# Patient Record
Sex: FEMALE | Race: BLACK OR AFRICAN AMERICAN | Employment: OTHER | URBAN - METROPOLITAN AREA
[De-identification: names, ages, dates, MRNs, and addresses within clinical notes are randomized per-mention and may not be internally consistent; named-entity substitution may affect disease eponyms.]

---

## 2017-01-13 ENCOUNTER — HOSPITAL ENCOUNTER (INPATIENT)
Age: 77
LOS: 6 days | Discharge: NURSING, REHAB OR LONG-TERM CARE | DRG: 065 | End: 2017-01-20
Attending: EMERGENCY MEDICINE | Admitting: INTERNAL MEDICINE
Payer: MEDICARE

## 2017-01-13 DIAGNOSIS — R29.898 ARM WEAKNESS: Primary | ICD-10-CM

## 2017-01-13 DIAGNOSIS — E11.42 TYPE 2 DIABETES MELLITUS WITH DIABETIC POLYNEUROPATHY, WITH LONG-TERM CURRENT USE OF INSULIN (HCC): ICD-10-CM

## 2017-01-13 DIAGNOSIS — I10 ESSENTIAL HYPERTENSION: ICD-10-CM

## 2017-01-13 DIAGNOSIS — G81.94 ACUTE LEFT HEMIPARESIS (HCC): ICD-10-CM

## 2017-01-13 DIAGNOSIS — I63.02 CEREBROVASCULAR ACCIDENT (CVA) DUE TO THROMBOSIS OF BASILAR ARTERY (HCC): ICD-10-CM

## 2017-01-13 DIAGNOSIS — I63.50 RIGHT PONTINE STROKE (HCC): ICD-10-CM

## 2017-01-13 DIAGNOSIS — Z79.4 TYPE 2 DIABETES MELLITUS WITH DIABETIC POLYNEUROPATHY, WITH LONG-TERM CURRENT USE OF INSULIN (HCC): ICD-10-CM

## 2017-01-13 LAB
GLUCOSE BLD STRIP.AUTO-MCNC: 287 MG/DL (ref 65–100)
SERVICE CMNT-IMP: ABNORMAL

## 2017-01-13 PROCEDURE — 99285 EMERGENCY DEPT VISIT HI MDM: CPT

## 2017-01-13 PROCEDURE — 82962 GLUCOSE BLOOD TEST: CPT

## 2017-01-13 NOTE — IP AVS SNAPSHOT
[address] [place] [facility]. 
[phone] Patient: Laurent Kim MRN: [id number] [id number] You are allergic to the following Allergen Reactions Amlodipine Swelling Nifedipine Swelling Sulfa (Sulfonamide Antibiotics) Rash Recent Documentation Weight Breastfeeding? BMI OB Status Smoking Status 102.2 kg No 38.08 kg/m2 Hysterectomy Never Smoker Emergency Contacts Name Discharge Info Relation Home Work Mobile Ko Klelogg DISCHARGE CAREGIVER [3] Son [22] [phone] Lalit Kellogg DISCHARGE CAREGIVER [3] Son [22] [phone] About your hospitalization You were admitted on:  January 14, 2017 You last received care in the:  [facility] 3 NEUROSCIENCE TELEMETRY You were discharged on:  January 20, 2017 Unit phone number:  [phone] Why you were hospitalized Your primary diagnosis was:  Not on File Your diagnoses also included:  Stroke (Cerebrum) (Hcc), Right Pontine Stroke (Hcc), Stenosis Of Both Internal Carotid Arteries Providers Seen During Your Hospitalizations Provider Role Specialty Primary office phone Carmen Baez MD Attending Provider Emergency Medicine [phone] Greg Pacheco MD Attending Provider Internal Medicine [phone]  
 Gee Cole MD Attending Provider Hospitalist [phone] Your Primary Care Physician (PCP) Primary Care Physician Office Phone Office Fax Juliet April [phone] Follow-up Information Follow up With Details Comments Contact Info [address]  This is your post acute care provider of choice  [address] [facility] 
[phone] Joslyn Tijerina MD In 2 weeks  [address] [address] 
[phone] Current Discharge Medication List  
  
 START taking these medications Dose & Instructions Dispensing Information Comments Morning Noon Evening Bedtime  
 clopidogrel 75 mg Tab Commonly known as:  PLAVIX Your next dose is: Today, Tomorrow Other:  _________ Dose:  75 mg Take 1 Tab by mouth daily. Quantity:  30 Tab Refills:  6  
     
   
   
   
  
 oxyCODONE IR 5 mg immediate release tablet Commonly known as:  Royetta Elidia Your next dose is: Today, Tomorrow Other:  _________ Dose:  5-10 mg Take 1-2 Tabs by mouth every six (6) hours as needed. Max Daily Amount: 40 mg.  
 Quantity:  30 Tab Refills:  0  
     
   
   
   
  
 predniSONE 20 mg tablet Commonly known as:  Lelia Pound Your next dose is: Today, Tomorrow Other:  _________ Dose:  20 mg Take 1 Tab by mouth daily (with breakfast) for 2 days. Quantity:  2 Tab Refills:  0 CONTINUE these medications which have CHANGED Dose & Instructions Dispensing Information Comments Morning Noon Evening Bedtime LANTUS SOLOSTAR 100 unit/mL (3 mL) pen Generic drug:  insulin glargine What changed:  Another medication with the same name was removed. Continue taking this medication, and follow the directions you see here. Your next dose is: Today, Tomorrow Other:  _________ Dose:  60 Units 60 Units by SubCUTAneous route nightly. Refills:  0 CONTINUE these medications which have NOT CHANGED Dose & Instructions Dispensing Information Comments Morning Noon Evening Bedtime  
 atorvastatin 40 mg tablet Commonly known as:  LIPITOR Your next dose is: Today, Tomorrow Other:  _________ Take  by mouth daily. Refills:  0 BENICAR 40 mg tablet Generic drug:  olmesartan Your next dose is: Today, Tomorrow Other:  _________ Take  by mouth daily. Refills:  0  
     
   
   
   
  
 bumetanide 1 mg tablet Commonly known as:  Joceline Oas Your next dose is: Today, Tomorrow Other:  _________ Dose:  1 mg Take 1 mg by mouth daily. Refills:  0 CENTRUM SILVER Tab tablet Generic drug:  multivitamins-minerals-lutein Your next dose is: Today, Tomorrow Other:  _________ Take  by mouth. Refills:  0  
     
   
   
   
  
 cloNIDine HCl 0.3 mg tablet Commonly known as:  CATAPRES Your next dose is: Today, Tomorrow Other:  _________ Dose:  0.3 mg Take 0.3 mg by mouth two (2) times a day. Refills:  0 HumaLOG KwikPen 100 unit/mL kwikpen Generic drug:  insulin lispro Your next dose is: Today, Tomorrow Other:  _________  
   
   
 by SubCUTAneous route Before breakfast, lunch, dinner and at bedtime. Sliding scale-  14 units                       141-180  16 units                        181-220  18 units                         221-260   20 units                          261-300  22 units                           301-340  24 units                             > 341     26 units Refills:  0  
     
   
   
   
  
 metoprolol succinate 50 mg XL tablet Commonly known as:  TOPROL XL Your next dose is: Today, Tomorrow Other:  _________ Dose:  50 mg Take 1 Tab by mouth daily. Quantity:  30 Tab Refills:  1  
     
   
   
   
  
 spironolactone 50 mg tablet Commonly known as:  ALDACTONE Your next dose is: Today, Tomorrow Other:  _________ Dose:  50 mg Take 50 mg by mouth every morning. Refills:  0  
     
   
   
   
  
 SYNTHROID 150 mcg tablet Generic drug:  levothyroxine Your next dose is: Today, Tomorrow Other:  _________ Dose:  137 mcg Take 137 mcg by mouth Daily (before breakfast). Only taking 75 mcg one day out of seven Refills:  0 STOP taking these medications   
 aspirin 325 mg tablet Commonly known as:  ASPIRIN Where to Get Your Medications Information on where to get these meds will be given to you by the nurse or doctor. ! Ask your nurse or doctor about these medications  
  clopidogrel 75 mg Tab  
 oxyCODONE IR 5 mg immediate release tablet  
 predniSONE 20 mg tablet Discharge Instructions HOSPITALIST DISCHARGE INSTRUCTIONS 
 
NAME: Rinku Dowling :  1940 MRN:  [id number] Date/Time:  2017 2:24 PM 
 
ADMIT DATE: 2017 DISCHARGE DATE: 2017 Attending Physician: Ritika Miller MD 
 
DISCHARGE DIAGNOSIS: 
 
Acute right pontine infarct. Diabetes type 2 uncontrolled on steroids Bilateral foot pain Medications: Per above medication reconciliation. Pain Management: per above medications Recommended diet: Diabetic Diet Recommended activity: Activity as tolerated Wound care: None Indwelling devices:  None Supplemental Oxygen: None Required Lab work: Per SNF routine Glucose management:  Accucheck ACHS with sliding scale per SNF protocol Code status: Full Outside physician follow up: Follow-up Information Follow up With Details Comments Contact Info [address]  This is your post acute care provider of choice  [address] [facility] [address] [facility] 
[phone] Yanni Giraldo MD   [address] [facility] [address] 
[phone] Skilled nursing facility/ SNF MD responsible for above on discharge. Information obtained by : 
I understand that if any problems occur once I am at home I am to contact my physician. I understand and acknowledge receipt of the instructions indicated above. Physician's or R.N.'s Signature                                                                  Date/Time Patient or Repres Discharge Orders None Introducing [facility] [place] [facility]! [facility] introduces eReceipts patient portal. Now you can access parts of your medical record, email your doctor's office, and request medication refills online. 1. In your internet browser, go to [email or web address]. [email or web address] 2. Click on the First Time User? Click Here link in the Sign In box. You will see the New Member Sign Up page. 3. Enter your eReceipts Access Code exactly as it appears below. You will not need to use this code after youve completed the sign-up process. If you do not sign up before the expiration date, you must request a new code. · eReceipts Access Code: [email or web address] Expires: 4/13/2017  8:42 AM 
 
4. Enter the last four digits of your Social Security Number (xxxx) and Date of Birth (mm/dd/yyyy) as indicated and click Submit. You will be taken to the next sign-up page. 5. Create a eReceipts ID. This will be your eReceipts login ID and cannot be changed, so think of one that is secure and easy to remember. 6. Create a eReceipts password. You can change your password at any time. 7. Enter your Password Reset Question and Answer. This can be used at a later time if you forget your password. 8. Enter your e-mail address. You will receive e-mail notification when new information is available in [address] 9. Click Sign Up. You can now view and download portions of your medical record. 10. Click the Download Summary menu link to download a portable copy of your medical information.  
 
If you have questions, please visit the Frequently Asked Questions section of the [employer]. Remember, MyChart is NOT to be used for urgent needs. For medical emergencies, dial 911. Now available from your iPhone and Android! General Information Please provide this summary of care documentation to your next provider. Patient Signature:  ____________________________________________________________ Date:  ____________________________________________________________  
  
Weston Moulding Provider Signature:  ____________________________________________________________ Date:  ____________________________________________________________

## 2017-01-14 ENCOUNTER — APPOINTMENT (OUTPATIENT)
Dept: ULTRASOUND IMAGING | Age: 77
DRG: 065 | End: 2017-01-14
Attending: INTERNAL MEDICINE
Payer: MEDICARE

## 2017-01-14 ENCOUNTER — APPOINTMENT (OUTPATIENT)
Dept: MRI IMAGING | Age: 77
DRG: 065 | End: 2017-01-14
Attending: INTERNAL MEDICINE
Payer: MEDICARE

## 2017-01-14 ENCOUNTER — APPOINTMENT (OUTPATIENT)
Dept: CT IMAGING | Age: 77
DRG: 065 | End: 2017-01-14
Attending: HOSPITALIST
Payer: MEDICARE

## 2017-01-14 ENCOUNTER — APPOINTMENT (OUTPATIENT)
Dept: CT IMAGING | Age: 77
DRG: 065 | End: 2017-01-14
Attending: EMERGENCY MEDICINE
Payer: MEDICARE

## 2017-01-14 PROBLEM — I63.9 STROKE (CEREBRUM) (HCC): Status: ACTIVE | Noted: 2017-01-14

## 2017-01-14 LAB
ALBUMIN SERPL BCP-MCNC: 3.9 G/DL (ref 3.5–5)
ALBUMIN/GLOB SERPL: 1.1 {RATIO} (ref 1.1–2.2)
ALP SERPL-CCNC: 54 U/L (ref 45–117)
ALT SERPL-CCNC: 32 U/L (ref 12–78)
ANION GAP BLD CALC-SCNC: 11 MMOL/L (ref 5–15)
APTT PPP: 24.7 SEC (ref 22.1–32.5)
AST SERPL W P-5'-P-CCNC: 31 U/L (ref 15–37)
BASOPHILS # BLD AUTO: 0 K/UL (ref 0–0.1)
BASOPHILS # BLD: 1 % (ref 0–1)
BILIRUB SERPL-MCNC: 0.4 MG/DL (ref 0.2–1)
BUN SERPL-MCNC: 46 MG/DL (ref 6–20)
BUN/CREAT SERPL: 18 (ref 12–20)
CALCIUM SERPL-MCNC: 8.3 MG/DL (ref 8.5–10.1)
CHLORIDE SERPL-SCNC: 100 MMOL/L (ref 97–108)
CK MB CFR SERPL CALC: 1.3 % (ref 0–2.5)
CK MB SERPL-MCNC: 2.7 NG/ML (ref 5–25)
CK SERPL-CCNC: 202 U/L (ref 26–192)
CO2 SERPL-SCNC: 27 MMOL/L (ref 21–32)
CREAT SERPL-MCNC: 2.49 MG/DL (ref 0.55–1.02)
EOSINOPHIL # BLD: 0.1 K/UL (ref 0–0.4)
EOSINOPHIL NFR BLD: 2 % (ref 0–7)
ERYTHROCYTE [DISTWIDTH] IN BLOOD BY AUTOMATED COUNT: 14.1 % (ref 11.5–14.5)
GLOBULIN SER CALC-MCNC: 3.4 G/DL (ref 2–4)
GLUCOSE BLD STRIP.AUTO-MCNC: 158 MG/DL (ref 65–100)
GLUCOSE BLD STRIP.AUTO-MCNC: 165 MG/DL (ref 65–100)
GLUCOSE BLD STRIP.AUTO-MCNC: 344 MG/DL (ref 65–100)
GLUCOSE BLD STRIP.AUTO-MCNC: 349 MG/DL (ref 65–100)
GLUCOSE SERPL-MCNC: 242 MG/DL (ref 65–100)
HCT VFR BLD AUTO: 29.2 % (ref 35–47)
HGB BLD-MCNC: 9.9 G/DL (ref 11.5–16)
INR BLD: 1 (ref 0.9–1.2)
INR PPP: 1 (ref 0.9–1.1)
LYMPHOCYTES # BLD AUTO: 46 % (ref 12–49)
LYMPHOCYTES # BLD: 2 K/UL (ref 0.8–3.5)
MCH RBC QN AUTO: 29.7 PG (ref 26–34)
MCHC RBC AUTO-ENTMCNC: 33.9 G/DL (ref 30–36.5)
MCV RBC AUTO: 87.7 FL (ref 80–99)
MONOCYTES # BLD: 0.3 K/UL (ref 0–1)
MONOCYTES NFR BLD AUTO: 7 % (ref 5–13)
NEUTS SEG # BLD: 1.8 K/UL (ref 1.8–8)
NEUTS SEG NFR BLD AUTO: 44 % (ref 32–75)
PLATELET # BLD AUTO: 180 K/UL (ref 150–400)
POTASSIUM SERPL-SCNC: 4.1 MMOL/L (ref 3.5–5.1)
PROT SERPL-MCNC: 7.3 G/DL (ref 6.4–8.2)
PROTHROMBIN TIME: 10.2 SEC (ref 9–11.1)
RBC # BLD AUTO: 3.33 M/UL (ref 3.8–5.2)
SERVICE CMNT-IMP: ABNORMAL
SODIUM SERPL-SCNC: 138 MMOL/L (ref 136–145)
THERAPEUTIC RANGE,PTTT: NORMAL SECS (ref 58–77)
TROPONIN I SERPL-MCNC: <0.04 NG/ML
WBC # BLD AUTO: 4.2 K/UL (ref 3.6–11)

## 2017-01-14 PROCEDURE — 82550 ASSAY OF CK (CPK): CPT | Performed by: EMERGENCY MEDICINE

## 2017-01-14 PROCEDURE — 80053 COMPREHEN METABOLIC PANEL: CPT | Performed by: EMERGENCY MEDICINE

## 2017-01-14 PROCEDURE — 74011250636 HC RX REV CODE- 250/636: Performed by: INTERNAL MEDICINE

## 2017-01-14 PROCEDURE — 74011636637 HC RX REV CODE- 636/637: Performed by: INTERNAL MEDICINE

## 2017-01-14 PROCEDURE — 82553 CREATINE MB FRACTION: CPT | Performed by: EMERGENCY MEDICINE

## 2017-01-14 PROCEDURE — 85610 PROTHROMBIN TIME: CPT | Performed by: EMERGENCY MEDICINE

## 2017-01-14 PROCEDURE — 74011250637 HC RX REV CODE- 250/637: Performed by: HOSPITALIST

## 2017-01-14 PROCEDURE — 85610 PROTHROMBIN TIME: CPT

## 2017-01-14 PROCEDURE — 70551 MRI BRAIN STEM W/O DYE: CPT

## 2017-01-14 PROCEDURE — 70450 CT HEAD/BRAIN W/O DYE: CPT

## 2017-01-14 PROCEDURE — 65660000000 HC RM CCU STEPDOWN

## 2017-01-14 PROCEDURE — 82962 GLUCOSE BLOOD TEST: CPT

## 2017-01-14 PROCEDURE — 85730 THROMBOPLASTIN TIME PARTIAL: CPT | Performed by: EMERGENCY MEDICINE

## 2017-01-14 PROCEDURE — 92610 EVALUATE SWALLOWING FUNCTION: CPT

## 2017-01-14 PROCEDURE — 74011250637 HC RX REV CODE- 250/637: Performed by: INTERNAL MEDICINE

## 2017-01-14 PROCEDURE — 84484 ASSAY OF TROPONIN QUANT: CPT | Performed by: EMERGENCY MEDICINE

## 2017-01-14 PROCEDURE — 85025 COMPLETE CBC W/AUTO DIFF WBC: CPT | Performed by: EMERGENCY MEDICINE

## 2017-01-14 PROCEDURE — 76770 US EXAM ABDO BACK WALL COMP: CPT

## 2017-01-14 PROCEDURE — 36415 COLL VENOUS BLD VENIPUNCTURE: CPT | Performed by: EMERGENCY MEDICINE

## 2017-01-14 RX ORDER — MAGNESIUM SULFATE 100 %
4 CRYSTALS MISCELLANEOUS AS NEEDED
Status: DISCONTINUED | OUTPATIENT
Start: 2017-01-14 | End: 2017-01-20 | Stop reason: HOSPADM

## 2017-01-14 RX ORDER — INSULIN LISPRO 100 [IU]/ML
INJECTION, SOLUTION INTRAVENOUS; SUBCUTANEOUS
Status: DISCONTINUED | OUTPATIENT
Start: 2017-01-14 | End: 2017-01-19

## 2017-01-14 RX ORDER — SODIUM CHLORIDE 9 MG/ML
50 INJECTION, SOLUTION INTRAVENOUS CONTINUOUS
Status: DISCONTINUED | OUTPATIENT
Start: 2017-01-14 | End: 2017-01-15

## 2017-01-14 RX ORDER — ATORVASTATIN CALCIUM 40 MG/1
40 TABLET, FILM COATED ORAL DAILY
Status: DISCONTINUED | OUTPATIENT
Start: 2017-01-14 | End: 2017-01-14

## 2017-01-14 RX ORDER — METOPROLOL SUCCINATE 25 MG/1
12.5 TABLET, EXTENDED RELEASE ORAL DAILY
Status: DISCONTINUED | OUTPATIENT
Start: 2017-01-15 | End: 2017-01-16

## 2017-01-14 RX ORDER — DEXTROSE 50 % IN WATER (D50W) INTRAVENOUS SYRINGE
12.5-25 AS NEEDED
Status: DISCONTINUED | OUTPATIENT
Start: 2017-01-14 | End: 2017-01-20 | Stop reason: HOSPADM

## 2017-01-14 RX ORDER — CLONIDINE HYDROCHLORIDE 0.1 MG/1
0.1 TABLET ORAL 2 TIMES DAILY
Status: DISCONTINUED | OUTPATIENT
Start: 2017-01-15 | End: 2017-01-16

## 2017-01-14 RX ORDER — ACETAMINOPHEN 325 MG/1
650 TABLET ORAL
Status: DISCONTINUED | OUTPATIENT
Start: 2017-01-14 | End: 2017-01-20 | Stop reason: HOSPADM

## 2017-01-14 RX ORDER — METOPROLOL SUCCINATE 50 MG/1
50 TABLET, EXTENDED RELEASE ORAL DAILY
Status: DISCONTINUED | OUTPATIENT
Start: 2017-01-14 | End: 2017-01-14

## 2017-01-14 RX ORDER — GUAIFENESIN 100 MG/5ML
81 LIQUID (ML) ORAL DAILY
Status: DISCONTINUED | OUTPATIENT
Start: 2017-01-14 | End: 2017-01-15

## 2017-01-14 RX ORDER — CLONIDINE HYDROCHLORIDE 0.1 MG/1
0.3 TABLET ORAL 2 TIMES DAILY
Status: DISCONTINUED | OUTPATIENT
Start: 2017-01-14 | End: 2017-01-14

## 2017-01-14 RX ORDER — SODIUM CHLORIDE 0.9 % (FLUSH) 0.9 %
5-10 SYRINGE (ML) INJECTION EVERY 8 HOURS
Status: DISCONTINUED | OUTPATIENT
Start: 2017-01-14 | End: 2017-01-20 | Stop reason: HOSPADM

## 2017-01-14 RX ORDER — ATORVASTATIN CALCIUM 40 MG/1
40 TABLET, FILM COATED ORAL
Status: DISCONTINUED | OUTPATIENT
Start: 2017-01-14 | End: 2017-01-20 | Stop reason: HOSPADM

## 2017-01-14 RX ORDER — INSULIN GLARGINE 100 [IU]/ML
50 INJECTION, SOLUTION SUBCUTANEOUS
Status: DISCONTINUED | OUTPATIENT
Start: 2017-01-15 | End: 2017-01-15

## 2017-01-14 RX ORDER — ONDANSETRON 2 MG/ML
4 INJECTION INTRAMUSCULAR; INTRAVENOUS
Status: DISCONTINUED | OUTPATIENT
Start: 2017-01-14 | End: 2017-01-20 | Stop reason: HOSPADM

## 2017-01-14 RX ORDER — INSULIN GLARGINE 100 [IU]/ML
20 INJECTION, SOLUTION SUBCUTANEOUS
Status: DISCONTINUED | OUTPATIENT
Start: 2017-01-15 | End: 2017-01-14

## 2017-01-14 RX ORDER — ENOXAPARIN SODIUM 100 MG/ML
40 INJECTION SUBCUTANEOUS EVERY 24 HOURS
Status: DISCONTINUED | OUTPATIENT
Start: 2017-01-14 | End: 2017-01-15

## 2017-01-14 RX ORDER — LEVOTHYROXINE SODIUM 150 UG/1
150 TABLET ORAL
Status: DISCONTINUED | OUTPATIENT
Start: 2017-01-14 | End: 2017-01-14

## 2017-01-14 RX ORDER — LABETALOL HCL 20 MG/4 ML
5 SYRINGE (ML) INTRAVENOUS
Status: DISCONTINUED | OUTPATIENT
Start: 2017-01-14 | End: 2017-01-20 | Stop reason: HOSPADM

## 2017-01-14 RX ORDER — SODIUM CHLORIDE 0.9 % (FLUSH) 0.9 %
5-10 SYRINGE (ML) INJECTION AS NEEDED
Status: DISCONTINUED | OUTPATIENT
Start: 2017-01-14 | End: 2017-01-20 | Stop reason: HOSPADM

## 2017-01-14 RX ADMIN — CLONIDINE HYDROCHLORIDE 0.3 MG: 0.1 TABLET ORAL at 11:55

## 2017-01-14 RX ADMIN — METOPROLOL SUCCINATE 50 MG: 50 TABLET, EXTENDED RELEASE ORAL at 11:55

## 2017-01-14 RX ADMIN — INSULIN LISPRO 2 UNITS: 100 INJECTION, SOLUTION INTRAVENOUS; SUBCUTANEOUS at 08:03

## 2017-01-14 RX ADMIN — SODIUM CHLORIDE 75 ML/HR: 900 INJECTION, SOLUTION INTRAVENOUS at 07:52

## 2017-01-14 RX ADMIN — Medication 10 ML: at 08:05

## 2017-01-14 RX ADMIN — INSULIN LISPRO 2 UNITS: 100 INJECTION, SOLUTION INTRAVENOUS; SUBCUTANEOUS at 12:07

## 2017-01-14 RX ADMIN — ASPIRIN 81 MG 81 MG: 81 TABLET ORAL at 11:55

## 2017-01-14 RX ADMIN — ATORVASTATIN CALCIUM 40 MG: 40 TABLET, FILM COATED ORAL at 22:28

## 2017-01-14 RX ADMIN — INSULIN LISPRO 7 UNITS: 100 INJECTION, SOLUTION INTRAVENOUS; SUBCUTANEOUS at 16:59

## 2017-01-14 RX ADMIN — LEVOTHYROXINE SODIUM 137 MCG: 25 TABLET ORAL at 11:08

## 2017-01-14 RX ADMIN — INSULIN LISPRO 4 UNITS: 100 INJECTION, SOLUTION INTRAVENOUS; SUBCUTANEOUS at 22:28

## 2017-01-14 RX ADMIN — ENOXAPARIN SODIUM 40 MG: 40 INJECTION SUBCUTANEOUS at 08:03

## 2017-01-14 NOTE — PROGRESS NOTES
Hospitalist    Pt was seen and examined  Admitted earlier by my partner   Continue current management  Passed speech    Alton Sorenson MD

## 2017-01-14 NOTE — H&P
Hospitalist Admission Note    NAME: Thang Felton   :  [birth year]   MRN:  [id number]     Date/Time:  2017 1:42 AM    Patient PCP: Palmira Lorenz MD  ________________________________________________________________________    My assessment of this patient's clinical condition and my plan of care is as follows. Assessment / Plan:  Left upper and lower extremity weakness likely secondary to acute CVA - CT head negative, but given risk factors and symptoms highly suggestive of stroke  -not a candidate for tpa  -continue aspirin, consider adding plavix  -Neurology consult  -Permissive HTN, labetalol PRN SBP >220  -MRI  -Echo  -Check lipid panel  -Check HbA1c  -Swallow screen  -Speech/OT/PT consults    TARUN - baseline Cr ~ 1.5, now up to 2.4, likely pre renal given poor PO intake and concomitant diuretic and ARB use   -start gentle IV fluids  -will hold bumex, olmesartan, spironolactone  -trend Cr, if not improving consider nephrology consult  -renal ultrasound    DM2 - on lantus at home  -will reorder half dose lantus  -ISS    Hypothyroidism  -levothyroxine    Code Status: full  Surrogate Decision Maker: bonita Hernández [phone] or .[phone], bonita Blake [phone]    DVT Prophylaxis: lovenox  GI Prophylaxis: not indicated    Baseline: independent, lives with son        Subjective:   CHIEF COMPLAINT: left sided weakness    HISTORY OF PRESENT ILLNESS:     Manuel Lopez is a 77 y.o.  female with history of diabetes, hypertension, CKD stage 3 who presents with one day history of left upper and lower extremities weakness. On 17 patient was last known well at 5 PM, patient woke up at 930 PM and felt weakness on her left side and fell. Her son who lives with her noticed some slurred speech. During her stay in the ED patient tele neurology was called and based on history she was outside the time window for tpa. Patient's leg weakness has improved, however her arm is still weak. Has been eating and drinking less recently. Had recently been treated for a UTI with antibiotics and completed 7 days of treatment is unable to recall the name. Denies any urinary symptoms. We were asked to admit for work up and evaluation of the above problems. Past Medical History   Diagnosis Date    Arthritis     Cataract      bilateral    Chronic kidney disease     Diabetes ([facility] [address]     Hypercholesterolemia     Hypertension     Ill-defined condition      states 'polyp' in gal bladder    Obesity     Thyroid disease     TIA (transient ischemic attack) 6/6/2014        Past Surgical History   Procedure Laterality Date    Hx gyn  1970's     partial hysterectomy    Hx orthopaedic  1988     bunion    Colorectal scrn; hi risk ind  5/30/2014          Pr breast surgery procedure unlisted  2009, 2006     breast bx-vince    Hx hernia repair  2009    Pr abdomen surgery proc unlisted  2006     stomach    Hx heent  8/2014, 09/2014     Dr. Анна Jonas Hx thyroidectomy  2014     Dr. Dayton Calderon History   Substance Use Topics    Smoking status: Never Smoker    Smokeless tobacco: Never Used    Alcohol use No        Family History   Problem Relation Age of Onset    Heart Disease Mother     Hypertension Mother     Diabetes Mother     Stroke Mother     Cancer Father      colon    Heart Disease Sister     Diabetes Sister     Heart Attack Sister     Hypertension Sister     Lung Disease Brother     Lung Disease Brother     Anesth Problems Neg Hx      Allergies   Allergen Reactions    Amlodipine Swelling    Nifedipine Swelling    Sulfa (Sulfonamide Antibiotics) Rash        Prior to Admission medications    Medication Sig Start Date End Date Taking? Authorizing Provider   levothyroxine (SYNTHROID) 150 mcg tablet Take 150 mcg by mouth Daily (before breakfast). Only taking 75 mcg one day out of seven   Yes Historical Provider   bumetanide (BUMEX) 1 mg tablet Take 1 mg by mouth daily.    Yes Historical Provider   insulin glargine (LANTUS) 100 unit/mL injection 50 Units by SubCUTAneous route nightly. Yes Historical Provider   spironolactone (ALDACTONE) 50 mg tablet Take 50 mg by mouth every morning. Yes Historical Provider   cloNIDine (CATAPRES) 0.3 mg tablet Take 0.3 mg by mouth two (2) times a day. Yes Historical Provider   olmesartan (BENICAR) 40 mg tablet Take  by mouth daily. Yes Historical Provider   metoprolol succinate (TOPROL XL) 50 mg XL tablet Take 1 Tab by mouth daily. 6/6/14  Yes Kam Goldstein MD   aspirin (ASPIRIN) 325 mg tablet Take 1 Tab by mouth daily. 6/6/14  Yes Kam Goldstein MD   atorvastatin (LIPITOR) 40 mg tablet Take  by mouth daily. Yes Historical Provider   multivitamins-minerals-lutein (CENTRUM SILVER) Tab Take  by mouth. Yes Historical Provider   insulin lispro (HUMALOG KWIKPEN) 100 unit/mL kwikpen by SubCUTAneous route Before breakfast, lunch, dinner and at bedtime. Sliding scale-  14 units                        141-180  16 units                         181-220  18 units                          221-260   20 units                           261-300  22 units                            301-340  24 units                              > 341     26 units   Yes Historical Provider   sitaGLIPtin (JANUVIA) 100 mg tablet Take 50 mg by mouth daily. Artis Correia MD       REVIEW OF SYSTEMS:     I am not able to complete the review of systems because:        Total of 12 systems reviewed as follows:         General: no fever, no chills, no sweats, no generalized weakness, no weight loss, no weight gain, no loss of appetite   Eyes: no blurred vision, no eye pain, no loss of vision, no double vision  ENT: no rhinorrhea, no pharyngitis   Respiratory: no cough, no sputum production, no SOB, no ALONSO, no wheezing, no pleuritic pain   Cardiology: no chest pain, no palpitations, no orthopnea, no PND, no edema, no syncope   Gastrointestinal: no abdominal pain, no N/V, no diarrhea, no dysphagia, no constipation, no bleeding   Genitourinary: no frequency, no urgency, no dysuria, no hematuria, no incontinence   Muskuloskeletal : no arthralgia, no myalgia, no back pain  Hematology: no easy bruising, no nose or gum bleeding, no lymphadenopathy   Dermatological: no rash, no ulceration, no pruritis, no color change / jaundice  Endocrine: no hot flashes, no polydipsia   Neurological: no headache, no dizziness, no confusion, + focal weakness, no paresthesia, no speech difficulties, no memory loss, no gait difficulty  Psychological: no anxiety, no depression, no agitation      Objective:   VITALS:    Patient Vitals for the past 12 hrs:   Pulse Resp BP SpO2   01/14/17 0115 70 23 138/66 97 %   01/14/17 0030 74 16 139/68 98 %   01/14/17 0021 77 21 150/62 98 %   01/13/17 2344 82 18 193/77 98 %         PHYSICAL EXAM:    General:    Alert, cooperative, no distress, appears stated age. HEENT: Atraumatic, anicteric sclerae, pink conjunctivae     No oral ulcers, oral mucosa dry  Neck:  Supple, symmetrical  Lungs:   Clear to auscultation bilaterally, no wheezing, rhonchi, or rales. Chest wall:  No tenderness, no accessory muscle use. Heart:   Regular rhythm, no murmur, no edema  Abdomen:   Soft, non-tender, not distended, bowel sounds normal  Extremities: No cyanosis, no clubbing      Capillary refill normal,  radial pulse 2+  Skin:     Not pale, not jaundiced, no rashes   Psych:  Good insight, not depressed, not anxious or agitated.   Neurologic: EOMs intact, mild left facial droop, no aphasia or slurred speech, +drift of left upper ext, strength 4/5, at time of my exam LLE 4+/5 and RLE 5/5 sensation grossly intact, alert and oriented x 4.     _______________________________________________________________________  Care Plan discussed with:    Comments   Patient x    Family  x son   RN     Care Manager                    Consultant: _______________________________________________________________________  Expected  Disposition:   Home with Family    HH/PT/OT/RN x   SNF/LTC    [facility]    ________________________________________________________________________  TOTAL TIME:  60 Minutes    Critical Care Provided     Minutes non procedure based      Comments     Reviewed previous records   >50% of visit spent in counseling and coordination of care  Discussion with patient and/or family and questions answered       ________________________________________________________________________  Mounika Rodas MD    Procedures: see electronic medical records for all procedures/Xrays and details which were not copied into this note but were reviewed prior to creation of Plan. LAB DATA REVIEWED:    Recent Results (from the past 24 hour(s))   GLUCOSE, POC    Collection Time: 01/13/17 11:44 PM   Result Value Ref Range    Glucose (POC) 287 (H) 65 - 100 mg/dL    Performed by Obdulio Mckinley    CBC WITH AUTOMATED DIFF    Collection Time: 01/14/17 12:11 AM   Result Value Ref Range    WBC 4.2 3.6 - 11.0 K/uL    RBC 3.33 (L) 3.80 - 5.20 M/uL    HGB 9.9 (L) 11.5 - 16.0 g/dL    HCT 29.2 (L) 35.0 - 47.0 %    MCV 87.7 80.0 - 99.0 FL    MCH 29.7 26.0 - 34.0 PG    MCHC 33.9 30.0 - 36.5 g/dL    RDW 14.1 11.5 - 14.5 %    PLATELET 143 693 - 600 K/uL    NEUTROPHILS 44 32 - 75 %    LYMPHOCYTES 46 12 - 49 %    MONOCYTES 7 5 - 13 %    EOSINOPHILS 2 0 - 7 %    BASOPHILS 1 0 - 1 %    ABS. NEUTROPHILS 1.8 1.8 - 8.0 K/UL    ABS. LYMPHOCYTES 2.0 0.8 - 3.5 K/UL    ABS. MONOCYTES 0.3 0.0 - 1.0 K/UL    ABS. EOSINOPHILS 0.1 0.0 - 0.4 K/UL    ABS.  BASOPHILS 0.0 0.0 - 0.1 K/UL   METABOLIC PANEL, COMPREHENSIVE    Collection Time: 01/14/17 12:11 AM   Result Value Ref Range    Sodium 138 136 - 145 mmol/L    Potassium 4.1 3.5 - 5.1 mmol/L    Chloride 100 97 - 108 mmol/L    CO2 27 21 - 32 mmol/L    Anion gap 11 5 - 15 mmol/L    Glucose 242 (H) 65 - 100 mg/dL    BUN 46 (H) 6 - 20 MG/DL    Creatinine 2.49 (H) 0.55 - 1.02 MG/DL    BUN/Creatinine ratio 18 12 - 20      GFR est AA 23 (L) >60 ml/min/1.73m2    GFR est non-AA 19 (L) >60 ml/min/1.73m2    Calcium 8.3 (L) 8.5 - 10.1 MG/DL    Bilirubin, total 0.4 0.2 - 1.0 MG/DL    ALT 32 12 - 78 U/L    AST 31 15 - 37 U/L    Alk. phosphatase 54 45 - 117 U/L    Protein, total 7.3 6.4 - 8.2 g/dL    Albumin 3.9 3.5 - 5.0 g/dL    Globulin 3.4 2.0 - 4.0 g/dL    A-G Ratio 1.1 1.1 - 2.2     TROPONIN I    Collection Time: 01/14/17 12:11 AM   Result Value Ref Range    Troponin-I, Qt. <0.04 <0.05 ng/mL   CK W/ REFLX CKMB    Collection Time: 01/14/17 12:11 AM   Result Value Ref Range     (H) 26 - 192 U/L   PROTHROMBIN TIME + INR    Collection Time: 01/14/17 12:11 AM   Result Value Ref Range    INR 1.0 0.9 - 1.1      Prothrombin time 10.2 9.0 - 11.1 sec   PTT    Collection Time: 01/14/17 12:11 AM   Result Value Ref Range    aPTT 24.7 22.1 - 32.5 sec    aPTT, therapeutic range     58.0 - 77.0 SECS   CK-MB,QUANT.     Collection Time: 01/14/17 12:11 AM   Result Value Ref Range    CK - MB 2.7 <3.6 NG/ML    CK-MB Index 1.3 0 - 2.5     POC INR    Collection Time: 01/14/17 12:41 AM   Result Value Ref Range    INR (POC) 1.0 <1.2

## 2017-01-14 NOTE — PROGRESS NOTES
Speech Pathology bedside swallow evaluation/discharge  Patient: Lulu Flowers (77 y.o. female)  Date: 1/14/2017  Primary Diagnosis: Stroke (cerebrum) [facility])        Precautions:        ASSESSMENT :  Based on the objective data described below, the patient presents with functional swallow of all textures. Good basic language but min dysarthria. Reduced breath support noted with min distortions. Therapy provided by a speech-language pathologist is not indicated at this time. PLAN :  Recommendations:  Continue with current diet. Discharge Recommendations: To Be Determined     SUBJECTIVE:   Patient stated she had L sided weakness. OBJECTIVE:     Past Medical History   Diagnosis Date    Arthritis     Cataract      bilateral    Chronic kidney disease     Diabetes ([facility] [address]     Hypercholesterolemia     Hypertension     Ill-defined condition      states 'polyp' in gal bladder    Obesity     Thyroid disease     TIA (transient ischemic attack) 6/6/2014     Past Surgical History   Procedure Laterality Date    Hx gyn  1970's     partial hysterectomy    Hx orthopaedic  1988     bunion    Colorectal scrn; hi risk ind  5/30/2014          Pr breast surgery procedure unlisted  2009, 2006     breast bx-vince    Hx hernia repair  2009    Pr abdomen surgery proc unlisted  2006     stomach    Hx heent  8/2014, 09/2014     Dr. Dang Chávez Hx thyroidectomy  2014     Dr. Billy Prakash     Prior Level of Function/Home Situation:      Diet prior to admission:   Current Diet:   Reg/thins  Cognitive and Communication Status:  Neurologic State: Alert  Orientation Level: Oriented X4  Cognition: Appropriate decision making, Appropriate for age attention/concentration, Appropriate safety awareness, Follows commands  Perception: Appears intact  Perseveration: No perseveration noted     Oral Assessment:  Oral Assessment  Labial: Left droop (min)  Lingual: Left deviation (min)  Mandible: No impairment  P.O.  Trials:     Vocal quality prior to P.O.: No impairment  Consistency Presented: Thin liquid; Solid  How Presented: Self-fed/presented     Bolus Acceptance: No impairment  Bolus Formation/Control: No impairment     Propulsion: No impairment  Oral Residue: None  Initiation of Swallow: No impairment  Laryngeal Elevation: Functional  Aspiration Signs/Symptoms: None                Oral Phase Severity: No impairment  Pharyngeal Phase Severity : No impairment  G Codes: In compliance with CMSs Claims Based Outcome Reporting, the following G-code set was chosen for this patient based the use of the NOMS functional outcome to quantify this patient's level of swallowing impairment. Using the NOMS, the patient was determined to be at level 1 for swallow function which correlates with the CH= 0% level of severity. Based on the objective assessment provided within this note, the current, goal, and discharge g-codes are as follows:    Swallow  Swallowing:   Swallow Current Status CH= 0%   Swallow Goal Status CH= 0%   Swallow D/C Status CH= 0%      NOMS Swallowing Levels:  Level 1 (CN): NPO  Level 2 (CM): NPO but takes consistency in therapy  Level 3 (CL): Takes less than 50% of nutrition p.o. and continues with nonoral feedings; and/or safe with mod cues; and/or max diet restriction  Level 4 (CK): Safe swallow but needs mod cues; and/or mod diet restriction; and/or still requires some nonoral feeding/supplements  Level 5 (CJ): Safe swallow with min diet restriction; and/or needs min cues  Level 6 (CI): Independent with p.o.; rare cues; usually self cues; may need to avoid some foods or needs extra time  Level 7 ([address] [place] [address]): Independent for all p.o.  CATHRYN. (2003). National Outcomes Measurement System (NOMS): Adult Speech-Language Pathology User's Guide.        Pain:  Pain Scale 1: Numeric (0 - 10)  Pain Intensity 1: 0     After treatment:   [] Patient left in no apparent distress sitting up in chair  [x] Patient left in no apparent distress in bed  [x] Call bell left within reach  [x] Nursing notified  [] Caregiver present  [] Bed alarm activated    COMMUNICATION/EDUCATION:   The patients plan of care including findings, recommendations, and recommended diet changes were discussed with: Registered Nurse. Patient was educated regarding Her deficit(s) of dysphagia as this relates to Her diagnosis of CVA. She demonstrated Good understanding as evidenced by good comprehension. [] Posted safety precautions in patient's room. [x] Patient/family have participated as able and agree with findings and recommendations. [] Patient is unable to participate in plan of care at this time.     Thank you for this referral.  Parth Gandhi, SLP  Time Calculation: 10 mins

## 2017-01-14 NOTE — PROGRESS NOTES
Patient had been sleeping and woke up appearing to have increasing weakness of the left arm and more droop of the left side of the face. Dr. Marlene Morse notified. Stat head CT ordered. Spoke to vascular to clarify the stat duplex scan. Not done on weekends unless prior to surgery. Neurology paged for consult.

## 2017-01-14 NOTE — ED PROVIDER NOTES
HPI Comments: 11:58 Sidney Josue is a 77 y.o. Female with history of HTN, DM, elevated cholesterol and TIA. Patient presents with:  Extremity Weakness: started at 10pm, left sided weakness in both arm and leg, and left sided facial droop when puffing out cheeks. hx HTN  Fall: x 2 tonight, post weakness symptoms. denies hitting head or LOC       Patient lives with her son and states the last time he saw her normal was yesterday. He left for work this AM while she was asleep. When he came home for work he went upstairs without checking on her and then heard a fall (approx 9:30). He was unable to get her up and noticed she couldn't use her left arm and leg. He also notes she was having some slurred speech. They checked her BP and noted it was elevated at 160/90. He denies any recent illness and states she was perfectly normal yesterday able to accomplish all her daily activities without problem.    Patient denies any HA, CP, SOB, N/V/D.    PCP:    SOCIAL: lives with son, -Tobacco, -EtOH           Past Medical History:   Diagnosis Date    Arthritis     Cataract      bilateral    Chronic kidney disease     [facility] ([facility] [address]     Hypercholesterolemia     Hypertension     Ill-defined condition      states 'polyp' in gal bladder    Obesity     Thyroid disease     TIA (transient ischemic attack) 6/6/2014       Past Surgical History:   Procedure Laterality Date    Hx gyn  1970's     partial hysterectomy    Hx orthopaedic  1988     bunion    Colorectal scrn; hi risk ind  5/30/2014          Pr breast surgery procedure unlisted  2009, 2006     breast bx-vince    Hx hernia repair  2009    Pr abdomen surgery proc unlisted  2006     stomach    Hx heent  8/2014, 09/2014     Dr. Campbell  Hx thyroidectomy  2014     Dr. Galilea Gonzalez         Family History:   Problem Relation Age of Onset    Heart Disease Mother     Hypertension Mother     Diabetes Mother     Stroke Mother     Cancer Father      colon    Heart Disease Sister     Diabetes Sister     Heart Attack Sister     Hypertension Sister     Lung Disease Brother     Anesth Problems Neg Hx     Lung Disease Brother        Social History     Social History    Marital status:      Spouse name: N/A    Number of children: N/A    Years of education: N/A     Occupational History    Not on file. Social History Main Topics    Smoking status: Never Smoker    Smokeless tobacco: Never Used    Alcohol use No    Drug use: No    Sexual activity: Not on file     Other Topics Concern    Not on file     Social History Narrative         ALLERGIES: Amlodipine; Nifedipine; and Sulfa (sulfonamide antibiotics)    Review of Systems   Constitutional: Negative for activity change and chills. HENT: Negative for congestion and rhinorrhea. Respiratory: Negative for chest tightness and shortness of breath. Cardiovascular: Negative for chest pain and leg swelling. Genitourinary: Negative for dysuria. Neurological: Negative for dizziness and light-headedness. Psychiatric/Behavioral: Negative for confusion. All other systems reviewed and are negative. Vitals:    01/13/17 2344   BP: 193/77   Pulse: 82   Resp: 18   SpO2: 98%   Weight: 102.2 kg (225 lb 5 oz)            Physical Exam   Constitutional: She is oriented to person, place, and time. She appears well-developed and well-nourished. She appears distressed (Moderate). HENT:   Head: Normocephalic and atraumatic. Eyes: Conjunctivae are normal. Pupils are equal, round, and reactive to light. Right eye exhibits no discharge. Left eye exhibits no discharge. Neck: Normal range of motion. Neck supple. Cardiovascular: Normal rate and regular rhythm. No murmur heard. Pulmonary/Chest: Effort normal and breath sounds normal.   Abdominal: Soft. She exhibits no distension. There is no tenderness. Musculoskeletal: Normal range of motion. She exhibits no edema or tenderness.    Neurological: She is alert and oriented to person, place, and time. A cranial nerve deficit (Left sided facial droop) is present. No sensory deficit. She exhibits abnormal muscle tone. Gait abnormal.   Skin: She is not diaphoretic. Psychiatric: She has a normal mood and affect. Nursing note and vitals reviewed. MDM  Number of Diagnoses or Management Options  Arm weakness:   Diagnosis management comments: Stroke but unsure of time of onset and last known well. Hold tPA. Amount and/or Complexity of Data Reviewed  Discuss the patient with other providers: yes (Neuro, hospitalist)      ED Course       Procedures     00:30 Confirmation of details regarding symptoms. Pt states she was able to reheat dinner around 4 or 5:00 tonight and then sat in a chair watching the news until after 9 when she went to go to bed. At that time she realized she couldn't use her leg. CONSULT NOTE:   12:19 AM  Ashlyn Galvez MD spoke with ,   Specialty: neuro  Discussed pt's hx, disposition, and available diagnostic and imaging results. Reviewed care plans. Consultant agrees with plans as outlined. Agrees pt is not a TPA candidate. States No CTA necessary as there is not an indication consistent with cortical stroke  Written by QUINTEN Teagueibe, as dictated by Ashlyn Galvez MD.    CONSULT NOTE:   12:23 AM  Ashlyn Galvez MD spoke with ,   Specialty: Hospitalist  Discussed pt's hx, disposition, and available diagnostic and imaging results. Reviewed care plans. Consultant will evaluate pt for admission.   Written by QUINTEN Teagueibe, as dictated by Ashlyn Galvez MD.    LABORATORY TESTS:  Recent Results (from the past 12 hour(s))   GLUCOSE, POC    Collection Time: 01/13/17 11:44 PM   Result Value Ref Range    Glucose (POC) 287 (H) 65 - 100 mg/dL    Performed by [employer]    CBC WITH AUTOMATED DIFF    Collection Time: 01/14/17 12:11 AM   Result Value Ref Range    WBC 4.2 3.6 - 11.0 K/uL    RBC 3.33 (L) 3.80 - 5.20 M/uL    HGB 9.9 (L) 11.5 - 16.0 g/dL    HCT 29.2 (L) 35.0 - 47.0 %    MCV 87.7 80.0 - 99.0 FL    MCH 29.7 26.0 - 34.0 PG    MCHC 33.9 30.0 - 36.5 g/dL    RDW 14.1 11.5 - 14.5 %    PLATELET 937 768 - 662 K/uL    NEUTROPHILS 44 32 - 75 %    LYMPHOCYTES 46 12 - 49 %    MONOCYTES 7 5 - 13 %    EOSINOPHILS 2 0 - 7 %    BASOPHILS 1 0 - 1 %    ABS. NEUTROPHILS 1.8 1.8 - 8.0 K/UL    ABS. LYMPHOCYTES 2.0 0.8 - 3.5 K/UL    ABS. MONOCYTES 0.3 0.0 - 1.0 K/UL    ABS. EOSINOPHILS 0.1 0.0 - 0.4 K/UL    ABS. BASOPHILS 0.0 0.0 - 0.1 K/UL   METABOLIC PANEL, COMPREHENSIVE    Collection Time: 01/14/17 12:11 AM   Result Value Ref Range    Sodium 138 136 - 145 mmol/L    Potassium 4.1 3.5 - 5.1 mmol/L    Chloride 100 97 - 108 mmol/L    CO2 27 21 - 32 mmol/L    Anion gap 11 5 - 15 mmol/L    Glucose 242 (H) 65 - 100 mg/dL    BUN 46 (H) 6 - 20 MG/DL    Creatinine 2.49 (H) 0.55 - 1.02 MG/DL    BUN/Creatinine ratio 18 12 - 20      GFR est AA 23 (L) >60 ml/min/1.73m2    GFR est non-AA 19 (L) >60 ml/min/1.73m2    Calcium 8.3 (L) 8.5 - 10.1 MG/DL    Bilirubin, total 0.4 0.2 - 1.0 MG/DL    ALT 32 12 - 78 U/L    AST 31 15 - 37 U/L    Alk. phosphatase 54 45 - 117 U/L    Protein, total 7.3 6.4 - 8.2 g/dL    Albumin 3.9 3.5 - 5.0 g/dL    Globulin 3.4 2.0 - 4.0 g/dL    A-G Ratio 1.1 1.1 - 2.2     TROPONIN I    Collection Time: 01/14/17 12:11 AM   Result Value Ref Range    Troponin-I, Qt. <0.04 <0.05 ng/mL   CK W/ REFLX CKMB    Collection Time: 01/14/17 12:11 AM   Result Value Ref Range     (H) 26 - 192 U/L   PROTHROMBIN TIME + INR    Collection Time: 01/14/17 12:11 AM   Result Value Ref Range    INR 1.0 0.9 - 1.1      Prothrombin time 10.2 9.0 - 11.1 sec   PTT    Collection Time: 01/14/17 12:11 AM   Result Value Ref Range    aPTT 24.7 22.1 - 32.5 sec    aPTT, therapeutic range     58.0 - 77.0 SECS   CK-MB,QUANT.     Collection Time: 01/14/17 12:11 AM   Result Value Ref Range    CK - MB 2.7 <3.6 NG/ML    CK-MB Index 1.3 0 - 2.5     POC INR    Collection Time: 01/14/17 12:41 AM   Result Value Ref Range    INR (POC) 1.0 <1.2         IMAGING RESULTS:  CT CODE NEURO HEAD WO CONTRAST   Final ResultEXAM: CT CODE NEURO HEAD WO CONTRAST     INDICATION: r/o stroke left-sided weakness started at 10:00 PM     COMPARISON: 2014.     TECHNIQUE: Unenhanced CT of the head was performed using 5 mm images. Brain and  bone windows were generated. CT dose reduction was achieved through use of a  standardized protocol tailored for this examination and automatic exposure  control for dose modulation.      FINDINGS:  There is slight prominence of ventricles sulci which is stable. There are mild  changes small vessel disease periventricular white matter. No hemorrhage mass or  acute infarction identified. Bony structures are intact.     IMPRESSION  IMPRESSION: No acute abnormality identified. MRI BRAIN WO CONT    (Results Pending)   DUPLEX CAROTID BILATERAL    (Results Pending)   US RETROPERITONEUM COMP    (Results Pending)       MEDICATIONS GIVEN:  Medications   atorvastatin (LIPITOR) tablet 40 mg (not administered)   cloNIDine HCl (CATAPRES) tablet 0.3 mg (not administered)   levothyroxine (SYNTHROID) tablet 150 mcg (not administered)   metoprolol succinate (TOPROL-XL) XL tablet 50 mg (not administered)   sodium chloride (NS) flush 5-10 mL (not administered)   sodium chloride (NS) flush 5-10 mL (not administered)   0.9% sodium chloride infusion (not administered)   aspirin chewable tablet 81 mg (not administered)   enoxaparin (LOVENOX) injection 40 mg (not administered)   labetalol (NORMODYNE;TRANDATE) 20 mg/4 mL (5 mg/mL) injection 5 mg (not administered)   insulin glargine (LANTUS) injection 20 Units (not administered)   insulin lispro (HUMALOG) injection (not administered)   glucose chewable tablet 16 g (not administered)   dextrose (D50W) injection syrg 12.5-25 g (not administered)   glucagon (GLUCAGEN) injection 1 mg (not administered)       IMPRESSION:  1. Arm weakness        PLAN:  1. Admit to hospitalist      12:24 AM  Patient is being admitted to the hospital by Dr. Mony Chin. The results of their tests and reasons for their admission have been discussed with them and/or available family. They convey agreement and understanding for the need to be admitted and for their admission diagnosis. Consultation has been made with the inpatient physician specialist for hospitalization. Written by Thang Villareal, ED Scribe, as dictated by Helio Guzman MD.    This note is prepared by Thang Villareal, acting as Scribe for Helio Guzman MD.    Helio Guzman MD: The scribe's documentation has been prepared under my direction and personally reviewed by me in its entirety. I confirm that the note above accurately reflects all work, treatment, procedures, and medical decision making performed by me.

## 2017-01-14 NOTE — PROGRESS NOTES
Bedside shift change report given to Iwona(oncoming nurse) by Valeria Agrawal (offgoing nurse). Report included the following information SBAR, ED Summary and Recent Results. MRI screening sheet completed. Patient placed on a hospital bed.  Primary Nurse Jaclyn Phillips RN and Talon Velasquez RN performed a dual skin assessment on this patient No impairment noted  Vinny score is 23

## 2017-01-14 NOTE — ED NOTES
Pt alert and oriented x4. Pt speaking in full sentences. Pt denies any chest pain and shortness of breath.

## 2017-01-14 NOTE — ED NOTES
Pt resting comfortably, sleeping but easily aroused, no sign or symptoms of pain or discomfort, VS WNL, frequent checks, bed in lowest position, side rails up x2, call bell within reach, will continue to monitor.

## 2017-01-15 LAB
ANION GAP BLD CALC-SCNC: 6 MMOL/L (ref 5–15)
BUN SERPL-MCNC: 34 MG/DL (ref 6–20)
BUN/CREAT SERPL: 16 (ref 12–20)
CALCIUM SERPL-MCNC: 8.1 MG/DL (ref 8.5–10.1)
CHLORIDE SERPL-SCNC: 107 MMOL/L (ref 97–108)
CHOLEST SERPL-MCNC: 170 MG/DL
CO2 SERPL-SCNC: 28 MMOL/L (ref 21–32)
CREAT SERPL-MCNC: 2.13 MG/DL (ref 0.55–1.02)
EST. AVERAGE GLUCOSE BLD GHB EST-MCNC: 283 MG/DL
GLUCOSE BLD STRIP.AUTO-MCNC: 231 MG/DL (ref 65–100)
GLUCOSE BLD STRIP.AUTO-MCNC: 299 MG/DL (ref 65–100)
GLUCOSE BLD STRIP.AUTO-MCNC: 352 MG/DL (ref 65–100)
GLUCOSE BLD STRIP.AUTO-MCNC: 358 MG/DL (ref 65–100)
GLUCOSE SERPL-MCNC: 227 MG/DL (ref 65–100)
HBA1C MFR BLD: 11.5 % (ref 4.2–6.3)
HDLC SERPL-MCNC: 66 MG/DL
HDLC SERPL: 2.6 {RATIO} (ref 0–5)
LDLC SERPL CALC-MCNC: 70.6 MG/DL (ref 0–100)
LIPID PROFILE,FLP: ABNORMAL
POTASSIUM SERPL-SCNC: 4.3 MMOL/L (ref 3.5–5.1)
SERVICE CMNT-IMP: ABNORMAL
SODIUM SERPL-SCNC: 141 MMOL/L (ref 136–145)
TRIGL SERPL-MCNC: 167 MG/DL (ref ?–150)
TSH SERPL DL<=0.05 MIU/L-ACNC: 4.03 UIU/ML (ref 0.36–3.74)
VLDLC SERPL CALC-MCNC: 33.4 MG/DL

## 2017-01-15 PROCEDURE — 97530 THERAPEUTIC ACTIVITIES: CPT

## 2017-01-15 PROCEDURE — 74011250636 HC RX REV CODE- 250/636: Performed by: INTERNAL MEDICINE

## 2017-01-15 PROCEDURE — G8978 MOBILITY CURRENT STATUS: HCPCS

## 2017-01-15 PROCEDURE — 36415 COLL VENOUS BLD VENIPUNCTURE: CPT | Performed by: INTERNAL MEDICINE

## 2017-01-15 PROCEDURE — 74011250636 HC RX REV CODE- 250/636: Performed by: HOSPITALIST

## 2017-01-15 PROCEDURE — 74011636637 HC RX REV CODE- 636/637: Performed by: HOSPITALIST

## 2017-01-15 PROCEDURE — 83036 HEMOGLOBIN GLYCOSYLATED A1C: CPT | Performed by: INTERNAL MEDICINE

## 2017-01-15 PROCEDURE — 82962 GLUCOSE BLOOD TEST: CPT

## 2017-01-15 PROCEDURE — 74011250637 HC RX REV CODE- 250/637: Performed by: HOSPITALIST

## 2017-01-15 PROCEDURE — 74011636637 HC RX REV CODE- 636/637: Performed by: INTERNAL MEDICINE

## 2017-01-15 PROCEDURE — G8979 MOBILITY GOAL STATUS: HCPCS

## 2017-01-15 PROCEDURE — 80061 LIPID PANEL: CPT | Performed by: INTERNAL MEDICINE

## 2017-01-15 PROCEDURE — 97162 PT EVAL MOD COMPLEX 30 MIN: CPT

## 2017-01-15 PROCEDURE — 74011250637 HC RX REV CODE- 250/637: Performed by: INTERNAL MEDICINE

## 2017-01-15 PROCEDURE — 80048 BASIC METABOLIC PNL TOTAL CA: CPT | Performed by: INTERNAL MEDICINE

## 2017-01-15 PROCEDURE — 65660000000 HC RM CCU STEPDOWN

## 2017-01-15 PROCEDURE — 84443 ASSAY THYROID STIM HORMONE: CPT | Performed by: HOSPITALIST

## 2017-01-15 RX ORDER — INSULIN GLARGINE 100 [IU]/ML
60 INJECTION, SOLUTION SUBCUTANEOUS
Status: DISCONTINUED | OUTPATIENT
Start: 2017-01-15 | End: 2017-01-20 | Stop reason: HOSPADM

## 2017-01-15 RX ORDER — ENOXAPARIN SODIUM 100 MG/ML
30 INJECTION SUBCUTANEOUS EVERY 24 HOURS
Status: DISCONTINUED | OUTPATIENT
Start: 2017-01-16 | End: 2017-01-20 | Stop reason: HOSPADM

## 2017-01-15 RX ORDER — LEVOTHYROXINE SODIUM 150 UG/1
150 TABLET ORAL
Status: DISCONTINUED | OUTPATIENT
Start: 2017-01-16 | End: 2017-01-20 | Stop reason: HOSPADM

## 2017-01-15 RX ORDER — CLOPIDOGREL BISULFATE 75 MG/1
75 TABLET ORAL DAILY
Status: DISCONTINUED | OUTPATIENT
Start: 2017-01-16 | End: 2017-01-20 | Stop reason: HOSPADM

## 2017-01-15 RX ORDER — INSULIN LISPRO 100 [IU]/ML
20 INJECTION, SOLUTION INTRAVENOUS; SUBCUTANEOUS ONCE
Status: COMPLETED | OUTPATIENT
Start: 2017-01-15 | End: 2017-01-15

## 2017-01-15 RX ADMIN — INSULIN LISPRO 5 UNITS: 100 INJECTION, SOLUTION INTRAVENOUS; SUBCUTANEOUS at 12:59

## 2017-01-15 RX ADMIN — INSULIN LISPRO 26 UNITS: 100 INJECTION, SOLUTION INTRAVENOUS; SUBCUTANEOUS at 21:15

## 2017-01-15 RX ADMIN — CLONIDINE HYDROCHLORIDE 0.1 MG: 0.1 TABLET ORAL at 08:02

## 2017-01-15 RX ADMIN — INSULIN GLARGINE 60 UNITS: 100 INJECTION, SOLUTION SUBCUTANEOUS at 21:15

## 2017-01-15 RX ADMIN — LEVOTHYROXINE SODIUM 137 MCG: 25 TABLET ORAL at 07:56

## 2017-01-15 RX ADMIN — CLONIDINE HYDROCHLORIDE 0.1 MG: 0.1 TABLET ORAL at 17:36

## 2017-01-15 RX ADMIN — ENOXAPARIN SODIUM 40 MG: 40 INJECTION SUBCUTANEOUS at 08:03

## 2017-01-15 RX ADMIN — Medication 10 ML: at 21:17

## 2017-01-15 RX ADMIN — ATORVASTATIN CALCIUM 40 MG: 40 TABLET, FILM COATED ORAL at 21:17

## 2017-01-15 RX ADMIN — SODIUM CHLORIDE 50 ML/HR: 900 INJECTION, SOLUTION INTRAVENOUS at 03:57

## 2017-01-15 RX ADMIN — Medication 10 ML: at 08:05

## 2017-01-15 RX ADMIN — ASPIRIN 81 MG 81 MG: 81 TABLET ORAL at 08:02

## 2017-01-15 RX ADMIN — INSULIN LISPRO 3 UNITS: 100 INJECTION, SOLUTION INTRAVENOUS; SUBCUTANEOUS at 07:58

## 2017-01-15 RX ADMIN — METOPROLOL SUCCINATE 12.5 MG: 25 TABLET, EXTENDED RELEASE ORAL at 08:02

## 2017-01-15 RX ADMIN — INSULIN LISPRO 20 UNITS: 100 INJECTION, SOLUTION INTRAVENOUS; SUBCUTANEOUS at 17:36

## 2017-01-15 NOTE — ED NOTES
Pt neuro assessment remains the same at this time. Pt still has minimal weakness noted to left arm as well as mild left sided facial paralysis. Pt in no sign of distress or discomfort at this time.

## 2017-01-15 NOTE — PROGRESS NOTES
Hospitalist Progress Note    NAME: Winifred Forrest   :  [id number]   MRN:  [id number]       Interim Hospital Summary: 77 y.o. female whom presented on 2017 with L sided weakness      Assessment / Plan:  Stroke POA  Sx: facial droop, L sided weakness - slowly improving   Allowing permissive hypertension: BP goal 150-160  Cont ASA pending neurology consult ( likely will need to change to Plavix or aggrenox)   Head CT:negative   MRI:Multiple old focal infarcts in both cerebral hemispheres. Acute right pontine infarct. Carotid duplex US: pending   ECHO: pending   Stroke blood work: LDL  70.6  hba1c 11.5  TSH - 4.03   Seen by neurology: pending   Speech:passed bedside swallow - ok for diet   PT/OT:pending      Likely CKD stage III  Last available baseline  1.4, admission 2.49 - likely her baseline. TARUN was r/o   Cr is not significantly down while on fluids, so likely her baseline. DC IVF   Holding bumex, olmesartan, spironolactone  US: no hydronephrosis, medical disease      IDDM type II with renal manifestation   -300   D/w pt: she is followed by endocrinology every 5-6 months, next appointment . She is trying to keep up with diet. She reports being compliant with mend. hba1c 11 ( per pt down from 13 5 months ago)   Cont home dose lantus 60 units.   Will use this opportunity of her being in the hospital to get her sugar under control - will ask endocrinology for help   Diabetic education   ISS    HTN  Allowing for permissive HTN - BP goal 150-160   Holding bumex/spironolactone/ ARB   Cont clonidine and Toprol XL at lower dose to avoid rebound effect with parameters to hold if BP < 150     Hypothyroidism  TSH 4.03 - high side --> incr levothyroxine  Follow TSH in 6 weeks     HLP, LDL at goal, cont statin   Gallbladder polyp, followed by Dr Betsy Olivo with abd US every year           Code status: Full   Surrogate Decision Maker: bonita Rae [phone] or [phone], bonita Ortega Grisel [phone]  Prophylaxis: Lovenox     Baseline: lives with sio; she has 2 sons; was independent   Recommended Disposition: TBD pending PT     Subjective:     Chief Complaint / Reason for Physician Visit: following stroke/ HTN/TARUN   Pt reports slow improve in L weakness ( she can hold her leg against gravity today)      Discussed with RN events overnight. Review of Systems:  Symptom Y/N Comments  Symptom Y/N Comments   Fever/Chills n   Chest Pain n    Poor Appetite    Edema     Cough    Abdominal Pain n    Sputum    Joint Pain     SOB/ALONSO n   Pruritis/Rash     Nausea/vomit n   Tolerating PT/OT  Pending    Diarrhea n   Tolerating Diet y    Constipation n   Other       Could NOT obtain due to:      Objective:     VITALS:   Last 24hrs VS reviewed since prior progress note.  Most recent are:  Patient Vitals for the past 24 hrs:   Temp Pulse Resp BP SpO2   01/15/17 0727 98.1 °F (36.7 °C) 69 18 151/67 100 %   01/15/17 0436 98.3 °F (36.8 °C) 69 18 167/74 100 %   01/15/17 0330 - 63 19 138/57 94 %   01/15/17 0300 - 69 23 154/64 95 %   01/15/17 0230 - 67 19 149/59 95 %   01/15/17 0136 - 73 21 - -   01/15/17 0130 - - - 153/66 97 %   01/15/17 0100 - 76 21 151/65 96 %   01/15/17 0034 - 68 20 - -   01/15/17 0030 - - - 143/60 94 %   01/14/17 2330 - - - 153/65 97 %   01/14/17 2329 - 75 21 - -   01/14/17 2300 - 71 23 150/63 96 %   01/14/17 2230 - 72 20 141/72 96 %   01/14/17 2200 - 76 21 147/66 95 %   01/14/17 2130 - 73 20 150/60 95 %   01/14/17 2030 - 73 20 137/60 93 %   01/14/17 2000 - 79 22 139/61 94 %   01/14/17 1953 - 82 25 - 95 %   01/14/17 1925 - 78 22 - 97 %   01/14/17 1902 - 81 20 125/85 95 %   01/14/17 1828 - 74 19 - 97 %   01/14/17 1817 - - - 180/68 -   01/14/17 1810 - 74 24 - 97 %   01/14/17 1731 97.9 °F (36.6 °C) 73 22 150/60 97 %   01/14/17 1700 - 74 23 152/58 97 %   01/14/17 1635 - 80 25 - 95 %   01/14/17 1633 - - - 147/64 -   01/14/17 1500 - 70 19 128/65 95 %   01/14/17 1406 - - - - 96 %   01/14/17 1400 - 75 14 144/74 -   01/14/17 1200 - 65 17 148/65 98 %   01/14/17 1100 97.5 °F (36.4 °C) 69 19 140/68 97 %       Intake/Output Summary (Last 24 hours) at 01/15/17 1047  Last data filed at 01/15/17 1008   Gross per 24 hour   Intake                0 ml   Output              300 ml   Net             -300 ml        PHYSICAL EXAM:  General: WD, WN. Alert, cooperative, no acute distress    EENT:  EOMI. Anicteric sclerae. MMM  Resp:  CTA bilaterally, no wheezing or rales. No accessory muscle use  CV:  Regular  rhythm,  No edema  GI:  Soft, Non distended, Non tender.  +Bowel sounds  Neurologic:  Alert and oriented X 3, normal speech, + facial droop, L sided weakness 3/5  - unchanged   Psych:   Good insight. Not anxious nor agitated  Skin:  No rashes. No jaundice    Reviewed most current lab test results and cultures  YES  Reviewed most current radiology test results   YES  Review and summation of old records today    NO  Reviewed patient's current orders and MAR    YES  PMH/SH reviewed - no change compared to H&P  ________________________________________________________________________  Care Plan discussed with:    Comments   Patient y    Family      RN y    Care Manager     Consultant                        Multidiciplinary team rounds were held today with , nursing, pharmacist and clinical coordinator. Patient's plan of care was discussed; medications were reviewed and discharge planning was addressed. ________________________________________________________________________  Total NON critical care TIME:  35   Minutes    Total CRITICAL CARE TIME Spent:   Minutes non procedure based      Comments   >50% of visit spent in counseling and coordination of care     ________________________________________________________________________  Glenn Doshi MD     Procedures: see electronic medical records for all procedures/Xrays and details which were not copied into this note but were reviewed prior to creation of Plan. LABS:  I reviewed today's most current labs and imaging studies.   Pertinent labs include:  Recent Labs      01/14/17   0011   WBC  4.2   HGB  9.9*   HCT  29.2*   PLT  180     Recent Labs      01/15/17   0443  01/14/17   0041  01/14/17   0011   NA  141   --   138   K  4.3   --   4.1   CL  107   --   100   CO2  28   --   27   GLU  227*   --   242*   BUN  34*   --   46*   CREA  2.13*   --   2.49*   CA  8.1*   --   8.3*   ALB   --    --   3.9   TBILI   --    --   0.4   SGOT   --    --   31   ALT   --    --   32   INR   --   1.0  1.0       Signed: Janett Cochran MD

## 2017-01-15 NOTE — PROGRESS NOTES
Hospitalist Progress Note    NAME: Bonifacio Lozano   :     MRN:  [id number]       Interim Hospital Summary: 77 y.o. female whom presented on 2017 with L sided weakness      Assessment / Plan:  Stroke POA  Sx: facial droop, L sided weakness - slowly improving   Allowing permissive hypertension   Cont ASA pending neurology consult ( likely will need to change to Plavix or aggrenox)   Head CT:negative   MRI:pending   MRA: pending   ECHO: pending   Stroke blood work: LDL    hba1c  TSH - pending   Seen by neurology: pending   Speech:passed bedside swallow - ok for diet   PT/OT:pending   Called by RN: pt woke after after nap with worsening facial droop and weakness. Stat head CT ordered. She is not a TPA candidate.      TARUN vs CKD stage III  Last available baseline  1.4, admission 2.49  Holding diuretics and gentle hydration now with close follow up   Holding bumex, olmesartan, spironolactone  US: no hydronephrosis, medical disease      IDDM type II with renal manifestation   BS stable   Cont lower dose Lantus since PO intake will be uncertain at present   ISS    HTN  Allowing for permissive HTN   Holding bumex/spironolactone/ ARB   Cont clonidine and Toprol XL at lower dose to avoid rebound effect with parameters to hold    HLP, cont statin   Hypothyroidism, cont levothyroxine         Code status: Full   Surrogate Decision Maker: bonita Bains [phone] or [phone], son Izaiah Birch [phone]  Prophylaxis: Lovenox     Baseline: lives with sio; she has 2 sons; was independent   Recommended Disposition: TBD pending PT     Subjective:     Chief Complaint / Reason for Physician Visit: following stroke/ HTN/TARUN   Pt was seen in ED around 12 noon. She reported at that time slowly improving weakness  She passed STAND and was able to tolerate diet       Discussed with RN events overnight.      Review of Systems:  Symptom Y/N Comments  Symptom Y/N Comments   Fever/Chills n   Chest Pain n    Poor Appetite Edema     Cough    Abdominal Pain n    Sputum    Joint Pain     SOB/AOLNSO n   Pruritis/Rash     Nausea/vomit n   Tolerating PT/OT  Pending    Diarrhea n   Tolerating Diet y    Constipation n   Other       Could NOT obtain due to:      Objective:     VITALS:   Last 24hrs VS reviewed since prior progress note.  Most recent are:  Patient Vitals for the past 24 hrs:   Temp Pulse Resp BP SpO2   01/14/17 2030 - 73 20 137/60 93 %   01/14/17 2000 - 79 22 139/61 94 %   01/14/17 1953 - 82 25 - 95 %   01/14/17 1925 - 78 22 - 97 %   01/14/17 1902 - 81 20 125/85 95 %   01/14/17 1828 - 74 19 - 97 %   01/14/17 1817 - - - 180/68 -   01/14/17 1810 - 74 24 - 97 %   01/14/17 1731 97.9 °F (36.6 °C) 73 22 150/60 97 %   01/14/17 1700 - 74 23 152/58 97 %   01/14/17 1635 - 80 25 - 95 %   01/14/17 1633 - - - 147/64 -   01/14/17 1500 - 70 19 128/65 95 %   01/14/17 1406 - - - - 96 %   01/14/17 1400 - 75 14 144/74 -   01/14/17 1200 - 65 17 148/65 98 %   01/14/17 1100 97.5 °F (36.4 °C) 69 19 140/68 97 %   01/14/17 0800 97.9 °F (36.6 °C) 70 16 140/61 99 %   01/14/17 0600 - 77 17 141/67 98 %   01/14/17 0545 - 65 18 129/58 97 %   01/14/17 0530 - 70 20 137/63 98 %   01/14/17 0515 - 66 18 132/60 96 %   01/14/17 0500 - 73 20 136/64 96 %   01/14/17 0445 - 69 21 132/59 95 %   01/14/17 0430 - 86 22 132/57 94 %   01/14/17 0415 - 76 21 128/51 99 %   01/14/17 0400 - 71 21 141/65 97 %   01/14/17 0345 - 72 18 141/71 98 %   01/14/17 0330 - 71 25 127/58 97 %   01/14/17 0315 - 71 16 140/61 99 %   01/14/17 0300 - 68 19 136/59 98 %   01/14/17 0230 - 72 21 142/62 98 %   01/14/17 0132 - 71 22 (!) 119/108 98 %   01/14/17 0115 - 70 23 138/66 97 %   01/14/17 0030 - 74 16 139/68 98 %   01/14/17 0021 - 77 21 150/62 98 %   01/13/17 2344 - 82 18 193/77 98 %       Intake/Output Summary (Last 24 hours) at 01/14/17 2104  Last data filed at 01/14/17 0829   Gross per 24 hour   Intake               60 ml   Output                0 ml   Net               60 ml        PHYSICAL EXAM:  General: WD, WN. Alert, cooperative, no acute distress    EENT:  EOMI. Anicteric sclerae. MMM  Resp:  CTA bilaterally, no wheezing or rales. No accessory muscle use  CV:  Regular  rhythm,  No edema  GI:  Soft, Non distended, Non tender.  +Bowel sounds  Neurologic:  Alert and oriented X 3, normal speech, + facial droop, L sided weakness 3/5    Psych:   Good insight. Not anxious nor agitated  Skin:  No rashes. No jaundice    Reviewed most current lab test results and cultures  YES  Reviewed most current radiology test results   YES  Review and summation of old records today    NO  Reviewed patient's current orders and MAR    YES  PMH/SH reviewed - no change compared to H&P  ________________________________________________________________________  Care Plan discussed with:    Comments   Patient y    Family  y Bedside    RN y    Care Manager     Consultant                        Multidiciplinary team rounds were held today with , nursing, pharmacist and clinical coordinator. Patient's plan of care was discussed; medications were reviewed and discharge planning was addressed. ________________________________________________________________________  Total NON critical care TIME:  50   Minutes    Total CRITICAL CARE TIME Spent:   Minutes non procedure based      Comments   >50% of visit spent in counseling and coordination of care     ________________________________________________________________________  Myriam Pham MD     Procedures: see electronic medical records for all procedures/Xrays and details which were not copied into this note but were reviewed prior to creation of Plan. LABS:  I reviewed today's most current labs and imaging studies.   Pertinent labs include:  Recent Labs      01/14/17 0011   WBC  4.2   HGB  9.9*   HCT  29.2*   PLT  180     Recent Labs      01/14/17 0041  01/14/17 0011   NA   --   138   K   --   4.1   CL   --   100   CO2   --   27   GLU   --   242* BUN   --   46*   CREA   --   2.49*   CA   --   8.3*   ALB   --   3.9   TBILI   --   0.4   SGOT   --   31   ALT   --   32   INR  1.0  1.0       Signed: Sandi Singh MD

## 2017-01-15 NOTE — ROUTINE PROCESS
..  * No surgery found *  Bedside and Verbal shift change report given to Leydi Ramirez 70 (oncoming nurse) by Minerva Condon (offgoing nurse). Report included the following information SBAR, Kardex, Recent Results and Cardiac Rhythm NSR. Zone Phone:   [phone]      Significant changes during shift: tried to use bedside commode very weak on left side 2 person assist. Admission        Patient Information    Winifred Forrest  77 y.o.  1/13/2017 11:45 PM by Marcela Carlson MD. Winifred Forrest was admitted from Home    Problem List    Patient Active Problem List    Diagnosis Date Noted    Stroke (cerebrum) ([facility] [address] 01/14/2017    Thyroid cancer ([facility] [address] 09/17/2014    TIA (transient ischemic attack) 06/06/2014    HTN (hypertension) 06/06/2014    CKD (chronic kidney disease) 06/06/2014    Thyroid nodule, right 03/10/2014    Diabetes mellitus ([facility] [address] 03/10/2014    Gallbladder polyp 08/14/2013    Gallbladder sludge 08/14/2013    Recurrent ventral incisional hernia 08/14/2013    Obesity (BMI 35.0-39.9 without comorbidity) ([facility] [address] 08/14/2013     Past Medical History   Diagnosis Date    Arthritis     Cataract      bilateral    Chronic kidney disease     Diabetes ([facility] [address]     Hypercholesterolemia     Hypertension     Ill-defined condition      states 'polyp' in gal bladder    Obesity     Thyroid disease     TIA (transient ischemic attack) 6/6/2014         Core Measures:    CVA: YES YES  CHF:NO NO  PNA:NO NO    Post Op Surgical (If Applicable):     Number times ambulated in hallway past shift:  0  Number of times OOB to chair past shift:   0  NG Tube: NO  Incentive Spirometer: NO  Drains: NO   Volume  0  Dressing Present:  NO  Flatus:  NO    Activity Status:    OOB to Chair NO  Ambulated this shift NO   Bed Rest NO    Supplemental O2: (If Applicable)    NC NO  NRB NO  Venti-mask NO  On 0 Liters/min      LINES AND DRAINS:    Central Line? NO Placement date 0 Reason Medically Necessary 0    PICC LINE?  NO Placement date [facility] Medically Necessary 0    Urinary Catheter? NO Placement Date 0 Reason Medically Necessary 0    DVT prophylaxis:    DVT prophylaxis Med- YES  DVT prophylaxis SCD or SINDHU- NO     Wounds: (If Applicable)    Wounds- NO    Location 0    Patient Safety:    Falls Score Total Score: 4  Safety Level_______  Bed Alarm On? YES  Sitter?  NO    Plan for upcoming shift: PT and OT        Discharge Plan: YES  Possible rehab    Active Consults:  IP CONSULT TO NEUROLOGY

## 2017-01-15 NOTE — ED NOTES
Upon hourly neuro check, patient NIH obtained. Patient NIH 6 at this time with increased weakness noted to left arm and left leg. MD Hospitalist Dr. Manoj Sahu aware of changes, no additional order received at this time. MD to evaluate patient. Will continue to monitor.

## 2017-01-15 NOTE — ED NOTES
Bedside shift change report given to [address] (oncoming nurse) by Pratima Rashid RN (offgoing nurse). Report included the following information SBAR, Kardex, ED Summary and MAR.

## 2017-01-15 NOTE — ED NOTES
Pt up to bedside commode at this time with RN assistance. Pt incontinent of urine in ED bed. Bed linen changed. Pt cleaned up and patient assisted back to ED bed and repositioned into position of comfort.

## 2017-01-15 NOTE — PROGRESS NOTES
Problem: Mobility Impaired (Adult and Pediatric)  Goal: *Acute Goals and Plan of Care (Insert Text)  Physical Therapy Goals  Initiated 1/15/2017  1. Patient will move from supine to sit and sit to supine , scoot up and down and roll side to side in bed with supervision/set-up within 7 day(s). 2. Patient will transfer from bed to chair and chair to bed with minimal assistance/contact guard assist using the least restrictive device within 7 day(s). 3. Patient will perform sit to stand with minimal assistance/contact guard assist within 7 day(s). 4. Patient will ambulate with moderate assistance for 10 feet with the least restrictive device within 7 day(s). 5. Patient will improve Back Balance score by 7 points within 7 days. PHYSICAL THERAPY EVALUATION- NEURO POPULATION     Patient: Woody Reynolds (77 y.o. female)  Date: 1/15/2017  Primary Diagnosis: Stroke (cerebrum) [facility])        Precautions:   Bed Alarm  Patient is positive for acute R pontine CVA  ASSESSMENT :  Based on the objective data described below, the patient presents with decreased L sided strength, impulsive, decreased functional mobility, unsteady gait, and impaired balance following admission for stroke. PTA patient was independent with all aspects of functional mobility and ADLs. She lives with her son who works during the day. Patient is fairly active at home and driving. Currently, patient required CGA for bed mobility. Patient with grossly 2-3/5 strength on LLE. Patient with intact static sitting balance, although dynamic is impaired. Patient required min-mod A for initial sit <> stand. Patient with immediate LLE buckling with poor quad and knee extension control in standing, requiring min A. Patient able to take 1-2 steps to the chair with mod A X 2 due to heavy trunk lean to the L and anteriorly as well as L knee buckling with poor control. Patient scored 6/56 on Back balance test, indicating high fall risk.  Patient left sitting in the chair with the bed alarm on at the conclusion of PT evaluation. Patient educated on seated LE exercises with handout to improve strength. Patient is an excellent candidate for IP rehab at discharge due to the above and to maximize her recovery and potential to regain independence. Patient's sister was at the bedside and is very supportive throughout session. Patient will benefit from skilled intervention to address the above impairments. Patients rehabilitation potential is considered to be Good  Factors which may influence rehabilitation potential include:   [ ]           None noted  [ ]           Mental ability/status  [X]           Medical condition  [ ]           Home/family situation and support systems  [X]           Safety awareness  [ ]           Pain tolerance/management  [ ]           Other:        PLAN :  Recommendations and Planned Interventions:  [X]             Bed Mobility Training             [X]      Neuromuscular Re-Education  [X]             Transfer Training                   [ ]      Orthotic/Prosthetic Training  [X]             Gait Training                         [ ]      Modalities  [X]             Therapeutic Exercises           [ ]      Edema Management/Control  [X]             Therapeutic Activities            [X]      Patient and Family Training/Education  [ ]             Other (comment):  Frequency/Duration: Patient will be followed by physical therapy 5 days per week to address goals. Discharge Recommendations: Inpatient Rehab  Further Equipment Recommendations for Discharge: TBD       SUBJECTIVE:   Patient stated I want to keep getting better.       OBJECTIVE DATA SUMMARY:   HISTORY:    Past Medical History   Diagnosis Date    Arthritis      Cataract         bilateral    Chronic kidney disease      Diabetes ([facility] [address]      Hypercholesterolemia      Hypertension      Ill-defined condition         states 'polyp' in gal bladder    Obesity      Thyroid disease      TIA (transient ischemic attack) 6/6/2014     Past Surgical History   Procedure Laterality Date    Hx gyn   1970's       partial hysterectomy    Hx orthopaedic   1988       bunion    Colorectal scrn; hi risk ind   5/30/2014            Pr breast surgery procedure unlisted   2009, 2006       breast bx-vince    Hx hernia repair   2009    Pr abdomen surgery proc unlisted   2006       stomach    Hx heent   8/2014, 09/2014       Dr. Mike Anders Hx thyroidectomy   2014       Dr. Vincent Arizmendi     Prior Level of Function/Home Situation: patient was independent with all aspects of functional mobility and ADLs. She lives with her son who works during the day. Patient is fairly active at home and driving.         Home Situation  Home Environment: Private residence  # Steps to Enter: 3  Rails to Enter: Yes  Hand Rails : Left  One/Two Story Residence: Two story, live on 1st floor  Living Alone: No  Support Systems: Child(riki)  Patient Expects to be Discharged to[de-identified] Unknown  Current DME Used/Available at Home: Cane, straight, Walker, rollator  Tub or Shower Type: Tub/Shower combination     EXAMINATION/PRESENTATION/DECISION MAKING:   Critical Behavior:  Neurologic State: Alert, Appropriate for age  Orientation Level: Oriented X4  Cognition: Appropriate decision making, Appropriate for age attention/concentration, Appropriate safety awareness, Follows commands     Skin:  Appears intact  Strength:    Strength: Generally decreased, functional                 LLE Strength  L Hip Flexion: 2  L Hip Extension: 3  L Hip ABduction: 3+  L Hip ADduction: 3+  L Knee Flexion: 2  L Knee Extension: 2  L Ankle Dorsiflexion: 2  L Ankle Plantar Flexion: 2  Coordination:  Coordination: Generally decreased, functional  Tone & Sensation:   Tone: Normal              Sensation: Intact               Range Of Motion:  AROM: Generally decreased, functional           PROM: Within functional limits           Functional Mobility:  Bed Mobility:  Rolling: Contact guard assistance  Supine to Sit: Minimum assistance     Scooting: Contact guard assistance  Transfers:  Sit to Stand: Minimum assistance; Moderate assistance;Assist x1  Stand to Sit: Moderate assistance  Stand Pivot Transfers: Moderate assistance     Bed to Chair: Moderate assistance;Assist x1              Balance:   Sitting: Impaired; Without support  Sitting - Static: Good (unsupported)  Sitting - Dynamic: Fair (occasional)  Standing: Impaired; Without support  Standing - Static: Constant support; Fair  Standing - Dynamic : Poor  Ambulation/Gait Training:  Distance (ft): 2 Feet (ft)  Assistive Device: Gait belt  Ambulation - Level of Assistance: Moderate assistance;Assist x1 (second assist for safety)     Gait Description (WDL): Exceptions to WDL  Gait Abnormalities: Ataxic;Decreased step clearance;Trunk sway increased; Shuffling gait; Hemiplegic        Base of Support: Widened;Shift to left  Stance: Left decreased  Speed/Katharina: Pace decreased (<100 feet/min)  Step Length: Right shortened;Left shortened  Swing Pattern: Left asymmetrical                            Functional Measure  Back Balance Test:      Sitting to Standin  Standing Unsupported: 1  Sitting with Back Unsupported: 4  Standing to Sittin  Transfers: 0  Standing Unsupported with Eyes Closed: 0  Standing Unsupported with Feet Together: 0  Reach Forward with Outstretched Arm: 0   Object: 0  Turn to Look Over Shoulders: 0  Turn 360 Degrees: 0  Alternate Foot on Step/Stool: 0  Standing Unsupported One Foot in Front: 0  Stand on One Le  Total: 6             56=Maximum possible score;   0-20=High fall risk  21-40=Moderate fall risk   41-56=Low fall risk      Back Balance Test and G-code impairment scale:  Percentage of Impairment CH     0%    CI     1-19% CJ     20-39% CK     40-59% CL     60-79% CM     80-99% CN      100%   Back   Score 0-56 56 45-55 34-44 23-33 12-22 1-11 0         G codes:   In compliance with CMSs Claims Based Outcome Reporting, the following G-code set was chosen for this patient based on their primary functional limitation being treated: The outcome measure chosen to determine the severity of the functional limitation was the Rahman with a score of 6/56 which was correlated with the impairment scale. · Mobility - Walking and Moving Around:               - CURRENT STATUS:    CM - 80%-99% impaired, limited or restricted               - GOAL STATUS:           CL - 60%-79% impaired, limited or restricted               - D/C STATUS:                       ---------------To be determined---------------         Therapeutic Exercises:   Handout with seated exercises: ankle pumps, LAQ, heel slides, glute squeezes x 10 L  Pain:  Pain Scale 1: Numeric (0 - 10)  Pain Intensity 1: 0              Activity Tolerance:   Good, VSS. Please refer to the flowsheet for vital signs taken during this treatment. After treatment:   [X]     Patient left in no apparent distress sitting up in chair  [ ]     Patient left in no apparent distress in bed  [X]     Call bell left within reach  [X]     Nursing notified  [X]     Caregiver present  [X]     Bed alarm activated      COMMUNICATION/EDUCATION:   The patients plan of care was discussed with: Registered Nurse and . Patient was educated regarding Her deficit(s) of L sided weakness, impaired balance, unsteady gait as this relates to Her diagnosis of CVA workup. She demonstrated fair-good understanding as evidenced by verbal feedback. Patient with fair-good insight into deficits and is impulsive. [X]  Fall prevention education was provided and the patient/caregiver indicated understanding. [X]  Patient/family have participated as able in goal setting and plan of care. [X]  Patient/family agree to work toward stated goals and plan of care. [ ]  Patient understands intent and goals of therapy, but is neutral about his/her participation.   [ ]  Patient is unable to participate in goal setting and plan of care.      Thank you for this referral.  Yariel Escoto, PT, DPT   Time Calculation: 23 mins

## 2017-01-15 NOTE — ROUTINE PROCESS
.. TRANSFER - IN REPORT:    Verbal report received from Tali Muñoz  on Arturo Howard  being received from The ED  for routine progression of care      Report consisted of patients Situation, Background, Assessment and   Recommendations(SBAR). Information from the following report(s) SBAR, MAR and Cardiac Rhythm NSR was reviewed with the receiving nurse. Opportunity for questions and clarification was provided. Assessment completed upon patients arrival to unit and care assumed.

## 2017-01-15 NOTE — ED NOTES
TRANSFER - OUT REPORT:    Verbal report given to [employer] (name) on Melecio Carson  being transferred to Neuro (unit) for routine progression of care       Report consisted of patients Situation, Background, Assessment and   Recommendations(SBAR). Information from the following report(s) SBAR, ED Summary and MAR was reviewed with the receiving nurse. Lines:       Opportunity for questions and clarification was provided.       Patient transported with:   [employer]

## 2017-01-15 NOTE — ED NOTES
Bedside and Verbal shift change report given to [id number] [place] [address] Road  (oncoming nurse) by Ryan Daniel RN (offgoing nurse). Report included the following information SBAR, ED Summary and MAR.

## 2017-01-15 NOTE — PROGRESS NOTES
..[facility] provided to patient and the following topics were discussed    1. Patients personal risk factors for stroke are hypertension, family history, hyperlipidemia, diabetes mellitus, obesity and Other TIA's    2. Warning signs of Stroke:        * Sudden numbness or weakness of the face, arm or leg, especially on one side of          The body            * Sudden confusion, trouble speaking or understanding        * Sudden trouble seeing in one or both eyes        * Sudden trouble walking, dizziness, loss of balance or coordination        * Sudden severe headache with no known cause      3. Importance of activation Emergency Medical Services ( 9-1-1 ) immediately if                       experience any warning signs of stroke. 4. Be sure and schedule a follow-up appointment with your primary care doctor or any                  specialists as instructed. 5. You must take medicine every day to treat your risk factors for stroke. Be sure to take your medicines exactly as your doctor tells you: no more, no less. Know what your medicines are for , what they do. Anti-thrombotics /anticoagulants can help prevent strokes. You are taking the following medicine(s)  Aspirin 81 mg     6. Smoking and second-hand smoke greatly increase your risk of stroke, cardiovascular disease and death.  Smoking history never

## 2017-01-15 NOTE — PROGRESS NOTES
Bedside and Verbal shift change report given to Brayan Phillips RN (oncoming nurse) by Caro Rolle RN (offgoing nurse). Report included the following information SBAR, Kardex, Intake/Output and MAR.

## 2017-01-16 ENCOUNTER — TELEPHONE (OUTPATIENT)
Dept: ENDOCRINOLOGY | Age: 77
End: 2017-01-16

## 2017-01-16 ENCOUNTER — HOSPITAL ENCOUNTER (OUTPATIENT)
Dept: ULTRASOUND IMAGING | Age: 77
Discharge: HOME | DRG: 065 | End: 2017-01-16
Attending: SURGERY
Payer: MEDICARE

## 2017-01-16 DIAGNOSIS — K82.4 GALLBLADDER POLYP: ICD-10-CM

## 2017-01-16 LAB
GLUCOSE BLD STRIP.AUTO-MCNC: 139 MG/DL (ref 65–100)
GLUCOSE BLD STRIP.AUTO-MCNC: 227 MG/DL (ref 65–100)
GLUCOSE BLD STRIP.AUTO-MCNC: 269 MG/DL (ref 65–100)
GLUCOSE BLD STRIP.AUTO-MCNC: 84 MG/DL (ref 65–100)
SERVICE CMNT-IMP: ABNORMAL
SERVICE CMNT-IMP: NORMAL

## 2017-01-16 PROCEDURE — 74011250637 HC RX REV CODE- 250/637: Performed by: PSYCHIATRY & NEUROLOGY

## 2017-01-16 PROCEDURE — 74011250636 HC RX REV CODE- 250/636: Performed by: HOSPITALIST

## 2017-01-16 PROCEDURE — 97166 OT EVAL MOD COMPLEX 45 MIN: CPT | Performed by: OCCUPATIONAL THERAPIST

## 2017-01-16 PROCEDURE — 74011636637 HC RX REV CODE- 636/637: Performed by: HOSPITALIST

## 2017-01-16 PROCEDURE — 97112 NEUROMUSCULAR REEDUCATION: CPT | Performed by: OCCUPATIONAL THERAPIST

## 2017-01-16 PROCEDURE — 97112 NEUROMUSCULAR REEDUCATION: CPT

## 2017-01-16 PROCEDURE — G8988 SELF CARE GOAL STATUS: HCPCS | Performed by: OCCUPATIONAL THERAPIST

## 2017-01-16 PROCEDURE — 93880 EXTRACRANIAL BILAT STUDY: CPT

## 2017-01-16 PROCEDURE — G8987 SELF CARE CURRENT STATUS: HCPCS | Performed by: OCCUPATIONAL THERAPIST

## 2017-01-16 PROCEDURE — 65660000000 HC RM CCU STEPDOWN

## 2017-01-16 PROCEDURE — 74011250637 HC RX REV CODE- 250/637: Performed by: HOSPITALIST

## 2017-01-16 PROCEDURE — 93306 TTE W/DOPPLER COMPLETE: CPT

## 2017-01-16 PROCEDURE — 82962 GLUCOSE BLOOD TEST: CPT

## 2017-01-16 PROCEDURE — 97530 THERAPEUTIC ACTIVITIES: CPT

## 2017-01-16 RX ORDER — INSULIN GLARGINE 100 [IU]/ML
INJECTION, SOLUTION SUBCUTANEOUS
COMMUNITY
End: 2017-10-02 | Stop reason: SDUPTHER

## 2017-01-16 RX ORDER — DOCUSATE SODIUM 100 MG/1
100 CAPSULE, LIQUID FILLED ORAL 2 TIMES DAILY
Status: DISCONTINUED | OUTPATIENT
Start: 2017-01-16 | End: 2017-01-20 | Stop reason: HOSPADM

## 2017-01-16 RX ORDER — CLONIDINE HYDROCHLORIDE 0.1 MG/1
0.3 TABLET ORAL 2 TIMES DAILY
Status: DISCONTINUED | OUTPATIENT
Start: 2017-01-16 | End: 2017-01-20 | Stop reason: HOSPADM

## 2017-01-16 RX ORDER — OLMESARTAN MEDOXOMIL 20 MG/1
40 TABLET ORAL DAILY
Status: DISCONTINUED | OUTPATIENT
Start: 2017-01-17 | End: 2017-01-16 | Stop reason: CLARIF

## 2017-01-16 RX ORDER — DOCUSATE SODIUM 100 MG/1
100 CAPSULE, LIQUID FILLED ORAL
Status: DISCONTINUED | OUTPATIENT
Start: 2017-01-16 | End: 2017-01-16

## 2017-01-16 RX ORDER — METOPROLOL SUCCINATE 50 MG/1
50 TABLET, EXTENDED RELEASE ORAL DAILY
Status: DISCONTINUED | OUTPATIENT
Start: 2017-01-17 | End: 2017-01-20 | Stop reason: HOSPADM

## 2017-01-16 RX ORDER — VALSARTAN 160 MG/1
160 TABLET ORAL DAILY
Status: DISCONTINUED | OUTPATIENT
Start: 2017-01-17 | End: 2017-01-20 | Stop reason: HOSPADM

## 2017-01-16 RX ADMIN — ENOXAPARIN SODIUM 30 MG: 30 INJECTION SUBCUTANEOUS at 09:17

## 2017-01-16 RX ADMIN — LEVOTHYROXINE SODIUM 150 MCG: 150 TABLET ORAL at 07:17

## 2017-01-16 RX ADMIN — DOCUSATE SODIUM 100 MG: 100 CAPSULE, LIQUID FILLED ORAL at 12:58

## 2017-01-16 RX ADMIN — CLONIDINE HYDROCHLORIDE 0.3 MG: 0.1 TABLET ORAL at 17:39

## 2017-01-16 RX ADMIN — INSULIN GLARGINE 60 UNITS: 100 INJECTION, SOLUTION SUBCUTANEOUS at 23:25

## 2017-01-16 RX ADMIN — CLONIDINE HYDROCHLORIDE 0.1 MG: 0.1 TABLET ORAL at 09:17

## 2017-01-16 RX ADMIN — DOCUSATE SODIUM 100 MG: 100 CAPSULE, LIQUID FILLED ORAL at 17:39

## 2017-01-16 RX ADMIN — METOPROLOL SUCCINATE 12.5 MG: 25 TABLET, EXTENDED RELEASE ORAL at 09:17

## 2017-01-16 RX ADMIN — ATORVASTATIN CALCIUM 40 MG: 40 TABLET, FILM COATED ORAL at 23:25

## 2017-01-16 RX ADMIN — CLOPIDOGREL BISULFATE 75 MG: 75 TABLET, FILM COATED ORAL at 09:17

## 2017-01-16 RX ADMIN — ACETAMINOPHEN 650 MG: 325 TABLET, FILM COATED ORAL at 23:25

## 2017-01-16 RX ADMIN — Medication 10 ML: at 13:41

## 2017-01-16 RX ADMIN — Medication 10 ML: at 05:43

## 2017-01-16 RX ADMIN — Medication 10 ML: at 23:25

## 2017-01-16 RX ADMIN — INSULIN LISPRO 20 UNITS: 100 INJECTION, SOLUTION INTRAVENOUS; SUBCUTANEOUS at 17:39

## 2017-01-16 RX ADMIN — INSULIN LISPRO 22 UNITS: 100 INJECTION, SOLUTION INTRAVENOUS; SUBCUTANEOUS at 12:58

## 2017-01-16 NOTE — PROGRESS NOTES
* No surgery found *  Bedside and Verbal shift change report given to [address] (oncoming nurse) by Julia Polanco RN (offgoing nurse). Report included the following information SBAR, Kardex, Recent Results and Cardiac Rhythm NSR.     Zone Phone: [phone]        Significant changes during shift: blood sugar high ,MD made aware           Patient Information     Stacy Donnelly  77 y.o.  1/13/2017 11:45 PM by Nano Motley MD. Stacy Donnelly was admitted from Home     Problem List          Patient Active Problem List     Diagnosis Date Noted    Stroke (cerebrum) ([address] 01/14/2017    Thyroid cancer ([address] 09/17/2014    TIA (transient ischemic attack) 06/06/2014    HTN (hypertension) 06/06/2014    CKD (chronic kidney disease) 06/06/2014    Thyroid nodule, right 03/10/2014    Diabetes mellitus ([address] 03/10/2014    Gallbladder polyp 08/14/2013    Gallbladder sludge 08/14/2013    Recurrent ventral incisional hernia 08/14/2013    Obesity (BMI 35.0-39.9 without comorbidity) ([address] 08/14/2013            Past Medical History   Diagnosis Date    Arthritis      Cataract         bilateral    Chronic kidney disease      Diabetes ([address]      Hypercholesterolemia      Hypertension      Ill-defined condition         states 'polyp' in gal bladder    Obesity      Thyroid disease      TIA (transient ischemic attack) 6/6/2014            Core Measures:     CVA: YES YES  CHF:NO NO  PNA:NO NO     Post Op Surgical (If Applicable):      Number times ambulated in hallway past shift: 0  Number of times OOB to chair past shift: 0  NG Tube: NO  Incentive Spirometer: NO  Drains: NO Volume 0  Dressing Present: NO  Flatus: NO     Activity Status:     OOB to Chair NO  Ambulated this shift NO   Bed Rest NO     Supplemental O2: (If Applicable)     NC NO  NRB NO  Venti-mask NO  On 0 Liters/min        LINES AND DRAINS:     Central Line? NO Placement date 0 Reason Medically Necessary 0     PICC LINE?  NO Placement date [facility] Medically Necessary 0     Urinary Catheter? NO Placement Date 0 Reason Medically Necessary 0     DVT prophylaxis:     DVT prophylaxis Med- YES  DVT prophylaxis SCD or SINDHU- NO      Wounds: (If Applicable)     Wounds- NO     Location 0     Patient Safety:     Falls Score Total Score: 4  Safety Level_______  Bed Alarm On? YES  Sitter?  NO     Plan for upcoming shift: PT and OT, echo,del           Discharge Plan: YES  Possible rehab     Active Consults:  IP CONSULT TO NEUROLOGY

## 2017-01-16 NOTE — INTERDISCIPLINARY ROUNDS
NeuroScience Telemetry Unit Interdisciplinary rounds were held today to discuss patient plan of care and outcomes. The interdisciplinary team collaborated to facilitate discharge planning needs. The following members were present; Nurse Manager, [address], Pharmacist, Primary Nurse, , Physical Therapy,   Physician, and Case Management. PLAN:  Endocrinology to see , therapy recommending inpt rehab, [facility] liason looking at chart, echo later today.

## 2017-01-16 NOTE — PROGRESS NOTES
[facility]  Preliminary Report:  Carotid Duplex Scan    Right:  Mild plaque noted in the right carotid system. Right ICA velocities suggest less than 50% diameter reduction. Right vertebral artery flow is antegrade. Left:  Mild plaque noted in the left carotid system. Left ICA velocities suggest less than 50% diameter reduction. Left vertebral artery flow is antegrade. Tortuous ICA's bilaterally. Final report to follow.

## 2017-01-16 NOTE — PROGRESS NOTES
Speech pathology  Followed up with patient. SLP saw Friday for swallow evaluation and patient with functional swallow. She has been tolerating a regular diet all weekend. Checked in to determine if patient continued with any dysarthria. Patient's speech is now clear and fluent. No current need for formal motor speech eval. Will complete ST orders.  Thanks, Rinku Marcum M.S. CCC-SLP

## 2017-01-16 NOTE — PROGRESS NOTES
Problem: Mobility Impaired (Adult and Pediatric)  Goal: *Acute Goals and Plan of Care (Insert Text)  Physical Therapy Goals  Initiated 1/15/2017  1. Patient will move from supine to sit and sit to supine , scoot up and down and roll side to side in bed with supervision/set-up within 7 day(s). 2. Patient will transfer from bed to chair and chair to bed with minimal assistance/contact guard assist using the least restrictive device within 7 day(s). 3. Patient will perform sit to stand with minimal assistance/contact guard assist within 7 day(s). 4. Patient will ambulate with moderate assistance for 10 feet with the least restrictive device within 7 day(s). 5. Patient will improve Back Balance score by 7 points within 7 days. PHYSICAL THERAPY TREATMENT  Patient: Thang Felton (77 y.o. female)  Date: 1/16/2017  Diagnosis: Stroke (cerebrum) [facility]) <principal problem not specified>       Precautions: Fall, Bed Alarm      ASSESSMENT:  Patient received sitting in the chair, agreeable to PT. Patient with increased B foot pain, limiting mobility this date. Patient attempted to stand x 4 attempts before being able to clear surface with max A x 2. Patient able to stand x 2 trials and tolerated standing x 30 seconds each time with min-mod manual cues to maintain upright standing posture and to facilitate L knee extension. Patient unable to weight shift laterally due to increased pain this date. RN aware. Able to place sriram pad under patient for ease of returning to bed. Patient continues to be an excellent candidate for IP rehab at discharge. Will benefit from pain medication for neuropathy like pain to improve mobility. RN aware.    Progression toward goals:  [X]    Improving appropriately and progressing toward goals  [ ]    Improving slowly and progressing toward goals  [ ]    Not making progress toward goals and plan of care will be adjusted       PLAN:  Patient continues to benefit from skilled intervention to address the above impairments. Continue treatment per established plan of care. Discharge Recommendations:  Inpatient Rehab  Further Equipment Recommendations for Discharge:  TBD       SUBJECTIVE:   Patient stated I can't stand. My ankles are hurting.       OBJECTIVE DATA SUMMARY:   Critical Behavior:  Neurologic State: Alert  Orientation Level: Oriented X4  Cognition: Follows commands  Safety/Judgement: Awareness of environment, Fall prevention, Insight into deficits  Functional Mobility Training:  Bed Mobility:  Rolling: Minimum assistance; Additional time;Assist x1  Supine to Sit: Minimum assistance; Additional time;Assist x1     Scooting: Moderate assistance; Additional time;Assist x1 (A to shift L hip forward)        Transfers:  Sit to Stand: Maximum assistance;Assist x2 (with increased ankle and foot pain bilaterally)  Stand to Sit: Moderate assistance;Assist x2        Bed to Chair: Total assistance; Additional time;Assist x1 (SPT towards R imp balance, WB LLE)                    Balance:  Sitting: Impaired; Without support  Sitting - Static: Good (unsupported)  Sitting - Dynamic: Fair (occasional)  Standing: Impaired; With support  Standing - Static: Constant support;Poor  Standing - Dynamic : Poor  Ambulation/Gait Training:   unable this date. Neuro Re-Education:  Provided mod-max manual cues on L distal quad and anterior tib to facilitate standing and weight bearing through LLE and manual cues at sternum for upright standing in midline. Tolerated standing 2 x 30 seconds with min-mod manual cues and mod-max physical assist x 2. Therapeutic Exercises:      Pain:  Pain Scale 1: Numeric (0 - 10)  Pain Intensity 1: 0              Activity Tolerance:   Limited by pain. Please refer to the flowsheet for vital signs taken during this treatment.   After treatment:   [X]    Patient left in no apparent distress sitting up in chair  [ ]    Patient left in no apparent distress in bed  [X] Call bell left within reach  [X]    Nursing notified  [X]    Caregiver present  [X]    Bed alarm activated      COMMUNICATION/COLLABORATION:   The patients plan of care was discussed with: Occupational Therapist, Registered Nurse and      Luz Elena Lo, PT, DPT   Time Calculation: 24 mins

## 2017-01-16 NOTE — PROGRESS NOTES
CM met with pt to determine disposition. Pt advised she would like to discharge to [facility] for post acute care. CM sent referral to [facility] via [employer]. CM was advised pt has been accepted to [facility] and will await discharge to accept pt. Care Management Interventions  PCP Verified by CM: Yes (Dr. Zuleyka Blackwood )  Mode of Transport at Discharge: Other (see comment) ([facility] transport )  Transition of Care Consult (CM Consult):  Other [facility] inpatient rehab)  Discharge Durable Medical Equipment: No  Physical Therapy Consult: Yes  Occupational Therapy Consult: Yes  Confirm Follow Up Transport: Family  Plan discussed with Pt/Family/Caregiver: Yes  Freedom of Choice Offered: Yes [facility] )  Discharge Location  Discharge Placement: Rehab hospital/unit acute [facility] )    PARAS Do, [address] [place] [address]   [phone]

## 2017-01-16 NOTE — PROGRESS NOTES
Chief Complaint:     Sitting in chair. Complains of bilateral ankle tenderness. Discussed risks for strokes and the role of platelets and the importance of antiplatelet therapy. Discussed the results of the studies. She has no new weakness or sensory loss. Assesment and Plan  1. Stroke  Right PCA stroke  Continue plavix   PT/OT    2. Diabetes  Continue insulin     3. Hypertension  Goal systolic blood pressure is below 120  continue clonidine     4. Hyperlipidemia   Goal LDL is below 70  Continue atorvastatin      Allergies  Amlodipine;  Nifedipine; and Sulfa (sulfonamide antibiotics)     Medications  Medication Dose Route Frequency    docusate sodium (COLACE) capsule 100 mg  100 mg Oral BID    cloNIDine HCl (CATAPRES) tablet 0.3 mg  0.3 mg Oral BID    [START ON 1/17/2017] metoprolol succinate (TOPROL-XL) XL tablet 50 mg  50 mg Oral DAILY    [START ON 1/17/2017] valsartan (DIOVAN) tablet 160 mg  160 mg Oral DAILY    insulin glargine (LANTUS) injection 60 Units  60 Units SubCUTAneous QHS    levothyroxine (SYNTHROID) tablet 150 mcg  150 mcg Oral ACB    enoxaparin (LOVENOX) injection 30 mg  30 mg SubCUTAneous Q24H    clopidogrel (PLAVIX) tablet 75 mg  75 mg Oral DAILY    sodium chloride (NS) flush 5-10 mL  5-10 mL IntraVENous PRN    labetalol (NORMODYNE;TRANDATE) 20 mg/4 mL (5 mg/mL) injection 5 mg  5 mg IntraVENous Q10MIN PRN    insulin lispro (HUMALOG) injection   SubCUTAneous AC&HS    glucose chewable tablet 16 g  4 Tab Oral PRN    dextrose (D50W) injection syrg 12.5-25 g  12.5-25 g IntraVENous PRN    glucagon (GLUCAGEN) injection 1 mg  1 mg IntraMUSCular PRN    atorvastatin (LIPITOR) tablet 40 mg  40 mg Oral QHS    acetaminophen (TYLENOL) tablet 650 mg  650 mg Oral Q6H PRN    ondansetron (ZOFRAN) injection 4 mg  4 mg IntraVENous Q4H PRN        Medical History  Past Medical History   Diagnosis Date    Arthritis     Cataract      bilateral    Chronic kidney disease     [facility]     Hypercholesterolemia     Hypertension     Ill-defined condition      states 'polyp' in gal bladder    Obesity     Thyroid disease     TIA (transient ischemic attack) 6/6/2014       Review of Systems  Neuro: negative for headaches, dizziness and seizures. Constitutional: negative for fevers and chills  Eyes: negative for visual disturbance, visual loss and irritation  ENT: negative for hearing loss, tinnitus and pain  RESP: negative for cough, sputum production and hemoptysis  CVS: negative for diaphoresis and palpatation  GI: negative for nausea vomiting and constipation  :negative for dysuria nocturia and incontinence  HEME: negative for bleeding lymphadenopathy and petichiae  MSKL:negative for positive for , arthalgia, of the left ankle no myalgia  BHV: negative for anxiety, mood swings and sleep disturbance  Exam:    Visit Vitals    /73 (BP 1 Location: Right arm, BP Patient Position: Sitting)    Pulse 93    Temp 99.6 °F (37.6 °C)    Resp 20    Wt 225 lb 5 oz (102.2 kg)    SpO2 95%    Breastfeeding No    BMI 38.08 kg/m2        General: Well developed, well nourished. Head: Normocephalic, atraumatic, anicteric sclera   Lungs:  Clear to auscultation bilaterally, No wheezes or rubs   Cardiac: regular, rate, rythm and no murmur   Abd: Bowel sounds were audible. No tenderness on palpation   Ext: No pedal edema   Skin: No overt signs of rash or insect bites      Neurological Exam:  Mental Status: alert, and oriented person, place and time   Speech: Fluent, no aphasia and no dysarthria   Cranial Nerves:  Intact visual fields. Facial sensation is normal. Left facial droop. Palate is midline. Normal sternocleidomastoid strength. Tongue is midline. Hearing is intact bilaterally. Eyes: Fundi reveal sharp optic discs appreciable venous pulsation PERRL, EOM's full, no nystagmus, no ptosis.    Motor:  4/5 left side weakness and normal bulk and tone   Reflexes:  1+ symmetric    Sensory:  sensory loss on the left no extinguishing   Gait:  Gait is deferred secondary to pain. Tremor:  No and tremor. Cerebellar:  Finger to nose was demonstrated competently. Neurovascular: no carotid bruits. No JVD         Imaging      MRI Results (most recent):    Results from Hospital Encounter encounter on 01/13/17   MRI BRAIN WO CONT   *PRELIMINARY REPORT*    MRI examination of the brain demonstrates acute ischemia in the right tracee. Preliminary report was provided by Dr. Gerri Esqueda, the on-call radiologist, at  11:37 AM    Final report to follow. *END PRELIMINARY REPORT*    INDICATION: Stroke. EXAM: Sagittal and axial and coronal images were obtained through the brain in  varying sequences. T1-weighted, T2-weighted, FLAIR, GRE, Diffusion-weighted  sequences may be utilized. FINDINGS:    Comparison study: June 5, 2014. Sagittal images demonstrate signal void to be present in the cavernous carotid  arteries bilaterally. There is moderate to severe prominence of the basilar  cisterns and cortical sulci and ventricles. Axial and coronal images demonstrate mild multifocal hyperintensities in the  periventricular deep white matter and centrum semiovale of both cerebral  hemispheres likely microvascular changes related to aging. Multiple bilateral  old focal infarct in the periventricular deep white matter of both cerebral  hemispheres. No new hemorrhage or mass effect identified. Images through the visualized orbits and sella and cavernous sinus and  ventricles are grossly within normal limits. Signal void appears to be present in the vertebral basilar system. Diffusion-weighted images demonstrate demilune-shaped geographic restricted  diffusion in the right tracee. IMPRESSION:    Mild to moderate diffuse cortical atrophy. Deep white matter ischemic change in both cerebral hemispheres. Multiple old focal infarcts in the periventricular deep white matter of both  cerebral hemispheres.     Acute right pontine infarct.                   .  Lab Review    Lab Results   Component Value Date/Time    WBC 4.2 01/14/2017 12:11 AM    HCT 29.2 01/14/2017 12:11 AM    HGB 9.9 01/14/2017 12:11 AM    PLATELET 692 44/39/4980 12:11 AM       Lab Results   Component Value Date/Time    Sodium 141 01/15/2017 04:43 AM    Potassium 4.3 01/15/2017 04:43 AM    Chloride 107 01/15/2017 04:43 AM    CO2 28 01/15/2017 04:43 AM    Glucose 227 01/15/2017 04:43 AM    BUN 34 01/15/2017 04:43 AM    Creatinine 2.13 01/15/2017 04:43 AM    Calcium 8.1 01/15/2017 04:43 AM       Lab Results   Component Value Date/Time    Hemoglobin A1c 11.5 01/15/2017 04:43 AM          Lab Results   Component Value Date/Time    Cholesterol, total 170 01/15/2017 04:43 AM    HDL Cholesterol 66 01/15/2017 04:43 AM    LDL, calculated 70.6 01/15/2017 04:43 AM    VLDL, calculated 33.4 01/15/2017 04:43 AM    Triglyceride 167 01/15/2017 04:43 AM    CHOL/HDL Ratio 2.6 01/15/2017 04:43 AM

## 2017-01-16 NOTE — PROGRESS NOTES
Tried to call for patient to have her carotid ultrasound but need a lift team to transport. Will do bedside tomorrow morning.       SHELBY SouzaT

## 2017-01-16 NOTE — ROUTINE PROCESS
No surgery found *  Bedside and Verbal shift change report given to Imer Nesbitt (oncoming nurse) by [address] (off going nurse). Report included the following information SBAR, Kardex, Recent Results and Cardiac Rhythm NSR.     Zone Phone: [phone]        Significant changes during shift:  incontinent       Patient Information     Shereen Sethi  77 y.o.  1/13/2017 11:45 PM by Crow Stevenson MD. Shereen Sethi was admitted from Home     Problem List          Patient Active Problem List     Diagnosis Date Noted    Stroke (cerebrum) ([facility] 01/14/2017    [facility] 09/17/2014    TIA (transient ischemic attack) 06/06/2014    HTN (hypertension) 06/06/2014    CKD (chronic kidney disease) 06/06/2014    Thyroid nodule, right 03/10/2014    Diabetes mellitus ([facility] 03/10/2014    Gallbladder polyp 08/14/2013    Gallbladder sludge 08/14/2013    Recurrent ventral incisional hernia 08/14/2013    Obesity (BMI 35.0-39.9 without comorbidity) ([facility] 08/14/2013            Past Medical History   Diagnosis Date    Arthritis      Cataract         bilateral    Chronic kidney disease      Diabetes ([facility]      Hypercholesterolemia      Hypertension      Ill-defined condition         states 'polyp' in gal bladder    Obesity      Thyroid disease      TIA (transient ischemic attack) 6/6/2014            Core Measures:     CVA: YES YES  CHF:NO NO  PNA:NO NO     Post Op Surgical (If Applicable):      Number times ambulated in hallway past shift: 0  Number of times OOB to chair past shift: 0  NG Tube: NO  Incentive Spirometer: NO  Drains: NO Volume 0  Dressing Present: NO  Flatus: NO     Activity Status:     OOB to Chair NO  Ambulated this shift NO   Bed Rest NO     Supplemental O2: (If Applicable)     NC NO  NRB NO  Venti-mask NO  On 0 Liters/min        LINES AND DRAINS:     Central Line? NO Placement date 0 Reason Medically Necessary 0     PICC LINE? NO Placement date 0Reason Medically Necessary 0     Urinary Catheter?  NO Placement Date 0 Reason Medically Necessary 0     DVT prophylaxis:     DVT prophylaxis Med- YES  DVT prophylaxis SCD or SINDHU- NO      Wounds: (If Applicable)     Wounds- NO     Location 0     Patient Safety:     Falls Score Total Score: 4  Safety Level_______  Bed Alarm On? YES  Sitter?  NO     Plan for upcoming shift: PT and OT and ECHO           Discharge Plan: YES  Possible rehab     Active Consults:  IP CONSULT TO NEUROLOGY

## 2017-01-16 NOTE — CONSULTS
Patient seen 1/15/2016             NEUROLOGY HISTORY AND PHYSICAL    Name Laurent Kim Age 77 y.o. MRN [id number]  1940     Consulting Physician: Greg Pacheco MD      Chief Complaint:  stroke     This is a 78-year-old right-handed after her females medical history of stroke, hypertension, diabetes, hyperlipidemia. She is admitted after a sudden onset of left sided weakness. This weakness is needed. There is no prodromal illness. She states that she arrived to the emergency department within 30 minutes of the onset of her symptoms. Additionally she is suffering pain in the left ankle. Educated patient on stroke, presentation, cause, risk factors, prevention and treatment. I have personally reviewed the chart   I have personally reviewed available imaging   I have the reviewed the available laboratory results. Assessment and Plan     1. Stroke  · Imaging: MRI, carotid dopplers, transthoracic echocardiogram  · Labs: B12 and folate, , lipid profile, TSH, HGBA1C,  · Consults: speech therapy, physical therapy and occupational therapy. · Start: plavix    2. Diabetes  Continue insulin    3. Hypertension  Goal systolic blood pressure is below 120  continue clonidine    4. Hyperlipidemia   Goal LDL is below 70  Continue atorvastatin        Patient Allergies    Amlodipine;  Nifedipine; and Sulfa (sulfonamide antibiotics)     Medication Dose Route Frequency    insulin glargine (LANTUS) injection 60 Units  60 Units SubCUTAneous QHS    levothyroxine (SYNTHROID) tablet 150 mcg  150 mcg Oral ACB    enoxaparin (LOVENOX) injection 30 mg  30 mg SubCUTAneous Q24H    clopidogrel (PLAVIX) tablet 75 mg  75 mg Oral DAILY    sodium chloride (NS) flush 5-10 mL  5-10 mL IntraVENous Q8H    sodium chloride (NS) flush 5-10 mL  5-10 mL IntraVENous PRN    labetalol (NORMODYNE;TRANDATE) 20 mg/4 mL (5 mg/mL) injection 5 mg  5 mg IntraVENous Q10MIN PRN    insulin lispro (HUMALOG) injection   SubCUTAneous AC&HS    glucose chewable tablet 16 g  4 Tab Oral PRN    dextrose (D50W) injection syrg 12.5-25 g  12.5-25 g IntraVENous PRN    glucagon (GLUCAGEN) injection 1 mg  1 mg IntraMUSCular PRN    atorvastatin (LIPITOR) tablet 40 mg  40 mg Oral QHS    metoprolol succinate (TOPROL-XL) XL tablet 12.5 mg  12.5 mg Oral DAILY    cloNIDine HCl (CATAPRES) tablet 0.1 mg  0.1 mg Oral BID    acetaminophen (TYLENOL) tablet 650 mg  650 mg Oral Q6H PRN    ondansetron (ZOFRAN) injection 4 mg  4 mg IntraVENous Q4H PRN       Past Medical History   Diagnosis Date    Arthritis     Cataract      bilateral    Chronic kidney disease     Diabetes ([facility] [address]     Hypercholesterolemia     Hypertension     Ill-defined condition      states 'polyp' in gal bladder    Obesity     Thyroid disease     TIA (transient ischemic attack) 6/6/2014       Social History     Social History    Marital status:      Spouse name: N/A    Number of children: N/A    Years of education: N/A     Occupational History    Not on file. Social History Main Topics    Smoking status: Never Smoker    Smokeless tobacco: Never Used    Alcohol use No    Drug use: No    Sexual activity: Not on file     Other Topics Concern    Not on file     Social History Narrative       Family History   Problem Relation Age of Onset    Heart Disease Mother     Hypertension Mother     Diabetes Mother     Stroke Mother     Cancer Father      colon    Heart Disease Sister     Diabetes Sister     Heart Attack Sister     Hypertension Sister     Lung Disease Brother     Lung Disease Brother     Anesth Problems Neg Hx        Review of Systems  Neuro: negative for headaches, dizziness and seizures.    Constitutional: negative for fevers and chills  Eyes: negative for visual disturbance, visual loss and irritation  ENT: negative for hearing loss, tinnitus and pain  RESP: negative for cough, sputum production and hemoptysis  CVS: negative for diaphoresis and palpatation  GI: negative for nausea vomiting and constipation  :negative for dysuria nocturia and incontinence  HEME: negative for bleeding lymphadenopathy and petichiae  MSKL:negative for positive for , arthalgia, of the left ankle no myalgia  BHV: negative for anxiety, mood swings and sleep disturbance    Exam      Visit Vitals    /72 (BP 1 Location: Right arm)    Pulse 84    Temp 99.3 °F (37.4 °C)    Resp 18    Wt 225 lb 5 oz (102.2 kg)    SpO2 99%    Breastfeeding No    BMI 38.08 kg/m2        General: Well developed, well nourished. Head: Normocephalic, atraumatic, anicteric sclera   Lungs:  Clear to auscultation bilaterally, No wheezes or rubs   Cardiac: regular, rate, rythm and no murmur   Abd: Bowel sounds were audible. No tenderness on palpation   Ext: No pedal edema   Skin: No overt signs of rash or insect bites     Neurological Exam:  Mental Status:  alert, and oriented person, place and time   Speech: Fluent, no aphasia and no dysarthria   Cranial Nerves:   Intact visual fields. Facial sensation is normal. Left facial droop. Palate is midline. Normal sternocleidomastoid strength. Tongue is midline. Hearing is intact bilaterally. Eyes: Fundi reveal sharp optic discs appreciable venous pulsation PERRL, EOM's full, no nystagmus, no ptosis. Motor:  4/5 left side weakness and normal bulk and tone   Reflexes:   1+ symmetric    Sensory:   sensory loss on the left no extinguishing   Gait:  Gait is deferred secondary to pain. Tremor:   No and tremor. Cerebellar:  Finger to nose was demonstrated competently. Neurovascular: no carotid bruits. No JVD     Imaging    MRI Results (most recent):    Results from [place] encounter on 01/13/17   MRI BRAIN WO CONT   Narrative *PRELIMINARY REPORT*    MRI examination of the brain demonstrates acute ischemia in the right tracee.     Preliminary report was provided by Dr. Zach Ledesma, the on-call radiologist, at  11:37 AM    Final report to follow. *END PRELIMINARY REPORT*    INDICATION: Stroke. EXAM: Sagittal and axial and coronal images were obtained through the brain in  varying sequences. T1-weighted, T2-weighted, FLAIR, GRE, Diffusion-weighted  sequences may be utilized. FINDINGS:    Comparison study: June 5, 2014. Sagittal images demonstrate signal void to be present in the cavernous carotid  arteries bilaterally. There is moderate to severe prominence of the basilar  cisterns and cortical sulci and ventricles. Axial and coronal images demonstrate mild multifocal hyperintensities in the  periventricular deep white matter and centrum semiovale of both cerebral  hemispheres likely microvascular changes related to aging. Multiple bilateral  old focal infarct in the periventricular deep white matter of both cerebral  hemispheres. No new hemorrhage or mass effect identified. Images through the visualized orbits and sella and cavernous sinus and  ventricles are grossly within normal limits. Signal void appears to be present in the vertebral basilar system. Diffusion-weighted images demonstrate demilune-shaped geographic restricted  diffusion in the right tracee. Impression IMPRESSION:    Mild to moderate diffuse cortical atrophy. Deep white matter ischemic change in both cerebral hemispheres. Multiple old focal infarcts in the periventricular deep white matter of both  cerebral hemispheres. Acute right pontine infarct. CT Results (most recent):    Results from Hospital Encounter encounter on 01/13/17   CT HEAD WO CONT   Narrative EXAM:  CT HEAD WO CONT    INDICATION:   Increasing neuro deficits    COMPARISON: MRI 1/14/2017. TECHNIQUE: Unenhanced CT of the head was performed using 5 mm images. Brain and  bone windows were generated. CT dose reduction was achieved through use of a  standardized protocol tailored for this examination and automatic exposure  control for dose modulation. FINDINGS:  The ventricles and sulci are normal in size, shape and configuration and  midline. Small foci of hypodensity in the white matter likely related to chronic  small vessel ischemic disease. A 9 mm hypodensity in the right tracee is related  to the previously seen acute lacunar infarct on MRI. Tthere is no intracranial  hemorrhage, extra-axial collection, mass, mass effect or midline shift. The  basilar cisterns are open. No acute infarct is identified. The bone windows  demonstrate no abnormalities. The visualized portions of the paranasal sinuses  and mastoid air cells are clear. Impression IMPRESSION:   1. Unchanged small lacunar infarct in the right tracee. 2. Mild white matter disease likely related to chronic small vessel ischemic  disease.               Lab Review    Lab Results   Component Value Date/Time    WBC 4.2 01/14/2017 12:11 AM    HCT 29.2 01/14/2017 12:11 AM    HGB 9.9 01/14/2017 12:11 AM    PLATELET 838 85/50/8544 12:11 AM     Lab Results   Component Value Date/Time    Sodium 141 01/15/2017 04:43 AM    Potassium 4.3 01/15/2017 04:43 AM    Chloride 107 01/15/2017 04:43 AM    CO2 28 01/15/2017 04:43 AM    Glucose 227 01/15/2017 04:43 AM    BUN 34 01/15/2017 04:43 AM    Creatinine 2.13 01/15/2017 04:43 AM    Calcium 8.1 01/15/2017 04:43 AM     No results found for: B12LT, FOL, RBCF  Lab Results   Component Value Date/Time    LDL, calculated 70.6 01/15/2017 04:43 AM     Lab Results   Component Value Date/Time    Hemoglobin A1c 11.5 01/15/2017 04:43 AM

## 2017-01-16 NOTE — PROGRESS NOTES
Problem: Self Care Deficits Care Plan (Adult)  Goal: *Acute Goals and Plan of Care (Insert Text)  Occupational Therapy Goals  Initiated 1/16/2017   1. Patient will perform self-feeding including container management using her L non-dominant UE as a gross assist with modified independence within 7 day(s). 2. Patient will perform upper body dressing with minimal assistance/contact guard assist using best technique within 7 day(s). 3. Patient will perform lower body dressing with maximal assistance within 7 day(s). 4. Patient will perform toilet transfers with moderate assistance and best DME within 7 day(s). 5. Patient will perform all aspects of toileting with maximal assistance within 7 day(s). 6. Patient will participate in upper extremity therapeutic exercise/activities with moderate assistance for LUE for 10 minutes within 7 day(s). 7. Patient will utilize energy conservation techniques during functional activities with verbal and visual cues within 7 day(s). 8. Patient will improve their LUE Fugl Marrufo score by 5 points within 7 days. OCCUPATIONAL THERAPY EVALUATION  Patient: Horacio Tran (77 y.o. female)  Date: 1/16/2017  Primary Diagnosis: Stroke (cerebrum) [facility])        Precautions:  Fall, Bed Alarm      ASSESSMENT :  Based on the objective data described below, the patient presents with acute R pontine CVA and h/o previous TIA. She is limited by L non-dominate hemiparesis, decreased endurance, mobility, balance in sitting and standing and safety. He LUE strength is 2-/5 and pt is aware of current impairments and verbalized signs and symptoms of CVA after re-educated using [employer] information. Pt currently requires up to mod A for UE ADLs, total A for LE ADLs, min A for bed mobility and total A for functional transfer to bed and BSC beside bed. Prior to CVA she was independent, driving and performing all ADLs without assist.  He son lives with her and works during the day.   She has a very supportive family and pt states her sister will stay with her after rehab. Recommend IP rehab at discharge as pt requires and can tolerate at least 3 hours of intense therapy a day. Patient will benefit from skilled intervention to address the above impairments. Patients rehabilitation potential is considered to be Good  Factors which may influence rehabilitation potential include:   [ ]                None noted  [ ]                Mental ability/status  [X]                Medical condition  [ ]                Home/family situation and support systems  [ ]                Safety awareness  [ ]                Pain tolerance/management  [ ]                Other:        PLAN :  Recommendations and Planned Interventions:  [X]                  Self Care Training                  [X]           Therapeutic Activities  [X]                  Functional Mobility Training    [ ]           Cognitive Retraining  [X]                  Therapeutic Exercises           [X]           Endurance Activities  [X]                  Balance Training                   [X]           Neuromuscular Re-Education  [ ]                  Visual/Perceptual Training     [X]      Home Safety Training  [X]                  Patient Education                 [X]           Family Training/Education  [ ]                  Other (comment):     Frequency/Duration: Patient will be followed by occupational therapy 5 times a week to address goals. Discharge Recommendations: Inpatient Rehab  Further Equipment Recommendations for Discharge: TBD       SUBJECTIVE:   Patient stated I wish I could do more with my L arm.       OBJECTIVE DATA SUMMARY:   HISTORY:   Past Medical History   Diagnosis Date    Arthritis      Cataract         bilateral    Chronic kidney disease      Diabetes ([facility] [address]      Hypercholesterolemia      Hypertension      Ill-defined condition         states 'polyp' in gal bladder    Obesity      Thyroid disease      TIA (transient ischemic attack) 6/6/2014     Past Surgical History   Procedure Laterality Date    Hx gyn   1970's       partial hysterectomy    Hx orthopaedic   1988       bunion    Colorectal scrn; hi risk ind   5/30/2014            Pr breast surgery procedure unlisted   2009, 2006       breast bx-vince    Hx hernia repair   2009    Pr abdomen surgery proc unlisted   2006       stomach    Hx heent   8/2014, 09/2014       Dr. Andie Farmer Hx thyroidectomy   2014       Dr. Bibiana Peñaloza        Prior Level of Function/Home Situation: Prior to CVA she was independent, driving and performing all ADLs without assist.  He son lives with her and works during the day. She has a very supportive family and pt states her sister will stay with her after rehab. Home Situation  Home Environment: Private residence  # Steps to Enter: 3  Rails to Enter: Yes  Hand Rails : Left  One/Two Story Residence: Two story, live on 1st floor  Living Alone: No  Support Systems: Child(riki) (son lives with pt, but works during day)  Patient Expects to be Discharged to[de-identified] Private residence  Current DME Used/Available at Home: [address] [facility], [place], straight, Richie Tidwell, rollator  Tub or Shower Type: Tub/Shower combination  [X]  Right hand dominant             [ ]  Left hand dominant     EXAMINATION OF PERFORMANCE DEFICITS:  Cognitive/Behavioral Status:  Neurologic State: Alert  Orientation Level: Oriented X4  Cognition: Follows commands  Perception: Cues to maintain midline in standing; Tactile;Verbal;Visual  Perseveration: No perseveration noted  Safety/Judgement: Awareness of environment; Fall prevention; Insight into deficits     Vision/Perceptual:    Acuity: Within Defined Limits    Corrective Lenses: Glasses  Range of Motion:  AROM: Grossly decreased, non-functional (LUE)  PROM: Generally decreased, functional                    Strength:  Strength: Grossly decreased, non-functional (LUE 2-/5 and RUE 5/5)              Coordination:  Coordination: Grossly decreased, non-functional (LUE)  Fine Motor Skills-Upper: Left Impaired;Right Intact    Gross Motor Skills-Upper: Left Impaired;Right Intact  Tone & Sensation:  Tone: Normal  Sensation: Intact (to light touch)                       Balance:  Sitting: Impaired; Without support  Sitting - Static: Good (unsupported)  Sitting - Dynamic: Fair (occasional)  Standing: Impaired; With support  Standing - Static: Poor;Constant support  Standing - Dynamic : Poor     Functional Mobility and Transfers for ADLs:  Bed Mobility:  Rolling: Minimum assistance; Additional time;Assist x1  Supine to Sit: Minimum assistance; Additional time;Assist x1  Scooting: Moderate assistance; Additional time;Assist x1 (A to shift L hip forward)     Transfers:  Sit to Stand: Maximum assistance; Additional time;Assist x1 (imp WB LLE)  Stand to Sit: Moderate assistance; Additional time;Assist x1  Bed to Chair: Total assistance; Additional time;Assist x1 (SPT towards R imp balance, WB LLE)  Toilet Transfer : Total assistance; Additional time;Assist x1 (to BSC- SPT towards R imp balance, WB LLE)     ADL Assessment:  Feeding: Setup; Additional time (A to open containers and cut food due to L HP)     Oral Facial Hygiene/Grooming: Minimum assistance; Additional time;Assist x1 (A to open containers due to L HP)     Bathing: Maximum assistance; Additional time;Assist x1 (A for balance, L HP)     Upper Body Dressing: Moderate assistance; Additional time;Assist x1 (due to L HP)     Lower Body Dressing: Total assistance; Additional time     Toileting: Total assistance; Additional time     Cognitive Retraining  Safety/Judgement: Awareness of environment; Fall prevention; Insight into deficits     Functional Measure:   Fugl-Marrufo Assessment of Motor Recovery after Stroke:  LUE      Reflex Activity  Flexors/Biceps/Fingers: None  Extensors/Triceps: None  Reflex Subtotal: 0     Volitional Movement Within Synergies  Shoulder Retraction: Partial  Shoulder Elevation: Partial  Shoulder Abduction (90 degrees): Partial  Shoulder External Rotation: None  Elbow Flexion: Partial  Forearm Supination: Partial  Shoulder Adduction/Internal Rotation: Partial  Elbow Extension: Full  Forearm Pronation: Full  Subtotal: 10     Volitional Movement Mixing Synergies  Hand to Lumbar Spine: None  Shoulder Flexion (0-90 degrees): Partial  Pronation-Supination: Partial  Subtotal: 2     Volitional Movement With Little or No Synergy  Shoulder Abduction (0-90 degrees): None  Shoulder Flexion ( degrees): None  Pronation/Supination: None  Subtotal : 0     Normal Reflex Activity  Biceps, Triceps, Finger Flexors: None  Subtotal : 0     Upper Extremity Total   Upper Extremity Total: 12     Wrist  Stability at 15 Degree Dorsiflexion: Partial  Repeated Dorsiflexion/ Volar Flexion: None  Stability at 15 Degree Dorsiflexion: None  Repeated Dorsiflexion/ Volar Flexion: None  Circumduction: None  Wrist Total: 1     Hand  Mass Flexion: Partial  Mass Extension: Partial  Grasp A: None  Grasp B: None  Grasp C: None  Grasp D: None  Grasp E: None  Hand Total: 2     Coordination/Speed  Tremor: None  Dysmetria: None  Time: >5s (unable to perform with LUE)  Coordination/Speed Total : 4     Total A-D  Total A-D (Motor Function): 19/66      Percentage of impairment CH  0% CI  1-19% CJ  20-39% CK  40-59% CL  60-79% CM  80-99% CN  100%   Fugl-Marrufo score: 0-66 66 53-65 39-52 26-38 13-25 1-12    0      This is a reliable/valid measure of arm function after a neurological event. It has established value to characterize functional status and for measuring spontaneous and therapy-induced recovery; tests proximal and distal motor functions. Fugl-Marrufo Assessment  UE scores recorded between five and 30 days post neurologic event can be used to predict UE recovery at six months post neurologic event.   Severe = 0-21 points   Moderately Severe = 22-33 points   Moderate = 34-47 points   Mild = 48-66 points  ADAM Deras, Jacobo Giordano., & Praneeth, FEDE (1992). Measurement of motor recovery after stroke: Outcome assessment and sample size requirements. Stroke, 23, pp. 9071-5690.   ------------------------------------------------------------------------------------------------------------------------------------------------------------------  MCID:  Stroke:   Aye Fallon et al, 2001; n = 171; mean age 79 (5) years; assessed within 16 (12) days of stroke, Acute Stroke)  FMA Motor Scores from Admission to Discharge   · 10 point increase in FMA Upper Extremity = 1.5 change in discharge FIM  · 10 point increase in FMA Lower Extremity = 1.9 change in discharge FIM  MDC:   Stroke:   Liyah Hou et al, 2008, n = 14, mean age = 59.9 (14.6) years, assessed on average 14 (6.5) months post stroke, Chronic Stroke)   · FMA = 5.2 points for the Upper Extremity portion of the assessment      G codes: In compliance with CMSs Claims Based Outcome Reporting, the following G-code set was chosen for this patient based on their primary functional limitation being treated: The outcome measure chosen to determine the severity of the functional limitation was the Fugl-Marrufo with a score of 19/66 which was correlated with the impairment scale.       · Self Care:               - CURRENT STATUS:    CL - 60%-79% impaired, limited or restricted               - GOAL STATUS:           CK - 40%-59% impaired, limited or restricted               - D/C STATUS:                       ---------------To be determined---------------       Occupational Therapy Evaluation Charge Determination   History Examination Decision-Making   MEDIUM Complexity : Expanded review of history including physical, cognitive and psychosocial  history  HIGH Complexity : 5 or more performance deficits relating to physical, cognitive , or psychosocial skils that result in activity limitations and / or participation restrictions MEDIUM Complexity : Patient may present with comorbidities that affect occupational performnce. Miniml to moderate modification of tasks or assistance (eg, physical or verbal ) with assesment(s) is necessary to enable patient to complete evaluation       Based on the above components, the patient evaluation is determined to be of the following complexity level: MEDIUM     Pain:  Pain Scale 1: Numeric (0 - 10)  Pain Intensity 1: 0              Activity Tolerance:   Fair-good  Please refer to the flowsheet for vital signs taken during this treatment. After treatment:   [X]  Patient left in no apparent distress sitting up in chair  [ ]  Patient left in no apparent distress in bed  [X]  Call bell left within reach  [X]  Nursing notified  [ ]  Caregiver present  [X]  Bed alarm activated      COMMUNICATION/EDUCATION:   The patients plan of care was discussed with: Physical Therapist and Registered Nurse. Patient was educated regarding Her deficit(s) of L HP as this relates to Her diagnosis of R pontine CVA. She demonstrated Good understanding as evidenced by awareness of situation. [X]    Home safety education was provided and the patient/caregiver indicated understanding. [X]    Patient/family have participated as able in goal setting and plan of care. [X]    Patient/family agree to work toward stated goals and plan of care. [ ]    Patient understands intent and goals of therapy, but is neutral about his/her participation. [ ]    Patient is unable to participate in goal setting and plan of care. This patients plan of care is appropriate for delegation to \[facility]\"".      Thank you for this referral.  Greg Luis OT  Time Calculation: 24 mins

## 2017-01-16 NOTE — PROGRESS NOTES
Hospitalist Progress Note    NAME: Arturo Howard   :     MRN:  [id number]       Interim Hospital Summary: 77 y.o. female whom presented on 2017 with L sided weakness      Assessment / Plan:  Stroke POA  Sx: facial droop, L sided weakness - slowly improving   OK to control BP   Head CT:negative   MRI:Multiple old focal infarcts in both cerebral hemispheres. Acute right pontine infarct. Carotid duplex US: pending   ECHO: pending   Stroke blood work: LDL  70.6  hba1c 11.5  TSH - 4.03   Seen by neurology: ASA was changed to Plavix   Speech:reguler diet    PT/OT:SAH       Likely CKD stage III  Last available baseline  1.4, admission 2.49 - likely her baseline. TARUN was r/o   Cr is not significantly down while on fluids, so likely her baseline. US: no hydronephrosis, medical disease      IDDM type II with renal manifestation     On home regiment now + SS  Endocrinology consulted  Diabetic education     HTN  OK to control BP now   BB/clonidine/ARB at home dose   Holding bumex/spironolactone    Hypothyroidism  TSH 4.03 - high side --> incr levothyroxine  Follow TSH in 6 weeks     HLP, LDL at goal, cont statin   Gallbladder polyp, followed by Dr Fritz Barahona with abd US every year           Code status: Full   Surrogate Decision Maker: bonita Simmons [phone] or [phone], son Dwight Jorge [phone]  Prophylaxis: Lovenox     Baseline: lives with sio; she has 2 sons; was independent   Recommended Disposition: PT recommending SAH, SW consulted      Subjective:     Chief Complaint / Reason for Physician Visit: following stroke/ HTN/TARUN   weakness is improving       Discussed with RN events overnight.      Review of Systems:  Symptom Y/N Comments  Symptom Y/N Comments   Fever/Chills n   Chest Pain n    Poor Appetite    Edema     Cough    Abdominal Pain n    Sputum    Joint Pain     SOB/ALONSO n   Pruritis/Rash     Nausea/vomit n   Tolerating PT/OT y    Diarrhea n   Tolerating Diet y    Constipation n Other       Could NOT obtain due to:      Objective:     VITALS:   Last 24hrs VS reviewed since prior progress note. Most recent are:  Patient Vitals for the past 24 hrs:   Temp Pulse Resp BP SpO2   01/16/17 1517 99.6 °F (37.6 °C) 93 20 150/73 95 %   01/16/17 1105 100 °F (37.8 °C) 88 20 161/79 100 %   01/16/17 0827 98.8 °F (37.1 °C) 99 20 186/79 98 %   01/16/17 0329 99.8 °F (37.7 °C) 90 20 154/80 100 %   01/16/17 0053 99 °F (37.2 °C) 90 18 158/66 98 %   01/15/17 2006 99.3 °F (37.4 °C) 84 18 169/72 99 %   01/15/17 1935 99.5 °F (37.5 °C) 84 18 157/69 100 %   01/15/17 1656 98.6 °F (37 °C) 76 18 158/70 98 %       Intake/Output Summary (Last 24 hours) at 01/16/17 1532  Last data filed at 01/15/17 1737   Gross per 24 hour   Intake              240 ml   Output                0 ml   Net              240 ml        PHYSICAL EXAM:  General: WD, WN. Alert, cooperative, no acute distress    EENT:  EOMI. Anicteric sclerae. MMM  Resp:  CTA bilaterally, no wheezing or rales. No accessory muscle use  CV:  Regular  rhythm,  No edema  GI:  Soft, Non distended, Non tender.  +Bowel sounds  Neurologic:  Alert and oriented X 3, normal speech,  facial droop - resolved, L sided weakness is much better   Psych:   Good insight. Not anxious nor agitated  Skin:  No rashes. No jaundice    Reviewed most current lab test results and cultures  YES  Reviewed most current radiology test results   YES  Review and summation of old records today    NO  Reviewed patient's current orders and MAR    YES  PMH/SH reviewed - no change compared to H&P  ________________________________________________________________________  Care Plan discussed with:    Comments   Patient y    Family      RN y    Care Manager     Consultant                        Multidiciplinary team rounds were held today with , nursing, pharmacist and clinical coordinator. Patient's plan of care was discussed; medications were reviewed and discharge planning was addressed. ________________________________________________________________________  Total NON critical care TIME:  35   Minutes    Total CRITICAL CARE TIME Spent:   Minutes non procedure based      Comments   >50% of visit spent in counseling and coordination of care     ________________________________________________________________________  Rodrick Juarez MD     Procedures: see electronic medical records for all procedures/Xrays and details which were not copied into this note but were reviewed prior to creation of Plan. LABS:  I reviewed today's most current labs and imaging studies.   Pertinent labs include:  Recent Labs      01/14/17   0011   WBC  4.2   HGB  9.9*   HCT  29.2*   PLT  180     Recent Labs      01/15/17   0443  01/14/17   0041  01/14/17   0011   NA  141   --   138   K  4.3   --   4.1   CL  107   --   100   CO2  28   --   27   GLU  227*   --   242*   BUN  34*   --   46*   CREA  2.13*   --   2.49*   CA  8.1*   --   8.3*   ALB   --    --   3.9   TBILI   --    --   0.4   SGOT   --    --   31   ALT   --    --   32   INR   --   1.0  1.0       Signed: Rodrick Juarez MD

## 2017-01-16 NOTE — DIABETES MGMT
DTC Consult Note    Recommendations/ Comments:Recommend changing humalog order to match her home scale which begins at a BG of 90. This will ensure pt is receiving insulin to cover her food at each meal.  Given the amount of insulin dosed per her humalog scale it is likely for prandial and correction dosed together to make the regimen easier on the patient. Pt's BG this am was 139. With the current scale pt did NOT receive insulin at breakfast and her BG was 269 by lunch time indicating need for prandial insulin. Discussed with Dr. Dominic Crespo and order received to change humalog scale to begin at 90 per med rec. Would monitor BG with this change. If BG remains elevated after change, then would consider either increasing doses or changing regimen to be separate prandial and correction dosing. Would continue lantus at current dose a this time. Left message for nurse at Dr. Maty Walden office to call regarding pt. Addendum:  Discussed pt with Dr. Maty Walden nurse, Cecilio Esquivel. Cecilio Esquivel will speak with Dr. Rubi Vazquez about having pt establish care in their office after discharge. Consult received for:  [x]             Assessment of home management                 Chart reviewed and initial evaluation complete on Marissa Kellogg. Patient is a 77 y.o. female with known Type 2 Diabetes on lantus 60units Qhs and humalog correction scale as follows at home:     14 units                           141-180  16 units                           181-220  18 units                           221-260   20 units                           261-300  22 units                            301-340  24 units                            > 341     26 units    Pt denies missing any insulin doses except for a rare occasion. If she misses her humalog it would be at lunch because she was eating out and did not take her insulin with her. Discussed need to take her insulin with her when dining out and when to administer.   She reports she typically eats at home. She likes baked chicken. She does snack some during the day. Reviewed basic meal planning and healthy snack ideas. Pt is avoiding concentrated sweets. Pt can verbalize the correct steps for using her insulin pens and is storing them appropriately. With pt's current neurologic injury, pt would not be able to administer her own insulin. Pt cannot  the insulin pen in her left hand to hold it still and allow her right hand to manipulate the pen. She reports plan to go to [facility] for two weeks and then her sister will be staying with her. Her sister will be able to assist with her insulin administration. Discussed with pt need for additional plan for her insulin, if by the time she is finished with rehab/her sister must return home she remains unable to administer it herself. Discussed a1c value and meaning with pt as well as continued high risk for stroke reoccurrence/ additional complications with continued uncontrolled DM. Pt typically is seen by Dr. Stephen Hughes in the outpt setting for her DM management. Per pt's request, contact info for [facility] was provided. Pt would like to see someone closer to her home here. Assessed and instructed patient on the following:   ·  interpretation of lab results, complications of diabetes mellitus, nutrition, referred to Diabetes Educator, site rotation and use of insulin pen      Provided patient with the following: [x]             Survival skills education materials               [x]             Insulin education materials               []             CHO counting education materials               [x]             Outpatient DTC contact number               []             Glucometer                 Discussed with patient and/or family need for follow up appointment for diabetes management after discharge.       A1c:   Lab Results   Component Value Date/Time    Hemoglobin A1c 11.5 01/15/2017 04:43 AM Recent Glucose Results: Lab Results   Component Value Date/Time    GLUCPOC 227 (H) 01/16/2017 03:20 PM    GLUCPOC 269 (H) 01/16/2017 11:53 AM    GLUCPOC 139 (H) 01/16/2017 06:34 AM        Lab Results   Component Value Date/Time    Creatinine 2.13 01/15/2017 04:43 AM       Active Orders   Diet    DIET DIABETIC WITH OPTIONS Consistent Carb 1800kcal; Regular        PO intake: Patient Vitals for the past 72 hrs:   % Diet Eaten   01/15/17 1737 100 %   01/15/17 1322 75 %   01/15/17 0830 75 %       Current hospital DM medication: lantus 60units Qhs and humalog >140 correction scale    Will continue to follow as needed. Thank you.   Carlitos Amaral RN, [address]

## 2017-01-17 ENCOUNTER — APPOINTMENT (OUTPATIENT)
Dept: GENERAL RADIOLOGY | Age: 77
DRG: 065 | End: 2017-01-17
Attending: HOSPITALIST
Payer: MEDICARE

## 2017-01-17 ENCOUNTER — TELEPHONE (OUTPATIENT)
Dept: ENDOCRINOLOGY | Age: 77
End: 2017-01-17

## 2017-01-17 PROBLEM — I65.23 STENOSIS OF BOTH INTERNAL CAROTID ARTERIES: Status: ACTIVE | Noted: 2017-01-17

## 2017-01-17 PROBLEM — I63.50 RIGHT PONTINE STROKE (HCC): Status: ACTIVE | Noted: 2017-01-17

## 2017-01-17 LAB
ANION GAP BLD CALC-SCNC: 9 MMOL/L (ref 5–15)
APPEARANCE UR: ABNORMAL
BACTERIA URNS QL MICRO: NEGATIVE /HPF
BILIRUB UR QL: NEGATIVE
BUN SERPL-MCNC: 25 MG/DL (ref 6–20)
BUN/CREAT SERPL: 12 (ref 12–20)
CALCIUM SERPL-MCNC: 8.3 MG/DL (ref 8.5–10.1)
CHLORIDE SERPL-SCNC: 104 MMOL/L (ref 97–108)
CO2 SERPL-SCNC: 27 MMOL/L (ref 21–32)
COLOR UR: ABNORMAL
CREAT SERPL-MCNC: 2.16 MG/DL (ref 0.55–1.02)
EPITH CASTS URNS QL MICRO: ABNORMAL /LPF
ERYTHROCYTE [DISTWIDTH] IN BLOOD BY AUTOMATED COUNT: 14.4 % (ref 11.5–14.5)
GLUCOSE BLD STRIP.AUTO-MCNC: 151 MG/DL (ref 65–100)
GLUCOSE BLD STRIP.AUTO-MCNC: 160 MG/DL (ref 65–100)
GLUCOSE BLD STRIP.AUTO-MCNC: 165 MG/DL (ref 65–100)
GLUCOSE BLD STRIP.AUTO-MCNC: 208 MG/DL (ref 65–100)
GLUCOSE SERPL-MCNC: 178 MG/DL (ref 65–100)
GLUCOSE UR STRIP.AUTO-MCNC: NEGATIVE MG/DL
HCT VFR BLD AUTO: 27.1 % (ref 35–47)
HGB BLD-MCNC: 9 G/DL (ref 11.5–16)
HGB UR QL STRIP: ABNORMAL
KETONES UR QL STRIP.AUTO: NEGATIVE MG/DL
LEUKOCYTE ESTERASE UR QL STRIP.AUTO: ABNORMAL
MCH RBC QN AUTO: 29.5 PG (ref 26–34)
MCHC RBC AUTO-ENTMCNC: 33.2 G/DL (ref 30–36.5)
MCV RBC AUTO: 88.9 FL (ref 80–99)
NITRITE UR QL STRIP.AUTO: NEGATIVE
PH UR STRIP: 5.5 [PH] (ref 5–8)
PLATELET # BLD AUTO: 154 K/UL (ref 150–400)
POTASSIUM SERPL-SCNC: 3.9 MMOL/L (ref 3.5–5.1)
PROT UR STRIP-MCNC: 100 MG/DL
RBC # BLD AUTO: 3.05 M/UL (ref 3.8–5.2)
RBC #/AREA URNS HPF: ABNORMAL /HPF (ref 0–5)
SERVICE CMNT-IMP: ABNORMAL
SODIUM SERPL-SCNC: 140 MMOL/L (ref 136–145)
SP GR UR REFRACTOMETRY: 1.02 (ref 1–1.03)
UA: UC IF INDICATED,UAUC: ABNORMAL
UROBILINOGEN UR QL STRIP.AUTO: 0.2 EU/DL (ref 0.2–1)
WBC # BLD AUTO: 6.9 K/UL (ref 3.6–11)
WBC URNS QL MICRO: ABNORMAL /HPF (ref 0–4)

## 2017-01-17 PROCEDURE — 74011636637 HC RX REV CODE- 636/637: Performed by: HOSPITALIST

## 2017-01-17 PROCEDURE — 74011250637 HC RX REV CODE- 250/637: Performed by: PSYCHIATRY & NEUROLOGY

## 2017-01-17 PROCEDURE — 74011250637 HC RX REV CODE- 250/637: Performed by: HOSPITALIST

## 2017-01-17 PROCEDURE — 36415 COLL VENOUS BLD VENIPUNCTURE: CPT | Performed by: HOSPITALIST

## 2017-01-17 PROCEDURE — 73630 X-RAY EXAM OF FOOT: CPT

## 2017-01-17 PROCEDURE — 85027 COMPLETE CBC AUTOMATED: CPT | Performed by: HOSPITALIST

## 2017-01-17 PROCEDURE — 65660000000 HC RM CCU STEPDOWN

## 2017-01-17 PROCEDURE — 81001 URINALYSIS AUTO W/SCOPE: CPT | Performed by: HOSPITALIST

## 2017-01-17 PROCEDURE — 71020 XR CHEST PA LAT: CPT

## 2017-01-17 PROCEDURE — 97535 SELF CARE MNGMENT TRAINING: CPT | Performed by: OCCUPATIONAL THERAPIST

## 2017-01-17 PROCEDURE — 97530 THERAPEUTIC ACTIVITIES: CPT | Performed by: OCCUPATIONAL THERAPIST

## 2017-01-17 PROCEDURE — 82962 GLUCOSE BLOOD TEST: CPT

## 2017-01-17 PROCEDURE — 74011250636 HC RX REV CODE- 250/636: Performed by: HOSPITALIST

## 2017-01-17 PROCEDURE — 80048 BASIC METABOLIC PNL TOTAL CA: CPT | Performed by: HOSPITALIST

## 2017-01-17 RX ORDER — SPIRONOLACTONE 25 MG/1
50 TABLET ORAL DAILY
Status: DISCONTINUED | OUTPATIENT
Start: 2017-01-18 | End: 2017-01-20 | Stop reason: HOSPADM

## 2017-01-17 RX ORDER — GABAPENTIN 100 MG/1
100 CAPSULE ORAL 3 TIMES DAILY
Status: DISCONTINUED | OUTPATIENT
Start: 2017-01-17 | End: 2017-01-20 | Stop reason: HOSPADM

## 2017-01-17 RX ORDER — BUMETANIDE 1 MG/1
1 TABLET ORAL DAILY
Status: DISCONTINUED | OUTPATIENT
Start: 2017-01-18 | End: 2017-01-20 | Stop reason: HOSPADM

## 2017-01-17 RX ORDER — PREDNISONE 20 MG/1
60 TABLET ORAL
Status: COMPLETED | OUTPATIENT
Start: 2017-01-17 | End: 2017-01-17

## 2017-01-17 RX ORDER — OXYCODONE HYDROCHLORIDE 5 MG/1
5 TABLET ORAL
Status: DISCONTINUED | OUTPATIENT
Start: 2017-01-17 | End: 2017-01-20 | Stop reason: HOSPADM

## 2017-01-17 RX ADMIN — PREDNISONE 60 MG: 20 TABLET ORAL at 16:18

## 2017-01-17 RX ADMIN — CLONIDINE HYDROCHLORIDE 0.3 MG: 0.1 TABLET ORAL at 07:46

## 2017-01-17 RX ADMIN — LEVOTHYROXINE SODIUM 150 MCG: 150 TABLET ORAL at 07:46

## 2017-01-17 RX ADMIN — Medication 10 ML: at 13:21

## 2017-01-17 RX ADMIN — DOCUSATE SODIUM 100 MG: 100 CAPSULE, LIQUID FILLED ORAL at 07:46

## 2017-01-17 RX ADMIN — GABAPENTIN 100 MG: 100 CAPSULE ORAL at 22:32

## 2017-01-17 RX ADMIN — ENOXAPARIN SODIUM 30 MG: 30 INJECTION SUBCUTANEOUS at 07:46

## 2017-01-17 RX ADMIN — METOPROLOL SUCCINATE 50 MG: 50 TABLET, EXTENDED RELEASE ORAL at 07:48

## 2017-01-17 RX ADMIN — INSULIN LISPRO 16 UNITS: 100 INJECTION, SOLUTION INTRAVENOUS; SUBCUTANEOUS at 12:34

## 2017-01-17 RX ADMIN — VALSARTAN 160 MG: 160 TABLET, FILM COATED ORAL at 07:47

## 2017-01-17 RX ADMIN — INSULIN LISPRO 16 UNITS: 100 INJECTION, SOLUTION INTRAVENOUS; SUBCUTANEOUS at 07:50

## 2017-01-17 RX ADMIN — CLOPIDOGREL BISULFATE 75 MG: 75 TABLET, FILM COATED ORAL at 07:46

## 2017-01-17 RX ADMIN — INSULIN GLARGINE 60 UNITS: 100 INJECTION, SOLUTION SUBCUTANEOUS at 22:33

## 2017-01-17 RX ADMIN — CLONIDINE HYDROCHLORIDE 0.3 MG: 0.1 TABLET ORAL at 17:20

## 2017-01-17 RX ADMIN — Medication 10 ML: at 06:26

## 2017-01-17 RX ADMIN — ACETAMINOPHEN 650 MG: 325 TABLET, FILM COATED ORAL at 13:24

## 2017-01-17 RX ADMIN — INSULIN LISPRO 16 UNITS: 100 INJECTION, SOLUTION INTRAVENOUS; SUBCUTANEOUS at 17:19

## 2017-01-17 RX ADMIN — DOCUSATE SODIUM 100 MG: 100 CAPSULE, LIQUID FILLED ORAL at 17:19

## 2017-01-17 RX ADMIN — GABAPENTIN 100 MG: 100 CAPSULE ORAL at 16:19

## 2017-01-17 RX ADMIN — INSULIN LISPRO 18 UNITS: 100 INJECTION, SOLUTION INTRAVENOUS; SUBCUTANEOUS at 22:33

## 2017-01-17 RX ADMIN — Medication 10 ML: at 22:36

## 2017-01-17 RX ADMIN — ATORVASTATIN CALCIUM 40 MG: 40 TABLET, FILM COATED ORAL at 22:32

## 2017-01-17 NOTE — PROGRESS NOTES
Problem: Self Care Deficits Care Plan (Adult)  Goal: *Acute Goals and Plan of Care (Insert Text)  Occupational Therapy Goals  Initiated 1/16/2017   1. Patient will perform self-feeding including container management using her L non-dominant UE as a gross assist with modified independence within 7 day(s). 2. Patient will perform upper body dressing with minimal assistance/contact guard assist using best technique within 7 day(s). 3. Patient will perform lower body dressing with maximal assistance within 7 day(s). 4. Patient will perform toilet transfers with moderate assistance and best DME within 7 day(s). 5. Patient will perform all aspects of toileting with maximal assistance within 7 day(s). 6. Patient will participate in upper extremity therapeutic exercise/activities with moderate assistance for LUE for 10 minutes within 7 day(s). 7. Patient will utilize energy conservation techniques during functional activities with verbal and visual cues within 7 day(s). 8. Patient will improve their LUE Fugl Marrufo score by 5 points within 7 days. OCCUPATIONAL THERAPY TREATMENT  Patient: Breanna Smiley (77 y.o. female)  Date: 1/17/2017  Diagnosis: Stroke (cerebrum) [facility]) <principal problem not specified>       Precautions: Fall, Bed Alarm      ASSESSMENT:  Pt is making slow, steady progress toward goals. She c/o increased B LE pain this session extending now to B feet, ankles and knees to touch and with ROM. RN aware and awaiting MD to assess pt as she was at xray earlier this am.  Pt unable to tolerate WBing through BLEs this session. She was able to sit EOB and participate in UE dressing using stephon technique to don front opening shirt with max A and mod verbal, visual and tactile cues. Continue to recommend IP rehab at discharge.   Progression toward goals:  [ ]       Improving appropriately and progressing toward goals  [X]       Improving slowly and progressing toward goals  [ ]       Not making progress toward goals and plan of care will be adjusted       PLAN:  Patient continues to benefit from skilled intervention to address the above impairments. Continue treatment per established plan of care. Discharge Recommendations:  Inpatient Rehab  Further Equipment Recommendations for Discharge:  TBD       SUBJECTIVE:   Patient stated River Diaz would be fine in my L side started working right.       OBJECTIVE DATA SUMMARY:   Cognitive/Behavioral Status:  Neurologic State: Alert; Appropriate for age  Orientation Level: Oriented X4  Cognition: Decreased attention/concentration; Follows commands  Perception: Appears intact  Perseveration: No perseveration noted  Safety/Judgement: Awareness of environment; Fall prevention; Insight into deficits     Functional Mobility and Transfers for ADLs:  Bed Mobility:  Supine to Sit: Moderate assistance; Additional time;Assist x1  Sit to Supine: Moderate assistance; Additional time;Assist x2  Scooting: Moderate assistance; Additional time;Assist x1 (A for wt shifting )     Transfers:  Sit to Stand: Total assistance (pt unable to tolerate touch or WBing through feet)     Balance:  Sitting: Impaired; With support  Sitting - Static: Good (unsupported)  Sitting - Dynamic: Fair (occasional)     ADL Intervention:  Upper Body Dressing Assistance  Front Opened Shirt: Maximum assistance; Compensatory technique training;Training to use affected extremity as a gross stabilizer (A to thread LUE in sleeve, push up arm, around back and into R sleeve. She required mod A to doff. A for removing RUE first.  Once RUE removed then she only required verbal and visual instruction to doff LUE.)  Cues: Physical assistance; Tactile cues provided;Verbal cues provided;Visual cues provided     Lower Body Dressing Assistance  Socks: Total assistance (dependent) (pt c/o 10/10 B leg pain even to touch)  Leg Crossed Method Used: No  Position Performed: Supine;Seated edge of bed  Cues: Don;Physical assistance; Tactile cues provided;Verbal cues provided;Visual cues provided     Cognitive Retraining  Safety/Judgement: Awareness of environment; Fall prevention; Insight into deficits     Pain:  Pain Scale 1: Numeric (0 - 10)  Pain Intensity 1: 10 - BLEs to touch and with ROM              Activity Tolerance:   Fair  Please refer to the flowsheet for vital signs taken during this treatment.   After treatment:   [ ] Patient left in no apparent distress sitting up in chair  [X] Patient left in no apparent distress in bed  [X] Call bell left within reach  [X] Nursing notified  [X] Caregiver present - pt's sister  [ ] Bed alarm activated      COMMUNICATION/COLLABORATION:   The patients plan of care was discussed with: Physical Therapist and Registered Nurse     Beena Saeed OT  Time Calculation: 25 mins

## 2017-01-17 NOTE — PROGRESS NOTES
PT note:    Spoke with OT who reports patient with severe B foot/ankle pain radiating up to her knees with any movement. Unable to perform any ROM, functional mobility, or able to withstand any touch to B LEs without extreme pain. RN aware and awaiting MD input. Will follow up for PT treatment session.      Franklyn Christie, PT, DPT

## 2017-01-17 NOTE — TELEPHONE ENCOUNTER
----- Message from Eagle Whitman sent at 2017 12:28 PM EST -----  Regarding: FW: Hospital consult request  Contact: [phone]  Alek Eleno was notified to call the diabetic team to evaluate and call Dr. Ken Frankel if needed. Alek Eleno  voiced understanding.   ----- Message -----     From: Frank Jennings     Sent: 2017  11:44 AM       To: Eagle Whitman  Subject: Hospital consult request                         2017    Reginald Dumont(with [facility] [phone]) is calling for a Hospital Consult for Dr. Alexander Light. Patient: Winnie Billings    :  1940  Room: 3123  Reason:  Uncontrolled Type2 Diabetes w/Stroke.     Attending Physician: Dr. Breanna Robles

## 2017-01-17 NOTE — TELEPHONE ENCOUNTER
----- Message from Linda Tinoco sent at 1/16/2017  5:04 PM EST -----  Regarding: FW: Hospital consult  Gerhardt Earthly stated that she saw the patient and she has an A1c of 11.5 and she also had a stroke. The patient normally sees Dr. Ranjan Barboza but would like to see a different endo. She stated that she feels that she needs to get in to get established with an endocrinologist .  Pt will be going to rehab for 1-2 weeks. Gerhardt Earthly stated she did adjust her insulin .  ----- Message -----     From: Thomas Matamoros     Sent: 1/16/2017   3:33 PM       To: Linda Tinoco  Subject: Hospital consult                                 Kash Arnett from diabetes treatment is calling in regards to the consult that was called in earlier for this patient. She stated that she would like to speak with you. She can be reached at [phone]. Thank you.        García Hollingsworth

## 2017-01-17 NOTE — PROGRESS NOTES
CM was advised pt has been accepted to [facility] and has a bed. Attending advised CM that pt will not be discharging today due to fever. CM advised [facility] liaison of the above. CM will continue to follow to assist with discharge needs.      Stevo Garrett, MSW, [address] [place] [address]   [phone]

## 2017-01-17 NOTE — PROCEDURES
[facility]  *** FINAL REPORT ***    Name: Michelle Muir  MRN: [id number]    Inpatient  : 19 Aug 1940  HIS Order #: [id number]  [address] [facility] Visit #: [id number]  Date: 2017    TYPE OF TEST: Cerebrovascular Duplex    REASON FOR TEST  Stroke    Right Carotid:-             Proximal               Mid                 Distal  cm/s  Systolic  Diastolic  Systolic  Diastolic  Systolic  Diastolic  CCA:     93.4                                      67.0  Bulb:  ECA:     85.0  ICA:     63.0                 77.0                 62.0  ICA/CCA:  0.9    ICA Stenosis: <50%    Right Vertebral:-  Finding: Antegrade  Sys:       55.0  Ginette:    Right Subclavian: Normal    Left Carotid:-            Proximal                Mid                 Distal  cm/s  Systolic  Diastolic  Systolic  Diastolic  Systolic  Diastolic  CCA:     45.9                                      68.0  Bulb:  ECA:     58.0  ICA:     50.0                 68.0                 95.0  ICA/CCA:  0.7    ICA Stenosis: <50%    Left Vertebral:-  Finding: Antegrade  Sys:       52.0  Ginette:    Left Subclavian: Normal    INTERPRETATION/FINDINGS  PROCEDURE:  Carotid Duplex Examination using B-mode, color and  spectral Doppler of the extracranial cerebrovascular arteries. 1. Bilateral <50% stenosis of the internal carotid arteries. 2. No significant stenosis in the external carotid arteries  bilaterally. 3. Antegrade flow in both vertebral arteries. 4. Normal flow in both subclavian arteries. Plaque Morphology:  1. Heterogeneous plaque in the bulb and right ICA. 2. Heterogeneous plaque in the left ICA. ADDITIONAL COMMENTS    Tortuous ICA's bilaterally. I have personally reviewed the data relevant to the interpretation of  this  study. TECHNOLOGIST: Boris Breen RVT, DANNIELLE  Signed: 2017 09:32 AM    PHYSICIAN: Valeria Patino MD  Signed: 2017 09:55 PM

## 2017-01-17 NOTE — ROUTINE PROCESS
Bedside and Verbal shift change report given to Mary Jo Kohler RN (oncoming nurse) by Shana Jenkins RN (off going nurse). Report included the following information SBAR, Kardex, Recent Results and Cardiac Rhythm NSR.     Zone Phone: [phone]          Significant changes during shift: None  Patient Information      Nora Abraham  77 y.o.  1/13/2017 11:45 PM by Lb Castellanos MD. Nora Abraham was admitted from [facility]  Problem List              Patient Active Problem List      Diagnosis Date Noted    Stroke (cerebrum) ([address] 01/14/2017    Thyroid cancer ([facility] 09/17/2014    TIA (transient ischemic attack) 06/06/2014    HTN (hypertension) 06/06/2014    CKD (chronic kidney disease) 06/06/2014    Thyroid nodule, right 03/10/2014    Diabetes mellitus ([address] 03/10/2014    Gallbladder polyp 08/14/2013    Gallbladder sludge 08/14/2013    Recurrent ventral incisional hernia 08/14/2013    Obesity (BMI 35.0-39.9 without comorbidity) ([address] 08/14/2013                 Past Medical History   Diagnosis Date    Arthritis       Cataract            bilateral    Chronic kidney disease       Diabetes ([address]       Hypercholesterolemia       Hypertension       Ill-defined condition            states 'polyp' in gal bladder    Obesity       Thyroid disease       TIA (transient ischemic attack) 6/6/2014               Core Measures:      CVA: YES YES  CHF:NO NO  PNA:NO NO      Post Op Surgical (If Applicable):       Number times ambulated in hallway past shift: 0  Number of times OOB to chair past shift: 0  NG Tube: NO  Incentive Spirometer: NO  Drains: NO Volume 0  Dressing Present: NO  Flatus: NO      Activity Status:      OOB to Chair NO  Ambulated this shift NO   Bed Rest NO      Supplemental O2: (If Applicable)      NC NO  NRB NO  Venti-mask NO  On 0 Liters/min          LINES AND DRAINS:  Hermes Edna? NO Placement date 0 Reason Medically Necessary 0      PICC LINE?  NO Placement date [facility] Medically Necessary 0      Urinary Catheter? NO Placement Date 0 Reason Medically Necessary 0      DVT prophylaxis:      DVT prophylaxis Med- YES  DVT prophylaxis SCD or SINDHU- NO       Wounds: (If Applicable)      Wounds- NO      Location 0      Patient Safety:      Falls Score Total Score: 4  Safety Level__III_____  Bed Alarm On? YES  Sitter?  NO      Plan for upcoming shift: PT and OT              Discharge Plan: YES  Possible rehab      Active Consults:  IP CONSULT TO NEUROLOGY

## 2017-01-17 NOTE — ROUTINE PROCESS
No surgery found *  Bedside and Verbal shift change report given to Radha (oncoming nurse) by HESHAM Zamora (off going nurse). Report included the following information SBAR, Kardex, Recent Results and Cardiac Rhythm NSR.     Zone Phone: [phone]        Significant changes during shift:  incontinent       Patient Information     Miguel Angel Nelson  77 y.o.  1/13/2017 11:45 PM by Rosetta Oconnor MD. Miguel Angel Nelson was admitted from Home     Problem List          Patient Active Problem List     Diagnosis Date Noted    Stroke (cerebrum) ([address] 01/14/2017    Thyroid cancer ([address] 09/17/2014    TIA (transient ischemic attack) 06/06/2014    HTN (hypertension) 06/06/2014    CKD (chronic kidney disease) 06/06/2014    Thyroid nodule, right 03/10/2014    Diabetes mellitus ([address] 03/10/2014    Gallbladder polyp 08/14/2013    Gallbladder sludge 08/14/2013    Recurrent ventral incisional hernia 08/14/2013    Obesity (BMI 35.0-39.9 without comorbidity) ([address] 08/14/2013            Past Medical History   Diagnosis Date    Arthritis      Cataract         bilateral    Chronic kidney disease      Diabetes ([address]      Hypercholesterolemia      Hypertension      Ill-defined condition         states 'polyp' in gal bladder    Obesity      Thyroid disease      TIA (transient ischemic attack) 6/6/2014            Core Measures:     CVA: YES YES  CHF:NO NO  PNA:NO NO     Post Op Surgical (If Applicable):      Number times ambulated in hallway past shift: 0  Number of times OOB to chair past shift: 0  NG Tube: NO  Incentive Spirometer: NO  Drains: NO Volume 0  Dressing Present: NO  Flatus: NO     Activity Status:     OOB to Chair NO  Ambulated this shift NO   Bed Rest NO     Supplemental O2: (If Applicable)     NC NO  NRB NO  Venti-mask NO  On 0 Liters/min        LINES AND DRAINS:     Central Line? NO Placement date 0 Reason Medically Necessary 0     PICC LINE? NO Placement date 0Reason Medically Necessary 0     Urinary Catheter?  NO Placement Date 0 Reason Medically Necessary 0     DVT prophylaxis:     DVT prophylaxis Med- YES  DVT prophylaxis SCD or SINDHU- NO      Wounds: (If Applicable)     Wounds- NO     Location 0     Patient Safety:     Falls Score Total Score: 4  Safety Level__III_____  Bed Alarm On? YES  Sitter?  NO     Plan for upcoming shift: PT and OT           Discharge Plan: YES  Possible rehab     Active Consults:  IP CONSULT TO NEUROLOGY

## 2017-01-17 NOTE — PROGRESS NOTES
Hospitalist Progress Note    NAME: Sergio Casey   :     MRN:  [id number]       Interim Hospital Summary: 77 y.o. female whom presented on 2017 with L sided weakness      Assessment / Plan:  Low grade fever   Denies cough/dysuria/diarrhea. No leukocytosis   Cxray: clear  Follow UA - not collected yet  ? Related to her BL feet pain ( possible if this is gout)     BL feet pain: neuropathy vs r/o gout arthritis   Unable to ambulate due to pain   H/o fall prior to admission --> check xray both feet   Follow uric acid  Start Neurontin and prednisone - reassess in am if need to continue steroids   Pain management      Stroke POA  Sx: facial droop, L sided weakness - slowly improving   OK to control BP   Head CT:negative   MRI:Multiple old focal infarcts in both cerebral hemispheres. Acute right pontine infarct. Carotid duplex US: BL < 50% stenosis   ECHO: EF 55%   Stroke blood work: LDL  70.6  hba1c 11.5  TSH - 4.03   Seen by neurology: ASA was changed to Plavix   Speech:reguler diet    PT/OT:[facility] accepted      Likely CKD stage III  Last available baseline  1.4, admission 2.49 - likely her baseline. TARUN was r/o   Cr is not significantly down while on fluids, so likely her baseline.     US: no hydronephrosis, medical disease      IDDM type II with renal manifestation   BS better   On home regiment now + SS  Endocrinology consulted however did not see pt IP ( RN called office and was advised that office d/w diabetic educator nurse and they will see pt OP)   Diabetic education     HTN  OK to control BP now goal < 120  BB/clonidine/ARB at home dose   Restarting bumex/spironolactone in am     Hypothyroidism  TSH 4.03 - high side --> incr levothyroxine  Follow TSH in 6 weeks     HLP, LDL at goal, cont statin   Gallbladder polyp, followed by Dr Trudy Manzano with abd US every year           Code status: Full   Surrogate Decision Maker: bonita Burton [phone] or [phone], bonita Jose [phone]  Prophylaxis: Lovenox     Baseline: lives with sio; she has 2 sons; was independent   Recommended Disposition: [facility] accepted pt - dc when medically stable 1-2 days ( pending w/u for fever and BL feet pain)      Subjective:     Chief Complaint / Reason for Physician Visit: following stroke/ HTN/TARUN   C/o BL feet pain started since admission. Both feet very tender to touch. Difficult to bear weight   No BM since admission    weakness is improving       Discussed with RN events overnight. Review of Systems:  Symptom Y/N Comments  Symptom Y/N Comments   Fever/Chills n   Chest Pain n    Poor Appetite    Edema     Cough    Abdominal Pain n    Sputum    Joint Pain     SOB/ALONSO n   Pruritis/Rash     Nausea/vomit n   Tolerating PT/OT y    Diarrhea n   Tolerating Diet y    Constipation n   Other       Could NOT obtain due to:      Objective:     VITALS:   Last 24hrs VS reviewed since prior progress note. Most recent are:  Patient Vitals for the past 24 hrs:   Temp Pulse Resp BP SpO2   01/17/17 1132 98.6 °F (37 °C) 80 18 127/69 99 %   01/17/17 0732 99.3 °F (37.4 °C) 87 30 163/81 100 %   01/17/17 0306 99.5 °F (37.5 °C) 79 18 145/65 96 %   01/16/17 2321 (!) 100.5 °F (38.1 °C) (!) 106 18 161/74 99 %   01/16/17 2002 99.3 °F (37.4 °C) 87 18 171/71 99 %   01/16/17 1517 99.6 °F (37.6 °C) 93 20 150/73 95 %     No intake or output data in the 24 hours ending 01/17/17 1459     PHYSICAL EXAM:  General: WD, WN. Alert, cooperative, no acute distress    EENT:  EOMI. Anicteric sclerae. MMM  Resp:  CTA bilaterally, no wheezing or rales. No accessory muscle use  CV:  Regular  rhythm,  No edema  GI:  Soft, Non distended, Non tender.  +Bowel sounds  Neurologic:  Alert and oriented X 3, normal speech,  facial droop - resolved, L sided weakness is much better   Extr:                BL feet /ankles swollen / very tender to touch/ not warm   Psych:   Good insight. Not anxious nor agitated  Skin:  No rashes.   No jaundice    Reviewed most current lab test results and cultures  YES  Reviewed most current radiology test results   YES  Review and summation of old records today    NO  Reviewed patient's current orders and MAR    YES  PMH/SH reviewed - no change compared to H&P  ________________________________________________________________________  Care Plan discussed with:    Comments   Patient y    Family  y Family meeting at bedside: sister at bedside and another on the speaker phone, also friend at bedside - reviewed available test, current management and plan    RN y    Care Manager     Consultant                        Multidiciplinary team rounds were held today with , nursing, pharmacist and clinical coordinator. Patient's plan of care was discussed; medications were reviewed and discharge planning was addressed. ________________________________________________________________________  Total NON critical care TIME:  45   Minutes    Total CRITICAL CARE TIME Spent:   Minutes non procedure based      Comments   >50% of visit spent in counseling and coordination of care     ________________________________________________________________________  Karina Macias MD     Procedures: see electronic medical records for all procedures/Xrays and details which were not copied into this note but were reviewed prior to creation of Plan. LABS:  I reviewed today's most current labs and imaging studies.   Pertinent labs include:  Recent Labs      01/17/17 0926   WBC  6.9   HGB  9.0*   HCT  27.1*   PLT  154     Recent Labs      01/17/17 0926  01/15/17   0443   NA  140  141   K  3.9  4.3   CL  104  107   CO2  27  28   GLU  178*  227*   BUN  25*  34*   CREA  2.16*  2.13*   CA  8.3*  8.1*       Signed: Karina Macias MD

## 2017-01-17 NOTE — PROGRESS NOTES
PT note:    Chart reviewed and noted patient off the floor for testing. Will follow up for PT treatment session.      Christine Avery, PT, DPT

## 2017-01-18 LAB
ANION GAP BLD CALC-SCNC: 12 MMOL/L (ref 5–15)
BUN SERPL-MCNC: 28 MG/DL (ref 6–20)
BUN/CREAT SERPL: 13 (ref 12–20)
CALCIUM SERPL-MCNC: 8.4 MG/DL (ref 8.5–10.1)
CHLORIDE SERPL-SCNC: 103 MMOL/L (ref 97–108)
CO2 SERPL-SCNC: 22 MMOL/L (ref 21–32)
CREAT SERPL-MCNC: 2.09 MG/DL (ref 0.55–1.02)
ERYTHROCYTE [DISTWIDTH] IN BLOOD BY AUTOMATED COUNT: 14.1 % (ref 11.5–14.5)
GLUCOSE BLD STRIP.AUTO-MCNC: 230 MG/DL (ref 65–100)
GLUCOSE BLD STRIP.AUTO-MCNC: 237 MG/DL (ref 65–100)
GLUCOSE BLD STRIP.AUTO-MCNC: 268 MG/DL (ref 65–100)
GLUCOSE BLD STRIP.AUTO-MCNC: 319 MG/DL (ref 65–100)
GLUCOSE BLD STRIP.AUTO-MCNC: 343 MG/DL (ref 65–100)
GLUCOSE BLD STRIP.AUTO-MCNC: 371 MG/DL (ref 65–100)
GLUCOSE SERPL-MCNC: 226 MG/DL (ref 65–100)
HCT VFR BLD AUTO: 27.4 % (ref 35–47)
HGB BLD-MCNC: 9.3 G/DL (ref 11.5–16)
MCH RBC QN AUTO: 30.2 PG (ref 26–34)
MCHC RBC AUTO-ENTMCNC: 33.9 G/DL (ref 30–36.5)
MCV RBC AUTO: 89 FL (ref 80–99)
PLATELET # BLD AUTO: 162 K/UL (ref 150–400)
POTASSIUM SERPL-SCNC: 4.5 MMOL/L (ref 3.5–5.1)
RBC # BLD AUTO: 3.08 M/UL (ref 3.8–5.2)
SERVICE CMNT-IMP: ABNORMAL
SODIUM SERPL-SCNC: 137 MMOL/L (ref 136–145)
URATE SERPL-MCNC: 7.8 MG/DL (ref 2.6–6)
WBC # BLD AUTO: 7.6 K/UL (ref 3.6–11)

## 2017-01-18 PROCEDURE — 65660000000 HC RM CCU STEPDOWN

## 2017-01-18 PROCEDURE — 74011636637 HC RX REV CODE- 636/637: Performed by: EMERGENCY MEDICINE

## 2017-01-18 PROCEDURE — 82962 GLUCOSE BLOOD TEST: CPT

## 2017-01-18 PROCEDURE — 74011636637 HC RX REV CODE- 636/637: Performed by: HOSPITALIST

## 2017-01-18 PROCEDURE — 97112 NEUROMUSCULAR REEDUCATION: CPT

## 2017-01-18 PROCEDURE — 74011250636 HC RX REV CODE- 250/636: Performed by: HOSPITALIST

## 2017-01-18 PROCEDURE — 74011250637 HC RX REV CODE- 250/637: Performed by: PSYCHIATRY & NEUROLOGY

## 2017-01-18 PROCEDURE — 97116 GAIT TRAINING THERAPY: CPT

## 2017-01-18 PROCEDURE — 80048 BASIC METABOLIC PNL TOTAL CA: CPT | Performed by: HOSPITALIST

## 2017-01-18 PROCEDURE — 85027 COMPLETE CBC AUTOMATED: CPT | Performed by: HOSPITALIST

## 2017-01-18 PROCEDURE — 97112 NEUROMUSCULAR REEDUCATION: CPT | Performed by: OCCUPATIONAL THERAPIST

## 2017-01-18 PROCEDURE — 36415 COLL VENOUS BLD VENIPUNCTURE: CPT | Performed by: HOSPITALIST

## 2017-01-18 PROCEDURE — 74011250637 HC RX REV CODE- 250/637: Performed by: HOSPITALIST

## 2017-01-18 PROCEDURE — 97535 SELF CARE MNGMENT TRAINING: CPT | Performed by: OCCUPATIONAL THERAPIST

## 2017-01-18 PROCEDURE — 84550 ASSAY OF BLOOD/URIC ACID: CPT | Performed by: HOSPITALIST

## 2017-01-18 RX ORDER — INSULIN LISPRO 100 [IU]/ML
15 INJECTION, SOLUTION INTRAVENOUS; SUBCUTANEOUS ONCE
Status: COMPLETED | OUTPATIENT
Start: 2017-01-18 | End: 2017-01-18

## 2017-01-18 RX ADMIN — LEVOTHYROXINE SODIUM 150 MCG: 150 TABLET ORAL at 07:19

## 2017-01-18 RX ADMIN — INSULIN GLARGINE 60 UNITS: 100 INJECTION, SOLUTION SUBCUTANEOUS at 22:07

## 2017-01-18 RX ADMIN — DOCUSATE SODIUM 100 MG: 100 CAPSULE, LIQUID FILLED ORAL at 18:12

## 2017-01-18 RX ADMIN — CLONIDINE HYDROCHLORIDE 0.3 MG: 0.1 TABLET ORAL at 18:12

## 2017-01-18 RX ADMIN — INSULIN LISPRO 22 UNITS: 100 INJECTION, SOLUTION INTRAVENOUS; SUBCUTANEOUS at 11:51

## 2017-01-18 RX ADMIN — VALSARTAN 160 MG: 160 TABLET, FILM COATED ORAL at 08:49

## 2017-01-18 RX ADMIN — ENOXAPARIN SODIUM 30 MG: 30 INJECTION SUBCUTANEOUS at 08:48

## 2017-01-18 RX ADMIN — INSULIN LISPRO 26 UNITS: 100 INJECTION, SOLUTION INTRAVENOUS; SUBCUTANEOUS at 18:12

## 2017-01-18 RX ADMIN — ATORVASTATIN CALCIUM 40 MG: 40 TABLET, FILM COATED ORAL at 22:06

## 2017-01-18 RX ADMIN — DOCUSATE SODIUM 100 MG: 100 CAPSULE, LIQUID FILLED ORAL at 08:49

## 2017-01-18 RX ADMIN — METOPROLOL SUCCINATE 50 MG: 50 TABLET, EXTENDED RELEASE ORAL at 08:49

## 2017-01-18 RX ADMIN — INSULIN LISPRO 15 UNITS: 100 INJECTION, SOLUTION INTRAVENOUS; SUBCUTANEOUS at 23:19

## 2017-01-18 RX ADMIN — Medication 10 ML: at 15:07

## 2017-01-18 RX ADMIN — CLONIDINE HYDROCHLORIDE 0.3 MG: 0.1 TABLET ORAL at 08:49

## 2017-01-18 RX ADMIN — BUMETANIDE 1 MG: 1 TABLET ORAL at 08:49

## 2017-01-18 RX ADMIN — SPIRONOLACTONE 50 MG: 25 TABLET, FILM COATED ORAL at 08:49

## 2017-01-18 RX ADMIN — INSULIN LISPRO 20 UNITS: 100 INJECTION, SOLUTION INTRAVENOUS; SUBCUTANEOUS at 07:19

## 2017-01-18 RX ADMIN — CLOPIDOGREL BISULFATE 75 MG: 75 TABLET, FILM COATED ORAL at 08:49

## 2017-01-18 RX ADMIN — Medication 10 ML: at 22:06

## 2017-01-18 RX ADMIN — GABAPENTIN 100 MG: 100 CAPSULE ORAL at 22:06

## 2017-01-18 RX ADMIN — GABAPENTIN 100 MG: 100 CAPSULE ORAL at 08:50

## 2017-01-18 RX ADMIN — GABAPENTIN 100 MG: 100 CAPSULE ORAL at 18:12

## 2017-01-18 NOTE — CONSULTS
Endocrinology Consult    FYI--I am aware of this patient's need for outpatient follow up and we will arrange for her to be seen in our office once she is discharged from rehab from [facility]. I agree with the current regimen as ordered with lantus and humalog and this should be acceptable for her when she goes to rehab. Please don't hesitate to call [phone] with further questions.     Merly Loya MD

## 2017-01-18 NOTE — PROGRESS NOTES
Bedside and Verbal shift change report given to Deysi Rachel (oncoming nurse) by Sarahi Conrad RN (off going nurse). Report included the following information SBAR, Kardex, Recent Results and Cardiac Rhythm NSR.     Zone Phone: [phone]          Significant changes during shift: None    Patient Information      Silvana Desouza  77 y.o.  1/13/2017 11:45 PM by Tessie Nieves MD. Silvana Desouza was admitted from [facility]  Problem List      [address]   Patient Active Problem List      Diagnosis Date Noted    Stroke (cerebrum) ([facility] [address] 01/14/2017    Thyroid cancer ([facility] [address] 09/17/2014    TIA (transient ischemic attack) 06/06/2014    HTN (hypertension) 06/06/2014    CKD (chronic kidney disease) 06/06/2014    Thyroid nodule, right 03/10/2014    Diabetes mellitus ([address] 03/10/2014    Gallbladder polyp 08/14/2013    Gallbladder sludge 08/14/2013    Recurrent ventral incisional hernia 08/14/2013    Obesity (BMI 35.0-39.9 without comorbidity) ([address] 08/14/2013                     Past Medical History   Diagnosis Date    Arthritis       Cataract            bilateral    Chronic kidney disease       Diabetes ([facility] [address]       Hypercholesterolemia       Hypertension       Ill-defined condition            states 'polyp' in gal bladder    Obesity       Thyroid disease       TIA (transient ischemic attack) 6/6/2014               Core Measures:      CVA: YES YES  CHF:NO NO  PNA:NO NO      Post Op Surgical (If Applicable):       n/a      Activity Status:      OOB to Chair NO  Ambulated this shift NO   Bed Rest NO      Supplemental O2: (If Applicable)      n/a          LINES AND DRAINS:      Peripheral IV: 20 in the LAC, saline locked      DVT prophylaxis:      DVT prophylaxis Med- YES, Lovenox  DVT prophylaxis SCD or SINDHU- NO       Wounds: (If Applicable)      Wounds- NO      Location -      Patient Safety:      Falls Score Total Score: 4  Safety Level__III_____  Bed Alarm On? YES  Sitter?  NO      Plan for upcoming shift: work on ambulation and moving left side of body              Discharge Plan: YES  When medically cleared      Active Consults:  IP CONSULT TO NEUROLOGY

## 2017-01-18 NOTE — PROGRESS NOTES
Problem: Self Care Deficits Care Plan (Adult)  Goal: *Acute Goals and Plan of Care (Insert Text)  Occupational Therapy Goals  Initiated 1/16/2017   1. Patient will perform self-feeding including container management using her L non-dominant UE as a gross assist with modified independence within 7 day(s). 2. Patient will perform upper body dressing with minimal assistance/contact guard assist using best technique within 7 day(s). 3. Patient will perform lower body dressing with maximal assistance within 7 day(s). 4. Patient will perform toilet transfers with moderate assistance and best DME within 7 day(s). 5. Patient will perform all aspects of toileting with maximal assistance within 7 day(s). 6. Patient will participate in upper extremity therapeutic exercise/activities with moderate assistance for LUE for 10 minutes within 7 day(s). 7. Patient will utilize energy conservation techniques during functional activities with verbal and visual cues within 7 day(s). 8. Patient will improve their LUE Fugl Marrufo score by 5 points within 7 days. OCCUPATIONAL THERAPY TREATMENT: NEURO  Patient: Tabatha Crain (77 y.o. female)  Date: 1/18/2017  Diagnosis: Stroke (cerebrum) [facility]) <principal problem not specified>       Precautions: Fall, Bed Alarm      ASSESSMENT:  Pt presented with decreased safety awareness of affected L limbs,  impaired strength and balance of L side of body due to acute R pontine infarct. She was willing and eager to participate in skilled OT this morning with reports of decreased bilateral feet pain from 1/17. She recalled stephon-technique to don button-down shirt with min A for self-care activity. She was able to lean forward to reach her feet in order to perform LB dressing ADL of donning socks with mod A for R foot and max A for L foot. Pt performed neuro-retraining activity standing bedside reaching across the bed using BUEs with LUE incorporating proprioception, coordination, and strength.  Mod hand over hand A was provided to achieve full ROM of LUE due to decreased strength. Mod manual cues in LLE for WB provided. She also required verbal cues to maintain upright posture at bedside. Verbal cues were necessary for her to remember to reach back for the arm of the chair before sitting in the chair in a controlled fashion Further skilled OT is necessary to continue to address deficits in strength, balance, safety awareness, and incorporation of affected limbs in ADLs. Progression toward goals:  [X]          Improving appropriately and progressing toward goals  [ ]          Improving slowly and progressing toward goals  [ ]          Not making progress toward goals and plan of care will be adjusted       PLAN:  Patient continues to benefit from skilled intervention to address the above impairments. Continue treatment per established plan of care. Discharge Recommendations:  Inpatient Rehab  Further Equipment Recommendations for Discharge:  TBD       SUBJECTIVE:   Patient stated My pain is much better today.  Pt had B feet pain during session 1/17      OBJECTIVE DATA SUMMARY:   Cognitive/Behavioral Status:  Neurologic State: Alert  Orientation Level: Oriented X4  Cognition: Appropriate decision making; Appropriate for age attention/concentration; Appropriate safety awareness; Follows commands           Functional Mobility and Transfers for ADLs:  Bed Mobility:  Rolling: Contact guard assistance  Supine to Sit: Minimum assistance     Scooting: Contact guard assistance  Transfers:  Sit to Stand: Moderate assistance;Assist x2  Stand Pivot Transfers: Maximum assistance  Bed to Chair: Moderate assistance;Assist x2     Balance:  Sitting: Without support; Impaired  Sitting - Static: Good (unsupported)  Sitting - Dynamic: Fair (occasional)  Standing: Impaired; With support  Standing - Static: Constant support; Fair  Standing - Dynamic : Poor  ADL Intervention:    She recalled stephon-technique to don button-down shirt with min A for self-care activity. She was able to lean forward to reach her feet in order to perform LB dressing ADL of donning socks with mod A for R foot and max A for L foot. Neuro Re-Education & Therapeutic Exercises:     Pt performed neuro-retraining activity standing bedside reaching across the bed using BUEs with LUE incorporating proprioception, coordination, and strength. Mod [place] A was provided to achieve full ROM of LUE due to decreased strength. Mod manual cues in LLE for WB provided. She also required verbal cues to maintain upright posture at bedside. Pain:  Pain Scale 1: Numeric (0 - 10); no pain reported today  Pain Intensity 1: 0              Activity Tolerance:   Fair  Please refer to the flowsheet for vital signs taken during this treatment. After treatment:   [X] Patient left in no apparent distress sitting up in chair  [ ] Patient left in no apparent distress in bed  [X] Call bell left within reach  [X] Nursing notified  [ ] Caregiver present  [X] Bed alarm activated      COMMUNICATION/COLLABORATION:   The patients plan of care was discussed with: Physical Therapist and Registered Nurse  Patient was educated regarding Her deficit(s) of LUE and LLE as this relates to Her diagnosis of acute right pontine infarct. She demonstrated Good understanding as evidenced by verbalizing understanding. .        Regarding student involvement in patient care:  A student participated in this treatment session. Per CMS Medicare statements and AOTA guidelines I certify that the following was true:  1. I was present and directly observed the entire session. 2. I made all skilled judgments and clinical decisions regarding care. 3. I am the practitioner responsible for assessment, treatment, and documentation. SWETHA Erazo and Sun Monet, OTR/L, CBIS  Time Calculation: 32 mins

## 2017-01-18 NOTE — TELEPHONE ENCOUNTER
----- Message from Roddy Mcgowan sent at 1/17/2017 12:13 PM EST -----  Regarding: FW: 1 [facility] spoke with Andrew Maya and he stated that he was just following up on the consult. I informed him that Raquel Elias with the DTC team saw patient and evaluated her on yesterday . I informed him that she documented in the chart and stated that the patient needed a f/u with an endo. He read the note while I was on the phone and stated that he would check with the ordering doctor to see if a consult was still needed. ----- Message -----     From: Neena Meter     Sent: 1/17/2017  11:51 AM       To: Roddy Mcgowan  Subject: [id number] [facility], at [facility], is following up on a hospital consult from yesterday. He can be reached at:  [phone].     Room # 21 [phone]  Seen For:    Uncontrolled Diabetes  Referring Physician:   Dr. Caio Sin

## 2017-01-18 NOTE — PROGRESS NOTES
Chief Complaint: Left sided weakness    Patient was sitting in bed with physical therapy. She has less pain in her ankles. Has had no new weakness or sensory loss. No trouble swallowing. She is tolerating her medications and has no new complaints. Assesment and Plan  1. Stroke  Right PCA stroke  Continue plavix  continue PT/OT excellent candidate for rehabilitation. 2. Diabetes  Continue insulin     3. Hypertension  Goal systolic blood pressure is below 120  continue clonidine     4. Hyperlipidemia   Goal LDL is below 70  Continue atorvastatin      Allergies  Amlodipine;  Nifedipine; and Sulfa (sulfonamide antibiotics)     Medications  Medication Dose Route Frequency    docusate sodium (COLACE) capsule 100 mg  100 mg Oral BID    cloNIDine HCl (CATAPRES) tablet 0.3 mg  0.3 mg Oral BID    [START ON 1/17/2017] metoprolol succinate (TOPROL-XL) XL tablet 50 mg  50 mg Oral DAILY    [START ON 1/17/2017] valsartan (DIOVAN) tablet 160 mg  160 mg Oral DAILY    insulin glargine (LANTUS) injection 60 Units  60 Units SubCUTAneous QHS    levothyroxine (SYNTHROID) tablet 150 mcg  150 mcg Oral ACB    enoxaparin (LOVENOX) injection 30 mg  30 mg SubCUTAneous Q24H    clopidogrel (PLAVIX) tablet 75 mg  75 mg Oral DAILY    sodium chloride (NS) flush 5-10 mL  5-10 mL IntraVENous PRN    labetalol (NORMODYNE;TRANDATE) 20 mg/4 mL (5 mg/mL) injection 5 mg  5 mg IntraVENous Q10MIN PRN    insulin lispro (HUMALOG) injection   SubCUTAneous AC&HS    glucose chewable tablet 16 g  4 Tab Oral PRN    dextrose (D50W) injection syrg 12.5-25 g  12.5-25 g IntraVENous PRN    glucagon (GLUCAGEN) injection 1 mg  1 mg IntraMUSCular PRN    atorvastatin (LIPITOR) tablet 40 mg  40 mg Oral QHS    acetaminophen (TYLENOL) tablet 650 mg  650 mg Oral Q6H PRN    ondansetron (ZOFRAN) injection 4 mg  4 mg IntraVENous Q4H PRN        Medical History  Past Medical History   Diagnosis Date    Arthritis     Cataract      bilateral    Chronic kidney disease     Diabetes ([facility] [address]     Hypercholesterolemia     Hypertension     Ill-defined condition      states 'polyp' in gal bladder    Obesity     Thyroid disease     TIA (transient ischemic attack) 6/6/2014       Review of Systems  Neuro: negative for headaches, dizziness and seizures. Constitutional: negative for fevers and chills  Eyes: negative for visual disturbance, visual loss and irritation  ENT: negative for hearing loss, tinnitus and pain  RESP: negative for cough, sputum production and hemoptysis  CVS: negative for diaphoresis and palpatation  GI: negative for nausea vomiting and constipation  :negative for dysuria nocturia and incontinence  HEME: negative for bleeding lymphadenopathy and petichiae  MSKL: positive for , arthalgia, of the left ankle no myalgia  BHV: negative for anxiety, mood swings and sleep disturbance  Exam:    Visit Vitals    /74    Pulse 76    Temp 98.6 °F (37 °C)    Resp 18    Wt 225 lb 5 oz (102.2 kg)    SpO2 99%    Breastfeeding No    BMI 38.08 kg/m2        General: Well developed, well nourished. Head: Normocephalic, atraumatic, anicteric sclera   Lungs:  Clear to auscultation bilaterally, No wheezes or rubs   Cardiac: regular, rate, rythm and no murmur   Abd: Bowel sounds were audible. No tenderness on palpation   Ext: No pedal edema   Skin: No overt signs of rash or insect bites      Neurological Exam:  Mental Status: alert, and oriented person, place and time   Speech: Fluent, no aphasia and no dysarthria   Cranial Nerves:  CN II-XII intact with Left facial droop. Eyes: PERRL, EOM's full, no nystagmus, no ptosis. Motor:  4/5 left side weakness and normal bulk and tone   Reflexes:  1+ symmetric    Sensory:  sensory loss on the left no extinguishing   Gait:  Gait istood her up to walk but left leg buckeld    Tremor:  No and tremor. Cerebellar:  Finger to nose was demonstrated competently. Neurovascular: no carotid bruits.  No JVD Imaging      MRI Results (most recent):    Results from Hospital Encounter encounter on 01/13/17   MRI BRAIN WO CONT   *PRELIMINARY REPORT*    MRI examination of the brain demonstrates acute ischemia in the right tracee. Preliminary report was provided by Dr. Lavern Ott, the on-call radiologist, at  11:37 AM    Final report to follow. *END PRELIMINARY REPORT*    INDICATION: Stroke. EXAM: Sagittal and axial and coronal images were obtained through the brain in  varying sequences. T1-weighted, T2-weighted, FLAIR, GRE, Diffusion-weighted  sequences may be utilized. FINDINGS:    Comparison study: June 5, 2014. Sagittal images demonstrate signal void to be present in the cavernous carotid  arteries bilaterally. There is moderate to severe prominence of the basilar  cisterns and cortical sulci and ventricles. Axial and coronal images demonstrate mild multifocal hyperintensities in the  periventricular deep white matter and centrum semiovale of both cerebral  hemispheres likely microvascular changes related to aging. Multiple bilateral  old focal infarct in the periventricular deep white matter of both cerebral  hemispheres. No new hemorrhage or mass effect identified. Images through the visualized orbits and sella and cavernous sinus and  ventricles are grossly within normal limits. Signal void appears to be present in the vertebral basilar system. Diffusion-weighted images demonstrate demilune-shaped geographic restricted  diffusion in the right tracee. IMPRESSION:    Mild to moderate diffuse cortical atrophy. Deep white matter ischemic change in both cerebral hemispheres. Multiple old focal infarcts in the periventricular deep white matter of both  cerebral hemispheres. Acute right pontine infarct.                   .  Lab Review    Lab Results   Component Value Date/Time    WBC 7.6 01/18/2017 02:48 AM    HCT 27.4 01/18/2017 02:48 AM    HGB 9.3 01/18/2017 02:48 AM    PLATELET 162 01/18/2017 02:48 AM       Lab Results   Component Value Date/Time    Sodium 137 01/18/2017 02:48 AM    Potassium 4.5 01/18/2017 02:48 AM    Chloride 103 01/18/2017 02:48 AM    CO2 22 01/18/2017 02:48 AM    Glucose 226 01/18/2017 02:48 AM    BUN 28 01/18/2017 02:48 AM    Creatinine 2.09 01/18/2017 02:48 AM    Calcium 8.4 01/18/2017 02:48 AM       Lab Results   Component Value Date/Time    Hemoglobin A1c 11.5 01/15/2017 04:43 AM          Lab Results   Component Value Date/Time    Cholesterol, total 170 01/15/2017 04:43 AM    HDL Cholesterol 66 01/15/2017 04:43 AM    LDL, calculated 70.6 01/15/2017 04:43 AM    VLDL, calculated 33.4 01/15/2017 04:43 AM    Triglyceride 167 01/15/2017 04:43 AM    CHOL/HDL Ratio 2.6 01/15/2017 04:43 AM

## 2017-01-18 NOTE — PROGRESS NOTES
Problem: Mobility Impaired (Adult and Pediatric)  Goal: *Acute Goals and Plan of Care (Insert Text)  Physical Therapy Goals  Initiated 1/15/2017  1. Patient will move from supine to sit and sit to supine , scoot up and down and roll side to side in bed with supervision/set-up within 7 day(s). 2. Patient will transfer from bed to chair and chair to bed with minimal assistance/contact guard assist using the least restrictive device within 7 day(s). 3. Patient will perform sit to stand with minimal assistance/contact guard assist within 7 day(s). 4. Patient will ambulate with moderate assistance for 10 feet with the least restrictive device within 7 day(s). 5. Patient will improve Back Balance score by 7 points within 7 days. PHYSICAL THERAPY TREATMENT  Patient: Nano Gordon (77 y.o. female)  Date: 1/18/2017  Diagnosis: Stroke (cerebrum) [facility]) <principal problem not specified>       Precautions: Fall, Bed Alarm      ASSESSMENT:  Patient received resting in bed, agreeable and motivated for therapy. Patient reports less pain in B feet this date. Patient required min A for bed mobility. Patient with improved sitting balance and able to scoot herself with CGA. Patient required mod A x 2 for sit <> stand, requiring mod manual cues on L quad, anterior tib and glute to facilitate trunk, hip and knee extension. Patient tolerated standing x 3 times with mod A x 2. Performed pre-gait activities as described below with mod-max A x 2 and requiring max manual cues with fatigue. Patient able to get to the chair with mod A. Patient with impaired insight to deficits although is motivated. Patient left sitting in the chair at the conclusion of PT treatment session with bed alarm on. Patient able to tolerate extensive, separate PT/OT sessions this date with minimal pain. Patient continues to be an excellent candidate for IP rehab at discharge.    Progression toward goals:  [X]    Improving appropriately and progressing toward goals  [ ]    Improving slowly and progressing toward goals  [ ]    Not making progress toward goals and plan of care will be adjusted       PLAN:  Patient continues to benefit from skilled intervention to address the above impairments. Continue treatment per established plan of care. Discharge Recommendations:  Inpatient Rehab  Further Equipment Recommendations for Discharge:  TBD       SUBJECTIVE:   Patient stated My ankles are just weak.       OBJECTIVE DATA SUMMARY:   Critical Behavior:  Neurologic State: Alert  Orientation Level: Oriented X4  Cognition: Appropriate decision making, Appropriate for age attention/concentration, Appropriate safety awareness, Follows commands  Safety/Judgement: Awareness of environment, Fall prevention, Insight into deficits  Functional Mobility Training:  Bed Mobility:  Rolling: Contact guard assistance  Supine to Sit: Minimum assistance     Scooting: Contact guard assistance        Transfers:  Sit to Stand: Moderate assistance;Assist x2  Stand to Sit: Moderate assistance  Stand Pivot Transfers: Maximum assistance     Bed to Chair: Moderate assistance;Assist x2                    Balance:  Sitting: Without support; Impaired  Sitting - Static: Good (unsupported)  Sitting - Dynamic: Fair (occasional)  Standing: Impaired; With support  Standing - Static: Constant support; Fair  Standing - Dynamic : Poor  Ambulation/Gait Training:  Distance (ft): 2 Feet (ft)  Assistive Device: Gait belt  Ambulation - Level of Assistance: Moderate assistance;Assist x2        Gait Abnormalities: Ataxic;Decreased step clearance; Hemiplegic (poor quad control)        Base of Support: Widened;Center of gravity altered;Shift to left  Stance: Left decreased  Speed/Katharina: Slow  Step Length: Right shortened;Left shortened  Swing Pattern: Left asymmetrical                       Neuro Re-Education:  Performed weightshifting laterally  X 45 seconds with mod manual cues at the L quad and glute for facilitation of motor control and knee extension and trunk extensors for upright posture in preparation for gait and standing tasks. Performed pre-gait activity with stepping forward with RLE and weightshifting to the LLE with mod manual cues for facilitation of motor control on L quad and glute to promote trunk, hip, and knee extension. Therapeutic Exercises:   Performed glute squeezes, LAQ with cues for controlled decent x 20 on LLE  Pain:  Pain Scale 1: Numeric (0 - 10)  Pain Intensity 1: 0              Activity Tolerance:   Good, improving. Minimal pain. Please refer to the flowsheet for vital signs taken during this treatment.   After treatment:   [X]    Patient left in no apparent distress sitting up in chair  [ ]    Patient left in no apparent distress in bed  [X]    Call bell left within reach  [X]    Nursing notified  [ ]    Caregiver present  [X]    Bed alarm activated      COMMUNICATION/COLLABORATION:   The patients plan of care was discussed with: Occupational Therapist, Registered Nurse and      Luz Elena Lechuga, PT, DPT   Time Calculation: 39 mins

## 2017-01-19 LAB
ANION GAP BLD CALC-SCNC: 11 MMOL/L (ref 5–15)
BASOPHILS # BLD AUTO: 0 K/UL (ref 0–0.1)
BASOPHILS # BLD: 0 % (ref 0–1)
BUN SERPL-MCNC: 47 MG/DL (ref 6–20)
BUN/CREAT SERPL: 21 (ref 12–20)
CALCIUM SERPL-MCNC: 8.3 MG/DL (ref 8.5–10.1)
CHLORIDE SERPL-SCNC: 101 MMOL/L (ref 97–108)
CO2 SERPL-SCNC: 25 MMOL/L (ref 21–32)
CREAT SERPL-MCNC: 2.24 MG/DL (ref 0.55–1.02)
EOSINOPHIL # BLD: 0.2 K/UL (ref 0–0.4)
EOSINOPHIL NFR BLD: 2 % (ref 0–7)
ERYTHROCYTE [DISTWIDTH] IN BLOOD BY AUTOMATED COUNT: 14.2 % (ref 11.5–14.5)
GLUCOSE BLD STRIP.AUTO-MCNC: 109 MG/DL (ref 65–100)
GLUCOSE BLD STRIP.AUTO-MCNC: 188 MG/DL (ref 65–100)
GLUCOSE BLD STRIP.AUTO-MCNC: 355 MG/DL (ref 65–100)
GLUCOSE BLD STRIP.AUTO-MCNC: 75 MG/DL (ref 65–100)
GLUCOSE BLD STRIP.AUTO-MCNC: 84 MG/DL (ref 65–100)
GLUCOSE SERPL-MCNC: 152 MG/DL (ref 65–100)
HCT VFR BLD AUTO: 26.3 % (ref 35–47)
HGB BLD-MCNC: 8.8 G/DL (ref 11.5–16)
LYMPHOCYTES # BLD AUTO: 23 % (ref 12–49)
LYMPHOCYTES # BLD: 1.4 K/UL (ref 0.8–3.5)
MCH RBC QN AUTO: 29.4 PG (ref 26–34)
MCHC RBC AUTO-ENTMCNC: 33.5 G/DL (ref 30–36.5)
MCV RBC AUTO: 88 FL (ref 80–99)
MONOCYTES # BLD: 0.7 K/UL (ref 0–1)
MONOCYTES NFR BLD AUTO: 11 % (ref 5–13)
NEUTS SEG # BLD: 4.1 K/UL (ref 1.8–8)
NEUTS SEG NFR BLD AUTO: 64 % (ref 32–75)
PLATELET # BLD AUTO: 208 K/UL (ref 150–400)
POTASSIUM SERPL-SCNC: 4.2 MMOL/L (ref 3.5–5.1)
RBC # BLD AUTO: 2.99 M/UL (ref 3.8–5.2)
SERVICE CMNT-IMP: ABNORMAL
SERVICE CMNT-IMP: NORMAL
SERVICE CMNT-IMP: NORMAL
SODIUM SERPL-SCNC: 137 MMOL/L (ref 136–145)
WBC # BLD AUTO: 6.4 K/UL (ref 3.6–11)

## 2017-01-19 PROCEDURE — 85025 COMPLETE CBC W/AUTO DIFF WBC: CPT | Performed by: INTERNAL MEDICINE

## 2017-01-19 PROCEDURE — 74011636637 HC RX REV CODE- 636/637: Performed by: HOSPITALIST

## 2017-01-19 PROCEDURE — 82962 GLUCOSE BLOOD TEST: CPT

## 2017-01-19 PROCEDURE — 65660000000 HC RM CCU STEPDOWN

## 2017-01-19 PROCEDURE — 97530 THERAPEUTIC ACTIVITIES: CPT

## 2017-01-19 PROCEDURE — 74011250637 HC RX REV CODE- 250/637: Performed by: HOSPITALIST

## 2017-01-19 PROCEDURE — 97112 NEUROMUSCULAR REEDUCATION: CPT | Performed by: OCCUPATIONAL THERAPIST

## 2017-01-19 PROCEDURE — 74011250636 HC RX REV CODE- 250/636: Performed by: PSYCHIATRY & NEUROLOGY

## 2017-01-19 PROCEDURE — 80048 BASIC METABOLIC PNL TOTAL CA: CPT | Performed by: INTERNAL MEDICINE

## 2017-01-19 PROCEDURE — 74011636637 HC RX REV CODE- 636/637: Performed by: INTERNAL MEDICINE

## 2017-01-19 PROCEDURE — 97535 SELF CARE MNGMENT TRAINING: CPT | Performed by: OCCUPATIONAL THERAPIST

## 2017-01-19 PROCEDURE — 74011250637 HC RX REV CODE- 250/637: Performed by: PSYCHIATRY & NEUROLOGY

## 2017-01-19 PROCEDURE — 36415 COLL VENOUS BLD VENIPUNCTURE: CPT | Performed by: INTERNAL MEDICINE

## 2017-01-19 PROCEDURE — 74011250636 HC RX REV CODE- 250/636: Performed by: HOSPITALIST

## 2017-01-19 RX ORDER — INSULIN LISPRO 100 [IU]/ML
INJECTION, SOLUTION INTRAVENOUS; SUBCUTANEOUS
Status: DISCONTINUED | OUTPATIENT
Start: 2017-01-19 | End: 2017-01-20 | Stop reason: HOSPADM

## 2017-01-19 RX ORDER — SODIUM CHLORIDE AND POTASSIUM CHLORIDE .9; .15 G/100ML; G/100ML
SOLUTION INTRAVENOUS CONTINUOUS
Status: DISCONTINUED | OUTPATIENT
Start: 2017-01-19 | End: 2017-01-19

## 2017-01-19 RX ORDER — PREDNISONE 20 MG/1
20 TABLET ORAL
Status: DISCONTINUED | OUTPATIENT
Start: 2017-01-19 | End: 2017-01-20 | Stop reason: HOSPADM

## 2017-01-19 RX ORDER — IBUPROFEN 400 MG/1
400 TABLET ORAL 2 TIMES DAILY
Status: DISCONTINUED | OUTPATIENT
Start: 2017-01-19 | End: 2017-01-20 | Stop reason: HOSPADM

## 2017-01-19 RX ADMIN — INSULIN LISPRO 18 UNITS: 100 INJECTION, SOLUTION INTRAVENOUS; SUBCUTANEOUS at 08:32

## 2017-01-19 RX ADMIN — GABAPENTIN 100 MG: 100 CAPSULE ORAL at 22:10

## 2017-01-19 RX ADMIN — INSULIN LISPRO 6 UNITS: 100 INJECTION, SOLUTION INTRAVENOUS; SUBCUTANEOUS at 22:12

## 2017-01-19 RX ADMIN — CLOPIDOGREL BISULFATE 75 MG: 75 TABLET, FILM COATED ORAL at 08:33

## 2017-01-19 RX ADMIN — DOCUSATE SODIUM 100 MG: 100 CAPSULE, LIQUID FILLED ORAL at 17:25

## 2017-01-19 RX ADMIN — IBUPROFEN 400 MG: 400 TABLET ORAL at 13:18

## 2017-01-19 RX ADMIN — METOPROLOL SUCCINATE 50 MG: 50 TABLET, EXTENDED RELEASE ORAL at 08:33

## 2017-01-19 RX ADMIN — PREDNISONE 20 MG: 20 TABLET ORAL at 16:08

## 2017-01-19 RX ADMIN — INSULIN LISPRO 14 UNITS: 100 INJECTION, SOLUTION INTRAVENOUS; SUBCUTANEOUS at 13:17

## 2017-01-19 RX ADMIN — ATORVASTATIN CALCIUM 40 MG: 40 TABLET, FILM COATED ORAL at 22:10

## 2017-01-19 RX ADMIN — INSULIN GLARGINE 60 UNITS: 100 INJECTION, SOLUTION SUBCUTANEOUS at 22:12

## 2017-01-19 RX ADMIN — BUMETANIDE 1 MG: 1 TABLET ORAL at 08:33

## 2017-01-19 RX ADMIN — OXYCODONE HYDROCHLORIDE 5 MG: 5 TABLET ORAL at 22:12

## 2017-01-19 RX ADMIN — SODIUM CHLORIDE AND POTASSIUM CHLORIDE: 9; 1.49 INJECTION, SOLUTION INTRAVENOUS at 13:18

## 2017-01-19 RX ADMIN — OXYCODONE HYDROCHLORIDE 5 MG: 5 TABLET ORAL at 05:11

## 2017-01-19 RX ADMIN — IBUPROFEN 400 MG: 400 TABLET ORAL at 17:25

## 2017-01-19 RX ADMIN — Medication 10 ML: at 22:13

## 2017-01-19 RX ADMIN — DOCUSATE SODIUM 100 MG: 100 CAPSULE, LIQUID FILLED ORAL at 08:33

## 2017-01-19 RX ADMIN — Medication 10 ML: at 13:18

## 2017-01-19 RX ADMIN — CLONIDINE HYDROCHLORIDE 0.3 MG: 0.1 TABLET ORAL at 17:25

## 2017-01-19 RX ADMIN — LEVOTHYROXINE SODIUM 150 MCG: 150 TABLET ORAL at 08:32

## 2017-01-19 RX ADMIN — Medication 10 ML: at 03:05

## 2017-01-19 RX ADMIN — CLONIDINE HYDROCHLORIDE 0.3 MG: 0.1 TABLET ORAL at 08:32

## 2017-01-19 RX ADMIN — GABAPENTIN 100 MG: 100 CAPSULE ORAL at 16:08

## 2017-01-19 RX ADMIN — OXYCODONE HYDROCHLORIDE 5 MG: 5 TABLET ORAL at 16:08

## 2017-01-19 RX ADMIN — ENOXAPARIN SODIUM 30 MG: 30 INJECTION SUBCUTANEOUS at 08:32

## 2017-01-19 RX ADMIN — VALSARTAN 160 MG: 160 TABLET, FILM COATED ORAL at 08:33

## 2017-01-19 RX ADMIN — GABAPENTIN 100 MG: 100 CAPSULE ORAL at 08:33

## 2017-01-19 RX ADMIN — SPIRONOLACTONE 50 MG: 25 TABLET, FILM COATED ORAL at 08:33

## 2017-01-19 NOTE — PROGRESS NOTES
Hospitalist Progress Note    NAME: Shey Gunderson   :     MRN:  [id number]       Interim Hospital Summary: 77 y.o. female whom presented on 2017 with L sided weakness      Assessment / Plan:  Fevers now resolved  Denies cough/dysuria/diarrhea. No leukocytosis   Cxray: clear  UA with no pyuria or bacteria  ? Related to her BL feet pain, improving    BL feet pain: neuropathy vs acute gout arthritis   Unable to ambulate due to pain   H/o fall prior to admission --> check xray both feet   Follow uric acid  Start Neurontin   Got 60 mg prednisone last PM, resume prednisone 20 mg/day x 4 more days    DM type II with renal manifestation PO  , 188, 109  Lantus 60 units, will increase to 70 units  high level sliding scale  Outpatient endocrinology follow up  Diabetic education     Stroke POA  Sx: facial droop, Left sided weakness, slowly improving   OK to control BP   Head CT negative   MRI:Multiple old focal infarcts in both cerebral hemispheres. Acute right pontine infarct. Carotid duplex US: BL < 50% stenosis   ECHO LVEF 55% with no regional wall motion changes      Normal RV function, No AS, no MR  Stroke blood work: LDL  70.6  hba1c 11.5  TSH - 4.03   Seen by neurology: ASA was changed to Plavix   Speech:reguler diet    PT/OT:[facility] accepted  Not clear why neurology ordered a head CT scan     CKD stage III POA  Last available baseline  1.4, admission 2.49 - likely her baseline. TARUN was r/o   Cr is not significantly down while on fluids, so likely her baseline. US: no hydronephrosis, medical disease      DM type II with renal manifestation POA  BS remain poorly controlled, ?  Related to prednisone she got yesterday  , 230, 237, 268, 343  Lantus 60 units, will increase to 70 units  Add humalog 6 units before meals plus  Outpatient endocrinology follow up  Diabetic education     Essential HTN POA  BB/clonidine/ARB at home dose   Restarting bumex/spironolactone in am     Hypothyroidism POA  TSH 4.03 - high side --> incr levothyroxine  Follow TSH in 6 weeks     Hyperlipidemia POA, LDL at goal, cont statin   Gallbladder polyp, followed by Dr Talya Adam with abd US every year      Code status: Full   Surrogate Decision Maker: bonita Freeman [phone] or [phone], bonita Sethi [phone]  Prophylaxis: Lovenox     Baseline: lives with sio; she has 2 sons; was independent   Recommended Disposition: [address] accepted pt - dc when medically stable 1-2 days ( pending w/u for fever and BL feet pain)        Subjective:     Chief Complaint / Reason for Physician Visit: following stroke/ HTN/TARUN   C/o BL feet pain now feel a bit worse  No other complaints  Discussed with RN events overnight. Review of Systems:  Symptom Y/N Comments  Symptom Y/N Comments   Fever/Chills n   Chest Pain n    Poor Appetite    Edema     Cough n   Abdominal Pain n    Sputum    Joint Pain     SOB/ALONSO n   Pruritis/Rash     Nausea/vomit n   Tolerating PT/OT y    Diarrhea n   Tolerating Diet y    Constipation    Other       Could NOT obtain due to:      Objective:     VITALS:   Last 24hrs VS reviewed since prior progress note. Most recent are:  Patient Vitals for the past 24 hrs:   Temp Pulse Resp BP SpO2   01/19/17 1522 98.2 °F (36.8 °C) 66 18 130/59 100 %   01/19/17 1236 - 68 18 136/54 100 %   01/19/17 1100 - 71 - 121/69 -   01/19/17 0719 97.8 °F (36.6 °C) 68 18 144/69 98 %   01/19/17 0306 98.2 °F (36.8 °C) 77 18 133/60 99 %   01/18/17 2321 98.5 °F (36.9 °C) 69 18 147/63 100 %   01/18/17 2050 98.7 °F (37.1 °C) 71 18 149/75 96 %   01/18/17 1812 - 80 - 153/72 -     No intake or output data in the 24 hours ending 01/19/17 1554     PHYSICAL EXAM:  General: WD, WN. Alert, cooperative, no acute distress    EENT:  EOMI. Anicteric sclerae. MMM  Resp:  CTA bilaterally, no wheezing or rales.   No accessory muscle use  CV:  Regular  rhythm,  No edema  GI:  Soft, Non distended, Non tender.  +Bowel sounds  Neurologic:  Alert and oriented X 3, normal speech,  facial droop resolved, still with left droop  Extr:                BL feet /ankles swollen / very tender to touch/ not warm   Psych:   Good insight. Not anxious nor agitated  Skin:  No rashes. No jaundice    Reviewed most current lab test results and cultures  YES  Reviewed most current radiology test results   YES  Review and summation of old records today    NO  Reviewed patient's current orders and MAR    YES  PMH/SH reviewed - no change compared to H&P  ________________________________________________________________________  Care Plan discussed with:    Comments   Patient y    Family       RN y    Care Manager     Consultant                        Multidiciplinary team rounds were held today with , nursing, pharmacist and clinical coordinator. Patient's plan of care was discussed; medications were reviewed and discharge planning was addressed. ________________________________________________________________________  Total NON critical care TIME:  25   Minutes    Total CRITICAL CARE TIME Spent:   Minutes non procedure based      Comments   >50% of visit spent in counseling and coordination of care     ________________________________________________________________________  Cristino Gan MD     Procedures: see electronic medical records for all procedures/Xrays and details which were not copied into this note but were reviewed prior to creation of Plan. LABS:  I reviewed today's most current labs and imaging studies.   Pertinent labs include:  Recent Labs      01/19/17   0311  01/18/17   0248  01/17/17   0926   WBC  6.4  7.6  6.9   HGB  8.8*  9.3*  9.0*   HCT  26.3*  27.4*  27.1*   PLT  208  162  154     Recent Labs      01/19/17   0311  01/18/17   0248  01/17/17   0926   NA  137  137  140   K  4.2  4.5  3.9   CL  101  103  104   CO2  25  22  27   GLU  152*  226*  178*   BUN  47*  28*  25*   CREA  2.24*  2.09*  2.16*   CA  8.3*  8.4*  8.3*       Signed: Song Munoz Shantal Abraham MD

## 2017-01-19 NOTE — PROGRESS NOTES
Problem: Self Care Deficits Care Plan (Adult)  Goal: *Acute Goals and Plan of Care (Insert Text)  Occupational Therapy Goals  Initiated 1/16/2017   1. Patient will perform self-feeding including container management using her L non-dominant UE as a gross assist with modified independence within 7 day(s). 2. Patient will perform upper body dressing with minimal assistance/contact guard assist using best technique within 7 day(s). 3. Patient will perform lower body dressing with maximal assistance within 7 day(s). 4. Patient will perform toilet transfers with moderate assistance and best DME within 7 day(s). 5. Patient will perform all aspects of toileting with maximal assistance within 7 day(s). 6. Patient will participate in upper extremity therapeutic exercise/activities with moderate assistance for LUE for 10 minutes within 7 day(s). 7. Patient will utilize energy conservation techniques during functional activities with verbal and visual cues within 7 day(s). 8. Patient will improve their LUE Fugl Marrufo score by 5 points within 7 days. OCCUPATIONAL THERAPY TREATMENT: NEURO  Patient: Yessi Hirsch (77 y.o. female)  Date: 1/19/2017  Diagnosis: Stroke (cerebrum) [facility]) <principal problem not specified>       Precautions: Fall, Bed Alarm      ASSESSMENT:  Pt presents with more impaired LUE and LE strength, balance, and endurance to perform functional mobility and ADL tasks. RN notified immediately as well as neurologist.  Pt sat EOB for 25 minutes to participate in UB dressing ADL, functional mobility, and neuro-retraining activities. She recalled stephon-technique education regarding UE dressing ADL with button-down shirt, requiring mod A to complete the task. She was tired stating she did not sleep well last night, and c/o of increased B feet pain and unable to WB through LEs due to pain.   Typically, she is lively and eager to participate in therapy, but today she was limited due to mild slow processing, new LUE and LE weakness, fatigue and B feet pain. Skilled OT is needed to address cognitive deficits, decreased strength, balance/endurance, functional mobility, and ADL tasks. Continue to recommend IP rehab at discharge. Progression toward goals:  [ ]          Improving appropriately and progressing toward goals  [X]          Improving slowly and progressing toward goals  [ ]          Not making progress toward goals and plan of care will be adjusted       PLAN:  Patient continues to benefit from skilled intervention to address the above impairments. Continue treatment per established plan of care. Discharge Recommendations:  IP Rehab  Further Equipment Recommendations for Discharge:  TBD       SUBJECTIVE:   Patient stated I want to get out of this bed, but my feet hurt.  Wanted to get up from the bed, but unable to withstand physical demands of the task. OBJECTIVE DATA SUMMARY:   Cognitive/Behavioral Status:  Neurologic State: Alert  Orientation Level: Oriented X4  Cognition: Appropriate for age attention/concentration; Appropriate decision making; Follows commands; Appropriate safety awareness  Perception: Appears intact  Perseveration: No perseveration noted  Safety/Judgement: Awareness of environment; Fall prevention;Good awareness of safety precautions; Insight into deficits     Functional Mobility and Transfers for ADLs:  Bed Mobility:  Rolling: Moderate assistance  Supine to Sit: Moderate assistance  Sit to Supine: Maximum assistance;Assist x2  Scooting: Maximum assistance; Additional time;Assist x1     Transfers:  Sit to Stand: Maximum assistance;Assist x2     Balance:  Sitting: Impaired; Without support  Sitting - Static: Fair (occasional)  Sitting - Dynamic: Fair (occasional)  Standing - Static: Poor;Constant support     ADL Intervention:   Upper Body Dressing Assistance  Dressing Assistance:  Moderate assistance  Front Opened Shirt: Moderate assistance  Cues: Tactile cues provided;Verbal cues provided;Physical assistance;Visual cues provided  Cognitive Retraining  Safety/Judgement: Awareness of environment; Fall prevention;Good awareness of safety precautions; Insight into deficits     Neuro Re-Education:    Pt recalled stephon-technique education regarding UE dressing ADL with button-down shirt, requiring mod A to complete the task. Pt performed sit to stand transfer with max A x2 and only able to come to squatting position vs upright standing as preparatory activity to work towards toilet transfers using BSC. She required max manual cues for trunk extension, B hip extension and L knee extension for WB. Pt not safe at this time to transfer to [facility] without use of mechanical lift. Discussed with RN. Pain:  Pain Scale 1: Numeric (0 - 10)  Pain Intensity 1: 8  Pain Location 1: Foot   Pain Intervention(s) 1: Medication (see MAR)    Activity Tolerance:   Fair  Please refer to the flowsheet for vital signs taken during this treatment. After treatment: Hourly rounds team in pt room  [ ] Patient left in no apparent distress sitting up in chair  [X] Patient left in no apparent distress in bed  [X] Call bell left within reach  [X] Nursing notified  [ ] Caregiver present  [X] Bed alarm activated      COMMUNICATION/COLLABORATION:   The patients plan of care was discussed with: Physical Therapist and Registered Nurse  Patient was educated regarding Her deficit(s) of LUE and LLE weakness as this relates to Her diagnosis of acute right pontine infarct. She demonstrated Good understanding as evidenced by verbalizing understanding. Bindu Anand and [id number] xAd  Time Calculation: 32 mins     Regarding student involvement in patient care:  A student participated in this treatment session. Per CMS Medicare statements and AOTA guidelines I certify that the following was true:  1. I was present and directly observed the entire session.   2. I made all skilled judgments and clinical decisions regarding care.  3. I am the practitioner responsible for assessment, treatment, and documentation.

## 2017-01-19 NOTE — INTERDISCIPLINARY ROUNDS
[facility] Telemetry Unit Interdisciplinary rounds were held today to discuss patient plan of care and outcomes. The interdisciplinary team collaborated to facilitate discharge planning needs. The following members were present; Nurse Manager, Juan Ceron [phone], Pharmacist, Primary Nurse, , Physical Therapy,   Physician, and Case Management. PLAN:  Inpatient rehab recommended. CM to meet with patient.

## 2017-01-19 NOTE — PROGRESS NOTES
Bedside and Verbal shift change report given to Lorri Kathleen RN (oncoming nurse) by Josué Villafuerte RN (off going nurse). Report included the following information SBAR, Kardex, Recent Results and Cardiac Rhythm NSR.     Zone Phone: [phone]          Significant changes during shift: None    Patient Information      Stuart Marquez  77 y.o.  1/13/2017 11:45 PM by Adilene Tsang MD. Stuart Marquez was admitted from [facility]  Problem List      [address]   Patient Active Problem List      Diagnosis Date Noted    Stroke [facility]cerebrum) ([facility] [address] 01/14/2017    Thyroid cancer ([facility] [address] 09/17/2014    TIA (transient ischemic attack) 06/06/2014    HTN (hypertension) 06/06/2014    CKD (chronic kidney disease) 06/06/2014    Thyroid nodule, right 03/10/2014    Diabetes mellitus ([facility] [address] 03/10/2014    Gallbladder polyp 08/14/2013    Gallbladder sludge 08/14/2013    Recurrent ventral incisional hernia 08/14/2013    Obesity (BMI 35.0-39.9 without comorbidity) ([facility] [address] 08/14/2013                     Past Medical History   Diagnosis Date    Arthritis       Cataract            bilateral    Chronic kidney disease       Diabetes ([facility] [address]       Hypercholesterolemia       Hypertension       Ill-defined condition            states 'polyp' in gal bladder    Obesity       Thyroid disease       TIA (transient ischemic attack) 6/6/2014               Core Measures:      CVA: YES YES  CHF:NO NO  PNA:NO NO      Post Op Surgical (If Applicable):       n/a      Activity Status:      OOB to Chair NO  Ambulated this shift NO   Bed Rest NO      Supplemental O2: (If Applicable)      n/a          LINES AND DRAINS:      Peripheral IV: 20 in the LAC, saline locked      DVT prophylaxis:      DVT prophylaxis Med- YES, Lovenox  DVT prophylaxis SCD or SINDHU- NO       Wounds: (If Applicable)      Wounds- NO      Location -      Patient Safety:      Falls Score Total Score: 4  Safety Level__III_____  Bed Alarm On? YES  Sitter?  NO      Plan for upcoming shift: work on ambulation and moving left side of body              Discharge Plan: YES  When medically cleared      Active Consults:  IP CONSULT TO NEUROLOGY

## 2017-01-19 NOTE — PROGRESS NOTES
Problem: Mobility Impaired (Adult and Pediatric)  Goal: *Acute Goals and Plan of Care (Insert Text)  Physical Therapy Goals  Initiated 1/15/2017  1. Patient will move from supine to sit and sit to supine , scoot up and down and roll side to side in bed with supervision/set-up within 7 day(s). 2. Patient will transfer from bed to chair and chair to bed with minimal assistance/contact guard assist using the least restrictive device within 7 day(s). 3. Patient will perform sit to stand with minimal assistance/contact guard assist within 7 day(s). 4. Patient will ambulate with moderate assistance for 10 feet with the least restrictive device within 7 day(s). 5. Patient will improve Back Balance score by 7 points within 7 days. PHYSICAL THERAPY TREATMENT  Patient: Byron Regan (77 y.o. female)  Date: 1/19/2017  Diagnosis: Stroke (cerebrum) [facility]) <principal problem not specified>       Precautions: Fall, Bed Alarm      ASSESSMENT:  Patient received resting in bed, reporting increased B foot pain again. Patient with increased weakness on L side, especially in the arm/hand as well as more fatigued and with slower processing compared to yesterday. RN, neurologist, and MD notified. Patient agreeable to sit EOB, requiring min-mod A for supine to sit. Once sitting EOB, patient immediately incontinent of urine and reports unaware she needed to urinate. Patient unable to put feet on the ground due to increased ankle/foot pain. Returned to supine and performed rolling to each side to clean and change the bed. Noted patient with decreased attention to the L arm with rolling, leaving it behind and requiring cues. Patient able to scoot to the [facility] via bridging with mod A. Patient left resting in bed as she is unable to get to the chair this date.  Patient will benefit from IP rehab at discharge to maximize per rehab potential.  Progression toward goals:  [X]    Improving appropriately and progressing toward goals  [ ] Improving slowly and progressing toward goals  [ ]    Not making progress toward goals and plan of care will be adjusted       PLAN:  Patient continues to benefit from skilled intervention to address the above impairments. Continue treatment per established plan of care. Discharge Recommendations:  Inpatient Rehab  Further Equipment Recommendations for Discharge:  TBD       SUBJECTIVE:   Patient stated I just can't put my feet down.       OBJECTIVE DATA SUMMARY:   Critical Behavior:  Neurologic State: Alert  Orientation Level: Oriented X4  Cognition: Appropriate for age attention/concentration, Appropriate decision making, Follows commands, Appropriate safety awareness  Safety/Judgement: Awareness of environment, Fall prevention, Good awareness of safety precautions, Insight into deficits  Functional Mobility Training:  Bed Mobility:  Rolling: Moderate assistance  Supine to Sit: Moderate assistance  Sit to Supine: Maximum assistance  Scooting: Maximum assistance        Transfers:  Sit to Stand: Maximum assistance;Assist x2                                Balance:  Sitting: Impaired; Without support  Sitting - Static: Fair (occasional)  Sitting - Dynamic: Fair (occasional)  Standing:  (unable to attempt)  Standing - Static: Poor;Constant support  Ambulation/Gait Training:                                                        Neuro Re-Education:  Provided mod manual cues for bridging in supine x 5 to scoot to [facility]. Therapeutic Exercises:   LAQ x 5B on EOB  Pain:  Pain Scale 1: Numeric (0 - 10)  Pain Intensity 1: 3  Pain Location 1: Foot        Pain Intervention(s) 1: Medication (see MAR)  Activity Tolerance:   Fair, limited by pain. Please refer to the flowsheet for vital signs taken during this treatment.   After treatment:   [ ]    Patient left in no apparent distress sitting up in chair  [X]    Patient left in no apparent distress in bed  [X]    Call bell left within reach  [X]    Nursing notified  [ ] Caregiver present  [X]    Bed alarm activated      COMMUNICATION/COLLABORATION:   The patients plan of care was discussed with: Occupational Therapist, Registered Nurse and      Luz Elena Matute, PT, DPT   Time Calculation: 22 mins

## 2017-01-19 NOTE — PROGRESS NOTES
1710: Patient blood glucose 75. Patient was provided with 4 oz of orange juice and is eating her dinner. Will recheck blood sugar in 15 minutes. 1725:Patient blood sugar rechecked. Blood glucose 84.

## 2017-01-19 NOTE — PROGRESS NOTES
Hospitalist Progress Note    NAME: Rinku Dowling   :     MRN:  [id number]       Interim Hospital Summary: 77 y.o. female whom presented on 2017 with L sided weakness      Assessment / Plan:  Low grade fever no further high grade fevers  Denies cough/dysuria/diarrhea. No leukocytosis   Cxray: clear  UA with no pyuria or bacteria  ? Related to her BL feet pain, improving    BL feet pain: neuropathy vs r/o gout arthritis   Unable to ambulate due to pain   H/o fall prior to admission --> check xray both feet   Follow uric acid  Start Neurontin   Got 60 mg prednisone last PM, will hold further doses today    DM type II with renal manifestation POA  BS remain poorly controlled, ? Related to prednisone she got yesterday  , 230, 237, 268, 343  Lantus 60 units, will increase to 70 units  Add humalog 6 units before meals plus  Outpatient endocrinology follow up  Diabetic education     Stroke POA  Sx: facial droop, Left sided weakness, slowly improving   OK to control BP   Head CT negative   MRI:Multiple old focal infarcts in both cerebral hemispheres. Acute right pontine infarct. Carotid duplex US: BL < 50% stenosis   ECHO LVEF 55% with no regional wall motion changes      Normal RV function, No AS, no MR  Stroke blood work: LDL  70.6  hba1c 11.5  TSH - 4.03   Seen by neurology: ASA was changed to Plavix   Speech:reguler diet    PT/OT:[facility] accepted, likely discharge in AM     CKD stage III POA  Last available baseline  1.4, admission 2.49 - likely her baseline. TARUN was r/o   Cr is not significantly down while on fluids, so likely her baseline. US: no hydronephrosis, medical disease      DM type II with renal manifestation POA  BS remain poorly controlled, ?  Related to prednisone she got yesterday  , 230, 237, 268, 343  Lantus 60 units, will increase to 70 units  Add humalog 6 units before meals plus  Outpatient endocrinology follow up  Diabetic education     Essential HTN POA  BB/clonidine/ARB at home dose   Restarting bumex/spironolactone in am     Hypothyroidism POA  TSH 4.03 - high side --> incr levothyroxine  Follow TSH in 6 weeks     Hyperlipidemia POA, LDL at goal, cont statin   Gallbladder polyp, followed by Dr Ad Elizondo with abd US every year      Code status: Full   Surrogate Decision Maker: bonita Sal [phone] or [id number], bonita Lloyd [phone]  Prophylaxis: Lovenox     Baseline: lives with sio; she has 2 sons; was independent   Recommended Disposition: [facility] accepted pt - dc when medically stable 1-2 days ( pending w/u for fever and BL feet pain)        Subjective:     Chief Complaint / Reason for Physician Visit: following stroke/ HTN/TARUN   C/o BL feet pain is improved a bit, able to stand better. No CP  Discussed with RN events overnight. Review of Systems:  Symptom Y/N Comments  Symptom Y/N Comments   Fever/Chills n   Chest Pain n    Poor Appetite    Edema     Cough n   Abdominal Pain n    Sputum    Joint Pain     SOB/ALONSO n   Pruritis/Rash     Nausea/vomit n   Tolerating PT/OT y    Diarrhea n   Tolerating Diet y    Constipation    Other       Could NOT obtain due to:      Objective:     VITALS:   Last 24hrs VS reviewed since prior progress note. Most recent are:  Patient Vitals for the past 24 hrs:   Temp Pulse Resp BP SpO2   01/18/17 1812 - 80 - 153/72 -   01/18/17 1506 99.2 °F (37.3 °C) 72 16 130/76 100 %   01/18/17 1158 98.6 °F (37 °C) 76 18 128/74 99 %   01/18/17 1143 99.5 °F (37.5 °C) 82 18 163/68 100 %   01/18/17 0715 98.5 °F (36.9 °C) 67 16 137/68 99 %   01/18/17 0300 98 °F (36.7 °C) 80 18 138/70 96 %   01/18/17 0002 98.7 °F (37.1 °C) 79 18 170/73 96 %     No intake or output data in the 24 hours ending 01/18/17 2000     PHYSICAL EXAM:  General: WD, WN. Alert, cooperative, no acute distress    EENT:  EOMI. Anicteric sclerae. MMM  Resp:  CTA bilaterally, no wheezing or rales.   No accessory muscle use  CV:  Regular  rhythm,  No edema  GI:  Soft, Non distended, Non tender.  +Bowel sounds  Neurologic:  Alert and oriented X 3, normal speech,  facial droop resolved, still with left droop  Extr:                BL feet /ankles swollen / very tender to touch/ not warm   Psych:   Good insight. Not anxious nor agitated  Skin:  No rashes. No jaundice    Reviewed most current lab test results and cultures  YES  Reviewed most current radiology test results   YES  Review and summation of old records today    NO  Reviewed patient's current orders and MAR    YES  PMH/SH reviewed - no change compared to H&P  ________________________________________________________________________  Care Plan discussed with:    Comments   Patient y    Family       RN y    Care Manager     Consultant                        Multidiciplinary team rounds were held today with , nursing, pharmacist and clinical coordinator. Patient's plan of care was discussed; medications were reviewed and discharge planning was addressed. ________________________________________________________________________  Total NON critical care TIME:  25   Minutes    Total CRITICAL CARE TIME Spent:   Minutes non procedure based      Comments   >50% of visit spent in counseling and coordination of care     ________________________________________________________________________  Ritika Miller MD     Procedures: see electronic medical records for all procedures/Xrays and details which were not copied into this note but were reviewed prior to creation of Plan. LABS:  I reviewed today's most current labs and imaging studies.   Pertinent labs include:  Recent Labs      01/18/17   0248  01/17/17   0926   WBC  7.6  6.9   HGB  9.3*  9.0*   HCT  27.4*  27.1*   PLT  162  154     Recent Labs      01/18/17   0248  01/17/17   0926   NA  137  140   K  4.5  3.9   CL  103  104   CO2  22  27   GLU  226*  178*   BUN  28*  25*   CREA  2.09*  2.16*   CA  8.4*  8.3*       Signed: Ritika Miller MD

## 2017-01-20 ENCOUNTER — HOSPITAL ENCOUNTER (OUTPATIENT)
Age: 77
Discharge: HOME | End: 2017-02-21
Attending: PHYSICAL MEDICINE & REHABILITATION | Admitting: PHYSICAL MEDICINE & REHABILITATION

## 2017-01-20 ENCOUNTER — APPOINTMENT (OUTPATIENT)
Dept: CT IMAGING | Age: 77
DRG: 065 | End: 2017-01-20
Attending: PSYCHIATRY & NEUROLOGY
Payer: MEDICARE

## 2017-01-20 VITALS
SYSTOLIC BLOOD PRESSURE: 137 MMHG | BODY MASS INDEX: 38.08 KG/M2 | WEIGHT: 225.31 LBS | TEMPERATURE: 97.9 F | OXYGEN SATURATION: 96 % | RESPIRATION RATE: 20 BRPM | HEART RATE: 67 BPM | DIASTOLIC BLOOD PRESSURE: 63 MMHG

## 2017-01-20 LAB
ANION GAP BLD CALC-SCNC: 10 MMOL/L (ref 5–15)
ANION GAP BLD CALC-SCNC: 11 MMOL/L (ref 5–15)
APPEARANCE UR: CLEAR
BACTERIA URNS QL MICRO: NEGATIVE /HPF
BASOPHILS # BLD AUTO: 0 K/UL (ref 0–0.1)
BASOPHILS # BLD: 0 % (ref 0–1)
BILIRUB UR QL: NEGATIVE
BUN SERPL-MCNC: 54 MG/DL (ref 6–20)
BUN SERPL-MCNC: 55 MG/DL (ref 6–20)
BUN/CREAT SERPL: 22 (ref 12–20)
BUN/CREAT SERPL: 23 (ref 12–20)
CALCIUM SERPL-MCNC: 8 MG/DL (ref 8.5–10.1)
CALCIUM SERPL-MCNC: 8.2 MG/DL (ref 8.5–10.1)
CHLORIDE SERPL-SCNC: 100 MMOL/L (ref 97–108)
CHLORIDE SERPL-SCNC: 100 MMOL/L (ref 97–108)
CO2 SERPL-SCNC: 23 MMOL/L (ref 21–32)
CO2 SERPL-SCNC: 26 MMOL/L (ref 21–32)
COLOR UR: ABNORMAL
CREAT SERPL-MCNC: 2.39 MG/DL (ref 0.55–1.02)
CREAT SERPL-MCNC: 2.47 MG/DL (ref 0.55–1.02)
EOSINOPHIL # BLD: 0 K/UL (ref 0–0.4)
EOSINOPHIL NFR BLD: 0 % (ref 0–7)
EPITH CASTS URNS QL MICRO: ABNORMAL /LPF
ERYTHROCYTE [DISTWIDTH] IN BLOOD BY AUTOMATED COUNT: 14.1 % (ref 11.5–14.5)
GLUCOSE BLD STRIP.AUTO-MCNC: 283 MG/DL (ref 65–100)
GLUCOSE BLD STRIP.AUTO-MCNC: 315 MG/DL (ref 65–100)
GLUCOSE BLD STRIP.AUTO-MCNC: 373 MG/DL (ref 65–100)
GLUCOSE SERPL-MCNC: 285 MG/DL (ref 65–100)
GLUCOSE SERPL-MCNC: 318 MG/DL (ref 65–100)
GLUCOSE UR STRIP.AUTO-MCNC: 100 MG/DL
HCT VFR BLD AUTO: 26.4 % (ref 35–47)
HGB BLD-MCNC: 8.9 G/DL (ref 11.5–16)
HGB UR QL STRIP: NEGATIVE
HYALINE CASTS URNS QL MICRO: ABNORMAL /LPF (ref 0–5)
KETONES UR QL STRIP.AUTO: NEGATIVE MG/DL
LEUKOCYTE ESTERASE UR QL STRIP.AUTO: ABNORMAL
LYMPHOCYTES # BLD AUTO: 16 % (ref 12–49)
LYMPHOCYTES # BLD: 0.9 K/UL (ref 0.8–3.5)
MCH RBC QN AUTO: 29.9 PG (ref 26–34)
MCHC RBC AUTO-ENTMCNC: 33.7 G/DL (ref 30–36.5)
MCV RBC AUTO: 88.6 FL (ref 80–99)
MONOCYTES # BLD: 0.6 K/UL (ref 0–1)
MONOCYTES NFR BLD AUTO: 11 % (ref 5–13)
NEUTS SEG # BLD: 4.1 K/UL (ref 1.8–8)
NEUTS SEG NFR BLD AUTO: 73 % (ref 32–75)
NITRITE UR QL STRIP.AUTO: NEGATIVE
PH UR STRIP: 5.5 [PH] (ref 5–8)
PLATELET # BLD AUTO: 228 K/UL (ref 150–400)
POTASSIUM SERPL-SCNC: 4.5 MMOL/L (ref 3.5–5.1)
POTASSIUM SERPL-SCNC: 5.6 MMOL/L (ref 3.5–5.1)
PROT UR STRIP-MCNC: NEGATIVE MG/DL
RBC # BLD AUTO: 2.98 M/UL (ref 3.8–5.2)
RBC #/AREA URNS HPF: ABNORMAL /HPF (ref 0–5)
SERVICE CMNT-IMP: ABNORMAL
SODIUM SERPL-SCNC: 134 MMOL/L (ref 136–145)
SODIUM SERPL-SCNC: 136 MMOL/L (ref 136–145)
SP GR UR REFRACTOMETRY: 1.01 (ref 1–1.03)
UROBILINOGEN UR QL STRIP.AUTO: 0.2 EU/DL (ref 0.2–1)
WBC # BLD AUTO: 5.7 K/UL (ref 3.6–11)
WBC URNS QL MICRO: ABNORMAL /HPF (ref 0–4)

## 2017-01-20 PROCEDURE — 80048 BASIC METABOLIC PNL TOTAL CA: CPT | Performed by: INTERNAL MEDICINE

## 2017-01-20 PROCEDURE — 82962 GLUCOSE BLOOD TEST: CPT

## 2017-01-20 PROCEDURE — 74011250637 HC RX REV CODE- 250/637: Performed by: HOSPITALIST

## 2017-01-20 PROCEDURE — 74011636637 HC RX REV CODE- 636/637: Performed by: INTERNAL MEDICINE

## 2017-01-20 PROCEDURE — 74011636637 HC RX REV CODE- 636/637: Performed by: PHYSICAL MEDICINE & REHABILITATION

## 2017-01-20 PROCEDURE — 87186 SC STD MICRODIL/AGAR DIL: CPT | Performed by: PHYSICAL MEDICINE & REHABILITATION

## 2017-01-20 PROCEDURE — 97535 SELF CARE MNGMENT TRAINING: CPT | Performed by: OCCUPATIONAL THERAPIST

## 2017-01-20 PROCEDURE — 87077 CULTURE AEROBIC IDENTIFY: CPT | Performed by: PHYSICAL MEDICINE & REHABILITATION

## 2017-01-20 PROCEDURE — 70450 CT HEAD/BRAIN W/O DYE: CPT

## 2017-01-20 PROCEDURE — 74011250637 HC RX REV CODE- 250/637: Performed by: PSYCHIATRY & NEUROLOGY

## 2017-01-20 PROCEDURE — 74011250637 HC RX REV CODE- 250/637: Performed by: PHYSICAL MEDICINE & REHABILITATION

## 2017-01-20 PROCEDURE — 74011250636 HC RX REV CODE- 250/636: Performed by: HOSPITALIST

## 2017-01-20 PROCEDURE — 87086 URINE CULTURE/COLONY COUNT: CPT | Performed by: PHYSICAL MEDICINE & REHABILITATION

## 2017-01-20 PROCEDURE — 36415 COLL VENOUS BLD VENIPUNCTURE: CPT | Performed by: INTERNAL MEDICINE

## 2017-01-20 PROCEDURE — 97112 NEUROMUSCULAR REEDUCATION: CPT | Performed by: OCCUPATIONAL THERAPIST

## 2017-01-20 PROCEDURE — 81001 URINALYSIS AUTO W/SCOPE: CPT | Performed by: PHYSICAL MEDICINE & REHABILITATION

## 2017-01-20 PROCEDURE — 85025 COMPLETE CBC W/AUTO DIFF WBC: CPT | Performed by: INTERNAL MEDICINE

## 2017-01-20 RX ORDER — CLOPIDOGREL BISULFATE 75 MG/1
75 TABLET ORAL DAILY
Qty: 30 TAB | Refills: 6 | Status: SHIPPED | OUTPATIENT
Start: 2017-01-20 | End: 2019-12-17

## 2017-01-20 RX ORDER — INSULIN GLARGINE 100 [IU]/ML
60 INJECTION, SOLUTION SUBCUTANEOUS
Status: DISCONTINUED | OUTPATIENT
Start: 2017-01-20 | End: 2017-01-24

## 2017-01-20 RX ORDER — MAGNESIUM SULFATE 100 %
16 CRYSTALS MISCELLANEOUS AS NEEDED
Status: DISCONTINUED | OUTPATIENT
Start: 2017-01-20 | End: 2017-02-21 | Stop reason: HOSPADM

## 2017-01-20 RX ORDER — BUMETANIDE 1 MG/1
1 TABLET ORAL DAILY
Status: DISCONTINUED | OUTPATIENT
Start: 2017-01-21 | End: 2017-02-03

## 2017-01-20 RX ORDER — ANASTROZOLE 1 MG/1
1 TABLET ORAL DAILY
Status: DISCONTINUED | OUTPATIENT
Start: 2017-01-21 | End: 2017-01-20 | Stop reason: CLARIF

## 2017-01-20 RX ORDER — CLONIDINE HYDROCHLORIDE 0.1 MG/1
0.3 TABLET ORAL 2 TIMES DAILY
Status: DISCONTINUED | OUTPATIENT
Start: 2017-01-20 | End: 2017-02-21 | Stop reason: HOSPADM

## 2017-01-20 RX ORDER — ATORVASTATIN CALCIUM 40 MG/1
40 TABLET, FILM COATED ORAL
Status: DISCONTINUED | OUTPATIENT
Start: 2017-01-20 | End: 2017-02-21 | Stop reason: HOSPADM

## 2017-01-20 RX ORDER — VALSARTAN 160 MG/1
160 TABLET ORAL DAILY
Status: DISCONTINUED | OUTPATIENT
Start: 2017-01-21 | End: 2017-01-21

## 2017-01-20 RX ORDER — DEXTROSE 50 % IN WATER (D50W) INTRAVENOUS SYRINGE
25 AS NEEDED
Status: DISCONTINUED | OUTPATIENT
Start: 2017-01-20 | End: 2017-02-21 | Stop reason: HOSPADM

## 2017-01-20 RX ORDER — INSULIN LISPRO 100 [IU]/ML
INJECTION, SOLUTION INTRAVENOUS; SUBCUTANEOUS
Status: DISCONTINUED | OUTPATIENT
Start: 2017-01-20 | End: 2017-02-08

## 2017-01-20 RX ORDER — METOPROLOL SUCCINATE 50 MG/1
50 TABLET, EXTENDED RELEASE ORAL DAILY
Status: DISCONTINUED | OUTPATIENT
Start: 2017-01-21 | End: 2017-02-21 | Stop reason: HOSPADM

## 2017-01-20 RX ORDER — OXYCODONE HYDROCHLORIDE 5 MG/1
5-10 TABLET ORAL
Qty: 30 TAB | Refills: 0 | Status: SHIPPED | OUTPATIENT
Start: 2017-01-20 | End: 2017-06-06

## 2017-01-20 RX ORDER — THERA TABS 400 MCG
1 TAB ORAL DAILY
Status: DISCONTINUED | OUTPATIENT
Start: 2017-01-21 | End: 2017-01-20

## 2017-01-20 RX ORDER — INSULIN LISPRO 100 [IU]/ML
INJECTION, SOLUTION INTRAVENOUS; SUBCUTANEOUS
Status: DISCONTINUED | OUTPATIENT
Start: 2017-01-21 | End: 2017-02-21 | Stop reason: HOSPADM

## 2017-01-20 RX ORDER — HYDROMORPHONE HYDROCHLORIDE 2 MG/1
5 TABLET ORAL 2 TIMES DAILY
Status: DISCONTINUED | OUTPATIENT
Start: 2017-01-21 | End: 2017-01-20 | Stop reason: CLARIF

## 2017-01-20 RX ORDER — LANOLIN ALCOHOL/MO/W.PET/CERES
3 CREAM (GRAM) TOPICAL
Status: DISCONTINUED | OUTPATIENT
Start: 2017-01-20 | End: 2017-02-21 | Stop reason: HOSPADM

## 2017-01-20 RX ORDER — OXYCODONE HYDROCHLORIDE 5 MG/1
5 TABLET ORAL
Status: DISCONTINUED | OUTPATIENT
Start: 2017-01-20 | End: 2017-02-21 | Stop reason: HOSPADM

## 2017-01-20 RX ORDER — GABAPENTIN 100 MG/1
100 CAPSULE ORAL 3 TIMES DAILY
Status: DISCONTINUED | OUTPATIENT
Start: 2017-01-20 | End: 2017-01-24

## 2017-01-20 RX ORDER — ACETAMINOPHEN 325 MG/1
650 TABLET ORAL
Status: DISCONTINUED | OUTPATIENT
Start: 2017-01-20 | End: 2017-02-21 | Stop reason: HOSPADM

## 2017-01-20 RX ORDER — FACIAL-BODY WIPES
10 EACH TOPICAL DAILY PRN
Status: DISCONTINUED | OUTPATIENT
Start: 2017-01-20 | End: 2017-02-21 | Stop reason: HOSPADM

## 2017-01-20 RX ORDER — CLOPIDOGREL BISULFATE 75 MG/1
75 TABLET ORAL DAILY
Status: DISCONTINUED | OUTPATIENT
Start: 2017-01-21 | End: 2017-02-21 | Stop reason: HOSPADM

## 2017-01-20 RX ORDER — SPIRONOLACTONE 25 MG/1
50 TABLET ORAL DAILY
Status: DISCONTINUED | OUTPATIENT
Start: 2017-01-21 | End: 2017-02-03

## 2017-01-20 RX ORDER — PREDNISONE 20 MG/1
20 TABLET ORAL
Qty: 2 TAB | Refills: 0 | Status: SHIPPED
Start: 2017-01-20 | End: 2017-01-22

## 2017-01-20 RX ORDER — OXYCODONE HYDROCHLORIDE 5 MG/1
10 TABLET ORAL
Status: DISCONTINUED | OUTPATIENT
Start: 2017-01-20 | End: 2017-02-21 | Stop reason: HOSPADM

## 2017-01-20 RX ORDER — ENOXAPARIN SODIUM 100 MG/ML
30 INJECTION SUBCUTANEOUS EVERY 24 HOURS
Status: DISCONTINUED | OUTPATIENT
Start: 2017-01-21 | End: 2017-02-21 | Stop reason: HOSPADM

## 2017-01-20 RX ORDER — LEVOTHYROXINE SODIUM 75 UG/1
150 TABLET ORAL
Status: DISCONTINUED | OUTPATIENT
Start: 2017-01-21 | End: 2017-01-21

## 2017-01-20 RX ORDER — ONDANSETRON 4 MG/1
4 TABLET, ORALLY DISINTEGRATING ORAL
Status: DISCONTINUED | OUTPATIENT
Start: 2017-01-20 | End: 2017-02-21 | Stop reason: HOSPADM

## 2017-01-20 RX ORDER — AMOXICILLIN 250 MG
1 CAPSULE ORAL DAILY
Status: DISCONTINUED | OUTPATIENT
Start: 2017-01-21 | End: 2017-02-02

## 2017-01-20 RX ORDER — SORBITOL SOLUTION 70 %
30 SOLUTION, ORAL MISCELLANEOUS DAILY PRN
Status: DISCONTINUED | OUTPATIENT
Start: 2017-01-20 | End: 2017-02-21 | Stop reason: HOSPADM

## 2017-01-20 RX ORDER — PREDNISONE 20 MG/1
20 TABLET ORAL
Status: COMPLETED | OUTPATIENT
Start: 2017-01-21 | End: 2017-01-22

## 2017-01-20 RX ORDER — PREDNISONE 10 MG/1
10 TABLET ORAL
Status: DISCONTINUED | OUTPATIENT
Start: 2017-01-25 | End: 2017-01-24

## 2017-01-20 RX ADMIN — LEVOTHYROXINE SODIUM 150 MCG: 150 TABLET ORAL at 07:29

## 2017-01-20 RX ADMIN — CLOPIDOGREL BISULFATE 75 MG: 75 TABLET, FILM COATED ORAL at 09:31

## 2017-01-20 RX ADMIN — GABAPENTIN 100 MG: 100 CAPSULE ORAL at 09:31

## 2017-01-20 RX ADMIN — ENOXAPARIN SODIUM 30 MG: 30 INJECTION SUBCUTANEOUS at 09:30

## 2017-01-20 RX ADMIN — IBUPROFEN 400 MG: 400 TABLET ORAL at 09:31

## 2017-01-20 RX ADMIN — PREDNISONE 20 MG: 20 TABLET ORAL at 07:29

## 2017-01-20 RX ADMIN — DOCUSATE SODIUM 100 MG: 100 CAPSULE, LIQUID FILLED ORAL at 09:31

## 2017-01-20 RX ADMIN — INSULIN LISPRO 26 UNITS: 100 INJECTION, SOLUTION INTRAVENOUS; SUBCUTANEOUS at 21:36

## 2017-01-20 RX ADMIN — CLONIDINE HYDROCHLORIDE 0.3 MG: 0.1 TABLET ORAL at 09:31

## 2017-01-20 RX ADMIN — METOPROLOL SUCCINATE 50 MG: 50 TABLET, EXTENDED RELEASE ORAL at 09:31

## 2017-01-20 RX ADMIN — CLONIDINE HYDROCHLORIDE 0.3 MG: 0.1 TABLET ORAL at 21:20

## 2017-01-20 RX ADMIN — Medication 10 ML: at 02:07

## 2017-01-20 RX ADMIN — INSULIN GLARGINE 60 UNITS: 100 INJECTION, SOLUTION SUBCUTANEOUS at 21:36

## 2017-01-20 RX ADMIN — ATORVASTATIN CALCIUM 40 MG: 40 TABLET, FILM COATED ORAL at 21:20

## 2017-01-20 RX ADMIN — GABAPENTIN 100 MG: 100 CAPSULE ORAL at 21:20

## 2017-01-20 RX ADMIN — BUMETANIDE 1 MG: 1 TABLET ORAL at 09:31

## 2017-01-20 RX ADMIN — INSULIN LISPRO 22 UNITS: 100 INJECTION, SOLUTION INTRAVENOUS; SUBCUTANEOUS at 09:30

## 2017-01-20 RX ADMIN — OXYCODONE HYDROCHLORIDE 5 MG: 5 TABLET ORAL at 07:34

## 2017-01-20 RX ADMIN — VALSARTAN 160 MG: 160 TABLET, FILM COATED ORAL at 09:31

## 2017-01-20 RX ADMIN — SPIRONOLACTONE 50 MG: 25 TABLET, FILM COATED ORAL at 09:31

## 2017-01-20 RX ADMIN — INSULIN LISPRO 26 UNITS: 100 INJECTION, SOLUTION INTRAVENOUS; SUBCUTANEOUS at 12:01

## 2017-01-20 NOTE — ACP (ADVANCE CARE PLANNING)
Responded to request to assist patient in completion of an Advance Medical Directive. Ms. Scarlett Batista was sitting up in her chair and shared with me events of past few days. Advance Directive gone over with patient and completed; she appointed her sons Shiva Sung and Dariana Mello as her medical power of . She did not wish to discuss end-of life issues at this time; she shared that at this time she would want all measures done to preserve her life but understands she may not always feel that way and may choose to revisit the conversation at a later time. We elected to cross out section 2 of the advance Directive at this time. Form completed and witnessed by myself and RN Moris Oates; copies and original given to patient for her medical records- copy given to Moris Oates for patient's medical chart. Informed patient that should she ever desire to change her wishes; she would need to let Urbano Heaton know. Assured pt of prayers and care. No further needs at this time. Please page if needed/desired. [phone]. Visit by: Katty Arriola. Charito Hernandez.  Curtis Simon MA, [address]

## 2017-01-20 NOTE — PROGRESS NOTES
Spiritual Care Assessment/Progress Notes    Miguel Angel Nelson [id number]  [id number]    1940  77 y.o.  female    Patient Telephone Number: [phone] (home)   Samaritan Affiliation: [place]   Language: English   Extended Emergency Contact Information  Primary Emergency Contact: [address] Phone: [phone]  Relation: Son  Secondary Emergency Contact: Janet Conklin Phone: [phone]  Relation: Son   Patient Active Problem List    Diagnosis Date Noted    Right pontine stroke ([facility] [address] 01/17/2017    Stenosis of both internal carotid arteries 01/17/2017    Stroke (cerebrum) ([facility] [address] [facility] 01/14/2017    Thyroid cancer ([facility] [address] 09/17/2014    TIA (transient ischemic attack) 06/06/2014    HTN (hypertension) 06/06/2014    CKD (chronic kidney disease) 06/06/2014    Thyroid nodule, right 03/10/2014    Diabetes mellitus ([facility] [address] 03/10/2014    Gallbladder polyp 08/14/2013    Gallbladder sludge 08/14/2013    Recurrent ventral incisional hernia 08/14/2013    Obesity (BMI 35.0-39.9 without comorbidity) ([facility] [address] 08/14/2013        Date: 1/20/2017       Level of Samaritan/Spiritual Activity:  []         Involved in dimple tradition/spiritual practice    []         Not involved in dimple tradition/spiritual practice  [x]         Spiritually oriented    []         Claims no spiritual orientation    []         seeking spiritual identity  []         Feels alienated from Evangelical practice/tradition  []         Feels angry about Evangelical practice/tradition  []         Spirituality/Evangelical tradition is a resource for coping at this time.   []         Not able to assess due to medical condition    Services Provided Today:  []         crisis intervention    []         reading Scriptures  [x]         spiritual assessment    []         prayer  []         empathic listening/emotional support  []         rites and rituals (cite in comments)  []         life review     [] Catholic support  []         theological development   []         advocacy  []         ethical dialog     []         blessing  []         bereavement support    []         support to family  []         anticipatory grief support   [x]         help with AMD  []         spiritual guidance    []         meditation      Spiritual Care Needs  []         Emotional Support  []         Spiritual/Jewish Care  []         Loss/Adjustment  []         Advocacy/Referral                /Ethics  [x]         No needs expressed at               this time  []         Other: (note in               comments)  [address]  []         Follow up visits with               pt/family  []         Provide materials  []         Schedule sacraments  []         Contact Community               Clergy  [x]         Follow up as needed  []         Other: (note in               comments)     Comments:   Responded to request to assist patient in completion of an Advance Medical Directive. Ms. Delia Wong was sitting up in her chair and shared with me events of past few days. Advance Directive gone over with patient and completed; she appointed her sons Juwan Funes and Lela Marinelli as her medical power of . She did not wish to discuss end-of life issues at this time; she shared that at this time she would want all measures done to preserve her life but understands she may not always feel that way and may choose to revisit the conversation at a later time. We elected to cross out section 2 of the advance Directive at this time. Form completed and witnessed by myself and RN Mona Solorzano; copies and original given to patient for her medical records- copy given to Mona Solorzano for patient's medical chart. Informed patient that should she ever desire to change her wishes; she would need to let Emily Cassidyer know. Assured pt of prayers and care. No further needs at this time. Please page if needed/desired. [phone]. Visit by: Arjun Monk. Dana Jay.  Julien Brooks MA, [facility]

## 2017-01-20 NOTE — DISCHARGE INSTRUCTIONS
HOSPITALIST DISCHARGE INSTRUCTIONS    NAME: Nora Abraham   :     MRN:  [id number]     Date/Time:  2017 2:24 PM    ADMIT DATE: 2017     DISCHARGE DATE: 2017     Attending Physician: Ramila Rodrigez MD    DISCHARGE DIAGNOSIS:    Acute right pontine infarct. Diabetes type 2 uncontrolled on steroids    Bilateral foot pain      Medications: Per above medication reconciliation. Pain Management: per above medications    Recommended diet: Diabetic Diet    Recommended activity: Activity as tolerated    Wound care: None    Indwelling devices:  None    Supplemental Oxygen: None    Required Lab work: Per SNF routine    Glucose management:  Accucheck ACHS with sliding scale per SNF protocol    Code status: Full        Outside physician follow up: Follow-up Information     Follow up With Details Comments [address] [place] [address]  This is your post acute care provider of choice  [address] [place] [id number]  Remigio Franklin MD   [address] [place] [phone]                 Skilled nursing facility/ SNF MD responsible for above on discharge. Information obtained by :  I understand that if any problems occur once I am at home I am to contact my physician. I understand and acknowledge receipt of the instructions indicated above.                                                                                                                                            Physician's or R.N.'s Signature                                                                  Date/Time                                                                                                                                              Patient or Repres

## 2017-01-20 NOTE — PROGRESS NOTES
Discharge Note:     Pt will be discharged to [facility] room #108. Pt will be transported via [facility] transport. CM provided call report number to floor nurse. CM provided second IM letter and placed on bedside chart. Care Management Interventions  PCP Verified by CM: Yes (Dr. Lucho Bernal )  Mode of Transport at Discharge: Other (see comment) ([facility] transport )  Transition of Care Consult (CM Consult):  Other [facility] inpatient rehab)  Discharge Durable Medical Equipment: No  Physical Therapy Consult: Yes  Occupational Therapy Consult: Yes  Confirm Follow Up Transport: Family  Plan discussed with Pt/Family/Caregiver: Yes  Freedom of Choice Offered: Yes [facility] )  Discharge Location  Discharge Placement: Rehab hospital/unit acute [facility] )    PARAS Ramsey, [address] [place], [place] [phone]

## 2017-01-20 NOTE — DISCHARGE SUMMARY
Hospitalist Discharge Note    NAME: Nico Salazar   :     MRN:  [id number]     Admit date: 2017    Discharge date: 17    PCP: Roberto Pulido MD    Discharge Diagnoses:    Acute right pontine thrombotic Stroke POA     CKD stage III POA     DM type II with renal manifestation POA    Essential HTN POA     Fevers now resolved    BL feet pain: neuropathy vs acute gout arthritis    Hypothyroidism POA    Hyperlipidemia POA    Gallbladder polyp    Code status: Full       Discharge Medications:  Current Discharge Medication List      START taking these medications    Details   predniSONE (DELTASONE) 20 mg tablet Take 1 Tab by mouth daily (with breakfast) for 2 days. Qty: 2 Tab, Refills: 0      oxyCODONE IR (ROXICODONE) 5 mg immediate release tablet Take 1-2 Tabs by mouth every six (6) hours as needed. Max Daily Amount: 40 mg.  Qty: 30 Tab, Refills: 0      clopidogrel (PLAVIX) 75 mg tab Take 1 Tab by mouth daily. Qty: 30 Tab, Refills: 6         CONTINUE these medications which have NOT CHANGED    Details   insulin glargine (LANTUS SOLOSTAR) 100 unit/mL (3 mL) pen 60 Units by SubCUTAneous route nightly. levothyroxine (SYNTHROID) 150 mcg tablet Take 137 mcg by mouth Daily (before breakfast). Only taking 75 mcg one day out of seven       bumetanide (BUMEX) 1 mg tablet Take 1 mg by mouth daily. spironolactone (ALDACTONE) 50 mg tablet Take 50 mg by mouth every morning. cloNIDine (CATAPRES) 0.3 mg tablet Take 0.3 mg by mouth two (2) times a day. olmesartan (BENICAR) 40 mg tablet Take  by mouth daily. metoprolol succinate (TOPROL XL) 50 mg XL tablet Take 1 Tab by mouth daily. Qty: 30 Tab, Refills: 1      atorvastatin (LIPITOR) 40 mg tablet Take  by mouth daily. multivitamins-minerals-lutein (CENTRUM SILVER) Tab Take  by mouth. insulin lispro (HUMALOG KWIKPEN) 100 unit/mL kwikpen by SubCUTAneous route Before breakfast, lunch, dinner and at bedtime.  Sliding scale-  14 units                        141-180  16 units                         181-220  18 units                          221-260   20 units                           261-300  22 units                            301-340  24 units                              > 341     26 units         STOP taking these medications       aspirin (ASPIRIN) 325 mg tablet Comments:   Reason for Stopping:         insulin glargine (LANTUS) 100 unit/mL injection Comments:   Reason for Stopping: Follow-up Information     Follow up With Details Comments [facility]  This is your post acute care provider of choice  [address] [facility] [address]   [address] [place]    Marlena Maldonado MD In 2 weeks  [address] [address] [phone]            Time spent on discharge:   I spent greater than 30 minutes on discharge, seeing and examining the patient, reconciling home meds and new meds, coordinating care with case management, doing the discharge papers and the D/C summary    Discharge disposition: home    Discharge Condition: Stable    Summary of admission H+P(copied from Dr Natalie Schaffer Note):     CHIEF COMPLAINT: left sided weakness     HISTORY OF PRESENT ILLNESS:   Mely Nuno is a 77 y.o.  female with history of diabetes, hypertension, CKD stage 3 who presents with one day history of left upper and lower extremities weakness. On 1/13/17 patient was last known well at 5 PM, patient woke up at 930 PM and felt weakness on her left side and fell. Her son who lives with her noticed some slurred speech. During her stay in the ED patient tele neurology was called and based on history she was outside the time window for tpa. Patient's leg weakness has improved, however her arm is still weak. Has been eating and drinking less recently. Had recently been treated for a UTI with antibiotics and completed 7 days of treatment is unable to recall the name.  Denies any urinary symptoms.     We were asked to admit for work up and evaluation of the above problems.            Past Medical History   Diagnosis Date    Arthritis      Cataract         bilateral    Chronic kidney disease      Diabetes ([facility] [address]      Hypercholesterolemia      Hypertension      Ill-defined condition         states 'polyp' in gal bladder    Obesity      Thyroid disease      TIA (transient ischemic attack)        PA/Lat CXR results:  PA and lateral views demonstrate normal heart size. The patient is on a cardiac  monitor. The patient effect a shallow depth inspiration. The lung volumes are low. Lungs  are clear there is no pneumonia or pulmonary edema. IMPRESSION:  1. No pneumonia    Admit head CT scan FINDINGS:  There is slight prominence of ventricles sulci which is stable. There are mild  changes small vessel disease periventricular white matter. No hemorrhage mass or  acute infarction identified. Bony structures are intact. IMPRESSION: No acute abnormality identified. Left foot X-ray FINDINGS:   Three views of the left foot demonstrate pins traversing an old  healed first metatarsal fracture. Mild first metatarsophalangeal and  interphalangeal osteoarthritis, as well as midfoot and ankle osteoarthritis. No  acute fracture. The soft tissues are within normal limits. IMPRESSION: No acute fracture. Right foot -ray FINDINGS:   Three views of the right foot demonstrate no acute fracture or other  acute osseous or articular abnormality. Swelling of the soft tissues superior to  the metatarsal heads. Midfoot and first metatarsophalangeal osteoarthritis. Resolution of the fifth proximal phalangeal head. Pin across the second  metatarsal head. IMPRESSION: Soft tissue swelling over the metatarsal heads. No acute fracture    MRI/MRA brain results:  MR brain:  The ventricles are midline without hydrocephalus. There is no acute intra or extra-axial fluid collection.  Moderate patchy   bilateral subcortical and periventricular increased T2/FLAIR signal is   nonspecific but likely related to changes of chronic small vessel ischemic   disease. Probable tiny left chronic lacunar infarction. No abnormal   intracranial enhancement. The optic nerves and orbits appear within normal   limits. No restricted diffusion to suggest acute infarction. No significant brain   parenchymal signal abnormality. The major intracranial vascular flow-voids are patent. No abnormal signal in the mastoid air cells or visualized paranasal sinuses. Visualized soft tissues unremarkable. MRA brain:  Bilateral cavernous internal carotid arteries are widely patent. Bilateral anterior and middle cerebral arteries are widely patent. The anterior communicating artery is unremarkable. The posterior communicating arteries are diminutive. The posterior cerebral arteries are patent and unremarkable. The basilar artery and its branches are patent and unremarkable. The distal V4 segments of the vertebral arteries are patent and   unremarkable. They are codominant. The posterior circulation is unremarkable. No hemodynamically significant stenosis or evidence for intracranial   Aneurysm. IMPRESSION:  No evidence for acute infarction. Changes most compatible with moderate chronic small vessel ischemic disease   with probable tiny chronic left lacunar infarction. No abnormal intracranial enhancement. The optic nerves and orbits appear   within normal limits. No intracranial hemodynamically significant arterial stenosis or evidence   for intracranial aneurysm. Echo TTE results:  LEFT VENTRICLE: Size was normal. Systolic function was normal. Ejection  fraction was estimated in the range of 55 % to 60 %. There were no  regional wall motion abnormalities. There was mild concentric hypertrophy.   RIGHT VENTRICLE: The size was normal. Systolic function was normal. Wall  thickness was normal.  LEFT ATRIUM: Size was normal.  RIGHT ATRIUM: Size was normal.  MITRAL VALVE: There was mild thickening. DOPPLER: There was no significant  regurgitation. AORTIC VALVE: The valve was trileaflet. Leaflets exhibited normal  thickness and normal cuspal separation. DOPPLER: Transaortic velocity was  within the normal range. There was no stenosis. There was no regurgitation. TRICUSPID VALVE: Normal valve structure. DOPPLER: There was no significant  regurgitation. PULMONIC VALVE: Leaflets exhibited normal thickness, no calcification, and  normal cuspal separation. DOPPLER: The transpulmonic velocity was within  the normal range. There was no regurgitation  AORTA: The root exhibited normal size  PERICARDIUM: There was no pericardial effusion. The pericardium was normal  in appearance. Hospital course:         Acute right pontine stroke POA  Sx: facial droop, Left sided weakness, improved  OK to control BP   Head CT negative   MRI:Multiple old focal infarcts in both cerebral hemispheres. Acute right pontine infarct. Carotid duplex US: BL < 50% stenosis   ECHO LVEF 55% with no regional wall motion changes      Normal RV function, No AS, no MR  Stroke blood work: LDL  70.6  hba1c 11.5  TSH - 4.03   Seen by neurology: ASA was changed to Plavix   Speech:reguler diet    PT/OT:[facility] accepted  Repeat head CT scan negative    Fevers now resolved  Denies cough/dysuria/diarrhea. No leukocytosis   Cxray: clear  UA with no pyuria or bacteria  ? Related to her BL feet pain, improving    BL feet pain: neuropathy vs acute gout arthritis   Unable to ambulate due to pain   H/o fall prior to admission --> check xray both feet   Follow uric acid  Start Neurontin   Got 60 mg prednisone last PM, resume prednisone 20 mg/day x 4 more days    DM type II with renal manifestation POA  BS remain poorly controlled, ?  Related to prednisone she got yesterday  , 230, 237, 268, 343  Lantus 60 units, will increase to 70 units  Add humalog 6 units before meals plus  Outpatient endocrinology follow up  Diabetic education      CKD stage III POA  Last available baseline 2014 1.4, admission 2.49 - likely her baseline. TARUN was r/o   Cr is not significantly down while on fluids, so likely her baseline. US: no hydronephrosis, medical disease      Essential HTN POA  BB/clonidine/ARB at home dose   Restarting bumex/spironolactone in am     Hypothyroidism POA  TSH 4.03 - high side --> incr levothyroxine  Follow TSH in 6 weeks     Hyperlipidemia POA, LDL at goal, cont statin   Gallbladder polyp, followed by Dr Guy Brown with abd US every year      Code status: Full   Surrogate Decision Maker: son Medina Delacruz [phone] or [phone], son Shannon Estimable [phone]  Prophylaxis: Lovenox     Baseline: lives with sio; she has 2 sons; was independent   Recommended Disposition: [facility] accepted pt - dc when medically stable 1-2 days ( pending w/u for fever and BL feet pain)        Subjective:     Chief Complaint / Reason for Physician Visit: following stroke/ HTN/TARUN   C/o BL feet pain  No other complaints  Discussed with RN events overnight. Review of Systems:  Symptom Y/N Comments  Symptom Y/N Comments   Fever/Chills n   Chest Pain n    Poor Appetite    Edema     Cough n   Abdominal Pain n    Sputum    Joint Pain     SOB/ALONSO n   Pruritis/Rash     Nausea/vomit n   Tolerating PT/OT y    Diarrhea n   Tolerating Diet y    Constipation    Other       Could NOT obtain due to:      Objective:     VITALS:   Last 24hrs VS reviewed since prior progress note.  Most recent are:  Patient Vitals for the past 24 hrs:   Temp Pulse Resp BP SpO2   01/20/17 1131 - 67 20 137/63 96 %   01/20/17 0855 97.9 °F (36.6 °C) 61 20 143/54 99 %   01/20/17 0322 98.1 °F (36.7 °C) (!) 54 20 153/63 98 %   01/19/17 2320 98.3 °F (36.8 °C) 67 20 164/78 99 %   01/19/17 1924 98.2 °F (36.8 °C) 62 18 130/56 100 %   01/19/17 1522 98.2 °F (36.8 °C) 66 18 130/59 100 %     No intake or output data in the 24 hours ending 01/20/17 1432     PHYSICAL EXAM:  General: WD, WN. Alert, cooperative, no acute distress    Resp:  CTA bilaterally,No accessory muscle use  Neurologic:  Alert and oriented X 3, normal speech,  facial droop resolved  Psych:   Good insight. Not anxious nor agitated  Skin:  No rashes. No jaundice    Reviewed most current lab test results and cultures  YES  Reviewed most current radiology test results   YES  Review and summation of old records today    NO  Reviewed patient's current orders and MAR    YES  PMH/SH reviewed - no change compared to H&P  ________________________________________________________________________  Care Plan discussed with:    Comments   Patient y    Family       RN y    Care Manager     Consultant                        Multidiciplinary team rounds were held today with , nursing, pharmacist and clinical coordinator. Patient's plan of care was discussed; medications were reviewed and discharge planning was addressed. ________________________________________________________________________      Comments   >50% of visit spent in counseling and coordination of care     ________________________________________________________________________  Geno Dawson MD     Procedures: see electronic medical records for all procedures/Xrays and details which were not copied into this note but were reviewed prior to creation of Plan. LABS:  I reviewed today's most current labs and imaging studies.   Pertinent labs include:  Recent Labs      01/20/17   0209  01/19/17   0311  01/18/17   0248   WBC  5.7  6.4  7.6   HGB  8.9*  8.8*  9.3*   HCT  26.4*  26.3*  27.4*   PLT  228  208  162     Recent Labs      01/20/17   1047  01/20/17   0209  01/19/17   0311   NA  136  134*  137   K  4.5  5.6*  4.2   CL  100  100  101   CO2  26  23  25   GLU  285*  318*  152*   BUN  54*  55*  47*   CREA  2.39*  2.47*  2.24*   CA  8.2*  8.0*  8.3*       Signed: Geno Dawson MD

## 2017-01-20 NOTE — PROGRESS NOTES
Chief Complaint: stroke    Patient is lying in bed. She is complaining of increased weakness on the left side. This started overnight. No confusion no cranial nerve deficits. No nausea no vomiting no diarrhea.keeps repeating how disappointed she is that she suffered a stroke. that she wishes she could wake up from this nightmare. Assesment and Plan  1. Stroke  Right PCA stroke  Overnight worsening of weakness on the paretic side. This may be evolution of the current stroke or a new ischemic process since the patient woke up with this weakness she is out of the window for TPA. CT in the AM  Continue plavix  PT/OT      2. Diabetes  Continue insulin      3. Hypertension  Goal systolic blood pressure is below 120  continue clonidine      4. Hyperlipidemia   Goal LDL is below 70  Continue atorvastatin    5.  depression. A setup for depression both because of her deficits and because of the stroke. Start cymbalta    Allergies  Amlodipine;  Nifedipine; and Sulfa (sulfonamide antibiotics)     Medications  Medication Dose Route Frequency    ibuprofen (MOTRIN) tablet 400 mg  400 mg Oral BID    predniSONE (DELTASONE) tablet 20 mg  20 mg Oral DAILY WITH BREAKFAST    insulin lispro (HUMALOG) injection   SubCUTAneous AC&HS    gabapentin (NEURONTIN) capsule 100 mg  100 mg Oral TID    oxyCODONE IR (ROXICODONE) tablet 5 mg  5 mg Oral Q4H PRN    bumetanide (BUMEX) tablet 1 mg  1 mg Oral DAILY    spironolactone (ALDACTONE) tablet 50 mg  50 mg Oral DAILY    docusate sodium (COLACE) capsule 100 mg  100 mg Oral BID    cloNIDine HCl (CATAPRES) tablet 0.3 mg  0.3 mg Oral BID    metoprolol succinate (TOPROL-XL) XL tablet 50 mg  50 mg Oral DAILY    valsartan (DIOVAN) tablet 160 mg  160 mg Oral DAILY    insulin glargine (LANTUS) injection 60 Units  60 Units SubCUTAneous QHS    levothyroxine (SYNTHROID) tablet 150 mcg  150 mcg Oral ACB    enoxaparin (LOVENOX) injection 30 mg  30 mg SubCUTAneous Q24H    clopidogrel (PLAVIX) tablet 75 mg  75 mg Oral DAILY    sodium chloride (NS) flush 5-10 mL  5-10 mL IntraVENous Q8H    sodium chloride (NS) flush 5-10 mL  5-10 mL IntraVENous PRN    labetalol (NORMODYNE;TRANDATE) 20 mg/4 mL (5 mg/mL) injection 5 mg  5 mg IntraVENous Q10MIN PRN    glucose chewable tablet 16 g  4 Tab Oral PRN    dextrose (D50W) injection syrg 12.5-25 g  12.5-25 g IntraVENous PRN    glucagon (GLUCAGEN) injection 1 mg  1 mg IntraMUSCular PRN    atorvastatin (LIPITOR) tablet 40 mg  40 mg Oral QHS    acetaminophen (TYLENOL) tablet 650 mg  650 mg Oral Q6H PRN    ondansetron (ZOFRAN) injection 4 mg  4 mg IntraVENous Q4H PRN        Medical History  Past Medical History   Diagnosis Date    Arthritis     Cataract      bilateral    Chronic kidney disease     Diabetes ([facility] [address]     Hypercholesterolemia     Hypertension     Ill-defined condition      states 'polyp' in gal bladder    Obesity     Thyroid disease     TIA (transient ischemic attack) 6/6/2014       Review of Systems  Neuro: negative for headaches, dizziness and seizures. worsening weakness   Constitutional: negative for fevers and chills  Eyes: negative for visual disturbance, visual loss and irritation  ENT: negative for hearing loss, tinnitus and pain  RESP: negative for cough, sputum production and hemoptysis  CVS: negative for diaphoresis and palpatation  GI: negative for nausea vomiting and constipation  :negative for dysuria nocturia and incontinence  HEME: negative for bleeding lymphadenopathy and petichiae  MSKL: positive for , arthalgia, both ankle no myalgia  BHV: negative for anxiety, mood swings and sleep disturbance    Exam:    Visit Vitals    /78 (BP 1 Location: Left arm, BP Patient Position: At rest)    Pulse 67    Temp 98.3 °F (36.8 °C)    Resp 20    Wt 225 lb 5 oz (102.2 kg)    SpO2 99%    Breastfeeding No    BMI 38.08 kg/m2        General: Well developed, well nourished.     Head: Normocephalic, atraumatic, anicteric sclera Lungs:  Clear to auscultation bilaterally, No wheezes or rubs   Cardiac: regular, rate, rythm and no murmur   Abd: Bowel sounds were audible. No tenderness on palpation   Ext: No pedal edema   Skin: No overt signs of rash or insect bites       Neurological Exam:  Mental Status: alert, and oriented person, place and time   Speech: Fluent, no aphasia and no dysarthria   Cranial Nerves:  CN II-XII intact with Left facial droop. Eyes: PERRL, EOM's full, no nystagmus, no ptosis. Motor:  2/5 left side weakness and normal bulk and tone   Reflexes:  1+ symmetric    Sensory:  sensory loss on the left no extinguishing   Gait:  Gait istood her up to walk but left leg buckeld    Tremor:  No tremor. Cerebellar:  Finger to nose was demonstrated competently. Neurovascular: no carotid bruits. No JVD            Imaging      MRI Results (most recent):    Results from [place] encounter on 01/13/17   MRI BRAIN WO CONT       INDICATION: Stroke. EXAM: Sagittal and axial and coronal images were obtained through the brain in  varying sequences. T1-weighted, T2-weighted, FLAIR, GRE, Diffusion-weighted  sequences may be utilized. FINDINGS:    Comparison study: June 5, 2014. Sagittal images demonstrate signal void to be present in the cavernous carotid  arteries bilaterally. There is moderate to severe prominence of the basilar  cisterns and cortical sulci and ventricles. Axial and coronal images demonstrate mild multifocal hyperintensities in the  periventricular deep white matter and centrum semiovale of both cerebral  hemispheres likely microvascular changes related to aging. Multiple bilateral  old focal infarct in the periventricular deep white matter of both cerebral  hemispheres. No new hemorrhage or mass effect identified. Images through the visualized orbits and sella and cavernous sinus and  ventricles are grossly within normal limits.     Signal void appears to be present in the vertebral basilar system. Diffusion-weighted images demonstrate demilune-shaped geographic restricted  diffusion in the right tracee. Impression IMPRESSION:    Mild to moderate diffuse cortical atrophy. Deep white matter ischemic change in both cerebral hemispheres. Multiple old focal infarcts in the periventricular deep white matter of both  cerebral hemispheres. Acute right pontine infarct.                   .  Lab Review    Lab Results   Component Value Date/Time    WBC 6.4 01/19/2017 03:11 AM    HCT 26.3 01/19/2017 03:11 AM    HGB 8.8 01/19/2017 03:11 AM    PLATELET 253 46/70/6279 03:11 AM       Lab Results   Component Value Date/Time    Sodium 137 01/19/2017 03:11 AM    Potassium 4.2 01/19/2017 03:11 AM    Chloride 101 01/19/2017 03:11 AM    CO2 25 01/19/2017 03:11 AM    Glucose 152 01/19/2017 03:11 AM    BUN 47 01/19/2017 03:11 AM    Creatinine 2.24 01/19/2017 03:11 AM    Calcium 8.3 01/19/2017 03:11 AM       Lab Results   Component Value Date/Time    Hemoglobin A1c 11.5 01/15/2017 04:43 AM       Lab Results   Component Value Date/Time    Cholesterol, total 170 01/15/2017 04:43 AM    HDL Cholesterol 66 01/15/2017 04:43 AM    LDL, calculated 70.6 01/15/2017 04:43 AM    VLDL, calculated 33.4 01/15/2017 04:43 AM    Triglyceride 167 01/15/2017 04:43 AM    CHOL/HDL Ratio 2.6 01/15/2017 04:43 AM

## 2017-01-20 NOTE — PROGRESS NOTES
Problem: Self Care Deficits Care Plan (Adult)  Goal: *Acute Goals and Plan of Care (Insert Text)  Occupational Therapy Goals  Initiated 1/16/2017   1. Patient will perform self-feeding including container management using her L non-dominant UE as a gross assist with modified independence within 7 day(s). 2. Patient will perform upper body dressing with minimal assistance/contact guard assist using best technique within 7 day(s). 3. Patient will perform lower body dressing with maximal assistance within 7 day(s). 4. Patient will perform toilet transfers with moderate assistance and best DME within 7 day(s). 5. Patient will perform all aspects of toileting with maximal assistance within 7 day(s). 6. Patient will participate in upper extremity therapeutic exercise/activities with moderate assistance for LUE for 10 minutes within 7 day(s). 7. Patient will utilize energy conservation techniques during functional activities with verbal and visual cues within 7 day(s). 8. Patient will improve their LUE Fugl Marrufo score by 5 points within 7 days. OCCUPATIONAL THERAPY TREATMENT  Patient: Mariola Limon (77 y.o. female)  Date: 1/20/2017  Diagnosis: Stroke (cerebrum) [facility]) <principal problem not specified>       Precautions: Fall, Bed Alarm      ASSESSMENT:  Pt continues to present with decreased endurance, strength, and cognition, but improvements were noted from yesterday's session. She still remains weak in LUE/LLE for functional mobility and ADLs, but improved from her status yesterday. She was alert and eager to participate in therapy, with no c/o B foot pain. Pt was mod A x2 for sit to stand for self-care and toileting ADL, she was incontinent of urine in brief. She had approx 5 min of mod Ax2 supported standing balance using back of the bedside chair for WB in LUE. Pt demonstrated carry-over of prior stephon-technique education for UB dressing with her hospital gown with mod A.   When positioning gait belt for sit to stand transfer she lifted LUE with RUE, stating that she performs these motions while in bed for SROM. Pt participated in container management using L non-dominant UE as a gross assist with 5 different container styles and shapes, including flip top lids, screw-on, cylindrical containers, conical containers, and denture container with flip top lid. Pt initiated opening/closing containers and required min verbal cues to complete the task. She put two pillowcases on pillows using LUE as gross assist with min A and mod verbal cues to complete the activity. Kept stating \"my son would do this at my house,\" but was open to continuing the activity as therapeutic exercise. Skilled OT is needed to address cognitive deficits, decreased strength, balance/endurance, functional mobility, and ADL tasks. Continue to recommend IP rehab at discharge. Progression toward goals:  [ ]       Improving appropriately and progressing toward goals  [X]       Improving slowly and progressing toward goals  [ ]       Not making progress toward goals and plan of care will be adjusted       PLAN:  Patient continues to benefit from skilled intervention to address the above impairments. Continue treatment per established plan of care. Discharge Recommendations:  Inpatient Rehab  Further Equipment Recommendations for Discharge:  TBD       SUBJECTIVE:   Patient stated I been working on my hand.  Pt states she practices SROM while in bed with affected RUE      OBJECTIVE DATA SUMMARY:   Cognitive/Behavioral Status:  Neurologic State: Alert  Orientation Level: Oriented X4  Cognition: Follows commands; Appropriate safety awareness  Perception: Appears intact  Perseveration: No perseveration noted  Safety/Judgement: Awareness of environment; Fall prevention;Lack of insight into deficits     Functional Mobility and Transfers for ADLs:  Bed Mobility:  Scooting: Contact guard assistance     Transfers:  Sit to Stand:  Moderate assistance;Assist x2 Balance:  Sitting - Static: Good (unsupported)  Sitting - Dynamic: Fair (occasional)  Standing - Static: Fair  Standing - Dynamic : Poor     ADL Intervention:   Upper Body Dressing Assistance  Dressing Assistance: Moderate assistance  Hospital Gown: Moderate assistance (using stephon-technique carry over)  Cues: Verbal cues provided;Visual cues provided;Physical assistance; Tactile cues provided     Lower Body Dressing Assistance  Socks: Stand-by assistance (did not doff/don, but pulled up increased balance)  Cues: Verbal cues provided     Toileting  Toileting Assistance: Total assistance(dependent)  Bladder Hygiene: Total assistance (dependent) (soaked brief)  Cues: Physical assistance for pants down;Physical assistance for pants up; Tactile cues provided;Verbal cues provided     Cognitive Retraining  Problem Solving: Awareness of environment; Identifying the problem;Deductive reason (pillow cases)  Organizing/Sequencing: Breaking task down  Attention to Task: Multi-task  Maintains Attention For (Time): Greater than 10 minutes  Following Commands: Follows multi-step simple commands/directions  Safety/Judgement: Awareness of environment; Fall prevention;Lack of insight into deficits  Cues: Verbal cues provided; Tactile cues provided;Visual cues provided     Neuro Re-Education:    Pt had approx 5 min of mod Ax2 supported standing balance using back of the bedside chair for WB in LUE, WB through LLE as well. Pt performed neuro task of using LUE as gross assist to put two pillowcases on pillows with min A and mod verbal cues to complete the activity. Pt stated this was not a normal task for her, but demonstrated good problem solving in opening the pillow case, holding the pillow, and attempting to pull the case up with LUE for gross assist during the task.  On 2nd pillow, therapist showed her that when she got to a place where she could no longer pull the pillowcase up with RUE to turn the pillow over and continue using LUE as gross assist while JULIÁN manipulated the pillowcase. Pain:  Pain Scale 1: Numeric (0 - 10)  Pain Intensity 1: No pain reported today           Pain Intervention(s) 1: Medication (see MAR)  Activity Tolerance:   Good  Please refer to the flowsheet for vital signs taken during this treatment. After treatment:   [X] Patient left in no apparent distress sitting up in chair  [ ] Patient left in no apparent distress in bed  [X] Call bell left within reach  [X] Nursing notified  [ ] Caregiver present  [X] Bed alarm activated      COMMUNICATION/COLLABORATION:   The patients plan of care was discussed with: Registered Nurse     Giuseppe Izaguirre. SWETHA Anand and Sun Bui, OTR/L, CBIS  Time Calculation: 40 mins        Regarding student involvement in patient care:  A student participated in this treatment session. Per CMS Medicare statements and AOTA guidelines I certify that the following was true:  1. I was present and directly observed the entire session. 2. I made all skilled judgments and clinical decisions regarding care. 3. I am the practitioner responsible for assessment, treatment, and documentation.

## 2017-01-20 NOTE — ROUTINE PROCESS
Bedside and Verbal shift change report given to Diamante Waller RN (oncoming nurse) by HESHAM Warner (off going nurse). Report included the following information SBAR, Kardex, Recent Results and Cardiac Rhythm NSR.     Zone Phone: [phone]          Significant changes during shift: None     Patient Information      Nano Gordon  77 y.o.  1/13/2017 11:45 PM by Annika Carpio MD. Nano Gordon was admitted from [facility]  Problem List      [id number]   Patient Active Problem List      Diagnosis Date Noted    Stroke (cerebrum) ([facility] 01/14/2017    Thyroid cancer ([facility] 09/17/2014    TIA (transient ischemic attack) 06/06/2014    HTN (hypertension) 06/06/2014    CKD (chronic kidney disease) 06/06/2014    Thyroid nodule, right 03/10/2014    Diabetes mellitus ([facility] 03/10/2014    Gallbladder polyp 08/14/2013    Gallbladder sludge 08/14/2013    Recurrent ventral incisional hernia 08/14/2013    Obesity (BMI 35.0-39.9 without comorbidity) ([facility] 08/14/2013                     Past Medical History   Diagnosis Date    Arthritis       Cataract            bilateral    Chronic kidney disease       Diabetes ([facility]       Hypercholesterolemia       Hypertension       Ill-defined condition            states 'polyp' in gal bladder    Obesity       Thyroid disease       TIA (transient ischemic attack) 6/6/2014               Core Measures:      CVA: YES YES  CHF:NO NO  PNA:NO NO      Post Op Surgical (If Applicable):       n/a      Activity Status:      OOB to Chair NO  Ambulated this shift NO   Bed Rest NO      Supplemental O2: (If Applicable)      n/a          LINES AND DRAINS:      Peripheral IV: 20 in the LAC, saline locked      DVT prophylaxis:      DVT prophylaxis Med- YES, Lovenox  DVT prophylaxis SCD or SINDHU- NO       Wounds: (If Applicable)      Wounds- NO      Location -      Patient Safety:      Falls Score Total Score: 4  Safety Level__III_____  Bed Alarm On? YES  Sitter?  NO      Plan for upcoming shift: work on ambulation and moving left side of body              Discharge Plan: YES  When medically cleared      Active Consults:  IP CONSULT TO NEUROLOGY

## 2017-01-20 NOTE — PROGRESS NOTES
Nutrition Services      Nutrition Screen:  Wt Readings from Last 10 Encounters:   01/13/17 102.2 kg (225 lb 5 oz)   01/14/17 102.2 kg (225 lb 5 oz)   08/08/16 100.2 kg (221 lb)   05/09/16 102.5 kg (226 lb)   01/11/16 98 kg (216 lb)   01/09/15 97.1 kg (214 lb)   09/16/14 101.6 kg (224 lb)   09/08/14 102.3 kg (225 lb 8 oz)   08/13/14 103 kg (227 lb)   08/06/14 103 kg (227 lb)     Body mass index is 38.08 kg/(m^2). Supplements:                        _____ ordered ______  declined. __ __  Pt is nutritionally stable at this time, will rescreen in 7 days. _x __    Pt is at nutritional risk and will be rescreened in 3-5 days. __ __  Pt is at moderate or high nutritional risk, will refer to RD for assessment.        Julianna Schultz  Dietetic Technician, Registered

## 2017-01-20 NOTE — ROUTINE PROCESS
Report was called to [facility], I spoke with Lucie Lui RN. Report included SBAR, recent results, and MAR. All  Questions were answered.

## 2017-01-21 LAB
ANION GAP BLD CALC-SCNC: 8 MMOL/L (ref 5–15)
BUN SERPL-MCNC: 57 MG/DL (ref 6–20)
BUN/CREAT SERPL: 25 (ref 12–20)
CALCIUM SERPL-MCNC: 8.6 MG/DL (ref 8.5–10.1)
CHLORIDE SERPL-SCNC: 102 MMOL/L (ref 97–108)
CO2 SERPL-SCNC: 27 MMOL/L (ref 21–32)
CREAT SERPL-MCNC: 2.27 MG/DL (ref 0.55–1.02)
ERYTHROCYTE [DISTWIDTH] IN BLOOD BY AUTOMATED COUNT: 14.1 % (ref 11.5–14.5)
GLUCOSE SERPL-MCNC: 245 MG/DL (ref 65–100)
HCT VFR BLD AUTO: 26.4 % (ref 35–47)
HGB BLD-MCNC: 8.8 G/DL (ref 11.5–16)
MCH RBC QN AUTO: 29.4 PG (ref 26–34)
MCHC RBC AUTO-ENTMCNC: 33.3 G/DL (ref 30–36.5)
MCV RBC AUTO: 88.3 FL (ref 80–99)
PLATELET # BLD AUTO: 257 K/UL (ref 150–400)
POTASSIUM SERPL-SCNC: 4.5 MMOL/L (ref 3.5–5.1)
RBC # BLD AUTO: 2.99 M/UL (ref 3.8–5.2)
SODIUM SERPL-SCNC: 137 MMOL/L (ref 136–145)
WBC # BLD AUTO: 5.8 K/UL (ref 3.6–11)

## 2017-01-21 PROCEDURE — 36415 COLL VENOUS BLD VENIPUNCTURE: CPT | Performed by: PHYSICAL MEDICINE & REHABILITATION

## 2017-01-21 PROCEDURE — 74011250637 HC RX REV CODE- 250/637: Performed by: PHYSICAL MEDICINE & REHABILITATION

## 2017-01-21 PROCEDURE — 74011636637 HC RX REV CODE- 636/637: Performed by: PHYSICAL MEDICINE & REHABILITATION

## 2017-01-21 PROCEDURE — 74011250636 HC RX REV CODE- 250/636: Performed by: PHYSICAL MEDICINE & REHABILITATION

## 2017-01-21 PROCEDURE — 80048 BASIC METABOLIC PNL TOTAL CA: CPT | Performed by: PHYSICAL MEDICINE & REHABILITATION

## 2017-01-21 PROCEDURE — 85027 COMPLETE CBC AUTOMATED: CPT | Performed by: PHYSICAL MEDICINE & REHABILITATION

## 2017-01-21 RX ADMIN — BUMETANIDE 1 MG: 1 TABLET ORAL at 08:52

## 2017-01-21 RX ADMIN — ACETAMINOPHEN 650 MG: 325 TABLET, FILM COATED ORAL at 19:06

## 2017-01-21 RX ADMIN — ATORVASTATIN CALCIUM 40 MG: 40 TABLET, FILM COATED ORAL at 21:31

## 2017-01-21 RX ADMIN — METOPROLOL SUCCINATE 50 MG: 50 TABLET, EXTENDED RELEASE ORAL at 08:52

## 2017-01-21 RX ADMIN — ACETAMINOPHEN 650 MG: 325 TABLET, FILM COATED ORAL at 04:41

## 2017-01-21 RX ADMIN — GABAPENTIN 100 MG: 100 CAPSULE ORAL at 13:05

## 2017-01-21 RX ADMIN — INSULIN LISPRO 12 UNITS: 100 INJECTION, SOLUTION INTRAVENOUS; SUBCUTANEOUS at 12:16

## 2017-01-21 RX ADMIN — BISACODYL 10 MG: 10 SUPPOSITORY RECTAL at 19:05

## 2017-01-21 RX ADMIN — INSULIN LISPRO 22 UNITS: 100 INJECTION, SOLUTION INTRAVENOUS; SUBCUTANEOUS at 17:28

## 2017-01-21 RX ADMIN — GABAPENTIN 100 MG: 100 CAPSULE ORAL at 06:36

## 2017-01-21 RX ADMIN — SPIRONOLACTONE 50 MG: 25 TABLET, FILM COATED ORAL at 08:51

## 2017-01-21 RX ADMIN — PREDNISONE 20 MG: 20 TABLET ORAL at 08:53

## 2017-01-21 RX ADMIN — INSULIN LISPRO 18 UNITS: 100 INJECTION, SOLUTION INTRAVENOUS; SUBCUTANEOUS at 08:50

## 2017-01-21 RX ADMIN — CLONIDINE HYDROCHLORIDE 0.3 MG: 0.1 TABLET ORAL at 08:51

## 2017-01-21 RX ADMIN — ENOXAPARIN SODIUM 30 MG: 30 INJECTION SUBCUTANEOUS at 08:51

## 2017-01-21 RX ADMIN — CLONIDINE HYDROCHLORIDE 0.3 MG: 0.1 TABLET ORAL at 21:31

## 2017-01-21 RX ADMIN — GABAPENTIN 100 MG: 100 CAPSULE ORAL at 21:31

## 2017-01-21 RX ADMIN — Medication 1 TABLET: at 08:53

## 2017-01-21 RX ADMIN — INSULIN LISPRO 4 UNITS: 100 INJECTION, SOLUTION INTRAVENOUS; SUBCUTANEOUS at 21:32

## 2017-01-21 RX ADMIN — INSULIN GLARGINE 60 UNITS: 100 INJECTION, SOLUTION SUBCUTANEOUS at 21:32

## 2017-01-21 RX ADMIN — VALSARTAN 160 MG: 160 TABLET, FILM COATED ORAL at 08:52

## 2017-01-21 RX ADMIN — LEVOTHYROXINE SODIUM 150 MCG: 75 TABLET ORAL at 06:36

## 2017-01-21 RX ADMIN — CLOPIDOGREL BISULFATE 75 MG: 75 TABLET, FILM COATED ORAL at 08:52

## 2017-01-21 NOTE — PROGRESS NOTES
Chief Complaint: stroke    Patient is lying in bed. Feeling better. Weakness resolved as well as the pain in the ankles. Tolerating the medications. The ct scan was reveiwed and the results discussed. She should be heading out to rehabilitation today. CT reviewed  Labs reviewed. Assesment and Plan  1. Stroke  Right PCA stroke  Worsening of weakness resolved  CT scan reviewed  Continue plavix  Going to inpatient rehabilitation      2. Diabetes  Continue insulin      3. Hypertension  Goal systolic blood pressure is below 120  continue clonidine      4. Hyperlipidemia   Goal LDL is below 70  Continue atorvastatin    5.  depression. Start cymbalta    Allergies  Amlodipine;  Nifedipine; and Sulfa (sulfonamide antibiotics)     Medications  Medication Dose    ibuprofen (MOTRIN) tablet 400 mg  400 mg    predniSONE (DELTASONE) tablet 20 mg  20 mg    insulin lispro (HUMALOG) injection      gabapentin (NEURONTIN) capsule 100 mg  100 mg    oxyCODONE IR (ROXICODONE) tablet 5 mg  5 mg    bumetanide (BUMEX) tablet 1 mg  1 mg    spironolactone (ALDACTONE) tablet 50 mg  50 mg    docusate sodium (COLACE) capsule 100 mg  100 mg    cloNIDine HCl (CATAPRES) tablet 0.3 mg  0.3 mg    metoprolol succinate (TOPROL-XL) XL tablet 50 mg  50 mg    valsartan (DIOVAN) tablet 160 mg  160 mg    insulin glargine (LANTUS) injection 60 Units  60 Units    levothyroxine (SYNTHROID) tablet 150 mcg  150 mcg    enoxaparin (LOVENOX) injection 30 mg  30 mg    clopidogrel (PLAVIX) tablet 75 mg  75 mg    sodium chloride (NS) flush 5-10 mL  5-10 mL    sodium chloride (NS) flush 5-10 mL  5-10 mL    labetalol (NORMODYNE;TRANDATE) 20 mg/4 mL (5 mg/mL) injection 5 mg  5 mg    glucose chewable tablet 16 g  4 Tab    dextrose (D50W) injection syrg 12.5-25 g  12.5-25 g    glucagon (GLUCAGEN) injection 1 mg  1 mg    atorvastatin (LIPITOR) tablet 40 mg  40 mg    acetaminophen (TYLENOL) tablet 650 mg  650 mg    ondansetron (ZOFRAN) injection 4 mg  4 mg        Medical History  Past Medical History   Diagnosis Date    Arthritis     Cataract      bilateral    Chronic kidney disease     Diabetes ([facility]     Hypercholesterolemia     Hypertension     Ill-defined condition      states 'polyp' in gal bladder    Obesity     Thyroid disease     TIA (transient ischemic attack) 6/6/2014       Review of Systems  Neuro: negative for headaches, dizziness and seizures. weakness   Constitutional: negative for fevers and chills  Eyes: negative for visual disturbance, visual loss and irritation  ENT: negative for hearing loss, tinnitus and pain  RESP: negative for cough, sputum production and hemoptysis  CVS: negative for diaphoresis and palpatation  GI: negative for nausea vomiting and constipation  :negative for dysuria nocturia and incontinence  HEME: negative for bleeding lymphadenopathy and petichiae  MSKL: arthalgia, both ankle no myalgia  BHV: negative for anxiety, mood swings and sleep disturbance    Exam:    Visit Vitals    /63 (BP 1 Location: Left arm)    Pulse 67    Temp 97.9 °F (36.6 °C)    Resp 20    Wt 225 lb 5 oz (102.2 kg)    SpO2 96%    Breastfeeding No    BMI 38.08 kg/m2        General: Well developed, well nourished. Head: Normocephalic, atraumatic, anicteric sclera   Lungs:  Clear to auscultation bilaterally, No wheezes or rubs   Cardiac: regular, rate, rythm and no murmur   Abd: Bowel sounds were audible. No tenderness on palpation   Ext: No pedal edema       Neurological Exam:  Mental Status: alert, and oriented person, place and time   Speech: Fluent, no aphasia and no dysarthria   Cranial Nerves:  CN II-XII intact with Left facial droop. Eyes: PERRL, EOM's full, no nystagmus, no ptosis. Motor:  3/5 left side weakness and normal bulk and tone  Worse in th left hand which is 2/5   Reflexes:  1+ symmetric    Sensory:  sensory loss on the left no extinguishing   Gait:  Gait stood her up to walk.  Can support weight   Tremor: No tremor. Neurovascular: no carotid bruits. No JVD            Imaging      MRI Results (most recent):    Results from [place] encounter on 01/13/17   MRI BRAIN WO CONT       INDICATION: Stroke. EXAM: Sagittal and axial and coronal images were obtained through the brain in  varying sequences. T1-weighted, T2-weighted, FLAIR, GRE, Diffusion-weighted  sequences may be utilized. FINDINGS:    Comparison study: June 5, 2014. Sagittal images demonstrate signal void to be present in the cavernous carotid  arteries bilaterally. There is moderate to severe prominence of the basilar  cisterns and cortical sulci and ventricles. Axial and coronal images demonstrate mild multifocal hyperintensities in the  periventricular deep white matter and centrum semiovale of both cerebral  hemispheres likely microvascular changes related to aging. Multiple bilateral  old focal infarct in the periventricular deep white matter of both cerebral  hemispheres. No new hemorrhage or mass effect identified. Images through the visualized orbits and sella and cavernous sinus and  ventricles are grossly within normal limits. Signal void appears to be present in the vertebral basilar system. Diffusion-weighted images demonstrate demilune-shaped geographic restricted  diffusion in the right tracee. IMPRESSION:    Mild to moderate diffuse cortical atrophy. Deep white matter ischemic change in both cerebral hemispheres. Multiple old focal infarcts in the periventricular deep white matter of both  cerebral hemispheres. Acute right pontine infarct.                  .  Lab Review    Lab Results   Component Value Date/Time    WBC 5.8 01/21/2017 04:35 AM    HCT 26.4 01/21/2017 04:35 AM    HGB 8.8 01/21/2017 04:35 AM    PLATELET 225 54/85/0397 04:35 AM       Lab Results   Component Value Date/Time    Sodium 137 01/21/2017 04:35 AM    Potassium 4.5 01/21/2017 04:35 AM    Chloride 102 01/21/2017 04:35 AM    CO2 27 01/21/2017 04:35 AM    Glucose 245 01/21/2017 04:35 AM    BUN 57 01/21/2017 04:35 AM    Creatinine 2.27 01/21/2017 04:35 AM    Calcium 8.6 01/21/2017 04:35 AM       Lab Results   Component Value Date/Time    Hemoglobin A1c 11.5 01/15/2017 04:43 AM       Lab Results   Component Value Date/Time    Cholesterol, total 170 01/15/2017 04:43 AM    HDL Cholesterol 66 01/15/2017 04:43 AM    LDL, calculated 70.6 01/15/2017 04:43 AM    VLDL, calculated 33.4 01/15/2017 04:43 AM    Triglyceride 167 01/15/2017 04:43 AM    CHOL/HDL Ratio 2.6 01/15/2017 04:43 AM

## 2017-01-22 PROCEDURE — 74011250637 HC RX REV CODE- 250/637: Performed by: PHYSICAL MEDICINE & REHABILITATION

## 2017-01-22 PROCEDURE — 74011250636 HC RX REV CODE- 250/636: Performed by: PHYSICAL MEDICINE & REHABILITATION

## 2017-01-22 PROCEDURE — 74011636637 HC RX REV CODE- 636/637: Performed by: PHYSICAL MEDICINE & REHABILITATION

## 2017-01-22 RX ORDER — VALSARTAN 160 MG/1
160 TABLET ORAL DAILY
Status: DISCONTINUED | OUTPATIENT
Start: 2017-01-22 | End: 2017-02-21 | Stop reason: HOSPADM

## 2017-01-22 RX ORDER — DOXYCYCLINE HYCLATE 100 MG
100 TABLET ORAL EVERY 12 HOURS
Status: DISCONTINUED | OUTPATIENT
Start: 2017-01-22 | End: 2017-01-23

## 2017-01-22 RX ADMIN — INSULIN LISPRO 6 UNITS: 100 INJECTION, SOLUTION INTRAVENOUS; SUBCUTANEOUS at 20:52

## 2017-01-22 RX ADMIN — LEVOTHYROXINE SODIUM 137 MCG: 112 TABLET ORAL at 06:06

## 2017-01-22 RX ADMIN — OXYCODONE HYDROCHLORIDE 5 MG: 5 TABLET ORAL at 12:24

## 2017-01-22 RX ADMIN — BUMETANIDE 1 MG: 1 TABLET ORAL at 08:18

## 2017-01-22 RX ADMIN — VALSARTAN 160 MG: 160 TABLET, FILM COATED ORAL at 08:17

## 2017-01-22 RX ADMIN — CLOPIDOGREL BISULFATE 75 MG: 75 TABLET, FILM COATED ORAL at 08:19

## 2017-01-22 RX ADMIN — INSULIN LISPRO 14 UNITS: 100 INJECTION, SOLUTION INTRAVENOUS; SUBCUTANEOUS at 08:16

## 2017-01-22 RX ADMIN — GABAPENTIN 100 MG: 100 CAPSULE ORAL at 20:51

## 2017-01-22 RX ADMIN — INSULIN LISPRO 26 UNITS: 100 INJECTION, SOLUTION INTRAVENOUS; SUBCUTANEOUS at 17:42

## 2017-01-22 RX ADMIN — INSULIN GLARGINE 60 UNITS: 100 INJECTION, SOLUTION SUBCUTANEOUS at 20:51

## 2017-01-22 RX ADMIN — PREDNISONE 20 MG: 20 TABLET ORAL at 08:19

## 2017-01-22 RX ADMIN — CLONIDINE HYDROCHLORIDE 0.3 MG: 0.1 TABLET ORAL at 08:18

## 2017-01-22 RX ADMIN — INSULIN LISPRO 18 UNITS: 100 INJECTION, SOLUTION INTRAVENOUS; SUBCUTANEOUS at 12:24

## 2017-01-22 RX ADMIN — DOXYCYCLINE HYCLATE 100 MG: 100 TABLET, COATED ORAL at 20:50

## 2017-01-22 RX ADMIN — GABAPENTIN 100 MG: 100 CAPSULE ORAL at 06:06

## 2017-01-22 RX ADMIN — ATORVASTATIN CALCIUM 40 MG: 40 TABLET, FILM COATED ORAL at 20:50

## 2017-01-22 RX ADMIN — METOPROLOL SUCCINATE 50 MG: 50 TABLET, EXTENDED RELEASE ORAL at 08:18

## 2017-01-22 RX ADMIN — SPIRONOLACTONE 50 MG: 25 TABLET, FILM COATED ORAL at 08:18

## 2017-01-22 RX ADMIN — GABAPENTIN 100 MG: 100 CAPSULE ORAL at 13:11

## 2017-01-22 RX ADMIN — Medication 1 TABLET: at 08:19

## 2017-01-22 RX ADMIN — ENOXAPARIN SODIUM 30 MG: 30 INJECTION SUBCUTANEOUS at 08:17

## 2017-01-22 RX ADMIN — CLONIDINE HYDROCHLORIDE 0.3 MG: 0.1 TABLET ORAL at 20:50

## 2017-01-23 LAB
BACTERIA SPEC CULT: NORMAL
BACTERIA SPEC CULT: NORMAL
CC UR VC: NORMAL
SERVICE CMNT-IMP: NORMAL

## 2017-01-23 PROCEDURE — 74011250637 HC RX REV CODE- 250/637: Performed by: PHYSICAL MEDICINE & REHABILITATION

## 2017-01-23 PROCEDURE — 74011636637 HC RX REV CODE- 636/637: Performed by: PHYSICAL MEDICINE & REHABILITATION

## 2017-01-23 PROCEDURE — 74011250636 HC RX REV CODE- 250/636: Performed by: PHYSICAL MEDICINE & REHABILITATION

## 2017-01-23 RX ORDER — DOXYCYCLINE HYCLATE 100 MG
100 TABLET ORAL EVERY 12 HOURS
Status: COMPLETED | OUTPATIENT
Start: 2017-01-23 | End: 2017-01-30

## 2017-01-23 RX ADMIN — GABAPENTIN 100 MG: 100 CAPSULE ORAL at 13:04

## 2017-01-23 RX ADMIN — DOXYCYCLINE HYCLATE 100 MG: 100 TABLET, COATED ORAL at 08:49

## 2017-01-23 RX ADMIN — DOXYCYCLINE HYCLATE 100 MG: 100 TABLET, COATED ORAL at 21:35

## 2017-01-23 RX ADMIN — Medication 1 TABLET: at 08:48

## 2017-01-23 RX ADMIN — MELATONIN 3 MG ORAL TABLET 3 MG: 3 TABLET ORAL at 21:35

## 2017-01-23 RX ADMIN — INSULIN LISPRO 12 UNITS: 100 INJECTION, SOLUTION INTRAVENOUS; SUBCUTANEOUS at 17:08

## 2017-01-23 RX ADMIN — VALSARTAN 160 MG: 160 TABLET, FILM COATED ORAL at 08:51

## 2017-01-23 RX ADMIN — SPIRONOLACTONE 50 MG: 25 TABLET, FILM COATED ORAL at 08:51

## 2017-01-23 RX ADMIN — BUMETANIDE 1 MG: 1 TABLET ORAL at 08:50

## 2017-01-23 RX ADMIN — CLONIDINE HYDROCHLORIDE 0.3 MG: 0.1 TABLET ORAL at 21:35

## 2017-01-23 RX ADMIN — INSULIN LISPRO 18 UNITS: 100 INJECTION, SOLUTION INTRAVENOUS; SUBCUTANEOUS at 13:04

## 2017-01-23 RX ADMIN — INSULIN LISPRO 4 UNITS: 100 INJECTION, SOLUTION INTRAVENOUS; SUBCUTANEOUS at 21:52

## 2017-01-23 RX ADMIN — GABAPENTIN 100 MG: 100 CAPSULE ORAL at 21:35

## 2017-01-23 RX ADMIN — CLOPIDOGREL BISULFATE 75 MG: 75 TABLET, FILM COATED ORAL at 08:49

## 2017-01-23 RX ADMIN — GABAPENTIN 100 MG: 100 CAPSULE ORAL at 07:09

## 2017-01-23 RX ADMIN — INSULIN LISPRO 14 UNITS: 100 INJECTION, SOLUTION INTRAVENOUS; SUBCUTANEOUS at 08:50

## 2017-01-23 RX ADMIN — PREDNISONE 15 MG: 5 TABLET ORAL at 08:49

## 2017-01-23 RX ADMIN — METOPROLOL SUCCINATE 50 MG: 50 TABLET, EXTENDED RELEASE ORAL at 08:51

## 2017-01-23 RX ADMIN — OXYCODONE HYDROCHLORIDE 10 MG: 5 TABLET ORAL at 08:49

## 2017-01-23 RX ADMIN — LEVOTHYROXINE SODIUM 137 MCG: 112 TABLET ORAL at 07:09

## 2017-01-23 RX ADMIN — CLONIDINE HYDROCHLORIDE 0.3 MG: 0.1 TABLET ORAL at 08:51

## 2017-01-23 RX ADMIN — INSULIN GLARGINE 60 UNITS: 100 INJECTION, SOLUTION SUBCUTANEOUS at 21:34

## 2017-01-23 RX ADMIN — ATORVASTATIN CALCIUM 40 MG: 40 TABLET, FILM COATED ORAL at 21:35

## 2017-01-23 RX ADMIN — ENOXAPARIN SODIUM 30 MG: 30 INJECTION SUBCUTANEOUS at 08:49

## 2017-01-23 RX ADMIN — OXYCODONE HYDROCHLORIDE 10 MG: 5 TABLET ORAL at 21:35

## 2017-01-24 PROCEDURE — 74011250636 HC RX REV CODE- 250/636: Performed by: PHYSICAL MEDICINE & REHABILITATION

## 2017-01-24 PROCEDURE — 74011250637 HC RX REV CODE- 250/637: Performed by: PHYSICAL MEDICINE & REHABILITATION

## 2017-01-24 PROCEDURE — 74011636637 HC RX REV CODE- 636/637: Performed by: PHYSICAL MEDICINE & REHABILITATION

## 2017-01-24 RX ORDER — GABAPENTIN 100 MG/1
100 CAPSULE ORAL 2 TIMES DAILY
Status: DISCONTINUED | OUTPATIENT
Start: 2017-01-24 | End: 2017-01-25

## 2017-01-24 RX ORDER — INSULIN GLARGINE 100 [IU]/ML
35 INJECTION, SOLUTION SUBCUTANEOUS
Status: DISCONTINUED | OUTPATIENT
Start: 2017-01-24 | End: 2017-01-26

## 2017-01-24 RX ORDER — GABAPENTIN 300 MG/1
300 CAPSULE ORAL
Status: DISCONTINUED | OUTPATIENT
Start: 2017-01-24 | End: 2017-02-21 | Stop reason: HOSPADM

## 2017-01-24 RX ORDER — INSULIN GLARGINE 100 [IU]/ML
70 INJECTION, SOLUTION SUBCUTANEOUS
Status: DISCONTINUED | OUTPATIENT
Start: 2017-01-24 | End: 2017-01-24

## 2017-01-24 RX ORDER — PREDNISONE 20 MG/1
20 TABLET ORAL 2 TIMES DAILY WITH MEALS
Status: DISCONTINUED | OUTPATIENT
Start: 2017-01-24 | End: 2017-01-26

## 2017-01-24 RX ADMIN — ATORVASTATIN CALCIUM 40 MG: 40 TABLET, FILM COATED ORAL at 21:19

## 2017-01-24 RX ADMIN — SPIRONOLACTONE 50 MG: 25 TABLET, FILM COATED ORAL at 09:28

## 2017-01-24 RX ADMIN — ENOXAPARIN SODIUM 30 MG: 30 INJECTION SUBCUTANEOUS at 09:28

## 2017-01-24 RX ADMIN — GABAPENTIN 300 MG: 300 CAPSULE ORAL at 21:19

## 2017-01-24 RX ADMIN — VALSARTAN 160 MG: 160 TABLET, FILM COATED ORAL at 09:28

## 2017-01-24 RX ADMIN — GABAPENTIN 100 MG: 100 CAPSULE ORAL at 14:45

## 2017-01-24 RX ADMIN — CLONIDINE HYDROCHLORIDE 0.3 MG: 0.1 TABLET ORAL at 09:28

## 2017-01-24 RX ADMIN — INSULIN LISPRO 18 UNITS: 100 INJECTION, SOLUTION INTRAVENOUS; SUBCUTANEOUS at 12:47

## 2017-01-24 RX ADMIN — INSULIN LISPRO 12 UNITS: 100 INJECTION, SOLUTION INTRAVENOUS; SUBCUTANEOUS at 09:28

## 2017-01-24 RX ADMIN — LEVOTHYROXINE SODIUM 137 MCG: 112 TABLET ORAL at 06:07

## 2017-01-24 RX ADMIN — Medication 1 TABLET: at 09:28

## 2017-01-24 RX ADMIN — CLONIDINE HYDROCHLORIDE 0.3 MG: 0.1 TABLET ORAL at 21:19

## 2017-01-24 RX ADMIN — DOXYCYCLINE HYCLATE 100 MG: 100 TABLET, COATED ORAL at 21:19

## 2017-01-24 RX ADMIN — INSULIN GLARGINE 35 UNITS: 100 INJECTION, SOLUTION SUBCUTANEOUS at 21:18

## 2017-01-24 RX ADMIN — INSULIN LISPRO 4 UNITS: 100 INJECTION, SOLUTION INTRAVENOUS; SUBCUTANEOUS at 21:28

## 2017-01-24 RX ADMIN — PREDNISONE 15 MG: 5 TABLET ORAL at 09:28

## 2017-01-24 RX ADMIN — PREDNISONE 20 MG: 20 TABLET ORAL at 17:45

## 2017-01-24 RX ADMIN — GABAPENTIN 100 MG: 100 CAPSULE ORAL at 06:07

## 2017-01-24 RX ADMIN — OXYCODONE HYDROCHLORIDE 10 MG: 5 TABLET ORAL at 11:10

## 2017-01-24 RX ADMIN — METOPROLOL SUCCINATE 50 MG: 50 TABLET, EXTENDED RELEASE ORAL at 09:28

## 2017-01-24 RX ADMIN — DOXYCYCLINE HYCLATE 100 MG: 100 TABLET, COATED ORAL at 09:28

## 2017-01-24 RX ADMIN — CLOPIDOGREL BISULFATE 75 MG: 75 TABLET, FILM COATED ORAL at 09:28

## 2017-01-24 RX ADMIN — INSULIN LISPRO 24 UNITS: 100 INJECTION, SOLUTION INTRAVENOUS; SUBCUTANEOUS at 17:45

## 2017-01-24 RX ADMIN — BUMETANIDE 1 MG: 1 TABLET ORAL at 09:28

## 2017-01-25 PROCEDURE — 74011250636 HC RX REV CODE- 250/636: Performed by: PHYSICAL MEDICINE & REHABILITATION

## 2017-01-25 PROCEDURE — 74011250637 HC RX REV CODE- 250/637: Performed by: PHYSICAL MEDICINE & REHABILITATION

## 2017-01-25 PROCEDURE — 74011636637 HC RX REV CODE- 636/637: Performed by: PHYSICAL MEDICINE & REHABILITATION

## 2017-01-25 RX ORDER — GABAPENTIN 100 MG/1
200 CAPSULE ORAL 2 TIMES DAILY
Status: DISCONTINUED | OUTPATIENT
Start: 2017-01-25 | End: 2017-02-21 | Stop reason: HOSPADM

## 2017-01-25 RX ADMIN — PREDNISONE 20 MG: 20 TABLET ORAL at 17:48

## 2017-01-25 RX ADMIN — ENOXAPARIN SODIUM 30 MG: 30 INJECTION SUBCUTANEOUS at 08:38

## 2017-01-25 RX ADMIN — INSULIN LISPRO 26 UNITS: 100 INJECTION, SOLUTION INTRAVENOUS; SUBCUTANEOUS at 12:37

## 2017-01-25 RX ADMIN — CLONIDINE HYDROCHLORIDE 0.3 MG: 0.1 TABLET ORAL at 21:53

## 2017-01-25 RX ADMIN — METOPROLOL SUCCINATE 50 MG: 50 TABLET, EXTENDED RELEASE ORAL at 08:39

## 2017-01-25 RX ADMIN — SORBITOL SOLUTION (BULK) 30 ML: 70 SOLUTION at 08:38

## 2017-01-25 RX ADMIN — INSULIN LISPRO 22 UNITS: 100 INJECTION, SOLUTION INTRAVENOUS; SUBCUTANEOUS at 08:38

## 2017-01-25 RX ADMIN — OXYCODONE HYDROCHLORIDE 10 MG: 5 TABLET ORAL at 08:40

## 2017-01-25 RX ADMIN — INSULIN GLARGINE 35 UNITS: 100 INJECTION, SOLUTION SUBCUTANEOUS at 21:52

## 2017-01-25 RX ADMIN — INSULIN LISPRO 18 UNITS: 100 INJECTION, SOLUTION INTRAVENOUS; SUBCUTANEOUS at 17:47

## 2017-01-25 RX ADMIN — ATORVASTATIN CALCIUM 40 MG: 40 TABLET, FILM COATED ORAL at 21:54

## 2017-01-25 RX ADMIN — GABAPENTIN 200 MG: 100 CAPSULE ORAL at 17:48

## 2017-01-25 RX ADMIN — INSULIN LISPRO 6 UNITS: 100 INJECTION, SOLUTION INTRAVENOUS; SUBCUTANEOUS at 21:51

## 2017-01-25 RX ADMIN — LEVOTHYROXINE SODIUM 137 MCG: 112 TABLET ORAL at 05:56

## 2017-01-25 RX ADMIN — BUMETANIDE 1 MG: 1 TABLET ORAL at 08:39

## 2017-01-25 RX ADMIN — DOXYCYCLINE HYCLATE 100 MG: 100 TABLET, COATED ORAL at 21:53

## 2017-01-25 RX ADMIN — Medication 1 TABLET: at 08:40

## 2017-01-25 RX ADMIN — GABAPENTIN 100 MG: 100 CAPSULE ORAL at 08:39

## 2017-01-25 RX ADMIN — PREDNISONE 20 MG: 20 TABLET ORAL at 08:39

## 2017-01-25 RX ADMIN — GABAPENTIN 300 MG: 300 CAPSULE ORAL at 21:54

## 2017-01-25 RX ADMIN — CLOPIDOGREL BISULFATE 75 MG: 75 TABLET, FILM COATED ORAL at 09:00

## 2017-01-25 RX ADMIN — VALSARTAN 160 MG: 160 TABLET, FILM COATED ORAL at 08:40

## 2017-01-25 RX ADMIN — SPIRONOLACTONE 50 MG: 25 TABLET, FILM COATED ORAL at 08:40

## 2017-01-25 RX ADMIN — DOXYCYCLINE HYCLATE 100 MG: 100 TABLET, COATED ORAL at 08:40

## 2017-01-25 RX ADMIN — CLONIDINE HYDROCHLORIDE 0.3 MG: 0.1 TABLET ORAL at 08:38

## 2017-01-26 PROCEDURE — 74011250636 HC RX REV CODE- 250/636: Performed by: PHYSICAL MEDICINE & REHABILITATION

## 2017-01-26 PROCEDURE — 74011250637 HC RX REV CODE- 250/637: Performed by: PHYSICAL MEDICINE & REHABILITATION

## 2017-01-26 PROCEDURE — 77030029684 HC NEB SM VOL KT MONA -A

## 2017-01-26 PROCEDURE — 74011636637 HC RX REV CODE- 636/637: Performed by: PHYSICAL MEDICINE & REHABILITATION

## 2017-01-26 RX ORDER — INSULIN GLARGINE 100 [IU]/ML
40 INJECTION, SOLUTION SUBCUTANEOUS
Status: DISCONTINUED | OUTPATIENT
Start: 2017-01-26 | End: 2017-01-28 | Stop reason: DRUGHIGH

## 2017-01-26 RX ORDER — PREDNISONE 20 MG/1
20 TABLET ORAL 2 TIMES DAILY WITH MEALS
Status: COMPLETED | OUTPATIENT
Start: 2017-01-26 | End: 2017-01-26

## 2017-01-26 RX ADMIN — GABAPENTIN 200 MG: 100 CAPSULE ORAL at 09:14

## 2017-01-26 RX ADMIN — PREDNISONE 20 MG: 20 TABLET ORAL at 09:14

## 2017-01-26 RX ADMIN — INSULIN LISPRO 26 UNITS: 100 INJECTION, SOLUTION INTRAVENOUS; SUBCUTANEOUS at 12:54

## 2017-01-26 RX ADMIN — GABAPENTIN 300 MG: 300 CAPSULE ORAL at 20:23

## 2017-01-26 RX ADMIN — SPIRONOLACTONE 50 MG: 25 TABLET, FILM COATED ORAL at 09:13

## 2017-01-26 RX ADMIN — INSULIN GLARGINE 40 UNITS: 100 INJECTION, SOLUTION SUBCUTANEOUS at 22:38

## 2017-01-26 RX ADMIN — METOPROLOL SUCCINATE 50 MG: 50 TABLET, EXTENDED RELEASE ORAL at 09:14

## 2017-01-26 RX ADMIN — INSULIN LISPRO 6 UNITS: 100 INJECTION, SOLUTION INTRAVENOUS; SUBCUTANEOUS at 22:39

## 2017-01-26 RX ADMIN — VALSARTAN 160 MG: 160 TABLET, FILM COATED ORAL at 09:13

## 2017-01-26 RX ADMIN — DOXYCYCLINE HYCLATE 100 MG: 100 TABLET, COATED ORAL at 09:14

## 2017-01-26 RX ADMIN — GABAPENTIN 200 MG: 100 CAPSULE ORAL at 17:17

## 2017-01-26 RX ADMIN — LEVOTHYROXINE SODIUM 137 MCG: 112 TABLET ORAL at 05:42

## 2017-01-26 RX ADMIN — DOXYCYCLINE HYCLATE 100 MG: 100 TABLET, COATED ORAL at 20:24

## 2017-01-26 RX ADMIN — INSULIN GLARGINE 40 UNITS: 100 INJECTION, SOLUTION SUBCUTANEOUS at 22:39

## 2017-01-26 RX ADMIN — CLOPIDOGREL BISULFATE 75 MG: 75 TABLET, FILM COATED ORAL at 09:14

## 2017-01-26 RX ADMIN — CLONIDINE HYDROCHLORIDE 0.3 MG: 0.1 TABLET ORAL at 20:24

## 2017-01-26 RX ADMIN — INSULIN LISPRO 26 UNITS: 100 INJECTION, SOLUTION INTRAVENOUS; SUBCUTANEOUS at 09:25

## 2017-01-26 RX ADMIN — ATORVASTATIN CALCIUM 40 MG: 40 TABLET, FILM COATED ORAL at 20:23

## 2017-01-26 RX ADMIN — INSULIN LISPRO 26 UNITS: 100 INJECTION, SOLUTION INTRAVENOUS; SUBCUTANEOUS at 17:41

## 2017-01-26 RX ADMIN — ENOXAPARIN SODIUM 30 MG: 30 INJECTION SUBCUTANEOUS at 09:15

## 2017-01-26 RX ADMIN — BUMETANIDE 1 MG: 1 TABLET ORAL at 09:13

## 2017-01-26 RX ADMIN — PREDNISONE 20 MG: 20 TABLET ORAL at 17:17

## 2017-01-26 RX ADMIN — CLONIDINE HYDROCHLORIDE 0.3 MG: 0.1 TABLET ORAL at 09:14

## 2017-01-26 RX ADMIN — Medication 1 TABLET: at 09:15

## 2017-01-27 PROCEDURE — 74011250637 HC RX REV CODE- 250/637: Performed by: PHYSICAL MEDICINE & REHABILITATION

## 2017-01-27 PROCEDURE — 74011636637 HC RX REV CODE- 636/637: Performed by: PHYSICAL MEDICINE & REHABILITATION

## 2017-01-27 PROCEDURE — 74011250636 HC RX REV CODE- 250/636: Performed by: PHYSICAL MEDICINE & REHABILITATION

## 2017-01-27 RX ORDER — PREDNISONE 10 MG/1
10 TABLET ORAL 2 TIMES DAILY WITH MEALS
Status: COMPLETED | OUTPATIENT
Start: 2017-01-29 | End: 2017-01-30

## 2017-01-27 RX ADMIN — Medication 1 TABLET: at 09:56

## 2017-01-27 RX ADMIN — VALSARTAN 160 MG: 160 TABLET, FILM COATED ORAL at 09:57

## 2017-01-27 RX ADMIN — BUMETANIDE 1 MG: 1 TABLET ORAL at 09:56

## 2017-01-27 RX ADMIN — DOXYCYCLINE HYCLATE 100 MG: 100 TABLET, COATED ORAL at 09:56

## 2017-01-27 RX ADMIN — CLONIDINE HYDROCHLORIDE 0.3 MG: 0.1 TABLET ORAL at 22:12

## 2017-01-27 RX ADMIN — PREDNISONE 15 MG: 5 TABLET ORAL at 09:56

## 2017-01-27 RX ADMIN — PREDNISONE 15 MG: 5 TABLET ORAL at 16:22

## 2017-01-27 RX ADMIN — INSULIN LISPRO 26 UNITS: 100 INJECTION, SOLUTION INTRAVENOUS; SUBCUTANEOUS at 09:56

## 2017-01-27 RX ADMIN — GABAPENTIN 300 MG: 300 CAPSULE ORAL at 22:08

## 2017-01-27 RX ADMIN — DOXYCYCLINE HYCLATE 100 MG: 100 TABLET, COATED ORAL at 22:08

## 2017-01-27 RX ADMIN — INSULIN GLARGINE 40 UNITS: 100 INJECTION, SOLUTION SUBCUTANEOUS at 22:08

## 2017-01-27 RX ADMIN — CLOPIDOGREL BISULFATE 75 MG: 75 TABLET, FILM COATED ORAL at 09:56

## 2017-01-27 RX ADMIN — INSULIN LISPRO 6 UNITS: 100 INJECTION, SOLUTION INTRAVENOUS; SUBCUTANEOUS at 22:09

## 2017-01-27 RX ADMIN — LEVOTHYROXINE SODIUM 137 MCG: 112 TABLET ORAL at 06:33

## 2017-01-27 RX ADMIN — METOPROLOL SUCCINATE 50 MG: 50 TABLET, EXTENDED RELEASE ORAL at 09:56

## 2017-01-27 RX ADMIN — INSULIN GLARGINE 40 UNITS: 100 INJECTION, SOLUTION SUBCUTANEOUS at 22:09

## 2017-01-27 RX ADMIN — INSULIN LISPRO 28 UNITS: 100 INJECTION, SOLUTION INTRAVENOUS; SUBCUTANEOUS at 13:01

## 2017-01-27 RX ADMIN — CLONIDINE HYDROCHLORIDE 0.3 MG: 0.1 TABLET ORAL at 09:56

## 2017-01-27 RX ADMIN — INSULIN LISPRO 28 UNITS: 100 INJECTION, SOLUTION INTRAVENOUS; SUBCUTANEOUS at 17:31

## 2017-01-27 RX ADMIN — ENOXAPARIN SODIUM 30 MG: 30 INJECTION SUBCUTANEOUS at 09:56

## 2017-01-27 RX ADMIN — GABAPENTIN 200 MG: 100 CAPSULE ORAL at 16:21

## 2017-01-27 RX ADMIN — GABAPENTIN 200 MG: 100 CAPSULE ORAL at 09:57

## 2017-01-27 RX ADMIN — SPIRONOLACTONE 50 MG: 25 TABLET, FILM COATED ORAL at 09:56

## 2017-01-27 RX ADMIN — ATORVASTATIN CALCIUM 40 MG: 40 TABLET, FILM COATED ORAL at 22:08

## 2017-01-28 PROCEDURE — 74011636637 HC RX REV CODE- 636/637: Performed by: PHYSICAL MEDICINE & REHABILITATION

## 2017-01-28 PROCEDURE — 74011250636 HC RX REV CODE- 250/636: Performed by: PHYSICAL MEDICINE & REHABILITATION

## 2017-01-28 PROCEDURE — 74011250637 HC RX REV CODE- 250/637: Performed by: PHYSICAL MEDICINE & REHABILITATION

## 2017-01-28 RX ORDER — INSULIN GLARGINE 100 [IU]/ML
50 INJECTION, SOLUTION SUBCUTANEOUS
Status: COMPLETED | OUTPATIENT
Start: 2017-01-28 | End: 2017-01-28

## 2017-01-28 RX ORDER — INSULIN GLARGINE 100 [IU]/ML
50 INJECTION, SOLUTION SUBCUTANEOUS 2 TIMES DAILY
Status: DISCONTINUED | OUTPATIENT
Start: 2017-01-28 | End: 2017-01-30

## 2017-01-28 RX ADMIN — GABAPENTIN 200 MG: 100 CAPSULE ORAL at 17:15

## 2017-01-28 RX ADMIN — INSULIN LISPRO 28 UNITS: 100 INJECTION, SOLUTION INTRAVENOUS; SUBCUTANEOUS at 17:15

## 2017-01-28 RX ADMIN — INSULIN LISPRO 6 UNITS: 100 INJECTION, SOLUTION INTRAVENOUS; SUBCUTANEOUS at 22:28

## 2017-01-28 RX ADMIN — CLONIDINE HYDROCHLORIDE 0.3 MG: 0.1 TABLET ORAL at 09:36

## 2017-01-28 RX ADMIN — INSULIN LISPRO 24 UNITS: 100 INJECTION, SOLUTION INTRAVENOUS; SUBCUTANEOUS at 09:36

## 2017-01-28 RX ADMIN — INSULIN GLARGINE 50 UNITS: 100 INJECTION, SOLUTION SUBCUTANEOUS at 12:38

## 2017-01-28 RX ADMIN — ATORVASTATIN CALCIUM 40 MG: 40 TABLET, FILM COATED ORAL at 21:02

## 2017-01-28 RX ADMIN — INSULIN GLARGINE 50 UNITS: 100 INJECTION, SOLUTION SUBCUTANEOUS at 22:28

## 2017-01-28 RX ADMIN — INSULIN LISPRO 28 UNITS: 100 INJECTION, SOLUTION INTRAVENOUS; SUBCUTANEOUS at 12:37

## 2017-01-28 RX ADMIN — SPIRONOLACTONE 50 MG: 25 TABLET, FILM COATED ORAL at 09:37

## 2017-01-28 RX ADMIN — LEVOTHYROXINE SODIUM 137 MCG: 112 TABLET ORAL at 06:01

## 2017-01-28 RX ADMIN — Medication 1 TABLET: at 09:37

## 2017-01-28 RX ADMIN — PREDNISONE 15 MG: 5 TABLET ORAL at 09:37

## 2017-01-28 RX ADMIN — CLOPIDOGREL BISULFATE 75 MG: 75 TABLET, FILM COATED ORAL at 09:37

## 2017-01-28 RX ADMIN — METOPROLOL SUCCINATE 50 MG: 50 TABLET, EXTENDED RELEASE ORAL at 09:36

## 2017-01-28 RX ADMIN — VALSARTAN 160 MG: 160 TABLET, FILM COATED ORAL at 09:37

## 2017-01-28 RX ADMIN — DOXYCYCLINE HYCLATE 100 MG: 100 TABLET, COATED ORAL at 21:02

## 2017-01-28 RX ADMIN — GABAPENTIN 200 MG: 100 CAPSULE ORAL at 09:37

## 2017-01-28 RX ADMIN — ENOXAPARIN SODIUM 30 MG: 30 INJECTION SUBCUTANEOUS at 09:36

## 2017-01-28 RX ADMIN — CLONIDINE HYDROCHLORIDE 0.3 MG: 0.1 TABLET ORAL at 21:02

## 2017-01-28 RX ADMIN — PREDNISONE 15 MG: 5 TABLET ORAL at 17:15

## 2017-01-28 RX ADMIN — GABAPENTIN 300 MG: 300 CAPSULE ORAL at 21:02

## 2017-01-28 RX ADMIN — BUMETANIDE 1 MG: 1 TABLET ORAL at 09:36

## 2017-01-28 RX ADMIN — DOXYCYCLINE HYCLATE 100 MG: 100 TABLET, COATED ORAL at 09:37

## 2017-01-29 PROCEDURE — 74011636637 HC RX REV CODE- 636/637: Performed by: PHYSICAL MEDICINE & REHABILITATION

## 2017-01-29 PROCEDURE — 74011250637 HC RX REV CODE- 250/637: Performed by: PHYSICAL MEDICINE & REHABILITATION

## 2017-01-29 PROCEDURE — 74011250636 HC RX REV CODE- 250/636: Performed by: PHYSICAL MEDICINE & REHABILITATION

## 2017-01-29 RX ADMIN — SPIRONOLACTONE 50 MG: 25 TABLET, FILM COATED ORAL at 08:54

## 2017-01-29 RX ADMIN — DOXYCYCLINE HYCLATE 100 MG: 100 TABLET, COATED ORAL at 08:55

## 2017-01-29 RX ADMIN — CLOPIDOGREL BISULFATE 75 MG: 75 TABLET, FILM COATED ORAL at 08:54

## 2017-01-29 RX ADMIN — CLONIDINE HYDROCHLORIDE 0.3 MG: 0.1 TABLET ORAL at 08:55

## 2017-01-29 RX ADMIN — ATORVASTATIN CALCIUM 40 MG: 40 TABLET, FILM COATED ORAL at 21:13

## 2017-01-29 RX ADMIN — CLONIDINE HYDROCHLORIDE 0.3 MG: 0.1 TABLET ORAL at 21:13

## 2017-01-29 RX ADMIN — INSULIN LISPRO 26 UNITS: 100 INJECTION, SOLUTION INTRAVENOUS; SUBCUTANEOUS at 13:07

## 2017-01-29 RX ADMIN — VALSARTAN 160 MG: 160 TABLET, FILM COATED ORAL at 08:53

## 2017-01-29 RX ADMIN — ENOXAPARIN SODIUM 30 MG: 30 INJECTION SUBCUTANEOUS at 08:56

## 2017-01-29 RX ADMIN — PREDNISONE 10 MG: 10 TABLET ORAL at 16:20

## 2017-01-29 RX ADMIN — INSULIN LISPRO 6 UNITS: 100 INJECTION, SOLUTION INTRAVENOUS; SUBCUTANEOUS at 21:13

## 2017-01-29 RX ADMIN — GABAPENTIN 200 MG: 100 CAPSULE ORAL at 16:20

## 2017-01-29 RX ADMIN — INSULIN GLARGINE 50 UNITS: 100 INJECTION, SOLUTION SUBCUTANEOUS at 08:55

## 2017-01-29 RX ADMIN — INSULIN LISPRO 16 UNITS: 100 INJECTION, SOLUTION INTRAVENOUS; SUBCUTANEOUS at 08:56

## 2017-01-29 RX ADMIN — GABAPENTIN 300 MG: 300 CAPSULE ORAL at 21:13

## 2017-01-29 RX ADMIN — DOXYCYCLINE HYCLATE 100 MG: 100 TABLET, COATED ORAL at 21:13

## 2017-01-29 RX ADMIN — PREDNISONE 10 MG: 10 TABLET ORAL at 08:55

## 2017-01-29 RX ADMIN — GABAPENTIN 200 MG: 100 CAPSULE ORAL at 08:54

## 2017-01-29 RX ADMIN — BUMETANIDE 1 MG: 1 TABLET ORAL at 08:54

## 2017-01-29 RX ADMIN — METOPROLOL SUCCINATE 50 MG: 50 TABLET, EXTENDED RELEASE ORAL at 08:55

## 2017-01-29 RX ADMIN — INSULIN LISPRO 28 UNITS: 100 INJECTION, SOLUTION INTRAVENOUS; SUBCUTANEOUS at 16:20

## 2017-01-29 RX ADMIN — Medication 1 TABLET: at 08:54

## 2017-01-29 RX ADMIN — INSULIN GLARGINE 50 UNITS: 100 INJECTION, SOLUTION SUBCUTANEOUS at 21:13

## 2017-01-29 RX ADMIN — LEVOTHYROXINE SODIUM 137 MCG: 112 TABLET ORAL at 06:14

## 2017-01-30 PROCEDURE — 74011636637 HC RX REV CODE- 636/637: Performed by: PHYSICAL MEDICINE & REHABILITATION

## 2017-01-30 PROCEDURE — 74011250637 HC RX REV CODE- 250/637: Performed by: PHYSICAL MEDICINE & REHABILITATION

## 2017-01-30 PROCEDURE — 74011250636 HC RX REV CODE- 250/636: Performed by: PHYSICAL MEDICINE & REHABILITATION

## 2017-01-30 RX ORDER — PREDNISONE 10 MG/1
10 TABLET ORAL
Status: DISCONTINUED | OUTPATIENT
Start: 2017-01-31 | End: 2017-02-03

## 2017-01-30 RX ORDER — INSULIN GLARGINE 100 [IU]/ML
55 INJECTION, SOLUTION SUBCUTANEOUS 2 TIMES DAILY
Status: DISCONTINUED | OUTPATIENT
Start: 2017-01-30 | End: 2017-02-01

## 2017-01-30 RX ADMIN — PREDNISONE 10 MG: 10 TABLET ORAL at 09:07

## 2017-01-30 RX ADMIN — PREDNISONE 10 MG: 10 TABLET ORAL at 16:42

## 2017-01-30 RX ADMIN — DOXYCYCLINE HYCLATE 100 MG: 100 TABLET, COATED ORAL at 09:07

## 2017-01-30 RX ADMIN — GABAPENTIN 200 MG: 100 CAPSULE ORAL at 09:07

## 2017-01-30 RX ADMIN — INSULIN LISPRO 20 UNITS: 100 INJECTION, SOLUTION INTRAVENOUS; SUBCUTANEOUS at 17:31

## 2017-01-30 RX ADMIN — CLONIDINE HYDROCHLORIDE 0.3 MG: 0.1 TABLET ORAL at 21:19

## 2017-01-30 RX ADMIN — INSULIN GLARGINE 50 UNITS: 100 INJECTION, SOLUTION SUBCUTANEOUS at 09:06

## 2017-01-30 RX ADMIN — INSULIN LISPRO 5 UNITS: 100 INJECTION, SOLUTION INTRAVENOUS; SUBCUTANEOUS at 21:19

## 2017-01-30 RX ADMIN — INSULIN LISPRO 20 UNITS: 100 INJECTION, SOLUTION INTRAVENOUS; SUBCUTANEOUS at 09:05

## 2017-01-30 RX ADMIN — INSULIN GLARGINE 55 UNITS: 100 INJECTION, SOLUTION SUBCUTANEOUS at 21:00

## 2017-01-30 RX ADMIN — SPIRONOLACTONE 50 MG: 25 TABLET, FILM COATED ORAL at 09:07

## 2017-01-30 RX ADMIN — INSULIN LISPRO 26 UNITS: 100 INJECTION, SOLUTION INTRAVENOUS; SUBCUTANEOUS at 12:25

## 2017-01-30 RX ADMIN — VALSARTAN 160 MG: 160 TABLET, FILM COATED ORAL at 09:06

## 2017-01-30 RX ADMIN — BUMETANIDE 1 MG: 1 TABLET ORAL at 09:07

## 2017-01-30 RX ADMIN — LEVOTHYROXINE SODIUM 137 MCG: 112 TABLET ORAL at 05:33

## 2017-01-30 RX ADMIN — ATORVASTATIN CALCIUM 40 MG: 40 TABLET, FILM COATED ORAL at 21:20

## 2017-01-30 RX ADMIN — ENOXAPARIN SODIUM 30 MG: 30 INJECTION SUBCUTANEOUS at 09:05

## 2017-01-30 RX ADMIN — METOPROLOL SUCCINATE 50 MG: 50 TABLET, EXTENDED RELEASE ORAL at 09:07

## 2017-01-30 RX ADMIN — GABAPENTIN 300 MG: 300 CAPSULE ORAL at 21:20

## 2017-01-30 RX ADMIN — GABAPENTIN 200 MG: 100 CAPSULE ORAL at 16:41

## 2017-01-30 RX ADMIN — Medication 1 TABLET: at 09:08

## 2017-01-30 RX ADMIN — CLOPIDOGREL BISULFATE 75 MG: 75 TABLET, FILM COATED ORAL at 09:07

## 2017-01-30 RX ADMIN — CLONIDINE HYDROCHLORIDE 0.3 MG: 0.1 TABLET ORAL at 09:06

## 2017-01-31 LAB
APPEARANCE UR: CLEAR
BACTERIA URNS QL MICRO: NEGATIVE /HPF
BILIRUB UR QL: NEGATIVE
COLOR UR: ABNORMAL
EPITH CASTS URNS QL MICRO: ABNORMAL /LPF
GLUCOSE UR STRIP.AUTO-MCNC: 500 MG/DL
HGB UR QL STRIP: NEGATIVE
HYALINE CASTS URNS QL MICRO: ABNORMAL /LPF (ref 0–5)
KETONES UR QL STRIP.AUTO: NEGATIVE MG/DL
LEUKOCYTE ESTERASE UR QL STRIP.AUTO: ABNORMAL
NITRITE UR QL STRIP.AUTO: NEGATIVE
PH UR STRIP: 5.5 [PH] (ref 5–8)
PROT UR STRIP-MCNC: NEGATIVE MG/DL
RBC #/AREA URNS HPF: ABNORMAL /HPF (ref 0–5)
SP GR UR REFRACTOMETRY: 1.01 (ref 1–1.03)
UROBILINOGEN UR QL STRIP.AUTO: 0.2 EU/DL (ref 0.2–1)
WBC URNS QL MICRO: ABNORMAL /HPF (ref 0–4)

## 2017-01-31 PROCEDURE — 74011250636 HC RX REV CODE- 250/636: Performed by: PHYSICAL MEDICINE & REHABILITATION

## 2017-01-31 PROCEDURE — 74011250637 HC RX REV CODE- 250/637: Performed by: PHYSICAL MEDICINE & REHABILITATION

## 2017-01-31 PROCEDURE — 87077 CULTURE AEROBIC IDENTIFY: CPT | Performed by: PHYSICAL MEDICINE & REHABILITATION

## 2017-01-31 PROCEDURE — 74011636637 HC RX REV CODE- 636/637: Performed by: PHYSICAL MEDICINE & REHABILITATION

## 2017-01-31 PROCEDURE — 87186 SC STD MICRODIL/AGAR DIL: CPT | Performed by: PHYSICAL MEDICINE & REHABILITATION

## 2017-01-31 PROCEDURE — 81001 URINALYSIS AUTO W/SCOPE: CPT | Performed by: PHYSICAL MEDICINE & REHABILITATION

## 2017-01-31 PROCEDURE — 87086 URINE CULTURE/COLONY COUNT: CPT | Performed by: PHYSICAL MEDICINE & REHABILITATION

## 2017-01-31 RX ADMIN — ATORVASTATIN CALCIUM 40 MG: 40 TABLET, FILM COATED ORAL at 20:58

## 2017-01-31 RX ADMIN — INSULIN LISPRO 6 UNITS: 100 INJECTION, SOLUTION INTRAVENOUS; SUBCUTANEOUS at 20:58

## 2017-01-31 RX ADMIN — PREDNISONE 10 MG: 10 TABLET ORAL at 09:08

## 2017-01-31 RX ADMIN — GABAPENTIN 200 MG: 100 CAPSULE ORAL at 09:08

## 2017-01-31 RX ADMIN — INSULIN LISPRO 28 UNITS: 100 INJECTION, SOLUTION INTRAVENOUS; SUBCUTANEOUS at 12:59

## 2017-01-31 RX ADMIN — SPIRONOLACTONE 50 MG: 25 TABLET, FILM COATED ORAL at 09:08

## 2017-01-31 RX ADMIN — INSULIN LISPRO 26 UNITS: 100 INJECTION, SOLUTION INTRAVENOUS; SUBCUTANEOUS at 09:13

## 2017-01-31 RX ADMIN — INSULIN GLARGINE 55 UNITS: 100 INJECTION, SOLUTION SUBCUTANEOUS at 09:12

## 2017-01-31 RX ADMIN — LEVOTHYROXINE SODIUM 137 MCG: 112 TABLET ORAL at 06:04

## 2017-01-31 RX ADMIN — INSULIN LISPRO 26 UNITS: 100 INJECTION, SOLUTION INTRAVENOUS; SUBCUTANEOUS at 17:02

## 2017-01-31 RX ADMIN — CLONIDINE HYDROCHLORIDE 0.3 MG: 0.1 TABLET ORAL at 20:58

## 2017-01-31 RX ADMIN — BUMETANIDE 1 MG: 1 TABLET ORAL at 09:08

## 2017-01-31 RX ADMIN — METOPROLOL SUCCINATE 50 MG: 50 TABLET, EXTENDED RELEASE ORAL at 09:08

## 2017-01-31 RX ADMIN — GABAPENTIN 300 MG: 300 CAPSULE ORAL at 20:58

## 2017-01-31 RX ADMIN — VALSARTAN 160 MG: 160 TABLET, FILM COATED ORAL at 09:08

## 2017-01-31 RX ADMIN — GABAPENTIN 200 MG: 100 CAPSULE ORAL at 16:20

## 2017-01-31 RX ADMIN — CLONIDINE HYDROCHLORIDE 0.3 MG: 0.1 TABLET ORAL at 09:08

## 2017-01-31 RX ADMIN — Medication 1 TABLET: at 09:08

## 2017-01-31 RX ADMIN — INSULIN GLARGINE 55 UNITS: 100 INJECTION, SOLUTION SUBCUTANEOUS at 20:56

## 2017-01-31 RX ADMIN — CLOPIDOGREL BISULFATE 75 MG: 75 TABLET, FILM COATED ORAL at 09:08

## 2017-01-31 RX ADMIN — ENOXAPARIN SODIUM 30 MG: 30 INJECTION SUBCUTANEOUS at 09:13

## 2017-02-01 PROCEDURE — 74011250637 HC RX REV CODE- 250/637: Performed by: PHYSICAL MEDICINE & REHABILITATION

## 2017-02-01 PROCEDURE — 74011636637 HC RX REV CODE- 636/637: Performed by: PHYSICAL MEDICINE & REHABILITATION

## 2017-02-01 PROCEDURE — 74011250636 HC RX REV CODE- 250/636: Performed by: PHYSICAL MEDICINE & REHABILITATION

## 2017-02-01 RX ORDER — INSULIN GLARGINE 100 [IU]/ML
60 INJECTION, SOLUTION SUBCUTANEOUS 2 TIMES DAILY
Status: DISCONTINUED | OUTPATIENT
Start: 2017-02-01 | End: 2017-02-03

## 2017-02-01 RX ADMIN — VALSARTAN 160 MG: 160 TABLET, FILM COATED ORAL at 09:00

## 2017-02-01 RX ADMIN — INSULIN GLARGINE 60 UNITS: 100 INJECTION, SOLUTION SUBCUTANEOUS at 21:03

## 2017-02-01 RX ADMIN — GABAPENTIN 200 MG: 100 CAPSULE ORAL at 17:30

## 2017-02-01 RX ADMIN — SPIRONOLACTONE 50 MG: 25 TABLET, FILM COATED ORAL at 09:00

## 2017-02-01 RX ADMIN — INSULIN LISPRO 5 UNITS: 100 INJECTION, SOLUTION INTRAVENOUS; SUBCUTANEOUS at 21:02

## 2017-02-01 RX ADMIN — GABAPENTIN 200 MG: 100 CAPSULE ORAL at 09:00

## 2017-02-01 RX ADMIN — INSULIN LISPRO 16 UNITS: 100 INJECTION, SOLUTION INTRAVENOUS; SUBCUTANEOUS at 08:59

## 2017-02-01 RX ADMIN — BUMETANIDE 1 MG: 1 TABLET ORAL at 09:00

## 2017-02-01 RX ADMIN — INSULIN LISPRO 20 UNITS: 100 INJECTION, SOLUTION INTRAVENOUS; SUBCUTANEOUS at 12:24

## 2017-02-01 RX ADMIN — LEVOTHYROXINE SODIUM 137 MCG: 112 TABLET ORAL at 05:45

## 2017-02-01 RX ADMIN — Medication 1 TABLET: at 09:00

## 2017-02-01 RX ADMIN — INSULIN GLARGINE 55 UNITS: 100 INJECTION, SOLUTION SUBCUTANEOUS at 08:59

## 2017-02-01 RX ADMIN — CLONIDINE HYDROCHLORIDE 0.3 MG: 0.1 TABLET ORAL at 21:00

## 2017-02-01 RX ADMIN — INSULIN LISPRO 24 UNITS: 100 INJECTION, SOLUTION INTRAVENOUS; SUBCUTANEOUS at 17:31

## 2017-02-01 RX ADMIN — CLOPIDOGREL BISULFATE 75 MG: 75 TABLET, FILM COATED ORAL at 09:00

## 2017-02-01 RX ADMIN — ATORVASTATIN CALCIUM 40 MG: 40 TABLET, FILM COATED ORAL at 20:59

## 2017-02-01 RX ADMIN — GABAPENTIN 300 MG: 300 CAPSULE ORAL at 21:00

## 2017-02-01 RX ADMIN — PREDNISONE 10 MG: 10 TABLET ORAL at 09:00

## 2017-02-01 RX ADMIN — CLONIDINE HYDROCHLORIDE 0.3 MG: 0.1 TABLET ORAL at 09:00

## 2017-02-01 RX ADMIN — ENOXAPARIN SODIUM 30 MG: 30 INJECTION SUBCUTANEOUS at 09:00

## 2017-02-01 RX ADMIN — METOPROLOL SUCCINATE 50 MG: 50 TABLET, EXTENDED RELEASE ORAL at 09:00

## 2017-02-02 ENCOUNTER — TELEPHONE (OUTPATIENT)
Dept: SURGERY | Age: 77
End: 2017-02-02

## 2017-02-02 LAB
BACTERIA SPEC CULT: NORMAL
CC UR VC: NORMAL
SERVICE CMNT-IMP: NORMAL

## 2017-02-02 PROCEDURE — 74011250636 HC RX REV CODE- 250/636: Performed by: PHYSICAL MEDICINE & REHABILITATION

## 2017-02-02 PROCEDURE — 74011250637 HC RX REV CODE- 250/637: Performed by: PHYSICAL MEDICINE & REHABILITATION

## 2017-02-02 PROCEDURE — 74011636637 HC RX REV CODE- 636/637: Performed by: PHYSICAL MEDICINE & REHABILITATION

## 2017-02-02 RX ORDER — LOPERAMIDE HYDROCHLORIDE 2 MG/1
2 CAPSULE ORAL
Status: DISCONTINUED | OUTPATIENT
Start: 2017-02-02 | End: 2017-02-21 | Stop reason: HOSPADM

## 2017-02-02 RX ADMIN — INSULIN LISPRO 6 UNITS: 100 INJECTION, SOLUTION INTRAVENOUS; SUBCUTANEOUS at 21:16

## 2017-02-02 RX ADMIN — PREDNISONE 10 MG: 10 TABLET ORAL at 08:42

## 2017-02-02 RX ADMIN — ATORVASTATIN CALCIUM 40 MG: 40 TABLET, FILM COATED ORAL at 21:17

## 2017-02-02 RX ADMIN — LOPERAMIDE HYDROCHLORIDE 2 MG: 2 CAPSULE ORAL at 14:45

## 2017-02-02 RX ADMIN — GABAPENTIN 300 MG: 300 CAPSULE ORAL at 21:17

## 2017-02-02 RX ADMIN — GABAPENTIN 200 MG: 100 CAPSULE ORAL at 17:40

## 2017-02-02 RX ADMIN — BUMETANIDE 1 MG: 1 TABLET ORAL at 08:44

## 2017-02-02 RX ADMIN — LEVOTHYROXINE SODIUM 137 MCG: 112 TABLET ORAL at 05:24

## 2017-02-02 RX ADMIN — CLONIDINE HYDROCHLORIDE 0.3 MG: 0.1 TABLET ORAL at 08:45

## 2017-02-02 RX ADMIN — VALSARTAN 160 MG: 160 TABLET, FILM COATED ORAL at 08:42

## 2017-02-02 RX ADMIN — SPIRONOLACTONE 50 MG: 25 TABLET, FILM COATED ORAL at 08:45

## 2017-02-02 RX ADMIN — INSULIN LISPRO 12 UNITS: 100 INJECTION, SOLUTION INTRAVENOUS; SUBCUTANEOUS at 17:40

## 2017-02-02 RX ADMIN — Medication 1 TABLET: at 08:45

## 2017-02-02 RX ADMIN — ENOXAPARIN SODIUM 30 MG: 30 INJECTION SUBCUTANEOUS at 08:42

## 2017-02-02 RX ADMIN — METOPROLOL SUCCINATE 50 MG: 50 TABLET, EXTENDED RELEASE ORAL at 08:45

## 2017-02-02 RX ADMIN — INSULIN GLARGINE 60 UNITS: 100 INJECTION, SOLUTION SUBCUTANEOUS at 08:43

## 2017-02-02 RX ADMIN — CLOPIDOGREL BISULFATE 75 MG: 75 TABLET, FILM COATED ORAL at 08:42

## 2017-02-02 RX ADMIN — INSULIN LISPRO 12 UNITS: 100 INJECTION, SOLUTION INTRAVENOUS; SUBCUTANEOUS at 08:44

## 2017-02-02 RX ADMIN — CLONIDINE HYDROCHLORIDE 0.3 MG: 0.1 TABLET ORAL at 21:17

## 2017-02-02 RX ADMIN — INSULIN LISPRO 12 UNITS: 100 INJECTION, SOLUTION INTRAVENOUS; SUBCUTANEOUS at 12:23

## 2017-02-02 RX ADMIN — GABAPENTIN 200 MG: 100 CAPSULE ORAL at 08:43

## 2017-02-02 RX ADMIN — INSULIN GLARGINE 60 UNITS: 100 INJECTION, SOLUTION SUBCUTANEOUS at 21:15

## 2017-02-02 NOTE — TELEPHONE ENCOUNTER
Patient calling to let Dr. Celina Kelly know she has had a slight stroke on the left side. Patient is at [address] in room [address].  Please call patient after 4:00pm.

## 2017-02-03 LAB
ANION GAP BLD CALC-SCNC: 9 MMOL/L (ref 5–15)
BUN SERPL-MCNC: 105 MG/DL (ref 6–20)
BUN/CREAT SERPL: 39 (ref 12–20)
CALCIUM SERPL-MCNC: 8.1 MG/DL (ref 8.5–10.1)
CHLORIDE SERPL-SCNC: 102 MMOL/L (ref 97–108)
CO2 SERPL-SCNC: 24 MMOL/L (ref 21–32)
CREAT SERPL-MCNC: 2.66 MG/DL (ref 0.55–1.02)
ERYTHROCYTE [DISTWIDTH] IN BLOOD BY AUTOMATED COUNT: 14.5 % (ref 11.5–14.5)
GLUCOSE SERPL-MCNC: 254 MG/DL (ref 65–100)
HCT VFR BLD AUTO: 25.6 % (ref 35–47)
HGB BLD-MCNC: 8.6 G/DL (ref 11.5–16)
MCH RBC QN AUTO: 29.3 PG (ref 26–34)
MCHC RBC AUTO-ENTMCNC: 33.6 G/DL (ref 30–36.5)
MCV RBC AUTO: 87.1 FL (ref 80–99)
PLATELET # BLD AUTO: 268 K/UL (ref 150–400)
POTASSIUM SERPL-SCNC: 6 MMOL/L (ref 3.5–5.1)
RBC # BLD AUTO: 2.94 M/UL (ref 3.8–5.2)
SODIUM SERPL-SCNC: 135 MMOL/L (ref 136–145)
WBC # BLD AUTO: 6.1 K/UL (ref 3.6–11)

## 2017-02-03 PROCEDURE — 85027 COMPLETE CBC AUTOMATED: CPT | Performed by: PHYSICAL MEDICINE & REHABILITATION

## 2017-02-03 PROCEDURE — 80048 BASIC METABOLIC PNL TOTAL CA: CPT | Performed by: PHYSICAL MEDICINE & REHABILITATION

## 2017-02-03 PROCEDURE — 74011250637 HC RX REV CODE- 250/637: Performed by: PHYSICAL MEDICINE & REHABILITATION

## 2017-02-03 PROCEDURE — 36415 COLL VENOUS BLD VENIPUNCTURE: CPT | Performed by: PHYSICAL MEDICINE & REHABILITATION

## 2017-02-03 PROCEDURE — 74011250636 HC RX REV CODE- 250/636: Performed by: PHYSICAL MEDICINE & REHABILITATION

## 2017-02-03 PROCEDURE — 74011636637 HC RX REV CODE- 636/637: Performed by: PHYSICAL MEDICINE & REHABILITATION

## 2017-02-03 RX ORDER — SODIUM POLYSTYRENE SULFONATE 15 G/60ML
15 SUSPENSION ORAL; RECTAL
Status: COMPLETED | OUTPATIENT
Start: 2017-02-03 | End: 2017-02-03

## 2017-02-03 RX ORDER — INSULIN GLARGINE 100 [IU]/ML
45 INJECTION, SOLUTION SUBCUTANEOUS EVERY 12 HOURS
Status: DISCONTINUED | OUTPATIENT
Start: 2017-02-03 | End: 2017-02-06

## 2017-02-03 RX ORDER — SODIUM CHLORIDE 0.9 % (FLUSH) 0.9 %
5 SYRINGE (ML) INJECTION AS NEEDED
Status: DISCONTINUED | OUTPATIENT
Start: 2017-02-03 | End: 2017-02-07

## 2017-02-03 RX ORDER — LINEZOLID 600 MG/1
600 TABLET, FILM COATED ORAL DAILY
Status: DISCONTINUED | OUTPATIENT
Start: 2017-02-04 | End: 2017-02-03 | Stop reason: SDUPTHER

## 2017-02-03 RX ORDER — LINEZOLID 600 MG/1
600 TABLET, FILM COATED ORAL EVERY 12 HOURS
Status: COMPLETED | OUTPATIENT
Start: 2017-02-03 | End: 2017-02-10

## 2017-02-03 RX ORDER — SODIUM CHLORIDE 0.9 % (FLUSH) 0.9 %
5 SYRINGE (ML) INJECTION EVERY 8 HOURS
Status: DISCONTINUED | OUTPATIENT
Start: 2017-02-03 | End: 2017-02-07

## 2017-02-03 RX ORDER — PREDNISONE 5 MG/1
5 TABLET ORAL
Status: DISCONTINUED | OUTPATIENT
Start: 2017-02-04 | End: 2017-02-09

## 2017-02-03 RX ADMIN — LINEZOLID 600 MG: 600 TABLET, FILM COATED ORAL at 22:05

## 2017-02-03 RX ADMIN — VALSARTAN 160 MG: 160 TABLET, FILM COATED ORAL at 08:20

## 2017-02-03 RX ADMIN — SODIUM POLYSTYRENE SULFONATE 15 G: 15 SUSPENSION ORAL; RECTAL at 12:19

## 2017-02-03 RX ADMIN — CLOPIDOGREL BISULFATE 75 MG: 75 TABLET, FILM COATED ORAL at 08:21

## 2017-02-03 RX ADMIN — ATORVASTATIN CALCIUM 40 MG: 40 TABLET, FILM COATED ORAL at 22:04

## 2017-02-03 RX ADMIN — INSULIN GLARGINE 45 UNITS: 100 INJECTION, SOLUTION SUBCUTANEOUS at 22:05

## 2017-02-03 RX ADMIN — INSULIN LISPRO 20 UNITS: 100 INJECTION, SOLUTION INTRAVENOUS; SUBCUTANEOUS at 08:20

## 2017-02-03 RX ADMIN — CLONIDINE HYDROCHLORIDE 0.3 MG: 0.1 TABLET ORAL at 08:20

## 2017-02-03 RX ADMIN — LEVOTHYROXINE SODIUM 137 MCG: 112 TABLET ORAL at 05:30

## 2017-02-03 RX ADMIN — BUMETANIDE 1 MG: 1 TABLET ORAL at 08:21

## 2017-02-03 RX ADMIN — METOPROLOL SUCCINATE 50 MG: 50 TABLET, EXTENDED RELEASE ORAL at 08:21

## 2017-02-03 RX ADMIN — INSULIN GLARGINE 60 UNITS: 100 INJECTION, SOLUTION SUBCUTANEOUS at 08:19

## 2017-02-03 RX ADMIN — Medication 5 ML: at 14:59

## 2017-02-03 RX ADMIN — ERYTHROPOIETIN 10000 UNITS: 10000 INJECTION, SOLUTION INTRAVENOUS; SUBCUTANEOUS at 17:09

## 2017-02-03 RX ADMIN — GABAPENTIN 200 MG: 100 CAPSULE ORAL at 17:05

## 2017-02-03 RX ADMIN — Medication 5 ML: at 22:02

## 2017-02-03 RX ADMIN — ENOXAPARIN SODIUM 30 MG: 30 INJECTION SUBCUTANEOUS at 08:20

## 2017-02-03 RX ADMIN — CLONIDINE HYDROCHLORIDE 0.3 MG: 0.1 TABLET ORAL at 22:04

## 2017-02-03 RX ADMIN — GABAPENTIN 200 MG: 100 CAPSULE ORAL at 08:20

## 2017-02-03 RX ADMIN — INSULIN LISPRO 14 UNITS: 100 INJECTION, SOLUTION INTRAVENOUS; SUBCUTANEOUS at 12:19

## 2017-02-03 RX ADMIN — PREDNISONE 10 MG: 10 TABLET ORAL at 08:21

## 2017-02-03 RX ADMIN — INSULIN LISPRO 3 UNITS: 100 INJECTION, SOLUTION INTRAVENOUS; SUBCUTANEOUS at 22:06

## 2017-02-03 RX ADMIN — SPIRONOLACTONE 50 MG: 25 TABLET, FILM COATED ORAL at 08:20

## 2017-02-03 RX ADMIN — GABAPENTIN 300 MG: 300 CAPSULE ORAL at 22:04

## 2017-02-03 RX ADMIN — SODIUM CHLORIDE 1000 ML: 9 INJECTION, SOLUTION INTRAVENOUS at 12:19

## 2017-02-03 RX ADMIN — INSULIN LISPRO 24 UNITS: 100 INJECTION, SOLUTION INTRAVENOUS; SUBCUTANEOUS at 17:01

## 2017-02-03 NOTE — TELEPHONE ENCOUNTER
Spoke to pt, advised as listed below, per MD.   Patient verbalized understanding and will call back for an appointment once she is home.        US still suggests GB wall thickening and stones   Still needs to consider removal but get over stroke first     Talisha Aaron MD FACS

## 2017-02-04 LAB
ANION GAP BLD CALC-SCNC: 10 MMOL/L (ref 5–15)
BUN SERPL-MCNC: 96 MG/DL (ref 6–20)
BUN/CREAT SERPL: 42 (ref 12–20)
CALCIUM SERPL-MCNC: 7.7 MG/DL (ref 8.5–10.1)
CHLORIDE SERPL-SCNC: 108 MMOL/L (ref 97–108)
CO2 SERPL-SCNC: 22 MMOL/L (ref 21–32)
CREAT SERPL-MCNC: 2.27 MG/DL (ref 0.55–1.02)
ERYTHROCYTE [DISTWIDTH] IN BLOOD BY AUTOMATED COUNT: 14.6 % (ref 11.5–14.5)
GLUCOSE SERPL-MCNC: 74 MG/DL (ref 65–100)
HCT VFR BLD AUTO: 25.1 % (ref 35–47)
HGB BLD-MCNC: 8.5 G/DL (ref 11.5–16)
MCH RBC QN AUTO: 29.8 PG (ref 26–34)
MCHC RBC AUTO-ENTMCNC: 33.9 G/DL (ref 30–36.5)
MCV RBC AUTO: 88.1 FL (ref 80–99)
PLATELET # BLD AUTO: 254 K/UL (ref 150–400)
POTASSIUM SERPL-SCNC: 5 MMOL/L (ref 3.5–5.1)
RBC # BLD AUTO: 2.85 M/UL (ref 3.8–5.2)
SODIUM SERPL-SCNC: 140 MMOL/L (ref 136–145)
WBC # BLD AUTO: 4.8 K/UL (ref 3.6–11)

## 2017-02-04 PROCEDURE — 36415 COLL VENOUS BLD VENIPUNCTURE: CPT | Performed by: PHYSICAL MEDICINE & REHABILITATION

## 2017-02-04 PROCEDURE — 74011250637 HC RX REV CODE- 250/637: Performed by: PHYSICAL MEDICINE & REHABILITATION

## 2017-02-04 PROCEDURE — 74011250636 HC RX REV CODE- 250/636: Performed by: PHYSICAL MEDICINE & REHABILITATION

## 2017-02-04 PROCEDURE — 80048 BASIC METABOLIC PNL TOTAL CA: CPT | Performed by: PHYSICAL MEDICINE & REHABILITATION

## 2017-02-04 PROCEDURE — 85027 COMPLETE CBC AUTOMATED: CPT | Performed by: PHYSICAL MEDICINE & REHABILITATION

## 2017-02-04 PROCEDURE — 74011636637 HC RX REV CODE- 636/637: Performed by: PHYSICAL MEDICINE & REHABILITATION

## 2017-02-04 RX ADMIN — CLOPIDOGREL BISULFATE 75 MG: 75 TABLET, FILM COATED ORAL at 09:29

## 2017-02-04 RX ADMIN — OXYCODONE HYDROCHLORIDE 10 MG: 5 TABLET ORAL at 14:25

## 2017-02-04 RX ADMIN — METOPROLOL SUCCINATE 50 MG: 50 TABLET, EXTENDED RELEASE ORAL at 09:29

## 2017-02-04 RX ADMIN — Medication 5 ML: at 14:25

## 2017-02-04 RX ADMIN — Medication 5 ML: at 20:56

## 2017-02-04 RX ADMIN — INSULIN LISPRO 20 UNITS: 100 INJECTION, SOLUTION INTRAVENOUS; SUBCUTANEOUS at 12:21

## 2017-02-04 RX ADMIN — SODIUM CHLORIDE 1000 ML: 9 INJECTION, SOLUTION INTRAVENOUS at 19:14

## 2017-02-04 RX ADMIN — PREDNISONE 5 MG: 5 TABLET ORAL at 09:29

## 2017-02-04 RX ADMIN — CLONIDINE HYDROCHLORIDE 0.3 MG: 0.1 TABLET ORAL at 20:55

## 2017-02-04 RX ADMIN — ATORVASTATIN CALCIUM 40 MG: 40 TABLET, FILM COATED ORAL at 20:56

## 2017-02-04 RX ADMIN — Medication 5 ML: at 05:44

## 2017-02-04 RX ADMIN — GABAPENTIN 200 MG: 100 CAPSULE ORAL at 17:54

## 2017-02-04 RX ADMIN — LINEZOLID 600 MG: 600 TABLET, FILM COATED ORAL at 20:55

## 2017-02-04 RX ADMIN — INSULIN LISPRO 20 UNITS: 100 INJECTION, SOLUTION INTRAVENOUS; SUBCUTANEOUS at 17:53

## 2017-02-04 RX ADMIN — VALSARTAN 160 MG: 160 TABLET, FILM COATED ORAL at 09:29

## 2017-02-04 RX ADMIN — CLONIDINE HYDROCHLORIDE 0.3 MG: 0.1 TABLET ORAL at 09:29

## 2017-02-04 RX ADMIN — LINEZOLID 600 MG: 600 TABLET, FILM COATED ORAL at 09:31

## 2017-02-04 RX ADMIN — INSULIN GLARGINE 45 UNITS: 100 INJECTION, SOLUTION SUBCUTANEOUS at 09:31

## 2017-02-04 RX ADMIN — ENOXAPARIN SODIUM 30 MG: 30 INJECTION SUBCUTANEOUS at 09:29

## 2017-02-04 RX ADMIN — GABAPENTIN 300 MG: 300 CAPSULE ORAL at 20:56

## 2017-02-04 RX ADMIN — INSULIN GLARGINE 45 UNITS: 100 INJECTION, SOLUTION SUBCUTANEOUS at 20:55

## 2017-02-04 RX ADMIN — GABAPENTIN 200 MG: 100 CAPSULE ORAL at 09:29

## 2017-02-04 RX ADMIN — LEVOTHYROXINE SODIUM 137 MCG: 112 TABLET ORAL at 05:43

## 2017-02-05 LAB
ANION GAP BLD CALC-SCNC: 10 MMOL/L (ref 5–15)
BUN SERPL-MCNC: 89 MG/DL (ref 6–20)
BUN/CREAT SERPL: 37 (ref 12–20)
CALCIUM SERPL-MCNC: 7.8 MG/DL (ref 8.5–10.1)
CHLORIDE SERPL-SCNC: 108 MMOL/L (ref 97–108)
CO2 SERPL-SCNC: 23 MMOL/L (ref 21–32)
CREAT SERPL-MCNC: 2.38 MG/DL (ref 0.55–1.02)
GLUCOSE SERPL-MCNC: 137 MG/DL (ref 65–100)
POTASSIUM SERPL-SCNC: 5.4 MMOL/L (ref 3.5–5.1)
SODIUM SERPL-SCNC: 141 MMOL/L (ref 136–145)

## 2017-02-05 PROCEDURE — 80048 BASIC METABOLIC PNL TOTAL CA: CPT | Performed by: PHYSICAL MEDICINE & REHABILITATION

## 2017-02-05 PROCEDURE — 74011250636 HC RX REV CODE- 250/636: Performed by: PHYSICAL MEDICINE & REHABILITATION

## 2017-02-05 PROCEDURE — 74011250637 HC RX REV CODE- 250/637: Performed by: PHYSICAL MEDICINE & REHABILITATION

## 2017-02-05 PROCEDURE — 74011636637 HC RX REV CODE- 636/637: Performed by: PHYSICAL MEDICINE & REHABILITATION

## 2017-02-05 PROCEDURE — 36415 COLL VENOUS BLD VENIPUNCTURE: CPT | Performed by: PHYSICAL MEDICINE & REHABILITATION

## 2017-02-05 RX ORDER — SODIUM CHLORIDE 9 MG/ML
75 INJECTION, SOLUTION INTRAVENOUS DAILY
Status: DISCONTINUED | OUTPATIENT
Start: 2017-02-05 | End: 2017-02-06

## 2017-02-05 RX ORDER — SODIUM CHLORIDE 9 MG/ML
75 INJECTION, SOLUTION INTRAVENOUS DAILY
Status: DISCONTINUED | OUTPATIENT
Start: 2017-02-06 | End: 2017-02-05 | Stop reason: SDUPTHER

## 2017-02-05 RX ORDER — MAGNESIUM CITRATE
148 SOLUTION, ORAL ORAL ONCE
Status: COMPLETED | OUTPATIENT
Start: 2017-02-05 | End: 2017-02-05

## 2017-02-05 RX ADMIN — CLONIDINE HYDROCHLORIDE 0.3 MG: 0.1 TABLET ORAL at 20:51

## 2017-02-05 RX ADMIN — CLONIDINE HYDROCHLORIDE 0.3 MG: 0.1 TABLET ORAL at 09:04

## 2017-02-05 RX ADMIN — LINEZOLID 600 MG: 600 TABLET, FILM COATED ORAL at 20:48

## 2017-02-05 RX ADMIN — VALSARTAN 160 MG: 160 TABLET, FILM COATED ORAL at 09:04

## 2017-02-05 RX ADMIN — LEVOTHYROXINE SODIUM 137 MCG: 112 TABLET ORAL at 06:39

## 2017-02-05 RX ADMIN — OXYCODONE HYDROCHLORIDE 10 MG: 5 TABLET ORAL at 12:16

## 2017-02-05 RX ADMIN — METOPROLOL SUCCINATE 50 MG: 50 TABLET, EXTENDED RELEASE ORAL at 09:04

## 2017-02-05 RX ADMIN — PREDNISONE 5 MG: 5 TABLET ORAL at 09:04

## 2017-02-05 RX ADMIN — INSULIN GLARGINE 45 UNITS: 100 INJECTION, SOLUTION SUBCUTANEOUS at 09:04

## 2017-02-05 RX ADMIN — Medication 5 ML: at 20:52

## 2017-02-05 RX ADMIN — INSULIN LISPRO 12 UNITS: 100 INJECTION, SOLUTION INTRAVENOUS; SUBCUTANEOUS at 09:05

## 2017-02-05 RX ADMIN — Medication 5 ML: at 15:40

## 2017-02-05 RX ADMIN — CLOPIDOGREL BISULFATE 75 MG: 75 TABLET, FILM COATED ORAL at 09:04

## 2017-02-05 RX ADMIN — SODIUM CHLORIDE 75 ML/HR: 9 INJECTION, SOLUTION INTRAVENOUS at 18:35

## 2017-02-05 RX ADMIN — INSULIN LISPRO 12 UNITS: 100 INJECTION, SOLUTION INTRAVENOUS; SUBCUTANEOUS at 17:17

## 2017-02-05 RX ADMIN — GABAPENTIN 200 MG: 100 CAPSULE ORAL at 17:17

## 2017-02-05 RX ADMIN — BISACODYL 10 MG: 10 SUPPOSITORY RECTAL at 18:19

## 2017-02-05 RX ADMIN — SORBITOL SOLUTION (BULK) 30 ML: 70 SOLUTION at 17:18

## 2017-02-05 RX ADMIN — INSULIN LISPRO 16 UNITS: 100 INJECTION, SOLUTION INTRAVENOUS; SUBCUTANEOUS at 11:59

## 2017-02-05 RX ADMIN — GABAPENTIN 300 MG: 300 CAPSULE ORAL at 20:52

## 2017-02-05 RX ADMIN — ENOXAPARIN SODIUM 30 MG: 30 INJECTION SUBCUTANEOUS at 09:04

## 2017-02-05 RX ADMIN — LINEZOLID 600 MG: 600 TABLET, FILM COATED ORAL at 09:04

## 2017-02-05 RX ADMIN — Medication 5 ML: at 06:39

## 2017-02-05 RX ADMIN — INSULIN GLARGINE 45 UNITS: 100 INJECTION, SOLUTION SUBCUTANEOUS at 20:53

## 2017-02-05 RX ADMIN — ATORVASTATIN CALCIUM 40 MG: 40 TABLET, FILM COATED ORAL at 20:52

## 2017-02-05 RX ADMIN — MAGESIUM CITRATE 148 ML: 1.75 LIQUID ORAL at 15:40

## 2017-02-05 RX ADMIN — GABAPENTIN 200 MG: 100 CAPSULE ORAL at 09:04

## 2017-02-06 PROCEDURE — 74011250636 HC RX REV CODE- 250/636: Performed by: PHYSICAL MEDICINE & REHABILITATION

## 2017-02-06 PROCEDURE — 74011636637 HC RX REV CODE- 636/637: Performed by: PHYSICAL MEDICINE & REHABILITATION

## 2017-02-06 PROCEDURE — 74011250637 HC RX REV CODE- 250/637: Performed by: PHYSICAL MEDICINE & REHABILITATION

## 2017-02-06 RX ORDER — INSULIN GLARGINE 100 [IU]/ML
30 INJECTION, SOLUTION SUBCUTANEOUS EVERY 12 HOURS
Status: DISCONTINUED | OUTPATIENT
Start: 2017-02-06 | End: 2017-02-08

## 2017-02-06 RX ADMIN — METOPROLOL SUCCINATE 50 MG: 50 TABLET, EXTENDED RELEASE ORAL at 08:03

## 2017-02-06 RX ADMIN — ATORVASTATIN CALCIUM 40 MG: 40 TABLET, FILM COATED ORAL at 22:34

## 2017-02-06 RX ADMIN — INSULIN GLARGINE 30 UNITS: 100 INJECTION, SOLUTION SUBCUTANEOUS at 22:40

## 2017-02-06 RX ADMIN — LINEZOLID 600 MG: 600 TABLET, FILM COATED ORAL at 08:02

## 2017-02-06 RX ADMIN — LINEZOLID 600 MG: 600 TABLET, FILM COATED ORAL at 22:31

## 2017-02-06 RX ADMIN — GABAPENTIN 200 MG: 100 CAPSULE ORAL at 17:32

## 2017-02-06 RX ADMIN — INSULIN GLARGINE 45 UNITS: 100 INJECTION, SOLUTION SUBCUTANEOUS at 08:03

## 2017-02-06 RX ADMIN — INSULIN LISPRO 24 UNITS: 100 INJECTION, SOLUTION INTRAVENOUS; SUBCUTANEOUS at 17:30

## 2017-02-06 RX ADMIN — GABAPENTIN 200 MG: 100 CAPSULE ORAL at 08:02

## 2017-02-06 RX ADMIN — ENOXAPARIN SODIUM 30 MG: 30 INJECTION SUBCUTANEOUS at 08:02

## 2017-02-06 RX ADMIN — Medication 5 ML: at 06:07

## 2017-02-06 RX ADMIN — PREDNISONE 5 MG: 5 TABLET ORAL at 08:03

## 2017-02-06 RX ADMIN — CLOPIDOGREL BISULFATE 75 MG: 75 TABLET, FILM COATED ORAL at 08:03

## 2017-02-06 RX ADMIN — Medication 5 ML: at 13:05

## 2017-02-06 RX ADMIN — GABAPENTIN 300 MG: 300 CAPSULE ORAL at 22:34

## 2017-02-06 RX ADMIN — LEVOTHYROXINE SODIUM 137 MCG: 112 TABLET ORAL at 06:06

## 2017-02-06 RX ADMIN — CLONIDINE HYDROCHLORIDE 0.3 MG: 0.1 TABLET ORAL at 22:34

## 2017-02-06 RX ADMIN — INSULIN LISPRO 16 UNITS: 100 INJECTION, SOLUTION INTRAVENOUS; SUBCUTANEOUS at 12:28

## 2017-02-07 LAB
ANION GAP BLD CALC-SCNC: 7 MMOL/L (ref 5–15)
BUN SERPL-MCNC: 67 MG/DL (ref 6–20)
BUN/CREAT SERPL: 30 (ref 12–20)
CALCIUM SERPL-MCNC: 7.8 MG/DL (ref 8.5–10.1)
CHLORIDE SERPL-SCNC: 111 MMOL/L (ref 97–108)
CO2 SERPL-SCNC: 24 MMOL/L (ref 21–32)
CREAT SERPL-MCNC: 2.27 MG/DL (ref 0.55–1.02)
GLUCOSE SERPL-MCNC: 122 MG/DL (ref 65–100)
POTASSIUM SERPL-SCNC: 5.3 MMOL/L (ref 3.5–5.1)
SODIUM SERPL-SCNC: 142 MMOL/L (ref 136–145)

## 2017-02-07 PROCEDURE — 36415 COLL VENOUS BLD VENIPUNCTURE: CPT | Performed by: PHYSICAL MEDICINE & REHABILITATION

## 2017-02-07 PROCEDURE — 80048 BASIC METABOLIC PNL TOTAL CA: CPT | Performed by: PHYSICAL MEDICINE & REHABILITATION

## 2017-02-07 PROCEDURE — 74011636637 HC RX REV CODE- 636/637: Performed by: PHYSICAL MEDICINE & REHABILITATION

## 2017-02-07 PROCEDURE — 74011250637 HC RX REV CODE- 250/637: Performed by: PHYSICAL MEDICINE & REHABILITATION

## 2017-02-07 PROCEDURE — 74011250636 HC RX REV CODE- 250/636: Performed by: PHYSICAL MEDICINE & REHABILITATION

## 2017-02-07 RX ADMIN — PREDNISONE 5 MG: 5 TABLET ORAL at 08:34

## 2017-02-07 RX ADMIN — CLOPIDOGREL BISULFATE 75 MG: 75 TABLET, FILM COATED ORAL at 08:34

## 2017-02-07 RX ADMIN — ENOXAPARIN SODIUM 30 MG: 30 INJECTION SUBCUTANEOUS at 08:33

## 2017-02-07 RX ADMIN — INSULIN GLARGINE 30 UNITS: 100 INJECTION, SOLUTION SUBCUTANEOUS at 08:33

## 2017-02-07 RX ADMIN — LEVOTHYROXINE SODIUM 137 MCG: 112 TABLET ORAL at 05:42

## 2017-02-07 RX ADMIN — INSULIN LISPRO 12 UNITS: 100 INJECTION, SOLUTION INTRAVENOUS; SUBCUTANEOUS at 08:32

## 2017-02-07 RX ADMIN — GABAPENTIN 200 MG: 100 CAPSULE ORAL at 17:30

## 2017-02-07 RX ADMIN — GABAPENTIN 300 MG: 300 CAPSULE ORAL at 21:19

## 2017-02-07 RX ADMIN — LINEZOLID 600 MG: 600 TABLET, FILM COATED ORAL at 08:34

## 2017-02-07 RX ADMIN — METOPROLOL SUCCINATE 50 MG: 50 TABLET, EXTENDED RELEASE ORAL at 08:34

## 2017-02-07 RX ADMIN — INSULIN LISPRO 12 UNITS: 100 INJECTION, SOLUTION INTRAVENOUS; SUBCUTANEOUS at 12:30

## 2017-02-07 RX ADMIN — LINEZOLID 600 MG: 600 TABLET, FILM COATED ORAL at 21:19

## 2017-02-07 RX ADMIN — CLONIDINE HYDROCHLORIDE 0.3 MG: 0.1 TABLET ORAL at 21:19

## 2017-02-07 RX ADMIN — ATORVASTATIN CALCIUM 40 MG: 40 TABLET, FILM COATED ORAL at 21:19

## 2017-02-07 RX ADMIN — INSULIN GLARGINE 30 UNITS: 100 INJECTION, SOLUTION SUBCUTANEOUS at 21:20

## 2017-02-07 RX ADMIN — CLONIDINE HYDROCHLORIDE 0.3 MG: 0.1 TABLET ORAL at 08:34

## 2017-02-07 RX ADMIN — GABAPENTIN 200 MG: 100 CAPSULE ORAL at 08:33

## 2017-02-07 RX ADMIN — Medication 1 CAPSULE: at 12:30

## 2017-02-07 RX ADMIN — VALSARTAN 160 MG: 160 TABLET, FILM COATED ORAL at 08:33

## 2017-02-07 RX ADMIN — INSULIN LISPRO 12 UNITS: 100 INJECTION, SOLUTION INTRAVENOUS; SUBCUTANEOUS at 17:30

## 2017-02-08 PROCEDURE — 74011250637 HC RX REV CODE- 250/637: Performed by: PHYSICAL MEDICINE & REHABILITATION

## 2017-02-08 PROCEDURE — 74011636637 HC RX REV CODE- 636/637: Performed by: PHYSICAL MEDICINE & REHABILITATION

## 2017-02-08 PROCEDURE — 74011250636 HC RX REV CODE- 250/636: Performed by: PHYSICAL MEDICINE & REHABILITATION

## 2017-02-08 RX ORDER — INSULIN GLARGINE 100 [IU]/ML
25 INJECTION, SOLUTION SUBCUTANEOUS EVERY 12 HOURS
Status: DISCONTINUED | OUTPATIENT
Start: 2017-02-08 | End: 2017-02-11

## 2017-02-08 RX ADMIN — VALSARTAN 160 MG: 160 TABLET, FILM COATED ORAL at 08:59

## 2017-02-08 RX ADMIN — INSULIN LISPRO 10 UNITS: 100 INJECTION, SOLUTION INTRAVENOUS; SUBCUTANEOUS at 17:26

## 2017-02-08 RX ADMIN — ENOXAPARIN SODIUM 30 MG: 30 INJECTION SUBCUTANEOUS at 09:00

## 2017-02-08 RX ADMIN — GABAPENTIN 300 MG: 300 CAPSULE ORAL at 21:41

## 2017-02-08 RX ADMIN — LINEZOLID 600 MG: 600 TABLET, FILM COATED ORAL at 22:06

## 2017-02-08 RX ADMIN — PREDNISONE 5 MG: 5 TABLET ORAL at 09:01

## 2017-02-08 RX ADMIN — Medication 1 CAPSULE: at 08:59

## 2017-02-08 RX ADMIN — CLONIDINE HYDROCHLORIDE 0.3 MG: 0.1 TABLET ORAL at 08:59

## 2017-02-08 RX ADMIN — GABAPENTIN 200 MG: 100 CAPSULE ORAL at 08:59

## 2017-02-08 RX ADMIN — CLONIDINE HYDROCHLORIDE 0.3 MG: 0.1 TABLET ORAL at 21:40

## 2017-02-08 RX ADMIN — CLOPIDOGREL BISULFATE 75 MG: 75 TABLET, FILM COATED ORAL at 08:59

## 2017-02-08 RX ADMIN — INSULIN LISPRO 12 UNITS: 100 INJECTION, SOLUTION INTRAVENOUS; SUBCUTANEOUS at 12:51

## 2017-02-08 RX ADMIN — INSULIN GLARGINE 25 UNITS: 100 INJECTION, SOLUTION SUBCUTANEOUS at 21:41

## 2017-02-08 RX ADMIN — LEVOTHYROXINE SODIUM 137 MCG: 112 TABLET ORAL at 06:16

## 2017-02-08 RX ADMIN — LINEZOLID 600 MG: 600 TABLET, FILM COATED ORAL at 09:05

## 2017-02-08 RX ADMIN — METOPROLOL SUCCINATE 50 MG: 50 TABLET, EXTENDED RELEASE ORAL at 08:59

## 2017-02-08 RX ADMIN — GABAPENTIN 200 MG: 100 CAPSULE ORAL at 17:27

## 2017-02-08 RX ADMIN — INSULIN GLARGINE 25 UNITS: 100 INJECTION, SOLUTION SUBCUTANEOUS at 09:01

## 2017-02-08 RX ADMIN — ATORVASTATIN CALCIUM 40 MG: 40 TABLET, FILM COATED ORAL at 21:41

## 2017-02-09 PROCEDURE — 74011250637 HC RX REV CODE- 250/637: Performed by: PHYSICAL MEDICINE & REHABILITATION

## 2017-02-09 PROCEDURE — 74011250636 HC RX REV CODE- 250/636: Performed by: PHYSICAL MEDICINE & REHABILITATION

## 2017-02-09 PROCEDURE — 74011636637 HC RX REV CODE- 636/637: Performed by: PHYSICAL MEDICINE & REHABILITATION

## 2017-02-09 RX ADMIN — ATORVASTATIN CALCIUM 40 MG: 40 TABLET, FILM COATED ORAL at 20:54

## 2017-02-09 RX ADMIN — VALSARTAN 160 MG: 160 TABLET, FILM COATED ORAL at 08:41

## 2017-02-09 RX ADMIN — LINEZOLID 600 MG: 600 TABLET, FILM COATED ORAL at 08:43

## 2017-02-09 RX ADMIN — CLONIDINE HYDROCHLORIDE 0.3 MG: 0.1 TABLET ORAL at 08:41

## 2017-02-09 RX ADMIN — METOPROLOL SUCCINATE 50 MG: 50 TABLET, EXTENDED RELEASE ORAL at 08:41

## 2017-02-09 RX ADMIN — ENOXAPARIN SODIUM 30 MG: 30 INJECTION SUBCUTANEOUS at 08:39

## 2017-02-09 RX ADMIN — CLOPIDOGREL BISULFATE 75 MG: 75 TABLET, FILM COATED ORAL at 08:41

## 2017-02-09 RX ADMIN — GABAPENTIN 200 MG: 100 CAPSULE ORAL at 16:38

## 2017-02-09 RX ADMIN — INSULIN LISPRO 14 UNITS: 100 INJECTION, SOLUTION INTRAVENOUS; SUBCUTANEOUS at 17:38

## 2017-02-09 RX ADMIN — PREDNISONE 5 MG: 5 TABLET ORAL at 08:41

## 2017-02-09 RX ADMIN — GABAPENTIN 200 MG: 100 CAPSULE ORAL at 08:40

## 2017-02-09 RX ADMIN — INSULIN GLARGINE 25 UNITS: 100 INJECTION, SOLUTION SUBCUTANEOUS at 08:42

## 2017-02-09 RX ADMIN — LEVOTHYROXINE SODIUM 137 MCG: 112 TABLET ORAL at 06:19

## 2017-02-09 RX ADMIN — INSULIN LISPRO 12 UNITS: 100 INJECTION, SOLUTION INTRAVENOUS; SUBCUTANEOUS at 12:23

## 2017-02-09 RX ADMIN — CLONIDINE HYDROCHLORIDE 0.3 MG: 0.1 TABLET ORAL at 20:53

## 2017-02-09 RX ADMIN — INSULIN GLARGINE 25 UNITS: 100 INJECTION, SOLUTION SUBCUTANEOUS at 20:54

## 2017-02-09 RX ADMIN — GABAPENTIN 300 MG: 300 CAPSULE ORAL at 20:54

## 2017-02-09 RX ADMIN — Medication 1 CAPSULE: at 08:41

## 2017-02-09 RX ADMIN — LINEZOLID 600 MG: 600 TABLET, FILM COATED ORAL at 21:00

## 2017-02-10 PROCEDURE — 74011250637 HC RX REV CODE- 250/637: Performed by: PHYSICAL MEDICINE & REHABILITATION

## 2017-02-10 PROCEDURE — 74011250636 HC RX REV CODE- 250/636: Performed by: PHYSICAL MEDICINE & REHABILITATION

## 2017-02-10 PROCEDURE — 74011636637 HC RX REV CODE- 636/637: Performed by: PHYSICAL MEDICINE & REHABILITATION

## 2017-02-10 RX ADMIN — METOPROLOL SUCCINATE 50 MG: 50 TABLET, EXTENDED RELEASE ORAL at 08:53

## 2017-02-10 RX ADMIN — CLONIDINE HYDROCHLORIDE 0.3 MG: 0.1 TABLET ORAL at 21:51

## 2017-02-10 RX ADMIN — GABAPENTIN 300 MG: 300 CAPSULE ORAL at 21:51

## 2017-02-10 RX ADMIN — ENOXAPARIN SODIUM 30 MG: 30 INJECTION SUBCUTANEOUS at 08:53

## 2017-02-10 RX ADMIN — GABAPENTIN 200 MG: 100 CAPSULE ORAL at 17:11

## 2017-02-10 RX ADMIN — INSULIN LISPRO 12 UNITS: 100 INJECTION, SOLUTION INTRAVENOUS; SUBCUTANEOUS at 12:12

## 2017-02-10 RX ADMIN — GABAPENTIN 200 MG: 100 CAPSULE ORAL at 08:53

## 2017-02-10 RX ADMIN — INSULIN GLARGINE 25 UNITS: 100 INJECTION, SOLUTION SUBCUTANEOUS at 21:49

## 2017-02-10 RX ADMIN — ERYTHROPOIETIN 10000 UNITS: 10000 INJECTION, SOLUTION INTRAVENOUS; SUBCUTANEOUS at 17:35

## 2017-02-10 RX ADMIN — Medication 1 CAPSULE: at 08:53

## 2017-02-10 RX ADMIN — LEVOTHYROXINE SODIUM 137 MCG: 112 TABLET ORAL at 06:11

## 2017-02-10 RX ADMIN — INSULIN GLARGINE 25 UNITS: 100 INJECTION, SOLUTION SUBCUTANEOUS at 08:53

## 2017-02-10 RX ADMIN — CLONIDINE HYDROCHLORIDE 0.3 MG: 0.1 TABLET ORAL at 08:53

## 2017-02-10 RX ADMIN — ATORVASTATIN CALCIUM 40 MG: 40 TABLET, FILM COATED ORAL at 21:52

## 2017-02-10 RX ADMIN — INSULIN LISPRO 12 UNITS: 100 INJECTION, SOLUTION INTRAVENOUS; SUBCUTANEOUS at 08:54

## 2017-02-10 RX ADMIN — LINEZOLID 600 MG: 600 TABLET, FILM COATED ORAL at 08:57

## 2017-02-10 RX ADMIN — VALSARTAN 160 MG: 160 TABLET, FILM COATED ORAL at 08:53

## 2017-02-10 RX ADMIN — CLOPIDOGREL BISULFATE 75 MG: 75 TABLET, FILM COATED ORAL at 08:53

## 2017-02-10 RX ADMIN — ACETAMINOPHEN 650 MG: 325 TABLET, FILM COATED ORAL at 17:14

## 2017-02-11 PROCEDURE — 74011250637 HC RX REV CODE- 250/637: Performed by: PHYSICAL MEDICINE & REHABILITATION

## 2017-02-11 PROCEDURE — 74011250636 HC RX REV CODE- 250/636: Performed by: PHYSICAL MEDICINE & REHABILITATION

## 2017-02-11 PROCEDURE — 74011636637 HC RX REV CODE- 636/637: Performed by: PHYSICAL MEDICINE & REHABILITATION

## 2017-02-11 RX ORDER — INSULIN GLARGINE 100 [IU]/ML
20 INJECTION, SOLUTION SUBCUTANEOUS EVERY 12 HOURS
Status: DISCONTINUED | OUTPATIENT
Start: 2017-02-11 | End: 2017-02-13

## 2017-02-11 RX ADMIN — OXYCODONE HYDROCHLORIDE 5 MG: 5 TABLET ORAL at 08:57

## 2017-02-11 RX ADMIN — GABAPENTIN 200 MG: 100 CAPSULE ORAL at 08:57

## 2017-02-11 RX ADMIN — GABAPENTIN 200 MG: 100 CAPSULE ORAL at 17:15

## 2017-02-11 RX ADMIN — Medication 1 CAPSULE: at 08:57

## 2017-02-11 RX ADMIN — CLONIDINE HYDROCHLORIDE 0.3 MG: 0.1 TABLET ORAL at 08:57

## 2017-02-11 RX ADMIN — INSULIN GLARGINE 20 UNITS: 100 INJECTION, SOLUTION SUBCUTANEOUS at 21:09

## 2017-02-11 RX ADMIN — ATORVASTATIN CALCIUM 40 MG: 40 TABLET, FILM COATED ORAL at 21:12

## 2017-02-11 RX ADMIN — METOPROLOL SUCCINATE 50 MG: 50 TABLET, EXTENDED RELEASE ORAL at 08:58

## 2017-02-11 RX ADMIN — CLOPIDOGREL BISULFATE 75 MG: 75 TABLET, FILM COATED ORAL at 08:57

## 2017-02-11 RX ADMIN — GABAPENTIN 300 MG: 300 CAPSULE ORAL at 21:12

## 2017-02-11 RX ADMIN — VALSARTAN 160 MG: 160 TABLET, FILM COATED ORAL at 08:57

## 2017-02-11 RX ADMIN — ENOXAPARIN SODIUM 30 MG: 30 INJECTION SUBCUTANEOUS at 08:56

## 2017-02-11 RX ADMIN — CLONIDINE HYDROCHLORIDE 0.3 MG: 0.1 TABLET ORAL at 21:11

## 2017-02-11 RX ADMIN — INSULIN LISPRO 16 UNITS: 100 INJECTION, SOLUTION INTRAVENOUS; SUBCUTANEOUS at 12:16

## 2017-02-11 RX ADMIN — LEVOTHYROXINE SODIUM 137 MCG: 112 TABLET ORAL at 06:25

## 2017-02-11 RX ADMIN — INSULIN GLARGINE 25 UNITS: 100 INJECTION, SOLUTION SUBCUTANEOUS at 08:56

## 2017-02-12 PROCEDURE — 74011250636 HC RX REV CODE- 250/636: Performed by: PHYSICAL MEDICINE & REHABILITATION

## 2017-02-12 PROCEDURE — 74011250637 HC RX REV CODE- 250/637: Performed by: PHYSICAL MEDICINE & REHABILITATION

## 2017-02-12 PROCEDURE — 74011636637 HC RX REV CODE- 636/637: Performed by: PHYSICAL MEDICINE & REHABILITATION

## 2017-02-12 RX ADMIN — CLONIDINE HYDROCHLORIDE 0.3 MG: 0.1 TABLET ORAL at 08:52

## 2017-02-12 RX ADMIN — GABAPENTIN 200 MG: 100 CAPSULE ORAL at 08:51

## 2017-02-12 RX ADMIN — METOPROLOL SUCCINATE 50 MG: 50 TABLET, EXTENDED RELEASE ORAL at 08:52

## 2017-02-12 RX ADMIN — INSULIN GLARGINE 20 UNITS: 100 INJECTION, SOLUTION SUBCUTANEOUS at 21:51

## 2017-02-12 RX ADMIN — ATORVASTATIN CALCIUM 40 MG: 40 TABLET, FILM COATED ORAL at 21:50

## 2017-02-12 RX ADMIN — GABAPENTIN 300 MG: 300 CAPSULE ORAL at 21:51

## 2017-02-12 RX ADMIN — CLONIDINE HYDROCHLORIDE 0.3 MG: 0.1 TABLET ORAL at 21:51

## 2017-02-12 RX ADMIN — ENOXAPARIN SODIUM 30 MG: 30 INJECTION SUBCUTANEOUS at 08:50

## 2017-02-12 RX ADMIN — INSULIN LISPRO 10 UNITS: 100 INJECTION, SOLUTION INTRAVENOUS; SUBCUTANEOUS at 08:51

## 2017-02-12 RX ADMIN — CLOPIDOGREL BISULFATE 75 MG: 75 TABLET, FILM COATED ORAL at 08:52

## 2017-02-12 RX ADMIN — GABAPENTIN 200 MG: 100 CAPSULE ORAL at 17:03

## 2017-02-12 RX ADMIN — OXYCODONE HYDROCHLORIDE 10 MG: 5 TABLET ORAL at 08:50

## 2017-02-12 RX ADMIN — LEVOTHYROXINE SODIUM 137 MCG: 112 TABLET ORAL at 05:59

## 2017-02-12 RX ADMIN — INSULIN GLARGINE 20 UNITS: 100 INJECTION, SOLUTION SUBCUTANEOUS at 08:51

## 2017-02-12 RX ADMIN — Medication 1 CAPSULE: at 08:52

## 2017-02-12 RX ADMIN — VALSARTAN 160 MG: 160 TABLET, FILM COATED ORAL at 08:51

## 2017-02-12 RX ADMIN — INSULIN LISPRO 10 UNITS: 100 INJECTION, SOLUTION INTRAVENOUS; SUBCUTANEOUS at 12:44

## 2017-02-13 PROCEDURE — 74011250637 HC RX REV CODE- 250/637: Performed by: PHYSICAL MEDICINE & REHABILITATION

## 2017-02-13 PROCEDURE — 74011636637 HC RX REV CODE- 636/637: Performed by: PHYSICAL MEDICINE & REHABILITATION

## 2017-02-13 PROCEDURE — 74011250636 HC RX REV CODE- 250/636: Performed by: PHYSICAL MEDICINE & REHABILITATION

## 2017-02-13 RX ORDER — COLCHICINE 0.6 MG/1
0.6 TABLET ORAL DAILY
Status: DISCONTINUED | OUTPATIENT
Start: 2017-02-13 | End: 2017-02-21 | Stop reason: ALTCHOICE

## 2017-02-13 RX ORDER — INSULIN GLARGINE 100 [IU]/ML
20 INJECTION, SOLUTION SUBCUTANEOUS DAILY
Status: DISCONTINUED | OUTPATIENT
Start: 2017-02-14 | End: 2017-02-21 | Stop reason: HOSPADM

## 2017-02-13 RX ORDER — PREDNISONE 20 MG/1
20 TABLET ORAL
Status: DISCONTINUED | OUTPATIENT
Start: 2017-02-13 | End: 2017-02-14

## 2017-02-13 RX ORDER — INSULIN GLARGINE 100 [IU]/ML
10 INJECTION, SOLUTION SUBCUTANEOUS
Status: DISCONTINUED | OUTPATIENT
Start: 2017-02-13 | End: 2017-02-21 | Stop reason: HOSPADM

## 2017-02-13 RX ADMIN — COLCHICINE 0.6 MG: 0.6 TABLET, FILM COATED ORAL at 13:21

## 2017-02-13 RX ADMIN — INSULIN LISPRO 12 UNITS: 100 INJECTION, SOLUTION INTRAVENOUS; SUBCUTANEOUS at 17:51

## 2017-02-13 RX ADMIN — METOPROLOL SUCCINATE 50 MG: 50 TABLET, EXTENDED RELEASE ORAL at 09:35

## 2017-02-13 RX ADMIN — CLONIDINE HYDROCHLORIDE 0.3 MG: 0.1 TABLET ORAL at 09:35

## 2017-02-13 RX ADMIN — GABAPENTIN 200 MG: 100 CAPSULE ORAL at 09:27

## 2017-02-13 RX ADMIN — CLONIDINE HYDROCHLORIDE 0.3 MG: 0.1 TABLET ORAL at 21:12

## 2017-02-13 RX ADMIN — GABAPENTIN 200 MG: 100 CAPSULE ORAL at 16:28

## 2017-02-13 RX ADMIN — PREDNISONE 20 MG: 20 TABLET ORAL at 12:53

## 2017-02-13 RX ADMIN — CLOPIDOGREL BISULFATE 75 MG: 75 TABLET, FILM COATED ORAL at 09:26

## 2017-02-13 RX ADMIN — ACETAMINOPHEN 650 MG: 325 TABLET, FILM COATED ORAL at 09:28

## 2017-02-13 RX ADMIN — INSULIN LISPRO 12 UNITS: 100 INJECTION, SOLUTION INTRAVENOUS; SUBCUTANEOUS at 12:53

## 2017-02-13 RX ADMIN — ATORVASTATIN CALCIUM 40 MG: 40 TABLET, FILM COATED ORAL at 21:12

## 2017-02-13 RX ADMIN — ACETAMINOPHEN 650 MG: 325 TABLET, FILM COATED ORAL at 21:13

## 2017-02-13 RX ADMIN — INSULIN GLARGINE 20 UNITS: 100 INJECTION, SOLUTION SUBCUTANEOUS at 09:28

## 2017-02-13 RX ADMIN — Medication 1 CAPSULE: at 09:27

## 2017-02-13 RX ADMIN — ENOXAPARIN SODIUM 30 MG: 30 INJECTION SUBCUTANEOUS at 09:27

## 2017-02-13 RX ADMIN — LEVOTHYROXINE SODIUM 137 MCG: 112 TABLET ORAL at 06:28

## 2017-02-13 RX ADMIN — GABAPENTIN 300 MG: 300 CAPSULE ORAL at 21:12

## 2017-02-13 RX ADMIN — INSULIN GLARGINE 10 UNITS: 100 INJECTION, SOLUTION SUBCUTANEOUS at 21:13

## 2017-02-13 RX ADMIN — VALSARTAN 160 MG: 160 TABLET, FILM COATED ORAL at 09:35

## 2017-02-14 LAB
ANION GAP BLD CALC-SCNC: 11 MMOL/L (ref 5–15)
BUN SERPL-MCNC: 35 MG/DL (ref 6–20)
BUN/CREAT SERPL: 20 (ref 12–20)
CALCIUM SERPL-MCNC: 8.2 MG/DL (ref 8.5–10.1)
CHLORIDE SERPL-SCNC: 110 MMOL/L (ref 97–108)
CO2 SERPL-SCNC: 19 MMOL/L (ref 21–32)
CREAT SERPL-MCNC: 1.77 MG/DL (ref 0.55–1.02)
ERYTHROCYTE [DISTWIDTH] IN BLOOD BY AUTOMATED COUNT: 14.6 % (ref 11.5–14.5)
GLUCOSE SERPL-MCNC: 187 MG/DL (ref 65–100)
HCT VFR BLD AUTO: 24.9 % (ref 35–47)
HGB BLD-MCNC: 8.4 G/DL (ref 11.5–16)
MCH RBC QN AUTO: 29.7 PG (ref 26–34)
MCHC RBC AUTO-ENTMCNC: 33.7 G/DL (ref 30–36.5)
MCV RBC AUTO: 88 FL (ref 80–99)
PLATELET # BLD AUTO: 149 K/UL (ref 150–400)
POTASSIUM SERPL-SCNC: 5.4 MMOL/L (ref 3.5–5.1)
RBC # BLD AUTO: 2.83 M/UL (ref 3.8–5.2)
SODIUM SERPL-SCNC: 140 MMOL/L (ref 136–145)
URATE SERPL-MCNC: 7.8 MG/DL (ref 2.6–6)
WBC # BLD AUTO: 3.9 K/UL (ref 3.6–11)

## 2017-02-14 PROCEDURE — 80048 BASIC METABOLIC PNL TOTAL CA: CPT | Performed by: PHYSICAL MEDICINE & REHABILITATION

## 2017-02-14 PROCEDURE — 74011250636 HC RX REV CODE- 250/636: Performed by: PHYSICAL MEDICINE & REHABILITATION

## 2017-02-14 PROCEDURE — 74011250637 HC RX REV CODE- 250/637: Performed by: PHYSICAL MEDICINE & REHABILITATION

## 2017-02-14 PROCEDURE — 84550 ASSAY OF BLOOD/URIC ACID: CPT | Performed by: PHYSICAL MEDICINE & REHABILITATION

## 2017-02-14 PROCEDURE — 36415 COLL VENOUS BLD VENIPUNCTURE: CPT | Performed by: PHYSICAL MEDICINE & REHABILITATION

## 2017-02-14 PROCEDURE — 74011636637 HC RX REV CODE- 636/637: Performed by: PHYSICAL MEDICINE & REHABILITATION

## 2017-02-14 PROCEDURE — 85027 COMPLETE CBC AUTOMATED: CPT | Performed by: PHYSICAL MEDICINE & REHABILITATION

## 2017-02-14 RX ADMIN — ACETAMINOPHEN 650 MG: 325 TABLET, FILM COATED ORAL at 09:10

## 2017-02-14 RX ADMIN — Medication 1 CAPSULE: at 09:09

## 2017-02-14 RX ADMIN — COLCHICINE 0.6 MG: 0.6 TABLET, FILM COATED ORAL at 09:10

## 2017-02-14 RX ADMIN — VALSARTAN 160 MG: 160 TABLET, FILM COATED ORAL at 09:09

## 2017-02-14 RX ADMIN — LEVOTHYROXINE SODIUM 137 MCG: 112 TABLET ORAL at 05:33

## 2017-02-14 RX ADMIN — CLOPIDOGREL BISULFATE 75 MG: 75 TABLET, FILM COATED ORAL at 09:09

## 2017-02-14 RX ADMIN — INSULIN LISPRO 16 UNITS: 100 INJECTION, SOLUTION INTRAVENOUS; SUBCUTANEOUS at 12:05

## 2017-02-14 RX ADMIN — PREDNISONE 20 MG: 20 TABLET ORAL at 09:09

## 2017-02-14 RX ADMIN — GABAPENTIN 200 MG: 100 CAPSULE ORAL at 09:09

## 2017-02-14 RX ADMIN — GABAPENTIN 200 MG: 100 CAPSULE ORAL at 15:54

## 2017-02-14 RX ADMIN — INSULIN GLARGINE 10 UNITS: 100 INJECTION, SOLUTION SUBCUTANEOUS at 21:36

## 2017-02-14 RX ADMIN — ENOXAPARIN SODIUM 30 MG: 30 INJECTION SUBCUTANEOUS at 09:10

## 2017-02-14 RX ADMIN — GABAPENTIN 300 MG: 300 CAPSULE ORAL at 21:37

## 2017-02-14 RX ADMIN — METOPROLOL SUCCINATE 50 MG: 50 TABLET, EXTENDED RELEASE ORAL at 09:09

## 2017-02-14 RX ADMIN — ATORVASTATIN CALCIUM 40 MG: 40 TABLET, FILM COATED ORAL at 21:37

## 2017-02-14 RX ADMIN — INSULIN GLARGINE 20 UNITS: 100 INJECTION, SOLUTION SUBCUTANEOUS at 09:11

## 2017-02-14 RX ADMIN — INSULIN LISPRO 14 UNITS: 100 INJECTION, SOLUTION INTRAVENOUS; SUBCUTANEOUS at 09:11

## 2017-02-14 RX ADMIN — CLONIDINE HYDROCHLORIDE 0.3 MG: 0.1 TABLET ORAL at 21:36

## 2017-02-14 RX ADMIN — INSULIN LISPRO 14 UNITS: 100 INJECTION, SOLUTION INTRAVENOUS; SUBCUTANEOUS at 16:46

## 2017-02-14 RX ADMIN — CLONIDINE HYDROCHLORIDE 0.3 MG: 0.1 TABLET ORAL at 09:10

## 2017-02-15 PROCEDURE — 74011250637 HC RX REV CODE- 250/637: Performed by: PHYSICAL MEDICINE & REHABILITATION

## 2017-02-15 PROCEDURE — 74011636637 HC RX REV CODE- 636/637: Performed by: PHYSICAL MEDICINE & REHABILITATION

## 2017-02-15 PROCEDURE — 74011250636 HC RX REV CODE- 250/636: Performed by: PHYSICAL MEDICINE & REHABILITATION

## 2017-02-15 RX ORDER — PREDNISONE 10 MG/1
10 TABLET ORAL
Status: DISCONTINUED | OUTPATIENT
Start: 2017-02-16 | End: 2017-02-16

## 2017-02-15 RX ADMIN — GABAPENTIN 300 MG: 300 CAPSULE ORAL at 21:44

## 2017-02-15 RX ADMIN — INSULIN LISPRO 16 UNITS: 100 INJECTION, SOLUTION INTRAVENOUS; SUBCUTANEOUS at 13:02

## 2017-02-15 RX ADMIN — ENOXAPARIN SODIUM 30 MG: 30 INJECTION SUBCUTANEOUS at 08:29

## 2017-02-15 RX ADMIN — INSULIN LISPRO 14 UNITS: 100 INJECTION, SOLUTION INTRAVENOUS; SUBCUTANEOUS at 08:28

## 2017-02-15 RX ADMIN — CLOPIDOGREL BISULFATE 75 MG: 75 TABLET, FILM COATED ORAL at 08:29

## 2017-02-15 RX ADMIN — INSULIN GLARGINE 20 UNITS: 100 INJECTION, SOLUTION SUBCUTANEOUS at 08:28

## 2017-02-15 RX ADMIN — INSULIN LISPRO 10 UNITS: 100 INJECTION, SOLUTION INTRAVENOUS; SUBCUTANEOUS at 17:12

## 2017-02-15 RX ADMIN — LEVOTHYROXINE SODIUM 137 MCG: 112 TABLET ORAL at 05:59

## 2017-02-15 RX ADMIN — OXYCODONE HYDROCHLORIDE 10 MG: 5 TABLET ORAL at 13:06

## 2017-02-15 RX ADMIN — CLONIDINE HYDROCHLORIDE 0.3 MG: 0.1 TABLET ORAL at 21:44

## 2017-02-15 RX ADMIN — METOPROLOL SUCCINATE 50 MG: 50 TABLET, EXTENDED RELEASE ORAL at 08:29

## 2017-02-15 RX ADMIN — OXYCODONE HYDROCHLORIDE 10 MG: 5 TABLET ORAL at 09:19

## 2017-02-15 RX ADMIN — VALSARTAN 160 MG: 160 TABLET, FILM COATED ORAL at 09:19

## 2017-02-15 RX ADMIN — INSULIN GLARGINE 10 UNITS: 100 INJECTION, SOLUTION SUBCUTANEOUS at 21:45

## 2017-02-15 RX ADMIN — ATORVASTATIN CALCIUM 40 MG: 40 TABLET, FILM COATED ORAL at 21:44

## 2017-02-15 RX ADMIN — Medication 1 CAPSULE: at 08:29

## 2017-02-15 RX ADMIN — GABAPENTIN 200 MG: 100 CAPSULE ORAL at 08:29

## 2017-02-15 RX ADMIN — COLCHICINE 0.6 MG: 0.6 TABLET, FILM COATED ORAL at 08:27

## 2017-02-15 RX ADMIN — PREDNISONE 15 MG: 5 TABLET ORAL at 08:29

## 2017-02-15 RX ADMIN — CLONIDINE HYDROCHLORIDE 0.3 MG: 0.1 TABLET ORAL at 08:28

## 2017-02-15 RX ADMIN — GABAPENTIN 200 MG: 100 CAPSULE ORAL at 17:12

## 2017-02-16 PROCEDURE — 74011636637 HC RX REV CODE- 636/637: Performed by: PHYSICAL MEDICINE & REHABILITATION

## 2017-02-16 PROCEDURE — 74011250636 HC RX REV CODE- 250/636: Performed by: PHYSICAL MEDICINE & REHABILITATION

## 2017-02-16 PROCEDURE — 74011250637 HC RX REV CODE- 250/637: Performed by: PHYSICAL MEDICINE & REHABILITATION

## 2017-02-16 RX ORDER — PREDNISONE 5 MG/1
7.5 TABLET ORAL
Status: DISCONTINUED | OUTPATIENT
Start: 2017-02-17 | End: 2017-02-18

## 2017-02-16 RX ORDER — BUMETANIDE 1 MG/1
0.5 TABLET ORAL DAILY
Status: DISCONTINUED | OUTPATIENT
Start: 2017-02-16 | End: 2017-02-21 | Stop reason: HOSPADM

## 2017-02-16 RX ADMIN — CLONIDINE HYDROCHLORIDE 0.3 MG: 0.1 TABLET ORAL at 09:19

## 2017-02-16 RX ADMIN — METOPROLOL SUCCINATE 50 MG: 50 TABLET, EXTENDED RELEASE ORAL at 09:19

## 2017-02-16 RX ADMIN — ACETAMINOPHEN 650 MG: 325 TABLET, FILM COATED ORAL at 13:28

## 2017-02-16 RX ADMIN — INSULIN LISPRO 12 UNITS: 100 INJECTION, SOLUTION INTRAVENOUS; SUBCUTANEOUS at 09:16

## 2017-02-16 RX ADMIN — INSULIN LISPRO 12 UNITS: 100 INJECTION, SOLUTION INTRAVENOUS; SUBCUTANEOUS at 16:30

## 2017-02-16 RX ADMIN — INSULIN GLARGINE 20 UNITS: 100 INJECTION, SOLUTION SUBCUTANEOUS at 09:16

## 2017-02-16 RX ADMIN — BUMETANIDE 0.5 MG: 1 TABLET ORAL at 13:23

## 2017-02-16 RX ADMIN — CLOPIDOGREL BISULFATE 75 MG: 75 TABLET, FILM COATED ORAL at 09:19

## 2017-02-16 RX ADMIN — VALSARTAN 160 MG: 160 TABLET, FILM COATED ORAL at 09:19

## 2017-02-16 RX ADMIN — PREDNISONE 10 MG: 10 TABLET ORAL at 09:19

## 2017-02-16 RX ADMIN — CLONIDINE HYDROCHLORIDE 0.3 MG: 0.1 TABLET ORAL at 20:26

## 2017-02-16 RX ADMIN — ATORVASTATIN CALCIUM 40 MG: 40 TABLET, FILM COATED ORAL at 20:26

## 2017-02-16 RX ADMIN — GABAPENTIN 300 MG: 300 CAPSULE ORAL at 20:27

## 2017-02-16 RX ADMIN — ENOXAPARIN SODIUM 30 MG: 30 INJECTION SUBCUTANEOUS at 09:18

## 2017-02-16 RX ADMIN — LEVOTHYROXINE SODIUM 137 MCG: 112 TABLET ORAL at 05:38

## 2017-02-16 RX ADMIN — COLCHICINE 0.6 MG: 0.6 TABLET, FILM COATED ORAL at 09:18

## 2017-02-16 RX ADMIN — GABAPENTIN 200 MG: 100 CAPSULE ORAL at 09:19

## 2017-02-16 RX ADMIN — INSULIN GLARGINE 10 UNITS: 100 INJECTION, SOLUTION SUBCUTANEOUS at 20:27

## 2017-02-16 RX ADMIN — INSULIN LISPRO 14 UNITS: 100 INJECTION, SOLUTION INTRAVENOUS; SUBCUTANEOUS at 13:23

## 2017-02-16 RX ADMIN — GABAPENTIN 200 MG: 100 CAPSULE ORAL at 16:39

## 2017-02-16 RX ADMIN — Medication 1 CAPSULE: at 09:19

## 2017-02-17 PROCEDURE — 74011250637 HC RX REV CODE- 250/637: Performed by: PHYSICAL MEDICINE & REHABILITATION

## 2017-02-17 PROCEDURE — 74011250636 HC RX REV CODE- 250/636: Performed by: PHYSICAL MEDICINE & REHABILITATION

## 2017-02-17 PROCEDURE — 74011636637 HC RX REV CODE- 636/637: Performed by: PHYSICAL MEDICINE & REHABILITATION

## 2017-02-17 RX ADMIN — GABAPENTIN 300 MG: 300 CAPSULE ORAL at 21:31

## 2017-02-17 RX ADMIN — VALSARTAN 160 MG: 160 TABLET, FILM COATED ORAL at 09:11

## 2017-02-17 RX ADMIN — INSULIN GLARGINE 10 UNITS: 100 INJECTION, SOLUTION SUBCUTANEOUS at 21:30

## 2017-02-17 RX ADMIN — INSULIN LISPRO 18 UNITS: 100 INJECTION, SOLUTION INTRAVENOUS; SUBCUTANEOUS at 12:34

## 2017-02-17 RX ADMIN — CLOPIDOGREL BISULFATE 75 MG: 75 TABLET, FILM COATED ORAL at 09:12

## 2017-02-17 RX ADMIN — ENOXAPARIN SODIUM 30 MG: 30 INJECTION SUBCUTANEOUS at 09:11

## 2017-02-17 RX ADMIN — INSULIN LISPRO 12 UNITS: 100 INJECTION, SOLUTION INTRAVENOUS; SUBCUTANEOUS at 17:50

## 2017-02-17 RX ADMIN — ERYTHROPOIETIN 10000 UNITS: 10000 INJECTION, SOLUTION INTRAVENOUS; SUBCUTANEOUS at 18:59

## 2017-02-17 RX ADMIN — BUMETANIDE 0.5 MG: 1 TABLET ORAL at 09:12

## 2017-02-17 RX ADMIN — LEVOTHYROXINE SODIUM 137 MCG: 112 TABLET ORAL at 05:17

## 2017-02-17 RX ADMIN — PREDNISONE 7.5 MG: 5 TABLET ORAL at 09:12

## 2017-02-17 RX ADMIN — GABAPENTIN 200 MG: 100 CAPSULE ORAL at 09:14

## 2017-02-17 RX ADMIN — INSULIN GLARGINE 20 UNITS: 100 INJECTION, SOLUTION SUBCUTANEOUS at 09:11

## 2017-02-17 RX ADMIN — GABAPENTIN 200 MG: 100 CAPSULE ORAL at 17:51

## 2017-02-17 RX ADMIN — METOPROLOL SUCCINATE 50 MG: 50 TABLET, EXTENDED RELEASE ORAL at 09:14

## 2017-02-17 RX ADMIN — COLCHICINE 0.6 MG: 0.6 TABLET, FILM COATED ORAL at 09:11

## 2017-02-17 RX ADMIN — ACETAMINOPHEN 650 MG: 325 TABLET, FILM COATED ORAL at 10:08

## 2017-02-17 RX ADMIN — ATORVASTATIN CALCIUM 40 MG: 40 TABLET, FILM COATED ORAL at 21:30

## 2017-02-17 RX ADMIN — CLONIDINE HYDROCHLORIDE 0.3 MG: 0.1 TABLET ORAL at 09:12

## 2017-02-17 RX ADMIN — CLONIDINE HYDROCHLORIDE 0.3 MG: 0.1 TABLET ORAL at 21:32

## 2017-02-18 PROCEDURE — 74011250636 HC RX REV CODE- 250/636: Performed by: PHYSICAL MEDICINE & REHABILITATION

## 2017-02-18 PROCEDURE — 74011250637 HC RX REV CODE- 250/637: Performed by: PHYSICAL MEDICINE & REHABILITATION

## 2017-02-18 PROCEDURE — 74011636637 HC RX REV CODE- 636/637: Performed by: PHYSICAL MEDICINE & REHABILITATION

## 2017-02-18 RX ORDER — ALLOPURINOL 100 MG/1
100 TABLET ORAL DAILY
Status: DISCONTINUED | OUTPATIENT
Start: 2017-02-20 | End: 2017-02-21 | Stop reason: HOSPADM

## 2017-02-18 RX ORDER — PREDNISONE 5 MG/1
5 TABLET ORAL
Status: DISCONTINUED | OUTPATIENT
Start: 2017-02-19 | End: 2017-02-19

## 2017-02-18 RX ADMIN — INSULIN LISPRO 14 UNITS: 100 INJECTION, SOLUTION INTRAVENOUS; SUBCUTANEOUS at 12:17

## 2017-02-18 RX ADMIN — COLCHICINE 0.6 MG: 0.6 TABLET, FILM COATED ORAL at 08:48

## 2017-02-18 RX ADMIN — CLOPIDOGREL BISULFATE 75 MG: 75 TABLET, FILM COATED ORAL at 08:50

## 2017-02-18 RX ADMIN — INSULIN GLARGINE 10 UNITS: 100 INJECTION, SOLUTION SUBCUTANEOUS at 21:30

## 2017-02-18 RX ADMIN — LEVOTHYROXINE SODIUM 137 MCG: 112 TABLET ORAL at 05:58

## 2017-02-18 RX ADMIN — GABAPENTIN 200 MG: 100 CAPSULE ORAL at 08:50

## 2017-02-18 RX ADMIN — INSULIN LISPRO 181 UNITS: 100 INJECTION, SOLUTION INTRAVENOUS; SUBCUTANEOUS at 16:34

## 2017-02-18 RX ADMIN — PREDNISONE 7.5 MG: 5 TABLET ORAL at 08:49

## 2017-02-18 RX ADMIN — ATORVASTATIN CALCIUM 40 MG: 40 TABLET, FILM COATED ORAL at 21:31

## 2017-02-18 RX ADMIN — CLONIDINE HYDROCHLORIDE 0.3 MG: 0.1 TABLET ORAL at 21:31

## 2017-02-18 RX ADMIN — GABAPENTIN 300 MG: 300 CAPSULE ORAL at 21:31

## 2017-02-18 RX ADMIN — GABAPENTIN 200 MG: 100 CAPSULE ORAL at 16:34

## 2017-02-18 RX ADMIN — CLONIDINE HYDROCHLORIDE 0.3 MG: 0.1 TABLET ORAL at 08:50

## 2017-02-18 RX ADMIN — VALSARTAN 160 MG: 160 TABLET, FILM COATED ORAL at 08:50

## 2017-02-18 RX ADMIN — BUMETANIDE 0.5 MG: 1 TABLET ORAL at 08:49

## 2017-02-18 RX ADMIN — ENOXAPARIN SODIUM 30 MG: 30 INJECTION SUBCUTANEOUS at 08:48

## 2017-02-18 RX ADMIN — INSULIN LISPRO 10 UNITS: 100 INJECTION, SOLUTION INTRAVENOUS; SUBCUTANEOUS at 08:48

## 2017-02-18 RX ADMIN — INSULIN GLARGINE 20 UNITS: 100 INJECTION, SOLUTION SUBCUTANEOUS at 08:49

## 2017-02-18 RX ADMIN — METOPROLOL SUCCINATE 50 MG: 50 TABLET, EXTENDED RELEASE ORAL at 08:49

## 2017-02-19 PROCEDURE — 74011636637 HC RX REV CODE- 636/637: Performed by: PHYSICAL MEDICINE & REHABILITATION

## 2017-02-19 PROCEDURE — 74011250636 HC RX REV CODE- 250/636: Performed by: PHYSICAL MEDICINE & REHABILITATION

## 2017-02-19 PROCEDURE — 74011250637 HC RX REV CODE- 250/637: Performed by: PHYSICAL MEDICINE & REHABILITATION

## 2017-02-19 RX ORDER — PREDNISONE 5 MG/1
2.5 TABLET ORAL
Status: DISCONTINUED | OUTPATIENT
Start: 2017-02-20 | End: 2017-02-21 | Stop reason: ALTCHOICE

## 2017-02-19 RX ADMIN — INSULIN LISPRO 18 UNITS: 100 INJECTION, SOLUTION INTRAVENOUS; SUBCUTANEOUS at 12:44

## 2017-02-19 RX ADMIN — GABAPENTIN 200 MG: 100 CAPSULE ORAL at 16:51

## 2017-02-19 RX ADMIN — GABAPENTIN 300 MG: 300 CAPSULE ORAL at 21:52

## 2017-02-19 RX ADMIN — VALSARTAN 160 MG: 160 TABLET, FILM COATED ORAL at 08:30

## 2017-02-19 RX ADMIN — ATORVASTATIN CALCIUM 40 MG: 40 TABLET, FILM COATED ORAL at 21:52

## 2017-02-19 RX ADMIN — CLONIDINE HYDROCHLORIDE 0.3 MG: 0.1 TABLET ORAL at 21:53

## 2017-02-19 RX ADMIN — PREDNISONE 5 MG: 5 TABLET ORAL at 08:31

## 2017-02-19 RX ADMIN — CLONIDINE HYDROCHLORIDE 0.3 MG: 0.1 TABLET ORAL at 08:31

## 2017-02-19 RX ADMIN — CLOPIDOGREL BISULFATE 75 MG: 75 TABLET, FILM COATED ORAL at 08:31

## 2017-02-19 RX ADMIN — INSULIN LISPRO 12 UNITS: 100 INJECTION, SOLUTION INTRAVENOUS; SUBCUTANEOUS at 08:29

## 2017-02-19 RX ADMIN — COLCHICINE 0.6 MG: 0.6 TABLET, FILM COATED ORAL at 08:30

## 2017-02-19 RX ADMIN — INSULIN GLARGINE 20 UNITS: 100 INJECTION, SOLUTION SUBCUTANEOUS at 08:29

## 2017-02-19 RX ADMIN — LEVOTHYROXINE SODIUM 137 MCG: 112 TABLET ORAL at 05:38

## 2017-02-19 RX ADMIN — INSULIN GLARGINE 10 UNITS: 100 INJECTION, SOLUTION SUBCUTANEOUS at 21:51

## 2017-02-19 RX ADMIN — METOPROLOL SUCCINATE 50 MG: 50 TABLET, EXTENDED RELEASE ORAL at 08:31

## 2017-02-19 RX ADMIN — BUMETANIDE 0.5 MG: 1 TABLET ORAL at 08:31

## 2017-02-19 RX ADMIN — GABAPENTIN 200 MG: 100 CAPSULE ORAL at 08:31

## 2017-02-19 RX ADMIN — INSULIN LISPRO 10 UNITS: 100 INJECTION, SOLUTION INTRAVENOUS; SUBCUTANEOUS at 16:51

## 2017-02-19 RX ADMIN — ENOXAPARIN SODIUM 30 MG: 30 INJECTION SUBCUTANEOUS at 08:29

## 2017-02-20 VITALS
HEART RATE: 67 BPM | HEIGHT: 65 IN | DIASTOLIC BLOOD PRESSURE: 71 MMHG | WEIGHT: 227.06 LBS | SYSTOLIC BLOOD PRESSURE: 154 MMHG | BODY MASS INDEX: 37.83 KG/M2

## 2017-02-20 PROCEDURE — 74011636637 HC RX REV CODE- 636/637: Performed by: PHYSICAL MEDICINE & REHABILITATION

## 2017-02-20 PROCEDURE — 74011250637 HC RX REV CODE- 250/637: Performed by: PHYSICAL MEDICINE & REHABILITATION

## 2017-02-20 PROCEDURE — 74011250636 HC RX REV CODE- 250/636: Performed by: PHYSICAL MEDICINE & REHABILITATION

## 2017-02-20 RX ADMIN — INSULIN LISPRO 14 UNITS: 100 INJECTION, SOLUTION INTRAVENOUS; SUBCUTANEOUS at 09:01

## 2017-02-20 RX ADMIN — ENOXAPARIN SODIUM 30 MG: 30 INJECTION SUBCUTANEOUS at 08:42

## 2017-02-20 RX ADMIN — CLONIDINE HYDROCHLORIDE 0.3 MG: 0.1 TABLET ORAL at 08:44

## 2017-02-20 RX ADMIN — COLCHICINE 0.6 MG: 0.6 TABLET, FILM COATED ORAL at 09:01

## 2017-02-20 RX ADMIN — ALLOPURINOL 100 MG: 100 TABLET ORAL at 08:43

## 2017-02-20 RX ADMIN — CLOPIDOGREL BISULFATE 75 MG: 75 TABLET, FILM COATED ORAL at 08:42

## 2017-02-20 RX ADMIN — LEVOTHYROXINE SODIUM 137 MCG: 112 TABLET ORAL at 06:53

## 2017-02-20 RX ADMIN — GABAPENTIN 200 MG: 100 CAPSULE ORAL at 17:17

## 2017-02-20 RX ADMIN — METOPROLOL SUCCINATE 50 MG: 50 TABLET, EXTENDED RELEASE ORAL at 08:44

## 2017-02-20 RX ADMIN — INSULIN LISPRO 12 UNITS: 100 INJECTION, SOLUTION INTRAVENOUS; SUBCUTANEOUS at 17:17

## 2017-02-20 RX ADMIN — PREDNISONE 2.5 MG: 5 TABLET ORAL at 08:42

## 2017-02-20 RX ADMIN — BUMETANIDE 0.5 MG: 1 TABLET ORAL at 08:44

## 2017-02-20 RX ADMIN — INSULIN GLARGINE 20 UNITS: 100 INJECTION, SOLUTION SUBCUTANEOUS at 08:43

## 2017-02-20 RX ADMIN — ATORVASTATIN CALCIUM 40 MG: 40 TABLET, FILM COATED ORAL at 21:47

## 2017-02-20 RX ADMIN — VALSARTAN 160 MG: 160 TABLET, FILM COATED ORAL at 08:44

## 2017-02-20 RX ADMIN — GABAPENTIN 200 MG: 100 CAPSULE ORAL at 08:42

## 2017-02-20 RX ADMIN — CLONIDINE HYDROCHLORIDE 0.3 MG: 0.1 TABLET ORAL at 21:46

## 2017-02-20 RX ADMIN — GABAPENTIN 300 MG: 300 CAPSULE ORAL at 21:47

## 2017-02-20 RX ADMIN — INSULIN GLARGINE 10 UNITS: 100 INJECTION, SOLUTION SUBCUTANEOUS at 21:49

## 2017-02-20 RX ADMIN — INSULIN LISPRO 14 UNITS: 100 INJECTION, SOLUTION INTRAVENOUS; SUBCUTANEOUS at 12:31

## 2017-02-21 PROCEDURE — 74011250637 HC RX REV CODE- 250/637: Performed by: PHYSICAL MEDICINE & REHABILITATION

## 2017-02-21 PROCEDURE — 74011250636 HC RX REV CODE- 250/636: Performed by: PHYSICAL MEDICINE & REHABILITATION

## 2017-02-21 PROCEDURE — 74011636637 HC RX REV CODE- 636/637: Performed by: PHYSICAL MEDICINE & REHABILITATION

## 2017-02-21 RX ADMIN — CLONIDINE HYDROCHLORIDE 0.3 MG: 0.1 TABLET ORAL at 09:12

## 2017-02-21 RX ADMIN — LEVOTHYROXINE SODIUM 137 MCG: 112 TABLET ORAL at 06:19

## 2017-02-21 RX ADMIN — METOPROLOL SUCCINATE 50 MG: 50 TABLET, EXTENDED RELEASE ORAL at 09:12

## 2017-02-21 RX ADMIN — ALLOPURINOL 100 MG: 100 TABLET ORAL at 09:12

## 2017-02-21 RX ADMIN — INSULIN GLARGINE 20 UNITS: 100 INJECTION, SOLUTION SUBCUTANEOUS at 09:13

## 2017-02-21 RX ADMIN — BUMETANIDE 0.5 MG: 1 TABLET ORAL at 09:12

## 2017-02-21 RX ADMIN — INSULIN LISPRO 12 UNITS: 100 INJECTION, SOLUTION INTRAVENOUS; SUBCUTANEOUS at 09:13

## 2017-02-21 RX ADMIN — CLOPIDOGREL BISULFATE 75 MG: 75 TABLET, FILM COATED ORAL at 09:12

## 2017-02-21 RX ADMIN — PREDNISONE 2.5 MG: 5 TABLET ORAL at 09:12

## 2017-02-21 RX ADMIN — COLCHICINE 0.6 MG: 0.6 TABLET, FILM COATED ORAL at 09:11

## 2017-02-21 RX ADMIN — VALSARTAN 160 MG: 160 TABLET, FILM COATED ORAL at 09:12

## 2017-02-21 RX ADMIN — ENOXAPARIN SODIUM 30 MG: 30 INJECTION SUBCUTANEOUS at 09:11

## 2017-02-21 RX ADMIN — GABAPENTIN 200 MG: 100 CAPSULE ORAL at 09:12

## 2017-06-06 ENCOUNTER — OFFICE VISIT (OUTPATIENT)
Dept: ENDOCRINOLOGY | Age: 77
End: 2017-06-06

## 2017-06-06 VITALS
WEIGHT: 217.2 LBS | DIASTOLIC BLOOD PRESSURE: 88 MMHG | HEART RATE: 65 BPM | SYSTOLIC BLOOD PRESSURE: 138 MMHG | HEIGHT: 65 IN | BODY MASS INDEX: 36.19 KG/M2

## 2017-06-06 DIAGNOSIS — E11.42 TYPE 2 DIABETES MELLITUS WITH DIABETIC POLYNEUROPATHY, WITH LONG-TERM CURRENT USE OF INSULIN (HCC): Primary | ICD-10-CM

## 2017-06-06 DIAGNOSIS — E78.5 HYPERLIPIDEMIA LDL GOAL <70: ICD-10-CM

## 2017-06-06 DIAGNOSIS — Z79.4 TYPE 2 DIABETES MELLITUS WITH DIABETIC POLYNEUROPATHY, WITH LONG-TERM CURRENT USE OF INSULIN (HCC): Primary | ICD-10-CM

## 2017-06-06 DIAGNOSIS — C73 THYROID CANCER (HCC): ICD-10-CM

## 2017-06-06 DIAGNOSIS — I10 ESSENTIAL HYPERTENSION, BENIGN: ICD-10-CM

## 2017-06-06 LAB — HBA1C MFR BLD HPLC: 6.2 %

## 2017-06-06 RX ORDER — LEVOTHYROXINE SODIUM 137 UG/1
CAPSULE ORAL
COMMUNITY
End: 2017-06-06 | Stop reason: SDUPTHER

## 2017-06-06 RX ORDER — GABAPENTIN 100 MG/1
CAPSULE ORAL
Qty: 450 CAP | Refills: 3 | Status: SHIPPED | OUTPATIENT
Start: 2017-06-06 | End: 2018-06-14 | Stop reason: SDUPTHER

## 2017-06-06 RX ORDER — LANOLIN ALCOHOL/MO/W.PET/CERES
CREAM (GRAM) TOPICAL
COMMUNITY
End: 2017-11-10

## 2017-06-06 RX ORDER — CITALOPRAM 40 MG/1
40 TABLET, FILM COATED ORAL DAILY
COMMUNITY
End: 2017-06-06 | Stop reason: SDUPTHER

## 2017-06-06 RX ORDER — PEN NEEDLE, DIABETIC 32GX 5/32"
NEEDLE, DISPOSABLE MISCELLANEOUS
COMMUNITY
Start: 2017-04-09 | End: 2018-01-15 | Stop reason: SDUPTHER

## 2017-06-06 RX ORDER — ALLOPURINOL 100 MG/1
TABLET ORAL DAILY
COMMUNITY

## 2017-06-06 RX ORDER — GABAPENTIN 100 MG/1
CAPSULE ORAL 3 TIMES DAILY
COMMUNITY
End: 2017-06-06 | Stop reason: SDUPTHER

## 2017-06-06 RX ORDER — CITALOPRAM 20 MG/1
TABLET, FILM COATED ORAL
COMMUNITY
Start: 2017-05-15 | End: 2019-09-11

## 2017-06-06 RX ORDER — LEVOTHYROXINE SODIUM 137 UG/1
TABLET ORAL
COMMUNITY
Start: 2017-03-08 | End: 2017-07-26 | Stop reason: SDUPTHER

## 2017-06-06 NOTE — PROGRESS NOTES
Chief Complaint   Patient presents with    Diabetes     pcp and pharmacy confirmed     History of Present Illness: Tami Reynoso is a 77 y.o. female who is a new patient for evaluation of diabetes. Was diagnosed with diabetes around age 49 and has been on insulin for at least 10-15 years. Had been seeing Dr. Siomara Levi and then changed to Dr. Guille Pavon but transferred to me due to distance. Last saw him in Dec 2016. Was admitted to the hospital in Jan 2017 for a stroke and went to [address] [place] [address] upon discharge and they adjusted her insulin regimen and has been on the doses below ever since. Current regimen is lantus 20 units in the morning and 10 units at night along with humalog based on the scale below. Prior to the hospital stay, she was not taking her insulin on schedule and not checking as frequently and her A1c was 11.5% in 1/17 but now is down to 6.2% today after being much more diligent with taking her insulin and checking sugars and eating on time. Checks blood sugars 3 times per day and readings run mostly in the 100-180 range. Did have a low sugar yesterday down to 40 in the late afternoon but didn't eat as much for lunch. Has made changes to her diet and has cut back on her starches. A typical day is as follows:  - breakfast: eggs, turkey perez and apple sauce and 1 slice of whole wheat toast, some cereal like shredded wheat  - lunch: ham or turkey sandwich or can of soup with a few crackers, no chips or fries  - dinner: healthy choice dinner 1/2 the portion, green salad and apple sauce, no sweets aside from occ terri crackers  - beverages: water, hot tea without sugar, no sweet drinks  - snacks: nuts  Exercise consists of going to therapy on Tues and Thurs until July which will be 20 weeks. (+) history of vascular disease with stroke in Jan 2017.  (+) history of retinopathy with laser shot years ago, no neuropathy, (+) nephropathy. Last eye exam was about a year ago.   She noticed a lump in her neck in 2014 and was found to have a 3.4 cm right lobe nodule with microcalcifications and she made an appt with Dr. Wayne Rey and he performed a right thyroidectomy in 8/14 and final pathology showed a Follicular carcinoma, Hurthle cell type, at least minimally invasive and ended up having a completion thyroidectomy that was benign. Had a whole body scan at [facility] that fall that didn't show any uptake. Was initially on Synthroid 150 mcg daily but her dose was decreased to 137 mcg daily and has been on this dose for the past 2 years. Did have a TSH of 4.03 in 1/17 during her hospital stay. Takes the synthroid every morning at least 30 min before food. Was just given iron by Dr. Day Glover but hasn't started taking this. Hgb was 8.4 in 2/17. She hasn't seen any blood in her stool or urine. Past Medical History:   Diagnosis Date    Arthritis     Cataract     bilateral    CKD stage 3 due to type 2 diabetes mellitus ([address]     Depression     Diabetes ([address] age 49    Follicular thyroid cancer ([address] 08/2014    Gallbladder polyp 8/14/2013    Hypercholesterolemia     Hypertension     Ill-defined condition     states 'polyp' in gal bladder    Obesity     Right pontine stroke ([address] 1/17/2017    TIA (transient ischemic attack) 6/6/2014     Past Surgical History:   Procedure Laterality Date    ABDOMEN SURGERY PROC UNLISTED  2006    stomach    BREAST SURGERY PROCEDURE UNLISTED  2009, 2006    breast bx-vince    COLORECTAL SCRN; HI RISK IND  5/30/2014         HX CATARACT REMOVAL Left     HX GYN  1970's    partial hysterectomy    HX HERNIA REPAIR  2009    HX ORTHOPAEDIC Bilateral 1988    bunion    HX THYROIDECTOMY  2014    Dr. Wayne Rey     Current Outpatient Prescriptions   Medication Sig    PYRIDOXINE HCL, VITAMIN B6, (VITAMIN B-6 PO) Take  by mouth daily.  ferrous sulfate 325 mg (65 mg iron) tablet Take  by mouth three (3) times daily (with meals).     allopurinol (ZYLOPRIM) 100 mg tablet Take  by mouth daily.    gabapentin (NEURONTIN) 100 mg capsule Take  by mouth three (3) times daily. Takes 2 in am, 1 at lunch, and 2 at bedtime    SYNTHROID 137 mcg tablet Take 1 tab daily    LOR PEN NEEDLE 32 gauge x 5/32\" ndle Use as directed    clopidogrel (PLAVIX) 75 mg tab Take 1 Tab by mouth daily.  insulin glargine (LANTUS SOLOSTAR) 100 unit/mL (3 mL) pen 20 units in the morning and 10 units at bedtime    bumetanide (BUMEX) 1 mg tablet Take 1 mg by mouth daily.  cloNIDine (CATAPRES) 0.3 mg tablet Take 0.3 mg by mouth two (2) times a day.  olmesartan (BENICAR) 40 mg tablet Take  by mouth daily.  metoprolol succinate (TOPROL XL) 50 mg XL tablet Take 1 Tab by mouth daily.  atorvastatin (LIPITOR) 40 mg tablet Take  by mouth daily.  multivitamins-minerals-lutein (CENTRUM SILVER) Tab Take  by mouth.  insulin lispro (HUMALOG KWIKPEN) 100 unit/mL kwikpen by SubCUTAneous route Before breakfast, lunch, dinner and at bedtime. Sliding scale-  10 units                        141-180  12 units                         181-220  14 units                          221-260   16 units                           261-300  18 units                            > 301     20 units    citalopram (CELEXA) 20 mg tablet Take 1 tab daily     No current facility-administered medications for this visit.       Allergies   Allergen Reactions    Amlodipine Swelling    Nifedipine Swelling    Sulfa (Sulfonamide Antibiotics) Rash     Family History   Problem Relation Age of Onset    Heart Disease Mother     Hypertension Mother     Diabetes Mother     Stroke Mother     Cancer Father      colon    Heart Disease Sister     Diabetes Sister     Heart Attack Sister     Hypertension Sister     Lung Disease Brother     Lung Disease Brother     Anesth Problems Neg Hx      Social History     Social History    Marital status:      Spouse name: N/A    Number of children: N/A    Years of education: N/A     Occupational History    Not on file. Social History Main Topics    Smoking status: Never Smoker    Smokeless tobacco: Never Used    Alcohol use No    Drug use: No    Sexual activity: Not on file     Other Topics Concern    Not on file     Social History Narrative    Lives in [place] with her son. Also has one other son. . Used to work at [employer]. Likes to [employer]. Review of Systems:  - Constitutional Symptoms: no fevers, chills, (+) 8 lb weight loss since 1/17  - Eyes: no blurry vision or double vision  - Cardiovascular: no chest pain or palpitations  - Respiratory: no cough or shortness of breath  - Gastrointestinal: no dysphagia or abdominal pain  - Musculoskeletal: (+) weakness in left arm and hand  - Integumentary: no rashes  - Neurological: some numbness, tingling in right hand, no headaches  - Psychiatric: some depression and anxiety  - Endocrine: no heat or cold intolerance, no polyuria or polydipsia    Physical Examination:  Blood pressure 138/88, pulse 65, height 5' 5\" (1.651 m), weight 217 lb 3.2 oz (98.5 kg).   - General: pleasant, no distress, good eye contact  - HEENT: no exopthalmos, no periorbital edema, no scleral/conjunctival injection, EOMI, no lid lag or stare  - Neck: supple, well healed inferior neck scar, no palpable thyroid tissue, masses or lymph nodes, no masses, lymph nodes, or carotid bruits, no supraclavicular or dorsocervical fat pads  - Cardiovascular: regular, normal rate, normal S1 and S2, no murmurs/rubs/gallops,  - Respiratory: clear to auscultation bilaterally  - Gastrointestinal: soft, nontender, nondistended, no masses, no hepatosplenomegaly  - Musculoskeletal: (+) proximal muscle weakness in left upper extremity  - Integumentary: no acanthosis nigricans, no abdominal striae, no rashes, no edema, no foot ulcers  - Neurological: see foot exam  - Psychiatric: normal mood and affect         Diabetic foot exam performed by Amaya Armijo MD Measurement  Response Nurse Comment Physician Comment   Monofilament  R - reduced sensation with micro filament  L - reduced sensation with micro filament     Pulse DP R - 2+ (normal)  L - 2+ (normal)     Vibration R - decreased  L - decreased     Structural deformity R - None  L - None     Skin Integrity / Deformity R - Mild - callus  L - Mild - callus        Reviewed by:               Data Reviewed:   Component      Latest Ref Rng & Units 6/6/2017           3:32 PM   Hemoglobin A1c (POC)      % 6.2     Component      Latest Ref Rng & Units 1/15/2017 1/15/2017 1/15/2017 1/15/2017           4:43 AM  4:43 AM  4:43 AM  4:43 AM   Sodium      136 - 145 mmol/L    141   Potassium      3.5 - 5.1 mmol/L    4.3   Chloride      97 - 108 mmol/L    107   CO2      21 - 32 mmol/L    28   Anion gap      5 - 15 mmol/L    6   Glucose      65 - 100 mg/dL    227 (H)   BUN      6 - 20 MG/DL    34 (H)   Creatinine      0.55 - 1.02 MG/DL    2.13 (H)   BUN/Creatinine ratio      12 - 20      16   GFR est AA      >60 ml/min/1.73m2    27 (L)   GFR est non-AA      >60 ml/min/1.73m2    23 (L)   Calcium      8.5 - 10.1 MG/DL    8.1 (L)   Cholesterol, total      <200 MG/DL  170     Triglyceride      <150 MG/DL  167 (H)     HDL Cholesterol      MG/DL  66     LDL, calculated      0 - 100 MG/DL  70.6     VLDL, calculated      MG/DL  33.4     CHOL/HDL Ratio      0 - 5.0    2.6     Hemoglobin A1c, (calculated)      4.2 - 6.3 % 11.5 (H)      Est. average glucose      mg/dL 283      TSH      0.36 - 3.74 uIU/mL   4.03 (H)      Component      Latest Ref Rng & Units 2/14/2017           5:37 AM   Sodium      136 - 145 mmol/L 140   Potassium      3.5 - 5.1 mmol/L 5.4 (H)   Chloride      97 - 108 mmol/L 110 (H)   CO2      21 - 32 mmol/L 19 (L)   Anion gap      5 - 15 mmol/L 11   Glucose      65 - 100 mg/dL 187 (H)   BUN      6 - 20 MG/DL 35 (H)   Creatinine      0.55 - 1.02 MG/DL 1.77 (H)   BUN/Creatinine ratio      12 - 20   20   GFR est AA      >60 ml/min/1.73m2 34 (L)   GFR est non-AA      >60 ml/min/1.73m2 28 (L)   Calcium      8.5 - 10.1 MG/DL 8.2 (L)       Thyroid surgery pathology 2014:    Thyroid gland, right, right hemithyroidectomy  Follicular carcinoma, Hurthle cell type, at least minimally invasive  See comment    FINAL PATHOLOGIC DIAGNOSIS  1. Left thyroid gland, completion thyroidectomy:  Benign thyroid tissue, no tumor seen  2. Level VI lymph node, selective lymph node dissection: Three benign lymph nodes, no tumor seen (0/3)    Assessment/Plan:   1. Type 2 diabetes mellitus with diabetic polyneuropathy, with long-term current use of insulin ([address]): her most recent Hgb A1c was 6.2% in 6/17 (first visit with me) down from 11.5% in 1/17 during hospital stay for stroke. She has greatly improved her compliance with insulin and diet and checking her sugars and A1c is at goal <7.5% so will keep her doses the same. - cont lantus 20 units in the morning and 10 units at night  - cont humalog 10 units before meals + 2 units for every 40 mg/dl above 140 mg/dl  - check bs 3 times daily  - foot exam done 6/17  - due for eye exam  - check Hgb A1c, cmp, and microalbumin prior to next visit      2. Thyroid cancer [facility]): She noticed a lump in her neck in 2014 and was found to have a 3.4 cm right lobe nodule with microcalcifications and she made an appt with Dr. Debra Mayberry and he performed a right thyroidectomy in 8/14 and final pathology showed a Follicular carcinoma, Hurthle cell type, at least minimally invasive and ended up having a completion thyroidectomy that was benign. Had [facility] that didn't show any uptake. Was initially on Synthroid 150 mcg daily but her dose was decreased to 137 mcg daily and has been on this dose for the past 2 years. Did have a TSH of 4.03 in 1/17 during her hospital stay.   - cont synthroid 137 mcg daily until labs are back  - check TSH and free T4 and thyroglobulin reflex panel today  - check TSH and free T4 prior to next visit      3. Essential hypertension, benign: her BP was at goal < 140/90  -  cont current regimen for now      4. Hyperlipidemia LDL goal <70: LDL 70.6 in 1/17 on atorvastatin 40 mg.  - cont atorvastatin 40 mg daily  - check lipids today and prior to next visit        Patient Instructions   1) Call Medicare to find out which testing supply company to use and have them fax me a form. 2) Hemoglobin A1c is a 3 month marker of your diabetes control. Goal is less than 7.5% which means your average blood sugar is less than 170. Your Hemoglobin A1c is 6.2% which means your diabetes is under much better control than 11.5% at your last check. Continue to work on your diet and exercise and take all your medications as directed. 3) Keep taking the same doses of lantus and humalog and let me know when you need refills. 4) I will repeat your thyroid tests to see if you need a change in your dose of synthroid. I will also check your thyroglobulin (thyroid cancer blood test) to see if you need any further evaluation or treatment. 5) I will call you or send you a letter with your lab results. 6) I will send you a reminder letter 3-4 weeks prior to your next visit and you will have the order already in the [facility] system so you can just go sometime in the 3-7 days before the next visit to have your labs drawn. Follow-up Disposition:  Return in about 5 months (around 11/6/2017).     Copy sent to:  Dr. Dread Gan Cera follow up: 6/7/17    Sent her the following message in a letter:    T4, FREE   Result Value Ref Range    T4, Free 1.16 0.82 - 1.77 ng/dL    Narrative    Performed at:  [facility] [address] [place] [address]  [id number]  : Nohemy Matos MD, Phone:  [phone]   TSH 3RD GENERATION   Result Value Ref Range    TSH 2.690 0.450 - 4.500 uIU/mL    Narrative    Performed at:  [facility]   [address] [place] [address] [place] [address]  [id number]  : Carmen Oneal MD, Phone:  [phone]   THYROGLOBULIN REFLEX PROFILE   Result Value Ref Range    Thyroglobulin Ab <1.0 0.0 - 0.9 IU/mL      Comment:      Thyroglobulin Antibody measured by [facility]    Narrative    Performed at:  [address]  [id number]  : Carmen Oneal MD, Phone:  [phone]   LIPID PANEL   Result Value Ref Range    Cholesterol, total 185 100 - 199 mg/dL    Triglyceride 118 0 - 149 mg/dL    HDL Cholesterol 81 >39 mg/dL    VLDL, calculated 24 5 - 40 mg/dL    LDL, calculated 80 0 - 99 mg/dL    Narrative    Performed at:  [address]  [id number]  : Carmen Oneal MD, Phone:  [phone]   METABOLIC PANEL, COMPREHENSIVE   Result Value Ref Range    Glucose 131 (H) 65 - 99 mg/dL    BUN 22 8 - 27 mg/dL    Creatinine 1.54 (H) 0.57 - 1.00 mg/dL    GFR est non-AA 33 (L) >59 mL/min/1.73    GFR est AA 38 (L) >59 mL/min/1.73    BUN/Creatinine ratio 14 12 - 28    Sodium 144 134 - 144 mmol/L    Potassium 3.8 3.5 - 5.2 mmol/L    Chloride 101 96 - 106 mmol/L    CO2 27 18 - 29 mmol/L    Calcium 9.0 8.7 - 10.3 mg/dL    Protein, total 7.0 6.0 - 8.5 g/dL    Albumin 4.4 3.5 - 4.8 g/dL    GLOBULIN, TOTAL 2.6 1.5 - 4.5 g/dL    A-G Ratio 1.7 1.2 - 2.2    Bilirubin, total 0.5 0.0 - 1.2 mg/dL    Alk.  phosphatase 71 39 - 117 IU/L    AST (SGOT) 53 (H) 0 - 40 IU/L    ALT (SGPT) 63 (H) 0 - 32 IU/L    Narrative    Performed at:  [address]  [id number]  : Carmen Oneal MD, Phone:  [phone]   CVD REPORT   Result Value Ref Range    INTERPRETATION NTAP     PDF IMAGE Not applicable     Narrative    Performed at:  [address]  [address] [place] [address]  [id number]  : Henri Burgos PhD, Phone:  [phone]   CKD REPORT   Result Value Ref Range Interpretation Note       Comment:      Supplement report is available. Narrative    Performed at:  [address]  [address] [place] [address]  [id number]  : Laila Haro PhD, Phone:  [phone]   DIABETES PATIENT EDUCATION   Result Value Ref Range    PDF Image Not applicable     Narrative    Performed at:  [address]  [address] [place] [address]  [id number]  : Laila Haro PhD, Phone:  [phone]   THYROGLOBULIN REFLEX   Result Value Ref Range    Thyroglobulin by ANN 0.3 (L) 1.5 - 38.5 ng/mL      Comment:      According to the [facility] of Clinical Biochemistry, the  reference interval for Thyroglobulin (TG) should be related to  euthyroid patients and not for patients who underwent thyroidectomy. TG reference intervals for these patients depend on the residual  mass of the thyroid tissue left after surgery. Establishing a  post-operative baseline is recommended. The assay limit of quantitation is 0.1 ng/mL  Thyroglobulin measured by [facility] Immunometric Assay      Narrative    Performed at:  [address]  [id number]  : Josie Vargas MD, Phone:  [phone]       I wanted to update you on your recent lab results:    -------------------------------------------------------------------------------------------------------------------  Total Cholesterol is the total number of cholesterol particles in your blood. Goal is less than 200. Your value is at goal.    Triglycerides are the short term fats in your blood. Goal is less than 150. Your value is at goal.    HDL is the good cholesterol in your blood. Goal is more than 50. Your value is at goal.    LDL is the bad cholesterol in your blood. Goal is less than 70. Your value is just above goal.    Continue to follow a low cholesterol diet.   Try to limit the amount of fried foods, fatty foods, butter, gravy, red meat, ice cream, cheese, and eggs in your diet, which are all high in cholesterol. Take all of your medications (atorvastatin) as directed.  -------------------------------------------------------------------------------------------------------------------  BUN and creatinine are markers of kidney function. Your creatinine is abnormal but improved from 1.77 at your last check. -------------------------------------------------------------------------------------------------------------------  ALT and AST are markers of liver function. Your values are normal and likely elevated due to fatty deposition in the liver that can occur with weight gain. Hopefully with weight loss, these values will return to normal.  -------------------------------------------------------------------------------------------------------------------  TSH is a thyroid test.  Your level is 2.69 which is normal and at goal of 0.5-3.0. This test goes opposite of your thyroid dose and suggests your dose of synthroid is working well so I will keep your dose the same. Please let me know when you need refills on this medication. Your thyroglobulin (thyroid cancer blood test) is very low at 0.3 so you don't have any evidence of thyroid cancer at this time.

## 2017-06-06 NOTE — PATIENT INSTRUCTIONS
1) Call Medicare to find out which testing supply company to use and have them fax me a form. 2) Hemoglobin A1c is a 3 month marker of your diabetes control. Goal is less than 7.5% which means your average blood sugar is less than 170. Your Hemoglobin A1c is 6.2% which means your diabetes is under much better control than 11.5% at your last check. Continue to work on your diet and exercise and take all your medications as directed. 3) Keep taking the same doses of lantus and humalog and let me know when you need refills. 4) I will repeat your thyroid tests to see if you need a change in your dose of synthroid. I will also check your thyroglobulin (thyroid cancer blood test) to see if you need any further evaluation or treatment. 5) I will call you or send you a letter with your lab results. 6) I will send you a reminder letter 3-4 weeks prior to your next visit and you will have the order already in the [facility] system so you can just go sometime in the 3-7 days before the next visit to have your labs drawn.

## 2017-06-06 NOTE — MR AVS SNAPSHOT
Visit Information Date & Time Provider Department Dept. Phone Encounter #  
 6/6/2017  2:30 PM Barbara Centeno, [address] [place] [address] Diabetes and Endocrinology [phone] [id number] Follow-up Instructions Return in about 5 months (around 11/6/2017). Upcoming Health Maintenance Date Due  
 FOOT EXAM Q1 8/19/1950 EYE EXAM RETINAL OR DILATED Q1 8/19/1950 DTaP/Tdap/Td series (1 - Tdap) 8/19/1962 ZOSTER VACCINE AGE 60> 8/19/2001 GLAUCOMA SCREENING Q2Y 8/19/2006 OSTEOPOROSIS SCREENING (DEXA) 8/19/2006 Pneumococcal 65+ High/Highest Risk (1 of 2 - PCV13) 8/19/2006 MEDICARE YEARLY EXAM 8/19/2006 MICROALBUMIN Q1 2/20/2015 HEMOGLOBIN A1C Q6M 7/15/2017 INFLUENZA AGE 9 TO ADULT 8/1/2017 LIPID PANEL Q1 1/15/2018 Allergies as of 6/6/2017  Review Complete On: 6/6/2017 By: Barbara Centeno MD  
  
 Severity Noted Reaction Type Reactions Amlodipine  08/06/2014    Swelling Nifedipine  08/06/2014    Swelling Sulfa (Sulfonamide Antibiotics)  08/14/2013    Rash Current Immunizations  Never Reviewed No immunizations on file. Not reviewed this visit You Were Diagnosed With   
  
 Codes Comments Type 2 diabetes mellitus with diabetic polyneuropathy, with long-term current use of insulin (HCC)    -  Primary ICD-10-CM: E11.42, Z79.4 ICD-9-CM: 250.60, 357.2, V58.67 Thyroid cancer [facility])     ICD-10-CM: O27 ICD-9-CM: 548 Essential hypertension, benign     ICD-10-CM: I10 
ICD-9-CM: 401.1 Hyperlipidemia LDL goal <70     ICD-10-CM: E78.5 ICD-9-CM: 272.4 Vitals BP Pulse Height(growth percentile) Weight(growth percentile) BMI OB Status 138/88 65 5' 5\" (1.651 m) 217 lb 3.2 oz (98.5 kg) 36.14 kg/m2 Hysterectomy Smoking Status Never Smoker Vitals History BMI and BSA Data Body Mass Index Body Surface Area  
 36.14 kg/m 2 2.13 m 2 [phone] Your Updated Medication List  
  
   
This list is accurate as of: 6/6/17  4:12 PM.  Always use your most recent med list.  
  
  
  
  
 allopurinol 100 mg tablet Commonly known as:  Renee Niels Take  by mouth daily. atorvastatin 40 mg tablet Commonly known as:  LIPITOR Take  by mouth daily. BENICAR 40 mg tablet Generic drug:  olmesartan Take  by mouth daily. bumetanide 1 mg tablet Commonly known as:  Shelah Renny Take 1 mg by mouth daily. CENTRUM SILVER Tab tablet Generic drug:  multivitamins-minerals-lutein Take  by mouth.  
  
 citalopram 20 mg tablet Commonly known as:  Ashwin Gaviria Take 1 tab daily  
  
 cloNIDine HCl 0.3 mg tablet Commonly known as:  CATAPRES Take 0.3 mg by mouth two (2) times a day. clopidogrel 75 mg Tab Commonly known as:  PLAVIX Take 1 Tab by mouth daily. ferrous sulfate 325 mg (65 mg iron) tablet Take  by mouth three (3) times daily (with meals). gabapentin 100 mg capsule Commonly known as:  NEURONTIN Take 2 caps in am, 1 at lunch, and 2 at bedtime HumaLOG KwikPen 100 unit/mL kwikpen Generic drug:  insulin lispro  
by SubCUTAneous route Before breakfast, lunch, dinner and at bedtime. Sliding scale-  10 units                       141-180  12 units                        181-220  14 units                         221-260   16 units                          261-300  18 units                           > 301     20 units LANTUS SOLOSTAR 100 unit/mL (3 mL) Inpn Generic drug:  insulin glargine 20 units in the morning and 10 units at bedtime  
  
 metoprolol succinate 50 mg XL tablet Commonly known as:  TOPROL XL Take 1 Tab by mouth daily. Krystina Pen Needle 32 gauge x 5/32\" Ndle Generic drug:  Insulin Needles (Disposable) Use as directed SYNTHROID 137 mcg tablet Generic drug:  levothyroxine Take 1 tab daily VITAMIN B-6 PO Take  by mouth daily. Prescriptions Sent to Pharmacy Refills  
 gabapentin (NEURONTIN) 100 mg capsule 3 Sig: Take 2 caps in am, 1 at lunch, and 2 at bedtime Class: Normal  
 Pharmacy: [address], [address] [place] [address] #: [phone] We Performed the Following AMB POC HEMOGLOBIN A1C [18364 CPT(R)] LIPID PANEL [00046 CPT(R)] METABOLIC PANEL, COMPREHENSIVE [17459 CPT(R)] T4, FREE E9597063 CPT(R)] THYROGLOBULIN REFLEX PROFILE T5952675 CPT(R)] TSH 3RD GENERATION [83756 CPT(R)] Follow-up Instructions Return in about 5 months (around 11/6/2017). Patient Instructions 1) Call Medicare to find out which testing supply company to use and have them fax me a form. 2) Hemoglobin A1c is a 3 month marker of your diabetes control. Goal is less than 7.5% which means your average blood sugar is less than 170. Your Hemoglobin A1c is 6.2% which means your diabetes is under much better control than 11.5% at your last check. Continue to work on your diet and exercise and take all your medications as directed. 3) Keep taking the same doses of lantus and humalog and let me know when you need refills. 4) I will repeat your thyroid tests to see if you need a change in your dose of synthroid. I will also check your thyroglobulin (thyroid cancer blood test) to see if you need any further evaluation or treatment. 5) I will call you or send you a letter with your lab results. 6) I will send you a reminder letter 3-4 weeks prior to your next visit and you will have the order already in the [facility] system so you can just go sometime in the 3-7 days before the next visit to have your labs drawn. Introducing [facility]!    
 Ernestina Maldonado introduces [employer] patient portal. Now you can access parts of your medical record, email your doctor's office, and request medication refills online. 1. In your internet browser, go to [email or web address] 2. Click on the First Time User? Click Here link in the Sign In box. You will see the New Member Sign Up page. 3. Enter your FortunePay Access Code exactly as it appears below. You will not need to use this code after youve completed the sign-up process. If you do not sign up before the expiration date, you must request a new code. · FortunePay Access Code: [email or web address] Expires: 9/4/2017  4:12 PM 
 
4. Enter the last four digits of your Social Security Number (xxxx) and Date of Birth (mm/dd/yyyy) as indicated and click Submit. You will be taken to the next sign-up page. 5. Create a FortunePay ID. This will be your FortunePay login ID and cannot be changed, so think of one that is secure and easy to remember. 6. Create a FortunePay password. You can change your password at any time. 7. Enter your Password Reset Question and Answer. This can be used at a later time if you forget your password. 8. Enter your e-mail address. You will receive e-mail notification when new information is available in [address] 9. Click Sign Up. You can now view and download portions of your medical record. 10. Click the Download Summary menu link to download a portable copy of your medical information. If you have questions, please visit the Frequently Asked Questions section of the FortunePay website. Remember, FortunePay is NOT to be used for urgent needs. For medical emergencies, dial 911. Now available from your iPhone and Android! Please provide this summary of care documentation to your next provider. Your primary care clinician is listed as FRAN BAKER. If you have any questions after today's visit, please call [phone].

## 2017-06-07 ENCOUNTER — TELEPHONE (OUTPATIENT)
Dept: ENDOCRINOLOGY | Age: 77
End: 2017-06-07

## 2017-06-07 LAB
ALBUMIN SERPL-MCNC: 4.4 G/DL (ref 3.5–4.8)
ALBUMIN/GLOB SERPL: 1.7 {RATIO} (ref 1.2–2.2)
ALP SERPL-CCNC: 71 IU/L (ref 39–117)
ALT SERPL-CCNC: 63 IU/L (ref 0–32)
AST SERPL-CCNC: 53 IU/L (ref 0–40)
BILIRUB SERPL-MCNC: 0.5 MG/DL (ref 0–1.2)
BUN SERPL-MCNC: 22 MG/DL (ref 8–27)
BUN/CREAT SERPL: 14 (ref 12–28)
CALCIUM SERPL-MCNC: 9 MG/DL (ref 8.7–10.3)
CHLORIDE SERPL-SCNC: 101 MMOL/L (ref 96–106)
CHOLEST SERPL-MCNC: 185 MG/DL (ref 100–199)
CO2 SERPL-SCNC: 27 MMOL/L (ref 18–29)
CREAT SERPL-MCNC: 1.54 MG/DL (ref 0.57–1)
GLOBULIN SER CALC-MCNC: 2.6 G/DL (ref 1.5–4.5)
GLUCOSE SERPL-MCNC: 131 MG/DL (ref 65–99)
HDLC SERPL-MCNC: 81 MG/DL
INTERPRETATION, 910389: NORMAL
INTERPRETATION: NORMAL
LDLC SERPL CALC-MCNC: 80 MG/DL (ref 0–99)
Lab: NORMAL
PDF IMAGE, 910387: NORMAL
POTASSIUM SERPL-SCNC: 3.8 MMOL/L (ref 3.5–5.2)
PROT SERPL-MCNC: 7 G/DL (ref 6–8.5)
SODIUM SERPL-SCNC: 144 MMOL/L (ref 134–144)
T4 FREE SERPL-MCNC: 1.16 NG/DL (ref 0.82–1.77)
THYROGLOB AB SERPL-ACNC: <1 IU/ML (ref 0–0.9)
THYROGLOB SERPL-MCNC: 0.3 NG/ML (ref 1.5–38.5)
TRIGL SERPL-MCNC: 118 MG/DL (ref 0–149)
TSH SERPL DL<=0.005 MIU/L-ACNC: 2.69 UIU/ML (ref 0.45–4.5)
VLDLC SERPL CALC-MCNC: 24 MG/DL (ref 5–40)

## 2017-06-08 NOTE — TELEPHONE ENCOUNTER
Patient has decided to use [facility] and she will have them fax the form to our office. She would also like to know if we have any samples of the one touch ultra test strips.

## 2017-06-09 NOTE — TELEPHONE ENCOUNTER
I don't have any samples of the one touch strips. If she is out of strips, then I can provide her with a freestyle meter and 30 strips to last until her mail order shipment arrives. She can pick this up at her convenience.

## 2017-06-09 NOTE — TELEPHONE ENCOUNTER
Pt notified of message per Dr. Sun Chirinos and voiced understanding of what was read to her. She stated that she will  the meter on Tuesday.

## 2017-07-26 RX ORDER — LEVOTHYROXINE SODIUM 137 UG/1
137 TABLET ORAL DAILY
Qty: 90 TAB | Refills: 3 | Status: SHIPPED | OUTPATIENT
Start: 2017-07-26 | End: 2017-11-10 | Stop reason: DRUGHIGH

## 2017-10-02 RX ORDER — INSULIN GLARGINE 100 [IU]/ML
INJECTION, SOLUTION SUBCUTANEOUS
Qty: 30 ML | Refills: 3 | Status: SHIPPED | OUTPATIENT
Start: 2017-10-02 | End: 2018-09-27 | Stop reason: SDUPTHER

## 2017-10-11 ENCOUNTER — TELEPHONE (OUTPATIENT)
Dept: ENDOCRINOLOGY | Age: 77
End: 2017-10-11

## 2017-10-11 DIAGNOSIS — C73 THYROID CANCER (HCC): ICD-10-CM

## 2017-10-11 DIAGNOSIS — Z79.4 TYPE 2 DIABETES MELLITUS WITH DIABETIC POLYNEUROPATHY, WITH LONG-TERM CURRENT USE OF INSULIN (HCC): ICD-10-CM

## 2017-10-11 DIAGNOSIS — E78.5 HYPERLIPIDEMIA LDL GOAL <70: ICD-10-CM

## 2017-10-11 DIAGNOSIS — E11.42 TYPE 2 DIABETES MELLITUS WITH DIABETIC POLYNEUROPATHY, WITH LONG-TERM CURRENT USE OF INSULIN (HCC): ICD-10-CM

## 2017-10-11 DIAGNOSIS — I10 ESSENTIAL HYPERTENSION, BENIGN: ICD-10-CM

## 2017-10-11 NOTE — TELEPHONE ENCOUNTER
----- Message from Mahogany Arechiga MD sent at 10/11/2017  6:27 AM EDT -----      ----- Message -----     From: Mahogany Arechiga MD     Sent: 10/11/2017       To: Mahogany Arechiga MD    Send a reminder letter to have labs prior to next visit    ---    completed

## 2017-11-04 LAB
ALBUMIN SERPL-MCNC: 3.8 G/DL (ref 3.5–4.8)
ALBUMIN/CREAT UR: 3192.8 MG/G CREAT (ref 0–30)
ALBUMIN/GLOB SERPL: 1.5 {RATIO} (ref 1.2–2.2)
ALP SERPL-CCNC: 68 IU/L (ref 39–117)
ALT SERPL-CCNC: 43 IU/L (ref 0–32)
AST SERPL-CCNC: 45 IU/L (ref 0–40)
BILIRUB SERPL-MCNC: 0.6 MG/DL (ref 0–1.2)
BUN SERPL-MCNC: 20 MG/DL (ref 8–27)
BUN/CREAT SERPL: 10 (ref 12–28)
CALCIUM SERPL-MCNC: 8.7 MG/DL (ref 8.7–10.3)
CHLORIDE SERPL-SCNC: 102 MMOL/L (ref 96–106)
CHOLEST SERPL-MCNC: 209 MG/DL (ref 100–199)
CO2 SERPL-SCNC: 27 MMOL/L (ref 18–29)
CREAT SERPL-MCNC: 1.91 MG/DL (ref 0.57–1)
CREAT UR-MCNC: 44.3 MG/DL
EST. AVERAGE GLUCOSE BLD GHB EST-MCNC: 166 MG/DL
GFR SERPLBLD CREATININE-BSD FMLA CKD-EPI: 25 ML/MIN/1.73
GFR SERPLBLD CREATININE-BSD FMLA CKD-EPI: 29 ML/MIN/1.73
GLOBULIN SER CALC-MCNC: 2.5 G/DL (ref 1.5–4.5)
GLUCOSE SERPL-MCNC: 124 MG/DL (ref 65–99)
HBA1C MFR BLD: 7.4 % (ref 4.8–5.6)
HDLC SERPL-MCNC: 93 MG/DL
LDLC SERPL CALC-MCNC: 99 MG/DL (ref 0–99)
MICROALBUMIN UR-MCNC: 1414.4 UG/ML
POTASSIUM SERPL-SCNC: 3.7 MMOL/L (ref 3.5–5.2)
PROT SERPL-MCNC: 6.3 G/DL (ref 6–8.5)
SODIUM SERPL-SCNC: 145 MMOL/L (ref 134–144)
T4 FREE SERPL-MCNC: 1.52 NG/DL (ref 0.82–1.77)
TRIGL SERPL-MCNC: 83 MG/DL (ref 0–149)
TSH SERPL DL<=0.005 MIU/L-ACNC: 3.41 UIU/ML (ref 0.45–4.5)
VLDLC SERPL CALC-MCNC: 17 MG/DL (ref 5–40)

## 2017-11-10 ENCOUNTER — OFFICE VISIT (OUTPATIENT)
Dept: ENDOCRINOLOGY | Age: 77
End: 2017-11-10

## 2017-11-10 VITALS
RESPIRATION RATE: 16 BRPM | WEIGHT: 214 LBS | HEART RATE: 72 BPM | DIASTOLIC BLOOD PRESSURE: 80 MMHG | HEIGHT: 65 IN | BODY MASS INDEX: 35.65 KG/M2 | SYSTOLIC BLOOD PRESSURE: 164 MMHG

## 2017-11-10 DIAGNOSIS — C73 THYROID CANCER (HCC): ICD-10-CM

## 2017-11-10 DIAGNOSIS — Z79.4 CONTROLLED TYPE 2 DIABETES MELLITUS WITH DIABETIC POLYNEUROPATHY, WITH LONG-TERM CURRENT USE OF INSULIN (HCC): Primary | ICD-10-CM

## 2017-11-10 DIAGNOSIS — E78.5 HYPERLIPIDEMIA LDL GOAL <70: ICD-10-CM

## 2017-11-10 DIAGNOSIS — E11.42 CONTROLLED TYPE 2 DIABETES MELLITUS WITH DIABETIC POLYNEUROPATHY, WITH LONG-TERM CURRENT USE OF INSULIN (HCC): Primary | ICD-10-CM

## 2017-11-10 DIAGNOSIS — I10 ESSENTIAL HYPERTENSION, BENIGN: ICD-10-CM

## 2017-11-10 RX ORDER — SPIRONOLACTONE 50 MG/1
50 TABLET, FILM COATED ORAL DAILY
COMMUNITY
Start: 2017-11-07 | End: 2019-05-24

## 2017-11-10 RX ORDER — BUMETANIDE 0.5 MG/1
0.5 TABLET ORAL 2 TIMES DAILY
COMMUNITY
Start: 2017-09-29 | End: 2020-05-08

## 2017-11-10 RX ORDER — LEVOTHYROXINE SODIUM 150 MCG
150 TABLET ORAL
Qty: 90 TAB | Refills: 3 | Status: SHIPPED | OUTPATIENT
Start: 2017-11-10 | End: 2018-01-15 | Stop reason: SDUPTHER

## 2017-11-10 NOTE — PATIENT INSTRUCTIONS
1) Stop the fruit after dinner. Only have fruit earlier in the day. Try not to snack on anything with sugar after dinner as this will cause your blood sugar to be higher the next morning. You can try sugar free jello or pudding or raw veggies or nuts as these won't cause your sugar to spike overnight. If you eliminate the fruit and your sugars in the morning are still over 130 after 2 weeks, then slightly increase the night lantus to 14 units. 2) Your Hemoglobin A1c is a 3 month marker of your diabetes control. Goal is less than 7% which means your average blood sugar is less than 150. Your Hemoglobin A1c is 7.4% which means your diabetes is under slightly worse control than 6.2% at your last check. Continue to work on your diet and exercise and take all your medications as directed. 3) TSH is a thyroid test.  Your level is 3.4 which is normal but above goal of 0.5-2.0. This test goes opposite of your thyroid dose and suggests your dose of synthroid is not quite enough. I will increase your dose to 150 mcg daily and have [facility] Feel free to use up the 137 mcg tabs by taking 1 tab on Monday-Saturday and 2 tabs on Sunday. 4) Your LDL (bad cholesterol) should be under 70 and yours is 99 and this up from 80 at your last check. It's possible that your thyroid dose not being enough is causing a slight rise in cholesterol. Try to limit the amount of fried foods, fatty foods, butter, gravy, red meat, ice cream, cheese and eggs in your diet, which are all high in cholesterol. 5) Your urine protein was very elevated so I agree with Dr. Yen Malhotra about restarting the spironolactone. 6) Your ALT and AST are markers of liver function. Your values are still slightly abnormal but improved from last time. 7) Your BUN and creatinine are markers of kidney function. Your values are higher than last time due to the higher A1c and blood pressure.     8) I will send you a reminder letter 3-4 weeks prior to your next visit and you will have the order already in the [facility] system so you can just go sometime in the 3-7 days before the next visit to have your labs drawn.

## 2017-11-10 NOTE — MR AVS SNAPSHOT
Visit Information Date & Time Provider Department Dept. Phone Encounter #  
 11/10/2017 11:50 AM Elfrieda Ahumada, MD [place] [facility] [phone] [id number] Follow-up Instructions Return in about 5 months (around 4/10/2018). Upcoming Health Maintenance Date Due  
 EYE EXAM RETINAL OR DILATED Q1 8/19/1950 DTaP/Tdap/Td series (1 - Tdap) 8/19/1962 ZOSTER VACCINE AGE 60> 6/19/2001 GLAUCOMA SCREENING Q2Y 8/19/2006 OSTEOPOROSIS SCREENING (DEXA) 8/19/2006 Pneumococcal 65+ High/Highest Risk (1 of 2 - PCV13) 8/19/2006 MEDICARE YEARLY EXAM 8/19/2006 Influenza Age 5 to Adult 8/1/2017 HEMOGLOBIN A1C Q6M 5/3/2018 FOOT EXAM Q1 6/6/2018 MICROALBUMIN Q1 11/3/2018 LIPID PANEL Q1 11/3/2018 Allergies as of 11/10/2017  Review Complete On: 11/10/2017 By: Elfrieda Ahumada, MD  
  
 Severity Noted Reaction Type Reactions Amlodipine  08/06/2014    Swelling Nifedipine  08/06/2014    Swelling Sulfa (Sulfonamide Antibiotics)  08/14/2013    Rash Current Immunizations  Never Reviewed No immunizations on file. Not reviewed this visit Vitals BP Pulse Resp Height(growth percentile) Weight(growth percentile) BMI  
 164/80 72 16 5' 5\" (1.651 m) 214 lb (97.1 kg) 35.61 kg/m2 OB Status Smoking Status Hysterectomy Never Smoker Vitals History BMI and BSA Data Body Mass Index Body Surface Area  
 35.61 kg/m 2 2.11 m 2 Preferred Pharmacy Pharmacy Name Phone [address] [facility] [phone] Your Updated Medication List  
  
   
This list is accurate as of: 11/10/17 12:52 PM.  Always use your most recent med list.  
  
  
  
  
 allopurinol 100 mg tablet Commonly known as:  Cyntha Dicker Take  by mouth daily. atorvastatin 40 mg tablet Commonly known as:  LIPITOR Take  by mouth daily. BENICAR 40 mg tablet Generic drug:  olmesartan Take  by mouth daily. * bumetanide 1 mg tablet Commonly known as:  Welby Shows Take 1 mg by mouth daily. * bumetanide 0.5 mg tablet Commonly known as:  Mitch Shows CENTRUM SILVER Tab tablet Generic drug:  multivitamins-minerals-lutein Take  by mouth.  
  
 citalopram 20 mg tablet Commonly known as:  Lenward Campanile Take 1 tab daily  
  
 cloNIDine HCl 0.3 mg tablet Commonly known as:  CATAPRES Take 0.3 mg by mouth two (2) times a day. clopidogrel 75 mg Tab Commonly known as:  PLAVIX Take 1 Tab by mouth daily. gabapentin 100 mg capsule Commonly known as:  NEURONTIN Take 2 caps in am, 1 at lunch, and 2 at bedtime HumaLOG KwikPen 100 unit/mL kwikpen Generic drug:  insulin lispro  
by SubCUTAneous route Before breakfast, lunch, dinner and at bedtime. Sliding scale-  10 units                       141-180  12 units                        181-220  14 units                         221-260   16 units                          261-300  18 units                           > 301     20 units  
  
 insulin glargine 100 unit/mL (3 mL) Inpn Commonly known as:  LANTUS SOLOSTAR Inject 20 units in the morning and 10 units at bedtime  
  
 metoprolol succinate 50 mg XL tablet Commonly known as:  TOPROL XL Take 1 Tab by mouth daily. Krystina Pen Needle 32 gauge x 5/32\" Ndle Generic drug:  Insulin Needles (Disposable) Use as directed  
  
 spironolactone 50 mg tablet Commonly known as:  ALDACTONE Take 50 mg by mouth daily. SYNTHROID 150 mcg tablet Generic drug:  levothyroxine Take 1 Tab by mouth Daily (before breakfast). BRAND MEDICALLY NECESSARY  
  
 VITAMIN B-6 PO Take  by mouth daily. * Notice: This list has 2 medication(s) that are the same as other medications prescribed for you. Read the directions carefully, and ask your doctor or other care provider to review them with you. Prescriptions Sent to Pharmacy Refills SYNTHROID 150 mcg tablet 3 Sig: Take 1 Tab by mouth Daily (before breakfast). BRAND MEDICALLY NECESSARY Class: Normal  
 Pharmacy: [address], [address] [place] [address] #: [phone] Route: Oral  
  
Follow-up Instructions Return in about 5 months (around 4/10/2018). Patient Instructions 1) Stop the fruit after dinner. Only have fruit earlier in the day. Try not to snack on anything with sugar after dinner as this will cause your blood sugar to be higher the next morning. You can try sugar free jello or pudding or raw veggies or nuts as these won't cause your sugar to spike overnight. If you eliminate the fruit and your sugars in the morning are still over 130 after 2 weeks, then slightly increase the night lantus to 14 units. 2) Your Hemoglobin A1c is a 3 month marker of your diabetes control. Goal is less than 7% which means your average blood sugar is less than 150. Your Hemoglobin A1c is 7.4% which means your diabetes is under slightly worse control than 6.2% at your last check. Continue to work on your diet and exercise and take all your medications as directed. 3) TSH is a thyroid test.  Your level is 3.4 which is normal but above goal of 0.5-2.0. This test goes opposite of your thyroid dose and suggests your dose of synthroid is not quite enough. I will increase your dose to 150 mcg daily and have [facility] Feel free to use up the 137 mcg tabs by taking 1 tab on Monday-Saturday and 2 tabs on Sunday. 4) Your LDL (bad cholesterol) should be under 70 and yours is 99 and this up from 80 at your last check. It's possible that your thyroid dose not being enough is causing a slight rise in cholesterol. Try to limit the amount of fried foods, fatty foods, butter, gravy, red meat, ice cream, cheese and eggs in your diet, which are all high in cholesterol. 5) Your urine protein was very elevated so I agree with Dr. Yen Malhotra about restarting the spironolactone. 6) Your ALT and AST are markers of liver function. Your values are still slightly abnormal but improved from last time. 7) Your BUN and creatinine are markers of kidney function. Your values are higher than last time due to the higher A1c and blood pressure. 8) I will send you a reminder letter 3-4 weeks prior to your next visit and you will have the order already in the [facility] system so you can just go sometime in the 3-7 days before the next visit to have your labs drawn. Introducing [facility] & HEALTH SERVICES! [facility] introduces [employer] patient portal. Now you can access parts of your medical record, email your doctor's office, and request medication refills online. 1. In your internet browser, go to [email or web address] 2. Click on the First Time User? Click Here link in the Sign In box. You will see the New Member Sign Up page. 3. Enter your SocialDefender Access Code exactly as it appears below. You will not need to use this code after youve completed the sign-up process. If you do not sign up before the expiration date, you must request a new code. · SocialDefender Access Code: [email or web address] Expires: 2/8/2018 12:52 PM 
 
4. Enter the last four digits of your Social Security Number (xxxx) and Date of Birth (mm/dd/yyyy) as indicated and click Submit. You will be taken to the next sign-up page. 5. Create a Neovacst ID. This will be your SocialDefender login ID and cannot be changed, so think of one that is secure and easy to remember. 6. Create a Neovacst password. You can change your password at any time. 7. Enter your Password Reset Question and Answer. This can be used at a later time if you forget your password. 8. Enter your e-mail address. You will receive e-mail notification when new information is available in [address] 9. Click Sign Up. You can now view and download portions of your medical record. 10. Click the Download Summary menu link to download a portable copy of your medical information. If you have questions, please visit the Frequently Asked Questions section of the aCon website. Remember, aCon is NOT to be used for urgent needs. For medical emergencies, dial 911. Now available from your iPhone and Android! Please provide this summary of care documentation to your next provider. Your primary care clinician is listed as Lalit Bob. If you have any questions after today's visit, please call [phone].

## 2017-11-10 NOTE — PROGRESS NOTES
Lulu Flowers is a 69 y.o. female      Chief Complaint   Patient presents with    Diabetes     PCP and Pharmacy Confirmed         1. Have you been to the ER, urgent care clinic since your last visit? Hospitalized since your last visit? No    2. Have you seen or consulted any other health care providers outside of the [address] [place] [address] since your last visit? Include any pap smears or colon screening.  No

## 2017-11-10 NOTE — PROGRESS NOTES
Chief Complaint   Patient presents with    Diabetes     PCP and Pharmacy Confirmed     History of Present Illness: Aleja Jernigan is a 69 y.o. female here for follow up of diabetes. Weight down 3 lbs since last visit in 6/17. Has been checking 3 times daily before meals and taking lantus and humalog the same way. Has seen more fasting sugars in the 150s when they had been closer to 130 or less. Can be back to  by lunch and about the same at dinner. Does have an apple or orange after dinner that may be keeping her fasting sugars higher. Dr. Jennifer Alonzo just added back floyd 50 mg daily 2 days ago based on high urine protein. Has had more leg swelling recently. Compliant with lipitor. Did have some fried chicken recently but otherwise isn't eating too many fatty foods. Compliant with synthroid and takes 30 min before breakfast with just water and hasn't missed doses or run out. Current Outpatient Prescriptions   Medication Sig    spironolactone (ALDACTONE) 50 mg tablet Take 50 mg by mouth daily.  bumetanide (BUMEX) 0.5 mg tablet     insulin glargine (LANTUS SOLOSTAR) 100 unit/mL (3 mL) inpn Inject 20 units in the morning and 10 units at bedtime    SYNTHROID 137 mcg tablet Take 137 mcg by mouth daily. Take 1 tab daily    PYRIDOXINE HCL, VITAMIN B6, (VITAMIN B-6 PO) Take  by mouth daily.  allopurinol (ZYLOPRIM) 100 mg tablet Take  by mouth daily.  citalopram (CELEXA) 20 mg tablet Take 1 tab daily    LOR PEN NEEDLE 32 gauge x 5/32\" ndle Use as directed    gabapentin (NEURONTIN) 100 mg capsule Take 2 caps in am, 1 at lunch, and 2 at bedtime    clopidogrel (PLAVIX) 75 mg tab Take 1 Tab by mouth daily.  bumetanide (BUMEX) 1 mg tablet Take 1 mg by mouth daily.  cloNIDine (CATAPRES) 0.3 mg tablet Take 0.3 mg by mouth two (2) times a day.  olmesartan (BENICAR) 40 mg tablet Take  by mouth daily.  metoprolol succinate (TOPROL XL) 50 mg XL tablet Take 1 Tab by mouth daily.     atorvastatin (LIPITOR) 40 mg tablet Take  by mouth daily.  multivitamins-minerals-lutein (CENTRUM SILVER) Tab Take  by mouth.  insulin lispro (HUMALOG KWIKPEN) 100 unit/mL kwikpen by SubCUTAneous route Before breakfast, lunch, dinner and at bedtime. Sliding scale-  10 units                        141-180  12 units                         181-220  14 units                          221-260   16 units                           261-300  18 units                            > 301     20 units     No current facility-administered medications for this visit. Allergies   Allergen Reactions    Amlodipine Swelling    Nifedipine Swelling    Sulfa (Sulfonamide Antibiotics) Rash     Review of Systems:  - Eyes: no blurry vision or double vision  - Cardiovascular: no chest pain  - Respiratory: no shortness of breath  - Musculoskeletal: no myalgias  - Neurological: no numbness/tingling in extremities    Physical Examination:  Blood pressure 164/80, pulse 72, resp. rate 16, height 5' 5\" (1.651 m), weight 214 lb (97.1 kg).   - General: pleasant, no distress, good eye contact   - Neck: well healed inferior neck scar, no palpable thyroid tissue, masses or lymph nodes, no carotid bruits  - Cardiovascular: regular, normal rate, nl s1 and s2, no m/r/g,   - Respiratory: clear bilaterally  - Integumentary: 1+ edema,   - Psychiatric: normal mood and affect    Data Reviewed:   Component      Latest Ref Rng & Units 11/3/2017 11/3/2017          10:44 AM 10:44 AM   T4, Free      0.82 - 1.77 ng/dL  1.52   TSH      0.450 - 4.500 uIU/mL 3.410      Component      Latest Ref Rng & Units 11/3/2017 11/3/2017 11/3/2017 11/3/2017          10:44 AM 10:44 AM 10:44 AM 10:44 AM   Glucose      65 - 99 mg/dL   124 (H)    BUN      8 - 27 mg/dL   20    Creatinine      0.57 - 1.00 mg/dL   1.91 (H)    GFR est non-AA      >59 mL/min/1.73   25 (L)    GFR est AA      >59 mL/min/1.73   29 (L)    BUN/Creatinine ratio      12 - 28   10 (L)    Sodium      134 - 144 mmol/L   145 (H)    Potassium      3.5 - 5.2 mmol/L   3.7    Chloride      96 - 106 mmol/L   102    CO2      18 - 29 mmol/L   27    Calcium      8.7 - 10.3 mg/dL   8.7    Protein, total      6.0 - 8.5 g/dL   6.3    Albumin      3.5 - 4.8 g/dL   3.8    GLOBULIN, TOTAL      1.5 - 4.5 g/dL   2.5    A-G Ratio      1.2 - 2.2   1.5    Bilirubin, total      0.0 - 1.2 mg/dL   0.6    Alk. phosphatase      39 - 117 IU/L   68    AST      0 - 40 IU/L   45 (H)    ALT (SGPT)      0 - 32 IU/L   43 (H)    Cholesterol, total      100 - 199 mg/dL  209 (H)     Triglyceride      0 - 149 mg/dL  83     HDL Cholesterol      >39 mg/dL  93     VLDL, calculated      5 - 40 mg/dL  17     LDL, calculated      0 - 99 mg/dL  99     Creatinine, urine      Not Estab. mg/dL    44.3   Microalbumin, urine      Not Estab. ug/mL    1414.4   Microalbumin/Creat. Ratio      0.0 - 30.0 mg/g creat    3192.8 (H)   Hemoglobin A1c, (calculated)      4.8 - 5.6 % 7.4 (H)      Estimated average glucose      mg/dL 166          Assessment/Plan:     1. Type 2 diabetes mellitus with diabetic polyneuropathy, with long-term current use of insulin ([facility]): her most recent Hgb A1c was 7.4% in 11/17 up from 6.2% in 6/17 (first visit with me) down from 11.5% in 1/17 during hospital stay for stroke. A1c is stil at goal <7-7.5% so will keep her doses the same but will eliminate fruit at night and if fasting sugars still over 130, will increase night lantus to 14 units  - cont lantus 20 units in the morning and 10 units at night  - cont humalog 10 units before meals + 2 units for every 40 mg/dl above 140 mg/dl  - check bs 3 times daily  - foot exam done 6/17  - due for eye exam  - microalbumin 3192 11/17  - check Hgb A1c, cmp, and microalbumin prior to next visit      2. Thyroid cancer [facility]):  She noticed a lump in her neck in 2014 and was found to have a 3.4 cm right lobe nodule with microcalcifications and she made an appt with Dr. Spencer Black and he performed a right thyroidectomy in 8/14 and final pathology showed a Follicular carcinoma, Hurthle cell type, at least minimally invasive and ended up having a completion thyroidectomy that was benign. Had a whole body scan at [facility] that fall that didn't show any uptake. Was initially on Synthroid 150 mcg daily but her dose was decreased to 137 mcg daily and has been on this dose for the past 2 years. Did have a TSH of 4.03 in 1/17 during her hospital stay. TSH 2.69 and TG 0.3 with neg TG ab in 6/17 so kept dose the same. TSH up to 3.41 in 11/17 so will increase synthroid back to 150 mcg daily  - increase synthroid to 150 mcg daily   - check TSH and free T4 and thyroglobulin reflex panel prior to next visit      3. Essential hypertension, benign: her BP was above goal < 140/90 but just had floyd added so no new changes today  -  cont current regimen for now      4. Hyperlipidemia LDL goal <70: LDL 70.6 in 1/17 on atorvastatin 40 mg and still 80 in 6/17 and 99 in 11/17 possibly due to higher TSH  - cont atorvastatin 40 mg daily  - check lipids prior to next visit        Patient Instructions   1) Stop the fruit after dinner. Only have fruit earlier in the day. Try not to snack on anything with sugar after dinner as this will cause your blood sugar to be higher the next morning. You can try sugar free jello or pudding or raw veggies or nuts as these won't cause your sugar to spike overnight. If you eliminate the fruit and your sugars in the morning are still over 130 after 2 weeks, then slightly increase the night lantus to 14 units. 2) Your Hemoglobin A1c is a 3 month marker of your diabetes control. Goal is less than 7% which means your average blood sugar is less than 150. Your Hemoglobin A1c is 7.4% which means your diabetes is under slightly worse control than 6.2% at your last check. Continue to work on your diet and exercise and take all your medications as directed.     3) TSH is a thyroid test.  Your level is 3.4 which is normal but above goal of 0.5-2.0. This test goes opposite of your thyroid dose and suggests your dose of synthroid is not quite enough. I will increase your dose to 150 mcg daily and have [facility] Feel free to use up the 137 mcg tabs by taking 1 tab on Monday-Saturday and 2 tabs on Sunday. 4) Your LDL (bad cholesterol) should be under 70 and yours is 99 and this up from 80 at your last check. It's possible that your thyroid dose not being enough is causing a slight rise in cholesterol. Try to limit the amount of fried foods, fatty foods, butter, gravy, red meat, ice cream, cheese and eggs in your diet, which are all high in cholesterol. 5) Your urine protein was very elevated so I agree with Dr. Jeff Guerrero about restarting the spironolactone. 6) Your ALT and AST are markers of liver function. Your values are still slightly abnormal but improved from last time. 7) Your BUN and creatinine are markers of kidney function. Your values are higher than last time due to the higher A1c and blood pressure. 8) I will send you a reminder letter 3-4 weeks prior to your next visit and you will have the order already in the [facility] system so you can just go sometime in the 3-7 days before the next visit to have your labs drawn. Follow-up Disposition:  Return in about 5 months (around 4/10/2018).     Copy sent to:  Dr. Scooby Ortiz

## 2017-12-12 ENCOUNTER — HOSPITAL ENCOUNTER (OUTPATIENT)
Dept: MAMMOGRAPHY | Age: 77
Discharge: HOME | End: 2017-12-12
Attending: INTERNAL MEDICINE
Payer: MEDICARE

## 2017-12-12 DIAGNOSIS — Z12.39 SCREENING BREAST EXAMINATION: ICD-10-CM

## 2017-12-12 PROCEDURE — 77063 BREAST TOMOSYNTHESIS BI: CPT

## 2018-01-15 RX ORDER — PEN NEEDLE, DIABETIC 32GX 5/32"
NEEDLE, DISPOSABLE MISCELLANEOUS
Qty: 500 PEN NEEDLE | Refills: 3 | Status: SHIPPED | OUTPATIENT
Start: 2018-01-15 | End: 2020-10-02

## 2018-01-15 RX ORDER — LEVOTHYROXINE SODIUM 150 MCG
150 TABLET ORAL
Qty: 90 TAB | Refills: 3 | Status: SHIPPED | OUTPATIENT
Start: 2018-01-15 | End: 2018-09-27 | Stop reason: SDUPTHER

## 2018-04-05 ENCOUNTER — TELEPHONE (OUTPATIENT)
Dept: ENDOCRINOLOGY | Age: 78
End: 2018-04-05

## 2018-04-05 NOTE — TELEPHONE ENCOUNTER
Patient went to [facility] today to have labs drawn. She had blood taken, but could not urinate. She wants to know if she has to have another order put in the system for just urine? She can be reached at:  [phone].

## 2018-04-06 LAB
ALBUMIN SERPL-MCNC: 4.3 G/DL (ref 3.5–4.8)
ALBUMIN/GLOB SERPL: 1.7 {RATIO} (ref 1.2–2.2)
ALP SERPL-CCNC: 66 IU/L (ref 39–117)
ALT SERPL-CCNC: 34 IU/L (ref 0–32)
AST SERPL-CCNC: 25 IU/L (ref 0–40)
BILIRUB SERPL-MCNC: 0.6 MG/DL (ref 0–1.2)
BUN SERPL-MCNC: 47 MG/DL (ref 8–27)
BUN/CREAT SERPL: 19 (ref 12–28)
CALCIUM SERPL-MCNC: 9.6 MG/DL (ref 8.7–10.3)
CHLORIDE SERPL-SCNC: 95 MMOL/L (ref 96–106)
CHOLEST SERPL-MCNC: 157 MG/DL (ref 100–199)
CO2 SERPL-SCNC: 29 MMOL/L (ref 18–29)
CREAT SERPL-MCNC: 2.51 MG/DL (ref 0.57–1)
CREAT UR-MCNC: NORMAL MG/DL
EST. AVERAGE GLUCOSE BLD GHB EST-MCNC: 258 MG/DL
GFR SERPLBLD CREATININE-BSD FMLA CKD-EPI: 18 ML/MIN/1.73
GFR SERPLBLD CREATININE-BSD FMLA CKD-EPI: 21 ML/MIN/1.73
GLOBULIN SER CALC-MCNC: 2.5 G/DL (ref 1.5–4.5)
GLUCOSE SERPL-MCNC: 196 MG/DL (ref 65–99)
HBA1C MFR BLD: 10.6 % (ref 4.8–5.6)
HDLC SERPL-MCNC: 75 MG/DL
INTERPRETATION, 910389: NORMAL
INTERPRETATION: NORMAL
LDLC SERPL CALC-MCNC: 63 MG/DL (ref 0–99)
Lab: NORMAL
MICROALBUMIN UR-MCNC: NORMAL
PDF IMAGE, 910387: NORMAL
POTASSIUM SERPL-SCNC: 4.4 MMOL/L (ref 3.5–5.2)
PROT SERPL-MCNC: 6.8 G/DL (ref 6–8.5)
SODIUM SERPL-SCNC: 140 MMOL/L (ref 134–144)
T4 FREE SERPL-MCNC: 1.5 NG/DL (ref 0.82–1.77)
THYROGLOB AB SERPL-ACNC: <1 IU/ML (ref 0–0.9)
THYROGLOB SERPL-MCNC: 0.3 NG/ML (ref 1.5–38.5)
TRIGL SERPL-MCNC: 94 MG/DL (ref 0–149)
TSH SERPL DL<=0.005 MIU/L-ACNC: 0.6 UIU/ML (ref 0.45–4.5)
VLDLC SERPL CALC-MCNC: 19 MG/DL (ref 5–40)

## 2018-04-13 ENCOUNTER — OFFICE VISIT (OUTPATIENT)
Dept: ENDOCRINOLOGY | Age: 78
End: 2018-04-13

## 2018-04-13 VITALS
WEIGHT: 209.2 LBS | BODY MASS INDEX: 34.85 KG/M2 | HEIGHT: 65 IN | DIASTOLIC BLOOD PRESSURE: 62 MMHG | HEART RATE: 71 BPM | SYSTOLIC BLOOD PRESSURE: 128 MMHG

## 2018-04-13 DIAGNOSIS — C73 THYROID CANCER (HCC): ICD-10-CM

## 2018-04-13 DIAGNOSIS — E78.5 HYPERLIPIDEMIA LDL GOAL <70: ICD-10-CM

## 2018-04-13 DIAGNOSIS — I10 ESSENTIAL HYPERTENSION, BENIGN: ICD-10-CM

## 2018-04-13 RX ORDER — LANOLIN ALCOHOL/MO/W.PET/CERES
CREAM (GRAM) TOPICAL DAILY
COMMUNITY

## 2018-04-13 RX ORDER — ATORVASTATIN CALCIUM 80 MG/1
80 TABLET, FILM COATED ORAL DAILY
COMMUNITY

## 2018-04-13 NOTE — PROGRESS NOTES
Chief Complaint   Patient presents with    Diabetes     pcp and pharmacy confirmed    Other     consent form signed to obtain eye report     History of Present Illness: Claire Archuleta is a 69 y.o. female here for follow up of diabetes. Weight down 5 lbs since last visit in 11/17. Fasting sugar was 320 this morning. Has been eating more bread since last visit and therefore having more readings in the 200s that are keeping her A1c up. States she is compliant with her insulin. Has cut back on fruit at night. Dr. Demetris Andres increased her atorvastatin to 80 mg sometime after her last visit with me. Due to see Dr. Borrego Fall in the next week. Compliant with higher dose of synthroid. Energy is about the same. Current Outpatient Prescriptions   Medication Sig    atorvastatin (LIPITOR) 80 mg tablet Take 80 mg by mouth daily.  ferrous sulfate (IRON) 325 mg (65 mg iron) tablet Take  by mouth three (3) times daily (with meals).  SYNTHROID 150 mcg tablet Take 1 Tab by mouth Daily (before breakfast). BRAND MEDICALLY NECESSARY    LOR PEN NEEDLE 32 gauge x 5/32\" ndle Use as directed 5 times daily    spironolactone (ALDACTONE) 50 mg tablet Take 50 mg by mouth daily.  bumetanide (BUMEX) 0.5 mg tablet     insulin glargine (LANTUS SOLOSTAR) 100 unit/mL (3 mL) inpn Inject 20 units in the morning and 10 units at bedtime    PYRIDOXINE HCL, VITAMIN B6, (VITAMIN B-6 PO) Take  by mouth daily.  allopurinol (ZYLOPRIM) 100 mg tablet Take  by mouth daily.  citalopram (CELEXA) 20 mg tablet Take 1 tab daily    gabapentin (NEURONTIN) 100 mg capsule Take 2 caps in am, 1 at lunch, and 2 at bedtime    clopidogrel (PLAVIX) 75 mg tab Take 1 Tab by mouth daily.  cloNIDine (CATAPRES) 0.3 mg tablet Take 0.3 mg by mouth two (2) times a day.  olmesartan (BENICAR) 40 mg tablet Take  by mouth daily.  metoprolol succinate (TOPROL XL) 50 mg XL tablet Take 1 Tab by mouth daily.     multivitamins-minerals-lutein (CENTRUM SILVER) Tab Take  by mouth.  insulin lispro (HUMALOG KWIKPEN) 100 unit/mL kwikpen by SubCUTAneous route Before breakfast, lunch, dinner and at bedtime. Sliding scale-  10 units                        141-180  12 units                         181-220  14 units                          221-260   16 units                           261-300  18 units                            > 301     20 units     No current facility-administered medications for this visit. Allergies   Allergen Reactions    Amlodipine Swelling    Nifedipine Swelling    Sulfa (Sulfonamide Antibiotics) Rash     Review of Systems:  - Eyes: no blurry vision or double vision  - Cardiovascular: no chest pain  - Respiratory: no shortness of breath  - Musculoskeletal: no myalgias  - Neurological: no numbness/tingling in extremities    Physical Examination:  Blood pressure 128/62, pulse 71, height 5' 5\" (1.651 m), weight 209 lb 3.2 oz (94.9 kg).   - General: pleasant, no distress, good eye contact   - Neck: no carotid bruits  - Cardiovascular: regular, normal rate, nl s1 and s2, no m/r/g,   - Respiratory: clear bilaterally  - Integumentary: trace edema,   - Psychiatric: normal mood and affect    Data Reviewed:   Component      Latest Ref Rng & Units 4/5/2018 4/5/2018 4/5/2018 4/5/2018          11:15 AM 11:15 AM 11:15 AM 11:15 AM   T4, Free      0.82 - 1.77 ng/dL    1.50   TSH      0.450 - 4.500 uIU/mL   0.600    Thyroglobulin Ab      0.0 - 0.9 IU/mL  <1.0     Thyroglobulin by ANN      1.5 - 38.5 ng/mL 0.3 (L)        Component      Latest Ref Rng & Units 4/5/2018 4/5/2018 4/5/2018          11:15 AM 11:15 AM 11:15 AM   Glucose      65 - 99 mg/dL  196 (H)    BUN      8 - 27 mg/dL  47 (H)    Creatinine      0.57 - 1.00 mg/dL  2.51 (H)    GFR est non-AA      >59 mL/min/1.73  18 (L)    GFR est AA      >59 mL/min/1.73  21 (L)    BUN/Creatinine ratio      12 - 28  19    Sodium      134 - 144 mmol/L  140    Potassium      3.5 - 5.2 mmol/L  4.4    Chloride 96 - 106 mmol/L  95 (L)    CO2      18 - 29 mmol/L  29    Calcium      8.7 - 10.3 mg/dL  9.6    Protein, total      6.0 - 8.5 g/dL  6.8    Albumin      3.5 - 4.8 g/dL  4.3    GLOBULIN, TOTAL      1.5 - 4.5 g/dL  2.5    A-G Ratio      1.2 - 2.2  1.7    Bilirubin, total      0.0 - 1.2 mg/dL  0.6    Alk. phosphatase      39 - 117 IU/L  66    AST      0 - 40 IU/L  25    ALT (SGPT)      0 - 32 IU/L  34 (H)    Cholesterol, total      100 - 199 mg/dL 157     Triglyceride      0 - 149 mg/dL 94     HDL Cholesterol      >39 mg/dL 75     VLDL, calculated      5 - 40 mg/dL 19     LDL, calculated      0 - 99 mg/dL 63     Hemoglobin A1c, (calculated)      4.8 - 5.6 %   10.6 (H)   Estimated average glucose      mg/dL   258       Assessment/Plan:     1. Type 2 diabetes mellitus with diabetic polyneuropathy, with long-term current use of insulin ([address]): her most recent Hgb A1c was 10.6% in 4/18 up from 7.4% in 11/17 up from 6.2% in 6/17 (first visit with me) down from 11.5% in 1/17 during hospital stay for stroke. A1c is above goal <7-7.5% due to eating more bread so will get back on track with her diet and keep her insulin the same as previously this regimen worked well. - cont lantus 20 units in the morning and 10 units at night  - cont humalog 10 units before meals + 2 units for every 40 mg/dl above 140 mg/dl  - check bs 3 times daily  - foot exam done 6/17  - eye exam UTD 3/18--will obtain report  - microalbumin 3192 11/17--repeat today  - check Hgb A1c and cmp prior to next visit      2. Thyroid cancer [facility]): She noticed a lump in her neck in 2014 and was found to have a 3.4 cm right lobe nodule with microcalcifications and she made an appt with Dr. Carole Whitaker and he performed a right thyroidectomy in 8/14 and final pathology showed a Follicular carcinoma, Hurthle cell type, at least minimally invasive and ended up having a completion thyroidectomy that was benign.   Had a whole body scan at [facility] that fall that didn't show any uptake. Was initially on Synthroid 150 mcg daily but her dose was decreased to 137 mcg daily and has been on this dose for the past 2 years. Did have a TSH of 4.03 in 1/17 during her hospital stay. TSH 2.69 and TG 0.3 with neg TG ab in 6/17 so kept dose the same. TSH up to 3.41 in 11/17 so increased synthroid back to 150 mcg daily and TSH 0.6 in 4/18 and TG 0.3 with neg TG ab so will keep her dose the same. - cont synthroid 150 mcg daily   - check TSH and free T4 prior to next visit  - check thyroglobulin reflex panel in 4/19      3. Essential hypertension, benign: her BP was at goal < 140/90 with addition of sprio  -  cont current regimen for now      4. Hyperlipidemia LDL goal <70: LDL 70.6 in 1/17 on atorvastatin 40 mg and still 80 in 6/17 and 99 in 11/17 possibly due to higher TSH. Was put on 80 mg by PCP and LDL down to 63 in 4/18  - cont atorvastatin 80 mg daily  - check lipids prior to next visit        Patient Instructions   1) Your Hemoglobin A1c is a 3 month marker of your diabetes control. Goal is less than 7.5% which means your average blood sugar is less than 170. Your Hemoglobin A1c is 10.6% which means your diabetes is under much wore control than 7.4% at your last check. Continue to work on your diet and exercise and take all your medications as directed. 2) Your LDL (bad cholesterol) is 63 and is back to goal under 70.    3) Your TSH (thyroid test) is normal so your current dose of Synthroid is perfect. Your thyroglobulin (thyroid cancer blood test) is still appropriately low at 0.3 so you have no evidence of thyroid cancer at this time. 4) Your creatinine (kidney test) has worsened from 1.9 to 2.5. Please be sure to follow up with Dr. Mary Portillo for your kidneys. 5) Your blood pressure was better and your weight was down 5 lbs since last visit. 6) I think the major problem has been too much bread and other carbs over the past 5 months so please start cutting back on these.       7) I will mail you a letter next week with your urine results. Follow-up Disposition:  Return in about 5 months (around 9/13/2018). Copy sent to:  Dr. Azalea Siddiqui    Lab follow up: 4/15/18    Sent her the following message in a letter:    Resulted Orders   MICROALBUMIN, UR, RAND W/ MICROALB/CREAT RATIO   Result Value Ref Range    Creatinine, urine 47.7 Not Estab. mg/dL    Microalbumin, urine 424.1 Not Estab. ug/mL      Comment:      Results confirmed on  dilution. Microalb/Creat ratio (ug/mg creat.) 889.1 (H) 0.0 - 30.0 mg/g creat    Narrative    Performed at:  [address]  [id number]  : Lele Gomes MD, Phone:  [phone]       Microalbumin/creatinine ratio is a marker of the amount of protein in your urine. Goal is less than 30. Your value is abnormal at 889 but much improved from 3192 in November. This indicates that your kidneys are being less affected by your uncontrolled diabetes and/or blood pressure than last time after starting the spironolactone. Continue to take olmesartan and spironolactone to help protect your kidneys from the effects of diabetes and high blood pressure.

## 2018-04-13 NOTE — MR AVS SNAPSHOT
[address] [place] [address] [address] [address] [phone] Patient: Mariela Lala MRN: [id number] [facility]:7/41[birth year]79 Visit Information Date & Time Provider Department Dept. Phone Encounter #  
 4/13/2018  9:50 AM Simón Meraz MD [facility] [phone] [id number] Follow-up Instructions Return in about 5 months (around 9/13/2018). Upcoming Health Maintenance Date Due  
 EYE EXAM RETINAL OR DILATED Q1 8/19/1950 DTaP/Tdap/Td series (1 - Tdap) 8/19/1962 ZOSTER VACCINE AGE 60> 6/19/2001 GLAUCOMA SCREENING Q2Y 8/19/2006 Bone Densitometry (Dexa) Screening 8/19/2006 Pneumococcal 65+ High/Highest Risk (1 of 2 - PCV13) 8/19/2006 Influenza Age 5 to Adult 8/1/2017 MEDICARE YEARLY EXAM 3/14/2018 FOOT EXAM Q1 6/6/2018 HEMOGLOBIN A1C Q6M 10/5/2018 MICROALBUMIN Q1 4/5/2019 LIPID PANEL Q1 4/5/2019 Allergies as of 4/13/2018  Review Complete On: 4/13/2018 By: Simón Meraz MD  
  
 Severity Noted Reaction Type Reactions Amlodipine  08/06/2014    Swelling Nifedipine  08/06/2014    Swelling Sulfa (Sulfonamide Antibiotics)  08/14/2013    Rash Current Immunizations  Never Reviewed No immunizations on file. Not reviewed this visit You Were Diagnosed With   
  
 Codes Comments Uncontrolled type 2 diabetes mellitus with diabetic polyneuropathy, with long-term current use of insulin (HCC)    -  Primary ICD-10-CM: E11.42, Z79.4, E11.65 ICD-9-CM: 250.62, 357.2, V58.67 Hyperlipidemia LDL goal <70     ICD-10-CM: E78.5 ICD-9-CM: 272.4 Thyroid cancer [facility])     ICD-10-CM: D01 ICD-9-CM: 660 Essential hypertension, benign     ICD-10-CM: I10 
ICD-9-CM: 401.1 Vitals BP Pulse Height(growth percentile) Weight(growth percentile) BMI OB Status 128/62 71 5' 5\" (1.651 m) 209 lb 3.2 oz (94.9 kg) 34.81 kg/m2 Hysterectomy Smoking Status Never Smoker Vitals History BMI and BSA Data Body Mass Index Body Surface Area 34.81 kg/m 2 2.09 m 2 Preferred Pharmacy Pharmacy Name Phone [facility]  [place] [phone] Your Updated Medication List  
  
   
This list is accurate as of 4/13/18 10:25 AM.  Always use your most recent med list.  
  
  
  
  
 allopurinol 100 mg tablet Commonly known as:  [place] Nick Take  by mouth daily. atorvastatin 80 mg tablet Commonly known as:  LIPITOR Take 80 mg by mouth daily. BENICAR 40 mg tablet Generic drug:  olmesartan Take  by mouth daily. bumetanide 0.5 mg tablet Commonly known as:  Willia Maid CENTRUM SILVER Tab tablet Generic drug:  multivitamins-minerals-lutein Take  by mouth.  
  
 citalopram 20 mg tablet Commonly known as:  Jeni [place] Take 1 tab daily  
  
 cloNIDine HCl 0.3 mg tablet Commonly known as:  CATAPRES Take 0.3 mg by mouth two (2) times a day. clopidogrel 75 mg Tab Commonly known as:  PLAVIX Take 1 Tab by mouth daily. gabapentin 100 mg capsule Commonly known as:  NEURONTIN Take 2 caps in am, 1 at lunch, and 2 at bedtime HumaLOG KwikPen Insulin 100 unit/mL kwikpen Generic drug:  insulin lispro  
by SubCUTAneous route Before breakfast, lunch, dinner and at bedtime. Sliding scale-  10 units                       141-180  12 units                        181-220  14 units                         221-260   16 units                          261-300  18 units                           > 301     20 units  
  
 insulin glargine 100 unit/mL (3 mL) Inpn Commonly known as:  LANTUS SOLOSTAR U-100 INSULIN Inject 20 units in the morning and 10 units at bedtime Iron 325 mg (65 mg iron) tablet Generic drug:  ferrous sulfate Take  by mouth three (3) times daily (with meals). metoprolol succinate 50 mg XL tablet Commonly known as:  TOPROL XL Take 1 Tab by mouth daily. Krystina Pen Needle 32 gauge x 5/32\" Ndle Generic drug:  Insulin Needles (Disposable) Use as directed 5 times daily  
  
 spironolactone 50 mg tablet Commonly known as:  ALDACTONE Take 50 mg by mouth daily. SYNTHROID 150 mcg tablet Generic drug:  levothyroxine Take 1 Tab by mouth Daily (before breakfast). BRAND MEDICALLY NECESSARY  
  
 VITAMIN B-6 PO Take  by mouth daily. We Performed the Following HEMOGLOBIN A1C WITH EAG [01357 CPT(R)] LIPID PANEL [32121 CPT(R)] METABOLIC PANEL, COMPREHENSIVE [88514 CPT(R)] MICROALBUMIN, UR, RAND W/ MICROALB/CREAT RATIO [id number] CPT(R)] T4, FREE [id number] CPT(R)] TSH 3RD GENERATION [74068 CPT(R)] Follow-up Instructions Return in about 5 months (around 9/13/2018). Patient Instructions 1) Your Hemoglobin A1c is a 3 month marker of your diabetes control. Goal is less than 7.5% which means your average blood sugar is less than 170. Your Hemoglobin A1c is 10.6% which means your diabetes is under much wore control than 7.4% at your last check. Continue to work on your diet and exercise and take all your medications as directed. 2) Your LDL (bad cholesterol) is 63 and is back to goal under 70. 
 
3) Your TSH (thyroid test) is normal so your current dose of Synthroid is perfect. Your thyroglobulin (thyroid cancer blood test) is still appropriately low at 0.3 so you have no evidence of thyroid cancer at this time. 4) Your creatinine (kidney test) has worsened from 1.9 to 2.5. Please be sure to follow up with Dr. Vania Pereira for your kidneys. 5) Your blood pressure was better and your weight was down 5 lbs since last visit. 6) I think the major problem has been too much bread and other carbs over the past 5 months so please start cutting back on these. 7) I will mail you a letter next week with your urine results. Introducing [facility]! [employer] introduces [employer] patient portal. Now you can access parts of your medical record, email your doctor's office, and request medication refills online. 1. In your internet browser, go to [email or web address] 2. Click on the First Time User? Click Here link in the Sign In box. You will see the New Member Sign Up page. 3. Enter your [employer] Access Code exactly as it appears below. You will not need to use this code after youve completed the sign-up process. If you do not sign up before the expiration date, you must request a new code. · [employer] Access Code: [email or web address] Expires: 7/12/2018 10:17 AM 
 
4. Enter the last four digits of your Social Security Number (xxxx) and Date of Birth (mm/dd/yyyy) as indicated and click Submit. You will be taken to the next sign-up page. 5. Create a [employer] ID. This will be your [employer] login ID and cannot be changed, so think of one that is secure and easy to remember. 6. Create a [employer] password. You can change your password at any time. 7. Enter your Password Reset Question and Answer. This can be used at a later time if you forget your password. 8. Enter your e-mail address. You will receive e-mail notification when new information is available in [address] 9. Click Sign Up. You can now view and download portions of your medical record. 10. Click the Download Summary menu link to download a portable copy of your medical information. If you have questions, please visit the Frequently Asked Questions section of the [employer] website. Remember, [employer] is NOT to be used for urgent needs. For medical emergencies, dial 911. Now available from your iPhone and Android! Please provide this summary of care documentation to your next provider. Your primary care clinician is listed as Wei Couch.  If you have any questions after today's visit, please call [phone].

## 2018-04-13 NOTE — PATIENT INSTRUCTIONS
1) Your Hemoglobin A1c is a 3 month marker of your diabetes control. Goal is less than 7.5% which means your average blood sugar is less than 170. Your Hemoglobin A1c is 10.6% which means your diabetes is under much wore control than 7.4% at your last check. Continue to work on your diet and exercise and take all your medications as directed. 2) Your LDL (bad cholesterol) is 63 and is back to goal under 70.    3) Your TSH (thyroid test) is normal so your current dose of Synthroid is perfect. Your thyroglobulin (thyroid cancer blood test) is still appropriately low at 0.3 so you have no evidence of thyroid cancer at this time. 4) Your creatinine (kidney test) has worsened from 1.9 to 2.5. Please be sure to follow up with Dr. Haley Rausch for your kidneys. 5) Your blood pressure was better and your weight was down 5 lbs since last visit. 6) I think the major problem has been too much bread and other carbs over the past 5 months so please start cutting back on these. 7) I will mail you a letter next week with your urine results.

## 2018-04-14 LAB
ALBUMIN/CREAT UR: 889.1 MG/G CREAT (ref 0–30)
CREAT UR-MCNC: 47.7 MG/DL
MICROALBUMIN UR-MCNC: 424.1 UG/ML

## 2018-04-15 PROBLEM — E11.21 TYPE 2 DIABETES WITH NEPHROPATHY (HCC): Status: ACTIVE | Noted: 2018-04-15

## 2018-04-20 ENCOUNTER — TELEPHONE (OUTPATIENT)
Dept: ENDOCRINOLOGY | Age: 78
End: 2018-04-20

## 2018-04-23 NOTE — TELEPHONE ENCOUNTER
4/23/18: spoke with patient. She states that her urine has an odor, but no burning or pain with urination. Advised to contact her PCP to rule out UTI. She agreed. She also wanted to make sure that her lab results were sent to  and Dr. Dom Elmore.

## 2018-06-14 ENCOUNTER — TELEPHONE (OUTPATIENT)
Dept: ENDOCRINOLOGY | Age: 78
End: 2018-06-14

## 2018-06-14 RX ORDER — GABAPENTIN 100 MG/1
CAPSULE ORAL
Qty: 70 CAP | Refills: 0 | Status: SHIPPED | OUTPATIENT
Start: 2018-06-14 | End: 2018-09-27 | Stop reason: SDUPTHER

## 2018-06-14 RX ORDER — GABAPENTIN 100 MG/1
CAPSULE ORAL
Qty: 450 CAP | Refills: 3 | Status: SHIPPED | OUTPATIENT
Start: 2018-06-14 | End: 2018-06-14 | Stop reason: SDUPTHER

## 2018-06-14 NOTE — TELEPHONE ENCOUNTER
Pt notified that [facility] however she is requesting a 14 day supply to be sent to her local pharmacy.

## 2018-06-14 NOTE — TELEPHONE ENCOUNTER
Patient is calling in regards to getting a refill on her gabapentin to be sent to [facility]. She stated that she has only 3 days left. Patient can be reached at [phone].

## 2018-07-17 ENCOUNTER — OFFICE VISIT (OUTPATIENT)
Dept: SURGERY | Age: 78
End: 2018-07-17

## 2018-07-17 VITALS
BODY MASS INDEX: 33.82 KG/M2 | HEIGHT: 65 IN | DIASTOLIC BLOOD PRESSURE: 60 MMHG | TEMPERATURE: 98 F | HEART RATE: 67 BPM | WEIGHT: 203 LBS | SYSTOLIC BLOOD PRESSURE: 131 MMHG | OXYGEN SATURATION: 98 % | RESPIRATION RATE: 16 BRPM

## 2018-07-17 DIAGNOSIS — K43.2 RECURRENT VENTRAL INCISIONAL HERNIA: Primary | ICD-10-CM

## 2018-07-17 DIAGNOSIS — K82.8 GALLBLADDER SLUDGE: ICD-10-CM

## 2018-07-17 DIAGNOSIS — E66.9 OBESITY (BMI 35.0-39.9 WITHOUT COMORBIDITY): ICD-10-CM

## 2018-07-17 DIAGNOSIS — K82.4 GALLBLADDER POLYP: ICD-10-CM

## 2018-07-17 NOTE — MR AVS SNAPSHOT
[address], [address], [address] [place] [address] [facility] 
[phone] Patient: Toby Lung MRN: [id number] CMS:4/69/3328 Visit Information Date & Time Provider Department Dept. Phone Encounter #  
 7/17/2018  8:00 AM Wilfredo Carvalho MD [facility] [id number] Your Appointments 9/27/2018  9:50 AM  
Follow Up with Ranjit Cristina MD  
[facility] [address]) Appt Note: 5 month f/u  
 [address] [facility] [address] [address] [address]  
  
    
 11/12/2018  8:40 AM  
ESTABLISHED PATIENT with Wilfredo Carvalho MD  
[facility] Appt Note: follow up gallbladder polyp [address] [address]  
[address], [address] [place] [address] [address] [address] Upcoming Health Maintenance Date Due DTaP/Tdap/Td series (1 - Tdap) 8/19/1962 ZOSTER VACCINE AGE 60> 6/19/2001 Bone Densitometry (Dexa) Screening 8/19/2006 Pneumococcal 65+ High/Highest Risk (1 of 2 - PCV13) 8/19/2006 MEDICARE YEARLY EXAM 3/14/2018 FOOT EXAM Q1 6/6/2018 Influenza Age 5 to Adult 8/1/2018 HEMOGLOBIN A1C Q6M 10/5/2018 LIPID PANEL Q1 4/5/2019 MICROALBUMIN Q1 4/13/2019 EYE EXAM RETINAL OR DILATED Q1 5/23/2019 GLAUCOMA SCREENING Q2Y 5/23/2020 Allergies as of 7/17/2018  Review Complete On: 7/17/2018 By: Wilfredo Carvalho MD  
  
 Severity Noted Reaction Type Reactions Amlodipine  08/06/2014    Swelling Nifedipine  08/06/2014    Swelling Sulfa (Sulfonamide Antibiotics)  08/14/2013    Rash Current Immunizations  Never Reviewed No immunizations on file. Not reviewed this visit Vitals BP Pulse Temp Resp Height(growth percentile) Weight(growth percentile) 131/60 (BP 1 Location: Right arm, BP Patient Position: Sitting) 67 98 °F (36.7 °C) (Oral) 16 5' 5\" (1.651 m) 203 lb (92.1 kg) SpO2 BMI OB Status Smoking Status 98% 33.78 kg/m2 Hysterectomy Never Smoker BMI and BSA Data Body Mass Index Body Surface Area 33.78 kg/m 2 2.06 m 2 Preferred Pharmacy Pharmacy Name Phone Dav[facility], [facility] [phone] Your Updated Medication List  
  
   
This list is accurate as of 7/17/18  8:52 AM.  Always use your most recent med list.  
  
  
  
  
 allopurinol 100 mg tablet Commonly known as:  Ollen Epley Take  by mouth daily. atorvastatin 80 mg tablet Commonly known as:  LIPITOR Take 80 mg by mouth daily. BENICAR 40 mg tablet Generic drug:  olmesartan Take  by mouth daily. bumetanide 0.5 mg tablet Commonly known as:  Maryjunie Baas CENTRUM SILVER Tab tablet Generic drug:  multivitamins-minerals-lutein Take  by mouth.  
  
 citalopram 20 mg tablet Commonly known as:  Ethel Mutters Take 1 tab daily  
  
 cloNIDine HCl 0.3 mg tablet Commonly known as:  CATAPRES Take 0.3 mg by mouth two (2) times a day. clopidogrel 75 mg Tab Commonly known as:  PLAVIX Take 1 Tab by mouth daily. gabapentin 100 mg capsule Commonly known as:  NEURONTIN  
TAKE 2 CAPSULES IN THE MORNING, 1 CAPSULE AT LUNCH AND 2 CAPSULES AT BEDTIME--14 DAY TEMP SUPPLY HumaLOG KwikPen Insulin 100 unit/mL kwikpen Generic drug:  insulin lispro  
by SubCUTAneous route Before breakfast, lunch, dinner and at bedtime. Sliding scale-  10 units                       141-180  12 units                        181-220  14 units                         221-260   16 units                          261-300  18 units                           > 301     20 units insulin glargine 100 unit/mL (3 mL) Inpn Commonly known as:  LANTUS SOLOSTAR U-100 INSULIN Inject 20 units in the morning and 10 units at bedtime Iron 325 mg (65 mg iron) tablet Generic drug:  ferrous sulfate Take  by mouth three (3) times daily (with meals). metoprolol succinate 50 mg XL tablet Commonly known as:  TOPROL XL Take 1 Tab by mouth daily. Krystina Pen Needle 32 gauge x 5/32\" Ndle Generic drug:  Insulin Needles (Disposable) Use as directed 5 times daily  
  
 spironolactone 50 mg tablet Commonly known as:  ALDACTONE Take 50 mg by mouth daily. SYNTHROID 150 mcg tablet Generic drug:  levothyroxine Take 1 Tab by mouth Daily (before breakfast). BRAND MEDICALLY NECESSARY  
  
 VITAMIN B-6 PO Take  by mouth daily. To-Do List   
 12/17/2018 9:00 AM  
  Appointment with [facility] 3 at [address] [place] [address] ([phone]) Shower or bathe using soap and water. Do not use deodorant, powder, perfumes, or lotion the day of your exam.  If your prior mammograms were not performed at [facility] 6 please bring films with you or forward prior images 2 days before your procedure. Check in at registration 15min before your appointment time unless you were instructed to do otherwise. A script is not necessary for screening. If you have one, please bring it on the day of the mammogram or have it faxed to the department. [facility]  [phone] [facility] [phone] [facility] [phone] [facility] [phone] Introducing \[facility]\"" & HEALTH SERVICES! [facility] introduces [employer] patient portal. Now you can access parts of your medical record, email your doctor's office, and request medication refills online. 1. In your internet browser, go to [email or web address] 2. Click on the First Time User? Click Here link in the Sign In box. You will see the New Member Sign Up page. 3. Enter your [employer] Access Code exactly as it appears below.  You will not need to use this code after youve completed the sign-up process. If you do not sign up before the expiration date, you must request a new code. · [employer] Access Code: [email or web address] Expires: 10/15/2018  8:21 AM 
 
4. Enter the last four digits of your Social Security Number (xxxx) and Date of Birth (mm/dd/yyyy) as indicated and click Submit. You will be taken to the next sign-up page. 5. Create a [employer] ID. This will be your [employer] login ID and cannot be changed, so think of one that is secure and easy to remember. 6. Create a [employer] password. You can change your password at any time. 7. Enter your Password Reset Question and Answer. This can be used at a later time if you forget your password. 8. Enter your e-mail address. You will receive e-mail notification when new information is available in [address] 9. Click Sign Up. You can now view and download portions of your medical record. 10. Click the Download Summary menu link to download a portable copy of your medical information. If you have questions, please visit the Frequently Asked Questions section of the [employer] website. Remember, [employer] is NOT to be used for urgent needs. For medical emergencies, dial 911. Now available from your iPhone and Android! Please provide this summary of care documentation to your next provider. Your primary care clinician is listed as Marlena Maldonado. If you have any questions after today's visit, please call [phone].

## 2018-07-17 NOTE — PROGRESS NOTES
HISTORY OF PRESENT ILLNESS  Breanna Smiley is a 69 y.o.  AA  female who comes in for follow up for a gallbladder polyp and incisional hernia  Follow-up   Associated symptoms include abdominal pain. Pertinent negatives include no chest pain, no headaches and no shortness of breath. Abdominal Pain   Associated symptoms include abdominal pain. Pertinent negatives include no chest pain, no headaches and no shortness of breath. Associated symptoms include abdominal pain. Pertinent negatives include no chest pain, no headaches and no shortness of breath. She has had a known GB polyp for over 3 years. It went from 1.1 to 1.2 cm over 12 months. Recent US 11/25/2015 demonstrated no change in the largest one but still with multiple polyps and increasing number of small liver nodules and a liver hemangioma. These have been unchanged since 2012. She has some bloating but denies nausea, vomiting, GERD, post prandial indigestion, pain, diarrhea or constipation, fever, chills or sweats. She also has abdominal swelling in the where she previously underwent a partial gastrectomy and subsequent incisional hernia repair and diagnosed with a recurrent incisional hernia by me in Aug 2013. Mabeline Sink She is now having more swelling and pain around the hernia site. She denies weight loss. Repeat US 2/2017 demonstrated the polyp and possible stones.     Past Medical History:   Diagnosis Date    Arthritis     Cataract     bilateral    CKD stage 3 due to type 2 diabetes mellitus ([address]     Depression     Diabetes ([address] age 49    Follicular thyroid cancer ([address] 08/2014    Gallbladder polyp 8/14/2013    Hypercholesterolemia     Hypertension     Ill-defined condition     states 'polyp' in gal bladder    Obesity     Right pontine stroke ([address] 1/17/2017    TIA (transient ischemic attack) 6/6/2014     Past Surgical History:   Procedure Laterality Date    ABDOMEN SURGERY PROC UNLISTED  2006    stomach    BREAST SURGERY PROCEDURE UNLISTED  2009, 2006    breast bx-vince    COLORECTAL SCRN; HI RISK IND  5/30/2014         HX BREAST BIOPSY Right [id number]    2 biopies on the right. Benign (per patient)    HX BREAST BIOPSY Left 2015    Benign (per patient)    HX CATARACT REMOVAL Left     HX GYN  18's    partial hysterectomy    HX HERNIA REPAIR  2009    HX HYSTERECTOMY      HX ORTHOPAEDIC Bilateral 1988    bunion    HX THYROIDECTOMY  2014    Dr. Billy Prakash     Family History   Problem Relation Age of Onset    Heart Disease Mother     Hypertension Mother     Diabetes Mother     Stroke Mother     Cancer Father      colon    Heart Disease Sister     Diabetes Sister     Heart Attack Sister     Hypertension Sister     Lung Disease Brother     Lung Disease Brother     Anesth Problems Neg Hx      Social History   Substance Use Topics    Smoking status: Never Smoker    Smokeless tobacco: Never Used    Alcohol use No     Current Outpatient Prescriptions   Medication Sig    gabapentin (NEURONTIN) 100 mg capsule TAKE 2 CAPSULES IN THE MORNING, 1 CAPSULE AT LUNCH AND 2 CAPSULES AT BEDTIME--14 DAY TEMP SUPPLY    atorvastatin (LIPITOR) 80 mg tablet Take 80 mg by mouth daily.  ferrous sulfate (IRON) 325 mg (65 mg iron) tablet Take  by mouth three (3) times daily (with meals).  SYNTHROID 150 mcg tablet Take 1 Tab by mouth Daily (before breakfast). BRAND MEDICALLY NECESSARY    LOR PEN NEEDLE 32 gauge x 5/32\" ndle Use as directed 5 times daily    spironolactone (ALDACTONE) 50 mg tablet Take 50 mg by mouth daily.  bumetanide (BUMEX) 0.5 mg tablet     insulin glargine (LANTUS SOLOSTAR) 100 unit/mL (3 mL) inpn Inject 20 units in the morning and 10 units at bedtime    PYRIDOXINE HCL, VITAMIN B6, (VITAMIN B-6 PO) Take  by mouth daily.  allopurinol (ZYLOPRIM) 100 mg tablet Take  by mouth daily.  citalopram (CELEXA) 20 mg tablet Take 1 tab daily    clopidogrel (PLAVIX) 75 mg tab Take 1 Tab by mouth daily.     cloNIDine (CATAPRES) 0.3 mg tablet Take 0.3 mg by mouth two (2) times a day.  olmesartan (BENICAR) 40 mg tablet Take  by mouth daily.  metoprolol succinate (TOPROL XL) 50 mg XL tablet Take 1 Tab by mouth daily.  multivitamins-minerals-lutein (CENTRUM SILVER) Tab Take  by mouth.  insulin lispro (HUMALOG KWIKPEN) 100 unit/mL kwikpen by SubCUTAneous route Before breakfast, lunch, dinner and at bedtime. Sliding scale-  10 units                        141-180  12 units                         181-220  14 units                          221-260   16 units                           261-300  18 units                            > 301     20 units     No current facility-administered medications for this visit. Allergies   Allergen Reactions    Amlodipine Swelling    Nifedipine Swelling    Sulfa (Sulfonamide Antibiotics) Rash       Review of Systems   Constitutional: Negative for chills, diaphoresis, fever, malaise/fatigue and weight loss. HENT: Negative for congestion, ear pain and sore throat. Eyes: Negative for blurred vision and pain. Respiratory: Negative for cough, hemoptysis, sputum production, shortness of breath, wheezing and stridor. Cardiovascular: Negative for chest pain, palpitations, orthopnea, claudication, leg swelling and PND. Gastrointestinal: Positive for abdominal pain. Negative for blood in stool, constipation, diarrhea, heartburn, melena, nausea and vomiting. Genitourinary: Negative for dysuria, flank pain, frequency, hematuria and urgency. Musculoskeletal: Positive for joint pain. Negative for back pain, myalgias and neck pain. Skin: Negative for itching and rash. Neurological: Negative for dizziness, tremors, focal weakness, seizures, weakness and headaches. Endo/Heme/Allergies: Negative for polydipsia. Last Hgb A1c was over 12. Psychiatric/Behavioral: Positive for depression. Negative for memory loss. The patient is not nervous/anxious.       Visit Vitals    BP 131/60 (BP 1 Location: Right arm, BP Patient Position: Sitting)    Pulse 67    Temp 98 °F (36.7 °C) (Oral)    Resp 16    Ht 5' 5\" (1.651 m)    Wt 92.1 kg (203 lb)    SpO2 98%    BMI 33.78 kg/m2       Physical Exam   Constitutional: She is oriented to person, place, and time. She appears well-developed and well-nourished. No distress. HENT:   Head: Normocephalic and atraumatic. Mouth/Throat: Oropharynx is clear and moist. No oropharyngeal exudate. Eyes: Conjunctivae and EOM are normal. Pupils are equal, round, and reactive to light. No scleral icterus. Neck: Normal range of motion and full passive range of motion without pain. Neck supple. No JVD present. No tracheal deviation present. No thyroid mass (3+ cm right anterior neck mass) and no thyromegaly present. Cardiovascular: Normal rate and regular rhythm. Exam reveals no gallop and no friction rub. No murmur heard. Pulmonary/Chest: Effort normal and breath sounds normal. No respiratory distress. She has no wheezes. She has no rales. Abdominal: Soft. Bowel sounds are normal. She exhibits no distension and no mass. There is no hepatosplenomegaly. There is no tenderness. There is no rebound, no guarding and no CVA tenderness. A hernia is present. Hernia confirmed positive in the ventral area (reducible 6 x 8 cm defect). Musculoskeletal: Normal range of motion. She exhibits no edema. Lymphadenopathy:     She has no cervical adenopathy. Neurological: She is alert and oriented to person, place, and time. No cranial nerve deficit. Skin: Skin is warm and dry. No rash noted. She is not diaphoretic. No erythema. No pallor. Psychiatric: She has a normal mood and affect. Her behavior is normal. Judgment and thought content normal.       ASSESSMENT and PLAN  1. Asymptomatic gallbladder polyp and sludge/stones. This is stable at this point.    I explained the anatomy and pathophysiology of biliary tract disease and cholecystitis, pancreatitis, cholangitis, choledocholithiasis. I explained about laparoscopic possible open cholecystectomy with possible cholangiogram and the risks of surgery including but not limited to bleeding, infection, bile duct or bowel injury, hernia development, retained common duct stones requiring further therapy, non resolution of symptoms, post cholecystectomy diarrhea, DVT, and risks of general anesthesia. The polyp has not significantly changed size in a year (1.2 vs 1.1). We discussed risks of future malignancy. 2.  Recurrent incisional hernia/eventration of mesh. I explained about the anatomy and pathophysiology of hernias and the risk of incarceration and strangulation of the bowel. I explained about hernia repairs (open with and without mesh, and laparoscopic with mesh). I explained the risks and benefits of repair including bleeding, infection, chronic pain, bowel or bladder injury, hernia recurrence, mesh infection requiring removal.  I explained it would be a six to eight week recuperation with no driving for 5 - 7 days, no lifting for six weeks. She would require a separation of components and mesh repair for her defect. 3.  Obesity. BMI now 38   She will try to exercise and lose weight (down 5 pounds since May 2016)  4. Diabetes mellitus, type 2:  Uncontrolled. Last HgbA1c was over 10.6. Needs better control. Changed to Dr Jean Thompson  5. 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 Hx colon polyps:   Unclear if last colonoscopy was in 2006 or 2009, sees Dr Christianne Sims but he did not do last exam  7. Small liver nodules ? ?significance but given the new onset would like her to get a colonoscopy to r/o malignancy. Stable? 8.   Chronic kidney disease  Followed by Dr Lydia Villareal    She is still not ready for surgery with poor glucose control and she has actually gained weight  She is leaning to undergo RA recurrent hernia repair with mesh and cholecystectomy but needs her blood sugar control is better and continue to lose weight  RTC in 4 months if she has better glucose control and has lost some weight      London Sher MD FACS

## 2018-07-17 NOTE — PROGRESS NOTES
Jay Davis is a 69 y.o. female     Chief Complaint   Patient presents with    Follow-up     gallbladder polyp       Visit Vitals    /60 (BP 1 Location: Right arm, BP Patient Position: Sitting)    Pulse 67    Temp 98 °F (36.7 °C) (Oral)    Resp 16    Ht 5' 5\" (1.651 m)    Wt 203 lb (92.1 kg)    SpO2 98%    BMI 33.78 kg/m2       Health Maintenance Due   Topic Date Due    DTaP/Tdap/Td series (1 - Tdap) 08/19/1962    ZOSTER VACCINE AGE 60>  06/19/2001    Bone Densitometry (Dexa) Screening  08/19/2006    Pneumococcal 65+ High/Highest Risk (1 of 2 - PCV13) 08/19/2006    MEDICARE YEARLY EXAM  03/14/2018    FOOT EXAM Q1  06/06/2018       1. Have you been to the ER, urgent care clinic since your last visit? Hospitalized since your last visit? No    2. Have you seen or consulted any other health care providers outside of the [facility] since your last visit? Include any pap smears or colon screening.  No

## 2018-09-12 NOTE — ROUTINE PROCESS
Message from [employer]:  Arleen Cottomiguelangel would like a refill of the following medications:     clonazePAM (KLONOPIN) 1 MG tablet [Michelle Ruvalcaba MD]   Patient Comment: Please send refill to [facility]. Thank you     ALPRAZolam (XANAX) 0.25 MG tablet [Michelle Ruvalcaba MD]   Patient Comment: Please send refill to [facility]. Thank you    Preferred pharmacy: [facility] [address] [place] [address]  Delivery method: Pickup       No surgery found *  Bedside and Verbal shift change report given to [address] [place] [address] (oncoming nurse) by Kalie Christopher (off going nurse). Report included the following information SBAR, Kardex, Recent Results and Cardiac Rhythm NSR.     Zone Phone: [phone]        Significant changes during shift:  incontinent       Patient Information     Yessi Hirsch  77 y.o.  1/13/2017 11:45 PM by Haritha Medina MD. Yessi Hirsch was admitted from Home     Problem List          Patient Active Problem List     Diagnosis Date Noted    Stroke (cerebrum) ([address] 01/14/2017    Thyroid cancer ([facility] 09/17/2014    TIA (transient ischemic attack) 06/06/2014    HTN (hypertension) 06/06/2014    CKD (chronic kidney disease) 06/06/2014    Thyroid nodule, right 03/10/2014    Diabetes mellitus ([address] 03/10/2014    Gallbladder polyp 08/14/2013    Gallbladder sludge 08/14/2013    Recurrent ventral incisional hernia 08/14/2013    Obesity (BMI 35.0-39.9 without comorbidity) ([address] 08/14/2013            Past Medical History   Diagnosis Date    Arthritis      Cataract         bilateral    Chronic kidney disease      Diabetes ([address]      Hypercholesterolemia      Hypertension      Ill-defined condition         states 'polyp' in gal bladder    Obesity      Thyroid disease      TIA (transient ischemic attack) 6/6/2014            Core Measures:     CVA: YES YES  CHF:NO NO  PNA:NO NO     Post Op Surgical (If Applicable):      Number times ambulated in hallway past shift: 0  Number of times OOB to chair past shift: 0  NG Tube: NO  Incentive Spirometer: NO  Drains: NO Volume 0  Dressing Present: NO  Flatus: NO     Activity Status:     OOB to Chair NO  Ambulated this shift NO   Bed Rest NO     Supplemental O2: (If Applicable)     NC NO  NRB NO  Venti-mask NO  On 0 Liters/min        LINES AND DRAINS:     Central Line? NO Placement date 0 Reason Medically Necessary 0     PICC LINE? NO Placement date 0Reason Medically Necessary 0     Urinary Catheter?  NO Placement Date 0 Reason Medically Necessary 0     DVT prophylaxis:     DVT prophylaxis Med- YES  DVT prophylaxis SCD or SINDHU- NO      Wounds: (If Applicable)     Wounds- NO     Location 0     Patient Safety:     Falls Score Total Score: 4  Safety Level__III_____  Bed Alarm On? YES  Sitter?  NO     Plan for upcoming shift: PT and OT           Discharge Plan: YES  Possible rehab     Active Consults:  IP CONSULT TO NEUROLOGY

## 2018-09-26 LAB
ALBUMIN SERPL-MCNC: 4.3 G/DL (ref 3.5–4.8)
ALBUMIN/GLOB SERPL: 1.9 {RATIO} (ref 1.2–2.2)
ALP SERPL-CCNC: 70 IU/L (ref 39–117)
ALT SERPL-CCNC: 33 IU/L (ref 0–32)
AST SERPL-CCNC: 31 IU/L (ref 0–40)
BILIRUB SERPL-MCNC: 0.5 MG/DL (ref 0–1.2)
BUN SERPL-MCNC: 54 MG/DL (ref 8–27)
BUN/CREAT SERPL: 17 (ref 12–28)
CALCIUM SERPL-MCNC: 9.7 MG/DL (ref 8.7–10.3)
CHLORIDE SERPL-SCNC: 97 MMOL/L (ref 96–106)
CHOLEST SERPL-MCNC: 149 MG/DL (ref 100–199)
CO2 SERPL-SCNC: 25 MMOL/L (ref 20–29)
CREAT SERPL-MCNC: 3.12 MG/DL (ref 0.57–1)
EST. AVERAGE GLUCOSE BLD GHB EST-MCNC: 349 MG/DL
GLOBULIN SER CALC-MCNC: 2.3 G/DL (ref 1.5–4.5)
GLUCOSE SERPL-MCNC: 156 MG/DL (ref 65–99)
HBA1C MFR BLD: 13.8 % (ref 4.8–5.6)
HDLC SERPL-MCNC: 78 MG/DL
INTERPRETATION, 910389: NORMAL
INTERPRETATION: NORMAL
LDLC SERPL CALC-MCNC: 55 MG/DL (ref 0–99)
Lab: NORMAL
PDF IMAGE, 910387: NORMAL
POTASSIUM SERPL-SCNC: 5.1 MMOL/L (ref 3.5–5.2)
PROT SERPL-MCNC: 6.6 G/DL (ref 6–8.5)
SODIUM SERPL-SCNC: 139 MMOL/L (ref 134–144)
T4 FREE SERPL-MCNC: 1.53 NG/DL (ref 0.82–1.77)
TRIGL SERPL-MCNC: 79 MG/DL (ref 0–149)
TSH SERPL DL<=0.005 MIU/L-ACNC: 0.08 UIU/ML (ref 0.45–4.5)
VLDLC SERPL CALC-MCNC: 16 MG/DL (ref 5–40)

## 2018-09-27 ENCOUNTER — OFFICE VISIT (OUTPATIENT)
Dept: ENDOCRINOLOGY | Age: 78
End: 2018-09-27

## 2018-09-27 VITALS
HEART RATE: 59 BPM | SYSTOLIC BLOOD PRESSURE: 119 MMHG | HEIGHT: 65 IN | DIASTOLIC BLOOD PRESSURE: 53 MMHG | WEIGHT: 199.2 LBS | BODY MASS INDEX: 33.19 KG/M2

## 2018-09-27 DIAGNOSIS — C73 THYROID CANCER (HCC): ICD-10-CM

## 2018-09-27 DIAGNOSIS — I10 ESSENTIAL HYPERTENSION, BENIGN: ICD-10-CM

## 2018-09-27 DIAGNOSIS — E78.5 HYPERLIPIDEMIA LDL GOAL <70: ICD-10-CM

## 2018-09-27 RX ORDER — TERCONAZOLE 4 MG/G
CREAM VAGINAL
COMMUNITY
Start: 2018-08-13 | End: 2018-12-27

## 2018-09-27 RX ORDER — LEVOTHYROXINE SODIUM 150 MCG
TABLET ORAL
Qty: 90 TAB | Refills: 3 | Status: SHIPPED | OUTPATIENT
Start: 2018-09-27 | End: 2018-12-27 | Stop reason: DRUGHIGH

## 2018-09-27 RX ORDER — INSULIN LISPRO 100 [IU]/ML
INJECTION, SOLUTION INTRAVENOUS; SUBCUTANEOUS
Qty: 60 ML | Refills: 3 | Status: SHIPPED | OUTPATIENT
Start: 2018-09-27 | End: 2019-01-03 | Stop reason: SDUPTHER

## 2018-09-27 RX ORDER — GABAPENTIN 100 MG/1
CAPSULE ORAL
Qty: 540 CAP | Refills: 3 | Status: SHIPPED | OUTPATIENT
Start: 2018-09-27 | End: 2019-09-11

## 2018-09-27 RX ORDER — INSULIN GLARGINE 100 [IU]/ML
INJECTION, SOLUTION SUBCUTANEOUS
Qty: 45 ML | Refills: 3 | Status: SHIPPED | OUTPATIENT
Start: 2018-09-27 | End: 2018-12-27

## 2018-09-27 NOTE — PROGRESS NOTES
Chief Complaint   Patient presents with    Diabetes     pcp and pharmacy confirme    Diabetic Foot Exam     due     History of Present Illness: Nico Garrett is a 69 y.o. female here for follow up of diabetes. Weight down 10 lbs since last visit in 4/18. May be missing the morning dose of lantus once a week but always gets the evening dose. Has seen more readings in the 200-300s recently. Has been eating more bread recently. Dr. Amber Johnson cancelled her appointment in 8/18 and rescheduled for today and she cancelled this because of seeing me today and rescheduled for October. Compliant with regimen below. Current Outpatient Prescriptions   Medication Sig    terconazole (TERAZOL 7) 0.4 % vaginal cream     gabapentin (NEURONTIN) 100 mg capsule TAKE 2 CAPSULES IN THE MORNING, 1 CAPSULE AT LUNCH AND 2 CAPSULES AT BEDTIME--14 DAY TEMP SUPPLY    atorvastatin (LIPITOR) 80 mg tablet Take 80 mg by mouth daily.  ferrous sulfate (IRON) 325 mg (65 mg iron) tablet Take  by mouth three (3) times daily (with meals).  SYNTHROID 150 mcg tablet Take 1 Tab by mouth Daily (before breakfast). BRAND MEDICALLY NECESSARY    LOR PEN NEEDLE 32 gauge x 5/32\" ndle Use as directed 5 times daily    spironolactone (ALDACTONE) 50 mg tablet Take 50 mg by mouth daily.  bumetanide (BUMEX) 0.5 mg tablet     insulin glargine (LANTUS SOLOSTAR) 100 unit/mL (3 mL) inpn Inject 20 units in the morning and 10 units at bedtime    PYRIDOXINE HCL, VITAMIN B6, (VITAMIN B-6 PO) Take  by mouth daily.  allopurinol (ZYLOPRIM) 100 mg tablet Take  by mouth daily.  citalopram (CELEXA) 20 mg tablet Take 1 tab daily    clopidogrel (PLAVIX) 75 mg tab Take 1 Tab by mouth daily.  cloNIDine (CATAPRES) 0.3 mg tablet Take 0.3 mg by mouth two (2) times a day.  olmesartan (BENICAR) 40 mg tablet Take  by mouth daily.  metoprolol succinate (TOPROL XL) 50 mg XL tablet Take 1 Tab by mouth daily.     multivitamins-minerals-lutein (CENTRUM SILVER) Tab Take  by mouth.  insulin lispro (HUMALOG KWIKPEN) 100 unit/mL kwikpen by SubCUTAneous route Before breakfast, lunch, dinner and at bedtime. Sliding scale-  10 units                        141-180  12 units                         181-220  14 units                          221-260   16 units                           261-300  18 units                            > 301     20 units     No current facility-administered medications for this visit. Allergies   Allergen Reactions    Amlodipine Swelling    Nifedipine Swelling    Sulfa (Sulfonamide Antibiotics) Rash     Review of Systems: Not asked today    Physical Examination:  Blood pressure 119/53, pulse (!) 59, height 5' 5\" (1.651 m), weight 199 lb 3.2 oz (90.4 kg). - General: pleasant, no distress, good eye contact   - Full exam not performed  - Psychiatric: normal mood and affect    Data Reviewed:   Component      Latest Ref Rng & Units 9/25/2018 9/25/2018          11:58 AM 11:58 AM   T4, Free      0.82 - 1.77 ng/dL  1.53   TSH      0.450 - 4.500 uIU/mL 0.079 (L)      Component      Latest Ref Rng & Units 9/25/2018 9/25/2018 9/25/2018          11:58 AM 11:58 AM 11:58 AM   Creatinine      0.57 - 1.00 mg/dL 3.12 (H)     GFR est non-AA      >59 mL/min/1.73 14 (L)     GFR est AA      >59 mL/min/1.73 16 (L)     BUN/Creatinine ratio      12 - 28 17     Sodium      134 - 144 mmol/L 139     Potassium      3.5 - 5.2 mmol/L 5.1     Chloride      96 - 106 mmol/L 97     CO2      20 - 29 mmol/L 25     Calcium      8.7 - 10.3 mg/dL 9.7     Protein, total      6.0 - 8.5 g/dL 6.6     Albumin      3.5 - 4.8 g/dL 4.3     GLOBULIN, TOTAL      1.5 - 4.5 g/dL 2.3     A-G Ratio      1.2 - 2.2 1.9     Bilirubin, total      0.0 - 1.2 mg/dL 0.5     Alk.  phosphatase      39 - 117 IU/L 70     AST      0 - 40 IU/L 31     ALT (SGPT)      0 - 32 IU/L 33 (H)     Cholesterol, total      100 - 199 mg/dL  149    Triglyceride      0 - 149 mg/dL  79    HDL Cholesterol      >39 mg/dL  78    VLDL, calculated      5 - 40 mg/dL  16    LDL, calculated      0 - 99 mg/dL  55    Hemoglobin A1c, (calculated)      4.8 - 5.6 %   13.8 (H)   Estimated average glucose      mg/dL   349       Assessment/Plan:     1. Type 2 diabetes mellitus with diabetic polyneuropathy, with long-term current use of insulin ([facility]): her most recent Hgb A1c was 13.8% in 9/18 up from 10.6% in 4/18 up from 7.4% in 11/17 up from 6.2% in 6/17 (first visit with me) down from 11.5% in 1/17 during hospital stay for stroke. A1c is above goal <8% due to eating more bread and missing some insulin so will increase her evening lantus and have my nurse navigator reach out to her in 2 weeks. - cont lantus 20 units in the morning and increase to 20 units at night  - cont humalog 10 units before meals + 2 units for every 40 mg/dl above 140 mg/dl  - check bs 3 times daily  - foot exam done 6/17  - eye exam UTD 5/18  - microalbumin 3192 11/17, down to 889 in 4/18  - check Hgb A1c and cmp prior to next visit      2. Thyroid cancer [facility]): She noticed a lump in her neck in 2014 and was found to have a 3.4 cm right lobe nodule with microcalcifications and she made an appt with Dr. Diamond Mccrary and he performed a right thyroidectomy in 8/14 and final pathology showed a Follicular carcinoma, Hurthle cell type, at least minimally invasive and ended up having a completion thyroidectomy that was benign. Had a whole body scan at [facility] that fall that didn't show any uptake. Was initially on Synthroid 150 mcg daily but her dose was decreased to 137 mcg daily and has been on this dose for the past 2 years. Did have a TSH of 4.03 in 1/17 during her hospital stay. TSH 2.69 and TG 0.3 with neg TG ab in 6/17 so kept dose the same. TSH up to 3.41 in 11/17 so increased synthroid back to 150 mcg daily and TSH 0.6 in 4/18 and TG 0.3 with neg TG ab so kept her dose the same.   Wt down 10 lbs and TSH down to 0.079 in 9/18 so will take 6 tabs/week  - cont synthroid 150 mcg but decrease to 6 tabs/week  - check TSH and free T4 prior to next visit  - check thyroglobulin reflex panel in 4/19      3. Essential hypertension, benign: her BP was at goal < 140/90 with addition of floyd. Creatinine is worsening due to uncontrolled DM  -  cont current regimen for now      4. Hyperlipidemia LDL goal <70: LDL 70.6 in 1/17 on atorvastatin 40 mg and still 80 in 6/17 and 99 in 11/17 possibly due to higher TSH. Was put on 80 mg by PCP and LDL down to 63 in 4/18 and 55 in 9/18  - cont atorvastatin 80 mg daily  - check lipids prior to next visit        Patient Instructions   1) TSH is a thyroid test.  Your level is 0.07 which is low and below goal of 0.5-2.0. This test goes opposite of your thyroid dose and suggests your dose of synthroid is too much. I will decrease your dose to 150 mcg and take 1 tab on Mon-Sat and NONE on Sunday. I have sent a new prescription to your local pharmacy. 2) Your weight is down 10 lbs from your diabetes being out of control. Your Hemoglobin A1c is a 3 month marker of your diabetes control. Goal is less than 8% which means your average blood sugar is less than 185. Your Hemoglobin A1c is 13.8% which means your diabetes is under worse control than 10.6% at your last check. Continue to work on your diet and exercise and take all your medications as directed. 3) Increase your lantus to 20 units in the morning and at night starting tonight to get your sugar back down. 4) If you want to change kidney doctors, you can try Dr. Miladys Mckeon at [phone]    5) Write down your sugars and how much insulin you took and my nurse navigator, Edie Sanchez, will call you in 2 weeks to obtain your blood sugars so we can start adjusting your doses of insulin.           Follow-up Disposition:  Return for 12/27/18 at 1:50pm.    Copy sent to:  Dr. Stephan Nur

## 2018-09-27 NOTE — MR AVS SNAPSHOT
[address] [facility] [address] [id number] [phone] Patient: Breanna Smiley MRN: [id number] CXS:0/81/1520 Visit Information Date & Time Provider Department Dept. Phone Encounter #  
 9/27/2018  9:50 AM MD [facility] [phone] Follow-up Instructions Return for 12/27/18 at 1:50pm.  
  
Your Appointments 11/12/2018  8:40 AM  
ESTABLISHED PATIENT with Alley Pepe MD  
[facility] Dr. Ko [facility] [place] ([facility]) Appt Note: follow up gallbladder polyp [address] [address]  
[address], [address], [address] [facility] [address] [address] Upcoming Health Maintenance Date Due DTaP/Tdap/Td series (1 - Tdap) 8/19/1962 Shingrix Vaccine Age 50> (1 of 2) 8/19/1991 Bone Densitometry (Dexa) Screening 8/19/2006 Pneumococcal 65+ High/Highest Risk (1 of 2 - PCV13) 8/19/2006 MEDICARE YEARLY EXAM 3/14/2018 FOOT EXAM Q1 6/6/2018 Influenza Age 5 to Adult 8/1/2018 HEMOGLOBIN A1C Q6M 3/25/2019 MICROALBUMIN Q1 4/13/2019 EYE EXAM RETINAL OR DILATED Q1 5/23/2019 LIPID PANEL Q1 9/25/2019 GLAUCOMA SCREENING Q2Y 5/23/2020 Allergies as of 9/27/2018  Review Complete On: 9/27/2018 By: Michelle Hunt MD  
  
 Severity Noted Reaction Type Reactions Amlodipine  08/06/2014    Swelling Nifedipine  08/06/2014    Swelling Sulfa (Sulfonamide Antibiotics)  08/14/2013    Rash Current Immunizations  Never Reviewed No immunizations on file. Not reviewed this visit You Were Diagnosed With   
  
 Codes Comments Uncontrolled type 2 diabetes mellitus with diabetic polyneuropathy, with long-term current use of insulin (HCC)    -  Primary ICD-10-CM: E11.42, Z79.4, E11.65 ICD-9-CM: 250.62, 357.2, V58.67   
 Thyroid cancer [facility])     ICD-10-CM: V40 ICD-9-CM: 039 Essential hypertension, benign     ICD-10-CM: I10 
ICD-9-CM: 401.1 Hyperlipidemia LDL goal <70     ICD-10-CM: E78.5 ICD-9-CM: 272.4 Vitals BP Pulse Height(growth percentile) Weight(growth percentile) BMI OB Status 119/53 (!) 59 5' 5\" (1.651 m) 199 lb 3.2 oz (90.4 kg) 33.15 kg/m2 Hysterectomy Smoking Status Never Smoker Vitals History BMI and BSA Data Body Mass Index Body Surface Area  
 33.15 kg/m 2 2.04 m 2 Preferred Pharmacy Pharmacy Name Phone Davon Garcia, [facility] [phone] Your Updated Medication List  
  
   
This list is accurate as of 9/27/18 11:10 AM.  Always use your most recent med list.  
  
  
  
  
 allopurinol 100 mg tablet Commonly known as:  Cyntha Dicker Take  by mouth daily. atorvastatin 80 mg tablet Commonly known as:  LIPITOR Take 80 mg by mouth daily. BENICAR 40 mg tablet Generic drug:  olmesartan Take  by mouth daily. bumetanide 0.5 mg tablet Commonly known as:  Merla Goods CENTRUM SILVER Tab tablet Generic drug:  multivitamins-minerals-lutein Take  by mouth.  
  
 citalopram 20 mg tablet Commonly known as:  Johnny Mao Take 1 tab daily  
  
 cloNIDine HCl 0.3 mg tablet Commonly known as:  CATAPRES Take 0.3 mg by mouth two (2) times a day. clopidogrel 75 mg Tab Commonly known as:  PLAVIX Take 1 Tab by mouth daily. gabapentin 100 mg capsule Commonly known as:  NEURONTIN  
TAKE 2 CAPSULES IN THE MORNING, 1 CAPSULE AT LUNCH AND 2 CAPSULES AT BEDTIME  
  
 insulin glargine 100 unit/mL (3 mL) Inpn Commonly known as:  LANTUS SOLOSTAR U-100 INSULIN Inject 20 units in the morning and 20 units at bedtime  
  
 insulin lispro 100 unit/mL kwikpen Commonly known as:  HumaLOG KwikPen Insulin Inject 10 units before meals + 2 units for every 40 mg/dl above 140 mg/dl--Max 65 units per day Iron 325 mg (65 mg iron) tablet Generic drug:  ferrous sulfate Take  by mouth three (3) times daily (with meals). metoprolol succinate 50 mg XL tablet Commonly known as:  TOPROL XL Take 1 Tab by mouth daily. Krystina Pen Needle 32 gauge x 5/32\" Ndle Generic drug:  Insulin Needles (Disposable) Use as directed 5 times daily  
  
 spironolactone 50 mg tablet Commonly known as:  ALDACTONE Take 50 mg by mouth daily. SYNTHROID 150 mcg tablet Generic drug:  levothyroxine Take 1 tab on Mon-Sat and NONE on Sunday--BRAND MEDICALLY NECESSARY  
  
 terconazole 0.4 % vaginal cream  
Commonly known as:  TERAZOL 7  
  
 VITAMIN B-6 PO Take  by mouth daily. Prescriptions Sent to Pharmacy Refills SYNTHROID 150 mcg tablet 3 Sig: Take 1 tab on Mon-Sat and NONE on Sunday--BRAND MEDICALLY NECESSARY Class: Normal  
 Pharmacy: [facility], [address] [place] [address] Ph #: [phone]  
 insulin glargine (LANTUS SOLOSTAR U-100 INSULIN) 100 unit/mL (3 mL) inpn 3 Sig: Inject 20 units in the morning and 20 units at bedtime Class: Normal  
 Pharmacy: [address] Ph #: [phone]  
 insulin lispro (HUMALOG [facility] INSULIN) 100 unit/mL kwikpen 3 Sig: Inject 10 units before meals + 2 units for every 40 mg/dl above 140 mg/dl--Max 65 units per day Class: Normal  
 Pharmacy: [address], [address] [place] [address] Ph #: [phone]  
 gabapentin (NEURONTIN) 100 mg capsule 3 Sig: TAKE 2 CAPSULES IN THE MORNING, 1 CAPSULE AT LUNCH AND 2 CAPSULES AT BEDTIME Class: Normal  
 Pharmacy: [address] [place] [address], [address] [place] [address] Ph #: [phone] We Performed the Following HEMOGLOBIN A1C WITH EAG [98311 CPT(R)] LIPID PANEL [05418 CPT(R)] METABOLIC PANEL, COMPREHENSIVE [02820 CPT(R)] T4, FREE B9074625 CPT(R)] TSH 3RD GENERATION [85423 CPT(R)] Follow-up Instructions Return for 12/27/18 at 1:50pm. To-Do List   
 12/17/2018 9:00 AM  
  Appointment with [id number] at [address] [place] [address] ([phone]) Shower or bathe using soap and water. Do not use deodorant, powder, perfumes, or lotion the day of your exam.  If your prior mammograms were not performed at [facility] 6 please bring films with you or forward prior images 2 days before your procedure. Check in at registration 15min before your appointment time unless you were instructed to do otherwise. A script is not necessary for screening. If you have one, please bring it on the day of the mammogram or have it faxed to the department. [facility]  [phone] [facility] [phone] [facility] [phone] [facility] [phone] Patient Instructions 1) TSH is a thyroid test.  Your level is 0.07 which is low and below goal of 0.5-2.0. This test goes opposite of your thyroid dose and suggests your dose of synthroid is too much. I will decrease your dose to 150 mcg and take 1 tab on Mon-Sat and NONE on Sunday. I have sent a new prescription to your local pharmacy. 2) Your weight is down 10 lbs from your diabetes being out of control. Your Hemoglobin A1c is a 3 month marker of your diabetes control. Goal is less than 8% which means your average blood sugar is less than 185. Your Hemoglobin A1c is 13.8% which means your diabetes is under worse control than 10.6% at your last check. Continue to work on your diet and exercise and take all your medications as directed. 3) Increase your lantus to 20 units in the morning and at night starting tonight to get your sugar back down. 4) If you want to change kidney doctors, you can try Dr. Sergio Avery at [phone] 5) Write down your sugars and how much insulin you took and my nurse navigator, Sheila Foote, will call you in 2 weeks to obtain your blood sugars so we can start adjusting your doses of insulin. Introducing [facility]! Kathleen Romero introduces [employer] patient portal. Now you can access parts of your medical record, email your doctor's office, and request medication refills online. 1. In your internet browser, go to [email or web address] 2. Click on the First Time User? Click Here link in the Sign In box. You will see the New Member Sign Up page. 3. Enter your [employer] Access Code exactly as it appears below. You will not need to use this code after youve completed the sign-up process. If you do not sign up before the expiration date, you must request a new code. · [employer] Access Code: [email or web address] Expires: 10/15/2018  8:21 AM 
 
4. Enter the last four digits of your Social Security Number (xxxx) and Date of Birth (mm/dd/yyyy) as indicated and click Submit. You will be taken to the next sign-up page. 5. Create a [employer] ID. This will be your [employer] login ID and cannot be changed, so think of one that is secure and easy to remember. 6. Create a [employer] password. You can change your password at any time. 7. Enter your Password Reset Question and Answer. This can be used at a later time if you forget your password. 8. Enter your e-mail address. You will receive e-mail notification when new information is available in [address] 9. Click Sign Up. You can now view and download portions of your medical record. 10. Click the Download Summary menu link to download a portable copy of your medical information. If you have questions, please visit the Frequently Asked Questions section of the [employer] website. Remember, [employer] is NOT to be used for urgent needs. For medical emergencies, dial 911. Now available from your iPhone and Android! Please provide this summary of care documentation to your next provider. Your primary care clinician is listed as Liat Hubbard. If you have any questions after today's visit, please call [phone].

## 2018-09-27 NOTE — PATIENT INSTRUCTIONS
1) TSH is a thyroid test.  Your level is 0.07 which is low and below goal of 0.5-2.0. This test goes opposite of your thyroid dose and suggests your dose of synthroid is too much. I will decrease your dose to 150 mcg and take 1 tab on Mon-Sat and NONE on Sunday. I have sent a new prescription to your local pharmacy. 2) Your weight is down 10 lbs from your diabetes being out of control. Your Hemoglobin A1c is a 3 month marker of your diabetes control. Goal is less than 8% which means your average blood sugar is less than 185. Your Hemoglobin A1c is 13.8% which means your diabetes is under worse control than 10.6% at your last check. Continue to work on your diet and exercise and take all your medications as directed. 3) Increase your lantus to 20 units in the morning and at night starting tonight to get your sugar back down. 4) If you want to change kidney doctors, you can try Dr. Therese Lawton at [phone]    5) Write down your sugars and how much insulin you took and my nurse navigator, Duc Murray, will call you in 2 weeks to obtain your blood sugars so we can start adjusting your doses of insulin.

## 2018-10-12 ENCOUNTER — PATIENT OUTREACH (OUTPATIENT)
Dept: ENDOCRINOLOGY | Age: 78
End: 2018-10-12

## 2018-10-12 NOTE — PROGRESS NOTES
MD Marcelo Zavala, RN    
    
   
    
  Please call him to obtain blood sugar readings over the past week 10/12/18 Attempted to contact patient, follow up for chronic condition of diabetes with blood sugar readings and insulin regimen. No answer, left voicemail message to return telephone call.

## 2018-10-17 ENCOUNTER — PATIENT OUTREACH (OUTPATIENT)
Dept: ENDOCRINOLOGY | Age: 78
End: 2018-10-17

## 2018-11-16 ENCOUNTER — OFFICE VISIT (OUTPATIENT)
Dept: SURGERY | Age: 78
End: 2018-11-16

## 2018-11-16 VITALS
SYSTOLIC BLOOD PRESSURE: 159 MMHG | WEIGHT: 205 LBS | HEART RATE: 64 BPM | DIASTOLIC BLOOD PRESSURE: 66 MMHG | TEMPERATURE: 97.5 F | HEIGHT: 65 IN | BODY MASS INDEX: 34.16 KG/M2 | OXYGEN SATURATION: 97 %

## 2018-11-16 DIAGNOSIS — K82.4 GALLBLADDER POLYP: Primary | ICD-10-CM

## 2018-11-16 DIAGNOSIS — K43.2 RECURRENT VENTRAL INCISIONAL HERNIA: ICD-10-CM

## 2018-11-16 NOTE — PROGRESS NOTES
HISTORY OF PRESENT ILLNESS  Mehnaz Gordon is a 69 y.o.  AA  female who comes in for follow up for a gallbladder polyp and incisional hernia  Follow-up   Associated symptoms include abdominal pain. Pertinent negatives include no chest pain, no headaches and no shortness of breath. Abdominal Pain   Associated symptoms include abdominal pain. Pertinent negatives include no chest pain, no headaches and no shortness of breath. Gallbladder Attack   Associated symptoms include abdominal pain. Pertinent negatives include no chest pain, no headaches and no shortness of breath. She has had a known GB polyp for over 3 years. It went from 1.1 to 1.2 cm over 12 months. Recent US 11/25/2015 demonstrated no change in the largest one but still with multiple polyps and increasing number of small liver nodules and a liver hemangioma. These have been unchanged since 2012. She has some bloating but denies nausea, vomiting, GERD, post prandial indigestion, pain, diarrhea or constipation, fever, chills or sweats. She also has abdominal swelling in the where she previously underwent a partial gastrectomy and subsequent incisional hernia repair and diagnosed with a recurrent incisional hernia by me in Aug 2013. Iam Cox She is now having more swelling and pain around the hernia site. She denies weight loss. Repeat US 2/2017 demonstrated the polyp and possible stones.     Past Medical History:   Diagnosis Date    Arthritis     Cataract     bilateral    CKD stage 3 due to type 2 diabetes mellitus ([address]     Depression     Diabetes ([address] age 49    Follicular thyroid cancer ([address] 08/2014    Gallbladder polyp 8/14/2013    Hypercholesterolemia     Hypertension     Ill-defined condition     states 'polyp' in gal bladder    Obesity     Right pontine stroke ([address] 1/17/2017    TIA (transient ischemic attack) 6/6/2014     Past Surgical History:   Procedure Laterality Date    ABDOMEN SURGERY PROC UNLISTED  2006    stomach    BREAST SURGERY PROCEDURE UNLISTED  2009, 2006    breast bx-vince    COLORECTAL SCRN; HI RISK IND  5/30/2014         HX BREAST BIOPSY Right [id number]    2 biopies on the right. Benign (per patient)    HX BREAST BIOPSY Left 2015    Benign (per patient)    HX CATARACT REMOVAL Left     HX GYN  19's    partial hysterectomy    HX HERNIA REPAIR  2009    HX HYSTERECTOMY      HX ORTHOPAEDIC Bilateral 1988    bunion    HX THYROIDECTOMY  2014    Dr. Virgil Taylor     Family History   Problem Relation Age of Onset    Heart Disease Mother     Hypertension Mother     Diabetes Mother     Stroke Mother     Cancer Father         colon    Heart Disease Sister     Diabetes Sister     Heart Attack Sister     Hypertension Sister     Lung Disease Brother     Lung Disease Brother     Anesth Problems Neg Hx      Social History     Tobacco Use    Smoking status: Never Smoker    Smokeless tobacco: Never Used   Substance Use Topics    Alcohol use: No    Drug use: No     Current Outpatient Medications   Medication Sig    terconazole (TERAZOL 7) 0.4 % vaginal cream     SYNTHROID 150 mcg tablet Take 1 tab on Mon-Sat and NONE on Sunday--BRAND MEDICALLY NECESSARY    insulin glargine (LANTUS SOLOSTAR U-100 INSULIN) 100 unit/mL (3 mL) inpn Inject 20 units in the morning and 20 units at bedtime    insulin lispro (HUMALOG KWIKPEN INSULIN) 100 unit/mL kwikpen Inject 10 units before meals + 2 units for every 40 mg/dl above 140 mg/dl--Max 65 units per day    gabapentin (NEURONTIN) 100 mg capsule TAKE 2 CAPSULES IN THE MORNING, 1 CAPSULE AT LUNCH AND 2 CAPSULES AT BEDTIME    ONETOUCH ULTRA BLUE TEST STRIP strip Test 3 times daily    atorvastatin (LIPITOR) 80 mg tablet Take 80 mg by mouth daily.  ferrous sulfate (IRON) 325 mg (65 mg iron) tablet Take  by mouth three (3) times daily (with meals).  LOR PEN NEEDLE 32 gauge x 5/32\" ndle Use as directed 5 times daily    spironolactone (ALDACTONE) 50 mg tablet Take 50 mg by mouth daily.  bumetanide (BUMEX) 0.5 mg tablet     PYRIDOXINE HCL, VITAMIN B6, (VITAMIN B-6 PO) Take  by mouth daily.  allopurinol (ZYLOPRIM) 100 mg tablet Take  by mouth daily.  citalopram (CELEXA) 20 mg tablet Take 1 tab daily    clopidogrel (PLAVIX) 75 mg tab Take 1 Tab by mouth daily.  cloNIDine (CATAPRES) 0.3 mg tablet Take 0.3 mg by mouth two (2) times a day.  olmesartan (BENICAR) 40 mg tablet Take  by mouth daily.  metoprolol succinate (TOPROL XL) 50 mg XL tablet Take 1 Tab by mouth daily.  multivitamins-minerals-lutein (CENTRUM SILVER) Tab Take  by mouth. No current facility-administered medications for this visit. Allergies   Allergen Reactions    Amlodipine Swelling    Nifedipine Swelling    Sulfa (Sulfonamide Antibiotics) Rash       Review of Systems   Constitutional: Negative for chills, diaphoresis, fever, malaise/fatigue and weight loss. HENT: Negative for congestion, ear pain and sore throat. Eyes: Negative for blurred vision and pain. Respiratory: Negative for cough, hemoptysis, sputum production, shortness of breath, wheezing and stridor. Cardiovascular: Negative for chest pain, palpitations, orthopnea, claudication, leg swelling and PND. Gastrointestinal: Positive for abdominal pain. Negative for blood in stool, constipation, diarrhea, heartburn, melena, nausea and vomiting. Genitourinary: Negative for dysuria, flank pain, frequency, hematuria and urgency. Musculoskeletal: Positive for joint pain. Negative for back pain, myalgias and neck pain. Skin: Negative for itching and rash. Neurological: Negative for dizziness, tremors, focal weakness, seizures, weakness and headaches. Endo/Heme/Allergies: Negative for polydipsia. Last Hgb A1c was over 12. Psychiatric/Behavioral: Positive for depression. Negative for memory loss. The patient is not nervous/anxious.       Visit Vitals  /66 (BP 1 Location: Right arm, BP Patient Position: Sitting) Pulse 64   Temp 97.5 °F (36.4 °C)   Ht 5' 5\" (1.651 m)   Wt 93 kg (205 lb)   SpO2 97%   BMI 34.11 kg/m²       Physical Exam   Constitutional: She is oriented to person, place, and time. She appears well-developed and well-nourished. No distress. HENT:   Head: Normocephalic and atraumatic. Mouth/Throat: Oropharynx is clear and moist. No oropharyngeal exudate. Eyes: Conjunctivae and EOM are normal. Pupils are equal, round, and reactive to light. No scleral icterus. Neck: Normal range of motion and full passive range of motion without pain. Neck supple. No JVD present. No tracheal deviation present. No thyroid mass (3+ cm right anterior neck mass) and no thyromegaly present. Cardiovascular: Normal rate and regular rhythm. Exam reveals no gallop and no friction rub. No murmur heard. Pulmonary/Chest: Effort normal and breath sounds normal. No respiratory distress. She has no wheezes. She has no rales. Abdominal: Soft. Bowel sounds are normal. She exhibits no distension and no mass. There is no hepatosplenomegaly. There is no tenderness. There is no rebound, no guarding and no CVA tenderness. A hernia is present. Hernia confirmed positive in the ventral area (reducible 6 x 8 cm defect). Musculoskeletal: Normal range of motion. She exhibits no edema. Lymphadenopathy:     She has no cervical adenopathy. Neurological: She is alert and oriented to person, place, and time. No cranial nerve deficit. Skin: Skin is warm and dry. No rash noted. She is not diaphoretic. No erythema. No pallor. Psychiatric: She has a normal mood and affect. Her behavior is normal. Judgment and thought content normal.       ASSESSMENT and PLAN  1. Asymptomatic gallbladder polyp and sludge/stones. This is stable at this point. I explained the anatomy and pathophysiology of biliary tract disease and cholecystitis, pancreatitis, cholangitis, choledocholithiasis.   I explained about laparoscopic possible open cholecystectomy with possible cholangiogram and the risks of surgery including but not limited to bleeding, infection, bile duct or bowel injury, hernia development, retained common duct stones requiring further therapy, non resolution of symptoms, post cholecystectomy diarrhea, DVT, and risks of general anesthesia. The polyp has not significantly changed size in a year (1.2 vs 1.1). We discussed risks of future malignancy. Will recheck [address] as she has not had an [address] since 1/2017  2. Recurrent incisional hernia/eventration of mesh. I explained about the anatomy and pathophysiology of hernias and the risk of incarceration and strangulation of the bowel. I explained about hernia repairs (open with and without mesh, and laparoscopic with mesh). I explained the risks and benefits of repair including bleeding, infection, chronic pain, bowel or bladder injury, hernia recurrence, mesh infection requiring removal.  I explained it would be a six to eight week recuperation with no driving for 5 - 7 days, no lifting for six weeks. She would require a separation of components and mesh repair for her defect. 3.  Obesity. BMI now 38   She will try to exercise and lose weight (down 5 pounds since May 2016)  4. Diabetes mellitus, type 2:  Uncontrolled. Last HgbA1c now 13.8. Needs better control. Changed to Dr Chris Cao  5. 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 Hx colon polyps:   Unclear if last colonoscopy was in 2006 or 2009, sees Dr Mauricio Groves but he did not do last exam  7. Small liver nodules ? ?significance but given the new onset would like her to get a colonoscopy to r/o malignancy. Stable? 8.   Chronic kidney disease  Followed by Dr Luisa Miranda    She is still not ready for surgery with poor glucose control and she has actually gained weight  She is leaning to undergo RA recurrent hernia repair with mesh and cholecystectomy but needs her blood sugar control is better and continue to lose weight  RTC in 4 months if she has better glucose control and has lost some weight      Ana Dodson MD FACS

## 2018-11-16 NOTE — PROGRESS NOTES
Chief Complaint   Patient presents with    Follow-up     f/u gallbladder polyp last seen 7/17/18     1. Have you been to the ER, urgent care clinic since your last visit? Hospitalized since your last visit? No    2. Have you seen or consulted any other health care providers outside of the [address] [place] [address] since your last visit? Include any pap smears or colon screening.  No

## 2018-11-23 ENCOUNTER — HOSPITAL ENCOUNTER (OUTPATIENT)
Dept: ULTRASOUND IMAGING | Age: 78
Discharge: HOME | End: 2018-11-23
Attending: SURGERY
Payer: MEDICARE

## 2018-11-23 DIAGNOSIS — K43.2 RECURRENT VENTRAL INCISIONAL HERNIA: ICD-10-CM

## 2018-11-23 DIAGNOSIS — K82.4 GALLBLADDER POLYP: ICD-10-CM

## 2018-11-23 PROCEDURE — 76700 US EXAM ABDOM COMPLETE: CPT

## 2018-11-28 ENCOUNTER — TELEPHONE (OUTPATIENT)
Dept: ENDOCRINOLOGY | Age: 78
End: 2018-11-28

## 2018-11-29 NOTE — TELEPHONE ENCOUNTER
Please call patient to let her know I received approval for her gabapentin so she should be able to receive this [facility]. Have her let us know if she is having any trouble getting this.

## 2018-12-17 ENCOUNTER — HOSPITAL ENCOUNTER (OUTPATIENT)
Dept: MAMMOGRAPHY | Age: 78
Discharge: HOME | End: 2018-12-17
Attending: INTERNAL MEDICINE
Payer: MEDICARE

## 2018-12-17 DIAGNOSIS — Z12.31 VISIT FOR SCREENING MAMMOGRAM: ICD-10-CM

## 2018-12-17 PROCEDURE — 77063 BREAST TOMOSYNTHESIS BI: CPT

## 2018-12-18 LAB
ALBUMIN SERPL-MCNC: 4.1 G/DL (ref 3.5–4.8)
ALBUMIN/GLOB SERPL: 1.8 {RATIO} (ref 1.2–2.2)
ALP SERPL-CCNC: 67 IU/L (ref 39–117)
ALT SERPL-CCNC: 53 IU/L (ref 0–32)
AST SERPL-CCNC: 48 IU/L (ref 0–40)
BILIRUB SERPL-MCNC: 0.6 MG/DL (ref 0–1.2)
BUN SERPL-MCNC: 37 MG/DL (ref 8–27)
BUN/CREAT SERPL: 13 (ref 12–28)
CALCIUM SERPL-MCNC: 9.2 MG/DL (ref 8.7–10.3)
CHLORIDE SERPL-SCNC: 102 MMOL/L (ref 96–106)
CHOLEST SERPL-MCNC: 143 MG/DL (ref 100–199)
CO2 SERPL-SCNC: 26 MMOL/L (ref 20–29)
CREAT SERPL-MCNC: 2.84 MG/DL (ref 0.57–1)
EST. AVERAGE GLUCOSE BLD GHB EST-MCNC: 312 MG/DL
GLOBULIN SER CALC-MCNC: 2.3 G/DL (ref 1.5–4.5)
GLUCOSE SERPL-MCNC: 65 MG/DL (ref 65–99)
HBA1C MFR BLD: 12.5 % (ref 4.8–5.6)
HDLC SERPL-MCNC: 71 MG/DL
INTERPRETATION, 910389: NORMAL
INTERPRETATION: NORMAL
LDLC SERPL CALC-MCNC: 58 MG/DL (ref 0–99)
Lab: NORMAL
PDF IMAGE, 910387: NORMAL
POTASSIUM SERPL-SCNC: 4.5 MMOL/L (ref 3.5–5.2)
PROT SERPL-MCNC: 6.4 G/DL (ref 6–8.5)
SODIUM SERPL-SCNC: 143 MMOL/L (ref 134–144)
T4 FREE SERPL-MCNC: 1.01 NG/DL (ref 0.82–1.77)
TRIGL SERPL-MCNC: 71 MG/DL (ref 0–149)
TSH SERPL DL<=0.005 MIU/L-ACNC: 5.98 UIU/ML (ref 0.45–4.5)
VLDLC SERPL CALC-MCNC: 14 MG/DL (ref 5–40)

## 2018-12-26 ENCOUNTER — APPOINTMENT (OUTPATIENT)
Dept: GENERAL RADIOLOGY | Age: 78
End: 2018-12-26
Attending: PHYSICIAN ASSISTANT
Payer: MEDICARE

## 2018-12-26 ENCOUNTER — HOSPITAL ENCOUNTER (EMERGENCY)
Age: 78
Discharge: HOME | End: 2018-12-26
Attending: EMERGENCY MEDICINE
Payer: MEDICARE

## 2018-12-26 ENCOUNTER — TELEPHONE (OUTPATIENT)
Dept: ENDOCRINOLOGY | Age: 78
End: 2018-12-26

## 2018-12-26 VITALS
HEIGHT: 65 IN | SYSTOLIC BLOOD PRESSURE: 133 MMHG | WEIGHT: 202.16 LBS | RESPIRATION RATE: 16 BRPM | TEMPERATURE: 97.7 F | BODY MASS INDEX: 33.68 KG/M2 | DIASTOLIC BLOOD PRESSURE: 57 MMHG | OXYGEN SATURATION: 100 % | HEART RATE: 65 BPM

## 2018-12-26 DIAGNOSIS — M25.511 ACUTE PAIN OF RIGHT SHOULDER: Primary | ICD-10-CM

## 2018-12-26 PROCEDURE — 73030 X-RAY EXAM OF SHOULDER: CPT

## 2018-12-26 PROCEDURE — 99283 EMERGENCY DEPT VISIT LOW MDM: CPT

## 2018-12-26 PROCEDURE — 74011250637 HC RX REV CODE- 250/637: Performed by: PHYSICIAN ASSISTANT

## 2018-12-26 RX ORDER — TRAMADOL HYDROCHLORIDE 50 MG/1
50 TABLET ORAL
Qty: 20 TAB | Refills: 0 | Status: SHIPPED | OUTPATIENT
Start: 2018-12-26 | End: 2019-01-05

## 2018-12-26 RX ORDER — TRAMADOL HYDROCHLORIDE 50 MG/1
50 TABLET ORAL
Status: COMPLETED | OUTPATIENT
Start: 2018-12-26 | End: 2018-12-26

## 2018-12-26 RX ADMIN — TRAMADOL HYDROCHLORIDE 50 MG: 50 TABLET, FILM COATED ORAL at 14:02

## 2018-12-26 NOTE — TELEPHONE ENCOUNTER
Patient called to inform Dr. Mica Harden that she has fallen 4x. She stated she saw an ortho Doctor last week and was dx with a sprained neck and bruised shoulder. She stated she is still in a lot of pain in spite of being on pain meds. Patient stated that she called them today and was not able to reach the doctor. She stated she was told by someone in the office that if she is in a lot of pain to go to the ER. She stated that she has an appointment with Dr. Mica Harden tomorrow and she will keep her appointment.

## 2018-12-26 NOTE — ED NOTES
SUSANA Kellogg have reviewed discharge instructions with the patient. The patient verbalized understanding. Left alert and oriented and ambulatory.

## 2018-12-26 NOTE — ED PROVIDER NOTES
EMERGENCY DEPARTMENT HISTORY AND PHYSICAL EXAM      Date: 12/26/2018  Patient Name: Ivelisse Hernandez    History of Presenting Illness     Chief Complaint   Patient presents with    Shoulder Pain     Pt states she fell on Dec 6 and has had RIGHT shoulder pain since. History Provided By: Patient    HPI: Ivelisse Hernandez, 69 y.o. female presents ambulatory to the ED with cc of 3 weeks 9 out of 10 constant aching right shoulder pain that is worse with movement and started after a fall at the [employer] on [id number]. She was seen last week for this problem by Ortho (Dr. Jessica Redd) and was diagnosed with cervical spondylosis. She specifically denies any fevers, chills, nausea, vomiting, chest pain, shortness of breath, headache, rash, diarrhea, sweating or weight loss. There are no other complaints, changes, or physical findings at this time. PCP: Fletcher Rodriguez MD    Current Outpatient Medications   Medication Sig Dispense Refill    traMADol (ULTRAM) 50 mg tablet Take 1 Tab by mouth every six (6) hours as needed for Pain for up to 10 days. Max Daily Amount: 200 mg. 20 Tab 0    terconazole (TERAZOL 7) 0.4 % vaginal cream       SYNTHROID 150 mcg tablet Take 1 tab on Mon-Sat and NONE on Sunday--BRAND MEDICALLY NECESSARY 90 Tab 3    insulin glargine (LANTUS SOLOSTAR U-100 INSULIN) 100 unit/mL (3 mL) inpn Inject 20 units in the morning and 20 units at bedtime 45 mL 3    insulin lispro (HUMALOG KWIKPEN INSULIN) 100 unit/mL kwikpen Inject 10 units before meals + 2 units for every 40 mg/dl above 140 mg/dl--Max 65 units per day 60 mL 3    gabapentin (NEURONTIN) 100 mg capsule TAKE 2 CAPSULES IN THE MORNING, 1 CAPSULE AT LUNCH AND 2 CAPSULES AT BEDTIME 540 Cap 3    ONETOUCH ULTRA BLUE TEST STRIP strip Test 3 times daily 300 Strip 3    atorvastatin (LIPITOR) 80 mg tablet Take 80 mg by mouth daily.  ferrous sulfate (IRON) 325 mg (65 mg iron) tablet Take  by mouth three (3) times daily (with meals).       LOR PEN NEEDLE 32 gauge x 5/32\" ndle Use as directed 5 times daily 500 Pen Needle 3    spironolactone (ALDACTONE) 50 mg tablet Take 50 mg by mouth daily.  bumetanide (BUMEX) 0.5 mg tablet       PYRIDOXINE HCL, VITAMIN B6, (VITAMIN B-6 PO) Take  by mouth daily.  allopurinol (ZYLOPRIM) 100 mg tablet Take  by mouth daily.  citalopram (CELEXA) 20 mg tablet Take 1 tab daily      clopidogrel (PLAVIX) 75 mg tab Take 1 Tab by mouth daily. 30 Tab 6    cloNIDine (CATAPRES) 0.3 mg tablet Take 0.3 mg by mouth two (2) times a day.  olmesartan (BENICAR) 40 mg tablet Take  by mouth daily.  metoprolol succinate (TOPROL XL) 50 mg XL tablet Take 1 Tab by mouth daily. 30 Tab 1    multivitamins-minerals-lutein (CENTRUM SILVER) Tab Take  by mouth. Past History     Past Medical History:  Past Medical History:   Diagnosis Date    Arthritis     Cataract     bilateral    CKD stage 3 due to type 2 diabetes mellitus ([address]     Depression     Diabetes ([address] age 49    Follicular thyroid cancer ([address] 08/2014    Gallbladder polyp 8/14/2013    Hypercholesterolemia     Hypertension     Ill-defined condition     states 'polyp' in gal bladder    Obesity     Right pontine stroke ([address] 1/17/2017    TIA (transient ischemic attack) 6/6/2014       Past Surgical History:  Past Surgical History:   Procedure Laterality Date    ABDOMEN SURGERY PROC UNLISTED  2006    stomach    BREAST SURGERY PROCEDURE UNLISTED  2009, 2006    breast bx-vince    COLORECTAL SCRN; HI RISK IND  5/30/2014         HX BREAST BIOPSY Right [id number]    2 biopies on the right. Benign (per patient)    HX BREAST BIOPSY Left 2015    Benign (per patient)    HX CATARACT REMOVAL Left     HX GYN  1970's    partial hysterectomy    HX HERNIA REPAIR  2009    HX HYSTERECTOMY      HX ORTHOPAEDIC Bilateral 1988    bunion    HX THYROIDECTOMY  2014    Dr. Pedro Acosta       Family History:  Family History   Problem Relation Age of Onset    Heart Disease Mother     Hypertension Mother     Diabetes Mother     Stroke Mother     Cancer Father         colon    Heart Disease Sister     Diabetes Sister     Heart Attack Sister     Hypertension Sister     Lung Disease Brother     Lung Disease Brother     Anesth Problems Neg Hx        Social History:  Social History     Tobacco Use    Smoking status: Never Smoker    Smokeless tobacco: Never Used   Substance Use Topics    Alcohol use: No    Drug use: No       Allergies: Allergies   Allergen Reactions    Amlodipine Swelling    Nifedipine Swelling    Sulfa (Sulfonamide Antibiotics) Rash     Review of Systems   Review of Systems   Constitutional: Negative for fatigue and fever. HENT: Negative for ear pain and sore throat. Eyes: Negative for pain, redness and visual disturbance. Respiratory: Negative for cough and shortness of breath. Cardiovascular: Negative for chest pain and palpitations. Gastrointestinal: Negative for abdominal pain, nausea and vomiting. Genitourinary: Negative for dysuria, frequency and urgency. Musculoskeletal: Negative for back pain, gait problem, neck pain and neck stiffness. Right shoulder pain   Skin: Negative for rash and wound. Neurological: Negative for dizziness, weakness, light-headedness, numbness and headaches. Physical Exam   Physical Exam   Constitutional: She is oriented to person, place, and time. She appears well-developed and well-nourished. Non-toxic appearance. No distress. HENT:   Head: Normocephalic and atraumatic. Right Ear: External ear normal.   Left Ear: External ear normal.   Nose: Nose normal.   Mouth/Throat: Uvula is midline. No trismus in the jaw. Eyes: Conjunctivae and EOM are normal. Pupils are equal, round, and reactive to light. No scleral icterus. Neck: Normal range of motion and full passive range of motion without pain. Cardiovascular: Normal rate and regular rhythm.    Pulmonary/Chest: Effort normal. No accessory muscle usage. No tachypnea. No respiratory distress. She has no decreased breath sounds. She has no wheezes. Abdominal: Soft. There is no tenderness. Musculoskeletal: Normal range of motion. Right shoulder: She exhibits tenderness. Arms:  RIGHT SHOULDER:  Good symmetry  No bruising, redness or swelling  ROM is full with some discomfort with overhead flexion  Diffuse tenderness   Neurological: She is alert and oriented to person, place, and time. She is not disoriented. No cranial nerve deficit. GCS eye subscore is 4. GCS verbal subscore is 5. GCS motor subscore is 6. Skin: Skin is intact. No rash noted. Psychiatric: She has a normal mood and affect. Her speech is normal.   Nursing note and vitals reviewed. Diagnostic Study Results     Labs -   No results found for this or any previous visit (from the past 12 hour(s)). Radiologic Studies -   XR SHOULDER RT AP/LAT MIN 2 V   Final Result   IMPRESSION: No acute abnormality. CT Results  (Last 48 hours)    None        CXR Results  (Last 48 hours)    None        Medical Decision Making   I am the first provider for this patient. I reviewed the vital signs, available nursing notes, past medical history, past surgical history, family history and social history. Vital Signs-Reviewed the patient's vital signs. Patient Vitals for the past 12 hrs:   Temp Pulse Resp BP SpO2   12/26/18 1232 97.7 °F (36.5 °C) 65 16 133/57 100 %       Pulse Oximetry Analysis - 100% on RA    Records Reviewed: Nursing Notes, Old Medical Records, Previous Radiology Studies, Previous Laboratory Studies and     Provider Notes (Medical Decision Making):   DDx: fracture, sprain, strain    ED Course:   Initial assessment performed. The patients presenting problems have been discussed, and they are in agreement with the care plan formulated and outlined with them.   I have encouraged them to ask questions as they arise throughout their visit. Disposition:  Discharge    PLAN:  1. Discharge Medication List as of 12/26/2018  2:08 PM      START taking these medications    Details   traMADol (ULTRAM) 50 mg tablet Take 1 Tab by mouth every six (6) hours as needed for Pain for up to 10 days. Max Daily Amount: 200 mg., Print, Disp-20 Tab, R-0         CONTINUE these medications which have NOT CHANGED    Details   terconazole (TERAZOL 7) 0.4 % vaginal cream Historical Med      SYNTHROID 150 mcg tablet Take 1 tab on Mon-Sat and NONE on Sunday--BRAND MEDICALLY NECESSARY, Normal, Disp-90 Tab, R-3, JAYSON      insulin glargine (LANTUS SOLOSTAR U-100 INSULIN) 100 unit/mL (3 mL) inpn Inject 20 units in the morning and 20 units at bedtime, Normal, Disp-45 mL, R-3      insulin lispro (HUMALOG KWIKPEN INSULIN) 100 unit/mL kwikpen Inject 10 units before meals + 2 units for every 40 mg/dl above 140 mg/dl--Max 65 units per day, Normal, Disp-60 mL, R-3      gabapentin (NEURONTIN) 100 mg capsule TAKE 2 CAPSULES IN THE MORNING, 1 CAPSULE AT LUNCH AND 2 CAPSULES AT BEDTIME, Normal, Disp-540 Cap, R-3      ONETOUCH ULTRA BLUE TEST STRIP strip Test 3 times daily, Normal, Disp-300 Strip, R-3, JAYSON      atorvastatin (LIPITOR) 80 mg tablet Take 80 mg by mouth daily. , Historical Med      ferrous sulfate (IRON) 325 mg (65 mg iron) tablet Take  by mouth three (3) times daily (with meals). , Historical Med      LOR PEN NEEDLE 32 gauge x 5/32\" ndle Use as directed 5 times daily, Normal, Disp-500 Pen Needle, R-3, JAYSON      spironolactone (ALDACTONE) 50 mg tablet Take 50 mg by mouth daily. , Historical Med      bumetanide (BUMEX) 0.5 mg tablet Historical Med      PYRIDOXINE HCL, VITAMIN B6, (VITAMIN B-6 PO) Take  by mouth daily. , Historical Med      allopurinol (ZYLOPRIM) 100 mg tablet Take  by mouth daily. , Historical Med      citalopram (CELEXA) 20 mg tablet Take 1 tab daily, Historical Med      clopidogrel (PLAVIX) 75 mg tab Take 1 Tab by mouth daily. , Print, Disp-30 Tab, R-6 cloNIDine (CATAPRES) 0.3 mg tablet Take 0.3 mg by mouth two (2) times a day., Historical Med      olmesartan (BENICAR) 40 mg tablet Take  by mouth daily. , Historical Med      metoprolol succinate (TOPROL XL) 50 mg XL tablet Take 1 Tab by mouth daily. , Print, Disp-30 Tab, R-1      multivitamins-minerals-lutein (CENTRUM SILVER) Tab Take  by mouth., Historical Med           2. Follow-up Information     Follow up With Specialties Details Why Contact Info    Michi Bragg MD Internal Medicine Schedule an appointment as soon as possible for a visit PRIMARY CARE: call to schedule follow up [address]      Tony Knapp MD Neurology Schedule an appointment as soon as possible for a visit NEUROLOGY: as needed [id number] [address]      Blake Aguilar MD Orthopedic Surgery Schedule an appointment as soon as possible for a visit ORTHO: call to schedule follow up [address] [facility] [address]  [phone]          Return to ED if worse     Diagnosis     Clinical Impression:   1.  Acute pain of right shoulder

## 2018-12-26 NOTE — ED NOTES
Pt fell on dec 6, not significant amt of pain but then noted neck was hurting so went to  on Thursday and had xray of back and neck and told sprained neck and prescribed medication but its not effective and now should more so hurts now.

## 2018-12-26 NOTE — TELEPHONE ENCOUNTER
Patient called to say that she has fallen 3 times recently and is very concerned. She can be reached at:  [phone].

## 2018-12-27 ENCOUNTER — OFFICE VISIT (OUTPATIENT)
Dept: ENDOCRINOLOGY | Age: 78
End: 2018-12-27

## 2018-12-27 VITALS
DIASTOLIC BLOOD PRESSURE: 52 MMHG | HEART RATE: 58 BPM | HEIGHT: 65 IN | BODY MASS INDEX: 33.05 KG/M2 | SYSTOLIC BLOOD PRESSURE: 111 MMHG | WEIGHT: 198.4 LBS

## 2018-12-27 DIAGNOSIS — E78.5 HYPERLIPIDEMIA LDL GOAL <70: ICD-10-CM

## 2018-12-27 DIAGNOSIS — I10 ESSENTIAL HYPERTENSION, BENIGN: ICD-10-CM

## 2018-12-27 DIAGNOSIS — C73 THYROID CANCER (HCC): ICD-10-CM

## 2018-12-27 RX ORDER — TIZANIDINE HYDROCHLORIDE 4 MG/1
4 CAPSULE, GELATIN COATED ORAL AS NEEDED
COMMUNITY
End: 2019-05-24

## 2018-12-27 RX ORDER — INSULIN GLARGINE 100 [IU]/ML
INJECTION, SOLUTION SUBCUTANEOUS
Qty: 45 ML | Refills: 3 | Status: SHIPPED | OUTPATIENT
Start: 2018-12-27 | End: 2019-09-12

## 2018-12-27 RX ORDER — LEVOTHYROXINE SODIUM 137 UG/1
137 TABLET ORAL
Qty: 90 TAB | Refills: 3 | Status: SHIPPED | OUTPATIENT
Start: 2018-12-27 | End: 2019-11-19 | Stop reason: DRUGHIGH

## 2018-12-27 NOTE — PATIENT INSTRUCTIONS
1) Your TSH is a thyroid test.  Your level is 5 which is high and above goal of 0.5-2.0. This test goes opposite of your thyroid dose and suggests your dose of synthroid is not enough. I will change your dose to 137 mcg and take 1 tab 7 days a week and have sent a new prescription to your mail order pharmacy. Keep taking the 150 mcg tabs until the new supply comes and this Sunday don't skip your dose. 2) Increase the lantus to 25 units in the morning and evening. 3) Write down your sugars and how much insulin you took and my nurse navigator, Corrinne Bone, will call you in one week to obtain your blood sugars so we can start adjusting your doses of insulin. 4) Your creatinine (kidney test) has improved since last visit.     5) Your cholesterol is at goal.

## 2018-12-27 NOTE — PROGRESS NOTES
Chief Complaint   Patient presents with    Diabetes     pcp and pharmacy confirmed    Diabetic Foot Exam     diue     History of Present Illness: Palma Cisneros is a 69 y.o. female here for follow up of diabetes. Weight down 1 lbs since last visit in 9/18. She had a fall on 12/6/18 when she went to the [facility] and had had some right shoulder pain since this fall and has also had a few more falls since since then. Went to Dr. Shima Reyes on 12/20/18 and he gave her tizanidine and prescribed PT but she can't start this until 1/8/19. Went to the ER yesterday and was given tramadol. She has put a call into Dr. Shima Reyes' office but hasn't heard back from them yet. Has been taking the lantus 20 units bid and the humalog with meals. May miss the humalog if away from home which can happen a few times a week. Checking sugars at least 2 times daily before breakfast and dinner. Fasting sugar was 235 this morning. Tends to stay over 150 in the morning and usually is close to 200. Has been taking synthroid 6 tabs a week. Due to have labs for kidney doctor. Current Outpatient Medications   Medication Sig    tiZANidine (ZANAFLEX) 4 mg capsule Take 4 mg by mouth as needed.  traMADol (ULTRAM) 50 mg tablet Take 1 Tab by mouth every six (6) hours as needed for Pain for up to 10 days.  Max Daily Amount: 200 mg.    SYNTHROID 150 mcg tablet Take 1 tab on Mon-Sat and NONE on Sunday--BRAND MEDICALLY NECESSARY    insulin glargine (LANTUS SOLOSTAR U-100 INSULIN) 100 unit/mL (3 mL) inpn Inject 20 units in the morning and 20 units at bedtime    insulin lispro (HUMALOG KWIKPEN INSULIN) 100 unit/mL kwikpen Inject 10 units before meals + 2 units for every 40 mg/dl above 140 mg/dl--Max 65 units per day    gabapentin (NEURONTIN) 100 mg capsule TAKE 2 CAPSULES IN THE MORNING, 1 CAPSULE AT LUNCH AND 2 CAPSULES AT BEDTIME    ONETOUCH ULTRA BLUE TEST STRIP strip Test 3 times daily    atorvastatin (LIPITOR) 80 mg tablet Take 80 mg by mouth daily.  LOR PEN NEEDLE 32 gauge x 5/32\" ndle Use as directed 5 times daily    bumetanide (BUMEX) 0.5 mg tablet     PYRIDOXINE HCL, VITAMIN B6, (VITAMIN B-6 PO) Take  by mouth daily.  allopurinol (ZYLOPRIM) 100 mg tablet Take  by mouth daily.  citalopram (CELEXA) 20 mg tablet Take 1 tab daily    clopidogrel (PLAVIX) 75 mg tab Take 1 Tab by mouth daily.  cloNIDine (CATAPRES) 0.3 mg tablet Take 0.3 mg by mouth two (2) times a day.  olmesartan (BENICAR) 40 mg tablet Take  by mouth daily.  metoprolol succinate (TOPROL XL) 50 mg XL tablet Take 1 Tab by mouth daily.  multivitamins-minerals-lutein (CENTRUM SILVER) Tab Take  by mouth.  ferrous sulfate (IRON) 325 mg (65 mg iron) tablet Take  by mouth three (3) times daily (with meals).  spironolactone (ALDACTONE) 50 mg tablet Take 50 mg by mouth daily. No current facility-administered medications for this visit. Allergies   Allergen Reactions    Amlodipine Swelling    Nifedipine Swelling    Sulfa (Sulfonamide Antibiotics) Rash     Review of Systems:  - Eyes: no blurry vision or double vision  - Cardiovascular: no chest pain  - Respiratory: no shortness of breath  - Musculoskeletal: no myalgias  - Neurological: no numbness/tingling in extremities    Physical Examination:  Blood pressure 111/52, pulse (!) 58, height 5' 5\" (1.651 m), weight 198 lb 6.4 oz (90 kg).   - General: pleasant, no distress, good eye contact   - Neck: no carotid bruits  - Cardiovascular: regular, normal rate, nl s1 and s2, no m/r/g,   - Respiratory: clear bilaterally  - Integumentary: no edema,   - Psychiatric: normal mood and affect    Diabetic foot exam:     Left Foot:   Visual Exam: callous - mild   Pulse DP: 2+ (normal)   Filament test: normal sensation    Vibratory sensation: diminished      Right Foot:   Visual Exam: callous - mild   Pulse DP: 2+ (normal)   Filament test: normal sensation    Vibratory sensation: diminished        Data Reviewed: Component      Latest Ref Rng & Units 12/17/2018 12/17/2018 12/17/2018          10:34 AM 10:34 AM 10:34 AM   Glucose      65 - 99 mg/dL  65    BUN      8 - 27 mg/dL  37 (H)    Creatinine      0.57 - 1.00 mg/dL  2.84 (H)    GFR est non-AA      >59 mL/min/1.73  15 (L)    GFR est AA      >59 mL/min/1.73  18 (L)    BUN/Creatinine ratio      12 - 28  13    Sodium      134 - 144 mmol/L  143    Potassium      3.5 - 5.2 mmol/L  4.5    Chloride      96 - 106 mmol/L  102    CO2      20 - 29 mmol/L  26    Calcium      8.7 - 10.3 mg/dL  9.2    Protein, total      6.0 - 8.5 g/dL  6.4    Albumin      3.5 - 4.8 g/dL  4.1    GLOBULIN, TOTAL      1.5 - 4.5 g/dL  2.3    A-G Ratio      1.2 - 2.2  1.8    Bilirubin, total      0.0 - 1.2 mg/dL  0.6    Alk. phosphatase      39 - 117 IU/L  67    AST      0 - 40 IU/L  48 (H)    ALT (SGPT)      0 - 32 IU/L  53 (H)    Cholesterol, total      100 - 199 mg/dL 143     Triglyceride      0 - 149 mg/dL 71     HDL Cholesterol      >39 mg/dL 71     VLDL, calculated      5 - 40 mg/dL 14     LDL, calculated      0 - 99 mg/dL 58     Hemoglobin A1c, (calculated)      4.8 - 5.6 %   12.5 (H)   Estimated average glucose      mg/dL   312     Component      Latest Ref Rng & Units 12/17/2018 12/17/2018          10:34 AM 10:34 AM   T4, Free      0.82 - 1.77 ng/dL  1.01   TSH      0.450 - 4.500 uIU/mL 5.980 (H)        Assessment/Plan:     1. Type 2 diabetes mellitus with diabetic polyneuropathy, with long-term current use of insulin ([facility]): her most recent Hgb A1c was 12.5% in 12/18 down from 13.8% in 9/18 up from 10.6% in 4/18 up from 7.4% in 11/17 up from 6.2% in 6/17 (first visit with me) down from 11.5% in 1/17 during hospital stay for stroke. A1c is above goal <8% due to pain and missing some insulin so will increase her evening lantus and have my nurse navigator reach out to her in 1 week.   - increase lantus to 25 units in the morning and 25 units at night  - cont humalog 10 units before meals + 2 units for every 40 mg/dl above 140 mg/dl  - check bs 3 times daily  - foot exam done 12/18  - eye exam UTD 5/18  - microalbumin 3192 11/17, down to 889 in 4/18  - check Hgb A1c and cmp and microalbumin prior to next visit        2. Thyroid cancer [facility]): She noticed a lump in her neck in 2014 and was found to have a 3.4 cm right lobe nodule with microcalcifications and she made an appt with Dr. Melecio Martins and he performed a right thyroidectomy in 8/14 and final pathology showed a Follicular carcinoma, Hurthle cell type, at least minimally invasive and ended up having a completion thyroidectomy that was benign. Had a whole body scan at [facility] that fall that didn't show any uptake. Was initially on Synthroid 150 mcg daily but her dose was decreased to 137 mcg daily and has been on this dose for the past 2 years. Did have a TSH of 4.03 in 1/17 during her hospital stay. TSH 2.69 and TG 0.3 with neg TG ab in 6/17 so kept dose the same. TSH up to 3.41 in 11/17 so increased synthroid back to 150 mcg daily and TSH 0.6 in 4/18 and TG 0.3 with neg TG ab so kept her dose the same. Wt down 10 lbs and TSH down to 0.079 in 9/18 so had her take 6 tabs/week and TSH up to 5.98 in 12/18 so changed to 137 mcg daily. - change synthroid to 137 mcg daily  - check TSH and free T4 prior to next visit  - check thyroglobulin reflex panel prior to next visit        3. Essential hypertension, benign: her BP was at goal < 140/90 with addition of floyd. Creatinine is worsening due to uncontrolled DM but better than last check. -  cont current regimen for now      4. Hyperlipidemia LDL goal <70: LDL 70.6 in 1/17 on atorvastatin 40 mg and still 80 in 6/17 and 99 in 11/17 possibly due to higher TSH.   Was put on 80 mg by PCP and LDL down to 63 in 4/18 and 55 in 9/18 and 58 in 12/18  - cont atorvastatin 80 mg daily  - check lipids in summer 2019        Patient Instructions   1) Your TSH is a thyroid test.  Your level is 5 which is high and above goal of 0.5-2.0. This test goes opposite of your thyroid dose and suggests your dose of synthroid is not enough. I will change your dose to 137 mcg and take 1 tab 7 days a week and have sent a new prescription to your mail order pharmacy. Keep taking the 150 mcg tabs until the new supply comes and this Sunday don't skip your dose. 2) Increase the lantus to 25 units in the morning and evening. 3) Write down your sugars and how much insulin you took and my nurse navigator, Gianni Norton, will call you in one week to obtain your blood sugars so we can start adjusting your doses of insulin. 4) Your creatinine (kidney test) has improved since last visit.     5) Your cholesterol is at goal.      Follow-up Disposition:  Return in about 3 months (around 3/27/2019) for ok to use 12:10pm.    Copy sent to:  Dr. Franca Castellanos

## 2019-01-02 ENCOUNTER — TELEPHONE (OUTPATIENT)
Dept: ENDOCRINOLOGY | Age: 79
End: 2019-01-02

## 2019-01-02 ENCOUNTER — PATIENT OUTREACH (OUTPATIENT)
Dept: ENDOCRINOLOGY | Age: 79
End: 2019-01-02

## 2019-01-02 NOTE — Clinical Note
FYI,Patient will bring blood sugar readings to office for your review in the next week or two. She is not writing them down and states she gets confused when she goes backward on her glucometer. Thank you.

## 2019-01-02 NOTE — TELEPHONE ENCOUNTER
1/2/2019  10:12 AM      Ms. Nicky Dubois didn't want to leave what the call was about, but would like for you to give her a call back.         Thanks

## 2019-01-02 NOTE — LETTER
1/3/2019 10:46 AM 
 
Ms. Luis Butterfield [address] [facility] Dear Ms. Jessica López: 
 
I am a RN Nurse Navigator working with [facility]. Part of my job is to follow up with patients who have been in the emergency department, hospital, or have a chronic disease. I check to see how you are feeling, answer any questions you may have about your health and check to see if you have a follow-up appointment to see your primary care physician. If you have changed your Primary Care Provider, let us know so we can update our records. Otherwise, I am available to help provide education and resources for your needs. I can be reached directly Monday thru Friday at [phone] or [phone]. Your next appointment with Dr. Sarah Yen is scheduled for Friday, April 19, 2019 at 2:50 pm. 
 
Thank you for allowing me to participate in your care! Sincerely, Ritika Shaffer RN Enclosure(s) Home Blood Sugar Monitoring Record Please call, email, mail or fax your most recent blood sugar readings to office for review. Fax number is [phone] or [phone]. Thank you.

## 2019-01-02 NOTE — TELEPHONE ENCOUNTER
Patient would like her recent office visit and labs sent to Dr. Kelin Mcduffie (Neurologist). I informed her that it would be taken care of.     Phone [phone]  Fax [phone]

## 2019-01-02 NOTE — PROGRESS NOTES
19 NN attempted to contact patient, follow up for chronic condition of diabetes with blood sugar readings and insulin regimen. No answer, left voicemail message to return telephone call. MD Rommel Omalley Berdie Poplar, RN  
  
   
  
Please call her on [phone] to check on her sugars over the past week 1/3/19 NN attempted to contact patient today at the above recommended telephone number, no answer, mailbox is full, unable to leave a message. Patient returned telephone call. NN verified patient's name, address and . Patient states doing good, no problems or concerns noted. Patient states taking her blood sugar readings 3 times daily, prior to meals and insulin administration. Patient states she is not writing her blood sugar down. Patient states no low blood sugar or symptoms of low blood sugar noted. Patient states blood sugar averaging between 160s-200s. NN encouraged patient to write down her blood sugar readings for the next 1-2 weeks, since she states confusion with glucometer dates and times. Patient will write down her blood sugars daily and bring readings to office in the next 1-2 weeks for MD to review. Patient states she is taking 25 units of Lantus 2 times daily and 10 units of Humalog with meals or she uses the corrective scale. NN asked patient to write down insulin given with blood sugar readings and watch dietary carbohydrates, sugar and starches. Patient states agreement and understanding. MD Rommel Omalley Berdie Poplar, RN  
  
   
  
Thanks so much.  At least she is making some progress.

## 2019-01-03 ENCOUNTER — TELEPHONE (OUTPATIENT)
Dept: ENDOCRINOLOGY | Age: 79
End: 2019-01-03

## 2019-01-03 RX ORDER — INSULIN LISPRO 100 [IU]/ML
INJECTION, SOLUTION INTRAVENOUS; SUBCUTANEOUS
Qty: 60 ML | Refills: 3 | Status: SHIPPED | OUTPATIENT
Start: 2019-01-03 | End: 2019-04-22

## 2019-01-03 NOTE — TELEPHONE ENCOUNTER
Please call Dr. Raymundo Sher office to confirm receipt of my office note that was electronically faxed. If not received, please manually fax.

## 2019-01-03 NOTE — TELEPHONE ENCOUNTER
Called Dr. Marisel Soriano office and spoke to [place]. She had not received the office note faxed over electronically. Manually faxed the note to the office. Confirmed receipt with [place].

## 2019-01-03 NOTE — TELEPHONE ENCOUNTER
----- Message from Brian Vargas sent at 1/3/2019 12:19 PM EST -----  Regarding: Dr Virgie Manzano  Pt (p) [phone], pt said she just received a call from [employer]  regarding her Lantus she said she does not need that one, she will need a new rx for her Humalog , she said the number is listed in her Chart

## 2019-01-12 ENCOUNTER — HOSPITAL ENCOUNTER (OUTPATIENT)
Dept: MRI IMAGING | Age: 79
Discharge: HOME | End: 2019-01-12
Attending: ORTHOPAEDIC SURGERY
Payer: MEDICARE

## 2019-01-12 DIAGNOSIS — S13.9XXD ACUTE CERVICAL SPRAIN, SUBSEQUENT ENCOUNTER: ICD-10-CM

## 2019-01-12 DIAGNOSIS — M47.812 CERVICAL SPONDYLOSIS WITHOUT MYELOPATHY: ICD-10-CM

## 2019-01-12 DIAGNOSIS — M54.2 NECK PAIN: ICD-10-CM

## 2019-01-12 DIAGNOSIS — M54.12 CERVICAL RADICULOPATHY: ICD-10-CM

## 2019-01-12 PROCEDURE — 72141 MRI NECK SPINE W/O DYE: CPT

## 2019-01-18 ENCOUNTER — PATIENT OUTREACH (OUTPATIENT)
Dept: ENDOCRINOLOGY | Age: 79
End: 2019-01-18

## 2019-01-18 NOTE — PROGRESS NOTES
1/118/19 Nurse navigator called patient today, no answer, left voicemail message to return telephone call.

## 2019-02-19 LAB — CREATININE, EXTERNAL: 3.11

## 2019-03-05 ENCOUNTER — OFFICE VISIT (OUTPATIENT)
Dept: SURGERY | Age: 79
End: 2019-03-05

## 2019-03-05 VITALS
OXYGEN SATURATION: 100 % | BODY MASS INDEX: 34.82 KG/M2 | SYSTOLIC BLOOD PRESSURE: 135 MMHG | HEART RATE: 59 BPM | TEMPERATURE: 97.6 F | DIASTOLIC BLOOD PRESSURE: 65 MMHG | WEIGHT: 209 LBS | RESPIRATION RATE: 24 BRPM | HEIGHT: 65 IN

## 2019-03-05 DIAGNOSIS — K82.8 GALLBLADDER SLUDGE: ICD-10-CM

## 2019-03-05 DIAGNOSIS — K43.2 RECURRENT VENTRAL INCISIONAL HERNIA: Primary | ICD-10-CM

## 2019-03-05 NOTE — PROGRESS NOTES
HISTORY OF PRESENT ILLNESS  Ángel Trejo is a 69 y.o.  AA  female who comes in for follow up for a gallbladder polyp and incisional hernia  Follow-up   Associated symptoms include abdominal pain. Pertinent negatives include no chest pain, no headaches and no shortness of breath. Abdominal Pain   Associated symptoms include abdominal pain. Pertinent negatives include no chest pain, no headaches and no shortness of breath. Gallbladder Attack   Associated symptoms include abdominal pain. Pertinent negatives include no chest pain, no headaches and no shortness of breath. She has had a known GB polyp for over 3 years. It went from 1.1 to 1.2 cm over 12 months. Recent US 11/25/2015 demonstrated no change in the largest one but still with multiple polyps and increasing number of small liver nodules and a liver hemangioma. These have been unchanged since 2012. She has some bloating but denies nausea, vomiting, GERD, post prandial indigestion, pain, diarrhea or constipation, fever, chills or sweats. She also has abdominal swelling in the where she previously underwent a partial gastrectomy and subsequent incisional hernia repair and diagnosed with a recurrent incisional hernia by me in Aug 2013. Vinita Dickerson She is now having more swelling and pain around the hernia site. She denies weight loss. Repeat US 2/2017 demonstrated the polyp and possible stones.     Past Medical History:   Diagnosis Date    Arthritis     Cataract     bilateral    Chronic kidney disease     Dr. Simon Magallanes CKD stage 3 due to type 2 diabetes mellitus ([facility] [place] [address]     Depression     Diabetes [facility]) age 49    Follicular thyroid cancer ([facility] [place] [address] 08/2014    Gallbladder polyp 8/14/2013    Hypercholesterolemia     Hypertension     Ill-defined condition     states 'polyp' in gal bladder    Long term current use of anticoagulant therapy     Obesity     Right pontine stroke ([facility] [place] [address] 1/17/2017    TIA (transient ischemic attack) 6/6/2014     Past Surgical History:   Procedure Laterality Date    ABDOMEN SURGERY PROC UNLISTED  2006    stomach    BREAST SURGERY PROCEDURE UNLISTED  2009, 2006    breast bx-vince    COLORECTAL SCRN; HI RISK IND  5/30/2014         HX BREAST BIOPSY Right [id number]    2 biopies on the right. Benign (per patient)    HX BREAST BIOPSY Left 2015    Benign (per patient)    HX CATARACT REMOVAL Left     HX GYN  1970's    partial hysterectomy    HX HERNIA REPAIR  2009    HX HYSTERECTOMY      HX ORTHOPAEDIC Bilateral 1988    bunion    HX THYROIDECTOMY  2014    Dr. Nicolasa Rosales     Family History   Problem Relation Age of Onset    Heart Disease Mother     Hypertension Mother     Diabetes Mother     Stroke Mother     Cancer Father         colon    Heart Disease Sister     Diabetes Sister     Heart Attack Sister     Hypertension Sister     Lung Disease Brother     Lung Disease Brother     Anesth Problems Neg Hx      Social History     Tobacco Use    Smoking status: Never Smoker    Smokeless tobacco: Never Used   Substance Use Topics    Alcohol use: No    Drug use: No     Current Outpatient Medications   Medication Sig    insulin lispro (HUMALOG KWIKPEN INSULIN) 100 unit/mL kwikpen Inject 10 units before meals + 2 units for every 40 mg/dl above 140 mg/dl--Max 65 units per day    SYNTHROID 137 mcg tablet Take 137 mcg by mouth Daily (before breakfast). BRAND MEDICALLY NECESSARY--Delete 150 mcg dose from profile    insulin glargine (LANTUS SOLOSTAR U-100 INSULIN) 100 unit/mL (3 mL) inpn Inject 25 units in the morning and 25 units at bedtime (Patient taking differently: 20 Units daily. Inject 25 units in the morning and 25 units at bedtime)    gabapentin (NEURONTIN) 100 mg capsule TAKE 2 CAPSULES IN THE MORNING, 1 CAPSULE AT LUNCH AND 2 CAPSULES AT BEDTIME    ONETOUCH ULTRA BLUE TEST STRIP strip Test 3 times daily    atorvastatin (LIPITOR) 80 mg tablet Take 80 mg by mouth daily.     ferrous sulfate (IRON) 325 mg (65 mg iron) tablet Take  by mouth three (3) times daily (with meals).  LOR PEN NEEDLE 32 gauge x 5/32\" ndle Use as directed 5 times daily    bumetanide (BUMEX) 0.5 mg tablet     allopurinol (ZYLOPRIM) 100 mg tablet Take  by mouth daily.  citalopram (CELEXA) 20 mg tablet Take 1 tab daily    clopidogrel (PLAVIX) 75 mg tab Take 1 Tab by mouth daily.  cloNIDine (CATAPRES) 0.3 mg tablet Take 0.3 mg by mouth two (2) times a day.  olmesartan (BENICAR) 40 mg tablet Take  by mouth daily.  metoprolol succinate (TOPROL XL) 50 mg XL tablet Take 1 Tab by mouth daily.  multivitamins-minerals-lutein (CENTRUM SILVER) Tab Take  by mouth.  tiZANidine (ZANAFLEX) 4 mg capsule Take 4 mg by mouth as needed.  spironolactone (ALDACTONE) 50 mg tablet Take 50 mg by mouth daily.  PYRIDOXINE HCL, VITAMIN B6, (VITAMIN B-6 PO) Take  by mouth daily. No current facility-administered medications for this visit. Allergies   Allergen Reactions    Amlodipine Swelling    Nifedipine Swelling    Sulfa (Sulfonamide Antibiotics) Rash       Review of Systems   Constitutional: Negative for chills, diaphoresis, fever, malaise/fatigue and weight loss. HENT: Negative for congestion, ear pain and sore throat. Eyes: Negative for blurred vision and pain. Respiratory: Negative for cough, hemoptysis, sputum production, shortness of breath, wheezing and stridor. Cardiovascular: Negative for chest pain, palpitations, orthopnea, claudication, leg swelling and PND. Gastrointestinal: Positive for abdominal pain. Negative for blood in stool, constipation, diarrhea, heartburn, melena, nausea and vomiting. Genitourinary: Negative for dysuria, flank pain, frequency, hematuria and urgency. Musculoskeletal: Positive for joint pain. Negative for back pain, myalgias and neck pain. Skin: Negative for itching and rash. Neurological: Negative for dizziness, tremors, focal weakness, seizures, weakness and headaches.    Endo/Heme/Allergies: Negative for polydipsia. Last Hgb A1c was over 12. Psychiatric/Behavioral: Positive for depression. Negative for memory loss. The patient is not nervous/anxious. Visit Vitals  /65 (BP 1 Location: Right arm, BP Patient Position: Sitting)   Pulse (!) 59   Temp 97.6 °F (36.4 °C)   Resp 24   Ht 5' 5\" (1.651 m)   Wt 94.8 kg (209 lb)   SpO2 100%   BMI 34.78 kg/m²       Physical Exam   Constitutional: She is oriented to person, place, and time. She appears well-developed and well-nourished. No distress. HENT:   Head: Normocephalic and atraumatic. Mouth/Throat: Oropharynx is clear and moist. No oropharyngeal exudate. Eyes: Conjunctivae and EOM are normal. Pupils are equal, round, and reactive to light. No scleral icterus. Neck: Normal range of motion and full passive range of motion without pain. Neck supple. No JVD present. No tracheal deviation present. No thyroid mass (3+ cm right anterior neck mass) and no thyromegaly present. Cardiovascular: Normal rate and regular rhythm. Exam reveals no gallop and no friction rub. No murmur heard. Pulmonary/Chest: Effort normal and breath sounds normal. No respiratory distress. She has no wheezes. She has no rales. Abdominal: Soft. Bowel sounds are normal. She exhibits no distension and no mass. There is no hepatosplenomegaly. There is no tenderness. There is no rebound, no guarding and no CVA tenderness. A hernia is present. Hernia confirmed positive in the ventral area (reducible 6 x 8 cm defect). Musculoskeletal: Normal range of motion. She exhibits no edema. Lymphadenopathy:     She has no cervical adenopathy. Neurological: She is alert and oriented to person, place, and time. No cranial nerve deficit. Skin: Skin is warm and dry. No rash noted. She is not diaphoretic. No erythema. No pallor. Psychiatric: She has a normal mood and affect. Her behavior is normal. Judgment and thought content normal.       ASSESSMENT and PLAN  1. Asymptomatic gallbladder polyp and sludge/stones. This is stable at this point. I explained the anatomy and pathophysiology of biliary tract disease and cholecystitis, pancreatitis, cholangitis, choledocholithiasis. I explained about laparoscopic possible open cholecystectomy with possible cholangiogram and the risks of surgery including but not limited to bleeding, infection, bile duct or bowel injury, hernia development, retained common duct stones requiring further therapy, non resolution of symptoms, post cholecystectomy diarrhea, DVT, and risks of general anesthesia. The polyp has not significantly changed size in a year (1.2 vs 1.1). We discussed risks of future malignancy. Repeat US  11/23/2018 demonstrated no change in polyps and stones  2. Recurrent incisional hernia/eventration of mesh. I explained about the anatomy and pathophysiology of hernias and the risk of incarceration and strangulation of the bowel. I explained about hernia repairs (open with and without mesh, and laparoscopic with mesh). I explained the risks and benefits of repair including bleeding, infection, chronic pain, bowel or bladder injury, hernia recurrence, mesh infection requiring removal.  I explained it would be a six to eight week recuperation with no driving for 5 - 7 days, no lifting for six weeks. She would require a separation of components and mesh repair for her defect. 3.  Obesity. Body mass index is 34.78 kg/m². She will try to exercise and lose weight (down 5 pounds since May 2016)  4. Diabetes mellitus, type 2:  Uncontrolled. Last HgbA1c now 12.5. Needs better control. Changed to Dr Michelle Strange  5. 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 Hx colon polyps:   Unclear if last colonoscopy was in 2006 or 2009, sees Dr Adriel Delacruz but he did not do last exam  7. Small liver nodules ? ?significance but given the new onset would like her to get a colonoscopy to r/o malignancy. Stable? 8.   Chronic kidney disease  Followed by Dr Celina Omer    She is still not ready for surgery with poor glucose control and she has actually gained weight  She is leaning to undergo RA recurrent hernia repair with mesh and cholecystectomy but needs her blood sugar control is better and continue to lose weight  RTC in 6-12 months if she has better glucose control and has lost some weight      Carol Greer MD FACS

## 2019-03-05 NOTE — PROGRESS NOTES
Chief Complaint   Patient presents with    Follow-up     f/u gallbladder last seen 11/16/2018     Discussed advanced directive. Patient states that she does not have an advanced directive. 1. Have you been to the ER, urgent care clinic since your last visit? Hospitalized since your last visit? No    2. Have you seen or consulted any other health care providers outside of the [address] [place] [address] since your last visit? Include any pap smears or colon screening.  No

## 2019-04-16 ENCOUNTER — TELEPHONE (OUTPATIENT)
Dept: ENDOCRINOLOGY | Age: 79
End: 2019-04-16

## 2019-04-16 NOTE — TELEPHONE ENCOUNTER
Please ask if she has checked her sugar this morning and find out what it was. She will need to bring in a detailed record of her sugars over the next 3 days to her visit on 4/19/19 so we can see what changes need to be made to her insulin doses.

## 2019-04-16 NOTE — TELEPHONE ENCOUNTER
Pedro Wooten with [facility] called with a critical lab for Ms. Kellogg    Glucose 528 from 4/15/2019

## 2019-04-17 LAB
BUN SERPL-MCNC: 35 MG/DL (ref 8–27)
BUN/CREAT SERPL: 15 (ref 12–28)
CALCIUM SERPL-MCNC: 9.2 MG/DL (ref 8.7–10.3)
CHLORIDE SERPL-SCNC: 93 MMOL/L (ref 96–106)
CO2 SERPL-SCNC: 27 MMOL/L (ref 20–29)
CREAT SERPL-MCNC: 2.27 MG/DL (ref 0.57–1)
CREAT UR-MCNC: NORMAL MG/DL
EST. AVERAGE GLUCOSE BLD GHB EST-MCNC: 309 MG/DL
GLUCOSE SERPL-MCNC: 528 MG/DL (ref 65–99)
HBA1C MFR BLD: 12.4 % (ref 4.8–5.6)
INTERPRETATION: NORMAL
Lab: NORMAL
MICROALBUMIN UR-MCNC: NORMAL
POTASSIUM SERPL-SCNC: 4 MMOL/L (ref 3.5–5.2)
SODIUM SERPL-SCNC: 137 MMOL/L (ref 134–144)
T4 FREE SERPL-MCNC: 1.31 NG/DL (ref 0.82–1.77)
THYROGLOB AB SERPL-ACNC: <1 IU/ML (ref 0–0.9)
THYROGLOB SERPL-MCNC: 0.2 NG/ML (ref 1.5–38.5)
TSH SERPL DL<=0.005 MIU/L-ACNC: 0.39 UIU/ML (ref 0.45–4.5)

## 2019-04-18 NOTE — TELEPHONE ENCOUNTER
I spoke with patient and she stated that she does not check her sugar and she has not checked it in over a month. I asked her to check it and it was 249. Patient stated that the day she had her blood drawn she had drank two cups of sweet tea 16 oz. She will check her sugar tonight and tomorrow in the am and afternoon and will bring the readings with her.

## 2019-04-22 ENCOUNTER — OFFICE VISIT (OUTPATIENT)
Dept: ENDOCRINOLOGY | Age: 79
End: 2019-04-22

## 2019-04-22 VITALS
DIASTOLIC BLOOD PRESSURE: 70 MMHG | SYSTOLIC BLOOD PRESSURE: 139 MMHG | HEART RATE: 63 BPM | HEIGHT: 65 IN | WEIGHT: 207.6 LBS | OXYGEN SATURATION: 99 % | BODY MASS INDEX: 34.59 KG/M2

## 2019-04-22 DIAGNOSIS — I10 ESSENTIAL HYPERTENSION, BENIGN: ICD-10-CM

## 2019-04-22 DIAGNOSIS — E78.5 HYPERLIPIDEMIA LDL GOAL <70: ICD-10-CM

## 2019-04-22 DIAGNOSIS — C73 THYROID CANCER (HCC): ICD-10-CM

## 2019-04-22 PROBLEM — N18.4 CKD (CHRONIC KIDNEY DISEASE) STAGE 4, GFR 15-29 ML/MIN (HCC): Status: ACTIVE | Noted: 2019-04-22

## 2019-04-22 RX ORDER — INSULIN LISPRO 100 [IU]/ML
INJECTION, SOLUTION INTRAVENOUS; SUBCUTANEOUS
Qty: 60 ML | Refills: 3
Start: 2019-04-22 | End: 2019-12-03 | Stop reason: CLARIF

## 2019-04-22 RX ORDER — BLOOD-GLUCOSE METER
KIT MISCELLANEOUS
Qty: 1 KIT | Refills: 0 | Status: SHIPPED | OUTPATIENT
Start: 2019-04-22 | End: 2020-10-02

## 2019-04-22 NOTE — PROGRESS NOTES
Benuel Sacks is a 69 y.o. female Chief Complaint Patient presents with  Diabetes 3 month follow up Visit Vitals /70 (BP 1 Location: Left arm, BP Patient Position: Sitting) Pulse 63 Ht 5' 5\" (1.651 m) Wt 207 lb 9.6 oz (94.2 kg) SpO2 99% BMI 34.55 kg/m² Health Maintenance Due Topic Date Due  
 DTaP/Tdap/Td series (1 - Tdap) 08/19/1962  Shingrix Vaccine Age 50> (1 of 2) 08/19/1991  Bone Densitometry (Dexa) Screening  08/19/2006  Pneumococcal 65+ years (1 of 2 - PCV13) 08/19/2006  MEDICARE YEARLY EXAM  03/14/2018 1. Have you been to the ER, urgent care clinic since your last visit? Hospitalized since your last visit? No 
 
2. Have you seen or consulted any other health care providers outside of the [address] [place] [address] since your last visit? Include any pap smears or colon screening.  No

## 2019-04-22 NOTE — PATIENT INSTRUCTIONS
1) Be sure to take 25 units of lantus twice daily. 2) Follow this new scale for breakfast and dinner for the humalog (throw out the old scale): 
Blood sugar Less than 90  Eat first, take 10 units    12 units 151-200  14 units 201-250  16 units 251-300  18 units 301-350  20 units 351-400  22 units Over 400  24 units If you don't check, still plan on taking 12 units of humalog for food. 3) Your TSH (thyroid test) is 0.39 which is fine for now so keep taking 137 mcg of synthroid daily. Your thyroglobulin (thyroid cancer blood test) is appropriately low at 0.2 so you don't have any evidence of thyroid cancer at this time. 4) Your creatinine (kidney test) has improved from 2.8 to 2.2. 
 
5) Write down your sugars and how much humalog you took and bring to your visit in 1 month. 6) I will make a referral to the diabetes treatment center who will contact you for an appointment for further education.

## 2019-04-22 NOTE — PROGRESS NOTES
Chief Complaint Patient presents with  Diabetes 3 month follow up History of Present Illness: Lul Tate is a 69 y.o. female here for follow up of diabetes. Weight up 9 lbs since last visit in 12/18. Takes the synthroid every morning with just water and waits 30 min before eating. Missed a dose this week but usually does not miss doses. Did increase the lantus for awhile to 25 units twice daily but then forgot about this and went back to 20 units twice daily. Did drink 2 glasses of sweet tea right before her labs were drawn to explain why her sugar was over 500 on the lab draw. She is only checking her sugar about 3 times a week at most and will dose her humalog based on the scale when she checks but otherwise is just taking 10 units of humalog when she eats. Current Outpatient Medications Medication Sig  
 insulin lispro (HUMALOG KWIKPEN INSULIN) 100 unit/mL kwikpen Inject 10 units before meals + 2 units for every 40 mg/dl above 140 mg/dl--Max 65 units per day  tiZANidine (ZANAFLEX) 4 mg capsule Take 4 mg by mouth as needed.  SYNTHROID 137 mcg tablet Take 137 mcg by mouth Daily (before breakfast). BRAND MEDICALLY NECESSARY--Delete 150 mcg dose from profile  insulin glargine (LANTUS SOLOSTAR U-100 INSULIN) 100 unit/mL (3 mL) inpn Inject 25 units in the morning and 25 units at bedtime (Patient taking differently: 20 Units two (2) times a day. Inject 25 units in the morning and 25 units at bedtime)  gabapentin (NEURONTIN) 100 mg capsule TAKE 2 CAPSULES IN THE MORNING, 1 CAPSULE AT LUNCH AND 2 CAPSULES AT BEDTIME  
 ONETOUCH ULTRA BLUE TEST STRIP strip Test 3 times daily  atorvastatin (LIPITOR) 80 mg tablet Take 80 mg by mouth daily.  ferrous sulfate (IRON) 325 mg (65 mg iron) tablet Take  by mouth daily.  LOR PEN NEEDLE 32 gauge x 5/32\" ndle Use as directed 5 times daily  spironolactone (ALDACTONE) 50 mg tablet Take 50 mg by mouth daily.  bumetanide (BUMEX) 0.5 mg tablet  PYRIDOXINE HCL, VITAMIN B6, (VITAMIN B-6 PO) Take  by mouth daily.  allopurinol (ZYLOPRIM) 100 mg tablet Take  by mouth daily.  citalopram (CELEXA) 20 mg tablet Take 1 tab daily  clopidogrel (PLAVIX) 75 mg tab Take 1 Tab by mouth daily.  cloNIDine (CATAPRES) 0.3 mg tablet Take 0.3 mg by mouth two (2) times a day.  olmesartan (BENICAR) 40 mg tablet Take  by mouth daily.  metoprolol succinate (TOPROL XL) 50 mg XL tablet Take 1 Tab by mouth daily.  multivitamins-minerals-lutein (CENTRUM SILVER) Tab Take  by mouth. No current facility-administered medications for this visit. Allergies Allergen Reactions  Amlodipine Swelling  Nifedipine Swelling  Sulfa (Sulfonamide Antibiotics) Rash Review of Systems: - Eyes: no blurry vision or double vision - Cardiovascular: no chest pain - Respiratory: no shortness of breath - Musculoskeletal: no myalgias - Neurological: no numbness/tingling in extremities Physical Examination: 
Blood pressure 139/70, pulse 63, height 5' 5\" (1.651 m), weight 207 lb 9.6 oz (94.2 kg), SpO2 99 %. - General: pleasant, no distress, good eye contact  
- Neck: no carotid bruits - Cardiovascular: regular, normal rate, nl s1 and s2, no m/r/g,  
- Respiratory: clear bilaterally - Integumentary: no edema,  
- Psychiatric: normal mood and affect Data Reviewed:  
Component Latest Ref Rng & Units 4/15/2019 4/15/2019 4/15/2019 4/15/2019  
 
      3:58 PM  3:58 PM  3:58 PM  3:58 PM  
TSH 
    0.450 - 4.500 uIU/mL    0.394 (L) T4, Free 0.82 - 1.77 ng/dL   1.31 Thyroglobulin Ab 
    0.0 - 0.9 IU/mL  <1.0 Thyroglobulin by ANN 
    1.5 - 38.5 ng/mL 0.2 (L) Component Latest Ref Rng & Units 4/15/2019 4/15/2019  
 
      3:58 PM  3:58 PM  
Glucose 65 - 99 mg/dL 528 (>) BUN 
    8 - 27 mg/dL 35 (H) Creatinine 
    0.57 - 1.00 mg/dL 2.27 (H) GFR est non-AA >59 mL/min/1.73 20 (L) GFR est AA 
    >59 mL/min/1.73 23 (L) BUN/Creatinine ratio 12 - 28 15 Sodium 134 - 144 mmol/L 137 Potassium 3.5 - 5.2 mmol/L 4.0 Chloride 96 - 106 mmol/L 93 (L)   
CO2 
    20 - 29 mmol/L 27 Calcium 8.7 - 10.3 mg/dL 9.2 Hemoglobin A1c, (calculated) 4.8 - 5.6 %  12.4 (H) Estimated average glucose 
    mg/dL  309 Assessment/Plan: 1. Type 2 diabetes mellitus with diabetic polyneuropathy, with long-term current use of insulin ([facility] [address]): her most recent Hgb A1c was 12.4% in 4/19 down from 12.5% in 12/18 down from 13.8% in 9/18 up from 10.6% in 4/18 up from 7.4% in 11/17 up from 6.2% in 6/17 (first visit with me) down from 11.5% in 1/17 during hospital stay for stroke. A1c is above goal <8% due to not checking frequently and not following the scale and forgetting about the increased dose of lantus so printed a new scale today to follow and gave her log sheets to write down her readings over the next month. - increase lantus to 25 units in the morning and 25 units at night 
- increase humalog to 12 units before meals + 2 units for every 50 mg/dl above 150 mg/dl - check bs 3 times daily 
- foot exam done 12/18 
- eye exam UTD 5/18 
- microalbumin 3192 11/17, down to 889 in 4/18 
- check Hgb A1c and cmp and microalbumin in July 2019  
  
2. Thyroid cancer [facility]): She noticed a lump in her neck in 2014 and was found to have a 3.4 cm right lobe nodule with microcalcifications and she made an appt with Dr. Rola Nielson and he performed a right thyroidectomy in 8/14 and final pathology showed a Follicular carcinoma, Hurthle cell type, at least minimally invasive and ended up having a completion thyroidectomy that was benign. Had a whole body scan at [facility] that fall that didn't show any uptake. Was initially on Synthroid 150 mcg daily but her dose was decreased to 137 mcg daily and has been on this dose for the past 2 years. Did have a TSH of 4.03 in 1/17 during her hospital stay. TSH 2.69 and TG 0.3 with neg TG ab in 6/17 so kept dose the same. TSH up to 3.41 in 11/17 so increased synthroid back to 150 mcg daily and TSH 0.6 in 4/18 and TG 0.3 with neg TG ab so kept her dose the same. Wt down 10 lbs and TSH down to 0.079 in 9/18 so had her take 6 tabs/week and TSH up to 5.98 in 12/18 so changed to 137 mcg daily and TSH 0.39 in 4/19 and TG 0.2 so kept dose the same 
- cont synthroid 137 mcg daily - check TSH and free T4 in July 2019 
- check thyroglobulin reflex panel in 4/20 3. Essential hypertension, benign: her BP was at goal < 140/90 with addition of floyd. Creatinine is still high but improved from last time 
-  cont current regimen for now 4. Hyperlipidemia LDL goal <70: LDL 70.6 in 1/17 on atorvastatin 40 mg and still 80 in 6/17 and 99 in 11/17 possibly due to higher TSH. Was put on 80 mg by PCP and LDL down to 63 in 4/18 and 55 in 9/18 and 58 in 12/18 
- cont atorvastatin 80 mg daily - check lipids in July 2019 Patient Instructions 1) Be sure to take 25 units of lantus twice daily. 2) Follow this new scale for breakfast and dinner for the humalog (throw out the old scale): 
Blood sugar Less than 90  Eat first, take 10 units    12 units 151-200  14 units 201-250  16 units 251-300  18 units 301-350  20 units 351-400  22 units Over 400  24 units If you don't check, still plan on taking 12 units of humalog for food. 3) Your TSH (thyroid test) is 0.39 which is fine for now so keep taking 137 mcg of synthroid daily. Your thyroglobulin (thyroid cancer blood test) is appropriately low at 0.2 so you don't have any evidence of thyroid cancer at this time. 4) Your creatinine (kidney test) has improved from 2.8 to 2.2. 
 
5) Write down your sugars and how much humalog you took and bring to your visit in 1 month. 6) I will make a referral to the diabetes treatment center who will contact you for an appointment for further education. Follow-up and Dispositions · Return for 5/20/19 at 12:10pm. 
  
 
 
 
 
 
Copy sent to: 
Dr. Rand Cordoba

## 2019-05-24 ENCOUNTER — OFFICE VISIT (OUTPATIENT)
Dept: ENDOCRINOLOGY | Age: 79
End: 2019-05-24

## 2019-05-24 VITALS
DIASTOLIC BLOOD PRESSURE: 80 MMHG | OXYGEN SATURATION: 99 % | BODY MASS INDEX: 35.09 KG/M2 | HEART RATE: 64 BPM | WEIGHT: 210.6 LBS | RESPIRATION RATE: 16 BRPM | SYSTOLIC BLOOD PRESSURE: 162 MMHG | HEIGHT: 65 IN

## 2019-05-24 DIAGNOSIS — I10 ESSENTIAL HYPERTENSION, BENIGN: ICD-10-CM

## 2019-05-24 DIAGNOSIS — C73 THYROID CANCER (HCC): ICD-10-CM

## 2019-05-24 DIAGNOSIS — E78.5 HYPERLIPIDEMIA LDL GOAL <70: ICD-10-CM

## 2019-05-24 RX ORDER — TERCONAZOLE 4 MG/G
CREAM VAGINAL
Refills: 0 | COMMUNITY
Start: 2019-04-11 | End: 2019-05-24

## 2019-05-24 RX ORDER — LEVOTHYROXINE SODIUM 137 UG/1
137 TABLET ORAL
Qty: 90 TAB | Refills: 3 | Status: CANCELLED | OUTPATIENT
Start: 2019-05-24

## 2019-05-24 RX ORDER — GABAPENTIN 300 MG/1
600 CAPSULE ORAL 3 TIMES DAILY
COMMUNITY
Start: 2019-01-24 | End: 2020-01-31 | Stop reason: SDUPTHER

## 2019-05-24 RX ORDER — LEVOTHYROXINE SODIUM 150 UG/1
TABLET ORAL
COMMUNITY
Start: 2018-09-27 | End: 2019-05-24

## 2019-05-24 NOTE — PROGRESS NOTES
Chief Complaint   Patient presents with    Diabetes    Other     Pharmay verified; HM all are updated     History of Present Illness: Benuel Sacks is a 69 y.o. female here for follow up of diabetes. Weight up 3 lbs since last visit in 4/19. Has been good about getting the higher dose of lantus 25 units bid and the humalog scale. Fasting sugars are in the 100-150 range and pre-dinner are 130-170 but did have some over 200 after last visit but they have come down with watching her portions more closely. Was rushing to get her and forgot one of her BP pills to explain the rise in BP today. States she had to pay over $600 for her lantus and humalog so may need to change to NPH and regular in the future. Current Outpatient Medications   Medication Sig    gabapentin (NEURONTIN) 300 mg capsule Take 600 mg by mouth.  insulin lispro (HUMALOG KWIKPEN INSULIN) 100 unit/mL kwikpen Inject 12 units before meals + 2 units for every 50 mg/dl above 150 mg/dl--Max 65 units per day--Dose change 4/22/19--updated med list--did not send prescription to the pharmacy    [facility] monitoring kit Test 3 times daily. DX E11.65    SYNTHROID 137 mcg tablet Take 137 mcg by mouth Daily (before breakfast). BRAND MEDICALLY NECESSARY--Delete 150 mcg dose from profile    insulin glargine (LANTUS SOLOSTAR U-100 INSULIN) 100 unit/mL (3 mL) inpn Inject 25 units in the morning and 25 units at bedtime (Patient taking differently: 20 Units two (2) times a day. Inject 25 units in the morning and 25 units at bedtime)    ONETOUCH ULTRA BLUE TEST STRIP strip Test 3 times daily    atorvastatin (LIPITOR) 80 mg tablet Take 80 mg by mouth daily.  ferrous sulfate (IRON) 325 mg (65 mg iron) tablet Take  by mouth daily.  LOR PEN NEEDLE 32 gauge x 5/32\" ndle Use as directed 5 times daily    bumetanide (BUMEX) 0.5 mg tablet     PYRIDOXINE HCL, VITAMIN B6, (VITAMIN B-6 PO) Take  by mouth daily.     allopurinol (ZYLOPRIM) 100 mg tablet Take  by mouth daily.  citalopram (CELEXA) 20 mg tablet Take 1 tab daily    clopidogrel (PLAVIX) 75 mg tab Take 1 Tab by mouth daily.  cloNIDine (CATAPRES) 0.3 mg tablet Take 0.3 mg by mouth two (2) times a day.  olmesartan (BENICAR) 40 mg tablet Take  by mouth daily.  metoprolol succinate (TOPROL XL) 50 mg XL tablet Take 1 Tab by mouth daily.  multivitamins-minerals-lutein (CENTRUM SILVER) Tab Take  by mouth.  gabapentin (NEURONTIN) 100 mg capsule TAKE 2 CAPSULES IN THE MORNING, 1 CAPSULE AT LUNCH AND 2 CAPSULES AT BEDTIME     No current facility-administered medications for this visit. Allergies   Allergen Reactions    Amlodipine Swelling    Nifedipine Swelling    Sulfa (Sulfonamide Antibiotics) Rash     Review of Systems:  - Eyes: no blurry vision or double vision  - Cardiovascular: no chest pain  - Respiratory: no shortness of breath  - Musculoskeletal: no myalgias  - Neurological: no numbness/tingling in extremities    Physical Examination:  Blood pressure 162/80, pulse 64, resp. rate 16, height 5' 5\" (1.651 m), weight 210 lb 9.6 oz (95.5 kg), SpO2 99 %. - General: pleasant, no distress, good eye contact   - Neck: no carotid bruits  - Cardiovascular: regular, normal rate, nl s1 and s2, no m/r/g,   - Respiratory: clear bilaterally  - Integumentary: no edema,   - Psychiatric: normal mood and affect    Data Reviewed:   - none new for review    Assessment/Plan:     1. Type 2 diabetes mellitus with diabetic polyneuropathy, with long-term current use of [facility] her most recent Hgb A1c was 12.4% in 4/19 down from 12.5% in 12/18 down from 13.8% in 9/18 up from 10.6% in 4/18 up from 7.4% in 11/17 up from 6.2% in 6/17 (first visit with me) down from 11.5% in 1/17 during hospital stay for stroke. A1c is above goal <8% but her current sugars are much better so won't make any changes at this time.   - cont lantus 25 units in the morning and 25 units at night  - cont humalog 12 units before meals + 2 units for every 50 mg/dl above 150 mg/dl  - check bs 3 times daily  - foot exam done 12/18  - eye exam UTD 5/18  - microalbumin 3192 11/17, down to 889 in 4/18  - check Hgb A1c and cmp and microalbumin prior to next visit        2. Thyroid cancer [facility]): She noticed a lump in her neck in 2014 and was found to have a 3.4 cm right lobe nodule with microcalcifications and she made an appt with Dr. Valarie Banks and he performed a right thyroidectomy in 8/14 and final pathology showed a Follicular carcinoma, Hurthle cell type, at least minimally invasive and ended up having a completion thyroidectomy that was benign. Had a whole body scan at [facility] that fall that didn't show any uptake. Was initially on Synthroid 150 mcg daily but her dose was decreased to 137 mcg daily and has been on this dose for the past 2 years. Did have a TSH of 4.03 in 1/17 during her hospital stay. TSH 2.69 and TG 0.3 with neg TG ab in 6/17 so kept dose the same. TSH up to 3.41 in 11/17 so increased synthroid back to 150 mcg daily and TSH 0.6 in 4/18 and TG 0.3 with neg TG ab so kept her dose the same. Wt down 10 lbs and TSH down to 0.079 in 9/18 so had her take 6 tabs/week and TSH up to 5.98 in 12/18 so changed to 137 mcg daily and TSH 0.39 in 4/19 and TG 0.2 so kept dose the same  - cont synthroid 137 mcg daily  - check TSH and free T4 prior to next visit  - check thyroglobulin reflex panel in 4/20        3. Essential hypertension, benign: her BP was above goal < 140/90 but was rushing so won't make any changes  -  cont current regimen for now      4. Hyperlipidemia LDL goal <70: LDL 70.6 in 1/17 on atorvastatin 40 mg and still 80 in 6/17 and 99 in 11/17 possibly due to higher TSH. Was put on 80 mg by PCP and LDL down to 63 in 4/18 and 55 in 9/18 and 58 in 12/18  - cont atorvastatin 80 mg daily  - check lipids prior to next visit          Patient Instructions   1) Your blood sugars look so much better. Keep up the good work.     2) Your weight is slowly rising because you are eating too much starch/sugar and this makes your blood sugar rise and then you take more insulin based on the sliding scale and then the higher insulin doses can cause weight gain. If you try watching your intake more closely, this will mean you will need less insulin and will help with weight loss. 3) Your blood pressure was up today but you were rushing so I'm not concerned. Follow-up and Dispositions    · Return in about 3 months (around 8/24/2019) for ok to use 12:10.                Copy sent to:  Dr. Ramesh Hopkins

## 2019-05-24 NOTE — PATIENT INSTRUCTIONS
1) Your blood sugars look so much better. Keep up the good work. 2) Your weight is slowly rising because you are eating too much starch/sugar and this makes your blood sugar rise and then you take more insulin based on the sliding scale and then the higher insulin doses can cause weight gain. If you try watching your intake more closely, this will mean you will need less insulin and will help with weight loss. 3) Your blood pressure was up today but you were rushing so I'm not concerned.

## 2019-06-20 ENCOUNTER — HOSPITAL ENCOUNTER (EMERGENCY)
Age: 79
Discharge: HOME | End: 2019-06-20
Attending: EMERGENCY MEDICINE
Payer: MEDICARE

## 2019-06-20 ENCOUNTER — APPOINTMENT (OUTPATIENT)
Dept: GENERAL RADIOLOGY | Age: 79
End: 2019-06-20
Attending: EMERGENCY MEDICINE
Payer: MEDICARE

## 2019-06-20 VITALS
OXYGEN SATURATION: 100 % | SYSTOLIC BLOOD PRESSURE: 173 MMHG | TEMPERATURE: 99 F | HEIGHT: 65 IN | HEART RATE: 62 BPM | RESPIRATION RATE: 18 BRPM | WEIGHT: 218.92 LBS | BODY MASS INDEX: 36.47 KG/M2 | DIASTOLIC BLOOD PRESSURE: 69 MMHG

## 2019-06-20 DIAGNOSIS — R60.0 LOCALIZED EDEMA: Primary | ICD-10-CM

## 2019-06-20 LAB
ALBUMIN SERPL-MCNC: 3.3 G/DL (ref 3.5–5)
ALBUMIN/GLOB SERPL: 1 {RATIO} (ref 1.1–2.2)
ALP SERPL-CCNC: 73 U/L (ref 45–117)
ALT SERPL-CCNC: 84 U/L (ref 12–78)
ANION GAP SERPL CALC-SCNC: 3 MMOL/L (ref 5–15)
AST SERPL-CCNC: 66 U/L (ref 15–37)
BASOPHILS # BLD: 0 K/UL (ref 0–0.1)
BASOPHILS NFR BLD: 1 % (ref 0–1)
BILIRUB SERPL-MCNC: 0.4 MG/DL (ref 0.2–1)
BNP SERPL-MCNC: 297 PG/ML
BUN SERPL-MCNC: 30 MG/DL (ref 6–20)
BUN/CREAT SERPL: 13 (ref 12–20)
CALCIUM SERPL-MCNC: 8 MG/DL (ref 8.5–10.1)
CHLORIDE SERPL-SCNC: 104 MMOL/L (ref 97–108)
CO2 SERPL-SCNC: 33 MMOL/L (ref 21–32)
CREAT SERPL-MCNC: 2.26 MG/DL (ref 0.55–1.02)
DIFFERENTIAL METHOD BLD: ABNORMAL
EOSINOPHIL # BLD: 0.1 K/UL (ref 0–0.4)
EOSINOPHIL NFR BLD: 2 % (ref 0–7)
ERYTHROCYTE [DISTWIDTH] IN BLOOD BY AUTOMATED COUNT: 14.7 % (ref 11.5–14.5)
GLOBULIN SER CALC-MCNC: 3.4 G/DL (ref 2–4)
GLUCOSE SERPL-MCNC: 130 MG/DL (ref 65–100)
HCT VFR BLD AUTO: 29.8 % (ref 35–47)
HGB BLD-MCNC: 10.1 G/DL (ref 11.5–16)
IMM GRANULOCYTES # BLD AUTO: 0 K/UL (ref 0–0.04)
IMM GRANULOCYTES NFR BLD AUTO: 1 % (ref 0–0.5)
LYMPHOCYTES # BLD: 1.7 K/UL (ref 0.8–3.5)
LYMPHOCYTES NFR BLD: 42 % (ref 12–49)
MCH RBC QN AUTO: 29.7 PG (ref 26–34)
MCHC RBC AUTO-ENTMCNC: 33.9 G/DL (ref 30–36.5)
MCV RBC AUTO: 87.6 FL (ref 80–99)
MONOCYTES # BLD: 0.5 K/UL (ref 0–1)
MONOCYTES NFR BLD: 14 % (ref 5–13)
NEUTS SEG # BLD: 1.6 K/UL (ref 1.8–8)
NEUTS SEG NFR BLD: 40 % (ref 32–75)
NRBC # BLD: 0 K/UL (ref 0–0.01)
NRBC BLD-RTO: 0 PER 100 WBC
PLATELET # BLD AUTO: 183 K/UL (ref 150–400)
PMV BLD AUTO: 11.2 FL (ref 8.9–12.9)
POTASSIUM SERPL-SCNC: 3.8 MMOL/L (ref 3.5–5.1)
PROT SERPL-MCNC: 6.7 G/DL (ref 6.4–8.2)
RBC # BLD AUTO: 3.4 M/UL (ref 3.8–5.2)
SODIUM SERPL-SCNC: 140 MMOL/L (ref 136–145)
WBC # BLD AUTO: 4 K/UL (ref 3.6–11)

## 2019-06-20 PROCEDURE — 85025 COMPLETE CBC W/AUTO DIFF WBC: CPT

## 2019-06-20 PROCEDURE — 80053 COMPREHEN METABOLIC PANEL: CPT

## 2019-06-20 PROCEDURE — 71045 X-RAY EXAM CHEST 1 VIEW: CPT

## 2019-06-20 PROCEDURE — 36415 COLL VENOUS BLD VENIPUNCTURE: CPT

## 2019-06-20 PROCEDURE — 74011000250 HC RX REV CODE- 250: Performed by: EMERGENCY MEDICINE

## 2019-06-20 PROCEDURE — 83880 ASSAY OF NATRIURETIC PEPTIDE: CPT

## 2019-06-20 PROCEDURE — 96374 THER/PROPH/DIAG INJ IV PUSH: CPT

## 2019-06-20 PROCEDURE — 99282 EMERGENCY DEPT VISIT SF MDM: CPT

## 2019-06-20 RX ORDER — BUMETANIDE 0.25 MG/ML
1 INJECTION INTRAMUSCULAR; INTRAVENOUS
Status: COMPLETED | OUTPATIENT
Start: 2019-06-20 | End: 2019-06-20

## 2019-06-20 RX ADMIN — BUMETANIDE 1 MG: 0.25 INJECTION INTRAMUSCULAR; INTRAVENOUS at 18:34

## 2019-06-20 NOTE — ED NOTES
Dr. Mariaa Ramirez reviewed discharge instructions with the patient. The patient verbalized understanding. All questions and concerns were addressed. The patient declined a wheelchair and is discharged ambulatory in the care of family members with instructions and prescriptions in hand. Pt is alert and oriented x 4. Respirations are clear and unlabored.

## 2019-06-20 NOTE — DISCHARGE INSTRUCTIONS
Patient Education        Leg and Ankle Edema: Care Instructions  Your Care Instructions  Swelling in the legs, ankles, and feet is called edema. It is common after you sit or stand for a while. Long plane flights or car rides often cause swelling in the legs and feet. You may also have swelling if you have to stand for long periods of time at your job. Problems with the veins in the legs (varicose veins) and changes in hormones can also cause swelling. Sometimes the swelling in the ankles and feet is caused by a more serious problem, such as heart failure, infection, blood clots, or liver or kidney disease. Follow-up care is a key part of your treatment and safety. Be sure to make and go to all appointments, and call your doctor if you are having problems. It's also a good idea to know your test results and keep a list of the medicines you take. How can you care for yourself at home? · If your doctor gave you medicine, take it as prescribed. Call your doctor if you think you are having a problem with your medicine. · Whenever you are resting, raise your legs up. Try to keep the swollen area higher than the level of your heart. · Take breaks from standing or sitting in one position. ? Walk around to increase the blood flow in your lower legs. ? Move your feet and ankles often while you stand, or tighten and relax your leg muscles. · Wear support stockings. Put them on in the morning, before swelling gets worse. · Eat a balanced diet. Lose weight if you need to. · Limit the amount of salt (sodium) in your diet. Salt holds fluid in the body and may increase swelling. When should you call for help? Call 911 anytime you think you may need emergency care. For example, call if:    · You have symptoms of a blood clot in your lung (called a pulmonary embolism). These may include:  ? Sudden chest pain. ? Trouble breathing. ?  Coughing up blood.    Call your doctor now or seek immediate medical care if:    · You have signs of a blood clot, such as:  ? Pain in your calf, back of the knee, thigh, or groin. ? Redness and swelling in your leg or groin.     · You have symptoms of infection, such as:  ? Increased pain, swelling, warmth, or redness. ? Red streaks or pus. ? A fever.    Watch closely for changes in your health, and be sure to contact your doctor if:    · Your swelling is getting worse.     · You have new or worsening pain in your legs.     · You do not get better as expected. Where can you learn more? Go to http://jaciel-fish.info/. Enter S213 in the search box to learn more about \"Leg and Ankle Edema: Care Instructions. \"  Current as of: September 23, 2018  Content Version: 11.9  © 0658-9188 [email or web address], [employer]. Care instructions adapted under license by [employer] (which disclaims liability or warranty for this information). If you have questions about a medical condition or this instruction, always ask your healthcare professional. [place] [address] any warranty or liability for your use of this information.
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
## 2019-09-08 ENCOUNTER — HOSPITAL ENCOUNTER (EMERGENCY)
Age: 79
Discharge: HOME | End: 2019-09-08
Attending: EMERGENCY MEDICINE | Admitting: EMERGENCY MEDICINE
Payer: MEDICARE

## 2019-09-08 VITALS
HEIGHT: 65 IN | WEIGHT: 214 LBS | BODY MASS INDEX: 35.65 KG/M2 | TEMPERATURE: 98.4 F | OXYGEN SATURATION: 100 % | SYSTOLIC BLOOD PRESSURE: 150 MMHG | HEART RATE: 88 BPM | DIASTOLIC BLOOD PRESSURE: 70 MMHG | RESPIRATION RATE: 16 BRPM

## 2019-09-08 DIAGNOSIS — R53.83 MALAISE AND FATIGUE: Primary | ICD-10-CM

## 2019-09-08 DIAGNOSIS — N18.4 CKD (CHRONIC KIDNEY DISEASE) STAGE 4, GFR 15-29 ML/MIN (HCC): ICD-10-CM

## 2019-09-08 DIAGNOSIS — R77.8 ELEVATED TROPONIN: ICD-10-CM

## 2019-09-08 DIAGNOSIS — R53.81 MALAISE AND FATIGUE: Primary | ICD-10-CM

## 2019-09-08 LAB
ALBUMIN SERPL-MCNC: 3 G/DL (ref 3.5–5)
ALBUMIN/GLOB SERPL: 0.8 {RATIO} (ref 1.1–2.2)
ALP SERPL-CCNC: 67 U/L (ref 45–117)
ALT SERPL-CCNC: 27 U/L (ref 12–78)
ANION GAP SERPL CALC-SCNC: 5 MMOL/L (ref 5–15)
AST SERPL-CCNC: 27 U/L (ref 15–37)
BASOPHILS # BLD: 0 K/UL (ref 0–0.1)
BASOPHILS NFR BLD: 0 % (ref 0–1)
BILIRUB SERPL-MCNC: 0.6 MG/DL (ref 0.2–1)
BUN SERPL-MCNC: 33 MG/DL (ref 6–20)
BUN/CREAT SERPL: 10 (ref 12–20)
CALCIUM SERPL-MCNC: 8.4 MG/DL (ref 8.5–10.1)
CHLORIDE SERPL-SCNC: 103 MMOL/L (ref 97–108)
CO2 SERPL-SCNC: 31 MMOL/L (ref 21–32)
CREAT SERPL-MCNC: 3.24 MG/DL (ref 0.55–1.02)
DIFFERENTIAL METHOD BLD: ABNORMAL
EOSINOPHIL # BLD: 0.1 K/UL (ref 0–0.4)
EOSINOPHIL NFR BLD: 1 % (ref 0–7)
ERYTHROCYTE [DISTWIDTH] IN BLOOD BY AUTOMATED COUNT: 14.9 % (ref 11.5–14.5)
GLOBULIN SER CALC-MCNC: 3.6 G/DL (ref 2–4)
GLUCOSE SERPL-MCNC: 200 MG/DL (ref 65–100)
HCT VFR BLD AUTO: 31.1 % (ref 35–47)
HGB BLD-MCNC: 10.7 G/DL (ref 11.5–16)
IMM GRANULOCYTES # BLD AUTO: 0 K/UL (ref 0–0.04)
IMM GRANULOCYTES NFR BLD AUTO: 0 % (ref 0–0.5)
LYMPHOCYTES # BLD: 1.6 K/UL (ref 0.8–3.5)
LYMPHOCYTES NFR BLD: 35 % (ref 12–49)
MCH RBC QN AUTO: 30.2 PG (ref 26–34)
MCHC RBC AUTO-ENTMCNC: 34.4 G/DL (ref 30–36.5)
MCV RBC AUTO: 87.9 FL (ref 80–99)
MONOCYTES # BLD: 0.5 K/UL (ref 0–1)
MONOCYTES NFR BLD: 11 % (ref 5–13)
NEUTS SEG # BLD: 2.4 K/UL (ref 1.8–8)
NEUTS SEG NFR BLD: 53 % (ref 32–75)
NRBC # BLD: 0 K/UL (ref 0–0.01)
NRBC BLD-RTO: 0 PER 100 WBC
PLATELET # BLD AUTO: 230 K/UL (ref 150–400)
PMV BLD AUTO: 10.8 FL (ref 8.9–12.9)
POTASSIUM SERPL-SCNC: 4.1 MMOL/L (ref 3.5–5.1)
PROT SERPL-MCNC: 6.6 G/DL (ref 6.4–8.2)
RBC # BLD AUTO: 3.54 M/UL (ref 3.8–5.2)
SODIUM SERPL-SCNC: 139 MMOL/L (ref 136–145)
TROPONIN I SERPL-MCNC: 0.07 NG/ML
TROPONIN I SERPL-MCNC: 0.07 NG/ML
WBC # BLD AUTO: 4.5 K/UL (ref 3.6–11)

## 2019-09-08 PROCEDURE — 84484 ASSAY OF TROPONIN QUANT: CPT

## 2019-09-08 PROCEDURE — 80053 COMPREHEN METABOLIC PANEL: CPT

## 2019-09-08 PROCEDURE — 96361 HYDRATE IV INFUSION ADD-ON: CPT

## 2019-09-08 PROCEDURE — 93005 ELECTROCARDIOGRAM TRACING: CPT

## 2019-09-08 PROCEDURE — 85025 COMPLETE CBC W/AUTO DIFF WBC: CPT

## 2019-09-08 PROCEDURE — 74011250636 HC RX REV CODE- 250/636: Performed by: EMERGENCY MEDICINE

## 2019-09-08 PROCEDURE — 96360 HYDRATION IV INFUSION INIT: CPT

## 2019-09-08 PROCEDURE — 36415 COLL VENOUS BLD VENIPUNCTURE: CPT

## 2019-09-08 PROCEDURE — 99285 EMERGENCY DEPT VISIT HI MDM: CPT

## 2019-09-08 RX ADMIN — SODIUM CHLORIDE 500 ML: 900 INJECTION, SOLUTION INTRAVENOUS at 18:26

## 2019-09-08 NOTE — ED TRIAGE NOTES
Assumed care of pt from triage. Pt CC of malaise x2 days. Pt states she is a diabetic. Pt denies n/v/d, CP, SOB, and fevers. Pt states she hasn't been taking her heart medications because she is out. Pt on monitor x2. Family at bedside. Call bell in reach.  Vss.

## 2019-09-08 NOTE — ED NOTES
Assumed pt care from triage rn. Plan of care discussed and all questions answered. Pt resting in bed. No distress noted. Comfort and safety measures in place. Call light in reach.

## 2019-09-08 NOTE — ED PROVIDER NOTES
EMERGENCY DEPARTMENT HISTORY AND PHYSICAL EXAM      Date: 9/8/2019  Patient Name: Theodora Conrad    History of Presenting Illness     Chief Complaint   Patient presents with    Fatigue     Patient c/o general malaise x two days. Denies pain/n/v/d/constipation/urinary problems       History Provided By: Patient    HPI: Theodora Conrad, 67 y.o. female presents to the ED with cc of fatigue. The patient states that she has had the symptoms for the last 2 days. She thinks it is related to the fact that she has not been taking her medications appropriately for the last 3 months. She attributes that to the cost of the medications. She denies fever, chills, lightheadedness, dizziness, headache, chest pain, abdominal pain or shortness of breath. She denies cough, dysuria, diarrhea or vomiting. There are no other complaints, changes, or physical findings at this time. PCP: Mariann Dunham MD    No current facility-administered medications on file prior to encounter. Current Outpatient Medications on File Prior to Encounter   Medication Sig Dispense Refill    gabapentin (NEURONTIN) 300 mg capsule Take 600 mg by mouth.  insulin lispro (HUMALOG KWIKPEN INSULIN) 100 unit/mL kwikpen Inject 12 units before meals + 2 units for every 50 mg/dl above 150 mg/dl--Max 65 units per day--Dose change 4/22/19--updated med list--did not send prescription to the pharmacy 60 mL 3    insulin glargine (LANTUS SOLOSTAR U-100 INSULIN) 100 unit/mL (3 mL) inpn Inject 25 units in the morning and 25 units at bedtime 45 mL 3    gabapentin (NEURONTIN) 100 mg capsule TAKE 2 CAPSULES IN THE MORNING, 1 CAPSULE AT LUNCH AND 2 CAPSULES AT BEDTIME 540 Cap 3    ferrous sulfate (IRON) 325 mg (65 mg iron) tablet Take  by mouth daily.  bumetanide (BUMEX) 0.5 mg tablet       PYRIDOXINE HCL, VITAMIN B6, (VITAMIN B-6 PO) Take  by mouth daily.  clopidogrel (PLAVIX) 75 mg tab Take 1 Tab by mouth daily.  30 Tab 6    cloNIDine (CATAPRES) 0.3 mg tablet Take 0.3 mg by mouth two (2) times a day.  multivitamins-minerals-lutein (CENTRUM SILVER) Tab Take  by mouth.  ONETOUCH ULTRA2 monitoring kit Test 3 times daily. DX E11.65 1 Kit 0    SYNTHROID 137 mcg tablet Take 137 mcg by mouth Daily (before breakfast). BRAND MEDICALLY NECESSARY--Delete 150 mcg dose from profile 90 Tab 3    ONETOUCH ULTRA BLUE TEST STRIP strip Test 3 times daily 300 Strip 3    atorvastatin (LIPITOR) 80 mg tablet Take 80 mg by mouth daily.  LOR PEN NEEDLE 32 gauge x 5/32\" ndle Use as directed 5 times daily 500 Pen Needle 3    allopurinol (ZYLOPRIM) 100 mg tablet Take  by mouth daily.  citalopram (CELEXA) 20 mg tablet Take 1 tab daily      olmesartan (BENICAR) 40 mg tablet Take  by mouth daily.  metoprolol succinate (TOPROL XL) 50 mg XL tablet Take 1 Tab by mouth daily. 30 Tab 1       Past History     Past Medical History:  Past Medical History:   Diagnosis Date    Arthritis     Cataract     bilateral    Chronic kidney disease     Dr. Tena Rosen CKD stage 3 due to type 2 diabetes mellitus ([facility] [address]     Depression     Diabetes [facility]) age 49    Follicular thyroid cancer ([facility] [address] 08/2014    Gallbladder polyp 8/14/2013    Hypercholesterolemia     Hypertension     Ill-defined condition     states 'polyp' in gal bladder    Long term current use of anticoagulant therapy     Obesity     Right pontine stroke ([facility] [address] 1/17/2017    TIA (transient ischemic attack) 6/6/2014       Past Surgical History:  Past Surgical History:   Procedure Laterality Date    ABDOMEN SURGERY PROC UNLISTED  2006    stomach    BREAST SURGERY PROCEDURE UNLISTED  2009, 2006    breast bx-vince    COLORECTAL SCRN; HI RISK IND  5/30/2014         HX BREAST BIOPSY Right [id number]    2 biopies on the right. Benign (per patient)    HX BREAST BIOPSY Left 2015    Benign (per patient)    HX CATARACT REMOVAL Left     HX GYN  1970's    partial hysterectomy    HX HERNIA REPAIR  2009  HX HYSTERECTOMY      HX ORTHOPAEDIC Bilateral 1988    bunion    HX THYROIDECTOMY  2014    Dr. Jamilah Bobby       Family History:  Family History   Problem Relation Age of Onset    Heart Disease Mother     Hypertension Mother     Diabetes Mother     Stroke Mother     Cancer Father         colon    Heart Disease Sister     Diabetes Sister     Heart Attack Sister     Hypertension Sister     Lung Disease Brother     Lung Disease Brother     Anesth Problems Neg Hx        Social History:  Social History     Tobacco Use    Smoking status: Never Smoker    Smokeless tobacco: Never Used   Substance Use Topics    Alcohol use: No    Drug use: No       Allergies: Allergies   Allergen Reactions    Amlodipine Swelling    Nifedipine Swelling    Sulfa (Sulfonamide Antibiotics) Rash         Review of Systems   Review of Systems   Constitutional: Negative for chills and fever. HENT: Negative for congestion. Eyes: Negative. Respiratory: Negative for shortness of breath. Cardiovascular: Negative for chest pain. Gastrointestinal: Negative for abdominal pain. Endocrine: Negative for heat intolerance. Genitourinary: Negative. Musculoskeletal: Negative for back pain. Skin: Negative for rash. Allergic/Immunologic: Negative for immunocompromised state. Neurological: Positive for weakness. Hematological: Does not bruise/bleed easily. Psychiatric/Behavioral: Negative. All other systems reviewed and are negative. Physical Exam   Physical Exam   Constitutional: She is oriented to person, place, and time. She appears well-developed and well-nourished. No distress. HENT:   Head: Normocephalic. Eyes: Pupils are equal, round, and reactive to light. EOM are normal.   Neck: Normal range of motion. Neck supple. Cardiovascular: Normal rate, regular rhythm, normal heart sounds and intact distal pulses. Pulmonary/Chest: Effort normal and breath sounds normal. No respiratory distress. Abdominal: Soft. Bowel sounds are normal. There is no tenderness. Musculoskeletal: Normal range of motion. She exhibits no edema. Neurological: She is alert and oriented to person, place, and time. No focal deficits   Skin: Skin is warm and dry. Psychiatric: She has a normal mood and affect. Her behavior is normal.   Nursing note and vitals reviewed. Diagnostic Study Results     Labs -     Recent Results (from the past 12 hour(s))   CBC WITH AUTOMATED DIFF    Collection Time: 09/08/19  4:29 PM   Result Value Ref Range    WBC 4.5 3.6 - 11.0 K/uL    RBC 3.54 (L) 3.80 - 5.20 M/uL    HGB 10.7 (L) 11.5 - 16.0 g/dL    HCT 31.1 (L) 35.0 - 47.0 %    MCV 87.9 80.0 - 99.0 FL    MCH 30.2 26.0 - 34.0 PG    MCHC 34.4 30.0 - 36.5 g/dL    RDW 14.9 (H) 11.5 - 14.5 %    PLATELET 913 706 - 662 K/uL    MPV 10.8 8.9 - 12.9 FL    NRBC 0.0 0  WBC    ABSOLUTE NRBC 0.00 0.00 - 0.01 K/uL    NEUTROPHILS 53 32 - 75 %    LYMPHOCYTES 35 12 - 49 %    MONOCYTES 11 5 - 13 %    EOSINOPHILS 1 0 - 7 %    BASOPHILS 0 0 - 1 %    IMMATURE GRANULOCYTES 0 0.0 - 0.5 %    ABS. NEUTROPHILS 2.4 1.8 - 8.0 K/UL    ABS. LYMPHOCYTES 1.6 0.8 - 3.5 K/UL    ABS. MONOCYTES 0.5 0.0 - 1.0 K/UL    ABS. EOSINOPHILS 0.1 0.0 - 0.4 K/UL    ABS. BASOPHILS 0.0 0.0 - 0.1 K/UL    ABS. IMM. GRANS. 0.0 0.00 - 0.04 K/UL    DF AUTOMATED     METABOLIC PANEL, COMPREHENSIVE    Collection Time: 09/08/19  4:29 PM   Result Value Ref Range    Sodium 139 136 - 145 mmol/L    Potassium 4.1 3.5 - 5.1 mmol/L    Chloride 103 97 - 108 mmol/L    CO2 31 21 - 32 mmol/L    Anion gap 5 5 - 15 mmol/L    Glucose 200 (H) 65 - 100 mg/dL    BUN 33 (H) 6 - 20 MG/DL    Creatinine 3.24 (H) 0.55 - 1.02 MG/DL    BUN/Creatinine ratio 10 (L) 12 - 20      GFR est AA 17 (L) >60 ml/min/1.73m2    GFR est non-AA 14 (L) >60 ml/min/1.73m2    Calcium 8.4 (L) 8.5 - 10.1 MG/DL    Bilirubin, total 0.6 0.2 - 1.0 MG/DL    ALT (SGPT) 27 12 - 78 U/L    AST (SGOT) 27 15 - 37 U/L    Alk.  phosphatase 67 45 - 117 U/L    Protein, total 6.6 6.4 - 8.2 g/dL    Albumin 3.0 (L) 3.5 - 5.0 g/dL    Globulin 3.6 2.0 - 4.0 g/dL    A-G Ratio 0.8 (L) 1.1 - 2.2         Radiologic Studies -   No orders to display     CT Results  (Last 48 hours)    None        CXR Results  (Last 48 hours)    None          Medical Decision Making   I am the first provider for this patient. I reviewed the vital signs, available nursing notes, past medical history, past surgical history, family history and social history. Vital Signs-Reviewed the patient's vital signs. Patient Vitals for the past 12 hrs:   Temp Pulse Resp BP SpO2   09/08/19 1800  94 18 (!) 151/119 99 %   09/08/19 1758  78 18 142/75 99 %   09/08/19 1715    132/67 100 %   09/08/19 1700    140/74 100 %   09/08/19 1645    133/67 98 %   09/08/19 1618 98.4 °F (36.9 °C) 95 18 161/78 100 %       EKG interpretation: (Preliminary)  Rhythm: normal sinus rhythm; and regular . Rate (approx.): 76; Axis: normal; DC interval: normal; QRS interval: normal ; ST/T wave: T wave inverted; Other findings: Leads I and L, no prior EKGs. Records Reviewed: Nursing Notes, Old Medical Records, Previous Radiology Studies and Previous Laboratory Studies    Provider Notes (Medical Decision Making):   Dehydration, lecture light abnormality, anemia, CAD UTI    ED Course:   Initial assessment performed. The patients presenting problems have been discussed, and they are in agreement with the care plan formulated and outlined with them. I have encouraged them to ask questions as they arise throughout their visit. Consult note:    Discussed the patient's troponin levels and the patient's EKG with Dr. Monica Oglesby, cardiology. We both believe that the elevation is due to the patient's renal failure. Given that she has no chest pain, he states it is okay for her to follow-up with Dr. Jacey Bedoya. Critical Care Time:   0    Disposition:  home    PLAN:  1.    Discharge Medication List as of 9/8/2019 8:32 PM        2. Follow-up Information     Follow up With Specialties Details Why Contact Tracy Swain MD Cardiology Schedule an appointment as soon as possible for a visit in 1 day  [address] [facility]  [address]  [facility]  [phone]      Dwight Hernandez MD Internal Medicine In 2 days  [address] [facility] [phone]      [address] DEPT Emergency Medicine  If symptoms worsen [address]  [address] [facility]  [phone]        Return to ED if worse     Diagnosis     Clinical Impression:   1. Malaise and fatigue    2. CKD (chronic kidney disease) stage 4, GFR 15-29 ml/min ([facility])    3. Elevated troponin        Attestations:    Rebeca Shore MD    Please note that this dictation was completed with [employer], the [employer] voice recognition software. Quite often unanticipated grammatical, syntax, homophones, and other interpretive errors are inadvertently transcribed by the computer software. Please disregard these errors. Please excuse any errors that have escaped final proofreading. Thank you.

## 2019-09-09 LAB
ATRIAL RATE: 76 BPM
CALCULATED P AXIS, ECG09: 37 DEGREES
CALCULATED R AXIS, ECG10: -63 DEGREES
CALCULATED T AXIS, ECG11: 124 DEGREES
DIAGNOSIS, 93000: NORMAL
P-R INTERVAL, ECG05: 122 MS
Q-T INTERVAL, ECG07: 430 MS
QRS DURATION, ECG06: 86 MS
QTC CALCULATION (BEZET), ECG08: 483 MS
VENTRICULAR RATE, ECG03: 76 BPM

## 2019-09-09 NOTE — DISCHARGE INSTRUCTIONS
Patient Education        Fatigue: Care Instructions  Your Care Instructions    Fatigue is a feeling of tiredness, exhaustion, or lack of energy. You may feel fatigue because of too much or not enough activity. It can also come from stress, lack of sleep, boredom, and poor diet. Many medical problems, such as viral infections, can cause fatigue. Emotional problems, especially depression, are often the cause of fatigue. Fatigue is most often a symptom of another problem. Treatment for fatigue depends on the cause. For example, if you have fatigue because you have a certain health problem, treating this problem also treats your fatigue. If depression or anxiety is the cause, treatment may help. Follow-up care is a key part of your treatment and safety. Be sure to make and go to all appointments, and call your doctor if you are having problems. It's also a good idea to know your test results and keep a list of the medicines you take. How can you care for yourself at home? · Get regular exercise. But don't overdo it. Go back and forth between rest and exercise. · Get plenty of rest.  · Eat a healthy diet. Do not skip meals, especially breakfast.  · Reduce your use of caffeine, tobacco, and alcohol. Caffeine is most often found in coffee, tea, cola drinks, and chocolate. · Limit medicines that can cause fatigue. This includes tranquilizers and cold and allergy medicines. When should you call for help? Watch closely for changes in your health, and be sure to contact your doctor if:    · You have new symptoms such as fever or a rash.     · Your fatigue gets worse.     · You have been feeling down, depressed, or hopeless. Or you may have lost interest in things that you usually enjoy.     · You are not getting better as expected. Where can you learn more? Go to http://jaciel-fish.info/. Enter Z325 in the search box to learn more about \"Fatigue: Care Instructions. \"  Current as of: September 23, 2018  Content Version: 12.1  © 9377-1962 [employer]. Care instructions adapted under license by [employer] (which disclaims liability or warranty for this information). If you have questions about a medical condition or this instruction, always ask your healthcare professional. [address] any warranty or liability for your use of this information. Patient Education        Weakness: Care Instructions  Your Care Instructions    Weakness is a lack of physical or muscle strength. You may feel that you need to make extra effort to move your arms, legs, or other muscles. Generalized weakness means that you feel weak in most areas of your body. Another type of weakness may affect just one muscle or group of muscles. You may feel weak and tired after you have done too much activity, such as taking an extra-long hike. This is not a serious problem. It often goes away on its own. Feeling weak can also be caused by medical conditions like thyroid problems, depression, or a virus. Sometimes the cause can be serious. Your doctor may want to do more tests to try to find the cause of the weakness. The doctor has checked you carefully, but problems can develop later. If you notice any problems or new symptoms, get medical treatment right away. Follow-up care is a key part of your treatment and safety. Be sure to make and go to all appointments, and call your doctor if you are having problems. It's also a good idea to know your test results and keep a list of the medicines you take. How can you care for yourself at home? · Rest when you feel tired. · Be safe with medicines. If your doctor prescribed medicine, take it exactly as prescribed. Call your doctor if you think you are having a problem with your medicine. You will get more details on the specific medicines your doctor prescribes. · Do not skip meals.  Eating a balanced diet may increase your energy level.  · Get some physical activity every day, but do not get too tired. When should you call for help? Call your doctor now or seek immediate medical care if:    · You have new or worse weakness.     · You are dizzy or lightheaded, or you feel like you may faint.    Watch closely for changes in your health, and be sure to contact your doctor if:    · You do not get better as expected. Where can you learn more? Go to http://jaciel-fish.info/. Enter 946 [id number] [id number] in the search box to learn more about \"Weakness: Care Instructions. \"  Current as of: September 23, 2018  Content Version: 12.1  © 7973-7940 [employer], [employer]. Care instructions adapted under license by [employer] (which disclaims liability or warranty for this information). If you have questions about a medical condition or this instruction, always ask your healthcare professional. [address] any warranty or liability for your use of this information.

## 2019-09-09 NOTE — ED NOTES
Pt provided with discharge instructions by provider. Pt verbalized understanding of discharge paperwork and follow up information. Iv removed as noted. Pt ambulated out of ed without assistance.

## 2019-09-11 ENCOUNTER — OFFICE VISIT (OUTPATIENT)
Dept: ENDOCRINOLOGY | Age: 79
End: 2019-09-11

## 2019-09-11 VITALS
HEIGHT: 65 IN | BODY MASS INDEX: 35.19 KG/M2 | SYSTOLIC BLOOD PRESSURE: 127 MMHG | HEART RATE: 97 BPM | WEIGHT: 211.2 LBS | DIASTOLIC BLOOD PRESSURE: 80 MMHG

## 2019-09-11 DIAGNOSIS — I10 ESSENTIAL HYPERTENSION, BENIGN: ICD-10-CM

## 2019-09-11 DIAGNOSIS — E78.5 HYPERLIPIDEMIA LDL GOAL <70: ICD-10-CM

## 2019-09-11 DIAGNOSIS — C73 THYROID CANCER (HCC): ICD-10-CM

## 2019-09-11 PROBLEM — E66.01 SEVERE OBESITY (HCC): Status: ACTIVE | Noted: 2019-09-11

## 2019-09-11 LAB — HBA1C MFR BLD HPLC: 10.2 %

## 2019-09-11 RX ORDER — LANCING DEVICE/LANCETS
KIT MISCELLANEOUS
Qty: 1 KIT | Refills: 0 | Status: SHIPPED | OUTPATIENT
Start: 2019-09-11 | End: 2020-10-02

## 2019-09-11 NOTE — PROGRESS NOTES
Chief Complaint   Patient presents with    Diabetes      pcp and pharmacy confirmed      History of Present Illness: Theodora Conrad is a 67 y.o. female here for follow up of diabetes. Weight up 1 lbs since last visit in 5/19. Was unable to keep her visit with me on 8/30/19 as she was feeling under the weather and came today off the cancellation list.  Was just in the ER on 9/8/19 as she was feeling more tired and worn out and they gave her a bag of fluid as her creatinine was up to 3.2 up from 2.2 in June. She had a mildly elevated troponin of 0.07 and Dr. Brianna Altamirano was consulted over the phone and he thought this was from her kidney function being worse so she was discharged and told to f/u with their office and has an appt tomorrow with Dr. Nilson Dunlap. Has been off the celexa for the past 2 weeks and so far her mood is fine off this. Has been out of benicar and the toprol XL for the past week but is still on bumex and clonidine and her BP is controlled today. Hasn't run out of her insulin and is taking lantus and humalog as directed. Didn't bring in any readings or her meter. Has not been checking everyday and if she doesn't check, will just take 12 units of humalog but if she does, will follow the scale. Needs a new lancet device. Getting the 137 mcg of synthroid daily and was out of this for 3 weeks until this past week but was using the old dose of 150 mcg tabs 6 days a week. Due to have a colonoscopy within the next month. Current Outpatient Medications   Medication Sig    Lancing Device with Lancets kit Use as directed to test 3 times daily    ONETOUCH ULTRA BLUE TEST STRIP strip TEST 3 TIMES DAILY    gabapentin (NEURONTIN) 300 mg capsule Take 300 mg by mouth three (3) times daily.     insulin lispro (HUMALOG KWIKPEN INSULIN) 100 unit/mL kwikpen Inject 12 units before meals + 2 units for every 50 mg/dl above 150 mg/dl--Max 65 units per day--Dose change 4/22/19--updated med list--did not send prescription to the pharmacy    SYNTHROID 137 mcg tablet Take 137 mcg by mouth Daily (before breakfast). BRAND MEDICALLY NECESSARY--Delete 150 mcg dose from profile    insulin glargine (LANTUS SOLOSTAR U-100 INSULIN) 100 unit/mL (3 mL) inpn Inject 25 units in the morning and 25 units at bedtime    atorvastatin (LIPITOR) 80 mg tablet Take 80 mg by mouth daily.  ferrous sulfate (IRON) 325 mg (65 mg iron) tablet Take  by mouth daily.  LOR PEN NEEDLE 32 gauge x 5/32\" ndle Use as directed 5 times daily    bumetanide (BUMEX) 0.5 mg tablet 0.5 mg two (2) times a day.  PYRIDOXINE HCL, VITAMIN B6, (VITAMIN B-6 PO) Take  by mouth daily.  allopurinol (ZYLOPRIM) 100 mg tablet Take  by mouth daily.  clopidogrel (PLAVIX) 75 mg tab Take 1 Tab by mouth daily.  cloNIDine (CATAPRES) 0.3 mg tablet Take 0.3 mg by mouth two (2) times a day.  multivitamins-minerals-lutein (CENTRUM SILVER) Tab Take  by mouth.  ONETOUCH ULTRA2 monitoring kit Test 3 times daily. DX E11.65     No current facility-administered medications for this visit. Allergies   Allergen Reactions    Amlodipine Swelling    Nifedipine Swelling    Sulfa (Sulfonamide Antibiotics) Rash     Review of Systems:  - Eyes: no blurry vision or double vision  - Cardiovascular: no chest pain  - Respiratory: no shortness of breath  - Musculoskeletal: no myalgias  - Neurological: no numbness/tingling in extremities    Physical Examination:  Blood pressure 127/80, pulse 97, height 5' 5\" (1.651 m), weight 211 lb 3.2 oz (95.8 kg).   - General: pleasant, no distress, good eye contact   - Neck: no carotid bruits  - Cardiovascular: regular, normal rate, nl s1 and s2, no m/r/g,   - Respiratory: clear bilaterally  - Integumentary: no edema,   - Psychiatric: normal mood and affect    Data Reviewed:     Component      Latest Ref Rng & Units 9/11/2019 9/8/2019           4:13 PM  4:29 PM   Sodium      136 - 145 mmol/L  139   Potassium      3.5 - 5.1 mmol/L  4.1 Chloride      97 - 108 mmol/L  103   CO2      21 - 32 mmol/L  31   Anion gap      5 - 15 mmol/L  5   Glucose      65 - 100 mg/dL  200 (H)   BUN      6 - 20 MG/DL  33 (H)   Creatinine      0.55 - 1.02 MG/DL  3.24 (H)   BUN/Creatinine ratio      12 - 20    10 (L)   GFR est AA      >60 ml/min/1.73m2  17 (L)   GFR est non-AA      >60 ml/min/1.73m2  14 (L)   Calcium      8.5 - 10.1 MG/DL  8.4 (L)   Bilirubin, total      0.2 - 1.0 MG/DL  0.6   ALT (SGPT)      12 - 78 U/L  27   AST      15 - 37 U/L  27   Alk. phosphatase      45 - 117 U/L  67   Protein, total      6.4 - 8.2 g/dL  6.6   Albumin      3.5 - 5.0 g/dL  3.0 (L)   Globulin      2.0 - 4.0 g/dL  3.6   A-G Ratio      1.1 - 2.2    0.8 (L)   Hemoglobin A1c (POC)      % 10.2        Assessment/Plan:     1. Type 2 diabetes mellitus with diabetic polyneuropathy, with long-term current use of insulin ([facility] [address]): her most recent Hgb A1c was 10.2% in 9/19 down from 12.4% in 4/19 down from 12.5% in 12/18 down from 13.8% in 9/18 up from 10.6% in 4/18 up from 7.4% in 11/17 up from 6.2% in 6/17 (first visit with me) down from 11.5% in 1/17 during hospital stay for stroke. A1c is above goal <8% but is slowly improving. Not currently checking on a regular basis so need her to start doing this before making other changes. - cont lantus 25 units in the morning and 25 units at night  - cont humalog 12 units before meals + 2 units for every 50 mg/dl above 150 mg/dl  - check bs 3 times daily  - foot exam done 12/18  - eye exam UTD 5/18  - microalbumin 3192 11/17, down to 889 in 4/18--repeat today  - check Hgb A1c and cmp and microalbumin prior to next visit        2. Thyroid cancer [facility]):  She noticed a lump in her neck in 2014 and was found to have a 3.4 cm right lobe nodule with microcalcifications and she made an appt with Dr. Jada Hargrove and he performed a right thyroidectomy in 8/14 and final pathology showed a Follicular carcinoma, Hurthle cell type, at least minimally invasive and ended up having a completion thyroidectomy that was benign. Had a whole body scan at [facility] that fall that didn't show any uptake. Was initially on Synthroid 150 mcg daily but her dose was decreased to 137 mcg daily and has been on this dose for the past 2 years. Did have a TSH of 4.03 in 1/17 during her hospital stay. TSH 2.69 and TG 0.3 with neg TG ab in 6/17 so kept dose the same. TSH up to 3.41 in 11/17 so increased synthroid back to 150 mcg daily and TSH 0.6 in 4/18 and TG 0.3 with neg TG ab so kept her dose the same. Wt down 10 lbs and TSH down to 0.079 in 9/18 so had her take 6 tabs/week and TSH up to 5.98 in 12/18 so changed to 137 mcg daily and TSH 0.39 in 4/19 and TG 0.2 so kept dose the same  - cont synthroid 137 mcg daily  - check TSH and free T4 today and prior to next visit  - check thyroglobulin reflex panel prior to next visit          3. Essential hypertension, benign: her BP was at goal < 140/90 and this was off benicar and toprol XL so will stay off both for now. - cont clonidine 0.3 mg bid  - cont bumex 0.5 mg bid  - monitor home blood pressure readings      4. Hyperlipidemia LDL goal <70: LDL 70.6 in 1/17 on atorvastatin 40 mg and still 80 in 6/17 and 99 in 11/17 possibly due to higher TSH. Was put on 80 mg by PCP and LDL down to 63 in 4/18 and 55 in 9/18 and 58 in 12/18  - cont atorvastatin 80 mg daily  - check lipids today and prior to next visit          We spent 40 minutes of face to face time together and > 50% of the time was spent in counseling regarding management of all the conditions above. Patient Instructions   1) I will send a new lancet device to your pharmacy so please check 3 times per day so you can adjust your humalog based on the scale. 2) I will call you or send you a letter with your lab results to see if any other changes need to be made.     3) Your Hemoglobin A1c (3 month test of blood sugar) was 10.2% down from 12% which is the best it's been in a year but we'll work to get this closer to 8% or less which is average sugar under 180.    4) When you have your colonoscopy, on the day of the prep when you just have clear liquids, you can take your normal dose of lantus 25 units twice daily but only take 5 units of humalog with each meal.  On the morning of your colonoscopy, just take 20 units of lantus and no humalog and after the test, you can go back to your normal doses of lantus and humalog. 5) Because your blood pressure is controlled off the olmesartan (benicar) and metoprolol XL, I recommend we have you stay off both of these for now. See if Dr. Katherine Santiago has a different opinion tomorrow. Start monitoring blood pressure about 2-3 times per week at alternating times either in the morning or evening after resting for 5 minutes and sitting upright in a chair with your arm at heart level. Please let me know if you are having readings over 140 on the top number or 90 on the bottom number. Follow-up and Dispositions    · Return for as scheduled in February. .           Copy sent to:  Dr. Amy Wilson Ahr  Dr. Orson Meigs follow up: 9/12/19    Sent her the following message in a letter and called her with her lab results:    TSH 3RD GENERATION (Collected: 9/11/2019  4:37 PM)   Result Value Ref Range    .500 (H) 0.450 - 4.500 uIU/mL    Narrative    Performed at:  [address]  [id number]  : Ajith Orellana MD, Phone:  [phone]   T4, FREE (Collected: 9/11/2019  4:37 PM)   Result Value Ref Range    T4, Free 0.67 (L) 0.82 - 1.77 ng/dL    Narrative    Performed at:  [address]  [id number]  : Ajith Orellana MD, Phone:  [phone]   MICROALBUMIN, UR, RAND W/ MICROALB/CREAT RATIO (Collected: 9/11/2019  4:37 PM)   Result Value Ref Range    Creatinine, urine 125.6 Not Estab. mg/dL    Microalbumin, urine 2,666.3 Not Estab. ug/mL      Comment:      Results confirmed on  dilution. Microalb/Creat ratio (ug/mg creat.) 2,122.9 (H) 0.0 - 30.0 mg/g creat      Comment:                           Normal:                0.0 -  30.0                       Albuminuria:          31.0 - 300.0                       Clinical albuminuria:       >300.0      Narrative    Performed at:  [address]  [id number]  : Camilo Caldwell MD, Phone:  [phone]   LIPID PANEL (Collected: 9/11/2019  4:37 PM)   Result Value Ref Range    Cholesterol, total 331 (H) 100 - 199 mg/dL    Triglyceride 216 (H) 0 - 149 mg/dL    HDL Cholesterol 103 >39 mg/dL    VLDL, calculated 43 (H) 5 - 40 mg/dL    LDL, calculated 185 (H) 0 - 99 mg/dL    Narrative    Performed at:  [address]  [id number]  : Camilo Caldwell MD, Phone:  [phone]   METABOLIC PANEL, COMPREHENSIVE (Collected: 9/11/2019  4:37 PM)   Result Value Ref Range    Glucose 42 (L) 65 - 99 mg/dL    BUN 31 (H) 8 - 27 mg/dL    Creatinine 3.09 (H) 0.57 - 1.00 mg/dL    GFR est non-AA 14 (L) >59 mL/min/1.73    GFR est AA 16 (L) >59 mL/min/1.73    BUN/Creatinine ratio 10 (L) 12 - 28    Sodium 143 134 - 144 mmol/L    Potassium 3.3 (L) 3.5 - 5.2 mmol/L    Chloride 100 96 - 106 mmol/L    CO2 26 20 - 29 mmol/L    Calcium 9.0 8.7 - 10.3 mg/dL    Protein, total 6.0 6.0 - 8.5 g/dL    Albumin 3.6 3.5 - 4.8 g/dL    GLOBULIN, TOTAL 2.4 1.5 - 4.5 g/dL    A-G Ratio 1.5 1.2 - 2.2    Bilirubin, total 0.2 0.0 - 1.2 mg/dL    Alk. phosphatase 58 39 - 117 IU/L    AST (SGOT) 38 0 - 40 IU/L    ALT (SGPT) 25 0 - 32 IU/L    Narrative    Performed at:  [address]  [id number]  : Camilo Caldwell MD, Phone:  [phone]       Per our phone conversation:    TSH is a thyroid test.  Your level is 144 which is extremely high and far from goal of 0.5-2.0.   This test goes opposite of your thyroid dose and suggests your dose of synthroid is not enough or you have been missing doses. Since you told me that you actually were NOT taking this medication regularly over the past 6 weeks, I think this is the reason that your level went so high and why you are feeling so tired and weak right now. Please do your best to get your synthroid 137 mcg tabs everyday and if you miss a dose, take 2 tabs the next day. Take the enclosed lab slip to repeat your labs in 6 weeks and I'll be in touch with the results. -------------------------------------------------------------------------------------------------------------------  Total Cholesterol is the total number of cholesterol particles in your blood. Goal is less than 200. Triglycerides are the short term fats in your blood. Goal is less than 150. HDL is the good cholesterol in your blood. Goal is more than 50 if you are a woman and 40 if you are a man. LDL is the bad cholesterol in your blood. Goal is less than 100 unless you have heart disease and then goal is under 70. Your cholesterol is extremely high due to your thyroid being very underactive from not taking your synthroid so hopefully with getting back on track with your synthroid, your cholesterol will come back down. Continue to follow a low cholesterol diet. Try to limit the amount of fried foods, fatty foods, butter, gravy, red meat, ice cream, cheese, and eggs in your diet, which are all high in cholesterol.  -------------------------------------------------------------------------------------------------------------------  BUN and creatinine are markers of kidney function. Your values are abnormal but slightly better from 33 and 3.24 when in the ER. Hopefully with getting your thyroid regulated your kidney function will improve.   We will repeat your kidney function in 6 weeks.  -------------------------------------------------------------------------------------------------------------------  ALT and AST are markers of liver function. Your values are normal.  -------------------------------------------------------------------------------------------------------------------  Microalbumin/creatinine ratio is a marker of the amount of protein in your urine. Goal is less than 30. Your value is very abnormal at 2122. This indicates that your kidneys are being affected by your uncontrolled diabetes and/or blood pressure and your thyroid. Hopefully with better control of your sugar and your thyroid, this value will improve.  -------------------------------------------------------------------------------------------------------------------  Your blood sugar was 42 on your lab drawn and given your kidney function is abnormal and your thyroid is abnormal, you are at higher risk of low blood sugar so I recommend decreasing your lantus to 20 units twice daily. Be sure to start checking your sugar every 3 times daily so you can adjust your humalog. I will have my nurse navigator, Stone Willis, reach out to you in 1-2 weeks to check on how your sugars are doing.

## 2019-09-11 NOTE — PATIENT INSTRUCTIONS
1) I will send a new lancet device to your pharmacy so please check 3 times per day so you can adjust your humalog based on the scale. 2) I will call you or send you a letter with your lab results to see if any other changes need to be made. 3) Your Hemoglobin A1c (3 month test of blood sugar) was 10.2% down from 12% which is the best it's been in a year but we'll work to get this closer to 8% or less which is average sugar under 180.    4) When you have your colonoscopy, on the day of the prep when you just have clear liquids, you can take your normal dose of lantus 25 units twice daily but only take 5 units of humalog with each meal.  On the morning of your colonoscopy, just take 20 units of lantus and no humalog and after the test, you can go back to your normal doses of lantus and humalog. 5) Because your blood pressure is controlled off the olmesartan (benicar) and metoprolol XL, I recommend we have you stay off both of these for now. See if Dr. Yohana Diane has a different opinion tomorrow. Start monitoring blood pressure about 2-3 times per week at alternating times either in the morning or evening after resting for 5 minutes and sitting upright in a chair with your arm at heart level. Please let me know if you are having readings over 140 on the top number or 90 on the bottom number.

## 2019-09-12 DIAGNOSIS — I10 ESSENTIAL HYPERTENSION, BENIGN: ICD-10-CM

## 2019-09-12 DIAGNOSIS — C73 THYROID CANCER (HCC): Primary | ICD-10-CM

## 2019-09-12 LAB
ALBUMIN SERPL-MCNC: 3.6 G/DL (ref 3.5–4.8)
ALBUMIN/CREAT UR: 2122.9 MG/G CREAT (ref 0–30)
ALBUMIN/GLOB SERPL: 1.5 {RATIO} (ref 1.2–2.2)
ALP SERPL-CCNC: 58 IU/L (ref 39–117)
ALT SERPL-CCNC: 25 IU/L (ref 0–32)
AST SERPL-CCNC: 38 IU/L (ref 0–40)
BILIRUB SERPL-MCNC: 0.2 MG/DL (ref 0–1.2)
BUN SERPL-MCNC: 31 MG/DL (ref 8–27)
BUN/CREAT SERPL: 10 (ref 12–28)
CALCIUM SERPL-MCNC: 9 MG/DL (ref 8.7–10.3)
CHLORIDE SERPL-SCNC: 100 MMOL/L (ref 96–106)
CHOLEST SERPL-MCNC: 331 MG/DL (ref 100–199)
CO2 SERPL-SCNC: 26 MMOL/L (ref 20–29)
CREAT SERPL-MCNC: 3.09 MG/DL (ref 0.57–1)
CREAT UR-MCNC: 125.6 MG/DL
GLOBULIN SER CALC-MCNC: 2.4 G/DL (ref 1.5–4.5)
GLUCOSE SERPL-MCNC: 42 MG/DL (ref 65–99)
HDLC SERPL-MCNC: 103 MG/DL
LDLC SERPL CALC-MCNC: 185 MG/DL (ref 0–99)
MICROALBUMIN UR-MCNC: 2666.3 UG/ML
POTASSIUM SERPL-SCNC: 3.3 MMOL/L (ref 3.5–5.2)
PROT SERPL-MCNC: 6 G/DL (ref 6–8.5)
SODIUM SERPL-SCNC: 143 MMOL/L (ref 134–144)
T4 FREE SERPL-MCNC: 0.67 NG/DL (ref 0.82–1.77)
TRIGL SERPL-MCNC: 216 MG/DL (ref 0–149)
TSH SERPL DL<=0.005 MIU/L-ACNC: 144.5 UIU/ML (ref 0.45–4.5)
VLDLC SERPL CALC-MCNC: 43 MG/DL (ref 5–40)

## 2019-09-12 RX ORDER — INSULIN GLARGINE 100 [IU]/ML
INJECTION, SOLUTION SUBCUTANEOUS
Qty: 45 ML | Refills: 3
Start: 2019-09-12 | End: 2020-02-06

## 2019-09-18 ENCOUNTER — TELEPHONE (OUTPATIENT)
Dept: ENDOCRINOLOGY | Age: 79
End: 2019-09-18

## 2019-09-18 NOTE — TELEPHONE ENCOUNTER
Spoke with pt and she stated that her feet and ankles are swollen, tight, and they are slightly painful. Pt stated that she is taking her fluid pills (Bumex) and is scheduled to see her PCP tomorrow but she wanted to make you aware.

## 2019-09-18 NOTE — TELEPHONE ENCOUNTER
Pt is a return call @ [phone]. She said she wants to the nurse regarding  swellings on both ankles.

## 2019-09-24 ENCOUNTER — TELEPHONE (OUTPATIENT)
Dept: ENDOCRINOLOGY | Age: 79
End: 2019-09-24

## 2019-09-24 NOTE — TELEPHONE ENCOUNTER
9/24/2019  3:56 PM        Ms. Keyes Skates called stating that her feet and legs are swelling real bad would like to know if Dr. Cherri Felix can write a prescription to help with the swelling.           Thanks

## 2019-09-25 NOTE — TELEPHONE ENCOUNTER
Please let her know that she should contact her cardiologist about these changes so he can decide what needs to be switched as he was the one who put her back on these meds. I don't prescribe support stockings so if these are needed, they would have to come from her PCP or cardiologist.  In terms of diabetic shoes, Medicare requires that a foot exam be performed within 6 months of an order and I have not done an exam on her feet since December 2018 so I would not be able to write an order for shoes at this time but would be happy to do this at her next visit in February if this is medically necessary.

## 2019-09-25 NOTE — TELEPHONE ENCOUNTER
Patient stated that she started back taking benicar and metoprolol on 9/16 per her Cardiologist.  She stated that those medications are causing her to have swelling in both her feet. She would like to know what she should do. Patient stated that she has not called her Cardiologist.  She also would like to know if Dr. Juliette Steven could write a script for chemo hose and diabetic shoes.

## 2019-09-25 NOTE — TELEPHONE ENCOUNTER
----- Message from Esdras Camargo sent at 9/24/2019  4:57 PM EDT -----  Regarding: Dr Colt Vital  Patient return call    Caller's first and last name and relationship (if not the patient):      Best contact number(s):[phone]      Whose call is being returned: Jadene Bumpers      Details to clarify the request: pt is returning call regarding diabetic shoes and stockings      Esdras Camargo

## 2019-10-02 ENCOUNTER — PATIENT OUTREACH (OUTPATIENT)
Dept: ENDOCRINOLOGY | Age: 79
End: 2019-10-02

## 2019-10-02 NOTE — PROGRESS NOTES
Diabetes Care Management Note    Was asked by Dr. Rojas Stein to see patient for Diabetes management. Last A1C was 10.2 on 9/11/19, prior to was 12.4 on 4/15/19. Called patient, no answer, no returned telephone call.

## 2019-10-11 ENCOUNTER — HOSPITAL ENCOUNTER (OUTPATIENT)
Dept: MRI IMAGING | Age: 79
Discharge: HOME | End: 2019-10-11
Attending: ORTHOPAEDIC SURGERY
Payer: MEDICARE

## 2019-10-11 DIAGNOSIS — M54.32 LEFT SIDED SCIATICA: ICD-10-CM

## 2019-10-11 DIAGNOSIS — M54.50 LUMBAR PAIN: ICD-10-CM

## 2019-10-11 PROCEDURE — 72148 MRI LUMBAR SPINE W/O DYE: CPT

## 2019-10-13 LAB
ALBUMIN SERPL-MCNC: 3.8 G/DL (ref 3.5–4.8)
ALBUMIN/GLOB SERPL: 1.7 {RATIO} (ref 1.2–2.2)
ALP SERPL-CCNC: 72 IU/L (ref 39–117)
ALT SERPL-CCNC: 31 IU/L (ref 0–32)
AST SERPL-CCNC: 30 IU/L (ref 0–40)
BILIRUB SERPL-MCNC: 0.4 MG/DL (ref 0–1.2)
BUN SERPL-MCNC: 30 MG/DL (ref 8–27)
BUN/CREAT SERPL: 11 (ref 12–28)
CALCIUM SERPL-MCNC: 9 MG/DL (ref 8.7–10.3)
CHLORIDE SERPL-SCNC: 97 MMOL/L (ref 96–106)
CHOLEST SERPL-MCNC: 214 MG/DL (ref 100–199)
CO2 SERPL-SCNC: 29 MMOL/L (ref 20–29)
CREAT SERPL-MCNC: 2.64 MG/DL (ref 0.57–1)
CREAT UR-MCNC: NORMAL MG/DL
EST. AVERAGE GLUCOSE BLD GHB EST-MCNC: 240 MG/DL
GLOBULIN SER CALC-MCNC: 2.3 G/DL (ref 1.5–4.5)
GLUCOSE SERPL-MCNC: 158 MG/DL (ref 65–99)
HBA1C MFR BLD: 10 % (ref 4.8–5.6)
HDLC SERPL-MCNC: 90 MG/DL
LDLC SERPL CALC-MCNC: 109 MG/DL (ref 0–99)
POTASSIUM SERPL-SCNC: 3.9 MMOL/L (ref 3.5–5.2)
PROT SERPL-MCNC: 6.1 G/DL (ref 6–8.5)
SODIUM SERPL-SCNC: 144 MMOL/L (ref 134–144)
T4 FREE SERPL-MCNC: 1.27 NG/DL (ref 0.82–1.77)
THYROGLOB AB SERPL-ACNC: <1 IU/ML (ref 0–0.9)
THYROGLOB SERPL-MCNC: 1.7 NG/ML (ref 1.5–38.5)
TRIGL SERPL-MCNC: 77 MG/DL (ref 0–149)
TSH SERPL DL<=0.005 MIU/L-ACNC: 9.34 UIU/ML (ref 0.45–4.5)
VLDLC SERPL CALC-MCNC: 15 MG/DL (ref 5–40)

## 2019-10-15 ENCOUNTER — PATIENT OUTREACH (OUTPATIENT)
Dept: ENDOCRINOLOGY | Age: 79
End: 2019-10-15

## 2019-10-15 ENCOUNTER — OFFICE VISIT (OUTPATIENT)
Dept: SURGERY | Age: 79
End: 2019-10-15

## 2019-10-15 VITALS
HEIGHT: 65 IN | TEMPERATURE: 98 F | SYSTOLIC BLOOD PRESSURE: 166 MMHG | DIASTOLIC BLOOD PRESSURE: 75 MMHG | BODY MASS INDEX: 36.25 KG/M2 | HEART RATE: 60 BPM | WEIGHT: 217.6 LBS | OXYGEN SATURATION: 100 %

## 2019-10-15 DIAGNOSIS — K82.4 GALLBLADDER POLYP: ICD-10-CM

## 2019-10-15 DIAGNOSIS — E66.9 OBESITY (BMI 35.0-39.9 WITHOUT COMORBIDITY): ICD-10-CM

## 2019-10-15 DIAGNOSIS — K43.2 RECURRENT VENTRAL INCISIONAL HERNIA: Primary | ICD-10-CM

## 2019-10-15 RX ORDER — METOPROLOL SUCCINATE 50 MG/1
TABLET, EXTENDED RELEASE ORAL DAILY
Status: ON HOLD | COMMUNITY
End: 2020-02-13 | Stop reason: SDUPTHER

## 2019-10-15 RX ORDER — OLMESARTAN MEDOXOMIL 40 MG/1
TABLET ORAL DAILY
COMMUNITY
End: 2020-02-13

## 2019-10-15 RX ORDER — OXYCODONE HYDROCHLORIDE 5 MG/1
5 TABLET ORAL AS NEEDED
COMMUNITY
End: 2019-12-16

## 2019-10-15 NOTE — PROGRESS NOTES
Chief Complaint   Patient presents with    Follow-up     6 MO. GALLBLADDER, POLYP     1. Have you been to the ER, urgent care clinic since your last visit? Hospitalized since your last visit? YES, 9/8/19    2. Have you seen or consulted any other health care providers outside of the [address] [place] [address] since your last visit? Include any pap smears or colon screening.  NO

## 2019-10-15 NOTE — PROGRESS NOTES
HISTORY OF PRESENT ILLNESS  Claudine Yang is a 67 y.o.  AA  female who comes in for follow up for a gallbladder polyp and incisional hernia  Follow-up   Associated symptoms include abdominal pain. Pertinent negatives include no chest pain, no headaches and no shortness of breath. Abdominal Pain   Associated symptoms include abdominal pain. Pertinent negatives include no chest pain, no headaches and no shortness of breath. Gallbladder Attack   Associated symptoms include abdominal pain. Pertinent negatives include no chest pain, no headaches and no shortness of breath. She has had a known GB polyp for over 3 years. It went from 1.1 to 1.2 cm over 12 months. Recent US 11/25/2015 demonstrated no change in the largest one but still with multiple polyps and increasing number of small liver nodules and a liver hemangioma. These have been unchanged since 2012. She has some bloating but denies nausea, vomiting, GERD, post prandial indigestion, pain, diarrhea or constipation, fever, chills or sweats. She also has abdominal swelling in the where she previously underwent a partial gastrectomy and subsequent incisional hernia repair and diagnosed with a recurrent incisional hernia by me in Aug 2013. Missy Melania She is now having more swelling and pain around the hernia site. She denies weight loss. Repeat US 2/2017 demonstrated the polyp and possible stones. She ended up in ER earlier this month because she ran out of meds. She is also still not controlling her glucoses.     Past Medical History:   Diagnosis Date    Arthritis     Cataract     bilateral    Chronic kidney disease     Dr. Reji Villareal CKD stage 3 due to type 2 diabetes mellitus ([facility]     Depression     Diabetes [facility]) age 49    Follicular thyroid cancer ([facility] [address] 08/2014    Gallbladder polyp 8/14/2013    Hypercholesterolemia     Hypertension     Ill-defined condition     states 'polyp' in gal bladder    Long term current use of anticoagulant therapy     Obesity     Right pontine stroke ([facility] 1/17/2017    TIA (transient ischemic attack) 6/6/2014     Past Surgical History:   Procedure Laterality Date    ABDOMEN SURGERY PROC UNLISTED  2006    stomach    BREAST SURGERY PROCEDURE UNLISTED  2009, 2006    breast bx-vince    COLORECTAL SCRN; HI RISK IND  5/30/2014         HX BREAST BIOPSY Right [id number]    2 biopies on the right. Benign (per patient)    HX BREAST BIOPSY Left 2015    Benign (per patient)    HX CATARACT REMOVAL Left     HX GYN  1970's    partial hysterectomy    HX HERNIA REPAIR  2009    HX HYSTERECTOMY      HX ORTHOPAEDIC Bilateral 1988    bunion    HX THYROIDECTOMY  2014    Dr. Edgar Barahona     Family History   Problem Relation Age of Onset    Heart Disease Mother     Hypertension Mother     Diabetes Mother     Stroke Mother     Cancer Father         colon    Heart Disease Sister     Diabetes Sister     Heart Attack Sister     Hypertension Sister     Lung Disease Brother     Hypertension Brother     Lung Disease Brother     Anesth Problems Neg Hx      Social History     Tobacco Use    Smoking status: Never Smoker    Smokeless tobacco: Never Used   Substance Use Topics    Alcohol use: No    Drug use: No     Current Outpatient Medications   Medication Sig    metoprolol succinate (TOPROL-XL) 50 mg XL tablet Take  by mouth daily.  olmesartan (BENICAR) 40 mg tablet Take  by mouth daily.  oxyCODONE IR (ROXICODONE) 5 mg immediate release tablet Take 5 mg by mouth as needed for Pain. 5-10 MG    insulin glargine (LANTUS SOLOSTAR U-100 INSULIN) 100 unit/mL (3 mL) inpn Inject 20 units in the morning and 20 units at bedtime--Dose change 9/12/19--updated med list--did not send prescription to the pharmacy    Lancing Device with Lancets kit Use as directed to test 3 times daily    ONETOUCH ULTRA BLUE TEST STRIP strip TEST 3 TIMES DAILY    gabapentin (NEURONTIN) 300 mg capsule Take 300 mg by mouth three (3) times daily.     insulin lispro (HUMALOG KWIKPEN INSULIN) 100 unit/mL kwikpen Inject 12 units before meals + 2 units for every 50 mg/dl above 150 mg/dl--Max 65 units per day--Dose change 4/22/19--updated med list--did not send prescription to the pharmacy   [facility] monitoring kit Test 3 times daily. DX E11.65    SYNTHROID 137 mcg tablet Take 137 mcg by mouth Daily (before breakfast). BRAND MEDICALLY NECESSARY--Delete 150 mcg dose from profile    atorvastatin (LIPITOR) 80 mg tablet Take 80 mg by mouth daily.  ferrous sulfate (IRON) 325 mg (65 mg iron) tablet Take  by mouth daily.  LOR PEN NEEDLE 32 gauge x 5/32\" ndle Use as directed 5 times daily    bumetanide (BUMEX) 0.5 mg tablet 0.5 mg two (2) times a day.  PYRIDOXINE HCL, VITAMIN B6, (VITAMIN B-6 PO) Take  by mouth daily.  allopurinol (ZYLOPRIM) 100 mg tablet Take  by mouth daily.  clopidogrel (PLAVIX) 75 mg tab Take 1 Tab by mouth daily.  cloNIDine (CATAPRES) 0.3 mg tablet Take 0.3 mg by mouth two (2) times a day.  multivitamins-minerals-lutein (CENTRUM SILVER) Tab Take  by mouth daily. No current facility-administered medications for this visit. Allergies   Allergen Reactions    Amlodipine Swelling    Nifedipine Swelling    Sulfa (Sulfonamide Antibiotics) Rash       Review of Systems   Constitutional: Negative for chills, diaphoresis, fever, malaise/fatigue and weight loss. HENT: Negative for congestion, ear pain and sore throat. Eyes: Negative for blurred vision and pain. Respiratory: Negative for cough, hemoptysis, sputum production, shortness of breath, wheezing and stridor. Cardiovascular: Negative for chest pain, palpitations, orthopnea, claudication, leg swelling and PND. Gastrointestinal: Positive for abdominal pain. Negative for blood in stool, constipation, diarrhea, heartburn, melena, nausea and vomiting. Genitourinary: Negative for dysuria, flank pain, frequency, hematuria and urgency.    Musculoskeletal: Positive for joint pain. Negative for back pain, myalgias and neck pain. Skin: Negative for itching and rash. Neurological: Negative for dizziness, tremors, focal weakness, seizures, weakness and headaches. Endo/Heme/Allergies: Negative for polydipsia. Last Hgb A1c was over 10   Psychiatric/Behavioral: Positive for depression. Negative for memory loss. The patient is not nervous/anxious. Visit Vitals  /75 (BP 1 Location: Right arm, BP Patient Position: Sitting)   Pulse 60   Temp 98 °F (36.7 °C) (Oral)   Ht 5' 5\" (1.651 m)   Wt 98.7 kg (217 lb 9.6 oz)   SpO2 100%   BMI 36.21 kg/m²       Physical Exam   Constitutional: She is oriented to person, place, and time. She appears well-developed and well-nourished. No distress. HENT:   Head: Normocephalic and atraumatic. Mouth/Throat: Oropharynx is clear and moist. No oropharyngeal exudate. Eyes: Pupils are equal, round, and reactive to light. Conjunctivae and EOM are normal. No scleral icterus. Neck: Normal range of motion and full passive range of motion without pain. Neck supple. No JVD present. No tracheal deviation present. No thyroid mass (3+ cm right anterior neck mass) and no thyromegaly present. Cardiovascular: Normal rate and regular rhythm. Exam reveals no gallop and no friction rub. No murmur heard. Pulmonary/Chest: Effort normal and breath sounds normal. No respiratory distress. She has no wheezes. She has no rales. Abdominal: Soft. Bowel sounds are normal. She exhibits no distension and no mass. There is no hepatosplenomegaly. There is no tenderness. There is no rebound, no guarding and no CVA tenderness. A hernia is present. Hernia confirmed positive in the ventral area (reducible 6 x 8 cm defect). Musculoskeletal: Normal range of motion. She exhibits no edema. Lymphadenopathy:     She has no cervical adenopathy. Neurological: She is alert and oriented to person, place, and time. No cranial nerve deficit.    Skin: Skin is warm and dry. No rash noted. She is not diaphoretic. No erythema. No pallor. Psychiatric: She has a normal mood and affect. Her behavior is normal. Judgment and thought content normal.       ASSESSMENT and PLAN  1. Asymptomatic gallbladder polyp and sludge/stones. This is stable at this point. I explained the anatomy and pathophysiology of biliary tract disease and cholecystitis, pancreatitis, cholangitis, choledocholithiasis. I explained about laparoscopic possible open cholecystectomy with possible cholangiogram and the risks of surgery including but not limited to bleeding, infection, bile duct or bowel injury, hernia development, retained common duct stones requiring further therapy, non resolution of symptoms, post cholecystectomy diarrhea, DVT, and risks of general anesthesia. The polyp has not significantly changed size in a year (1.2 vs 1.1). We discussed risks of future malignancy. Repeat US  11/23/2018 demonstrated no change in polyps and stones  2. Recurrent incisional hernia/eventration of mesh. Currently asymptomatic and low risk for incarceration/strangulation. I explained about the anatomy and pathophysiology of hernias and the risk of incarceration and strangulation of the bowel. I explained about hernia repairs (open with and without mesh, and laparoscopic with mesh). I explained the risks and benefits of repair including bleeding, infection, chronic pain, bowel or bladder injury, hernia recurrence, mesh infection requiring removal.  I explained it would be a six to eight week recuperation with no driving for 5 - 7 days, no lifting for six weeks. She would require a separation of components and mesh repair for her defect. 3.  Obesity. Body mass index is 36.21 kg/m². Encouraged her to exercise and modify diet  She continues to gain weight  4. Diabetes mellitus, type 2:  Uncontrolled. Last HgbA1c now 10. Needs better control.   Changed to Dr Renata Ramirez  Unfortunately could not afford her insulin and is not very compliant  5. 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 Hx colon polyps:   Unclear if last colonoscopy was in 2006 or 2009, sees Dr Umair Porter but he did not do last exam  7. Small liver nodules ? ?significance but given the new onset would like her to get a colonoscopy to r/o malignancy. Stable? 8.   Chronic kidney disease  Followed by Dr Darwin Paul    She is still not ready for surgery with poor glucose control and she has actually gained weight  She is leaning to undergo RA recurrent hernia repair with mesh and cholecystectomy but needs her blood sugar control is better and continue to lose weight  RTC in 6-12 months if she has better glucose control and has lost some weight      Travis Arguelles MD FACS

## 2019-10-24 DIAGNOSIS — C73 THYROID CANCER (HCC): ICD-10-CM

## 2019-10-24 DIAGNOSIS — I10 ESSENTIAL HYPERTENSION, BENIGN: ICD-10-CM

## 2019-11-14 LAB
ALBUMIN/CREAT UR: 3719.7 MG/G CREAT (ref 0–30)
BUN SERPL-MCNC: 28 MG/DL (ref 8–27)
BUN/CREAT SERPL: 10 (ref 12–28)
CALCIUM SERPL-MCNC: 9 MG/DL (ref 8.7–10.3)
CHLORIDE SERPL-SCNC: 97 MMOL/L (ref 96–106)
CO2 SERPL-SCNC: 30 MMOL/L (ref 20–29)
CREAT SERPL-MCNC: 2.89 MG/DL (ref 0.57–1)
CREAT UR-MCNC: 91.2 MG/DL
EST. AVERAGE GLUCOSE BLD GHB EST-MCNC: 263 MG/DL
GLUCOSE SERPL-MCNC: 234 MG/DL (ref 65–99)
HBA1C MFR BLD: 10.8 % (ref 4.8–5.6)
INTERPRETATION: NORMAL
Lab: NORMAL
MICROALBUMIN UR-MCNC: 3392.4 UG/ML
POTASSIUM SERPL-SCNC: 4 MMOL/L (ref 3.5–5.2)
SODIUM SERPL-SCNC: 144 MMOL/L (ref 134–144)
T4 FREE SERPL-MCNC: 1.19 NG/DL (ref 0.82–1.77)
THYROGLOB AB SERPL-ACNC: <1 IU/ML (ref 0–0.9)
THYROGLOB SERPL-MCNC: 2.2 NG/ML (ref 1.5–38.5)
TSH SERPL DL<=0.005 MIU/L-ACNC: 12.82 UIU/ML (ref 0.45–4.5)

## 2019-11-19 ENCOUNTER — OFFICE VISIT (OUTPATIENT)
Dept: ENDOCRINOLOGY | Age: 79
End: 2019-11-19

## 2019-11-19 VITALS
HEART RATE: 64 BPM | SYSTOLIC BLOOD PRESSURE: 122 MMHG | RESPIRATION RATE: 16 BRPM | OXYGEN SATURATION: 100 % | BODY MASS INDEX: 34.99 KG/M2 | DIASTOLIC BLOOD PRESSURE: 59 MMHG | WEIGHT: 210 LBS | HEIGHT: 65 IN

## 2019-11-19 DIAGNOSIS — I10 ESSENTIAL HYPERTENSION, BENIGN: ICD-10-CM

## 2019-11-19 DIAGNOSIS — E78.5 HYPERLIPIDEMIA LDL GOAL <70: ICD-10-CM

## 2019-11-19 DIAGNOSIS — C73 THYROID CANCER (HCC): ICD-10-CM

## 2019-11-19 RX ORDER — LEVOTHYROXINE SODIUM 150 MCG
150 TABLET ORAL
Qty: 90 TAB | Refills: 3 | Status: SHIPPED | OUTPATIENT
Start: 2019-11-19 | End: 2020-02-06 | Stop reason: DRUGHIGH

## 2019-11-19 NOTE — PROGRESS NOTES
Chief Complaint   Patient presents with    Diabetes     History of Present Illness: Claudine Yang is a 67 y.o. female here for follow up of diabetes. Weight down 1 lbs since last visit in 9/19. Since last visit, has been taking the synthroid 137 mcg every morning with just water aside from one time with coffee and hasn't missed any doses or run out. Has been feeling better back on the synthroid. It turns out she has only been taking lantus 20 units at night and not in the morning too. Didn't bring her meter or log book today. Only checking a few times a week but will take 12 of humalog when she eats even if she doesn't check. Has seen 150 in the morning and hasn't seen over 200 when she has checked. Would like to get diabetic shoes. I am treating this patient under a comprehensive plan of care for her diabetes and this patient needs diabetic shoes and inserts because of her diabetes with peripheral neuropathy and callus formation. With diabetic footwear, the patient's prognosis is good. Current Outpatient Medications   Medication Sig    metoprolol succinate (TOPROL-XL) 50 mg XL tablet Take  by mouth daily.  olmesartan (BENICAR) 40 mg tablet Take  by mouth daily.  oxyCODONE IR (ROXICODONE) 5 mg immediate release tablet Take 5 mg by mouth as needed for Pain. 5-10 MG    insulin glargine (LANTUS SOLOSTAR U-100 INSULIN) 100 unit/mL (3 mL) inpn Inject 20 units in the morning and 20 units at bedtime--Dose change 9/12/19--updated med list--did not send prescription to the pharmacy    Lancing Device with Lancets kit Use as directed to test 3 times daily    ONETOUCH ULTRA BLUE TEST STRIP strip TEST 3 TIMES DAILY    gabapentin (NEURONTIN) 300 mg capsule Take 300 mg by mouth three (3) times daily.     insulin lispro (HUMALOG KWIKPEN INSULIN) 100 unit/mL kwikpen Inject 12 units before meals + 2 units for every 50 mg/dl above 150 mg/dl--Max 65 units per day--Dose change 4/22/19--updated med list--did not send prescription to the pharmacy   [facility] monitoring kit Test 3 times daily. DX E11.65    SYNTHROID 137 mcg tablet Take 137 mcg by mouth Daily (before breakfast). BRAND MEDICALLY NECESSARY--Delete 150 mcg dose from profile    atorvastatin (LIPITOR) 80 mg tablet Take 80 mg by mouth daily.  ferrous sulfate (IRON) 325 mg (65 mg iron) tablet Take  by mouth daily.  LOR PEN NEEDLE 32 gauge x 5/32\" ndle Use as directed 5 times daily    bumetanide (BUMEX) 0.5 mg tablet 0.5 mg two (2) times a day.  PYRIDOXINE HCL, VITAMIN B6, (VITAMIN B-6 PO) Take  by mouth daily.  allopurinol (ZYLOPRIM) 100 mg tablet Take  by mouth daily.  clopidogrel (PLAVIX) 75 mg tab Take 1 Tab by mouth daily.  cloNIDine (CATAPRES) 0.3 mg tablet Take 0.3 mg by mouth two (2) times a day.  multivitamins-minerals-lutein (CENTRUM SILVER) Tab Take  by mouth daily. No current facility-administered medications for this visit. Allergies   Allergen Reactions    Amlodipine Swelling    Nifedipine Swelling    Sulfa (Sulfonamide Antibiotics) Rash     Review of Systems:  - Eyes: no blurry vision or double vision  - Cardiovascular: no chest pain  - Respiratory: no shortness of breath  - Musculoskeletal: no myalgias  - Neurological: some numbness/tingling in extremities    Physical Examination:  Blood pressure 122/59, pulse 64, resp. rate 16, height 5' 5\" (1.651 m), weight 210 lb (95.3 kg), SpO2 100 %.   - General: pleasant, no distress, good eye contact   - Neck: no carotid bruits  - Cardiovascular: regular, normal rate, nl s1 and s2, no m/r/g,   - Respiratory: clear bilaterally  - Integumentary: 1+ edema,   - Psychiatric: normal mood and affect    Diabetic foot exam:     Left Foot:   Visual Exam: callous - moderate   Pulse DP: 2+ (normal)   Filament test: reduced sensation    Vibratory sensation: diminished      Right Foot:   Visual Exam: callous - moderate   Pulse DP: 2+ (normal)   Filament test: reduced sensation Vibratory sensation: diminished        Data Reviewed:   Component      Latest Ref Rng & Units 11/13/2019 11/13/2019 11/13/2019 11/13/2019           2:58 PM  2:58 PM  2:58 PM  2:58 PM   T4, Free      0.82 - 1.77 ng/dL    1.19   TSH      0.450 - 4.500 uIU/mL   12.820 (H)    Thyroglobulin Ab      0.0 - 0.9 IU/mL  <1.0     Thyroglobulin by ANN      1.5 - 38.5 ng/mL 2.2        Component      Latest Ref Rng & Units 11/13/2019 11/13/2019 11/13/2019           2:58 PM  2:58 PM  2:58 PM   Glucose      65 - 99 mg/dL   234 (H)   BUN      8 - 27 mg/dL   28 (H)   Creatinine      0.57 - 1.00 mg/dL   2.89 (H)   GFR est non-AA      >59 mL/min/1.73   15 (L)   GFR est AA      >59 mL/min/1.73   17 (L)   BUN/Creatinine ratio      12 - 28   10 (L)   Sodium      134 - 144 mmol/L   144   Potassium      3.5 - 5.2 mmol/L   4.0   Chloride      96 - 106 mmol/L   97   CO2      20 - 29 mmol/L   30 (H)   Calcium      8.7 - 10.3 mg/dL   9.0   Creatinine, urine      Not Estab. mg/dL  91.2    Microalbumin, urine      Not Estab. ug/mL  3,392.4    Microalbumin/Creat.  Ratio      0.0 - 30.0 mg/g creat  3,719.7 (H)    Hemoglobin A1c, (calculated)      4.8 - 5.6 % 10.8 (H)     Estimated average glucose      mg/dL 263       Component      Latest Ref Rng & Units 10/11/2019 10/11/2019 10/11/2019 10/11/2019          10:30 AM 10:30 AM 10:30 AM 10:30 AM   Thyroglobulin Ab      0.0 - 0.9 IU/mL    <1.0   TSH      0.450 - 4.500 uIU/mL   9.340 (H)    T4, Free      0.82 - 1.77 ng/dL  1.27     Thyroglobulin by ANN      1.5 - 38.5 ng/mL 1.7        Component      Latest Ref Rng & Units 10/11/2019 10/11/2019 10/11/2019          10:30 AM 10:30 AM 10:30 AM   Glucose      65 - 99 mg/dL   158 (H)   BUN      8 - 27 mg/dL   30 (H)   Creatinine      0.57 - 1.00 mg/dL   2.64 (H)   GFR est non-AA      >59 mL/min/1.73   17 (L)   GFR est AA      >59 mL/min/1.73   19 (L)   BUN/Creatinine ratio      12 - 28   11 (L)   Sodium      134 - 144 mmol/L   144   Potassium      3.5 - 5.2 mmol/L   3.9   Chloride      96 - 106 mmol/L   97   CO2      20 - 29 mmol/L   29   Calcium      8.7 - 10.3 mg/dL   9.0   Protein, total      6.0 - 8.5 g/dL   6.1   Albumin      3.5 - 4.8 g/dL   3.8   GLOBULIN, TOTAL      1.5 - 4.5 g/dL   2.3   A-G Ratio      1.2 - 2.2   1.7   Bilirubin, total      0.0 - 1.2 mg/dL   0.4   Alk. phosphatase      39 - 117 IU/L   72   AST      0 - 40 IU/L   30   ALT (SGPT)      0 - 32 IU/L   31   Cholesterol, total      100 - 199 mg/dL  214 (H)    Triglyceride      0 - 149 mg/dL  77    HDL Cholesterol      >39 mg/dL  90    VLDL, calculated      5 - 40 mg/dL  15    LDL, calculated      0 - 99 mg/dL  109 (H)    Hemoglobin A1c, (calculated)      4.8 - 5.6 % 10.0 (H)     Estimated average glucose      mg/dL 240     Creatinine, urine      mg/dL          Assessment/Plan:     1. Type 2 diabetes mellitus with diabetic polyneuropathy, with long-term [facility] her most recent Hgb A1c was 10.8% in 11/19 down from 10% in 10/19 up from 10.2% in 9/19 down from 12.4% in 4/19 down from 12.5% in 12/18 down from 13.8% in 9/18 up from 10.6% in 4/18 up from 7.4% in 11/17 up from 6.2% in 6/17 (first visit with me) down from 11.5% in 1/17 during hospital stay for stroke. A1c is above goal <8% but is not checking on a regular basis and only taking lantus once daily so will go back to twice daily. - cont lantus 20 units in the morning and 20 units at night  - cont humalog 12 units before meals + 2 units for every 50 mg/dl above 150 mg/dl  - check bs 3 times daily  - foot exam done 11/19  - eye exam UTD 5/18  - microalbumin 3192 11/17, down to 889 in 4/18, up to 2122 in 9/19 when TSH was 144 and 3719 in 11/19 when TSH 12  - check Hgb A1c and cmp and microalbumin prior to next visit        2. Thyroid cancer [facility]):  She noticed a lump in her neck in 2014 and was found to have a 3.4 cm right lobe nodule with microcalcifications and she made an appt with Dr. Svetlana Escalante and he performed a right thyroidectomy in 8/14 and final pathology showed a Follicular carcinoma, Hurthle cell type, at least minimally invasive and ended up having a completion thyroidectomy that was benign. Had a whole body scan at [facility] that fall that didn't show any uptake. Was initially on Synthroid 150 mcg daily but her dose was decreased to 137 mcg daily and has been on this dose for the past 2 years. Did have a TSH of 4.03 in 1/17 during her hospital stay. TSH 2.69 and TG 0.3 with neg TG ab in 6/17 so kept dose the same. TSH up to 3.41 in 11/17 so increased synthroid back to 150 mcg daily and TSH 0.6 in 4/18 and TG 0.3 with neg TG ab so kept her dose the same. Wt down 10 lbs and TSH down to 0.079 in 9/18 so had her take 6 tabs/week and TSH up to 5.98 in 12/18 so changed to 137 mcg daily and TSH 0.39 in 4/19 and TG 0.2 so kept dose the same. Had been out for a couple of weeks prior to lab draw in 9/19 and .5 but with compliance, TSH down to 9.34 and TG 1.7 in 10/19 and TSH 12.8 and TG 2.2 in 11/19 so increased dose back to 150 mcg daily  - increase synthroid to 150 mcg daily  - check TSH and free T4 prior to next visit  - check thyroglobulin reflex panel in 11/20          3. Essential hypertension, benign: her BP was at goal < 140/90   -  cont current regimen for now  - monitor home blood pressure readings      4. Hyperlipidemia LDL goal <70: LDL 70.6 in 1/17 on atorvastatin 40 mg and still 80 in 6/17 and 99 in 11/17 possibly due to higher TSH. Was put on 80 mg by PCP and LDL down to 63 in 4/18 and 55 in 9/18 and 58 in 12/18. Up to 185 in 9/19 when TSH was 144. Down to 109 in 10/19 when TSH was 9  - cont atorvastatin 80 mg daily  - check lipids today and prior to next visit          Patient Instructions   1) Your TSH (thyroid test) was still high at 9 in 10/19 and 12 in 11/19 (goal is 0.5-2.0) so the 137 mcg is working better now that you are taking this as your last value was 144 in 9/19.   To be safe, I will have you go back to the 150 mcg dose and sent this to [facility]. Tomorrow only, take 2 of the 137 mcg tabs and then take 1 tab the next 6 days until your new supply of the 150 mcg tabs arrives. 2) Your A1c is 10.8% in 11/19 and 10% in 10/19 and goal is to get this back under 8%. Be sure to take lantus 20 units TWICE daily in the morning AND at night. 3) Try to check your blood sugar 3 times daily before you eat so you can adjust your humalog Blood sugar            Less than 90  Eat first, take 10 units      12 units    151-200  14 units      201-250  16 units     251-300  18 units      301-350  20 units      351-400  22 units  Over 400  24 units    4) Your cholesterol was better back on the thyroid medication. 5) Your creatinine (kidney test) has also improved. 6) Please call [address] [place] [address] at [phone] to request forms to be faxed to my office for diabetic shoes. Follow-up and Dispositions    · Return for as scheduled on 2/6/20.                Copy sent to:  Dr. Mirian Nieto

## 2019-11-19 NOTE — PROGRESS NOTES
Lab Results   Component Value Date/Time    Hemoglobin A1c 10.8 (H) 11/13/2019 02:58 PM    Hemoglobin A1c 10.0 (H) 10/11/2019 10:30 AM    Hemoglobin A1c 12.4 (H) 04/15/2019 03:58 PM

## 2019-11-19 NOTE — PATIENT INSTRUCTIONS
1) Your TSH (thyroid test) was still high at 9 in 10/19 and 12 in 11/19 (goal is 0.5-2.0) so the 137 mcg is working better now that you are taking this as your last value was 144 in 9/19. To be safe, I will have you go back to the 150 mcg dose and sent this to [facility]. Tomorrow only, take 2 of the 137 mcg tabs and then take 1 tab the next 6 days until your new supply of the 150 mcg tabs arrives. 2) Your A1c is 10.8% in 11/19 and 10% in 10/19 and goal is to get this back under 8%. Be sure to take lantus 20 units TWICE daily in the morning AND at night. 3) Try to check your blood sugar 3 times daily before you eat so you can adjust your humalog Blood sugar Less than 90  Eat first, take 10 units    12 units 151-200  14 units 201-250  16 units 251-300  18 units 301-350  20 units 351-400  22 units Over 400  24 units 4) Your cholesterol was better back on the thyroid medication. 5) Your creatinine (kidney test) has also improved. 6) Please call [address] [place] [address] [email or web address] at [phone] to request forms to be faxed to my office for diabetic shoes.

## 2019-12-02 ENCOUNTER — TELEPHONE (OUTPATIENT)
Dept: ENDOCRINOLOGY | Age: 79
End: 2019-12-02

## 2019-12-02 NOTE — TELEPHONE ENCOUNTER
Jorden,    Patient would to speak with you in regards to switching her Humalog to Novolog. She stated that her insurance will be changing.

## 2019-12-03 ENCOUNTER — TELEPHONE (OUTPATIENT)
Dept: ENDOCRINOLOGY | Age: 79
End: 2019-12-03

## 2019-12-03 RX ORDER — INSULIN ASPART 100 [IU]/ML
INJECTION, SOLUTION INTRAVENOUS; SUBCUTANEOUS
Status: CANCELLED | OUTPATIENT
Start: 2019-12-03

## 2019-12-03 RX ORDER — INSULIN ASPART 100 [IU]/ML
INJECTION, SOLUTION INTRAVENOUS; SUBCUTANEOUS
Qty: 60 ML | Refills: 3 | Status: SHIPPED | OUTPATIENT
Start: 2019-12-03 | End: 2020-01-13

## 2019-12-03 NOTE — TELEPHONE ENCOUNTER
I spoke with Luciano Friend after patient had returned her call and per Luciano Friend, patient requested that I print a prescription for Novolog pens and mail to her so she can price out to find the cheapest place to fill these.

## 2019-12-11 NOTE — TELEPHONE ENCOUNTER
Spoke with pt and she stated that she wanted to  See if she changed her insurance to see if her prescriptions would be less expensive, but after speaking with [facility], she decided to keep her current insurance. I then asked whether she called her insurance to compare prices of the Humalog and Novolog insulin and she said no. She then stated that she's going to save her money so that when it is time to renew her rx, she will be able to afford it.

## 2019-12-11 NOTE — TELEPHONE ENCOUNTER
Please call her and see if she needed anything else after you spoke with her last week as this may have been a duplicate message. Thanks.

## 2019-12-17 ENCOUNTER — ANESTHESIA (OUTPATIENT)
Dept: ENDOSCOPY | Age: 79
End: 2019-12-17
Payer: MEDICARE

## 2019-12-17 ENCOUNTER — ANESTHESIA EVENT (OUTPATIENT)
Dept: ENDOSCOPY | Age: 79
End: 2019-12-17
Payer: MEDICARE

## 2019-12-17 ENCOUNTER — HOSPITAL ENCOUNTER (OUTPATIENT)
Age: 79
Setting detail: OUTPATIENT SURGERY
Discharge: HOME | End: 2019-12-17
Attending: INTERNAL MEDICINE | Admitting: INTERNAL MEDICINE
Payer: MEDICARE

## 2019-12-17 VITALS
HEART RATE: 63 BPM | BODY MASS INDEX: 37.18 KG/M2 | HEIGHT: 65 IN | OXYGEN SATURATION: 100 % | RESPIRATION RATE: 12 BRPM | SYSTOLIC BLOOD PRESSURE: 163 MMHG | DIASTOLIC BLOOD PRESSURE: 69 MMHG | WEIGHT: 223.13 LBS | TEMPERATURE: 97.6 F

## 2019-12-17 LAB
GLUCOSE BLD STRIP.AUTO-MCNC: 110 MG/DL (ref 65–100)
GLUCOSE BLD STRIP.AUTO-MCNC: 64 MG/DL (ref 65–100)
SERVICE CMNT-IMP: ABNORMAL
SERVICE CMNT-IMP: ABNORMAL

## 2019-12-17 PROCEDURE — 77030037339 HC NET FB ENDO RETVR RESCUNT DISP BSC -B: Performed by: INTERNAL MEDICINE

## 2019-12-17 PROCEDURE — 74011250636 HC RX REV CODE- 250/636: Performed by: INTERNAL MEDICINE

## 2019-12-17 PROCEDURE — 88305 TISSUE EXAM BY PATHOLOGIST: CPT

## 2019-12-17 PROCEDURE — 74011250636 HC RX REV CODE- 250/636: Performed by: ANESTHESIOLOGY

## 2019-12-17 PROCEDURE — 77030010936 HC CLP LIG BSC -C: Performed by: INTERNAL MEDICINE

## 2019-12-17 PROCEDURE — 76040000007: Performed by: INTERNAL MEDICINE

## 2019-12-17 PROCEDURE — 77030013992 HC SNR POLYP ENDOSC BSC -B: Performed by: INTERNAL MEDICINE

## 2019-12-17 PROCEDURE — 74011000258 HC RX REV CODE- 258: Performed by: ANESTHESIOLOGY

## 2019-12-17 PROCEDURE — 74011000250 HC RX REV CODE- 250: Performed by: ANESTHESIOLOGY

## 2019-12-17 PROCEDURE — 76060000032 HC ANESTHESIA 0.5 TO 1 HR: Performed by: INTERNAL MEDICINE

## 2019-12-17 PROCEDURE — 82962 GLUCOSE BLOOD TEST: CPT

## 2019-12-17 RX ORDER — DEXTROSE 50 % IN WATER (D50W) INTRAVENOUS SYRINGE
12.5
Status: COMPLETED | OUTPATIENT
Start: 2019-12-17 | End: 2019-12-17

## 2019-12-17 RX ORDER — SODIUM CHLORIDE 9 MG/ML
100 INJECTION, SOLUTION INTRAVENOUS CONTINUOUS
Status: DISCONTINUED | OUTPATIENT
Start: 2019-12-17 | End: 2019-12-17 | Stop reason: HOSPADM

## 2019-12-17 RX ORDER — ATROPINE SULFATE 0.1 MG/ML
0.5 INJECTION INTRAVENOUS
Status: DISCONTINUED | OUTPATIENT
Start: 2019-12-17 | End: 2019-12-17 | Stop reason: HOSPADM

## 2019-12-17 RX ORDER — LIDOCAINE HYDROCHLORIDE 20 MG/ML
INJECTION, SOLUTION EPIDURAL; INFILTRATION; INTRACAUDAL; PERINEURAL AS NEEDED
Status: DISCONTINUED | OUTPATIENT
Start: 2019-12-17 | End: 2019-12-17 | Stop reason: HOSPADM

## 2019-12-17 RX ORDER — FLUMAZENIL 0.1 MG/ML
0.2 INJECTION INTRAVENOUS
Status: DISCONTINUED | OUTPATIENT
Start: 2019-12-17 | End: 2019-12-17 | Stop reason: HOSPADM

## 2019-12-17 RX ORDER — SODIUM CHLORIDE 0.9 % (FLUSH) 0.9 %
5-40 SYRINGE (ML) INJECTION AS NEEDED
Status: DISCONTINUED | OUTPATIENT
Start: 2019-12-17 | End: 2019-12-17 | Stop reason: HOSPADM

## 2019-12-17 RX ORDER — NALOXONE HYDROCHLORIDE 0.4 MG/ML
0.4 INJECTION, SOLUTION INTRAMUSCULAR; INTRAVENOUS; SUBCUTANEOUS
Status: DISCONTINUED | OUTPATIENT
Start: 2019-12-17 | End: 2019-12-17 | Stop reason: HOSPADM

## 2019-12-17 RX ORDER — PROPOFOL 10 MG/ML
INJECTION, EMULSION INTRAVENOUS AS NEEDED
Status: DISCONTINUED | OUTPATIENT
Start: 2019-12-17 | End: 2019-12-17 | Stop reason: HOSPADM

## 2019-12-17 RX ORDER — DEXTROMETHORPHAN/PSEUDOEPHED 2.5-7.5/.8
1.2 DROPS ORAL
Status: DISCONTINUED | OUTPATIENT
Start: 2019-12-17 | End: 2019-12-17 | Stop reason: HOSPADM

## 2019-12-17 RX ORDER — SODIUM CHLORIDE 0.9 % (FLUSH) 0.9 %
5-40 SYRINGE (ML) INJECTION EVERY 8 HOURS
Status: DISCONTINUED | OUTPATIENT
Start: 2019-12-17 | End: 2019-12-17 | Stop reason: HOSPADM

## 2019-12-17 RX ADMIN — SODIUM CHLORIDE 100 ML/HR: 900 INJECTION, SOLUTION INTRAVENOUS at 12:34

## 2019-12-17 RX ADMIN — DEXTROSE MONOHYDRATE 12.5 G: 25 INJECTION, SOLUTION INTRAVENOUS at 12:35

## 2019-12-17 RX ADMIN — PROPOFOL 160 MG: 10 INJECTION, EMULSION INTRAVENOUS at 13:34

## 2019-12-17 RX ADMIN — LIDOCAINE HYDROCHLORIDE 40 MG: 20 INJECTION, SOLUTION EPIDURAL; INFILTRATION; INTRACAUDAL; PERINEURAL at 13:08

## 2019-12-17 NOTE — ROUTINE PROCESS
Marta Hall 1940 
[id number] Situation: 
Verbal report received from: [employer] Procedure: Procedure(s): 
COLONOSCOPY 
ENDOSCOPIC POLYPECTOMY RESOLUTION CLIPs X 2 Background: 
 
Preoperative diagnosis: H/O ADENOMATOUS POLYP OF COLON 
DIABETES MELLITUS 
LONG TERM (CURRENT) USE OF ANTITHROMBOTICS/ANTIPLATELETS Postoperative diagnosis: polyps, hemorrhoids :  Dr. Shira Munoz Assistant(s): Endoscopy Technician-1: Amita Gallo Endoscopy RN-1: Marissa Waite RN Specimens:  
ID Type Source Tests Collected by Time Destination 1 : cecum polyps X 2 Preservative Cecum  Fernanda Simms MD 12/17/2019 1321 Pathology 2 : transverse colon polyps X 2 Preservative Colon, Transverse  Fernanda Simms MD 12/17/2019 1347 Pathology H. Pylori  no Assessment: 
Intra-procedure medications Anesthesia gave intra-procedure sedation and medications, see anesthesia flow sheet yes Intravenous fluids: NS@ Loye Cam Vital signs stable Abdominal assessment: round and soft Recommendation: 
Discharge patient per MD order [place] Family or Lu Beena son Permission to share finding with family or friend yes

## 2019-12-17 NOTE — PERIOP NOTES
Endoscope was pre-cleaned at the bedside immediately following procedure by curry Franks    Anesthesia reports 160mg Propofol, 40mg Lidocaine and 450mL NS given during procedure. Received report from anesthesia staff on vital signs and status of patient. Barbra Haney

## 2019-12-17 NOTE — ANESTHESIA POSTPROCEDURE EVALUATION
Procedure(s):  COLONOSCOPY  ENDOSCOPIC POLYPECTOMY  RESOLUTION CLIPs X 3.    total IV anesthesia    Anesthesia Post Evaluation        Patient location during evaluation: PACU  Note status: Adequate. Level of consciousness: responsive to verbal stimuli and sleepy but conscious  Pain management: satisfactory to patient  Airway patency: patent  Anesthetic complications: no  Cardiovascular status: acceptable  Respiratory status: acceptable  Hydration status: acceptable  Comments: +Post-Anesthesia Evaluation and Assessment    Patient: Claudine Yang MRN: [id number]  SSN: [id number]   YOB: 1940  Age: 67 y.o. Sex: female      Cardiovascular Function/Vital Signs    /69   Pulse 63   Temp 36.4 °C (97.6 °F)   Resp 12   Ht 5' 5\" (1.651 m)   Wt 101.2 kg (223 lb 2 oz)   SpO2 100%   Breastfeeding No   BMI 37.13 kg/m²     Patient is status post Procedure(s):  COLONOSCOPY  ENDOSCOPIC POLYPECTOMY  RESOLUTION CLIPs X 3. Nausea/Vomiting: Controlled. Postoperative hydration reviewed and adequate. Pain:  Pain Scale 1: Numeric (0 - 10) (12/17/19 1405)  Pain Intensity 1: 0 (12/17/19 1405)   Managed. Neurological Status: At baseline. Mental Status and Level of Consciousness: Arousable. Pulmonary Status:   O2 Device: Room air (12/17/19 1405)   Adequate oxygenation and airway patent. Complications related to anesthesia: None    Post-anesthesia assessment completed. No concerns. Signed By: Mayank Kellogg DO    12/17/2019  Post anesthesia nausea and vomiting:  controlled      Vitals Value Taken Time   /130 12/17/2019  2:09 PM   Temp 36.4 °C (97.6 °F) 12/17/2019  1:55 PM   Pulse 63 12/17/2019  2:10 PM   Resp 12 12/17/2019  2:10 PM   SpO2 100 % 12/17/2019  2:10 PM   Vitals shown include unvalidated device data.

## 2019-12-17 NOTE — PROCEDURES
Fest[facility]ice                  Colonoscopy Operative Report    12/17/2019      Mariann Ruiz  [id number]  1940    Procedure Type:   Colonoscopy --screening     Indications:    Personal history of colon polyps (screening only)     Pre-operative Diagnosis: see indication above    Post-operative Diagnosis:  See findings below    :  Vania Casey MD    Referring Provider: Misty Chávez MD      Sedation:  MAC anesthesia Propofol    Pre-Procedural Exam:      Airway: clear,  No airway problems anticipated  Heart: RRR, without gallops or rubs  Lungs: clear bilaterally without wheezes, crackles, or rhonchi  Abdomen: soft, nontender, nondistended, bowel sounds present  Mental Status: awake, alert and oriented to person, place and time     Procedure Details:  After informed consent was obtained with all risks and benefits of procedure explained and preoperative exam completed, the patient was taken to the endoscopy suite and placed in the left lateral decubitus position. Upon sequential sedation as per above, a digital rectal exam was performed . The Olympus videocolonoscope  was inserted in the rectum and carefully advanced to the cecum, which was identified by the ileocecal valve and appendiceal orifice. The cecum was identified by the ileocecal valve and appendiceal orifice. The quality of preparation was adequate. The colonoscope was slowly withdrawn with careful evaluation between folds. Retroflexion in the rectum was completed demonstrating internal hemorrhoids. Findings:   Rectum: Grade 1 internal hemorrhoid(s); Sigmoid:     - Diverticulosis  Descending Colon: normal  Transverse Colon: 6 mm and 8 mm polyps, cold snared and sites clipped  Ascending Colon: normal  Cecum: 6 and 7 mm polyps, cold snared--clipped  Terminal Ileum: not intubated      Specimen Removed:  1. cecal polyps  2. transverse colonic polyps    Complications: None.      EBL: None.    Impression:    Four polyps removed as above. Diverticulosis and hemorrhoids    Recommendations: --Await pathology. , -Repeat colonoscopy in 3 years. High fiber diet. Discontinue plavix for another 10 days   Discharge Disposition:  Home in the company of a  when able to ambulate. Torrance Epley., MD    12/17/2019     ROMAN Melendez MD  [facility], [address] [facility] [address] [place] [address]  [phone]  [email or web address]

## 2019-12-17 NOTE — H&P
Jessica Peterson MD  [facility], [address] [facility]  [phone]  [email or web address]    Gastroenterology Outpatient History and Physical    Patient: Chasidy Thomson    Physician: Katiuska Garcia MD    Vital Signs: Blood pressure 186/89, pulse 65, resp. rate 20, height 5' 5\" (1.651 m), weight 101.2 kg (223 lb 2 oz), SpO2 100 %, not currently breastfeeding. Allergies: Allergies   Allergen Reactions    Amlodipine Swelling    Clindamycin Rash    Nifedipine Swelling    Sulfa (Sulfonamide Antibiotics) Rash       Chief Complaint: Hx of polyps    History of Present Illness: Personal history of colonic polyps. Last colonoscopy was 2014 and showed adenomatous polyps. Currently has no GI symptoms. No FH of colon cancer or polyps. History:  Past Medical History:   Diagnosis Date    Arthritis     Cataract     bilateral    Chronic kidney disease     Dr. Joel Cortes Chronic pain     lower back    CKD stage 3 due to type 2 diabetes mellitus ([address]     Depression     Diabetes ([address] age 49    Type II    Follicular thyroid cancer ([address] 08/2014    Foot ulcer ([address]     Gallbladder polyp 8/14/2013    Hypercholesterolemia     Hypertension     Ill-defined condition     states 'polyp' in gal bladder    Long term current use of anticoagulant therapy     Obesity     Right pontine stroke ([address] 1/17/2017    TIA (transient ischemic attack) 6/6/2014      Past Surgical History:   Procedure Laterality Date    ABDOMEN SURGERY PROC UNLISTED  2006    stomach    BREAST SURGERY PROCEDURE UNLISTED  2009, 2006    breast bx-vince    COLORECTAL SCRN; HI RISK IND  5/30/2014         HX BREAST BIOPSY Right [id number]    2 biopies on the right. Benign (per patient)    HX BREAST BIOPSY Left 2015    Benign (per patient)    HX CATARACT REMOVAL Left     HX GYN  1970's    partial hysterectomy    HX HERNIA REPAIR  2009    hiatal    HX HYSTERECTOMY      HX ORTHOPAEDIC Bilateral 1988    bunion    HX THYROIDECTOMY  2014    Dr. Jaskaran Olson Marital status:      Spouse name: Not on file    Number of children: Not on file    Years of education: Not on file    Highest education level: Not on file   Tobacco Use    Smoking status: Never Smoker    Smokeless tobacco: Never Used   Substance and Sexual Activity    Alcohol use: No    Drug use: No   Social History Narrative    Lives in [address] [place] [address] with her son. Also has one other son. . Used to work at [employer]. Likes to [employer].       Family History   Problem Relation Age of Onset    Heart Disease Mother     Hypertension Mother     Diabetes Mother    French Stroke Mother     Cancer Father         colon    Heart Disease Sister     Diabetes Sister     Heart Attack Sister     Hypertension Sister     Lung Disease Brother     Hypertension Brother     Lung Disease Brother     Anesth Problems Neg Hx       Patient Active Problem List   Diagnosis Code    Gallbladder polyp K82.4    Gallbladder sludge K82.8    Recurrent ventral incisional hernia K43.2    Obesity (BMI 35.0-39.9 without comorbidity) E66.9    Thyroid nodule, right E04.1    Uncontrolled type 2 diabetes mellitus with diabetic polyneuropathy, with long-term current use of insulin (HCC) E11.42, Z79.4, E11.65    TIA (transient ischemic attack) G45.9    Essential hypertension, benign I10    CKD (chronic kidney disease) N18.9    Thyroid cancer ([facility] C73    Stroke (cerebrum) (HCC) I63.9    Right pontine stroke (HCC) I63.50    Stenosis of both internal carotid arteries I65.23    Hyperlipidemia LDL goal <70 E78.5    Type 2 diabetes with nephropathy (HCC) E11.21    CKD (chronic kidney disease) stage 4, GFR 15-29 ml/min (HCC) N18.4    Severe obesity (HCC) E66.01    Foot ulcer (HCC) L97.509       Medications:   Prior to Admission medications    Medication Sig Start Date End Date Taking? Authorizing Provider   insulin aspart U-100 (NOVOLOG FLEXPEN U-100 INSULIN) 100 unit/mL (3 mL) inpn Inject 12 units before meals + 2 units for every 50 mg/dl above 150 mg/dl--Max 65 units per day--replaces humalog 12/3/19  Yes Amairani Gray MD   SYNTHROID 150 mcg tablet Take 1 Tab by mouth Daily (before breakfast). BRAND MEDICALLY NECESSARY--Delete 137 mcg dose from profile 11/19/19  Yes Amairani Gray MD   metoprolol succinate (TOPROL-XL) 50 mg XL tablet Take  by mouth daily. Yes Provider, Historical   olmesartan (BENICAR) 40 mg tablet Take  by mouth daily. Yes Provider, Historical   insulin glargine (LANTUS SOLOSTAR U-100 INSULIN) 100 unit/mL (3 mL) inpn Inject 20 units in the morning and 20 units at bedtime--Dose change 9/12/19--updated med list--did not send prescription to the pharmacy  Patient taking differently: Inject 20 units in the morning and 20 units at bedtime--Dose change 9/12/19--updated med list--did not send prescription to the pharmacy. 9/12/19  Yes Amairani Gray MD   Lancing Device with Lancets kit Use as directed to test 3 times daily 9/11/19  Yes Amairani Gray MD   [facility] ULTRA BLUE TEST STRIP strip TEST 3 TIMES DAILY 9/10/19  Yes Amairani Gray MD   gabapentin (NEURONTIN) 300 mg capsule Take 600 mg by mouth three (3) times daily. 1/24/19  Yes Provider, Historical   Bianchi Cluster monitoring kit Test 3 times daily. DX E11.65 4/22/19  Yes Amairani Gray MD   atorvastatin (LIPITOR) 80 mg tablet Take 80 mg by mouth daily. Yes Provider, Historical   ferrous sulfate (IRON) 325 mg (65 mg iron) tablet Take  by mouth daily. Yes Provider, Historical   LOR PEN NEEDLE 32 gauge x 5/32\" ndle Use as directed 5 times daily 1/15/18  Yes Amairani Gray MD   bumetanide (BUMEX) 0.5 mg tablet 0.5 mg two (2) times a day. 9/29/17  Yes Provider, Historical   PYRIDOXINE HCL, VITAMIN B6, (VITAMIN B-6 PO) Take  by mouth daily.    Yes Provider, Historical   allopurinol (ZYLOPRIM) 100 mg tablet Take  by mouth daily. Yes Provider, Historical   clopidogrel (PLAVIX) 75 mg tab Take 1 Tab by mouth daily. 1/20/17  Yes Balwinder Almonte MD   cloNIDine (CATAPRES) 0.3 mg tablet Take 0.6 mg by mouth two (2) times a day. Yes Provider, Historical   multivitamins-minerals-lutein (CENTRUM SILVER) Tab Take  by mouth daily.    Yes Provider, Historical       Physical Exam:     General: well developed, well nourished   HEENT: unremarkable   Heart: regular rhythm no mumur    Lungs: clear   Abdominal:  benign   Neurological: unremarkable   Extremities: no edema     Findings/Diagnosis: Personal history of colonic polyps  Plan of Care/Planned Procedure: Colonoscopy with monitored anesthesia care sedation    Signed:  Ritika Serna MD 12/17/2019

## 2019-12-17 NOTE — DISCHARGE INSTRUCTIONS
Ajit Leija MD  [facility], [address]  [place], [address] [facility]  [phone]  [email or web address]    Agueda Crowley  [id number]  1940    COLON DISCHARGE INSTRUCTIONS  Discomfort:  Redness at IV site- apply warm compress to area; if redness or soreness persist- contact your physician  There may be a slight amount of blood passed from the rectum  Gaseous discomfort- walking, belching will help relieve any discomfort  You may not operate a vehicle for 12 hours  You may not engage in an occupation involving machinery or appliances for rest of today  You may not drink alcoholic beverages for at least 12 hours  Avoid making any critical decisions for at least 24 hour  DIET:   High fiber diet. - however -  remember your colon is empty and a heavy meal will produce gas. Avoid these foods:  vegetables, fried / greasy foods, carbonated drinks for today      ACTIVITY:  You may resume your normal daily activities it is recommended that you spend the remainder of the day resting -  avoid any strenuous activity. CALL M.D. ANY SIGN OF:   Increasing pain, nausea, vomiting  Abdominal distension (swelling)  New increased bleeding (oral or rectal)  Fever (chills)  Pain in chest area  Bloody discharge from nose or mouth  Shortness of breath     COLONOSCOPY FINDINGS:  Your colonoscopy showed: four polyps which were removed. So need to stay off plavix for another 10 days. Follow-up Instructions:   Call Dr. Ajit Leija if any questions or problems. Telephone # [phone]  Dr. Jovita Alarcon office will notify you of the biopsy results within 7 to 10 days. Should have a repeat colonoscopy in 3 years.

## 2019-12-23 ENCOUNTER — HOSPITAL ENCOUNTER (OUTPATIENT)
Dept: MAMMOGRAPHY | Age: 79
Discharge: HOME | End: 2019-12-23
Attending: INTERNAL MEDICINE
Payer: MEDICARE

## 2019-12-23 DIAGNOSIS — Z12.31 VISIT FOR SCREENING MAMMOGRAM: ICD-10-CM

## 2019-12-23 PROCEDURE — 77063 BREAST TOMOSYNTHESIS BI: CPT

## 2020-01-13 RX ORDER — INSULIN LISPRO 100 [IU]/ML
INJECTION, SOLUTION INTRAVENOUS; SUBCUTANEOUS
Qty: 60 ML | Refills: 3 | Status: SHIPPED | OUTPATIENT
Start: 2020-01-13 | End: 2020-02-06

## 2020-01-30 ENCOUNTER — TELEPHONE (OUTPATIENT)
Dept: ENDOCRINOLOGY | Age: 80
End: 2020-01-30

## 2020-01-30 NOTE — TELEPHONE ENCOUNTER
Please fax forms, prescription, and office note to [facility] and confirm receipt and let patient know this has been taken care of.

## 2020-01-31 RX ORDER — GABAPENTIN 300 MG/1
600 CAPSULE ORAL 3 TIMES DAILY
Qty: 540 CAP | Refills: 1 | Status: SHIPPED | OUTPATIENT
Start: 2020-01-31 | End: 2020-07-06 | Stop reason: SDUPTHER

## 2020-01-31 NOTE — TELEPHONE ENCOUNTER
Faxed forms to [employer]. Confirmed receipt with Kasie Gibson. Called patient to let her know this had been taken care of.

## 2020-02-03 ENCOUNTER — TELEPHONE (OUTPATIENT)
Dept: ENDOCRINOLOGY | Age: 80
End: 2020-02-03

## 2020-02-04 LAB
ALBUMIN SERPL-MCNC: 3.4 G/DL (ref 3.7–4.7)
ALBUMIN/CREAT UR: 4388 MG/G CREAT (ref 0–29)
ALBUMIN/GLOB SERPL: 1.7 {RATIO} (ref 1.2–2.2)
ALP SERPL-CCNC: 78 IU/L (ref 39–117)
ALT SERPL-CCNC: 80 IU/L (ref 0–32)
AST SERPL-CCNC: 56 IU/L (ref 0–40)
BILIRUB SERPL-MCNC: 0.4 MG/DL (ref 0–1.2)
BUN SERPL-MCNC: 31 MG/DL (ref 8–27)
BUN/CREAT SERPL: 11 (ref 12–28)
CALCIUM SERPL-MCNC: 8.3 MG/DL (ref 8.7–10.3)
CHLORIDE SERPL-SCNC: 99 MMOL/L (ref 96–106)
CHOLEST SERPL-MCNC: 180 MG/DL (ref 100–199)
CO2 SERPL-SCNC: 28 MMOL/L (ref 20–29)
CREAT SERPL-MCNC: 2.8 MG/DL (ref 0.57–1)
CREAT UR-MCNC: 58.9 MG/DL
EST. AVERAGE GLUCOSE BLD GHB EST-MCNC: 249 MG/DL
GLOBULIN SER CALC-MCNC: 2 G/DL (ref 1.5–4.5)
GLUCOSE SERPL-MCNC: 161 MG/DL (ref 65–99)
HBA1C MFR BLD: 10.3 % (ref 4.8–5.6)
HDLC SERPL-MCNC: 81 MG/DL
INTERPRETATION, 910389: NORMAL
INTERPRETATION: NORMAL
LDLC SERPL CALC-MCNC: 83 MG/DL (ref 0–99)
Lab: NORMAL
MICROALBUMIN UR-MCNC: 2584.3 UG/ML
PDF IMAGE, 910387: NORMAL
POTASSIUM SERPL-SCNC: 3.9 MMOL/L (ref 3.5–5.2)
PROT SERPL-MCNC: 5.4 G/DL (ref 6–8.5)
SODIUM SERPL-SCNC: 142 MMOL/L (ref 134–144)
T4 FREE SERPL-MCNC: 1.06 NG/DL (ref 0.82–1.77)
TRIGL SERPL-MCNC: 79 MG/DL (ref 0–149)
TSH SERPL DL<=0.005 MIU/L-ACNC: 11.51 UIU/ML (ref 0.45–4.5)
VLDLC SERPL CALC-MCNC: 16 MG/DL (ref 5–40)

## 2020-02-06 ENCOUNTER — OFFICE VISIT (OUTPATIENT)
Dept: ENDOCRINOLOGY | Age: 80
End: 2020-02-06

## 2020-02-06 VITALS
DIASTOLIC BLOOD PRESSURE: 70 MMHG | SYSTOLIC BLOOD PRESSURE: 136 MMHG | HEART RATE: 79 BPM | HEIGHT: 65 IN | WEIGHT: 209.2 LBS | BODY MASS INDEX: 34.85 KG/M2

## 2020-02-06 DIAGNOSIS — C73 THYROID CANCER (HCC): ICD-10-CM

## 2020-02-06 DIAGNOSIS — I10 ESSENTIAL HYPERTENSION, BENIGN: ICD-10-CM

## 2020-02-06 DIAGNOSIS — E78.5 HYPERLIPIDEMIA LDL GOAL <70: ICD-10-CM

## 2020-02-06 RX ORDER — LEVOTHYROXINE SODIUM 175 UG/1
175 TABLET ORAL
Qty: 90 TAB | Refills: 3 | Status: SHIPPED | OUTPATIENT
Start: 2020-02-06

## 2020-02-06 RX ORDER — GABAPENTIN 300 MG/1
300 CAPSULE ORAL
COMMUNITY
Start: 2019-08-30 | End: 2020-02-06

## 2020-02-06 NOTE — PROGRESS NOTES
Chief Complaint   Patient presents with    Diabetes     pcp and pharmacy confirmed     History of Present Illness: Deandre Giordano is a 67 y.o. female here for follow up of diabetes. Weight down 1 lbs since last visit in 11/19. The cost of insulin has become prohibitive and is paying $600 for 90 days of both insulins and would like to change to generic 70/30 mix insulin pens at [facility]. Didn't bring in any readings for review nor her meter. Has been getting both shots of lantus but was missing the mid-day dose of humalog often while away from home. Has been compliant with the higher dose of synthroid and may have only missed 1-2 doses the past 3 months. Compliant with BP regimen and lipitor. Having a lot of swelling in her legs. Forms for diabetic shoes were faxed to [address] [place] [address] and [facility] last week and she needs to call to get an appointment to be fitted. Current Outpatient Medications   Medication Sig    insulin lispro (HUMALOG KWIKPEN INSULIN) 100 unit/mL kwikpen INJECT 12 UNITS BEFORE MEALS PLUS 2 UNITS FOR EVERY 40 MG/DL ABOVE 140 MG/DL. MAXIMUM 65 UNITS PER DAY    SYNTHROID 150 mcg tablet Take 1 Tab by mouth Daily (before breakfast). BRAND MEDICALLY NECESSARY--Delete 137 mcg dose from profile    metoprolol succinate (TOPROL-XL) 50 mg XL tablet Take  by mouth daily.  olmesartan (BENICAR) 40 mg tablet Take  by mouth daily.  insulin glargine (LANTUS SOLOSTAR U-100 INSULIN) 100 unit/mL (3 mL) inpn Inject 20 units in the morning and 20 units at bedtime--Dose change 9/12/19--updated med list--did not send prescription to the pharmacy (Patient taking differently: Inject 20 units in the morning and 20 units at bedtime--Dose change 9/12/19--updated med list--did not send prescription to the pharmacy.)    Lancing Device with Lancets kit Use as directed to test 3 times daily    ONETOUCH ULTRA BLUE TEST STRIP strip TEST 3 TIMES DAILY    ONETOUCH ULTRA2 monitoring kit Test 3 times daily.   DX E11.65    atorvastatin (LIPITOR) 80 mg tablet Take 80 mg by mouth daily.  ferrous sulfate (IRON) 325 mg (65 mg iron) tablet Take  by mouth daily.  LOR PEN NEEDLE 32 gauge x 5/32\" ndle Use as directed 5 times daily    bumetanide (BUMEX) 0.5 mg tablet 0.5 mg two (2) times a day.  PYRIDOXINE HCL, VITAMIN B6, (VITAMIN B-6 PO) Take  by mouth daily.  allopurinol (ZYLOPRIM) 100 mg tablet Take  by mouth daily.  cloNIDine (CATAPRES) 0.3 mg tablet Take 0.6 mg by mouth two (2) times a day.  multivitamins-minerals-lutein (CENTRUM SILVER) Tab Take  by mouth daily.  gabapentin (NEURONTIN) 300 mg capsule Take 2 Caps by mouth three (3) times daily. Max Daily Amount: 1,800 mg. No current facility-administered medications for this visit. Allergies   Allergen Reactions    Amlodipine Swelling    Clindamycin Rash    Nifedipine Swelling    Sulfa (Sulfonamide Antibiotics) Rash     Review of Systems:  - Eyes: no blurry vision or double vision  - Cardiovascular: no chest pain  - Respiratory: no shortness of breath  - Musculoskeletal: no myalgias  - Neurological: (+) numbness/tingling in extremities    Physical Examination:  Blood pressure 136/70, pulse 79, height 5' 5\" (1.651 m), weight 209 lb 3.2 oz (94.9 kg).   - General: pleasant, no distress, good eye contact   - Neck: no carotid bruits  - Cardiovascular: regular, normal rate, nl s1 and s2, no m/r/g,   - Respiratory: clear bilaterally  - Integumentary: 2+ edema,   - Psychiatric: normal mood and affect    Data Reviewed:   Component      Latest Ref Rng & Units 2/3/2020 2/3/2020 2/3/2020 2/3/2020          10:22 AM 10:22 AM 10:22 AM 10:22 AM   Glucose      65 - 99 mg/dL 161 (H)      BUN      8 - 27 mg/dL 31 (H)      Creatinine      0.57 - 1.00 mg/dL 2.80 (H)      GFR est non-AA      >59 mL/min/1.73 16 (L)      GFR est AA      >59 mL/min/1.73 18 (L)      BUN/Creatinine ratio      12 - 28 11 (L)      Sodium      134 - 144 mmol/L 142      Potassium      3.5 - 5.2 mmol/L 3.9      Chloride      96 - 106 mmol/L 99      CO2      20 - 29 mmol/L 28      Calcium      8.7 - 10.3 mg/dL 8.3 (L)      Protein, total      6.0 - 8.5 g/dL 5.4 (L)      Albumin      3.7 - 4.7 g/dL 3.4 (L)      GLOBULIN, TOTAL      1.5 - 4.5 g/dL 2.0      A-G Ratio      1.2 - 2.2 1.7      Bilirubin, total      0.0 - 1.2 mg/dL 0.4      Alk. phosphatase      39 - 117 IU/L 78      AST      0 - 40 IU/L 56 (H)      ALT (SGPT)      0 - 32 IU/L 80 (H)      Cholesterol, total      100 - 199 mg/dL  180     Triglyceride      0 - 149 mg/dL  79     HDL Cholesterol      >39 mg/dL  81     VLDL, calculated      5 - 40 mg/dL  16     LDL, calculated      0 - 99 mg/dL  83     Creatinine, urine      Not Estab. mg/dL   58.9    Microalbumin, urine      Not Estab. ug/mL   2,584.3    Microalbumin/Creat. Ratio      0 - 29 mg/g creat   4,388 (H)    Hemoglobin A1c, (calculated)      4.8 - 5.6 %    10.3 (H)   Estimated average glucose      mg/dL    249     Component      Latest Ref Rng & Units 2/3/2020 2/3/2020          10:22 AM 10:22 AM   T4, Free      0.82 - 1.77 ng/dL  1.06   TSH      0.450 - 4.500 uIU/mL 11.510 (H)      Assessment/Plan:     1. Type 2 diabetes mellitus with diabetic polyneuropathy, with long-term current use of insulin ([facility] [address] [address]): her most recent Hgb A1c was 10.3% in 2/20 down from 10.8% in 11/19 down from 10% in 10/19 up from 10.2% in 9/19 down from 12.4% in 4/19 down from 12.5% in 12/18 down from 13.8% in 9/18 up from 10.6% in 4/18 up from 7.4% in 11/17 up from 6.2% in 6/17 (first visit with me) down from 11.5% in 1/17 during hospital stay for stroke.   A1c is above goal <8% but cost is prohibitive so will stop the lantus and humalog and change to pre-mix insulin  - begin novolin 70/30 pen 50 units before breakfast and 30 units before dinner + 5 units for every 50 mg/dl above 180 mg/dl  - stop lantus 20 units in the morning and 20 units at night  - stop humalog 12 units before meals + 2 units for every 50 mg/dl above 150 mg/dl  - check bs 3 times daily  - foot exam done 11/19  - eye exam UTD 5/18  - microalbumin 3192 11/17, down to 889 in 4/18, up to 2122 in 9/19 when TSH was 144 and 3719 in 11/19 when TSH 12 and 4388 in 2/20   - check Hgb A1c and bmp and microalbumin prior to next visit        2. Thyroid cancer [facility]): She noticed a lump in her neck in 2014 and was found to have a 3.4 cm right lobe nodule with microcalcifications and she made an appt with Dr. Ct Velazquez and he performed a right thyroidectomy in 8/14 and final pathology showed a Follicular carcinoma, Hurthle cell type, at least minimally invasive and ended up having a completion thyroidectomy that was benign. Had a whole body scan at [facility] that fall that didn't show any uptake. Was initially on Synthroid 150 mcg daily but her dose was decreased to 137 mcg daily and has been on this dose for the past 2 years. Did have a TSH of 4.03 in 1/17 during her hospital stay. TSH 2.69 and TG 0.3 with neg TG ab in 6/17 so kept dose the same. TSH up to 3.41 in 11/17 so increased synthroid back to 150 mcg daily and TSH 0.6 in 4/18 and TG 0.3 with neg TG ab so kept her dose the same. Wt down 10 lbs and TSH down to 0.079 in 9/18 so had her take 6 tabs/week and TSH up to 5.98 in 12/18 so changed to 137 mcg daily and TSH 0.39 in 4/19 and TG 0.2 so kept dose the same. Had been out for a couple of weeks prior to lab draw in 9/19 and .5 but with compliance, TSH down to 9.34 and TG 1.7 in 10/19 and TSH 12.8 and TG 2.2 in 11/19 so increased dose back to 150 mcg daily and TSH still 11.5 in 2/20 so increased to 175 mcg daily  - increase synthroid to 175 mcg daily  - check TSH and free T4 prior to next visit  - check thyroglobulin reflex panel in 11/20          3. Essential hypertension, benign: her BP was at goal < 140/90   -  cont current regimen for now  - monitor home blood pressure readings      4.  Hyperlipidemia LDL goal <70: LDL 70.6 in 1/17 on atorvastatin 40 mg and still 80 in 6/17 and 99 in 11/17 possibly due to higher TSH. Was put on 80 mg by PCP and LDL down to 63 in 4/18 and 55 in 9/18 and 58 in 12/18. Up to 185 in 9/19 when TSH was 144. Down to 109 in 10/19 when TSH was 9. Down to 80 in 2/20  - cont atorvastatin 80 mg daily  - check lipids in summer 2020          Patient Instructions   1) Use up the lantus and humalog and then we will change to novolin 70/30 mix insulin. You will have to roll the pen for 10 seconds prior to injection. The same pen needles fit this pen. 2) We will use 50 units of the new insulin 5-10 minutes before breakfast and 30 units 5-10 minutes before dinner. [facility] when you want to start. If you check your sugar and you are over 180, then you can adjust the insulin as below:  Blood sugar  Breakfast  Dinner       Less than 100  Eat first, 45 units Eat first, 25 units   100-180  50 units  30 units  181-230  55 units  35 units    231-280  60 units  40 units   281-330  65 units  45 units    331-380  70 units  50 units    381-430  75 units  55 units    3) TSH is a thyroid test.  Your level is 11 which is still high and above goal of 0.5-2.0. This test goes opposite of your thyroid dose and suggests your dose of synthroid is still not enough. I will increase your dose to 175 mcg daily and have [facility] Feel free to use up the 150 mcg tabs by taking 1 tab on Monday-Saturday and 2 tabs on Sunday. 4) I got your gabapentin approved to take 2 caps three times daily for nerve pain. This should come through the mail in the next week. 5) For diabetic shoes, please contact the clinic below to get fitted for diabetic shoes.   I already faxed over all the paperwork    [facility] [address] [place], [place]. [address]  Phone: [phone]            Follow-up and Dispositions    · Return in about 3 months (around 5/6/2020) for ok to use 12:10.                Copy sent to:  Dr. Florina Escamilla

## 2020-02-10 ENCOUNTER — HOSPITAL ENCOUNTER (INPATIENT)
Age: 80
LOS: 3 days | Discharge: NURSING, REHAB OR LONG-TERM CARE | DRG: 871 | End: 2020-02-13
Attending: EMERGENCY MEDICINE | Admitting: INTERNAL MEDICINE
Payer: MEDICARE

## 2020-02-10 ENCOUNTER — APPOINTMENT (OUTPATIENT)
Dept: CT IMAGING | Age: 80
DRG: 871 | End: 2020-02-10
Attending: EMERGENCY MEDICINE
Payer: MEDICARE

## 2020-02-10 ENCOUNTER — APPOINTMENT (OUTPATIENT)
Dept: GENERAL RADIOLOGY | Age: 80
DRG: 871 | End: 2020-02-10
Attending: EMERGENCY MEDICINE
Payer: MEDICARE

## 2020-02-10 DIAGNOSIS — N10 ACUTE PYELONEPHRITIS: Primary | ICD-10-CM

## 2020-02-10 DIAGNOSIS — G93.40 ACUTE ENCEPHALOPATHY: ICD-10-CM

## 2020-02-10 PROBLEM — N39.0 UTI (URINARY TRACT INFECTION): Status: ACTIVE | Noted: 2020-02-10

## 2020-02-10 PROBLEM — A41.9 SEPSIS (HCC): Status: ACTIVE | Noted: 2020-02-10

## 2020-02-10 LAB
ALBUMIN SERPL-MCNC: 2.3 G/DL (ref 3.5–5)
ALBUMIN/GLOB SERPL: 0.6 {RATIO} (ref 1.1–2.2)
ALP SERPL-CCNC: 70 U/L (ref 45–117)
ALT SERPL-CCNC: 48 U/L (ref 12–78)
AMORPH CRY URNS QL MICRO: ABNORMAL
ANION GAP SERPL CALC-SCNC: 7 MMOL/L (ref 5–15)
APPEARANCE UR: ABNORMAL
AST SERPL-CCNC: 59 U/L (ref 15–37)
BACTERIA URNS QL MICRO: ABNORMAL /HPF
BASOPHILS # BLD: 0 K/UL (ref 0–0.1)
BASOPHILS NFR BLD: 0 % (ref 0–1)
BILIRUB SERPL-MCNC: 0.8 MG/DL (ref 0.2–1)
BILIRUB UR QL: NEGATIVE
BUN SERPL-MCNC: 36 MG/DL (ref 6–20)
BUN/CREAT SERPL: 11 (ref 12–20)
CALCIUM SERPL-MCNC: 8.1 MG/DL (ref 8.5–10.1)
CHLORIDE SERPL-SCNC: 100 MMOL/L (ref 97–108)
CO2 SERPL-SCNC: 28 MMOL/L (ref 21–32)
COLOR UR: ABNORMAL
CREAT SERPL-MCNC: 3.19 MG/DL (ref 0.55–1.02)
DIFFERENTIAL METHOD BLD: ABNORMAL
EOSINOPHIL # BLD: 0.1 K/UL (ref 0–0.4)
EOSINOPHIL NFR BLD: 1 % (ref 0–7)
EPITH CASTS URNS QL MICRO: ABNORMAL /LPF
ERYTHROCYTE [DISTWIDTH] IN BLOOD BY AUTOMATED COUNT: 15.2 % (ref 11.5–14.5)
FLUAV AG NPH QL IA: NEGATIVE
FLUBV AG NOSE QL IA: NEGATIVE
GLOBULIN SER CALC-MCNC: 4 G/DL (ref 2–4)
GLUCOSE BLD STRIP.AUTO-MCNC: 234 MG/DL (ref 65–100)
GLUCOSE SERPL-MCNC: 226 MG/DL (ref 65–100)
GLUCOSE UR STRIP.AUTO-MCNC: 100 MG/DL
GRAN CASTS URNS QL MICRO: ABNORMAL /LPF
HCT VFR BLD AUTO: 28.5 % (ref 35–47)
HGB BLD-MCNC: 9.5 G/DL (ref 11.5–16)
HGB UR QL STRIP: ABNORMAL
IMM GRANULOCYTES # BLD AUTO: 0.1 K/UL (ref 0–0.04)
IMM GRANULOCYTES NFR BLD AUTO: 1 % (ref 0–0.5)
INR PPP: 1.1 (ref 0.9–1.1)
KETONES UR QL STRIP.AUTO: NEGATIVE MG/DL
LACTATE SERPL-SCNC: 1.5 MMOL/L (ref 0.4–2)
LEUKOCYTE ESTERASE UR QL STRIP.AUTO: ABNORMAL
LYMPHOCYTES # BLD: 0.4 K/UL (ref 0.8–3.5)
LYMPHOCYTES NFR BLD: 6 % (ref 12–49)
MAGNESIUM SERPL-MCNC: 1.8 MG/DL (ref 1.6–2.4)
MCH RBC QN AUTO: 29.8 PG (ref 26–34)
MCHC RBC AUTO-ENTMCNC: 33.3 G/DL (ref 30–36.5)
MCV RBC AUTO: 89.3 FL (ref 80–99)
MONOCYTES # BLD: 0.5 K/UL (ref 0–1)
MONOCYTES NFR BLD: 7 % (ref 5–13)
MUCOUS THREADS URNS QL MICRO: ABNORMAL /LPF
NEUTS SEG # BLD: 6.2 K/UL (ref 1.8–8)
NEUTS SEG NFR BLD: 85 % (ref 32–75)
NITRITE UR QL STRIP.AUTO: POSITIVE
NRBC # BLD: 0 K/UL (ref 0–0.01)
NRBC BLD-RTO: 0 PER 100 WBC
PH UR STRIP: 5.5 [PH] (ref 5–8)
PLATELET # BLD AUTO: 205 K/UL (ref 150–400)
PMV BLD AUTO: 12.2 FL (ref 8.9–12.9)
POTASSIUM SERPL-SCNC: 3.8 MMOL/L (ref 3.5–5.1)
PROT SERPL-MCNC: 6.3 G/DL (ref 6.4–8.2)
PROT UR STRIP-MCNC: 300 MG/DL
PROTHROMBIN TIME: 11.2 SEC (ref 9–11.1)
RBC # BLD AUTO: 3.19 M/UL (ref 3.8–5.2)
RBC #/AREA URNS HPF: ABNORMAL /HPF
RBC MORPH BLD: ABNORMAL
SERVICE CMNT-IMP: ABNORMAL
SODIUM SERPL-SCNC: 135 MMOL/L (ref 136–145)
SP GR UR REFRACTOMETRY: 1.02 (ref 1–1.03)
UROBILINOGEN UR QL STRIP.AUTO: 0.2 EU/DL (ref 0.2–1)
WBC # BLD AUTO: 7.3 K/UL (ref 3.6–11)
WBC MORPH BLD: ABNORMAL
WBC URNS QL MICRO: ABNORMAL /HPF

## 2020-02-10 PROCEDURE — 85025 COMPLETE CBC W/AUTO DIFF WBC: CPT

## 2020-02-10 PROCEDURE — 85610 PROTHROMBIN TIME: CPT

## 2020-02-10 PROCEDURE — 87077 CULTURE AEROBIC IDENTIFY: CPT

## 2020-02-10 PROCEDURE — 74011000258 HC RX REV CODE- 258: Performed by: EMERGENCY MEDICINE

## 2020-02-10 PROCEDURE — 70450 CT HEAD/BRAIN W/O DYE: CPT

## 2020-02-10 PROCEDURE — 65270000029 HC RM PRIVATE

## 2020-02-10 PROCEDURE — 96361 HYDRATE IV INFUSION ADD-ON: CPT

## 2020-02-10 PROCEDURE — 74011250636 HC RX REV CODE- 250/636: Performed by: EMERGENCY MEDICINE

## 2020-02-10 PROCEDURE — 87040 BLOOD CULTURE FOR BACTERIA: CPT

## 2020-02-10 PROCEDURE — 93005 ELECTROCARDIOGRAM TRACING: CPT

## 2020-02-10 PROCEDURE — 71046 X-RAY EXAM CHEST 2 VIEWS: CPT

## 2020-02-10 PROCEDURE — 96365 THER/PROPH/DIAG IV INF INIT: CPT

## 2020-02-10 PROCEDURE — 81001 URINALYSIS AUTO W/SCOPE: CPT

## 2020-02-10 PROCEDURE — 87086 URINE CULTURE/COLONY COUNT: CPT

## 2020-02-10 PROCEDURE — 99285 EMERGENCY DEPT VISIT HI MDM: CPT

## 2020-02-10 PROCEDURE — 77030019905 HC CATH URETH INTMIT MDII -A

## 2020-02-10 PROCEDURE — 87186 SC STD MICRODIL/AGAR DIL: CPT

## 2020-02-10 PROCEDURE — 83605 ASSAY OF LACTIC ACID: CPT

## 2020-02-10 PROCEDURE — 74011250637 HC RX REV CODE- 250/637

## 2020-02-10 PROCEDURE — 65660000000 HC RM CCU STEPDOWN

## 2020-02-10 PROCEDURE — 80053 COMPREHEN METABOLIC PANEL: CPT

## 2020-02-10 PROCEDURE — 83970 ASSAY OF PARATHORMONE: CPT

## 2020-02-10 PROCEDURE — 82962 GLUCOSE BLOOD TEST: CPT

## 2020-02-10 PROCEDURE — 83735 ASSAY OF MAGNESIUM: CPT

## 2020-02-10 PROCEDURE — 36415 COLL VENOUS BLD VENIPUNCTURE: CPT

## 2020-02-10 PROCEDURE — 87804 INFLUENZA ASSAY W/OPTIC: CPT

## 2020-02-10 PROCEDURE — 74011250636 HC RX REV CODE- 250/636: Performed by: INTERNAL MEDICINE

## 2020-02-10 PROCEDURE — 51701 INSERT BLADDER CATHETER: CPT

## 2020-02-10 RX ORDER — ACETAMINOPHEN 325 MG/1
650 TABLET ORAL
Status: DISCONTINUED | OUTPATIENT
Start: 2020-02-10 | End: 2020-02-13 | Stop reason: HOSPADM

## 2020-02-10 RX ORDER — ACETAMINOPHEN 325 MG/1
650 TABLET ORAL ONCE
Status: COMPLETED | OUTPATIENT
Start: 2020-02-10 | End: 2020-02-10

## 2020-02-10 RX ORDER — LANOLIN ALCOHOL/MO/W.PET/CERES
1 CREAM (GRAM) TOPICAL 2 TIMES DAILY WITH MEALS
Status: DISCONTINUED | OUTPATIENT
Start: 2020-02-11 | End: 2020-02-13 | Stop reason: HOSPADM

## 2020-02-10 RX ORDER — ALLOPURINOL 100 MG/1
100 TABLET ORAL DAILY
Status: DISCONTINUED | OUTPATIENT
Start: 2020-02-11 | End: 2020-02-13 | Stop reason: HOSPADM

## 2020-02-10 RX ORDER — SODIUM CHLORIDE 9 MG/ML
75 INJECTION, SOLUTION INTRAVENOUS CONTINUOUS
Status: DISCONTINUED | OUTPATIENT
Start: 2020-02-10 | End: 2020-02-11

## 2020-02-10 RX ORDER — CLONIDINE HYDROCHLORIDE 0.1 MG/1
0.6 TABLET ORAL 2 TIMES DAILY
Status: DISCONTINUED | OUTPATIENT
Start: 2020-02-11 | End: 2020-02-11

## 2020-02-10 RX ORDER — HEPARIN SODIUM 5000 [USP'U]/ML
5000 INJECTION, SOLUTION INTRAVENOUS; SUBCUTANEOUS EVERY 8 HOURS
Status: DISCONTINUED | OUTPATIENT
Start: 2020-02-10 | End: 2020-02-13 | Stop reason: HOSPADM

## 2020-02-10 RX ORDER — GABAPENTIN 300 MG/1
300 CAPSULE ORAL 2 TIMES DAILY
Status: DISCONTINUED | OUTPATIENT
Start: 2020-02-11 | End: 2020-02-13 | Stop reason: HOSPADM

## 2020-02-10 RX ORDER — MAGNESIUM SULFATE 100 %
4 CRYSTALS MISCELLANEOUS AS NEEDED
Status: DISCONTINUED | OUTPATIENT
Start: 2020-02-10 | End: 2020-02-13 | Stop reason: HOSPADM

## 2020-02-10 RX ORDER — INSULIN LISPRO 100 [IU]/ML
INJECTION, SOLUTION INTRAVENOUS; SUBCUTANEOUS
Status: DISCONTINUED | OUTPATIENT
Start: 2020-02-11 | End: 2020-02-13 | Stop reason: HOSPADM

## 2020-02-10 RX ORDER — ACETAMINOPHEN 325 MG/1
TABLET ORAL
Status: COMPLETED
Start: 2020-02-10 | End: 2020-02-10

## 2020-02-10 RX ORDER — ATORVASTATIN CALCIUM 40 MG/1
80 TABLET, FILM COATED ORAL DAILY
Status: DISCONTINUED | OUTPATIENT
Start: 2020-02-11 | End: 2020-02-13 | Stop reason: HOSPADM

## 2020-02-10 RX ORDER — OXYCODONE HYDROCHLORIDE 5 MG/1
5 TABLET ORAL
Status: DISCONTINUED | OUTPATIENT
Start: 2020-02-10 | End: 2020-02-13 | Stop reason: HOSPADM

## 2020-02-10 RX ORDER — DEXTROSE MONOHYDRATE 100 MG/ML
0-250 INJECTION, SOLUTION INTRAVENOUS AS NEEDED
Status: DISCONTINUED | OUTPATIENT
Start: 2020-02-10 | End: 2020-02-11

## 2020-02-10 RX ORDER — LABETALOL HYDROCHLORIDE 5 MG/ML
10 INJECTION, SOLUTION INTRAVENOUS
Status: DISCONTINUED | OUTPATIENT
Start: 2020-02-10 | End: 2020-02-12

## 2020-02-10 RX ORDER — ACETAMINOPHEN 325 MG/1
650 TABLET ORAL
Status: DISCONTINUED | OUTPATIENT
Start: 2020-02-10 | End: 2020-02-11

## 2020-02-10 RX ORDER — ONDANSETRON 2 MG/ML
4 INJECTION INTRAMUSCULAR; INTRAVENOUS
Status: DISCONTINUED | OUTPATIENT
Start: 2020-02-10 | End: 2020-02-13 | Stop reason: HOSPADM

## 2020-02-10 RX ORDER — LANOLIN ALCOHOL/MO/W.PET/CERES
50 CREAM (GRAM) TOPICAL DAILY
Status: DISCONTINUED | OUTPATIENT
Start: 2020-02-11 | End: 2020-02-13 | Stop reason: HOSPADM

## 2020-02-10 RX ORDER — METOPROLOL SUCCINATE 50 MG/1
50 TABLET, EXTENDED RELEASE ORAL DAILY
Status: DISCONTINUED | OUTPATIENT
Start: 2020-02-11 | End: 2020-02-11

## 2020-02-10 RX ADMIN — SODIUM CHLORIDE 50 ML/HR: 900 INJECTION, SOLUTION INTRAVENOUS at 23:29

## 2020-02-10 RX ADMIN — HEPARIN SODIUM 5000 UNITS: 5000 INJECTION INTRAVENOUS; SUBCUTANEOUS at 23:30

## 2020-02-10 RX ADMIN — SODIUM CHLORIDE 1000 ML: 900 INJECTION, SOLUTION INTRAVENOUS at 18:59

## 2020-02-10 RX ADMIN — ACETAMINOPHEN 650 MG: 325 TABLET ORAL at 20:21

## 2020-02-10 RX ADMIN — CEFTRIAXONE 1 G: 1 INJECTION, POWDER, FOR SOLUTION INTRAMUSCULAR; INTRAVENOUS at 21:48

## 2020-02-10 NOTE — ED PROVIDER NOTES
EMERGENCY DEPARTMENT HISTORY AND PHYSICAL EXAM      Date: 2/10/2020  Patient Name: Benuel Sacks    History of Presenting Illness     Chief Complaint   Patient presents with    Fever     pt arrives via ems for lethary and fever since today. History Provided By: Patient's Son and EMS    HPI: Benuel Sacks, 67 y.o. female with PMHx significant for CVA with baseline left-sided hemiparesis, diabetes, hypertension, high cholesterol, hypothyroidism, CKD and history of a gallbladder polyp, presents to the ED with cc of fever, not feeling well. Patient started feeling bad today with high fever, however is not able to provide much history at this time given she is having trouble concentrating, remembering exactly what was going on. Apparently the patient's son assessed her and noticed that she was significantly altered so he called EMS. No meds prior to arrival, per collateral information it appears as though she has had no cough/congestion, wheezing/dyspnea. No dysuria/hematuria/ increased urinary frequency. Rest of HPI is limited at this time. There are no other complaints, changes, or physical findings at this time. PCP: Yajaira Polanco MD    No current facility-administered medications on file prior to encounter. Current Outpatient Medications on File Prior to Encounter   Medication Sig Dispense Refill    SYNTHROID 175 mcg tablet Take 1 Tab by mouth Daily (before breakfast). Take 1 Tab by mouth Daily (before breakfast). BRAND MEDICALLY NECESSARY--Delete 150 mcg dose from profile 90 Tab 3    gabapentin (NEURONTIN) 300 mg capsule Take 2 Caps by mouth three (3) times daily. Max Daily Amount: 1,800 mg. 540 Cap 1    Lancing Device with Lancets kit Use as directed to test 3 times daily 1 Kit 0    ONETOUCH ULTRA BLUE TEST STRIP strip TEST 3 TIMES DAILY 300 Strip 3    ONETOUCH ULTRA2 monitoring kit Test 3 times daily.   DX E11.65 1 Kit 0    atorvastatin (LIPITOR) 80 mg tablet Take 80 mg by mouth daily.  ferrous sulfate (IRON) 325 mg (65 mg iron) tablet Take  by mouth daily.  LOR PEN NEEDLE 32 gauge x 5/32\" ndle Use as directed 5 times daily 500 Pen Needle 3    bumetanide (BUMEX) 0.5 mg tablet 0.5 mg two (2) times a day.  PYRIDOXINE HCL, VITAMIN B6, (VITAMIN B-6 PO) Take  by mouth daily.  allopurinol (ZYLOPRIM) 100 mg tablet Take  by mouth daily.  multivitamins-minerals-lutein (CENTRUM SILVER) Tab Take  by mouth daily. Past History     Past Medical History:  Past Medical History:   Diagnosis Date    Arthritis     Cataract     bilateral    Chronic kidney disease     Dr. Iveth Millan Chronic pain     lower back    CKD stage 3 due to type 2 diabetes mellitus ([facility]     Depression     Diabetes ([facility] age 49    Type II    Follicular thyroid cancer ([facility] 08/2014    Foot ulcer ([facility]     Gallbladder polyp 8/14/2013    Hypercholesterolemia     Hypertension     Ill-defined condition     states 'polyp' in gal bladder    Long term current use of anticoagulant therapy     Obesity     Right pontine stroke ([facility] 1/17/2017    TIA (transient ischemic attack) 6/6/2014       Past Surgical History:  Past Surgical History:   Procedure Laterality Date    ABDOMEN SURGERY PROC UNLISTED  2006    stomach    BREAST SURGERY PROCEDURE UNLISTED  2009, 2006    breast bx-vince    COLONOSCOPY N/A 12/17/2019    COLONOSCOPY performed by Reggie Arredondo MD at [address] [place] [address]  12/17/2019         COLORECTAL SCRN; HI RISK IND  5/30/2014         HX BREAST BIOPSY Right [id number]    2 biopies on the right. Benign (per patient)    HX BREAST BIOPSY Left 2015    Benign (per patient)    HX CATARACT REMOVAL Left     HX GYN  1970's    partial hysterectomy    HX HERNIA REPAIR  2009    hiatal    HX HYSTERECTOMY      HX ORTHOPAEDIC Bilateral 1988    bunion    HX THYROIDECTOMY  2014    Dr. Beau Jean       Family History:  Family History   Problem Relation Age of Onset    Heart Disease Mother     Hypertension Mother     Diabetes Mother     Stroke Mother     Cancer Father         colon    Heart Disease Sister     Diabetes Sister     Heart Attack Sister     Hypertension Sister     Lung Disease Brother     Hypertension Brother     Lung Disease Brother     Anesth Problems Neg Hx        Social History:  Social History     Tobacco Use    Smoking status: Never Smoker    Smokeless tobacco: Never Used   Substance Use Topics    Alcohol use: No    Drug use: No       Allergies: Allergies   Allergen Reactions    Amlodipine Swelling    Clindamycin Rash    Nifedipine Swelling    Sulfa (Sulfonamide Antibiotics) Rash         Review of Systems   Review of Systems   Unable to perform ROS: Mental status change       Physical Exam   Physical Exam  Vitals signs and nursing note reviewed. Constitutional:       Appearance: She is obese. She is ill-appearing. HENT:      Head: Normocephalic and atraumatic. Nose: Nose normal.      Mouth/Throat:      Mouth: Mucous membranes are dry. Pharynx: Oropharynx is clear. Eyes:      Extraocular Movements: Extraocular movements intact. Pupils: Pupils are equal, round, and reactive to light. Neck:      Musculoskeletal: Normal range of motion and neck supple. No neck rigidity. Cardiovascular:      Rate and Rhythm: Normal rate and regular rhythm. Heart sounds: Normal heart sounds. Pulmonary:      Effort: Pulmonary effort is normal.      Breath sounds: Normal breath sounds. Abdominal:      Palpations: Abdomen is soft. Tenderness: There is no abdominal tenderness. There is no guarding or rebound. Musculoskeletal:         General: No deformity. Skin:     General: Skin is warm and dry. Findings: No rash. Neurological:      Mental Status: She is alert.       Comments: Patient oriented to self, able to state that she lives alone but otherwise is very loosely oriented to situation, currently not oriented to time or place. No obvious dysarthria, follows commands intermittently, she has a known baseline left-sided hemiparesis but appears to have normal strength on the right side at this time   Psychiatric:         Mood and Affect: Affect is flat. Diagnostic Study Results     Labs -     No results found for this or any previous visit (from the past 24 hour(s)). Radiologic Studies -   CT HEAD WO CONT   Final Result   IMPRESSION:    1. No evidence of acute intracranial abnormality by this modality. XR CHEST PA LAT   Final Result   IMPRESSION:   1. No radiographic evidence of acute cardiopulmonary disease. CT Results  (Last 48 hours)    None        CXR Results  (Last 48 hours)    None            Medical Decision Making   I am the first provider for this patient. I reviewed the vital signs, available nursing notes, past medical history, past surgical history, family history and social history. Vital Signs-Reviewed the patient's vital signs. No data found. Records Reviewed: Nursing Notes and Old Medical Records    Differential Diagnosis/MDM:  Patient presenting with altered mental status. Pt has stable vitals, but is febrile and POC glucose was checked immediately upon arrival. DDx: medication toxicity, infection, anemia, electrolyte/metabolic anomoly, hypercapnea, stroke/bleed/mass, dehydration, illicit drug intoxication. Will obtain labwork, UA, EKG and CT imaging of the head, chest xray. Will consider adding toxicologic workup if history unclear or warrants further investigation of toxic source. Will continue to monitor and reassess for admission. ED Course:     Initial assessment performed. The patients presenting problems have been discussed, and they are in agreement with the care plan formulated and outlined with them. I have encouraged them to ask questions as they arise throughout their visit.     ED Course as of Feb 15 1328   Mon Feb 10, 2020   2126 Pt noted to have UTI, mental status changes most likely 2/2 encephalopathy from UTI. Patient mental status is much improved from initial assessment however family states that she is still not back to her baseline completely. Head CT is negative, will admit to hospitalist for further monitoring/work up. CTX given. Family understands and agrees with plan.     [SC]      ED Course User Index  [SC] Antoine Lopez MD       Disposition:  ADMISSION    Patient is being admitted to the hospital.  The results of their tests and reasons for their admission have been discussed with them and/or available family. They convey agreement and understanding for the need to be admitted and for their admission diagnosis. Consultation has been made with the inpatient physician specialist for hospitalization. PLAN:  1. Discharge Medication List as of 2/13/2020  5:01 PM      START taking these medications    Details   ciprofloxacin HCl (CIPRO) 500 mg tablet Take 1 Tab by mouth every eighteen (18) hours for 9 days. , Print, Disp-12 Tab, R-0         CONTINUE these medications which have CHANGED    Details   cloNIDine HCL (CATAPRES) 0.3 mg tablet Take 1 Tab by mouth three (3) times daily for 60 days. , Print, Disp-90 Tab, R-1      metoprolol succinate (TOPROL-XL) 50 mg XL tablet Take 2 Tabs by mouth daily for 60 days. , Print, Disp-60 Tab, R-1      insulin nph-regular human rec (NOVOLIN 70-30 FLEXPEN U-100) 100 unit/mL (70-30) inpn Inject 30units before breakfast and 30 units before dinner + 5 units for every 50 mg/dl above 150 mg/dl. Max 100 units per day--pt will pay cash, Normal, Disp-30 mL, R-11      hydrALAZINE (APRESOLINE) 25 mg tablet Take 3 Tabs by mouth three (3) times daily for 60 days. , Print, Disp-270 Tab, R-1         CONTINUE these medications which have NOT CHANGED    Details   SYNTHROID 175 mcg tablet Take 1 Tab by mouth Daily (before breakfast). Take 1 Tab by mouth Daily (before breakfast).  BRAND MEDICALLY NECESSARY--Delete 150 mcg dose from profile, Normal, Disp-90 Tab, R-3, JAYSON      gabapentin (NEURONTIN) 300 mg capsule Take 2 Caps by mouth three (3) times daily. Max Daily Amount: 1,800 mg., Normal, Disp-540 Cap, R-1      Lancing Device with Lancets kit Use as directed to test 3 times daily, Normal, Disp-1 Kit, R-0      ONETOUCH ULTRA BLUE TEST STRIP strip TEST 3 TIMES DAILY, Normal, Disp-300 Strip, R-3, JAYSON      ONETOUCH ULTRA2 monitoring kit Test 3 times daily. DX E11.65, Normal, Disp-1 Kit, R-0, JAYSON      atorvastatin (LIPITOR) 80 mg tablet Take 80 mg by mouth daily. , Historical Med      ferrous sulfate (IRON) 325 mg (65 mg iron) tablet Take  by mouth daily. , Historical Med      LOR PEN NEEDLE 32 gauge x \" ndle Use as directed 5 times daily, Normal, Disp-500 Pen Needle, R-3, JAYSON      bumetanide (BUMEX) 0.5 mg tablet 0.5 mg two (2) times a day., Historical Med      PYRIDOXINE HCL, VITAMIN B6, (VITAMIN B-6 PO) Take  by mouth daily. , Historical Med      allopurinol (ZYLOPRIM) 100 mg tablet Take  by mouth daily. , Historical Med      multivitamins-minerals-lutein (CENTRUM SILVER) Tab Take  by mouth daily. , Historical Med         STOP taking these medications       olmesartan (BENICAR) 40 mg tablet Comments:   Reason for Stoppin.   Follow-up Information     Follow up With Specialties Details Why Contact Info    Laurence Murray MD Internal Medicine   [address] [place] [address]  [phone]      Tashi Gordon MD Nephrology Schedule an appointment as soon as possible for a visit in 1 month  [address] [place] [address]  [phone]          Return to ED if worse     Diagnosis     Clinical Impression:   1. Acute pyelonephritis    2.  Acute encephalopathy

## 2020-02-11 LAB
ALBUMIN SERPL-MCNC: 1.9 G/DL (ref 3.5–5)
ALBUMIN/GLOB SERPL: 0.5 {RATIO} (ref 1.1–2.2)
ALP SERPL-CCNC: 61 U/L (ref 45–117)
ALT SERPL-CCNC: 39 U/L (ref 12–78)
ANION GAP SERPL CALC-SCNC: 5 MMOL/L (ref 5–15)
AST SERPL-CCNC: 44 U/L (ref 15–37)
ATRIAL RATE: 97 BPM
BASOPHILS # BLD: 0 K/UL (ref 0–0.1)
BASOPHILS NFR BLD: 0 % (ref 0–1)
BILIRUB SERPL-MCNC: 0.6 MG/DL (ref 0.2–1)
BUN SERPL-MCNC: 37 MG/DL (ref 6–20)
BUN/CREAT SERPL: 11 (ref 12–20)
CALCIUM SERPL-MCNC: 6.9 MG/DL (ref 8.5–10.1)
CALCIUM SERPL-MCNC: 7.3 MG/DL (ref 8.5–10.1)
CALCULATED P AXIS, ECG09: 42 DEGREES
CALCULATED R AXIS, ECG10: -45 DEGREES
CALCULATED T AXIS, ECG11: 102 DEGREES
CHLORIDE SERPL-SCNC: 100 MMOL/L (ref 97–108)
CO2 SERPL-SCNC: 28 MMOL/L (ref 21–32)
CREAT SERPL-MCNC: 3.32 MG/DL (ref 0.55–1.02)
DIAGNOSIS, 93000: NORMAL
DIFFERENTIAL METHOD BLD: ABNORMAL
EOSINOPHIL # BLD: 0 K/UL (ref 0–0.4)
EOSINOPHIL NFR BLD: 0 % (ref 0–7)
ERYTHROCYTE [DISTWIDTH] IN BLOOD BY AUTOMATED COUNT: 15.3 % (ref 11.5–14.5)
EST. AVERAGE GLUCOSE BLD GHB EST-MCNC: 237 MG/DL
FERRITIN SERPL-MCNC: 224 NG/ML (ref 8–252)
GLOBULIN SER CALC-MCNC: 3.6 G/DL (ref 2–4)
GLUCOSE BLD STRIP.AUTO-MCNC: 151 MG/DL (ref 65–100)
GLUCOSE BLD STRIP.AUTO-MCNC: 310 MG/DL (ref 65–100)
GLUCOSE BLD STRIP.AUTO-MCNC: 355 MG/DL (ref 65–100)
GLUCOSE BLD STRIP.AUTO-MCNC: 89 MG/DL (ref 65–100)
GLUCOSE SERPL-MCNC: 433 MG/DL (ref 65–100)
HBA1C MFR BLD: 9.9 % (ref 4–5.6)
HCT VFR BLD AUTO: 24 % (ref 35–47)
HGB BLD-MCNC: 8 G/DL (ref 11.5–16)
IMM GRANULOCYTES # BLD AUTO: 0.1 K/UL (ref 0–0.04)
IMM GRANULOCYTES NFR BLD AUTO: 1 % (ref 0–0.5)
INR PPP: 1.1 (ref 0.9–1.1)
IRON SATN MFR SERPL: 7 % (ref 20–50)
IRON SERPL-MCNC: 11 UG/DL (ref 35–150)
LYMPHOCYTES # BLD: 0.6 K/UL (ref 0.8–3.5)
LYMPHOCYTES NFR BLD: 7 % (ref 12–49)
MAGNESIUM SERPL-MCNC: 1.7 MG/DL (ref 1.6–2.4)
MCH RBC QN AUTO: 29.5 PG (ref 26–34)
MCHC RBC AUTO-ENTMCNC: 33.3 G/DL (ref 30–36.5)
MCV RBC AUTO: 88.6 FL (ref 80–99)
MONOCYTES # BLD: 1 K/UL (ref 0–1)
MONOCYTES NFR BLD: 11 % (ref 5–13)
NEUTS SEG # BLD: 7 K/UL (ref 1.8–8)
NEUTS SEG NFR BLD: 81 % (ref 32–75)
NRBC # BLD: 0 K/UL (ref 0–0.01)
NRBC BLD-RTO: 0 PER 100 WBC
P-R INTERVAL, ECG05: 128 MS
PHOSPHATE SERPL-MCNC: 3.5 MG/DL (ref 2.6–4.7)
PLATELET # BLD AUTO: 177 K/UL (ref 150–400)
PMV BLD AUTO: 12.1 FL (ref 8.9–12.9)
POTASSIUM SERPL-SCNC: 3.7 MMOL/L (ref 3.5–5.1)
PROT SERPL-MCNC: 5.5 G/DL (ref 6.4–8.2)
PROTHROMBIN TIME: 11.5 SEC (ref 9–11.1)
PTH-INTACT SERPL-MCNC: 117.8 PG/ML (ref 18.4–88)
Q-T INTERVAL, ECG07: 354 MS
QRS DURATION, ECG06: 82 MS
QTC CALCULATION (BEZET), ECG08: 449 MS
RBC # BLD AUTO: 2.71 M/UL (ref 3.8–5.2)
SERVICE CMNT-IMP: ABNORMAL
SERVICE CMNT-IMP: NORMAL
SODIUM SERPL-SCNC: 133 MMOL/L (ref 136–145)
TIBC SERPL-MCNC: 167 UG/DL (ref 250–450)
TSH SERPL DL<=0.05 MIU/L-ACNC: 2.74 UIU/ML (ref 0.36–3.74)
URATE SERPL-MCNC: 4.6 MG/DL (ref 2.6–6)
VENTRICULAR RATE, ECG03: 97 BPM
WBC # BLD AUTO: 8.6 K/UL (ref 3.6–11)

## 2020-02-11 PROCEDURE — 74011250637 HC RX REV CODE- 250/637: Performed by: INTERNAL MEDICINE

## 2020-02-11 PROCEDURE — 82962 GLUCOSE BLOOD TEST: CPT

## 2020-02-11 PROCEDURE — 74011250636 HC RX REV CODE- 250/636: Performed by: INTERNAL MEDICINE

## 2020-02-11 PROCEDURE — 83036 HEMOGLOBIN GLYCOSYLATED A1C: CPT

## 2020-02-11 PROCEDURE — 97530 THERAPEUTIC ACTIVITIES: CPT

## 2020-02-11 PROCEDURE — 83540 ASSAY OF IRON: CPT

## 2020-02-11 PROCEDURE — 65660000000 HC RM CCU STEPDOWN

## 2020-02-11 PROCEDURE — 74011000258 HC RX REV CODE- 258: Performed by: INTERNAL MEDICINE

## 2020-02-11 PROCEDURE — 74011636637 HC RX REV CODE- 636/637: Performed by: INTERNAL MEDICINE

## 2020-02-11 PROCEDURE — 97535 SELF CARE MNGMENT TRAINING: CPT

## 2020-02-11 PROCEDURE — 97161 PT EVAL LOW COMPLEX 20 MIN: CPT

## 2020-02-11 PROCEDURE — 97165 OT EVAL LOW COMPLEX 30 MIN: CPT

## 2020-02-11 PROCEDURE — 85610 PROTHROMBIN TIME: CPT

## 2020-02-11 PROCEDURE — 80053 COMPREHEN METABOLIC PANEL: CPT

## 2020-02-11 PROCEDURE — 83735 ASSAY OF MAGNESIUM: CPT

## 2020-02-11 PROCEDURE — 84550 ASSAY OF BLOOD/URIC ACID: CPT

## 2020-02-11 PROCEDURE — 36415 COLL VENOUS BLD VENIPUNCTURE: CPT

## 2020-02-11 PROCEDURE — 84443 ASSAY THYROID STIM HORMONE: CPT

## 2020-02-11 PROCEDURE — 84100 ASSAY OF PHOSPHORUS: CPT

## 2020-02-11 PROCEDURE — 85025 COMPLETE CBC W/AUTO DIFF WBC: CPT

## 2020-02-11 PROCEDURE — 77030038269 HC DRN EXT URIN PURWCK BARD -A

## 2020-02-11 PROCEDURE — 97116 GAIT TRAINING THERAPY: CPT

## 2020-02-11 PROCEDURE — 82728 ASSAY OF FERRITIN: CPT

## 2020-02-11 RX ORDER — METOPROLOL SUCCINATE 50 MG/1
100 TABLET, EXTENDED RELEASE ORAL DAILY
Status: DISCONTINUED | OUTPATIENT
Start: 2020-02-11 | End: 2020-02-13 | Stop reason: HOSPADM

## 2020-02-11 RX ORDER — INSULIN LISPRO 100 [IU]/ML
8 INJECTION, SOLUTION INTRAVENOUS; SUBCUTANEOUS
Status: DISCONTINUED | OUTPATIENT
Start: 2020-02-11 | End: 2020-02-12

## 2020-02-11 RX ORDER — CLONIDINE HYDROCHLORIDE 0.1 MG/1
0.3 TABLET ORAL 3 TIMES DAILY
Status: DISCONTINUED | OUTPATIENT
Start: 2020-02-11 | End: 2020-02-13 | Stop reason: HOSPADM

## 2020-02-11 RX ORDER — INSULIN GLARGINE 100 [IU]/ML
45 INJECTION, SOLUTION SUBCUTANEOUS DAILY
Status: DISCONTINUED | OUTPATIENT
Start: 2020-02-11 | End: 2020-02-12

## 2020-02-11 RX ORDER — DEXTROSE 50 % IN WATER (D50W) INTRAVENOUS SYRINGE
25 AS NEEDED
Status: DISCONTINUED | OUTPATIENT
Start: 2020-02-11 | End: 2020-02-13 | Stop reason: HOSPADM

## 2020-02-11 RX ORDER — SODIUM CHLORIDE 9 MG/ML
50 INJECTION, SOLUTION INTRAVENOUS CONTINUOUS
Status: DISCONTINUED | OUTPATIENT
Start: 2020-02-11 | End: 2020-02-13 | Stop reason: HOSPADM

## 2020-02-11 RX ADMIN — HEPARIN SODIUM 5000 UNITS: 5000 INJECTION INTRAVENOUS; SUBCUTANEOUS at 05:55

## 2020-02-11 RX ADMIN — HEPARIN SODIUM 5000 UNITS: 5000 INJECTION INTRAVENOUS; SUBCUTANEOUS at 17:44

## 2020-02-11 RX ADMIN — SODIUM CHLORIDE 75 ML/HR: 900 INJECTION, SOLUTION INTRAVENOUS at 15:10

## 2020-02-11 RX ADMIN — CEFTRIAXONE 1 G: 1 INJECTION, POWDER, FOR SOLUTION INTRAMUSCULAR; INTRAVENOUS at 22:18

## 2020-02-11 RX ADMIN — INSULIN LISPRO 7 UNITS: 100 INJECTION, SOLUTION INTRAVENOUS; SUBCUTANEOUS at 12:53

## 2020-02-11 RX ADMIN — HEPARIN SODIUM 5000 UNITS: 5000 INJECTION INTRAVENOUS; SUBCUTANEOUS at 21:03

## 2020-02-11 RX ADMIN — GABAPENTIN 300 MG: 300 CAPSULE ORAL at 09:56

## 2020-02-11 RX ADMIN — INSULIN LISPRO 8 UNITS: 100 INJECTION, SOLUTION INTRAVENOUS; SUBCUTANEOUS at 12:53

## 2020-02-11 RX ADMIN — INSULIN LISPRO 15 UNITS: 100 INJECTION, SOLUTION INTRAVENOUS; SUBCUTANEOUS at 09:58

## 2020-02-11 RX ADMIN — GABAPENTIN 300 MG: 300 CAPSULE ORAL at 17:43

## 2020-02-11 RX ADMIN — PYRIDOXINE HCL TAB 50 MG 50 MG: 50 TAB at 12:52

## 2020-02-11 RX ADMIN — ACETAMINOPHEN 650 MG: 325 TABLET ORAL at 20:57

## 2020-02-11 RX ADMIN — ATORVASTATIN CALCIUM 80 MG: 40 TABLET, FILM COATED ORAL at 09:56

## 2020-02-11 RX ADMIN — SODIUM CHLORIDE 50 ML/HR: 900 INJECTION, SOLUTION INTRAVENOUS at 00:11

## 2020-02-11 RX ADMIN — CLONIDINE HYDROCHLORIDE 0.3 MG: 0.1 TABLET ORAL at 21:03

## 2020-02-11 RX ADMIN — CLONIDINE HYDROCHLORIDE 0.3 MG: 0.1 TABLET ORAL at 09:56

## 2020-02-11 RX ADMIN — INSULIN LISPRO 2 UNITS: 100 INJECTION, SOLUTION INTRAVENOUS; SUBCUTANEOUS at 17:46

## 2020-02-11 RX ADMIN — CLONIDINE HYDROCHLORIDE 0.3 MG: 0.1 TABLET ORAL at 17:43

## 2020-02-11 RX ADMIN — LEVOTHYROXINE SODIUM 175 MCG: 150 TABLET ORAL at 05:55

## 2020-02-11 RX ADMIN — METOPROLOL SUCCINATE 100 MG: 50 TABLET, EXTENDED RELEASE ORAL at 09:56

## 2020-02-11 RX ADMIN — ALLOPURINOL 100 MG: 100 TABLET ORAL at 09:56

## 2020-02-11 RX ADMIN — EPOETIN ALFA-EPBX 10000 UNITS: 10000 INJECTION, SOLUTION INTRAVENOUS; SUBCUTANEOUS at 21:07

## 2020-02-11 RX ADMIN — INSULIN GLARGINE 45 UNITS: 100 INJECTION, SOLUTION SUBCUTANEOUS at 12:53

## 2020-02-11 RX ADMIN — INSULIN LISPRO 8 UNITS: 100 INJECTION, SOLUTION INTRAVENOUS; SUBCUTANEOUS at 17:44

## 2020-02-11 NOTE — CONSULTS
[facility] COnsult Note        NAME: Dayne Severance       :         MRN:  [id number]     Date/Time: 2020    Risk of deterioration: medium       Assessment:    Plan:  GNR Bacteremia/Fever  UTI  CKD IV-Pam, with TARUN  ANemia  HTN  Hx of edema 2 to CCBs  DM Fausto Wei)  (+) Large ventral hernia Changed clonidine to 0.3 mg tid  Increased beta blocker to 100mg  Awaiting cultures  Fever down  Pt with known CKD IV-baseline creatinine 2.4-2.8  Creatinine 3.3-underlying hypertensive nephrosclerosis and diabetic nephropathy  EPO, no iv iron in setting of active infection  Nephrocap  Check bone metabolism labs  Will follow     Asked to see for renal failure. Pt admitted with fever and uti. Pt states she saw her nephrologist yesterday but didn't tell him about the dysuria/fever. PT follows with dr. Brianna Swenson and states came to [facility] b/c he is here. Subjective:     Chief Complaint:  I'm feeling better now    Review of Systems:  No n/v (+) F/dysuria  (-) CP/SOB    Objective:     VITALS:   Last 24hrs VS reviewed since prior progress note. Most recent are:  Visit Vitals  /68   Pulse 76   Temp 99.2 °F (37.3 °C)   Resp 19   Ht 5' 5\" (1.651 m)   Wt 95.3 kg (210 lb)   SpO2 94%   BMI 34.95 kg/m²     SpO2 Readings from Last 6 Encounters:   20 94%   19 100%   19 100%   10/15/19 100%   19 100%   19 100%            Intake/Output Summary (Last 24 hours) at 2020 1245  Last data filed at 2020 0602  Gross per 24 hour   Intake 1095.83 ml   Output 500 ml   Net 595.83 ml          PHYSICAL EXAM:    General   well developed, well nourished, appears stated age, in no acute distress  EENT  Normocephalic, Atraumatic, EOMI, sclera clear, nares clear, pharynx normal  Respiratory   Clear To Auscultation bilaterally   Cardiology  Regular Rate and Rythmn    Abdominal  Soft, non-tender, non-distended (+) ventral hernia  Extremities  No clubbing, cyanosis, or edema. Pulses intact. Skin  Normal skin turgor. No rashes or skin ulcers noted              Lab Data Reviewed: (see below)    Medications Reviewed: (see below)    PMH/SH reviewed - no change compared to H&P  ___________________________________________  ___________________________________________________    Attending Physician: Helder Estrella MD     ____________________________________________________  MEDICATIONS:  Current Facility-Administered Medications   Medication Dose Route Frequency    cloNIDine HCL (CATAPRES) tablet 0.3 mg  0.3 mg Oral TID    metoprolol succinate (TOPROL-XL) XL tablet 100 mg  100 mg Oral DAILY    insulin glargine (LANTUS) injection 45 Units  45 Units SubCUTAneous DAILY    And    insulin lispro (HUMALOG) injection 8 Units  8 Units SubCUTAneous TIDAC    allopurinoL (ZYLOPRIM) tablet 100 mg  100 mg Oral DAILY    atorvastatin (LIPITOR) tablet 80 mg  80 mg Oral DAILY    ferrous sulfate tablet 325 mg  1 Tab Oral BID WITH MEALS    gabapentin (NEURONTIN) capsule 300 mg  300 mg Oral BID    pyridoxine (vitamin B6) (VITAMIN B-6) tablet 50 mg  50 mg Oral DAILY    levothyroxine (SYNTHROID) tablet 175 mcg  175 mcg Oral ACB    cefTRIAXone (ROCEPHIN) 1 g in 0.9% sodium chloride (MBP/ADV) 50 mL  1 g IntraVENous Q24H    labetaloL (NORMODYNE;TRANDATE) injection 10 mg  10 mg IntraVENous Q6H PRN    acetaminophen (TYLENOL) tablet 650 mg  650 mg Oral Q4H PRN    ondansetron (ZOFRAN) injection 4 mg  4 mg IntraVENous Q6H PRN    0.9% sodium chloride infusion  75 mL/hr IntraVENous CONTINUOUS    insulin lispro (HUMALOG) injection   SubCUTAneous AC&HS    glucose chewable tablet 16 g  4 Tab Oral PRN    glucagon (GLUCAGEN) injection 1 mg  1 mg IntraMUSCular PRN    oxyCODONE IR (ROXICODONE) tablet 5 mg  5 mg Oral Q4H PRN    heparin (porcine) injection 5,000 Units  5,000 Units SubCUTAneous Q8H    dextrose 10% infusion 0-250 mL  0-250 mL IntraVENous PRN        LABS:  Recent Labs     02/11/20  0031 02/10/20  1851   WBC 8.6 7.3   HGB 8.0* 9.5*   HCT 24.0* 28.5*    205     Recent Labs     02/11/20  0031 02/10/20  2332 02/10/20  1851   *  --  135*   K 3.7  --  3.8     --  100   CO2 28  --  28   BUN 37*  --  36*   CREA 3.32*  --  3.19*   *  --  226*   CA 7.3* PENDING 8.1*   MG 1.7  --  1.8   PHOS 3.5  --   --    URICA 4.6  --   --      Recent Labs     02/11/20  0031 02/10/20  1851   SGOT 44* 59*   ALT 39 48   AP 61 70   TBILI 0.6 0.8   TP 5.5* 6.3*   ALB 1.9* 2.3*   GLOB 3.6 4.0     Recent Labs     02/11/20  0031 02/10/20  1851   INR 1.1 1.1   PTP 11.5* 11.2*      Recent Labs     02/11/20 0031   TIBC 167*   PSAT 7*   FERR 224      No results for input(s): PH, PCO2, PO2 in the last 72 hours. No results for input(s): CPK, CKNDX, TROIQ in the last 72 hours.     No lab exists for component: CPKMB  Lab Results   Component Value Date/Time    Glucose (POC) 310 (H) 02/11/2020 11:40 AM    Glucose (POC) 355 (H) 02/11/2020 09:21 AM    Glucose (POC) 234 (H) 02/10/2020 06:39 PM    Glucose (POC) 110 (H) 12/17/2019 12:54 PM    Glucose (POC) 64 (L) 12/17/2019 12:20 PM

## 2020-02-11 NOTE — H&P
Hospitalist Admission Note    NAME: Lul Tate   :     MRN:  [id number]     Date/Time:  2/10/2020 9:57 PM    Patient PCP: Chris Millan MD  ______________________________________________________________________  Given the patient's current clinical presentation, I have a high level of concern for decompensation if discharged from the emergency department. Complex decision making was performed, which includes reviewing the patient's available past medical records, laboratory results, and x-ray films. My assessment of this patient's clinical condition and my plan of care is as follows. Assessment / Plan:  Sepsis POA: fever, tachycardia, encephalopathy due to UTI, start IVF and ABX. UTI: start CTX, F/U cultures. Acute Metabolic Encephalopathy: due to UTI, c/w ABX and monitor. TARUN on CKD: creatinine worsening, per record patient having significant proteinuria, check prot/creat ratio and pth, get Nephrology evaluation, start gentle hydration. DM type II with renal manifestation POA: c/w 75/25, place SSI, check HBA1C. H/O CVA: with residual left sided weakness, seems stable at this time. Essential HTN POA: c/w BB/Clonidine, use labetalol prn, hold ARB for now  Hypothyroidism POA: c/w L-thyroxine  Hyperlipidemia POA c/w statin  Gallbladder polyp, followed by Dr Talya Adam [facility]    Code Status: Full Code  Surrogate Decision Maker: bonita Freeman [phone] or [phone], bonita Sethi [phone]  DVT Prophylaxis: heparin  GI Prophylaxis: not indicated  Baseline: independent lives with Thad Sood, has some left sided weakness      Subjective:   CHIEF COMPLAINT: \"I was not feeling well, having fevers\"    HISTORY OF PRESENT ILLNESS:     Juliet Hu is a 67 y.o.  female  with PMHx significant for CVA, HTN, DM, CKD, Obesity, Chronic Pain, presents to the ED with cc of fever, not feeling well.  Started feeling bad today with high fever, no meds prior to arrival, no cough/congestion, wheezing/dyspnea. No dysuria/hematuria, increased urinary frequency. AT this time patient is feeling better c/o fevers, low abdominal tenderness, denies chest pain, no SOB, no N/V no diarrhea, no other associated symptoms. We were asked to admit for work up and evaluation of the above problems. Past Medical History:   Diagnosis Date    Arthritis     Cataract     bilateral    Chronic kidney disease     Dr. Iveth Millan Chronic pain     lower back    CKD stage 3 due to type 2 diabetes mellitus ([facility]     Depression     Diabetes ([facility] [address] [facility] age 49    Type II    Follicular thyroid cancer ([facility] [address] [facility] 08/2014    Foot ulcer ([facility]     Gallbladder polyp 8/14/2013    Hypercholesterolemia     Hypertension     Ill-defined condition     states 'polyp' in gal bladder    Long term current use of anticoagulant therapy     Obesity     Right pontine stroke ([facility] 1/17/2017    TIA (transient ischemic attack) 6/6/2014        Past Surgical History:   Procedure Laterality Date    ABDOMEN SURGERY PROC UNLISTED  2006    stomach    BREAST SURGERY PROCEDURE UNLISTED  2009, 2006    breast bx-vince    COLONOSCOPY N/A 12/17/2019    COLONOSCOPY performed by Reggie Arredondo MD at [address] [place] [address]  12/17/2019         COLORECTAL SCRN; HI RISK IND  5/30/2014         HX BREAST BIOPSY Right [id number]    2 biopies on the right. Benign (per patient)    HX BREAST BIOPSY Left 2015    Benign (per patient)    HX CATARACT REMOVAL Left     HX GYN  18's    partial hysterectomy    HX HERNIA REPAIR  2009    hiatal    HX HYSTERECTOMY      HX ORTHOPAEDIC Bilateral 1988    bunion    HX THYROIDECTOMY  2014    Dr. Helen Negrete History     Tobacco Use    Smoking status: Never Smoker    Smokeless tobacco: Never Used   Substance Use Topics    Alcohol use: No        Family History   Problem Relation Age of Onset    Heart Disease Mother     Hypertension Mother    Memo Casey Diabetes Mother     Stroke Mother     Cancer Father         colon    Heart Disease Sister     Diabetes Sister     Heart Attack Sister     Hypertension Sister     Lung Disease Brother     Hypertension Brother     Lung Disease Brother     Anesth Problems Neg Hx      Allergies   Allergen Reactions    Amlodipine Swelling    Clindamycin Rash    Nifedipine Swelling    Sulfa (Sulfonamide Antibiotics) Rash        Prior to Admission medications    Medication Sig Start Date End Date Taking? Authorizing Provider   insulin nph-regular human rec (NOVOLIN 70-30 FLEXPEN U-100) 100 unit/mL (70-30) inpn Inject 50 units before breakfast and 30 units before dinner + 5 units for every 50 mg/dl above 150 mg/dl. Max 100 units per day--pt will pay mckeon 2/6/20   Marlene Groves MD   SYNTHROID 175 mcg tablet Take 1 Tab by mouth Daily (before breakfast). Take 1 Tab by mouth Daily (before breakfast). BRAND MEDICALLY NECESSARY--Delete 150 mcg dose from profile 2/6/20   Marlene Groves MD   gabapentin (NEURONTIN) 300 mg capsule Take 2 Caps by mouth three (3) times daily. Max Daily Amount: 1,800 mg. 1/31/20   Marlene Groves MD   metoprolol succinate (TOPROL-XL) 50 mg XL tablet Take  by mouth daily. Provider, Historical   olmesartan (BENICAR) 40 mg tablet Take  by mouth daily. Provider, Historical   Lancing Device with Lancets kit Use as directed to test 3 times daily 9/11/19   Marlene Groves MD   [facility] ULTRA BLUE TEST STRIP strip TEST 3 TIMES DAILY 9/10/19   Marlene Groves MD   [facility] monitoring kit Test 3 times daily. DX E11.65 4/22/19   Marlene Groves MD   atorvastatin (LIPITOR) 80 mg tablet Take 80 mg by mouth daily. Provider, Historical   ferrous sulfate (IRON) 325 mg (65 mg iron) tablet Take  by mouth daily.     Provider, Historical   LOR PEN NEEDLE 32 gauge x 5/32\" ndle Use as directed 5 times daily 1/15/18   Marlene Groves MD   bumetanide (BUMEX) 0.5 mg tablet 0.5 mg two (2) times a day. 9/29/17   Provider, Historical   PYRIDOXINE HCL, VITAMIN B6, (VITAMIN B-6 PO) Take  by mouth daily. Provider, Historical   allopurinol (ZYLOPRIM) 100 mg tablet Take  by mouth daily. Provider, Historical   cloNIDine (CATAPRES) 0.3 mg tablet Take 0.6 mg by mouth two (2) times a day. Provider, Historical   multivitamins-minerals-lutein (CENTRUM SILVER) Tab Take  by mouth daily. Provider, Historical       REVIEW OF SYSTEMS:     I am not able to complete the review of systems because:    The patient is intubated and sedated    The patient has altered mental status due to his acute medical problems    The patient has baseline aphasia from prior stroke(s)    The patient has baseline dementia and is not reliable historian    The patient is in acute medical distress and unable to provide information           Total of 12 systems reviewed as follows:       POSITIVE= underlined text  Negative = text not underlined  General:  fever, chills, sweats, generalized weakness, weight loss/gain,      loss of appetite   Eyes:    blurred vision, eye pain, loss of vision, double vision  ENT:    rhinorrhea, pharyngitis   Respiratory:   cough, sputum production, SOB, ALONSO, wheezing, pleuritic pain   Cardiology:   chest pain, palpitations, orthopnea, PND, edema, syncope   Gastrointestinal:  abdominal pain , N/V, diarrhea, dysphagia, constipation, bleeding   Genitourinary:  frequency, urgency, dysuria, hematuria, incontinence   Muskuloskeletal :  arthralgia, myalgia, back pain  Hematology:  easy bruising, nose or gum bleeding, lymphadenopathy   Dermatological: rash, ulceration, pruritis, color change / jaundice  Endocrine:   hot flashes or polydipsia   Neurological:  headache, dizziness, confusion, focal weakness, paresthesia,     Speech difficulties, memory loss, gait difficulty  Psychological: Feelings of anxiety, depression, agitation    Objective:   VITALS:    Visit Vitals  /71   Pulse 89   Temp 99.5 °F (37.5 °C)   Resp 30   Ht 5' 5\" (1.651 m)   Wt 95.3 kg (210 lb)   SpO2 96%   BMI 34.95 kg/m²       PHYSICAL EXAM:    General:    Alert, cooperative, no distress, appears stated age. HEENT: Atraumatic, anicteric sclerae, pink conjunctivae     No oral ulcers, mucosa moist, throat clear, dentition poor  Neck:  Supple, symmetrical,  thyroid: non tender  Lungs:   Coarse BS  Chest wall:  No tenderness  No Accessory muscle use. Heart:   Regular  rhythm,  No  murmur   +  edema  Abdomen:   Soft, mild tender. Not distended. Bowel sounds normal, ventral hernia  Extremities: No cyanosis. No clubbing,      Skin turgor normal, Capillary refill normal, Radial  pulse 2+  Skin:     Not pale. Not Jaundiced  No rashes   Psych:  Good insight. Not depressed. Not anxious or agitated. Neurologic: Awake, oriented x3, GCS [id number], left sided weakness mild    _______________________________________________________________________  Care Plan discussed with:    Comments   Patient y    Family  y son   RN y    Care Manager                    Consultant:      _______________________________________________________________________  Expected  Disposition:   Home with Family    HH/PT/OT/RN y   SNF/LTC    [facility]    ________________________________________________________________________  TOTAL TIME:  61 Minutes    Critical Care Provided     Minutes non procedure based      Comments    y Reviewed previous records   >50% of visit spent in counseling and coordination of care y Discussion with patient and/or family and questions answered       ________________________________________________________________________  Signed: Daniel Aguilar MD    Procedures: see electronic medical records for all procedures/Xrays and details which were not copied into this note but were reviewed prior to creation of Plan.     LAB DATA REVIEWED:    Recent Results (from the past 24 hour(s))   GLUCOSE, POC    Collection Time: 02/10/20  6:39 PM   Result Value Ref Range    Glucose (POC) 234 (H) 65 - 100 mg/dL    Performed by Ivan Dodd    EKG, 12 LEAD, INITIAL    Collection Time: 02/10/20  6:46 PM   Result Value Ref Range    Ventricular Rate 97 BPM    Atrial Rate 97 BPM    P-R Interval 128 ms    QRS Duration 82 ms    Q-T Interval 354 ms    QTC Calculation (Bezet) 449 ms    Calculated P Axis 42 degrees    Calculated R Axis -45 degrees    Calculated T Axis 102 degrees    Diagnosis       Normal sinus rhythm  Left axis deviation  Nonspecific ST and T wave abnormality  When compared with ECG of 08-SEP-2019 19:03,  No significant change was found     LACTIC ACID    Collection Time: 02/10/20  6:51 PM   Result Value Ref Range    Lactic acid 1.5 0.4 - 2.0 MMOL/L   CBC WITH AUTOMATED DIFF    Collection Time: 02/10/20  6:51 PM   Result Value Ref Range    WBC 7.3 3.6 - 11.0 K/uL    RBC 3.19 (L) 3.80 - 5.20 M/uL    HGB 9.5 (L) 11.5 - 16.0 g/dL    HCT 28.5 (L) 35.0 - 47.0 %    MCV 89.3 80.0 - 99.0 FL    MCH 29.8 26.0 - 34.0 PG    MCHC 33.3 30.0 - 36.5 g/dL    RDW 15.2 (H) 11.5 - 14.5 %    PLATELET 185 210 - 946 K/uL    MPV 12.2 8.9 - 12.9 FL    NRBC 0.0 0  WBC    ABSOLUTE NRBC 0.00 0.00 - 0.01 K/uL    NEUTROPHILS 85 (H) 32 - 75 %    LYMPHOCYTES 6 (L) 12 - 49 %    MONOCYTES 7 5 - 13 %    EOSINOPHILS 1 0 - 7 %    BASOPHILS 0 0 - 1 %    IMMATURE GRANULOCYTES 1 (H) 0.0 - 0.5 %    ABS. NEUTROPHILS 6.2 1.8 - 8.0 K/UL    ABS. LYMPHOCYTES 0.4 (L) 0.8 - 3.5 K/UL    ABS. MONOCYTES 0.5 0.0 - 1.0 K/UL    ABS. EOSINOPHILS 0.1 0.0 - 0.4 K/UL    ABS. BASOPHILS 0.0 0.0 - 0.1 K/UL    ABS. IMM.  GRANS. 0.1 (H) 0.00 - 0.04 K/UL    DF SMEAR SCANNED      RBC COMMENTS NORMOCYTIC, NORMOCHROMIC      WBC COMMENTS TOXIC GRANULATION     PROTHROMBIN TIME + INR    Collection Time: 02/10/20  6:51 PM   Result Value Ref Range    INR 1.1 0.9 - 1.1      Prothrombin time 11.2 (H) 9.0 - 25.0 sec   METABOLIC PANEL, COMPREHENSIVE    Collection Time: 02/10/20  6:51 PM   Result Value Ref Range    Sodium 135 (L) 136 - 145 mmol/L    Potassium 3.8 3.5 - 5.1 mmol/L    Chloride 100 97 - 108 mmol/L    CO2 28 21 - 32 mmol/L    Anion gap 7 5 - 15 mmol/L    Glucose 226 (H) 65 - 100 mg/dL    BUN 36 (H) 6 - 20 MG/DL    Creatinine 3.19 (H) 0.55 - 1.02 MG/DL    BUN/Creatinine ratio 11 (L) 12 - 20      GFR est AA 17 (L) >60 ml/min/1.73m2    GFR est non-AA 14 (L) >60 ml/min/1.73m2    Calcium 8.1 (L) 8.5 - 10.1 MG/DL    Bilirubin, total 0.8 0.2 - 1.0 MG/DL    ALT (SGPT) 48 12 - 78 U/L    AST (SGOT) 59 (H) 15 - 37 U/L    Alk.  phosphatase 70 45 - 117 U/L    Protein, total 6.3 (L) 6.4 - 8.2 g/dL    Albumin 2.3 (L) 3.5 - 5.0 g/dL    Globulin 4.0 2.0 - 4.0 g/dL    A-G Ratio 0.6 (L) 1.1 - 2.2     MAGNESIUM    Collection Time: 02/10/20  6:51 PM   Result Value Ref Range    Magnesium 1.8 1.6 - 2.4 mg/dL   INFLUENZA A+B VIRAL AGS    Collection Time: 02/10/20  6:51 PM   Result Value Ref Range    Influenza A Antigen NEGATIVE  NEG      Influenza B Antigen NEGATIVE  NEG     URINALYSIS W/ RFLX MICROSCOPIC    Collection Time: 02/10/20  9:10 PM   Result Value Ref Range    Color YELLOW/STRAW      Appearance TURBID (A) CLEAR      Specific gravity 1.017 1.003 - 1.030      pH (UA) 5.5 5.0 - 8.0      Protein 300 (A) NEG mg/dL    Glucose 100 (A) NEG mg/dL    Ketone NEGATIVE  NEG mg/dL    Bilirubin NEGATIVE  NEG      Blood MODERATE (A) NEG      Urobilinogen 0.2 0.2 - 1.0 EU/dL    Nitrites POSITIVE (A) NEG      Leukocyte Esterase MODERATE (A) NEG      WBC  /hpf    RBC 5-10 /hpf    Epithelial cells FEW /lpf    Bacteria 3+ /hpf    Mucus TRACE /lpf    Amorphous Crystals 1+     Granular cast 2-5 /lpf

## 2020-02-11 NOTE — PROGRESS NOTES
Pharmacy Conversion of Mixed Insulin/Concentrated Insulin Products to Basal/Bolus Insulin regimen    Novolog 70/30:  50 Units acB; 30 Units acD    Lantus = 45 Units daily    Humalog = 8 Units TID AC      Convert to Basal Insulin (Ex: NovoLog Mix 70/30  10 units twice daily)   Step Example   Calculate total daily dose 50 Units AM; 30 Units PM= 80 units   Intermediate-acting portion 80 units x 70% = 56 units   Multiply by 80% 56 units x 80% = Lantus 45 units daily         Convert to Bolus Insulin (Ex: NovoLog Mix 70/30  10 units twice daily)   Step Example   Calculate total daily dose 50 Units AM; 30 Units PM= 80 units   Rapid-acting portion 80 units x 30% = 24 units   Divide total daily dose per meal 24 units / 3 meals = 8 units Humalog TID with meals

## 2020-02-11 NOTE — PROGRESS NOTES
Problem: Diabetes Self-Management  Goal: *Disease process and treatment process  Description  Define diabetes and identify own type of diabetes; list 3 options for treating diabetes. Outcome: Progressing Towards Goal  Goal: *Incorporating nutritional management into lifestyle  Description  Describe effect of type, amount and timing of food on blood glucose; list 3 methods for planning meals. Outcome: Progressing Towards Goal  Goal: *Incorporating physical activity into lifestyle  Description  State effect of exercise on blood glucose levels. Outcome: Progressing Towards Goal  Goal: *Developing strategies to promote health/change behavior  Description  Define the ABC's of diabetes; identify appropriate screenings, schedule and personal plan for screenings. Outcome: Progressing Towards Goal  Goal: *Using medications safely  Description  State effect of diabetes medications on diabetes; name diabetes medication taking, action and side effects. Outcome: Progressing Towards Goal  Goal: *Monitoring blood glucose, interpreting and using results  Description  Identify recommended blood glucose targets  and personal targets. Outcome: Progressing Towards Goal  Goal: *Prevention, detection, treatment of acute complications  Description  List symptoms of hyper- and hypoglycemia; describe how to treat low blood sugar and actions for lowering  high blood glucose level. Outcome: Progressing Towards Goal  Goal: *Prevention, detection and treatment of chronic complications  Description  Define the natural course of diabetes and describe the relationship of blood glucose levels to long term complications of diabetes.   Outcome: Progressing Towards Goal  Goal: *Developing strategies to address psychosocial issues  Description  Describe feelings about living with diabetes; identify support needed and support network  Outcome: Progressing Towards Goal  Goal: *Insulin pump training  Outcome: Progressing Towards Goal  Goal: *Sick day guidelines  Outcome: Progressing Towards Goal  Goal: *Patient Specific Goal (EDIT GOAL, INSERT TEXT)  Outcome: Progressing Towards Goal     Problem: Patient Education: Go to Patient Education Activity  Goal: Patient/Family Education  Outcome: Progressing Towards Goal     Problem: Urinary Tract Infection - Adult  Goal: *Absence of infection signs and symptoms  Outcome: Progressing Towards Goal     Problem: Patient Education: Go to Patient Education Activity  Goal: Patient/Family Education  Outcome: Progressing Towards Goal     Problem: Patient Education: Go to Patient Education Activity  Goal: Patient/Family Education  Outcome: Progressing Towards Goal     Problem: Sepsis: Day of Diagnosis  Goal: Off Pathway (Use only if patient is Off Pathway)  Outcome: Progressing Towards Goal  Goal: *Fluid resuscitation  Outcome: Progressing Towards Goal  Goal: *Paired blood cultures prior to first dose of antibiotic  Outcome: Progressing Towards Goal  Goal: *First dose of  appropriate antibiotic within 3 hours of arrival to ED, within 1 hour of arrival to ICU  Outcome: Progressing Towards Goal  Goal: *Lactic acid with first set of blood cultures  Outcome: Progressing Towards Goal  Goal: *Pneumococcal immunization (if eligible)  Outcome: Progressing Towards Goal  Goal: *Influenza immunization (if eligible)  Outcome: Progressing Towards Goal  Goal: Activity/Safety  Outcome: Progressing Towards Goal  Goal: Consults, if ordered  Outcome: Progressing Towards Goal  Goal: Diagnostic Test/Procedures  Outcome: Progressing Towards Goal  Goal: Nutrition/Diet  Outcome: Progressing Towards Goal  Goal: Discharge Planning  Outcome: Progressing Towards Goal  Goal: Medications  Outcome: Progressing Towards Goal  Goal: Respiratory  Outcome: Progressing Towards Goal  Goal: Treatments/Interventions/Procedures  Outcome: Progressing Towards Goal  Goal: Psychosocial  Outcome: Progressing Towards Goal     Problem: Sepsis: Day 2  Goal: Off Pathway (Use only if patient is Off Pathway)  Outcome: Progressing Towards Goal  Goal: *Central Venous Pressure maintained at 8-12 mm Hg  Outcome: Progressing Towards Goal  Goal: *Hemodynamically stable  Outcome: Progressing Towards Goal  Goal: *Tolerating diet  Outcome: Progressing Towards Goal  Goal: Activity/Safety  Outcome: Progressing Towards Goal  Goal: Consults, if ordered  Outcome: Progressing Towards Goal  Goal: Diagnostic Test/Procedures  Outcome: Progressing Towards Goal  Goal: Nutrition/Diet  Outcome: Progressing Towards Goal  Goal: Discharge Planning  Outcome: Progressing Towards Goal  Goal: Medications  Outcome: Progressing Towards Goal  Goal: Respiratory  Outcome: Progressing Towards Goal  Goal: Treatments/Interventions/Procedures  Outcome: Progressing Towards Goal  Goal: Psychosocial  Outcome: Progressing Towards Goal     Problem: Sepsis: Day 3  Goal: Off Pathway (Use only if patient is Off Pathway)  Outcome: Progressing Towards Goal  Goal: *Central Venous Pressure maintained at 8-12 mm Hg  Outcome: Progressing Towards Goal  Goal: *Oxygen saturation within defined limits  Outcome: Progressing Towards Goal  Goal: *Vital sign stability  Outcome: Progressing Towards Goal  Goal: *Tolerating diet  Outcome: Progressing Towards Goal  Goal: *Demonstrates progressive activity  Outcome: Progressing Towards Goal  Goal: Activity/Safety  Outcome: Progressing Towards Goal  Goal: Consults, if ordered  Outcome: Progressing Towards Goal  Goal: Diagnostic Test/Procedures  Outcome: Progressing Towards Goal  Goal: Nutrition/Diet  Outcome: Progressing Towards Goal  Goal: Discharge Planning  Outcome: Progressing Towards Goal  Goal: Medications  Outcome: Progressing Towards Goal  Goal: Respiratory  Outcome: Progressing Towards Goal  Goal: Treatments/Interventions/Procedures  Outcome: Progressing Towards Goal  Goal: Psychosocial  Outcome: Progressing Towards Goal     Problem: Sepsis: Day 4  Goal: Off Pathway (Use only if patient is Off Pathway)  Outcome: Progressing Towards Goal  Goal: Activity/Safety  Outcome: Progressing Towards Goal  Goal: Consults, if ordered  Outcome: Progressing Towards Goal  Goal: Diagnostic Test/Procedures  Outcome: Progressing Towards Goal  Goal: Nutrition/Diet  Outcome: Progressing Towards Goal  Goal: Discharge Planning  Outcome: Progressing Towards Goal  Goal: Medications  Outcome: Progressing Towards Goal  Goal: Respiratory  Outcome: Progressing Towards Goal  Goal: Treatments/Interventions/Procedures  Outcome: Progressing Towards Goal  Goal: Psychosocial  Outcome: Progressing Towards Goal  Goal: *Oxygen saturation within defined limits  Outcome: Progressing Towards Goal  Goal: *Hemodynamically stable  Outcome: Progressing Towards Goal  Goal: *Vital signs within defined limits  Outcome: Progressing Towards Goal  Goal: *Tolerating diet  Outcome: Progressing Towards Goal  Goal: *Demonstrates progressive activity  Outcome: Progressing Towards Goal  Goal: *Fluid volume maintenance  Outcome: Progressing Towards Goal     Problem: Sepsis: Day 5  Goal: Off Pathway (Use only if patient is Off Pathway)  Outcome: Progressing Towards Goal  Goal: *Oxygen saturation within defined limits  Outcome: Progressing Towards Goal  Goal: *Vital signs within defined limits  Outcome: Progressing Towards Goal  Goal: *Tolerating diet  Outcome: Progressing Towards Goal  Goal: *Demonstrates progressive activity  Outcome: Progressing Towards Goal  Goal: *Discharge plan identified  Outcome: Progressing Towards Goal  Goal: Activity/Safety  Outcome: Progressing Towards Goal  Goal: Consults, if ordered  Outcome: Progressing Towards Goal  Goal: Diagnostic Test/Procedures  Outcome: Progressing Towards Goal  Goal: Nutrition/Diet  Outcome: Progressing Towards Goal  Goal: Discharge Planning  Outcome: Progressing Towards Goal  Goal: Medications  Outcome: Progressing Towards Goal  Goal: Respiratory  Outcome: Progressing Towards Goal  Goal: Treatments/Interventions/Procedures  Outcome: Progressing Towards Goal  Goal: Psychosocial  Outcome: Progressing Towards Goal     Problem: Sepsis: Day 6  Goal: Off Pathway (Use only if patient is Off Pathway)  Outcome: Progressing Towards Goal  Goal: *Oxygen saturation within defined limits  Outcome: Progressing Towards Goal  Goal: *Vital signs within defined limits  Outcome: Progressing Towards Goal  Goal: *Tolerating diet  Outcome: Progressing Towards Goal  Goal: *Demonstrates progressive activity  Outcome: Progressing Towards Goal  Goal: Influenza immunization  Outcome: Progressing Towards Goal  Goal: *Pneumococcal immunization  Outcome: Progressing Towards Goal  Goal: Activity/Safety  Outcome: Progressing Towards Goal  Goal: Diagnostic Test/Procedures  Outcome: Progressing Towards Goal  Goal: Nutrition/Diet  Outcome: Progressing Towards Goal  Goal: Discharge Planning  Outcome: Progressing Towards Goal  Goal: Medications  Outcome: Progressing Towards Goal  Goal: Respiratory  Outcome: Progressing Towards Goal  Goal: Treatments/Interventions/Procedures  Outcome: Progressing Towards Goal  Goal: Psychosocial  Outcome: Progressing Towards Goal     Problem: Sepsis: Discharge Outcomes  Goal: *Vital signs within defined limits  Outcome: Progressing Towards Goal  Goal: *Tolerating diet  Outcome: Progressing Towards Goal  Goal: *Verbalizes understanding and describes prescribed diet  Outcome: Progressing Towards Goal  Goal: *Demonstrates progressive activity  Outcome: Progressing Towards Goal  Goal: *Describes follow-up/return visits to physicians  Outcome: Progressing Towards Goal  Goal: *Verbalizes name, dosage, time, side effects, and number of days to continue medications  Outcome: Progressing Towards Goal  Goal: *Influenza immunization (Oct-Mar only)  Outcome: Progressing Towards Goal  Goal: *Pneumococcal immunization  Outcome: Progressing Towards Goal  Goal: *Lungs clear or at baseline  Outcome: Progressing Towards Goal  Goal: *Oxygen saturation returns to baseline or 90% or better on room air  Outcome: Progressing Towards Goal  Goal: *Glycemic control  Outcome: Progressing Towards Goal  Goal: *Absence of deep venous thrombosis signs and symptoms(Stroke Metric)  Outcome: Progressing Towards Goal  Goal: *Describes available resources and support systems  Outcome: Progressing Towards Goal  Goal: *Optimal pain control at patient's stated goal  Outcome: Progressing Towards Goal

## 2020-02-11 NOTE — PROGRESS NOTES
Rec'd pt on unit, pt oriented to unit and room. All database and admission paperwork completed at this time. All meds up to date, labs obtained and sent. Skin accessed by bridget, no sin breakdown/wounds noted.

## 2020-02-11 NOTE — PROGRESS NOTES
Problem: Mobility Impaired (Adult and Pediatric)  Goal: *Acute Goals and Plan of Care (Insert Text)  Description  FUNCTIONAL STATUS PRIOR TO ADMISSION: Patient was modified independent using a single point cane for functional mobility per her report. .. she is vague on amt. Of sitting vs. Amb. In home during the day, she reports fixing her own simple meals such as cereal or a sandwich. HOME SUPPORT PRIOR TO ADMISSION: The patient lived with son who provided assistance in am and pm (before and after work). Physical Therapy Goals  Initiated 2/11/2020  1. Patient will move from supine to sit and sit to supine , scoot up and down and roll side to side in bed with modified independence within 7 day(s). 2.  Patient will transfer from bed to chair and chair to bed with modified independence using the least restrictive device within 7 day(s). 3.  Patient will perform sit to stand with modified independence within 7 day(s). 4.  Patient will ambulate with supervision/set-up for 100 feet with the least restrictive device within 7 day(s). 5.  Patient will ascend/descend 6 stairs with handrail(s) with supervision/set-up within 7 day(s). Outcome: Progressing Towards Goal     PHYSICAL THERAPY EVALUATION  Patient: Breanna Smiley (67 y.o. female)  Date: 2/11/2020  Primary Diagnosis: Sepsis [facility]) [A41.9]        Precautions:   Fall, Contact      ASSESSMENT  Based on the objective data described below, the patient presents with decreased activity tolerance for functional mobility efforts with use of RW for support today. Anticipate mostly sedentary lifestyle while she is alone during the day. Pt would benefit from rehab. Stay (SNF) upon discharge to assist with conditioning and decreasing caregiver burden at this time. Continue to follow for goals above. Current Level of Function Impacting Discharge (mobility/balance): min.  Assist for mobility using RW today, cuing for safety, decreased activity tolerance Functional Outcome Measure: The patient scored 10/28 on the Tinetti outcome measure which is indicative of high fall risk. Other factors to consider for discharge: son has reported to  that his Mom \"needs more assistance\"     Patient will benefit from skilled therapy intervention to address the above noted impairments. PLAN :  Recommendations and Planned Interventions: bed mobility training, transfer training, gait training, therapeutic exercises, neuromuscular re-education, edema management/control, patient and family training/education, and therapeutic activities      Frequency/Duration: Patient will be followed by physical therapy:  4 times a week to address goals. Recommendation for discharge: (in order for the patient to meet his/her long term goals)  Therapy up to 5 days/week in SNF setting    This discharge recommendation:  Has been made in collaboration with the attending provider and/or case management    IF patient discharges home will need the following DME: to be determined (TBD)         SUBJECTIVE:   Patient stated I do rest a lot during the day.     OBJECTIVE DATA SUMMARY:   HISTORY:    Past Medical History:   Diagnosis Date    Arthritis     Cataract     bilateral    Chronic kidney disease     Dr. Jayy Aguillon    Chronic pain     lower back    CKD stage 3 due to type 2 diabetes mellitus ([facility] [address]     Depression     Diabetes [facility]) age 49    Type II    Follicular thyroid cancer ([facility] [facility] 08/2014    Foot ulcer ([facility] [facility]     Gallbladder polyp 8/14/2013    Hypercholesterolemia     Hypertension     Ill-defined condition     states 'polyp' in gal bladder    Long term current use of anticoagulant therapy     Obesity     Right pontine stroke ([facility] 1/17/2017    TIA (transient ischemic attack) 6/6/2014     Past Surgical History:   Procedure Laterality Date    ABDOMEN SURGERY PROC UNLISTED  2006    stomach    BREAST SURGERY PROCEDURE UNLISTED  2009, 2006    breast bx-vince    COLONOSCOPY N/A 12/17/2019 COLONOSCOPY performed by Pablito Espinoza MD at [address] [place] [address]  12/17/2019         COLORECTAL SCRN; HI RISK IND  5/30/2014         HX BREAST BIOPSY Right [id number]    2 biopies on the right. Benign (per patient)    HX BREAST BIOPSY Left 2015    Benign (per patient)    HX CATARACT REMOVAL Left     HX GYN  1970's    partial hysterectomy    HX HERNIA REPAIR  2009    hiatal    HX HYSTERECTOMY      HX ORTHOPAEDIC Bilateral 1988    bunion    HX THYROIDECTOMY  2014    Dr. Phoebe Hansen       Personal factors and/or comorbidities impacting plan of care:     Home Situation  Home Environment: Private residence  # Steps to Enter: 6  Rails to Enter: Yes  Hand Rails : Left  One/Two Story Residence: Two story, live on 1st floor  Living Alone: No  Support Systems: Child(riki)(lives with son that works)  Patient Expects to be Discharged to[de-identified] Private residence  Current DME Used/Available at Home: Transfer bench, Grab bars, Cane, straight, Wheelchair, Other (comment)(transport chair ; auto. bed that lifts HOB and rail to pull up on)  Tub or Shower Type: Tub/Shower combination    EXAMINATION/PRESENTATION/DECISION MAKING:   Critical Behavior:  Neurologic State: Alert  Orientation Level: Appropriate for age  Cognition: Appropriate for age attention/concentration, Follows commands  Safety/Judgement: Awareness of environment, Insight into deficits  Hearing: Auditory  Auditory Impairment: None  Skin:  Intact  Edema: BLE (mild)  Range Of Motion:  AROM: Generally decreased, functional(RLE grossly 4/5, LLE 3+/5)           PROM: Generally decreased, functional           Strength:    Strength: Generally decreased, functional(RLE grossly 4/5, LLE 3+/5)                    Tone & Sensation:   Tone: Normal              Sensation: Intact        Functional Mobility:  Bed Mobility:  Rolling: Minimum assistance; Additional time;Bed Modified  Supine to Sit: Minimum assistance; Additional time;Bed Modified     Scooting: Minimum assistance; Additional time  Transfers:  Sit to Stand: Minimum assistance; Additional time  Stand to Sit: Minimum assistance; Additional time                       Balance:   Sitting: Intact  Standing: Impaired  Standing - Static: Constant support;Fair(RW)  Standing - Dynamic : Constant support;Fair(RW)    Ambulation/Gait Training:  Distance (ft): 20 Feet (ft)  Assistive Device: Walker, rolling;Gait belt  Ambulation - Level of Assistance: Minimal assistance     Gait Description (WDL): Exceptions to WDL  Gait Abnormalities: Decreased step clearance;Trunk sway increased        Base of Support: Widened;Shift to right     Speed/Katharina: Pace decreased (<100 feet/min); Shuffled; Slow  Step Length: Left shortened;Right shortened        Interventions: Safety awareness training; Tactile cues; Verbal cues          Functional Measure:  Tinetti test:    Sitting Balance: 1  Arises: 1  Attempts to Rise: 1  Immediate Standing Balance: 1  Standing Balance: 1  Nudged: 1  Eyes Closed: 0  Turn 360 Degrees - Continuous/Discontinuous: 0  Turn 360 Degrees - Steady/Unsteady: 0  Sitting Down: 1  Balance Score: 7 Balance total score  Indication of Gait: 0  R Step Length/Height: 0  L Step Length/Height: 0  R Foot Clearance: 1  L Foot Clearance: 1  Step Symmetry: 0  Step Continuity: 0  Path: 1  Trunk: 0  Walking Time: 0  Gait Score: 3 Gait total score  Total Score: 10/28 Overall total score         Tinetti Tool Score Risk of Falls  <19 = High Fall Risk  19-24 = Moderate Fall Risk  25-28 = Low Fall Risk  Tinetti ME. Performance-Oriented Assessment of Mobility Problems in Elderly Patients. Wong 67; [id number].  (Scoring Description: PT Bulletin Feb. 10, 1993)    Older adults: Bryan Moncada et al, 2009; n = 1000 [facility] elderly evaluated with ABC, RAYMUNDO, ADL, and IADL)  · Mean RAYMUNDO score for males aged 69-68 years = 26.21(3.40)  · Mean RAYMUNDO score for females age 69-68 years = 25.16(4.30)  · Mean RAYMUNDO score for males over 80 years = 23.29(6.02)  · Mean RAYMUNDO score for females over 80 years = 17.20(8.32)           Physical Therapy Evaluation Charge Determination   History Examination Presentation Decision-Making   MEDIUM  Complexity : 1-2 comorbidities / personal factors will impact the outcome/ POC  LOW Complexity : 1-2 Standardized tests and measures addressing body structure, function, activity limitation and / or participation in recreation  LOW Complexity : Stable, uncomplicated  Other outcome measures Tinetti  LOW       Based on the above components, the patient evaluation is determined to be of the following complexity level: LOW     Pain Ratin/10 with mobility    Activity Tolerance:   Fair and requires rest breaks  Please refer to the flowsheet for vital signs taken during this treatment. After treatment patient left in no apparent distress:   Sitting in chair, Call bell within reach, and nurse notified     COMMUNICATION/EDUCATION:   The patients plan of care was discussed with: Occupational Therapist and Registered Nurse. Fall prevention education was provided and the patient/caregiver indicated understanding., Patient/family have participated as able in goal setting and plan of care. , and Patient/family agree to work toward stated goals and plan of care.     Thank you for this referral.  Vane Ramirez, PT, DPT   Time Calculation: 29 mins

## 2020-02-11 NOTE — ROUTINE PROCESS
TRANSFER - OUT REPORT: 
 
Verbal report given to Ty Aguilera (name) on [facility]  being transferred to Oncology (unit) for routine progression of care Report consisted of patients Situation, Background, Assessment and  
Recommendations(SBAR). Information from the following report(s) SBAR, MAR, Recent Results and Cardiac Rhythm sr was reviewed with the receiving nurse. Lines:  
Peripheral IV 02/10/20 Right Forearm (Active) Site Assessment Clean, dry, & intact 2/10/2020 11:34 PM  
Hub Color/Line Status Green; Infusing 2/10/2020 11:34 PM  
   
Peripheral IV 02/10/20 Left Antecubital (Active) Hub Color/Line Status Pink;Flushed 2/10/2020 11:34 PM  
  
 
Opportunity for questions and clarification was provided. Patient transported with: 
 [employer]

## 2020-02-11 NOTE — PROGRESS NOTES
Problem: Self Care Deficits Care Plan (Adult)  Goal: *Acute Goals and Plan of Care (Insert Text)  Description    FUNCTIONAL STATUS PRIOR TO ADMISSION: Patient was modified independent using a single point cane for functional mobility. HOME SUPPORT: The patient lived with son and stated that her son prepared her meals at night but she was able to bathe and dress self with no assist. .    Occupational Therapy Goals  Initiated 2/11/2020  1. Patient will perform grooming with supervision/set-up within 7 day(s). 2.  Patient will perform bathing with minimal assistance/contact guard assist within 7 day(s). 3.  Patient will perform lower body dressing with moderate assistance  within 7 day(s). 4.  Patient will perform toilet transfers with supervision/set-up within 7 day(s). 5.  Patient will perform all aspects of toileting with supervision/set-up within 7 day(s). 6.  Patient will participate in upper extremity therapeutic exercise/activities with supervision/set-up for 5 minutes within 7 day(s). 7.  Patient will utilize energy conservation techniques during functional activities with verbal cues within 7 day(s). Outcome: Progressing Towards Goal   OCCUPATIONAL THERAPY EVALUATION  Patient: Lulu Flowers (67 y.o. female)  Date: 2/11/2020  Primary Diagnosis: Sepsis [facility]) [A41.9]        Precautions:   Fall, Contact    ASSESSMENT  Based on the objective data described below, the patient presents with decreased endurance, strength, functional mobility, ADLs and balance. Pt was living at home with son and stated that she was alone during the day while son was at work. She reports that she was able to bathe and dress with use of tub seat. Pt stated that she uses a transport chair around the home to get to and from bathroom and she walks short distances. Pt was CGA to SBa for bed mobility, and was able to stand with min assist of 2. She was able to transfer with CGA of 1 and use of RW.   She was noted to have decreased range and strength in left Ue due to old CVA. Pt was not able to kennedy her socks but did well with her ability to stand and was able to stand for approx 1-2  minutes and then stated that she was tired. Pt is not safe to return home and be alone during the day and should have rehab at discharge. Current Level of Function Impacting Discharge (ADLs/self-care): decreased endurance, strength, functional mobility, and ADLs    Functional Outcome Measure: The patient scored  on the Barthel outcome measure which is indicative of mod for ADLs. Other factors to consider for discharge: pt is not safe to return home and be alone during the day, and feel that she sits more and is consistently  getting weaker. Patient will benefit from skilled therapy intervention to address the above noted impairments. PLAN :  Recommendations and Planned Interventions: self care training, functional mobility training, therapeutic exercise, balance training, therapeutic activities, endurance activities, patient education, home safety training, and family training/education    Frequency/Duration: Patient will be followed by occupational therapy 4 times a week to address goals. Recommendation for discharge: (in order for the patient to meet his/her long term goals)  Therapy up to 5 days/week in SNF setting    This discharge recommendation:  Has been made in collaboration with the attending provider and/or case management    IF patient discharges home will need the following DME: tbd       SUBJECTIVE:   Patient stated I can do my own bath when I use the tub seat.     OBJECTIVE DATA SUMMARY:   HISTORY:   Past Medical History:   Diagnosis Date    Arthritis     Cataract     bilateral    Chronic kidney disease     Dr. Nadege Sanders    Chronic pain     lower back    CKD stage 3 due to type 2 diabetes mellitus ([facility]     Depression     Diabetes [facility]) age 49    Type II    Follicular [facility] ([facility] 08/2014    Foot ulcer ([facility]     Gallbladder polyp 8/14/2013    Hypercholesterolemia     Hypertension     Ill-defined condition     states 'polyp' in gal bladder    Long term current use of anticoagulant therapy     Obesity     Right pontine stroke ([facility] 1/17/2017    TIA (transient ischemic attack) 6/6/2014     Past Surgical History:   Procedure Laterality Date    ABDOMEN SURGERY PROC UNLISTED  2006    stomach    BREAST SURGERY PROCEDURE UNLISTED  2009, 2006    breast bx-vince    COLONOSCOPY N/A 12/17/2019    COLONOSCOPY performed by Jorje Washburn MD at [address] [place] [address]  12/17/2019         COLORECTAL SCRN; HI RISK IND  5/30/2014         HX BREAST BIOPSY Right [id number]    2 biopies on the right. Benign (per patient)    HX BREAST BIOPSY Left 2015    Benign (per patient)    HX CATARACT REMOVAL Left     HX GYN  1970's    partial hysterectomy    HX HERNIA REPAIR  2009    hiatal    HX HYSTERECTOMY      HX ORTHOPAEDIC Bilateral 1988    bunion    HX THYROIDECTOMY  2014    Dr. Spencer Black       Expanded or extensive additional review of patient history:     Home Situation  Home Environment: Private residence  # Steps to Enter: 6  Rails to Enter: Yes  Hand Rails : Left  One/Two Story Residence: Two story, live on 1st floor  Living Alone: No  Support Systems: Child(riki)(lives with son that works)  Patient Expects to be Discharged to[de-identified] Private residence  Current DME Used/Available at Home: Transfer bench, [facility], straight, Wheelchair, Other (comment)(transport chair ; auto. bed that lifts HOB and rail to pull up on)  Tub or Shower Type: Tub/Shower combination    Hand dominance: Right    EXAMINATION OF PERFORMANCE DEFICITS:  Cognitive/Behavioral Status:  Neurologic State: Alert  Orientation Level: Appropriate for age  Cognition: Appropriate for age attention/concentration; Follows commands        Safety/Judgement: Awareness of environment; Insight into deficits    Skin: in fair health     Edema: none Hearing: Auditory  Auditory Impairment: None    Vision/Perceptual:                 Appears intact                    Range of Motion:    AROM: Generally decreased, functional(RLE grossly 4/5, LLE 3+/5)  PROM: Generally decreased, functional                      Strength:    Strength: Generally decreased, functional(RLE grossly 4/5, LLE 3+/5)                Coordination:     Fine Motor Skills-Upper: Left Impaired;Right Intact    Gross Motor Skills-Upper: Left Impaired;Right Intact    Tone & Sensation:    Tone: Normal  Sensation: Intact                      Balance:  Sitting: Intact  Standing: Impaired  Standing - Static: Constant support;Fair(RW)  Standing - Dynamic : Constant support;Fair(RW)    Functional Mobility and Transfers for ADLs:  Bed Mobility:  Rolling: Minimum assistance; Additional time;Bed Modified  Supine to Sit: Minimum assistance; Additional time;Bed Modified  Scooting: Minimum assistance; Additional time    Transfers:  Sit to Stand: Minimum assistance; Additional time  Stand to Sit: Minimum assistance; Additional time    ADL Assessment:     Pt is setup for grooming and mod for LB ADLs and min to CGA for UB ADLs             Cognitive Retraining  Safety/Judgement: Awareness of environment; Insight into deficits        Functional Measure:  Barthel Index:    Bathin  Bladder: 10  Bowels: 10  Groomin  Dressin  Feeding: 10  Mobility: 0  Stairs: 5  Toilet Use: 5  Transfer (Bed to Chair and Back): 10  Total: 55/100        The Barthel ADL Index: Guidelines  1. The index should be used as a record of what a patient does, not as a record of what a patient could do. 2. The main aim is to establish degree of independence from any help, physical or verbal, however minor and for whatever reason. 3. The need for supervision renders the patient not independent. 4. A patient's performance should be established using the best available evidence.  Asking the patient, friends/relatives and nurses are the usual sources, but direct observation and common sense are also important. However direct testing is not needed. 5. Usually the patient's performance over the preceding 24-48 hours is important, but occasionally longer periods will be relevant. 6. Middle categories imply that the patient supplies over 50 per cent of the effort. 7. Use of aids to be independent is allowed. Denita Aden., Barthel, D.W. (3319). Functional evaluation: the Barthel Index. [facility] (14)2. Leann Oates dada STAR Manning, Deniz Jeronimo., Cherri Byers., Nick, [facility] (1999). Measuring the change indisability after inpatient rehabilitation; comparison of the responsiveness of the Barthel Index and Functional [place] Measure. Journal of Neurology, Neurosurgery, and Psychiatry, 66(4), 486-403. Randa Phipps, N.J.A, LUIS E Chan, & Robbin La, M.A. (2004.) Assessment of post-stroke quality of life in cost-effectiveness studies: The usefulness of the Barthel Index and the EuroQoL-5D. Quality of Life Research, 15, 881-61         Occupational Therapy Evaluation Charge Determination   History Examination Decision-Making   MEDIUM Complexity : Expanded review of history including physical, cognitive and psychosocial  history  MEDIUM Complexity : 3-5 performance deficits relating to physical, cognitive , or psychosocial skils that result in activity limitations and / or participation restrictions MEDIUM Complexity : Patient may present with comorbidities that affect occupational performnce. Miniml to moderate modification of tasks or assistance (eg, physical or verbal ) with assesment(s) is necessary to enable patient to complete evaluation       Based on the above components, the patient evaluation is determined to be of the following complexity level: MEDIUM        Activity Tolerance:   Fair  Please refer to the flowsheet for vital signs taken during this treatment.     After treatment patient left in no apparent distress: Sitting in chair and Call bell within reach    COMMUNICATION/EDUCATION:   The patients plan of care was discussed with: Physical Therapist and Registered Nurse. Home safety education was provided and the patient/caregiver indicated understanding. and Patient/family agree to work toward stated goals and plan of care. This patients plan of care is appropriate for delegation to [facility].     Thank you for this referral.  Kavon Cordova OT  Time Calculation: 41 mins

## 2020-02-11 NOTE — PROGRESS NOTES
TRANSFER - IN REPORT:    Verbal [facility] ([facility] (unit) for routine progression of care      Report consisted of patients Situation, Background, Assessment and   Recommendations(SBAR). Information from the following report(s) SBAR, Kardex and MAR was reviewed with the receiving nurse. Opportunity for questions and clarification was provided. Assessment completed upon patients arrival to unit and care assumed.

## 2020-02-11 NOTE — ED NOTES
Pt straight cathed at this time, pt tolerated well    Assist by Justin Vera, RN    Urine specimen collected and sent to lab

## 2020-02-11 NOTE — INTERDISCIPLINARY ROUNDS
Oncology Interdisciplinary rounds were held today to discuss patient plan of care and outcomes. The following members were present: Nursing, Physician, Case Management, Pharmacy, and PT/OT Actual Length of Stay: 1 Expected Length of Stay: - - - Plan            Discharge IV ABX 
PT/OT Nephrology following Date:TBA Lives with son/ independent.

## 2020-02-11 NOTE — PROGRESS NOTES
Hospitalist Progress Note    NAME: Nathan Veloz   :  1940   MRN:  [id number]       Assessment / Plan:  Sepsis POA:   Tana negative bacteriemia   UTI:  fever, tachycardia, encephalopathy due to UTI  C/w ceftriaxone , repeat bcx tomorrow   Awaiting Ucx sensitivity     Acute Metabolic Encephalopathy: resolved   Pt now AAOx4    TARUN on CKD: creatinine worsening, per record patient having significant proteinuria, appreciate nephro input     DM type II with renal manifestation POA:   Hyperglycemia   Pt on  novolin 70/30, pharmacy substitution with [id number] units and humalog 8units TIDAC  place SSI,  HBA1C 9.9. H/O CVA: with residual left sided weakness, seems stable at this time. Essential HTN POA: c/w BB/Clonidine, use labetalol prn, hold ARB for now    Hypothyroidism POA: c/w L-thyroxine  Hyperlipidemia POA c/w statin  Gallbladder polyp, followed by Dr Osmin Lala with abd US every year      Code Status: Full Code  Surrogate Decision Maker: bonita Olmstead Opitz [phone] or [phone], bonita Hopkins John [phone]  DVT Prophylaxis: heparin  GI Prophylaxis: not indicated  Baseline: independent lives with Herrick Crutch, has some left sided weakness    30.0 - 39.9 Obese / Body mass index is 34.95 kg/m². Subjective:     Chief Complaint / Reason for Physician Visit  Fu sepsis . AAOx4, no acute issues . Discussed with RN events overnight. Review of Systems:  Symptom Y/N Comments  Symptom Y/N Comments   Fever/Chills n   Chest Pain n    Poor Appetite    Edema     Cough    Abdominal Pain n    Sputum    Joint Pain     SOB/ALONSO n   Pruritis/Rash     Nausea/vomit n   Tolerating PT/OT     Diarrhea n   Tolerating Diet y    Constipation    Other       Could NOT obtain due to:      Objective:     VITALS:   Last 24hrs VS reviewed since prior progress note.  Most recent are:  Patient Vitals for the past 24 hrs:   Temp Pulse Resp BP SpO2   20 0746  86      20 0725 99.7 °F (37.6 °C) 83 18 186/81 95 %   20 0400  74      02/11/20 0258 99 °F (37.2 °C) 77 18 166/63 95 %   02/11/20 0010 99.6 °F (37.6 °C) 84 18 149/50 97 %   02/10/20 2300 98.5 °F (36.9 °C) 98 20 157/53 97 %   02/10/20 2245  91 15 151/60 96 %   02/10/20 2230  87 24  97 %   02/10/20 2200  83 25 143/52 98 %   02/10/20 2130  85 27 149/54 97 %   02/10/20 2100  86 25 142/71 96 %   02/10/20 2002 99.5 °F (37.5 °C) 89 30 157/71 96 %   02/10/20 1841 (!) 103.7 °F (39.8 °C) 95 18 163/56 95 %       Intake/Output Summary (Last 24 hours) at 2/11/2020 0750  Last data filed at 2/11/2020 0602  Gross per 24 hour   Intake 1095.83 ml   Output 500 ml   Net 595.83 ml        PHYSICAL EXAM:  General: WD, WN. Alert, cooperative, no acute distress    EENT:  EOMI. Anicteric sclerae. MMM  Resp:  CTA bilaterally, no wheezing or rales. No accessory muscle use  CV:  Regular  rhythm,  No edema  GI:  Soft, Non distended, Non tender.  +Bowel sounds  Neurologic:  Alert and oriented X 3, normal speech,   Psych:   Good insight. Not anxious nor agitated  Skin:  No rashes. No jaundice    Reviewed most current lab test results and cultures  YES  Reviewed most current radiology test results   YES  Review and summation of old records today    NO  Reviewed patient's current orders and MAR    YES  PMH/SH reviewed - no change compared to H&P  ________________________________________________________________________  Care Plan discussed with:    Comments   Patient x    Family      RN x    Care Manager     Consultant                       x Multidiciplinary team rounds were held today with , nursing, pharmacist and clinical coordinator. Patient's plan of care was discussed; medications were reviewed and discharge planning was addressed.      ________________________________________________________________________  Total NON critical care TIME:  30   Minutes    Total CRITICAL CARE TIME Spent:   Minutes non procedure based      Comments   >50% of visit spent in counseling and coordination of care x    ________________________________________________________________________  Conrad Amanda MD     Procedures: see electronic medical records for all procedures/Xrays and details which were not copied into this note but were reviewed prior to creation of Plan. LABS:  I reviewed today's most current labs and imaging studies.   Pertinent labs include:  Recent Labs     02/11/20  0031 02/10/20  1851   WBC 8.6 7.3   HGB 8.0* 9.5*   HCT 24.0* 28.5*    205     Recent Labs     02/11/20  0031 02/10/20  2332 02/10/20  1851   *  --  135*   K 3.7  --  3.8     --  100   CO2 28  --  28   *  --  226*   BUN 37*  --  36*   CREA 3.32*  --  3.19*   CA 7.3* PENDING 8.1*   MG 1.7  --  1.8   PHOS 3.5  --   --    ALB 1.9*  --  2.3*   TBILI 0.6  --  0.8   SGOT 44*  --  59*   ALT 39  --  48   INR 1.1  --  1.1       Signed: Conrad Amanda MD

## 2020-02-11 NOTE — PROGRESS NOTES
Oncology End of Shift Note      Bedside shift change report given to  [facility], RN (incoming nurse) by Dinah Olmos (outgoing nurse) on [place] [facility]. Report included the following information SBAR, Kardex, Intake/Output, MAR and Quality Measures. Shift Summary:  Assume care at midnight. Pt has experienced periodic confusion. Purewick in place. Light fluid resuscitation. Issues for Physician to Address:       Patient on Cardiac Monitoring?     [x] Yes  [] No    Rhythm: NSR         Shift Events        Dinah Olmos

## 2020-02-11 NOTE — PROGRESS NOTES
FAWN:   1) Rehab    2) Pt will need 2ND IM letter at time of discharge     Reason for Admission: Sepsis                    RUR Score: 24 MODERATE              Do you (patient/family) have any concerns for transition/discharge? Pt's son are concerned pt needs more assistance, pt agrees she does need more assistance but is not wanting to go to rehab. Plan for utilizing home health: Appropriate pending pt's progression during hospitalization     Current Advanced Directive/Advance Care Plan: On file. Pt wishes to remain a full code. Transition of Care Plan:         Pt is a 67year old,  [place] male, admitted with sepsis. Pt was alert and oriented when meeting with CM, confirming address, emergency contact and PCP. Pt states she lives with her son Ambrose Wooten in a two level apartment. Pt states there are 4 steps to etner and she stays downstairs. Pt has a cane adnd matt at baseline. Pt states she has had [place] int he past and cannot remember the name of the agency. Pt states she has not been to SNF/IPR in the past. Pt's preferred pharmacy [facility]), pt states no problems affording or accessing medications. Pt's sons will drive her home at time of discharge and as needed. Pt informed CM that her sons are saying she needs more assistance. CM spoke with pt regarding applying for Medicaid, pt willing to have screening. Referral sent to [employer] to complete screening, CM will complete a UAI if needed. CM informed by PT/OT pt will likely need rehab at time of discharge but may be reluctant. RAH attempted to contact pt's son Ambrose Wooten, no answer, unable to leave a VM. RAH spoke with pt's other son Edith Argueta who states he completely agree's with that statement as pt is home alone during the day and they are worried about her falling. CM left a SNF list in the room for sons to speak with pt about this this evening, sons will look at facilities.     Care Management Interventions  PCP Verified by CM:  Yes  Mode of Transport at Discharge: BLS  Transition of Care Consult (CM Consult): Discharge Planning  MyChart Signup: No  Discharge Durable Medical Equipment: No  Physical Therapy Consult: Yes  Occupational Therapy Consult: Yes  Speech Therapy Consult: No  Current Support Network: Lives with Caregiver, Relative's Home  Confirm Follow Up Transport: Family  The Patient and/or Patient Representative was Provided with a Choice of Provider and Agrees with the Discharge Plan?: Yes  Freedom of Choice List was Provided with Basic Dialogue that Supports the Patient's Individualized Plan of Care/Goals, Treatment Preferences and Shares the Quality Data Associated with the Providers?: Yes  Discharge Location  Discharge Placement: Skilled nursing facility     PARAS Mcgovern, [address] [facility]    [phone]

## 2020-02-12 LAB
25(OH)D3 SERPL-MCNC: 19.3 NG/ML (ref 30–100)
ALBUMIN SERPL-MCNC: 1.7 G/DL (ref 3.5–5)
ALBUMIN/GLOB SERPL: 0.5 {RATIO} (ref 1.1–2.2)
ALP SERPL-CCNC: 59 U/L (ref 45–117)
ALT SERPL-CCNC: 48 U/L (ref 12–78)
ANION GAP SERPL CALC-SCNC: 6 MMOL/L (ref 5–15)
AST SERPL-CCNC: 66 U/L (ref 15–37)
BILIRUB SERPL-MCNC: 0.3 MG/DL (ref 0.2–1)
BUN SERPL-MCNC: 33 MG/DL (ref 6–20)
BUN/CREAT SERPL: 10 (ref 12–20)
CALCIUM SERPL-MCNC: 7.1 MG/DL (ref 8.5–10.1)
CHLORIDE SERPL-SCNC: 104 MMOL/L (ref 97–108)
CO2 SERPL-SCNC: 27 MMOL/L (ref 21–32)
CREAT SERPL-MCNC: 3.24 MG/DL (ref 0.55–1.02)
GLOBULIN SER CALC-MCNC: 3.6 G/DL (ref 2–4)
GLUCOSE BLD STRIP.AUTO-MCNC: 134 MG/DL (ref 65–100)
GLUCOSE BLD STRIP.AUTO-MCNC: 240 MG/DL (ref 65–100)
GLUCOSE BLD STRIP.AUTO-MCNC: 243 MG/DL (ref 65–100)
GLUCOSE BLD STRIP.AUTO-MCNC: 301 MG/DL (ref 65–100)
GLUCOSE SERPL-MCNC: 113 MG/DL (ref 65–100)
POTASSIUM SERPL-SCNC: 3.4 MMOL/L (ref 3.5–5.1)
PROT SERPL-MCNC: 5.3 G/DL (ref 6.4–8.2)
SERVICE CMNT-IMP: ABNORMAL
SODIUM SERPL-SCNC: 137 MMOL/L (ref 136–145)

## 2020-02-12 PROCEDURE — 74011636637 HC RX REV CODE- 636/637: Performed by: INTERNAL MEDICINE

## 2020-02-12 PROCEDURE — 74011250636 HC RX REV CODE- 250/636: Performed by: INTERNAL MEDICINE

## 2020-02-12 PROCEDURE — 74011000258 HC RX REV CODE- 258: Performed by: INTERNAL MEDICINE

## 2020-02-12 PROCEDURE — 80053 COMPREHEN METABOLIC PANEL: CPT

## 2020-02-12 PROCEDURE — 36415 COLL VENOUS BLD VENIPUNCTURE: CPT

## 2020-02-12 PROCEDURE — 65660000000 HC RM CCU STEPDOWN

## 2020-02-12 PROCEDURE — 74011250637 HC RX REV CODE- 250/637: Performed by: INTERNAL MEDICINE

## 2020-02-12 PROCEDURE — 87040 BLOOD CULTURE FOR BACTERIA: CPT

## 2020-02-12 PROCEDURE — 97530 THERAPEUTIC ACTIVITIES: CPT

## 2020-02-12 PROCEDURE — 97116 GAIT TRAINING THERAPY: CPT

## 2020-02-12 PROCEDURE — 82306 VITAMIN D 25 HYDROXY: CPT

## 2020-02-12 PROCEDURE — 97535 SELF CARE MNGMENT TRAINING: CPT

## 2020-02-12 PROCEDURE — 82962 GLUCOSE BLOOD TEST: CPT

## 2020-02-12 RX ORDER — POTASSIUM CHLORIDE 750 MG/1
40 TABLET, FILM COATED, EXTENDED RELEASE ORAL ONCE
Status: COMPLETED | OUTPATIENT
Start: 2020-02-12 | End: 2020-02-12

## 2020-02-12 RX ORDER — INSULIN GLARGINE 100 [IU]/ML
35 INJECTION, SOLUTION SUBCUTANEOUS DAILY
Status: DISCONTINUED | OUTPATIENT
Start: 2020-02-13 | End: 2020-02-13 | Stop reason: HOSPADM

## 2020-02-12 RX ORDER — ERGOCALCIFEROL 1.25 MG/1
50000 CAPSULE ORAL
Status: DISCONTINUED | OUTPATIENT
Start: 2020-02-12 | End: 2020-02-13 | Stop reason: HOSPADM

## 2020-02-12 RX ORDER — HYDRALAZINE HYDROCHLORIDE 50 MG/1
50 TABLET, FILM COATED ORAL 3 TIMES DAILY
Status: DISCONTINUED | OUTPATIENT
Start: 2020-02-12 | End: 2020-02-13

## 2020-02-12 RX ORDER — INSULIN LISPRO 100 [IU]/ML
8 INJECTION, SOLUTION INTRAVENOUS; SUBCUTANEOUS
Status: DISCONTINUED | OUTPATIENT
Start: 2020-02-12 | End: 2020-02-13 | Stop reason: HOSPADM

## 2020-02-12 RX ORDER — INSULIN LISPRO 100 [IU]/ML
5 INJECTION, SOLUTION INTRAVENOUS; SUBCUTANEOUS
Status: DISCONTINUED | OUTPATIENT
Start: 2020-02-12 | End: 2020-02-12

## 2020-02-12 RX ORDER — HYDRALAZINE HYDROCHLORIDE 20 MG/ML
10 INJECTION INTRAMUSCULAR; INTRAVENOUS
Status: DISCONTINUED | OUTPATIENT
Start: 2020-02-12 | End: 2020-02-13 | Stop reason: HOSPADM

## 2020-02-12 RX ORDER — INSULIN GLARGINE 100 [IU]/ML
35 INJECTION, SOLUTION SUBCUTANEOUS DAILY
Status: DISCONTINUED | OUTPATIENT
Start: 2020-02-13 | End: 2020-02-12

## 2020-02-12 RX ORDER — POTASSIUM CHLORIDE 750 MG/1
20 TABLET, FILM COATED, EXTENDED RELEASE ORAL DAILY
Status: DISCONTINUED | OUTPATIENT
Start: 2020-02-12 | End: 2020-02-13

## 2020-02-12 RX ORDER — LABETALOL HYDROCHLORIDE 5 MG/ML
20 INJECTION, SOLUTION INTRAVENOUS
Status: DISCONTINUED | OUTPATIENT
Start: 2020-02-12 | End: 2020-02-12

## 2020-02-12 RX ORDER — HYDRALAZINE HYDROCHLORIDE 25 MG/1
25 TABLET, FILM COATED ORAL 3 TIMES DAILY
Status: DISCONTINUED | OUTPATIENT
Start: 2020-02-12 | End: 2020-02-12

## 2020-02-12 RX ADMIN — INSULIN GLARGINE 45 UNITS: 100 INJECTION, SOLUTION SUBCUTANEOUS at 09:08

## 2020-02-12 RX ADMIN — ATORVASTATIN CALCIUM 80 MG: 40 TABLET, FILM COATED ORAL at 09:10

## 2020-02-12 RX ADMIN — CLONIDINE HYDROCHLORIDE 0.3 MG: 0.1 TABLET ORAL at 21:36

## 2020-02-12 RX ADMIN — INSULIN LISPRO 3 UNITS: 100 INJECTION, SOLUTION INTRAVENOUS; SUBCUTANEOUS at 11:30

## 2020-02-12 RX ADMIN — METOPROLOL SUCCINATE 100 MG: 50 TABLET, EXTENDED RELEASE ORAL at 09:09

## 2020-02-12 RX ADMIN — HYDRALAZINE HYDROCHLORIDE 50 MG: 50 TABLET, FILM COATED ORAL at 21:37

## 2020-02-12 RX ADMIN — INSULIN LISPRO 5 UNITS: 100 INJECTION, SOLUTION INTRAVENOUS; SUBCUTANEOUS at 12:16

## 2020-02-12 RX ADMIN — INSULIN LISPRO 2 UNITS: 100 INJECTION, SOLUTION INTRAVENOUS; SUBCUTANEOUS at 21:39

## 2020-02-12 RX ADMIN — GABAPENTIN 300 MG: 300 CAPSULE ORAL at 09:12

## 2020-02-12 RX ADMIN — CEFTRIAXONE SODIUM 2 G: 2 INJECTION, POWDER, FOR SOLUTION INTRAMUSCULAR; INTRAVENOUS at 12:17

## 2020-02-12 RX ADMIN — HEPARIN SODIUM 5000 UNITS: 5000 INJECTION INTRAVENOUS; SUBCUTANEOUS at 21:37

## 2020-02-12 RX ADMIN — POTASSIUM CHLORIDE 40 MEQ: 750 TABLET, FILM COATED, EXTENDED RELEASE ORAL at 09:11

## 2020-02-12 RX ADMIN — FERROUS SULFATE TAB 325 MG (65 MG ELEMENTAL FE) 325 MG: 325 (65 FE) TAB at 09:12

## 2020-02-12 RX ADMIN — POTASSIUM CHLORIDE 20 MEQ: 750 TABLET, FILM COATED, EXTENDED RELEASE ORAL at 12:15

## 2020-02-12 RX ADMIN — HEPARIN SODIUM 5000 UNITS: 5000 INJECTION INTRAVENOUS; SUBCUTANEOUS at 15:30

## 2020-02-12 RX ADMIN — INSULIN LISPRO 7 UNITS: 100 INJECTION, SOLUTION INTRAVENOUS; SUBCUTANEOUS at 16:30

## 2020-02-12 RX ADMIN — CLONIDINE HYDROCHLORIDE 0.3 MG: 0.1 TABLET ORAL at 17:10

## 2020-02-12 RX ADMIN — HYDRALAZINE HYDROCHLORIDE 50 MG: 50 TABLET, FILM COATED ORAL at 17:09

## 2020-02-12 RX ADMIN — NEPHROCAP 1 CAPSULE: 1 CAP ORAL at 09:12

## 2020-02-12 RX ADMIN — ERGOCALCIFEROL 50000 UNITS: 1.25 CAPSULE ORAL at 12:16

## 2020-02-12 RX ADMIN — LEVOTHYROXINE SODIUM 175 MCG: 150 TABLET ORAL at 05:35

## 2020-02-12 RX ADMIN — ALLOPURINOL 100 MG: 100 TABLET ORAL at 09:12

## 2020-02-12 RX ADMIN — PYRIDOXINE HCL TAB 50 MG 50 MG: 50 TAB at 09:11

## 2020-02-12 RX ADMIN — INSULIN LISPRO 8 UNITS: 100 INJECTION, SOLUTION INTRAVENOUS; SUBCUTANEOUS at 17:08

## 2020-02-12 RX ADMIN — HEPARIN SODIUM 5000 UNITS: 5000 INJECTION INTRAVENOUS; SUBCUTANEOUS at 05:35

## 2020-02-12 RX ADMIN — CLONIDINE HYDROCHLORIDE 0.3 MG: 0.1 TABLET ORAL at 09:10

## 2020-02-12 RX ADMIN — HYDRALAZINE HYDROCHLORIDE 25 MG: 25 TABLET, FILM COATED ORAL at 12:15

## 2020-02-12 RX ADMIN — GABAPENTIN 300 MG: 300 CAPSULE ORAL at 17:09

## 2020-02-12 NOTE — PROGRESS NOTES
FAWN:   1) Rehab   2) Medicaid screening- CM will complete UAI if neeed \  3) Pt will need 2ND IM letter at time of discharge     10:46 AM- CM spoke with pt's son Paul Tooele, he did not get a chance to come up last night and review the SNF choices. CM emailed him the updated SNF list and he is going to look into facilities and provide CM with choices this afternoon.      PARAS Street, [address] [place] [address]   [phone]

## 2020-02-12 NOTE — PROGRESS NOTES
[facility] Progress Note        NAME: Yessi Hirsch       :  1940       MRN:  [id number]     Date/Time: 2020    Risk of deterioration: medium       Assessment:    Plan:  GNR Bacteremia/Fever  UTI  CKD IV-Pam, with TARUN  ANemia  HTN  Hx of edema 2 to CCBs  DM [place])  (+) Large ventral hernia Changed clonidine to 0.3 mg tid  Increased beta blocker to 100mg, adding hydralazine  cx (p)  Fever trend improving  Pt with known CKD IV-baseline creatinine 2.4-2.8  Creatinine starting to improve-3.24-underlying hypertensive nephrosclerosis and diabetic nephropathy  EPO, no iv iron in setting of active infection  Nephrocap  Check bone metabolism labs-needs vit d-ordered  Will follow       Subjective:     Chief Complaint:How long do I have to stay in the hospital?    Review of Systems:  No n/v (+) F/dysuria  (-) CP/SOB    Objective:     VITALS:   Last 24hrs VS reviewed since prior progress note. Most recent are:  Visit Vitals  /80 (BP 1 Location: Right arm, BP Patient Position: At rest)   Pulse 62   Temp 98.3 °F (36.8 °C)   Resp 18   Ht 5' 5\" (1.651 m)   Wt 101.6 kg (223 lb 15.8 oz)   SpO2 96%   BMI 37.27 kg/m²     SpO2 Readings from Last 6 Encounters:   20 96%   19 100%   19 100%   10/15/19 100%   19 100%   19 100%          No intake or output data in the 24 hours ending 20 1001       PHYSICAL EXAM:    General   well developed, well nourished, appears stated age, in no acute distress  EENT  Normocephalic, Atraumatic, EOMI, sclera clear, nares clear, pharynx normal  Respiratory   Clear To Auscultation bilaterally   Cardiology  Regular Rate and Rythmn    Abdominal  Soft, non-tender, non-distended (+) ventral hernia  Extremities  No clubbing, cyanosis, or edema. Pulses intact. Skin  Normal skin turgor.   No rashes or skin ulcers noted              Lab Data Reviewed: (see below)    Medications Reviewed: (see below)    PMH/SH reviewed - no change compared to H&P  ___________________________________________  ___________________________________________________    Attending Physician: Chhaya Livingston MD     ____________________________________________________  MEDICATIONS:  Current Facility-Administered Medications   Medication Dose Route Frequency    [START ON 2/13/2020] insulin glargine (LANTUS) injection 35 Units  35 Units SubCUTAneous DAILY    And    insulin lispro (HUMALOG) injection 5 Units  5 Units SubCUTAneous TIDAC    cloNIDine HCL (CATAPRES) tablet 0.3 mg  0.3 mg Oral TID    metoprolol succinate (TOPROL-XL) XL tablet 100 mg  100 mg Oral DAILY    B complex-vitaminC-folic acid (NEPHROCAP) cap  1 Cap Oral DAILY    epoetin nelia-epbx (RETACRIT) injection 10,000 Units  10,000 Units SubCUTAneous EVERY TU & TH    0.9% sodium chloride infusion  75 mL/hr IntraVENous CONTINUOUS    dextrose (D50W) injection syrg 25 g  25 g IntraVENous PRN    allopurinoL (ZYLOPRIM) tablet 100 mg  100 mg Oral DAILY    atorvastatin (LIPITOR) tablet 80 mg  80 mg Oral DAILY    ferrous sulfate tablet 325 mg  1 Tab Oral BID WITH MEALS    gabapentin (NEURONTIN) capsule 300 mg  300 mg Oral BID    pyridoxine (vitamin B6) (VITAMIN B-6) tablet 50 mg  50 mg Oral DAILY    levothyroxine (SYNTHROID) tablet 175 mcg  175 mcg Oral ACB    cefTRIAXone (ROCEPHIN) 1 g in 0.9% sodium chloride (MBP/ADV) 50 mL  1 g IntraVENous Q24H    labetaloL (NORMODYNE;TRANDATE) injection 10 mg  10 mg IntraVENous Q6H PRN    acetaminophen (TYLENOL) tablet 650 mg  650 mg Oral Q4H PRN    ondansetron (ZOFRAN) injection 4 mg  4 mg IntraVENous Q6H PRN    insulin lispro (HUMALOG) injection   SubCUTAneous AC&HS    glucose chewable tablet 16 g  4 Tab Oral PRN    glucagon (GLUCAGEN) injection 1 mg  1 mg IntraMUSCular PRN    oxyCODONE IR (ROXICODONE) tablet 5 mg  5 mg Oral Q4H PRN    heparin (porcine) injection 5,000 Units  5,000 Units SubCUTAneous Q8H        LABS:  Recent Labs     02/11/20  0031 02/10/20  3221   WBC 8.6 7.3   HGB 8.0* 9.5*   HCT 24.0* 28.5*    205     Recent Labs     02/12/20  0328 02/11/20  0031 02/10/20  2332 02/10/20  1851    133*  --  135*   K 3.4* 3.7  --  3.8    100  --  100   CO2 27 28  --  28   BUN 33* 37*  --  36*   CREA 3.24* 3.32*  --  3.19*   * 433*  --  226*   CA 7.1* 7.3* 6.9* 8.1*   MG  --  1.7  --  1.8   PHOS  --  3.5  --   --    URICA  --  4.6  --   --      Recent Labs     02/12/20  0328 02/11/20  0031 02/10/20  1851   SGOT 66* 44* 59*   ALT 48 39 48   AP 59 61 70   TBILI 0.3 0.6 0.8   TP 5.3* 5.5* 6.3*   ALB 1.7* 1.9* 2.3*   GLOB 3.6 3.6 4.0     Recent Labs     02/11/20  0031 02/10/20  1851   INR 1.1 1.1   PTP 11.5* 11.2*      Recent Labs     02/11/20 0031   TIBC 167*   PSAT 7*   FERR 224      No results for input(s): PH, PCO2, PO2 in the last 72 hours. No results for input(s): CPK, CKNDX, TROIQ in the last 72 hours.     No lab exists for component: CPKMB  Lab Results   Component Value Date/Time    Glucose (POC) 134 (H) 02/12/2020 08:02 AM    Glucose (POC) 89 02/11/2020 09:10 PM    Glucose (POC) 151 (H) 02/11/2020 04:30 PM    Glucose (POC) 310 (H) 02/11/2020 11:40 AM    Glucose (POC) 355 (H) 02/11/2020 09:21 AM

## 2020-02-12 NOTE — PROGRESS NOTES
Problem: Urinary Tract Infection - Adult  Goal: *Absence of infection signs and symptoms  Outcome: Progressing Towards Goal     Problem: Risk for Spread of Infection  Goal: Prevent transmission of infectious organism to others  Description  Prevent the transmission of infectious organisms to other patients, staff members, and visitors. Outcome: Progressing Towards Goal     Problem: Falls - Risk of  Goal: *Absence of Falls  Description  Document Tia Manus Fall Risk and appropriate interventions in the flowsheet.   Outcome: Progressing Towards Goal  Note: Fall Risk Interventions:  Mobility Interventions: Communicate number of staff needed for ambulation/transfer    Mentation Interventions: Adequate sleep, hydration, pain control, Evaluate medications/consider consulting pharmacy    Medication Interventions: Assess postural VS orthostatic hypotension, Patient to call before getting OOB    Elimination Interventions: Call light in reach

## 2020-02-12 NOTE — PROGRESS NOTES
Problem: Mobility Impaired (Adult and Pediatric)  Goal: *Acute Goals and Plan of Care (Insert Text)  Description  FUNCTIONAL STATUS PRIOR TO ADMISSION: Patient was modified independent using a single point cane for functional mobility per her report. .. she is vague on amt. Of sitting vs. Amb. In home during the day, she reports fixing her own simple meals such as cereal or a sandwich. HOME SUPPORT PRIOR TO ADMISSION: The patient lived with son who provided assistance in am and pm (before and after work). Physical Therapy Goals  Initiated 2/11/2020  1. Patient will move from supine to sit and sit to supine , scoot up and down and roll side to side in bed with modified independence within 7 day(s). 2.  Patient will transfer from bed to chair and chair to bed with modified independence using the least restrictive device within 7 day(s). 3.  Patient will perform sit to stand with modified independence within 7 day(s). 4.  Patient will ambulate with supervision/set-up for 100 feet with the least restrictive device within 7 day(s). 5.  Patient will ascend/descend 6 stairs with handrail(s) with supervision/set-up within 7 day(s). Outcome: Progressing Towards Goal   PHYSICAL THERAPY TREATMENT  Patient: Nathan Veloz (67 y.o. female)  Date: 2/12/2020  [facility]osis[facility] [A41.9]   <principal problem not specified>       Precautions: Fall, Contact  Chart, physical therapy assessment, plan of care and goals were reviewed. ASSESSMENT  Patient continues with skilled PT services and is progressing towards goals. Pt received in bed, eager to participate, asking about going to [facility] at d/c; motivated for eventual return home as soon as possible. Pt demo increased tolerance to activity this date participating in multiple functional transfers, functional dynamic standing activities, gait training with RW. Mobilized with CGA for balance and cues for safe walker mgmt.  Noted mild L weakness, balance deficits, and gait dysfunction. Pt demo strong motivation to participate in rehab process to enable return to [facility] with mobility. Pt now able to tolerate increased activity and would benefit from follow up IPR at d/c. Current Level of Function Impacting Discharge (mobility/balance): bed mob CGA, transfer CGA, amb with RW CGA    Other factors to consider for discharge: pt remains below functional baseline, home alone while son works, increased risk for fall at this time; h/o CVA         PLAN :  Patient continues to benefit from skilled intervention to address the above impairments. Continue treatment per established plan of care. to address goals. Recommendation for discharge: (in order for the patient to meet his/her long term goals)  Therapy 3 hours per day 5-7 days per week    This discharge recommendation:  Has not yet been discussed the attending provider and/or case management    IF patient discharges home will need the following DME: to be determined (TBD)       SUBJECTIVE:   Patient stated I want to go to [facility] I want to go home.     OBJECTIVE DATA SUMMARY:   Critical Behavior:  Neurologic State: Alert  Orientation Level: Oriented X4  Cognition: Follows commands  Safety/Judgement: Fall prevention  Functional Mobility Training:  Bed Mobility:  Rolling: Contact guard assistance; Additional time;Bed Modified  Supine to Sit: Contact guard assistance;Stand-by assistance     Scooting: Supervision        Transfers:  Sit to Stand: Contact guard assistance  Stand to Sit: Contact guard assistance        Bed to Chair: Contact guard assistance                    Balance:  Sitting: Intact  Standing: Impaired; Without support  Standing - Static: Good;Constant support  Standing - Dynamic : Fair;Constant support  Ambulation/Gait Training:  Distance (ft): 40 Feet (ft)(also 10' x 2 and 20')  Assistive Device: Gait belt;Walker, rolling  Ambulation - Level of Assistance: Contact guard assistance Gait Abnormalities: Decreased step clearance;Trunk sway increased        Base of Support: Widened     Speed/Katharina: Pace decreased (<100 feet/min)  Step Length: Left shortened        Interventions: Verbal cues(cues for safe walker mgmt w/ turns, safe turn to sit)   Pain Rating:  No c/o pain    Activity Tolerance:   Good  Please refer to the flowsheet for vital signs taken during this treatment.     After treatment patient left in no apparent distress:   Sitting in chair and Call bell within reach    COMMUNICATION/COLLABORATION:   The patients plan of care was discussed with: Occupational Therapist and Registered Nurse    Jack Sun PT   Time Calculation: 32 mins

## 2020-02-12 NOTE — PROGRESS NOTES
Problem: Self Care Deficits Care Plan (Adult)  Goal: *Acute Goals and Plan of Care (Insert Text)  Description    FUNCTIONAL STATUS PRIOR TO ADMISSION: Patient was modified independent using a single point cane for functional mobility. HOME SUPPORT: The patient lived with son and stated that her son prepared her meals at night but she was able to bathe and dress self with no assist. .    Occupational Therapy Goals  Initiated 2/11/2020  1. Patient will perform grooming with supervision/set-up within 7 day(s). 2.  Patient will perform bathing with minimal assistance/contact guard assist within 7 day(s). 3.  Patient will perform lower body dressing with moderate assistance  within 7 day(s). 4.  Patient will perform toilet transfers with supervision/set-up within 7 day(s). 5.  Patient will perform all aspects of toileting with supervision/set-up within 7 day(s). 6.  Patient will participate in upper extremity therapeutic exercise/activities with supervision/set-up for 5 minutes within 7 day(s). 7.  Patient will utilize energy conservation techniques during functional activities with verbal cues within 7 day(s). Outcome: Progressing Towards Goal   OCCUPATIONAL THERAPY TREATMENT  Patient: Benuel Sacks (67 y.o. female)  Date: 2/12/2020  [facility] [A41.9]   <principal problem not specified>       Precautions: Fall, Contact  Chart, occupational therapy assessment, plan of care, and goals were reviewed. ASSESSMENT  Patient continues with skilled OT services and is progressing towards goals. Pt stated that she felt better and was mod I and extra time for bed mobility. Pt was able to stand with CGA and with use of RW she walked to bathroom and CGA to transfer to toilet. Pt was able to toilet self with CGA and stood at the sink to wash her face and hands with CGA. She was CGA to stand and wash guido area and buttocks with VC.   She appears more motivated today and was able to sit and walk in room after standing for ADLs. Current Level of Function Impacting Discharge (ADLs): decreased endurance, strength, functional mobility, and ADLs    Other factors to consider for discharge: pt would benefit from rehab and pt is asking for in pt rehab and feel that she would be able to handle the 3 hours of therapy. PLAN :  Patient continues to benefit from skilled intervention to address the above impairments. Continue treatment per established plan of care. to address goals. Recommend with staff: OOB for meals and educated pt on calling for assist to get out of bed    Recommend next OT session: work on LB dressing and standing endurance. Recommendation for discharge: (in order for the patient to meet his/her long term goals)  Therapy 3 hours per day 5-7 days per week    This discharge recommendation:  Has been made in collaboration with the attending provider and/or case management    IF patient discharges home will need the following DME: tbd       SUBJECTIVE:   Patient stated I want to go home if I cant go to [facility].     OBJECTIVE DATA SUMMARY:   Cognitive/Behavioral Status:  Neurologic State: Alert  Orientation Level: Oriented X4  Cognition: Follows commands  Perception: Appears intact  Perseveration: No perseveration noted  Safety/Judgement: Fall prevention    Functional Mobility and Transfers for ADLs:  Bed Mobility:  Rolling: Contact guard assistance;Stand-by assistance(extra time and use of bed rails)  Supine to Sit: Contact guard assistance;Stand-by assistance  Scooting: Supervision    Transfers:  Sit to Stand: Contact guard assistance  Functional Transfers  Bathroom Mobility: Contact guard assistance  Toilet Transfer : Contact guard assistance  Bed to Chair: Contact guard assistance    Balance:  Sitting: Intact  Standing: Impaired; Without support  Standing - Static: Good;Constant support  Standing - Dynamic : Fair;Constant support    ADL Intervention:  Feeding  Feeding Assistance: Independent    Grooming  Grooming Assistance: Contact guard assistance  Position Performed: Standing  Washing Face: Contact guard assistance  Washing Hands: Contact guard assistance  Brushing Teeth: Contact guard assistance    Upper Body Bathing  Bathing Assistance: Contact guard assistance  Position Performed: Standing    Lower Body Bathing  Bathing Assistance: Contact guard assistance  Perineal  : Contact guard assistance  Position Performed: Standing       Toileting  Toileting Assistance: Contact guard assistance  Bladder Hygiene: Contact guard assistance  Bowel Hygiene: Contact guard assistance    Cognitive Retraining  Safety/Judgement: Fall prevention      Activity Tolerance:   Fair  Please refer to the flowsheet for vital signs taken during this treatment.     After treatment patient left in no apparent distress:   Sitting in chair and Call bell within reach    COMMUNICATION/COLLABORATION:   The patients plan of care was discussed with: Physical Therapist and Registered Nurse    Ashlyn Ramos OT  Time Calculation: 32 mins

## 2020-02-12 NOTE — PROGRESS NOTES
Oncology End of Shift Note      Bedside shift change report given to [facility] Fili Walsh RN (incoming nurse) by Stacie Castillo (outgoing nurse) on [facility]. Report included the following information SBAR, Kardex, MAR and Accordion. Shift Summary: Pt remained stable throughout shift. Scheduled meds given, no PRN meds given. Pts IV flushes well,  Hourly rounding completed, pt turns self. Pt had BM today. Issues for Physician to Address:       Patient on Cardiac Monitoring?     [x] Yes  [] No    Rhythm: Telemetry: normal sinus rhythm         Stacie Castillo

## 2020-02-12 NOTE — PROGRESS NOTES
Bedside shift change report given to Cathleen Oh (oncoming nurse) by Mindy Rangel (offgoing nurse). Report included the following information SBAR, Kardex, Intake/Output, MAR, Accordion and Recent Results. Worked with PT/OT and sat in the chair for a couple hours. Had an incontinent episode and was transferred back to bed with RN and tech. Patient seems to get confused in the early afternoon and throughout the night. Insulin administration edited by MD. Denied pain.

## 2020-02-12 NOTE — PROGRESS NOTES
,Oncology End of Shift Note      Bedside shift change report given to HESHAM James (incoming nurse) by London Holliday (outgoing nurse) on [facility]. Report included the following information SBAR, Kardex, Intake/Output, MAR and Quality Measures. Shift Summary:  Pt is noticeably more confused at night. Pt ran a temperature and tylenol given. Patient on IV abx. Pt heart rate running in the low 50's. Blood cultures drawn this AM.      Issues for Physician to Address:       Patient on Cardiac Monitoring?     [x] Yes  [] No    Rhythm: SB/ NSR         Shift Events        London Holliday

## 2020-02-13 ENCOUNTER — PATIENT OUTREACH (OUTPATIENT)
Dept: CASE MANAGEMENT | Age: 80
End: 2020-02-13

## 2020-02-13 VITALS
SYSTOLIC BLOOD PRESSURE: 165 MMHG | HEIGHT: 65 IN | BODY MASS INDEX: 38.31 KG/M2 | HEART RATE: 60 BPM | OXYGEN SATURATION: 98 % | TEMPERATURE: 98.8 F | RESPIRATION RATE: 18 BRPM | WEIGHT: 229.94 LBS | DIASTOLIC BLOOD PRESSURE: 74 MMHG

## 2020-02-13 LAB
ANION GAP SERPL CALC-SCNC: 4 MMOL/L (ref 5–15)
BACTERIA SPEC CULT: ABNORMAL
BUN SERPL-MCNC: 34 MG/DL (ref 6–20)
BUN/CREAT SERPL: 11 (ref 12–20)
CALCIUM SERPL-MCNC: 7.3 MG/DL (ref 8.5–10.1)
CC UR VC: ABNORMAL
CHLORIDE SERPL-SCNC: 105 MMOL/L (ref 97–108)
CO2 SERPL-SCNC: 27 MMOL/L (ref 21–32)
CREAT SERPL-MCNC: 3.13 MG/DL (ref 0.55–1.02)
ERYTHROCYTE [DISTWIDTH] IN BLOOD BY AUTOMATED COUNT: 15.6 % (ref 11.5–14.5)
GLUCOSE BLD STRIP.AUTO-MCNC: 193 MG/DL (ref 65–100)
GLUCOSE BLD STRIP.AUTO-MCNC: 217 MG/DL (ref 65–100)
GLUCOSE BLD STRIP.AUTO-MCNC: 223 MG/DL (ref 65–100)
GLUCOSE SERPL-MCNC: 167 MG/DL (ref 65–100)
HCT VFR BLD AUTO: 24.6 % (ref 35–47)
HGB BLD-MCNC: 8.1 G/DL (ref 11.5–16)
MCH RBC QN AUTO: 29.7 PG (ref 26–34)
MCHC RBC AUTO-ENTMCNC: 32.9 G/DL (ref 30–36.5)
MCV RBC AUTO: 90.1 FL (ref 80–99)
NRBC # BLD: 0 K/UL (ref 0–0.01)
NRBC BLD-RTO: 0 PER 100 WBC
PLATELET # BLD AUTO: 227 K/UL (ref 150–400)
PMV BLD AUTO: 11.5 FL (ref 8.9–12.9)
POTASSIUM SERPL-SCNC: 4 MMOL/L (ref 3.5–5.1)
RBC # BLD AUTO: 2.73 M/UL (ref 3.8–5.2)
SERVICE CMNT-IMP: ABNORMAL
SODIUM SERPL-SCNC: 136 MMOL/L (ref 136–145)
WBC # BLD AUTO: 6.7 K/UL (ref 3.6–11)

## 2020-02-13 PROCEDURE — 80048 BASIC METABOLIC PNL TOTAL CA: CPT

## 2020-02-13 PROCEDURE — 97535 SELF CARE MNGMENT TRAINING: CPT

## 2020-02-13 PROCEDURE — 74011636637 HC RX REV CODE- 636/637: Performed by: INTERNAL MEDICINE

## 2020-02-13 PROCEDURE — 82962 GLUCOSE BLOOD TEST: CPT

## 2020-02-13 PROCEDURE — 74011250637 HC RX REV CODE- 250/637: Performed by: INTERNAL MEDICINE

## 2020-02-13 PROCEDURE — 85027 COMPLETE CBC AUTOMATED: CPT

## 2020-02-13 PROCEDURE — 36415 COLL VENOUS BLD VENIPUNCTURE: CPT

## 2020-02-13 PROCEDURE — 74011000258 HC RX REV CODE- 258: Performed by: INTERNAL MEDICINE

## 2020-02-13 PROCEDURE — 74011250636 HC RX REV CODE- 250/636: Performed by: INTERNAL MEDICINE

## 2020-02-13 PROCEDURE — 97116 GAIT TRAINING THERAPY: CPT

## 2020-02-13 RX ORDER — CIPROFLOXACIN 500 MG/1
500 TABLET ORAL
Qty: 12 TAB | Refills: 0 | Status: SHIPPED | OUTPATIENT
Start: 2020-02-13 | End: 2020-02-22

## 2020-02-13 RX ORDER — METOPROLOL SUCCINATE 50 MG/1
100 TABLET, EXTENDED RELEASE ORAL DAILY
Qty: 60 TAB | Refills: 1 | Status: SHIPPED | OUTPATIENT
Start: 2020-02-13 | End: 2020-04-13

## 2020-02-13 RX ORDER — HYDRALAZINE HYDROCHLORIDE 25 MG/1
75 TABLET, FILM COATED ORAL 3 TIMES DAILY
Qty: 270 TAB | Refills: 1 | Status: SHIPPED
Start: 2020-02-13 | End: 2020-02-13 | Stop reason: SDUPTHER

## 2020-02-13 RX ORDER — HYDRALAZINE HYDROCHLORIDE 25 MG/1
75 TABLET, FILM COATED ORAL 3 TIMES DAILY
Qty: 270 TAB | Refills: 1 | Status: SHIPPED | OUTPATIENT
Start: 2020-02-13 | End: 2020-04-13

## 2020-02-13 RX ORDER — HYDRALAZINE HYDROCHLORIDE 50 MG/1
50 TABLET, FILM COATED ORAL 3 TIMES DAILY
Qty: 90 TAB | Refills: 1 | Status: SHIPPED | OUTPATIENT
Start: 2020-02-13 | End: 2020-02-13

## 2020-02-13 RX ORDER — CLONIDINE HYDROCHLORIDE 0.3 MG/1
0.3 TABLET ORAL 3 TIMES DAILY
Qty: 90 TAB | Refills: 1 | Status: SHIPPED | OUTPATIENT
Start: 2020-02-13 | End: 2020-04-13

## 2020-02-13 RX ADMIN — METOPROLOL SUCCINATE 100 MG: 50 TABLET, EXTENDED RELEASE ORAL at 08:23

## 2020-02-13 RX ADMIN — GABAPENTIN 300 MG: 300 CAPSULE ORAL at 08:23

## 2020-02-13 RX ADMIN — SODIUM CHLORIDE 50 ML/HR: 900 INJECTION, SOLUTION INTRAVENOUS at 04:38

## 2020-02-13 RX ADMIN — INSULIN GLARGINE 35 UNITS: 100 INJECTION, SOLUTION SUBCUTANEOUS at 08:25

## 2020-02-13 RX ADMIN — HEPARIN SODIUM 5000 UNITS: 5000 INJECTION INTRAVENOUS; SUBCUTANEOUS at 05:53

## 2020-02-13 RX ADMIN — INSULIN LISPRO 2 UNITS: 100 INJECTION, SOLUTION INTRAVENOUS; SUBCUTANEOUS at 07:30

## 2020-02-13 RX ADMIN — NEPHROCAP 1 CAPSULE: 1 CAP ORAL at 08:24

## 2020-02-13 RX ADMIN — HYDRALAZINE HYDROCHLORIDE 50 MG: 50 TABLET, FILM COATED ORAL at 08:25

## 2020-02-13 RX ADMIN — INSULIN LISPRO 8 UNITS: 100 INJECTION, SOLUTION INTRAVENOUS; SUBCUTANEOUS at 08:25

## 2020-02-13 RX ADMIN — ATORVASTATIN CALCIUM 80 MG: 40 TABLET, FILM COATED ORAL at 08:23

## 2020-02-13 RX ADMIN — LEVOTHYROXINE SODIUM 175 MCG: 150 TABLET ORAL at 05:53

## 2020-02-13 RX ADMIN — HEPARIN SODIUM 5000 UNITS: 5000 INJECTION INTRAVENOUS; SUBCUTANEOUS at 14:00

## 2020-02-13 RX ADMIN — CLONIDINE HYDROCHLORIDE 0.3 MG: 0.1 TABLET ORAL at 08:24

## 2020-02-13 RX ADMIN — INSULIN LISPRO 8 UNITS: 100 INJECTION, SOLUTION INTRAVENOUS; SUBCUTANEOUS at 12:54

## 2020-02-13 RX ADMIN — CEFTRIAXONE SODIUM 2 G: 2 INJECTION, POWDER, FOR SOLUTION INTRAMUSCULAR; INTRAVENOUS at 12:53

## 2020-02-13 RX ADMIN — PYRIDOXINE HCL TAB 50 MG 50 MG: 50 TAB at 08:23

## 2020-02-13 RX ADMIN — POTASSIUM CHLORIDE 20 MEQ: 750 TABLET, FILM COATED, EXTENDED RELEASE ORAL at 08:24

## 2020-02-13 RX ADMIN — INSULIN LISPRO 3 UNITS: 100 INJECTION, SOLUTION INTRAVENOUS; SUBCUTANEOUS at 11:30

## 2020-02-13 RX ADMIN — ALLOPURINOL 100 MG: 100 TABLET ORAL at 08:23

## 2020-02-13 NOTE — DISCHARGE SUMMARY
Hospitalist Discharge Summary     Patient ID:  Tabatha Crain  [id number]  67 y.o.  1940  2/10/2020    PCP on record: Ko Lopez MD    Admit date: 2/10/2020  Discharge date and time: 2/13/2020    DISCHARGE DIAGNOSIS:  Sepsis POA:   EColi bacteriemia   Ecoli UTI:  Acute Metabolic Encephalopathy: resolved   TARUN on CKD   DM type II with renal manifestation POA:   Hyperglycemia    H/O CVA: with residual left sided weakness,    Essential HTN POA   Hypothyroidism POA:  Hyperlipidemia POA  Gallbladder polyp,      CONSULTATIONS:  IP CONSULT TO NEPHROLOGY    Excerpted HPI from H&P of Daniel Aguilar MD:  Beena Hamm is a 67 y.o.  female  with PMHx significant for CVA, HTN, DM, CKD, Obesity, Chronic Pain, presents to the ED with cc of fever, not feeling well. Started feeling bad today with high fever, no meds prior to arrival, no cough/congestion, wheezing/dyspnea. No dysuria/hematuria, increased urinary frequency.   AT this time patient is feeling better c/o fevers, low abdominal tenderness, denies chest pain, no SOB, no N/V no diarrhea, no other associated symptoms. We were asked to admit for work up and evaluation of the above problems.        ______________________________________________________________________  DISCHARGE SUMMARY/HOSPITAL COURSE:  for full details see H&P, daily progress notes, labs, consult notes.    Sepsis POA:   EColi bacteriemia   Ecoli UTI:  fever, tachycardia, encephalopathy due to UTI  C/w ceftriaxone 2g  , repeat bcx NTD   bcx /Ucx came back with ecoli pansensitive  Pt stable to be DC on PO cipro renally dosed 500mg q18h for a total of 14days       Acute Metabolic Encephalopathy: resolved   Pt now AAOx4     TARUN on CKD:   Baseline creat around 2.8, creat peaked  To 3.32  now down 3.13   resume bumex on discharge , fU with pt own nephrologist dr Blayne Curtis     DM type II with renal manifestation POA:   Hyperglycemia   C/w home insulin regimen at reduced doses      H/O CVA: with residual left sided weakness, seems stable at this time.     Essential HTN POA:BP better   c/w BB/Clonidine, use labetalol prn, hold ARB  Hydralazine added, titrated to 75mg TID      Hypothyroidism POA: c/w L-thyroxine  Hyperlipidemia POA c/w statin  Gallbladder polyp, followed by Dr Babita Flowers with abd US every year    PT evaluation recommended IP rehab as pt is very unsteady on her feet and at risk of falls but pt declined  _______________________________________________________________________  Patient seen and examined by me on discharge day. Pertinent Findings:  Gen:    Not in distress  Chest: Clear lungs  CVS:   Regular rhythm. No edema  Abd:  Soft, not distended, not tender  Neuro:  Alert, orientedx4  _______________________________________________________________________  DISCHARGE MEDICATIONS:   Current Discharge Medication List      START taking these medications    Details   ciprofloxacin HCl (CIPRO) 500 mg tablet Take 1 Tab by mouth every eighteen (18) hours for 9 days. Qty: 12 Tab, Refills: 0      hydrALAZINE (APRESOLINE) 25 mg tablet Take 3 Tabs by mouth three (3) times daily for 60 days. Qty: 270 Tab, Refills: 1         CONTINUE these medications which have CHANGED    Details   cloNIDine HCL (CATAPRES) 0.3 mg tablet Take 1 Tab by mouth three (3) times daily for 60 days. Qty: 90 Tab, Refills: 1      metoprolol succinate (TOPROL-XL) 50 mg XL tablet Take 2 Tabs by mouth daily for 60 days. Qty: 60 Tab, Refills: 1      insulin nph-regular human rec (NOVOLIN 70-30 FLEXPEN U-100) 100 unit/mL (70-30) inpn Inject 30units before breakfast and 30 units before dinner + 5 units for every 50 mg/dl above 150 mg/dl. Max 100 units per day--pt will pay cash  Qty: 30 mL, Refills: 11         CONTINUE these medications which have NOT CHANGED    Details   SYNTHROID 175 mcg tablet Take 1 Tab by mouth Daily (before breakfast). Take 1 Tab by mouth Daily (before breakfast).  BRAND MEDICALLY NECESSARY--Delete 150 mcg dose from profile  Qty: 90 Tab, Refills: 3      gabapentin (NEURONTIN) 300 mg capsule Take 2 Caps by mouth three (3) times daily. Max Daily Amount: 1,800 mg. Qty: 540 Cap, Refills: 1    Associated Diagnoses: Uncontrolled type 2 diabetes mellitus with diabetic polyneuropathy, with long-term current use of insulin ([facility])      Lancing Device with Lancets kit Use as directed to test 3 times daily  Qty: 1 Kit, Refills: 0    Associated Diagnoses: Uncontrolled type 2 diabetes mellitus with diabetic polyneuropathy, with long-term current use of insulin ([facility])      ONETOUCH ULTRA BLUE TEST STRIP strip TEST 3 TIMES DAILY  Qty: 300 Strip, Refills: 3    Associated Diagnoses: Uncontrolled type 2 diabetes mellitus with diabetic polyneuropathy, with long-term current use of insulin ([facility])      ONETOUCH ULTRA2 monitoring kit Test 3 times daily. DX E11.65  Qty: 1 Kit, Refills: 0      atorvastatin (LIPITOR) 80 mg tablet Take 80 mg by mouth daily. ferrous sulfate (IRON) 325 mg (65 mg iron) tablet Take  by mouth daily. LOR PEN NEEDLE 32 gauge x 5/32\" ndle Use as directed 5 times daily  Qty: 500 Pen Needle, Refills: 3      bumetanide (BUMEX) 0.5 mg tablet 0.5 mg two (2) times a day. PYRIDOXINE HCL, VITAMIN B6, (VITAMIN B-6 PO) Take  by mouth daily. allopurinol (ZYLOPRIM) 100 mg tablet Take  by mouth daily. multivitamins-minerals-lutein (CENTRUM SILVER) Tab Take  by mouth daily. STOP taking these medications       olmesartan (BENICAR) 40 mg tablet Comments:   Reason for Stopping:                 Patient Follow Up Instructions: Activity: Activity as tolerated  Diet: Diabetic Diet  Wound Care: None needed    Follow-up with see below in 7 days.   Follow-up tests/labs   Follow-up Information     Follow up With Specialties Details Why Contact Info    Amanda Russell MD Internal Medicine   [place] [id number] [phone] ________________________________________________________________    Risk of deterioration: Moderate    Condition at Discharge:  Stable  __________________________________________________________________    Disposition  IP Rehab    ____________________________________________________________________    Code Status: Full Code  ___________________________________________________________________      Total time in minutes spent coordinating this discharge (includes going over instructions, follow-up, prescriptions, and preparing report for sign off to her PCP) :  35 minutes    Signed:  Celestino Singleton MD

## 2020-02-13 NOTE — PROGRESS NOTES
Oncology End of Shift Note      Bedside shift change report given to HESHAM James (incoming nurse) by Donavon Nicholas (outgoing nurse) on [facility]. Report included the following information SBAR, Kardex, Intake/Output, MAR and Quality Measures. Shift Summary:  No acute events overnight. Pt has been running mildly hypertensive otherwise vitals stable. Issues for Physician to Address:       Patient on Cardiac Monitoring?     [x] Yes  [] No    Rhythm: NSR         Shift Events        Donavon Nicholas

## 2020-02-13 NOTE — PROGRESS NOTES
[facility] Progress Note        NAME: Shereen Sethi       :  1940       MRN:  [id number]     Date/Time: 2020    Risk of deterioration: medium       Assessment:    Plan:  Ecoli bacteremia  UTI  CKD IV-Pam, with TARUN  ANemia  HTN  Hx of edema 2 to CCBs  DM Semaj Brush)  (+) Large ventral hernia   Pt with known CKD IV-baseline creatinine 2.4-2.8  Creatinine starting to improve-3.24-underlying hypertensive nephrosclerosis and diabetic nephropathy  EPO, no iv iron in setting of active infection  Nephrocap  Checked bone metabolism labs-needs vit d-ordered         Subjective:     Chief Complaint: feeling much better    Review of Systems:  No n/v (+) F/dysuria  (-) CP/SOB    Objective:     VITALS:   Last 24hrs VS reviewed since prior progress note. Most recent are:  Visit Vitals  /70 (BP 1 Location: Right arm, BP Patient Position: At rest)   Pulse 68   Temp 99.2 °F (37.3 °C)   Resp 18   Ht 5' 5\" (1.651 m)   Wt 104.3 kg (229 lb 15 oz)   SpO2 97%   BMI 38.26 kg/m²     SpO2 Readings from Last 6 Encounters:   20 97%   19 100%   19 100%   10/15/19 100%   19 100%   19 100%            Intake/Output Summary (Last 24 hours) at 2020 1116  Last data filed at 2020 0815  Gross per 24 hour   Intake 1050 ml   Output 1500 ml   Net -450 ml          PHYSICAL EXAM:    General   well developed, well nourished, appears stated age, in no acute distress  EENT  Normocephalic, Atraumatic, EOMI, sclera clear, nares clear, pharynx normal  Respiratory   Clear To Auscultation bilaterally   Cardiology  Regular Rate and Rythmn    Abdominal  Soft, non-tender, non-distended (+) ventral hernia  Extremities  No clubbing, cyanosis, or edema. Pulses intact. Skin  Normal skin turgor.   No rashes or skin ulcers noted              Lab Data Reviewed: (see below)    Medications Reviewed: (see below)    PMH/SH reviewed - no change compared to H&P  ___________________________________________  ___________________________________________________    Attending Physician: Luis E Miller MD     ____________________________________________________  MEDICATIONS:  Current Facility-Administered Medications   Medication Dose Route Frequency    ergocalciferol capsule 50,000 Units  50,000 Units Oral Q7D    cefTRIAXone (ROCEPHIN) 2 g in 0.9% sodium chloride (MBP/ADV) 50 mL  2 g IntraVENous Q24H    hydrALAZINE (APRESOLINE) tablet 50 mg  50 mg Oral TID    hydrALAZINE (APRESOLINE) 20 mg/mL injection 10 mg  10 mg IntraVENous Q6H PRN    insulin lispro (HUMALOG) injection 8 Units  8 Units SubCUTAneous TIDAC    And    insulin glargine (LANTUS) injection 35 Units  35 Units SubCUTAneous DAILY    cloNIDine HCL (CATAPRES) tablet 0.3 mg  0.3 mg Oral TID    metoprolol succinate (TOPROL-XL) XL tablet 100 mg  100 mg Oral DAILY    B complex-vitaminC-folic acid (NEPHROCAP) cap  1 Cap Oral DAILY    epoetin nelia-epbx (RETACRIT) injection 10,000 Units  10,000 Units SubCUTAneous EVERY TU & TH    0.9% sodium chloride infusion  50 mL/hr IntraVENous CONTINUOUS    dextrose (D50W) injection syrg 25 g  25 g IntraVENous PRN    allopurinoL (ZYLOPRIM) tablet 100 mg  100 mg Oral DAILY    atorvastatin (LIPITOR) tablet 80 mg  80 mg Oral DAILY    ferrous sulfate tablet 325 mg  1 Tab Oral BID WITH MEALS    gabapentin (NEURONTIN) capsule 300 mg  300 mg Oral BID    pyridoxine (vitamin B6) (VITAMIN B-6) tablet 50 mg  50 mg Oral DAILY    levothyroxine (SYNTHROID) tablet 175 mcg  175 mcg Oral ACB    acetaminophen (TYLENOL) tablet 650 mg  650 mg Oral Q4H PRN    ondansetron (ZOFRAN) injection 4 mg  4 mg IntraVENous Q6H PRN    insulin lispro (HUMALOG) injection   SubCUTAneous AC&HS    glucose chewable tablet 16 g  4 Tab Oral PRN    glucagon (GLUCAGEN) injection 1 mg  1 mg IntraMUSCular PRN    oxyCODONE IR (ROXICODONE) tablet 5 mg  5 mg Oral Q4H PRN    heparin (porcine) injection 5,000 Units  5,000 Units SubCUTAneous Q8H        LABS:  Recent Labs     02/13/20 0450 02/11/20 0031   WBC 6.7 8.6   HGB 8.1* 8.0*   HCT 24.6* 24.0*    177     Recent Labs     02/13/20 0450 02/12/20  0328 02/11/20  0031  02/10/20  1851    137 133*  --  135*   K 4.0 3.4* 3.7  --  3.8    104 100  --  100   CO2 27 27 28  --  28   BUN 34* 33* 37*  --  36*   CREA 3.13* 3.24* 3.32*  --  3.19*   * 113* 433*  --  226*   CA 7.3* 7.1* 7.3*   < > 8.1*   MG  --   --  1.7  --  1.8   PHOS  --   --  3.5  --   --    URICA  --   --  4.6  --   --     < > = values in this interval not displayed. Recent Labs     02/12/20  0328 02/11/20  0031 02/10/20  1851   SGOT 66* 44* 59*   ALT 48 39 48   AP 59 61 70   TBILI 0.3 0.6 0.8   TP 5.3* 5.5* 6.3*   ALB 1.7* 1.9* 2.3*   GLOB 3.6 3.6 4.0     Recent Labs     02/11/20  0031 02/10/20  1851   INR 1.1 1.1   PTP 11.5* 11.2*      Recent Labs     02/11/20 0031   TIBC 167*   PSAT 7*   FERR 224      No results for input(s): PH, PCO2, PO2 in the last 72 hours. No results for input(s): CPK, CKNDX, TROIQ in the last 72 hours.     No lab exists for component: CPKMB  Lab Results   Component Value Date/Time    Glucose (POC) 193 (H) 02/13/2020 08:04 AM    Glucose (POC) 243 (H) 02/12/2020 08:45 PM    Glucose (POC) 301 (H) 02/12/2020 04:45 PM    Glucose (POC) 240 (H) 02/12/2020 12:08 PM    Glucose (POC) 134 (H) 02/12/2020 08:02 AM

## 2020-02-13 NOTE — PROGRESS NOTES
Problem: Mobility Impaired (Adult and Pediatric)  Goal: *Acute Goals and Plan of Care (Insert Text)  Description  FUNCTIONAL STATUS PRIOR TO ADMISSION: Patient was modified independent using a single point cane for functional mobility per her report. .. she is vague on amt. Of sitting vs. Amb. In home during the day, she reports fixing her own simple meals such as cereal or a sandwich. HOME SUPPORT PRIOR TO ADMISSION: The patient lived with son who provided assistance in am and pm (before and after work). Physical Therapy Goals  Initiated 2/11/2020  1. Patient will move from supine to sit and sit to supine , scoot up and down and roll side to side in bed with modified independence within 7 day(s). 2.  Patient will transfer from bed to chair and chair to bed with modified independence using the least restrictive device within 7 day(s). 3.  Patient will perform sit to stand with modified independence within 7 day(s). 4.  Patient will ambulate with supervision/set-up for 100 feet with the least restrictive device within 7 day(s). 5.  Patient will ascend/descend 6 stairs with handrail(s) with supervision/set-up within 7 day(s). Outcome: Progressing Towards Goal   PHYSICAL THERAPY TREATMENT  Patient: Winifred Forrest (67 y.o. female)  Date: 2/13/2020  [facility]: [facility] [A41.9]   <principal problem not specified>       Precautions: Fall, Contact  Chart, physical therapy assessment, plan of care and goals were reviewed. ASSESSMENT  Patient continues with skilled PT services and is progressing towards goals. Patient highly motivated to return home at discharge vs doing a brief rehab stay. Requires physical assistance and verbal cueing to perform bed mobility with increased time. Once sitting EOB able to stand and ambulate into the hallway with CGA and use of RW. Pt quickly reporting fatigue and requesting a chair to sit.  No outward signs of ALONSO or weakness and with gentle encouragement able to progress gait distance and activity tolerance. Returned to room and left sitting up in chair with OT present. Pt remains below baseline, a high fall risk and deconditioned. She would benefit from a brief IP rehab stay to increase independence and safety vs home with 24/7 supervision. Current Level of Function Impacting Discharge (mobility/balance): Min A bed mobiliy    Other factors to consider for discharge: deconditioning, weakness         PLAN :  Patient continues to benefit from skilled intervention to address the above impairments. Continue treatment per established plan of care. to address goals. Recommendation for discharge: (in order for the patient to meet his/her long term goals)  Therapy 3 hours per day 5-7 days per week    This discharge recommendation:  Has been made in collaboration with the attending provider and/or case management    IF patient discharges home will need the following DME: patient owns DME required for discharge       SUBJECTIVE:   Patient stated I don't use a walker.     OBJECTIVE DATA SUMMARY:   Critical Behavior:  Neurologic State: Alert  Orientation Level: Oriented X4, Appropriate for age  Cognition: Follows commands  Safety/Judgement: Fall prevention  Functional Mobility Training:  Bed Mobility:  Rolling: Minimum assistance  Supine to Sit: Minimum assistance; Additional time     Scooting: Independent        Transfers:  Sit to Stand: Contact guard assistance  Stand to Sit: Contact guard assistance        Bed to Chair: Contact guard assistance;Stand-by assistance                    Balance:  Sitting: Intact  Standing: Impaired  Standing - Static: Good;Constant support  Standing - Dynamic : Good;Constant support  Ambulation/Gait Training:  Distance (ft): 80 Feet (ft)  Assistive Device: Gait belt;Walker, rolling  Ambulation - Level of Assistance: Contact guard assistance        Gait Abnormalities: Decreased step clearance;Trunk sway increased        Base of Support: Widened Speed/Katharina: Pace decreased (<100 feet/min)  Step Length: Right shortened;Left shortened        Interventions: Verbal cues         Activity Tolerance:   Good, SpO2 stable on RA, and requires frequent rest breaks  Please refer to the flowsheet for vital signs taken during this treatment.     After treatment patient left in no apparent distress:   Sitting in chair, Call bell within reach, and Caregiver / family present    COMMUNICATION/COLLABORATION:   The patients plan of care was discussed with: Occupational Therapist and Physician    Bienvenido Greer, PT   Time Calculation: 12 mins

## 2020-02-13 NOTE — PROGRESS NOTES
Problem: Self Care Deficits Care Plan (Adult)  Goal: *Acute Goals and Plan of Care (Insert Text)  Description    FUNCTIONAL STATUS PRIOR TO ADMISSION: Patient was modified independent using a single point cane for functional mobility. HOME SUPPORT: The patient lived with son and stated that her son prepared her meals at night but she was able to bathe and dress self with no assist. .    Occupational Therapy Goals  Initiated 2/11/2020  1. Patient will perform grooming with supervision/set-up within 7 day(s). 2.  Patient will perform bathing with minimal assistance/contact guard assist within 7 day(s). 3.  Patient will perform lower body dressing with moderate assistance  within 7 day(s). 4.  Patient will perform toilet transfers with supervision/set-up within 7 day(s). 5.  Patient will perform all aspects of toileting with supervision/set-up within 7 day(s). 6.  Patient will participate in upper extremity therapeutic exercise/activities with supervision/set-up for 5 minutes within 7 day(s). 7.  Patient will utilize energy conservation techniques during functional activities with verbal cues within 7 day(s). Outcome: Progressing Towards Goal   OCCUPATIONAL THERAPY TREATMENT  Patient: Nathan Veloz (67 y.o. female)  Date: 2/13/2020  Diagnosis: Sepsis ([facility] [A41.9]   <principal problem not specified>       Precautions: Fall, Contact  Chart, occupational therapy assessment, plan of care, and goals were reviewed. ASSESSMENT  Patient continues with skilled OT services and is progressing towards goals. Pt was stating that she wanted to go home and did not want to go to rehab and OT challenged her to prove that she would be safe at home. Pt needed min for bed mobility, and does have a bed rail at home, and was not able to get the covers off her left foot and needed min to CGA to scoot to EOB. She was CGA to stand and with RW was CGA to SBA for walking and transfers.   OT spoke to pt about needing some form of rehab before she returns home due to pt needs the motivation. Pt needs max to mod for dressing LB and is not able to walk longer distances with out rest breaks. Pt is making progress but continues to need assist.      Current Level of Function Impacting Discharge (ADLs): decreased endurance, strength, functional mobility, ADLs and bed mobility,     Other factors to consider for discharge: pt is alone most of the day and needs more assist at home for her safety         PLAN :  Patient continues to benefit from skilled intervention to address the above impairments. Continue treatment per established plan of care. to address goals. Recommend with staff: Kenna Gaona for meals     Recommend next OT session: work on LB dressing and standing endurance for ADLs    Recommendation for discharge: (in order for the patient to meet his/her long term goals)  Therapy 3 hours per day 5-7 days per week    This discharge recommendation:  Has been made in collaboration with the attending provider and/or case management    IF patient discharges home will need the following DME: tbd       SUBJECTIVE:   Patient stated I want to go home now.     OBJECTIVE DATA SUMMARY:   Cognitive/Behavioral Status:  Neurologic State: Alert  Orientation Level: Oriented X4;Appropriate for age  Cognition: Follows commands  Perception: Appears intact  Perseveration: Perseverates during conversation       Functional Mobility and Transfers for ADLs:  Bed Mobility:  Rolling: Minimum assistance  Supine to Sit: Minimum assistance  Scooting: Independent    Transfers:     Functional Transfers  Bathroom Mobility: Contact guard assistance;Stand-by assistance  Toilet Transfer : Contact guard assistance;Stand-by assistance  Bed to Chair: Contact guard assistance;Stand-by assistance    Balance:  Sitting: Intact  Standing: Impaired; Without support  Standing - Static: Good;Constant support  Standing - Dynamic : Good;Constant support    ADL Intervention:  Feeding  Feeding Assistance: Independent    Grooming  Grooming Assistance: Contact guard assistance;Stand-by assistance  Position Performed: Standing  Washing Face: Stand-by assistance  Washing Hands: Stand-by assistance  Brushing Teeth: Stand-by assistance    Upper Body Bathing  Bathing Assistance: Contact guard assistance;Stand-by assistance  Position Performed: Standing    Lower Body Bathing  Bathing Assistance: Contact guard assistance;Stand-by assistance  Perineal  : Contact guard assistance;Stand-by assistance  Position Performed: Standing    Upper Body Dressing Assistance  Dressing Assistance: Set-up    Lower Body Dressing Assistance  Dressing Assistance: Maximum assistance    Toileting  Toileting Assistance: Contact guard assistance;Stand-by assistance  Bladder Hygiene: Contact guard assistance;Stand-by assistance  Bowel Hygiene: Contact guard assistance;Stand-by assistance        Activity Tolerance:   Fair  Please refer to the flowsheet for vital signs taken during this treatment.     After treatment patient left in no apparent distress:   Sitting in chair and Call bell within reach    COMMUNICATION/COLLABORATION:   The patients plan of care was discussed with: Physical Therapist and Registered Nurse    Jeremy Grier OT  Time Calculation: 27 mins

## 2020-02-13 NOTE — PROGRESS NOTES
Hospitalist Progress Note    NAME: Tabatha Crain   :  1940   MRN:  [id number]       Assessment / Plan:  Sepsis POA:   EColi bacteriemia   Ecoli UTI:  fever, tachycardia, encephalopathy due to UTI  C/w ceftriaxone 2g  , repeat bcx NTD   bcx /Ucx came back with ecoli pansensitive  Pt stable to be DC on PO cipro renally dosed 500mg q18h for a total of 14days  Awaiting IP rehab      Acute Metabolic Encephalopathy: resolved   Pt now AAOx4    TARUN on CKD:   Baseline creat around 2.8, creat peaked  To 3.32  now down 3.13    DM type II with renal manifestation POA:   Hyperglycemia   Pt started recently  on  novolin 70/30, but pt reported taking lantus 20units bid at home up to her admission . She never started novolin   BS controlled , c/w lantus 35 units + lispro 8 units TIDAC  ,  SSI,  HBA1C 9.9. H/O CVA: with residual left sided weakness, seems stable at this time. Essential HTN POA:BP elevated   c/w BB/Clonidine, use labetalol prn, hold ARB for now  Hydralazine added, titrate as needed    Hypothyroidism POA: c/w L-thyroxine  Hyperlipidemia POA c/w statin  Gallbladder polyp, followed by Dr Mariama Le with abd US every year      Code Status: Full Code  Surrogate Decision Maker: bonita Sarabia [phone] or [phone], bonita Wheeler [phone]  DVT Prophylaxis: heparin  GI Prophylaxis: not indicated  Baseline: independent lives with Curlene Meals, has some left sided weakness  PT recommended ip rehab     30.0 - 39.9 Obese / Body mass index is 38.26 kg/m². Subjective:     Chief Complaint / Reason for Physician Visit  Fu sepsis . AAOx4, no acute issues . Discussed with RN events overnight.      Review of Systems:  Symptom Y/N Comments  Symptom Y/N Comments   Fever/Chills n   Chest Pain n    Poor Appetite    Edema     Cough    Abdominal Pain n    Sputum    Joint Pain     SOB/ALONSO n   Pruritis/Rash     Nausea/vomit n   Tolerating PT/OT     Diarrhea n   Tolerating Diet y    Constipation    Other       Could NOT obtain due to:      Objective:     VITALS:   Last 24hrs VS reviewed since prior progress note. Most recent are:  Patient Vitals for the past 24 hrs:   Temp Pulse Resp BP SpO2   02/13/20 0400  64      02/13/20 0305 98.6 °F (37 °C) 65 18 156/69 96 %   02/13/20 0000  62      02/12/20 2334 98.6 °F (37 °C) 62 18 172/74 98 %   02/12/20 2000  61      02/12/20 1931 98.6 °F (37 °C) 67 18 167/73 99 %   02/12/20 1540 98.6 °F (37 °C) 63 18 158/72 95 %   02/12/20 1200 98.2 °F (36.8 °C) 63 18 (!) 202/83 93 %   02/12/20 0830 98.3 °F (36.8 °C) 62 18 187/80 96 %       Intake/Output Summary (Last 24 hours) at 2/13/2020 0825  Last data filed at 2/13/2020 0631  Gross per 24 hour   Intake 1050 ml   Output 1500 ml   Net -450 ml        PHYSICAL EXAM:  General: WD, WN. Alert, cooperative, no acute distress    EENT:  EOMI. Anicteric sclerae. MMM  Resp:  CTA bilaterally, no wheezing or rales. No accessory muscle use  CV:  Regular  rhythm,  No edema  GI:  Soft, Non distended, Non tender.  +Bowel sounds  Neurologic:  Alert and oriented X 3, normal speech,   Psych:   Good insight. Not anxious nor agitated  Skin:  No rashes. No jaundice    Reviewed most current lab test results and cultures  YES  Reviewed most current radiology test results   YES  Review and summation of old records today    NO  Reviewed patient's current orders and MAR    YES  PMH/ reviewed - no change compared to H&P  ________________________________________________________________________  Care Plan discussed with:    Comments   Patient x    Family      RN x    Care Manager     Consultant                       x Multidiciplinary team rounds were held today with , nursing, pharmacist and clinical coordinator. Patient's plan of care was discussed; medications were reviewed and discharge planning was addressed.      ________________________________________________________________________  Total NON critical care TIME:  30   Minutes    Total CRITICAL CARE TIME Spent:   Minutes non procedure based      Comments   >50% of visit spent in counseling and coordination of care x    ________________________________________________________________________  Bette De Los Santos MD     Procedures: see electronic medical records for all procedures/Xrays and details which were not copied into this note but were reviewed prior to creation of Plan. LABS:  I reviewed today's most current labs and imaging studies. Pertinent labs include:  Recent Labs     02/13/20  0450 02/11/20  0031 02/10/20  1851   WBC 6.7 8.6 7.3   HGB 8.1* 8.0* 9.5*   HCT 24.6* 24.0* 28.5*    177 205     Recent Labs     02/13/20 0450 02/12/20  0328 02/11/20  0031  02/10/20  1851    137 133*  --  135*   K 4.0 3.4* 3.7  --  3.8    104 100  --  100   CO2 27 27 28  --  28   * 113* 433*  --  226*   BUN 34* 33* 37*  --  36*   CREA 3.13* 3.24* 3.32*  --  3.19*   CA 7.3* 7.1* 7.3*   < > 8.1*   MG  --   --  1.7  --  1.8   PHOS  --   --  3.5  --   --    ALB  --  1.7* 1.9*  --  2.3*   TBILI  --  0.3 0.6  --  0.8   SGOT  --  66* 44*  --  59*   ALT  --  48 39  --  48   INR  --   --  1.1  --  1.1    < > = values in this interval not displayed.        Signed: Bette De Los Santos MD

## 2020-02-13 NOTE — PROGRESS NOTES
Problem: Risk for Spread of Infection  Goal: Prevent transmission of infectious organism to others  Description  Prevent the transmission of infectious organisms to other patients, staff members, and visitors. Outcome: Progressing Towards Goal     Problem: Falls - Risk of  Goal: *Absence of Falls  Description  Document Anika Beltraninés Fall Risk and appropriate interventions in the flowsheet.   Outcome: Progressing Towards Goal  Note: Fall Risk Interventions:  Mobility Interventions: Communicate number of staff needed for ambulation/transfer    Mentation Interventions: Adequate sleep, hydration, pain control, Door open when patient unattended    Medication Interventions: Assess postural VS orthostatic hypotension, Evaluate medications/consider consulting pharmacy    Elimination Interventions: Call light in reach              Problem: Patient Education: Go to Patient Education Activity  Goal: Patient/Family Education  Outcome: Progressing Towards Goal

## 2020-02-13 NOTE — PROGRESS NOTES
Transitional Care Team: Initial [facility] Note    Date of Assessment: 02/13/20  Time of Assessment:  12:42 PM    Melecio Carson is a 67 y.o. female inpatient at  [facility]. Assessment & Plan   Pt now A+O. Sitting in bedside chair with nasal cannula in place. Initially admitted with altered mental status related to urosepsis. Continues on IV ABX, anticipates discharge for short term rehab with eventual return home. Is willing to accept [facility] services after the STR. Primary Diagnosis: sepsis  Advance Directive:   See ACP note from me. Current Code Status:  full    Referral to Hospice/ Palliative Care Appropriate: no.  Awareness of Medical Conditions: (Trajectory of illness and pts expectations). Anticipates return to baseline good quality of life    Discharge Needs: (to include safety issues) short term rehab  Barriers Identified: none  Patient is willing to go to SNF/Inpatient Rehab if recommended: yes    Medication Review:  Await AVS- anticipate possibe continued oral ABX. Can patient afford medications:  yes. Patient is Compliant with Medication regimen:yes    Who manages medications at home:self    Best Patient Contact Number: [phone]    [facility] (Healthy Understanding of Goals) program introduced to patient/family. The Transitional Care Team bridges the gaps in care and education surrounding discharge from the acute care facility. The objective is to empower the patient and family in taking a proactive role in preventing readmission within the first thirty days after discharge. The team is also involved in the efforts to reduce readmission to the acute care setting after stabilization and discharge from the acute care environment either to skilled nursing facilities or community. [facility] RN/NP will return with [facility] Calendar/ follow up appointments/ Ambulatory Nurse Navigator name and contact information when the patient is ready for discharge.     Future Appointments   Date Time Provider Rocio Forde   4/17/2020 10:20 AM Cade Siddiqui MD [facility]   5/8/2020 12:10 PM Alfredito Guaman MD RDE ARIANE [address] [facility] follow up appointment(s): none yet  [facility]: call information given to on discharge calendar      Patient education focused on readmission zones as described as: The Red Zone: High risk for readmission, days 1-21  The Yellow Zone: Moderate  risk for readmission, days 22-29   The Green Zone: Lower risk for readmission, days                30 and after    [facility] Team will attempt to follow the patient from a distance while inpatient as well as be available for further transition/disposition needs. The [facility] team will continue to offer support during the 30- 90 day discharge from acute care setting. Will notify Ambulatory {Blank Single Select Template:[id number][de-identified] \"FAWN   RN.     Past Medical History:   Diagnosis Date    Arthritis     Cataract     bilateral    Chronic kidney disease     Dr. Alvin Christian Chronic pain     lower back    CKD stage 3 due to type 2 diabetes mellitus ([address]     Depression     Diabetes ([address] age 49    Type II    Follicular thyroid cancer ([address] 08/2014    Foot ulcer ([address]     Gallbladder polyp 8/14/2013    Hypercholesterolemia     Hypertension     Ill-defined condition     states 'polyp' in gal bladder    Long term current use of anticoagulant therapy     Obesity     Right pontine stroke ([address] 1/17/2017    TIA (transient ischemic attack) 6/6/2014

## 2020-02-13 NOTE — PROGRESS NOTES
Pt has discharge order- declines short term rehab. Will accept [facility] services.   states will arrange in the morning

## 2020-02-13 NOTE — PROGRESS NOTES
FAWN:   1) Rehab   2) Pt will need 2ND IM letter at time of discharge     4:45 PM- CM informed by pt that she does not want to go to rehab anymore. Pt wishes to go home. CM's son Tian Miller called CM while meeting with pt. Son was placed on speaker and really requested pt go to rehab. Pt declined stating she wants to go home. Pt's son Tian Miller was contacting brother Dresden Crutch to pick pt up. Pt requested CM not inform her son Alejandro Beckford that she and Tian Miller will tell him. Pt [facility] [place] [facility]umption of [facility] services. Referral sent and orders sent. CM will arrange f.u appts tomorrow as the offices are closed. Medicare pt has received, reviewed, and signed 2nd IM letter informing them of their right to appeal the discharge. Signed copy has been placed on pt bedside chart. Pt is cleared to d/c from CM perspective, RN informed. 4:00 PM- CM informed [facility] accepted pt for rehab and was willing to accept pt today. CM spoke with pt and arranged Leandro to pick pt up at approx 5:00 PM. EMTALA information to follow. 10:21 AM- CM me with pt and PT/OT- pt is requesting to go to [facility] and if they cannot accept [facility]. PT/OT advised pt she may be eligible to the fast track at [facility] if she cannot go to [facility] or [facility], pt stated she would consider. FOC completed, referral sent to [facility] and [facility], waiting on response.      Jasiel Machado, MSW, [address]   [phone]

## 2020-02-13 NOTE — DISCHARGE INSTRUCTIONS
DISCHARGE DIAGNOSIS:  Sepsis POA:   EColi bacteriemia   Ecoli UTI:  Acute Metabolic Encephalopathy: resolved   TARUN on CKD   DM type II with renal manifestation POA:   Hyperglycemia    H/O CVA: with residual left sided weakness,    Essential HTN POA   Hypothyroidism POA:  Hyperlipidemia POA  Gallbladder polyp,      MEDICATIONS:  · It is important that you take the medication exactly as they are prescribed. · Keep your medication in the bottles provided by the pharmacist and keep a list of the medication names, dosages, and times to be taken in your wallet. · Do not take other medications without consulting your doctor. Pain Management: per above medications    What to do at Home    Recommended diet:  Cardiac Diet/diabetic diet     Recommended activity: Activity as tolerated    If you have questions regarding the hospital related prescriptions or hospital related issues please call [facility] at . You can always direct your questions to your primary care doctor if you are unable to reach your hospital physician; your PCP works as an extension of your hospital doctor just like your hospital doctor is an extension of your PCP for your time at the hospital [facility]).     If you experience any of the following symptoms then please call your primary care physician or return to the emergency room if you cannot get hold of your doctor:  Fever, chills, nausea, vomiting, diarrhea, change in mentation, falling, bleeding, shortness of breath

## 2020-02-13 NOTE — PROGRESS NOTES
Pt was given discharge paperwork & prescriptions, pt was given time to ask questions, she verbalized understanding. Pt was discharged home with son, who argued with her stating he thought she needed to go to rehab and she stated she wanted to go home. Pt was wheeled to the front of the hospital and got into the car with son.

## 2020-02-14 ENCOUNTER — PATIENT OUTREACH (OUTPATIENT)
Dept: INTERNAL MEDICINE CLINIC | Age: 80
End: 2020-02-14

## 2020-02-14 ENCOUNTER — TELEPHONE (OUTPATIENT)
Dept: ENDOCRINOLOGY | Age: 80
End: 2020-02-14

## 2020-02-14 NOTE — TELEPHONE ENCOUNTER
----- Message from Anurag Sunshine sent at 2/14/2020  1:32 PM EST -----  Regarding: Dr. Mona Wang  Patient return call    Caller's first and last name and relationship (if not the patient):  pt    Best contact number(s):  [phone]    Whose call is being returned:   The nurse    Details to clarify the request:      Anurag Sunshine

## 2020-02-14 NOTE — TELEPHONE ENCOUNTER
2/14/2020  10:21 AM      MsIngrid Jed Costa called stating that she was in the hospital from 2/10-2/13 for an UTI would like a call back.           Thanks

## 2020-02-14 NOTE — TELEPHONE ENCOUNTER
Patient stated that she was just giving Dr. Sly Schrader an Rocky Ser that she was just released from [facility] on yesterday after being admitted for a UTI. Patient offered not complaints at this time.

## 2020-02-14 NOTE — PROGRESS NOTES
Hospital Discharge Follow-Up      Date/Time:  2020 2:25 PM  Cher Diaz RN  Care Transitions Nurse  [address] [place] [address] Ambulatory Care Coordination Team  [phone]      Patient was admitted to [facility] on 2/10/2020 and discharged on 2020 for fever and urine frequency. The physician discharge summary was not available at the time of outreach. Patient was contacted within 1 business days of discharge. Top Challenges reviewed with the provider   -patient has not yet picked up Cipro or Hydralazine. She was unaware of new medications until CTN called to discuss. -declines inpatient rehab  -declines dispatch [facility]  not ordered at discharge. Advance Care Planning:   Does patient have an Advance Directive: On file       Method of communication with provider :phone  Was this a readmission? no   Patient stated reason for the readmission: n/a    Care Transition Nurse (CTN) contacted the patient by telephone to perform post hospital discharge assessment. Verified name and  with patient as identifiers. Provided introduction to self, and explanation of the CTN role. Patient received hospital discharge instructions. CTN reviewed discharge instructions and red flags with patient who verbalized understanding. Patient given an opportunity to ask questions and does not have any further questions or concerns at this time. The patient agrees to contact the PCP office for questions related to their healthcare. CTN provided contact information for future reference.     Disease Specific:   Sepsis    Patients top risk factors for readmission:  lack of knowledge about disease, medication management, multi health system providers    Home Health orders at discharge: [address]: working with [facility] prior to admission   Date of initial visit: [facility] received: n/a    Medication(s):   New Medications at Discharge:   START taking these medications     Details   ciprofloxacin HCl (CIPRO) 500 mg tablet Take 1 Tab by mouth every eighteen (18) hours for 9 days. Qty: 12 Tab, Refills: 0       hydrALAZINE (APRESOLINE) 25 mg tablet Take 3 Tabs by mouth three (3) times daily for 60 days. Qty: 270 Tab, Refills: 1           Changed Medications at Discharge:   CONTINUE these medications which have CHANGED     Details   cloNIDine HCL (CATAPRES) 0.3 mg tablet Take 1 Tab by mouth three (3) times daily for 60 days. Qty: 90 Tab, Refills: 1       metoprolol succinate (TOPROL-XL) 50 mg XL tablet Take 2 Tabs by mouth daily for 60 days. Qty: 60 Tab, Refills: 1       insulin nph-regular human rec (NOVOLIN 70-30 FLEXPEN U-100) 100 unit/mL (70-30) inpn Inject 30units before breakfast and 30 units before dinner + 5 units for every 50 mg/dl above 150 mg/dl. Max 100 units per day--pt will pay cash  Qty: 30 mL, Refills: 11           Discontinued Medications at Discharge:   STOP taking these medications         olmesartan (BENICAR) 40 mg tablet Comments:   Reason for Stopping:                Medication reconciliation was performed with patient, who verbalizes understanding of administration of home medications. There were barriers to obtaining medications identified at this time. Patient was unaware she had written Rx. She reports she has not looked at discharge instructions. Referral to Pharm D needed: no     Current Outpatient Medications   Medication Sig    cloNIDine HCL (CATAPRES) 0.3 mg tablet Take 1 Tab by mouth three (3) times daily for 60 days.  metoprolol succinate (TOPROL-XL) 50 mg XL tablet Take 2 Tabs by mouth daily for 60 days.  insulin nph-regular human rec (NOVOLIN 70-30 FLEXPEN U-100) 100 unit/mL (70-30) inpn Inject 30units before breakfast and 30 units before dinner + 5 units for every 50 mg/dl above 150 mg/dl.   Max 100 units per day--pt will pay cash    SYNTHROID 175 mcg tablet Take 1 Tab by mouth Daily (before breakfast). Take 1 Tab by mouth Daily (before breakfast). BRAND MEDICALLY NECESSARY--Delete 150 mcg dose from profile    gabapentin (NEURONTIN) 300 mg capsule Take 2 Caps by mouth three (3) times daily. Max Daily Amount: 1,800 mg.    atorvastatin (LIPITOR) 80 mg tablet Take 80 mg by mouth daily.  bumetanide (BUMEX) 0.5 mg tablet 0.5 mg two (2) times a day.  PYRIDOXINE HCL, VITAMIN B6, (VITAMIN B-6 PO) Take  by mouth daily.  allopurinol (ZYLOPRIM) 100 mg tablet Take  by mouth daily.  multivitamins-minerals-lutein (CENTRUM SILVER) Tab Take  by mouth daily.  ciprofloxacin HCl (CIPRO) 500 mg tablet Take 1 Tab by mouth every eighteen (18) hours for 9 days.  hydrALAZINE (APRESOLINE) 25 mg tablet Take 3 Tabs by mouth three (3) times daily for 60 days.  Lancing Device with Lancets kit Use as directed to test 3 times daily    ONETOUCH ULTRA BLUE TEST STRIP strip TEST 3 TIMES DAILY    ONETOUCH ULTRA2 monitoring kit Test 3 times daily. DX E11.65    ferrous sulfate (IRON) 325 mg (65 mg iron) tablet Take  by mouth daily.  LOR PEN NEEDLE 32 gauge x 5/32\" ndle Use as directed 5 times daily     No current facility-administered medications for this visit. There are no discontinued medications. [facility] follow up appointment(s):   Future Appointments   Date Time Provider [facility]   4/17/2020 10:20 AM Derrell Sams MD [address] [place] [address]   5/8/2020 12:10 PM George Portillo MD RDE [facility]      Non-[facility] follow up appointment(s): none  [facility]:  offered and patient declined       Goals      Attends follow-up appointments as directed. 2/14/2020  -Patient declined [address]  -Patient declines offer for CTN to call PCP for appointment.  Will call herself  -CTN to follow up in 3 days  [facility] Knowledge and adherence of prescribed medication (ie. action, side effects, missed dose, etc.).      2/14/2020  -CTN verified all medication with patient. Patient unaware of new medications Cipro and Hydralazine. Unaware of medication changes. CTN educated patient on medication uses and urged patient to take written RX to pharmacy. Patient states that she will call her son to have him drive.  -Patient reports that she is taking plavix but medication is not showing up on current medication. CC discharge from [facility] in December 2019 noted medication was discontinued. Patient will check with PCP. -CTN to follow up in 1 week  Anuj Esquivel Supportive resources in place to maintain patient in the community (ie. Home Health, DME equipment, refer to, medication assistant plan, etc.)      2/14/2020  -Patient declined discharge to rehab  -No HH ordered at discharge. CTN left voice message for inpatient CM  -reports she has been working with [facility] prior to admission. CTN spoke to [employer]. She reports that she did not receive resumption of care orders from the hospital but she faxed a request to the PCP.   -CTN to follow up in 3 days  Clarice DE PAZ

## 2020-02-14 NOTE — TELEPHONE ENCOUNTER
Please let her know I checked all of the information from the hospital and saw that her thyroid level had already come back to normal.  She should continue her current insulin doses as prescribed at our last visit and let me know if she needs anything in the next 2 weeks with her blood sugars especially if they are staying over 225 3 or more times during the week.

## 2020-02-15 LAB
BACTERIA SPEC CULT: ABNORMAL
BACTERIA SPEC CULT: ABNORMAL
SERVICE CMNT-IMP: ABNORMAL

## 2020-02-17 LAB
BACTERIA SPEC CULT: NORMAL
SERVICE CMNT-IMP: NORMAL

## 2020-02-18 ENCOUNTER — PATIENT OUTREACH (OUTPATIENT)
Dept: INTERNAL MEDICINE CLINIC | Age: 80
End: 2020-02-18

## 2020-02-18 NOTE — PROGRESS NOTES
Goals      Attends follow-up appointments as directed. 2/18/2020  -To follow up with PCP 2/19/2020  -CTN to follow up in 2 weeks  Render Neighbor  2/14/2020  -Patient declined [facility]  -Patient declines offer for CTN to call PCP for appointment. Will call herself  -CTN to follow up in 3 days  [facility] Knowledge and adherence of prescribed medication (ie. action, side effects, missed dose, etc.). 2/17/2020  -patient reports she was able to get medications from pharmacy on Friday   -CTN to follow  Up in 2 weeks  Render Neighbor  2/14/2020  -CTN verified all medication with patient. Patient unaware of new medications Cipro and Hydralazine. Unaware of medication changes. CTN educated patient on medication uses and urged patient to take written RX to pharmacy. Patient states that she will call her son to have him drive.  -Patient reports that she is taking plavix but medication is not showing up on current medication. CC discharge from [facility] in December 2019 noted medication was discontinued. Patient will check with PCP. -CTN to follow up in 1 week  [facility] Supportive resources in place to maintain patient in the community (ie. Home Health, DME equipment, refer to, medication assistant plan, etc.)      2/18/2020  -[facility] resumed on 2/17/2020  -CTN to follow up in 2 weeks  Clarice DE PAZ.  2/14/2020  -Patient declined discharge to rehab  -No HH ordered at discharge. CTN left voice message for inpatient CM  -reports she has been working with [facility] prior to admission. CTN spoke to [employer]. She reports that she did not receive resumption of care orders from the hospital but she faxed a request to the PCP.   -CTN to follow up in 3 days  Clarice DE PAZ

## 2020-02-20 ENCOUNTER — APPOINTMENT (OUTPATIENT)
Dept: VASCULAR SURGERY | Age: 80
End: 2020-02-20
Attending: EMERGENCY MEDICINE
Payer: MEDICARE

## 2020-02-20 ENCOUNTER — APPOINTMENT (OUTPATIENT)
Dept: GENERAL RADIOLOGY | Age: 80
End: 2020-02-20
Attending: EMERGENCY MEDICINE
Payer: MEDICARE

## 2020-02-20 ENCOUNTER — HOSPITAL ENCOUNTER (EMERGENCY)
Age: 80
Discharge: HOME | End: 2020-02-20
Attending: EMERGENCY MEDICINE | Admitting: EMERGENCY MEDICINE
Payer: MEDICARE

## 2020-02-20 VITALS
RESPIRATION RATE: 18 BRPM | DIASTOLIC BLOOD PRESSURE: 67 MMHG | SYSTOLIC BLOOD PRESSURE: 164 MMHG | TEMPERATURE: 98.7 F | OXYGEN SATURATION: 100 % | HEART RATE: 69 BPM

## 2020-02-20 DIAGNOSIS — R60.0 BILATERAL LEG EDEMA: Primary | ICD-10-CM

## 2020-02-20 LAB
ALBUMIN SERPL-MCNC: 2.3 G/DL (ref 3.5–5)
ALBUMIN/GLOB SERPL: 0.5 {RATIO} (ref 1.1–2.2)
ALP SERPL-CCNC: 78 U/L (ref 45–117)
ALT SERPL-CCNC: 40 U/L (ref 12–78)
ANION GAP SERPL CALC-SCNC: 4 MMOL/L (ref 5–15)
AST SERPL-CCNC: 22 U/L (ref 15–37)
BASOPHILS # BLD: 0 K/UL (ref 0–0.1)
BASOPHILS NFR BLD: 0 % (ref 0–1)
BILIRUB SERPL-MCNC: 0.3 MG/DL (ref 0.2–1)
BNP SERPL-MCNC: 1886 PG/ML
BUN SERPL-MCNC: 30 MG/DL (ref 6–20)
BUN/CREAT SERPL: 10 (ref 12–20)
CALCIUM SERPL-MCNC: 8.4 MG/DL (ref 8.5–10.1)
CHLORIDE SERPL-SCNC: 103 MMOL/L (ref 97–108)
CO2 SERPL-SCNC: 29 MMOL/L (ref 21–32)
COMMENT, HOLDF: NORMAL
CREAT SERPL-MCNC: 3.04 MG/DL (ref 0.55–1.02)
DIFFERENTIAL METHOD BLD: ABNORMAL
EOSINOPHIL # BLD: 0.1 K/UL (ref 0–0.4)
EOSINOPHIL NFR BLD: 2 % (ref 0–7)
ERYTHROCYTE [DISTWIDTH] IN BLOOD BY AUTOMATED COUNT: 15.6 % (ref 11.5–14.5)
GLOBULIN SER CALC-MCNC: 4.3 G/DL (ref 2–4)
GLUCOSE SERPL-MCNC: 153 MG/DL (ref 65–100)
HCT VFR BLD AUTO: 24.6 % (ref 35–47)
HGB BLD-MCNC: 7.7 G/DL (ref 11.5–16)
IMM GRANULOCYTES # BLD AUTO: 0.1 K/UL (ref 0–0.04)
IMM GRANULOCYTES NFR BLD AUTO: 1 % (ref 0–0.5)
LYMPHOCYTES # BLD: 1.2 K/UL (ref 0.8–3.5)
LYMPHOCYTES NFR BLD: 21 % (ref 12–49)
MCH RBC QN AUTO: 28.8 PG (ref 26–34)
MCHC RBC AUTO-ENTMCNC: 31.3 G/DL (ref 30–36.5)
MCV RBC AUTO: 92.1 FL (ref 80–99)
MONOCYTES # BLD: 0.5 K/UL (ref 0–1)
MONOCYTES NFR BLD: 9 % (ref 5–13)
NEUTS SEG # BLD: 3.6 K/UL (ref 1.8–8)
NEUTS SEG NFR BLD: 67 % (ref 32–75)
NRBC # BLD: 0 K/UL (ref 0–0.01)
NRBC BLD-RTO: 0 PER 100 WBC
PLATELET # BLD AUTO: 367 K/UL (ref 150–400)
PMV BLD AUTO: 10.5 FL (ref 8.9–12.9)
POTASSIUM SERPL-SCNC: 4.1 MMOL/L (ref 3.5–5.1)
PROT SERPL-MCNC: 6.6 G/DL (ref 6.4–8.2)
RBC # BLD AUTO: 2.67 M/UL (ref 3.8–5.2)
SAMPLES BEING HELD,HOLD: NORMAL
SODIUM SERPL-SCNC: 136 MMOL/L (ref 136–145)
TROPONIN I SERPL-MCNC: <0.05 NG/ML
WBC # BLD AUTO: 5.5 K/UL (ref 3.6–11)

## 2020-02-20 PROCEDURE — 84484 ASSAY OF TROPONIN QUANT: CPT

## 2020-02-20 PROCEDURE — 71045 X-RAY EXAM CHEST 1 VIEW: CPT

## 2020-02-20 PROCEDURE — 99284 EMERGENCY DEPT VISIT MOD MDM: CPT

## 2020-02-20 PROCEDURE — 93970 EXTREMITY STUDY: CPT

## 2020-02-20 PROCEDURE — 85025 COMPLETE CBC W/AUTO DIFF WBC: CPT

## 2020-02-20 PROCEDURE — 93005 ELECTROCARDIOGRAM TRACING: CPT

## 2020-02-20 PROCEDURE — 80053 COMPREHEN METABOLIC PANEL: CPT

## 2020-02-20 PROCEDURE — 83880 ASSAY OF NATRIURETIC PEPTIDE: CPT

## 2020-02-20 PROCEDURE — 36415 COLL VENOUS BLD VENIPUNCTURE: CPT

## 2020-02-20 RX ORDER — FUROSEMIDE 40 MG/1
20 TABLET ORAL DAILY
Qty: 3 TAB | Refills: 0 | Status: SHIPPED | OUTPATIENT
Start: 2020-02-20 | End: 2020-02-26

## 2020-02-20 NOTE — ED TRIAGE NOTES
Patient presents from home with complaints of bilateral leg swelling that started 2 days ago. Patient reports she was recently admitted to the hospital for a UTI and has kidney issues.  Patient also reports SOB

## 2020-02-21 ENCOUNTER — TELEPHONE (OUTPATIENT)
Dept: ENDOCRINOLOGY | Age: 80
End: 2020-02-21

## 2020-02-21 LAB
ATRIAL RATE: 55 BPM
CALCULATED P AXIS, ECG09: 25 DEGREES
CALCULATED R AXIS, ECG10: -20 DEGREES
CALCULATED T AXIS, ECG11: 59 DEGREES
DIAGNOSIS, 93000: NORMAL
P-R INTERVAL, ECG05: 126 MS
Q-T INTERVAL, ECG07: 484 MS
QRS DURATION, ECG06: 96 MS
QTC CALCULATION (BEZET), ECG08: 463 MS
VENTRICULAR RATE, ECG03: 55 BPM

## 2020-02-21 NOTE — DISCHARGE INSTRUCTIONS
Patient Education        Leg and Ankle Edema: Care Instructions  Your Care Instructions  Swelling in the legs, ankles, and feet is called edema. It is common after you sit or stand for a while. Long plane flights or car rides often cause swelling in the legs and feet. You may also have swelling if you have to stand for long periods of time at your job. Problems with the veins in the legs (varicose veins) and changes in hormones can also cause swelling. Sometimes the swelling in the ankles and feet is caused by a more serious problem, such as heart failure, infection, blood clots, or liver or kidney disease. Follow-up care is a key part of your treatment and safety. Be sure to make and go to all appointments, and call your doctor if you are having problems. It's also a good idea to know your test results and keep a list of the medicines you take. How can you care for yourself at home? · If your doctor gave you medicine, take it as prescribed. Call your doctor if you think you are having a problem with your medicine. · Whenever you are resting, raise your legs up. Try to keep the swollen area higher than the level of your heart. · Take breaks from standing or sitting in one position. ? Walk around to increase the blood flow in your lower legs. ? Move your feet and ankles often while you stand, or tighten and relax your leg muscles. · Wear support stockings. Put them on in the morning, before swelling gets worse. · Eat a balanced diet. Lose weight if you need to. · Limit the amount of salt (sodium) in your diet. Salt holds fluid in the body and may increase swelling. When should you call for help? Call 911 anytime you think you may need emergency care. For example, call if:    · You have symptoms of a blood clot in your lung (called a pulmonary embolism). These may include:  ? Sudden chest pain. ? Trouble breathing. ?  Coughing up blood.    Call your doctor now or seek immediate medical care if:    · You have signs of a blood clot, such as:  ? Pain in your calf, back of the knee, thigh, or groin. ? Redness and swelling in your leg or groin.     · You have symptoms of infection, such as:  ? Increased pain, swelling, warmth, or redness. ? Red streaks or pus. ? A fever.    Watch closely for changes in your health, and be sure to contact your doctor if:    · Your swelling is getting worse.     · You have new or worsening pain in your legs.     · You do not get better as expected. Where can you learn more? Go to http://jaciel-fish.info/. Enter D500 in the search box to learn more about \"Leg and Ankle Edema: Care Instructions. \"  Current as of: June 26, 2019  Content Version: 12.2  © 7238-9195 [employer], [employer]. Care instructions adapted under license by [employer] (which disclaims liability or warranty for this information). If you have questions about a medical condition or this instruction, always ask your healthcare professional. [place] [address] any warranty or liability for your use of this information.

## 2020-02-21 NOTE — ED PROVIDER NOTES
67 y.o. female with past medical history significant for Hypercholesterolemia, HTN, Arthritidis, TIA, DM, and CKD  who presents from home with chief complaint of leg swelling. Patient states that she was admitted to ED [facility], see old chart review. Patient states that upon her return from [facility] she started developing bilateral swelling in her lower extremities prompting her visit to the ED today. Patient presents to [facility] ED with complaints of bilateral leg swelling. Patient endorses accompanying pain when ambulating and accompanying SOB secondary to ambulating. Of note, patient states that she had a similar episode of swelling due to an allergic reaction to a medication. Patient denies chest pain, fever, and vomiting. There are no other acute medical concerns at this time. Social hx: No tobacco use, No EtOH use, No illicit drug use. PCP: Tez Lizama MD    Old Chart Review: Patient was admitted to the [facility] from 02/10/2020 to 02/13/2020 for acute pyelonephritis and was discharged with instructions to start taking Hydralazine and Ciprofloxacin and to follow up With Dr. Kolby Serrato and Dr. Lashell Peñaloza. Note written by Rodo Monk, as dictated by Burnetta Goltz, MD 7:00 PM    The history is provided by the patient, a relative and medical records (Son). No  was used.         Past Medical History:   Diagnosis Date    Arthritis     Cataract     bilateral    Chronic kidney disease     Dr. Anahy Novka Chronic pain     lower back    CKD stage 3 due to type 2 diabetes mellitus ([address]     Depression     Diabetes ([address] age 49    Type II    Follicular thyroid cancer ([address] 08/2014    Foot ulcer ([address]     Gallbladder polyp 8/14/2013    Hypercholesterolemia     Hypertension     Ill-defined condition     states 'polyp' in gal bladder    Long term current use of anticoagulant therapy     Obesity     Right pontine stroke ([address] 1/17/2017    TIA (transient ischemic attack) 6/6/2014       Past Surgical History:   Procedure Laterality Date    ABDOMEN SURGERY PROC UNLISTED  2006    stomach    BREAST SURGERY PROCEDURE UNLISTED  2009, 2006    breast bx-vince    COLONOSCOPY N/A 12/17/2019    COLONOSCOPY performed by Brenda Appiah [facility] at [facility] ENDOSCOPY    COLONOSCOPY,REMV CAIO,SNARE  12/17/2019         COLORECTAL SCRN; HI RISK IND  5/30/2014         HX BREAST BIOPSY Right [id number]    2 biopies on the right. Benign (per patient)    HX BREAST BIOPSY Left 2015    Benign (per patient)    HX CATARACT REMOVAL Left     HX GYN  1970's    partial hysterectomy    HX HERNIA REPAIR  2009    hiatal    HX HYSTERECTOMY      HX ORTHOPAEDIC Bilateral 1988    bunion    HX THYROIDECTOMY  2014    Dr. Katie Nair         Family History:   Problem Relation Age of Onset    Heart Disease Mother     Hypertension Mother     Diabetes Mother     Stroke Mother     Cancer Father         colon    Heart Disease Sister     Diabetes Sister     Heart Attack Sister     Hypertension Sister     Lung Disease Brother     Hypertension Brother     Lung Disease Brother     Anesth Problems Neg Hx        Social History     Socioeconomic History    Marital status:      Spouse name: Not on file    Number of children: Not on file    Years of education: Not on file    Highest education level: Not on file   Occupational History    Not on file   Social Needs    Financial resource strain: Not on file    Food insecurity:     Worry: Not on file     Inability: Not on file    Transportation needs:     Medical: Not on file     Non-medical: Not on file   Tobacco Use    Smoking status: Never Smoker    Smokeless tobacco: Never Used   Substance and Sexual Activity    Alcohol use: No    Drug use: No    Sexual activity: Not on file   Lifestyle    Physical activity:     Days per week: Not on file     Minutes per session: Not on file    Stress: Not on file   Relationships    Social connections:     Talks on phone: Not on file     Gets together: Not on file     Attends Voodoo service: Not on file     Active member of club or organization: Not on file     Attends meetings of clubs or organizations: Not on file     Relationship status: Not on file    Intimate partner violence:     Fear of current or ex partner: Not on file     Emotionally abused: Not on file     Physically abused: Not on file     Forced sexual activity: Not on file   Other Topics Concern    Not on file   Social History Narrative    Lives in [place] with her son. Also has one other son. . Used to work at [employer]. Likes to Smish. ALLERGIES: Amlodipine; Clindamycin; Nifedipine; and Sulfa (sulfonamide antibiotics)    Review of Systems   Constitutional: Negative for fever. Eyes: Negative for visual disturbance. Respiratory: Positive for shortness of breath. Negative for cough and wheezing. Cardiovascular: Positive for leg swelling. Negative for chest pain. Gastrointestinal: Negative for abdominal pain, diarrhea, nausea and vomiting. Genitourinary: Negative for dysuria. Musculoskeletal: Negative for back pain and neck stiffness. Leg pain aggravated when ambulating. Skin: Negative for rash. Neurological: Negative. Negative for syncope and headaches. Psychiatric/Behavioral: Negative for confusion. All other systems reviewed and are negative. Vitals:    02/20/20 1524 02/20/20 1913   BP: 159/78    Pulse: (!) 59 (!) 58   Resp: 16 18   Temp: 97.8 °F (36.6 °C) 97.7 °F (36.5 °C)   SpO2: 99% 97%            Physical Exam  Vitals signs and nursing note reviewed. Constitutional:       General: She is not in acute distress. Appearance: She is well-developed. HENT:      Head: Normocephalic. Mouth/Throat:      Mouth: Mucous membranes are dry. Eyes:      Pupils: Pupils are equal, round, and reactive to light. Neck:      Musculoskeletal: Normal range of motion.    Cardiovascular:      Rate and Rhythm: Normal rate and regular rhythm. Heart sounds: Normal heart sounds. No murmur. Pulmonary:      Effort: Pulmonary effort is normal. No respiratory distress. Breath sounds: Normal breath sounds. Abdominal:      Palpations: Abdomen is soft. Tenderness: There is no abdominal tenderness. Musculoskeletal: Normal range of motion. Comments: 3+ bilateral pitting edema to feet, legs, and ankles. Skin:     General: Skin is warm and dry. Neurological:      Mental Status: She is alert and oriented to person, place, and time. Mental status is at baseline. Cranial Nerves: No cranial nerve deficit. Psychiatric:         Behavior: Behavior normal.     Note written by Rodo Ramires, as dictated by Ron Cazares MD 7:30 PM      MDM       Procedures      ED EKG interpretation:  Rhythm: sinus bradycardia; and regular . Rate (approx.): 55 bpm; ST wave: non-acute ST wave changes.   Note written by Rodo Ramires, as dictated by Ron Cazares MD 7:30 PM

## 2020-02-21 NOTE — TELEPHONE ENCOUNTER
The box of pens has 5 pens to a box. The pen she is using she can leave out of the fridge at room temp but the unused pens need to stay in the fridge.

## 2020-02-21 NOTE — TELEPHONE ENCOUNTER
Patient wanted to know if she needed to put  Her humalog(70/30) back into the refrigerator once she opens it. Please call her back.  Thanks

## 2020-02-21 NOTE — TELEPHONE ENCOUNTER
Pt notified of message per Dr. Ken Frankel and voiced understanding of what was read to her. She also stated that she wanted to let Dr. Ken Frankel know that she was 1 [facility] on yesterday just in case he wanted to review the notes.

## 2020-02-21 NOTE — ED NOTES
I have reviewed discharge instructions with the patient. The patient verbalized understanding. Wheeled out of the department with a steady gait in no acute distress.

## 2020-04-16 ENCOUNTER — DOCUMENTATION ONLY (OUTPATIENT)
Dept: ENDOCRINOLOGY | Age: 80
End: 2020-04-16

## 2020-04-17 ENCOUNTER — TELEPHONE (OUTPATIENT)
Dept: ENDOCRINOLOGY | Age: 80
End: 2020-04-17

## 2020-04-17 NOTE — TELEPHONE ENCOUNTER
----- [facility] sent at 4/16/2020  7:43 PM EDT -----  Regarding: Dr. Kathleen Kearney / Valentino Canard first and last name: Myrna Dunlap, son      Reason for call: pt feels really cold and  cold to touch      Callback required yes/no and why: transferred to the answering service for the on call doctor      Best contact number(s): [phone]      Details to clarify the request: Myrna Dunlap, son stated pt feels really and is cold to touch; transferred to the answering service for the on call doctor.       [facility]

## 2020-04-17 NOTE — PROGRESS NOTES
Dr. Martínez Bender responded to my staff message and forwarded him the info at 10.40 pm     Niko Estevez MD 2 weeks

## 2020-04-17 NOTE — PROGRESS NOTES
Got a page - at  8.09 pm - she is Dr. Skyla Carver patient   I called answering service back but they had no idea how to reach covering doc for [facility]      So, I called the patient's family and spoke to her son .   Son gave morning insulin dose as usual and left for work   Later, he came to know that mother fell down to floor and was unable to get up   Patient fell down today and son checked - Blood sugar was 49 mg   EMS was called, with support - keena to over 250 mg     Son gave dinner shot at 6 pm   Now, patient is c/o cold - this is the only symptom    She Is awake and no other complaints       I reassured him, to check sugar one more time and reach out if extra help is needed   I did not feel that there was any major issue at this time from hearing to the son on phone      Bharti Guerrero MD

## 2020-04-18 NOTE — TELEPHONE ENCOUNTER
**LATE ENTRY--PHONE CALL TOOK PLACE ON Thursday 4/16/20 AT 8PM--FOR UNCLEAR REASONS PATIENT'S SON WAS TRANSFERRED TO DR Lea Singh AND NOT DR Tamie Butler WHO WAS ON CALL FOR OUR PRACTICE--THIS IS THE NOTE SHE SENT ME ABOUT HER CALL**    Levis Sarin, MD Mortimer Nakai, MD             Got a page from answering service   Benji of [facility],   They do not have [facility] covering doc info or number     So, I called the patient's family and spoke to her son .    Son gave morning insulin dose as usual and left for work   Later, he came to know that mother fell down to floor and was unable to get up   Patient fell down today and son checked - Blood sugar was 49 mg   EMS was called, with support - keena to over 250 mg     Son gave dinner shot at 6 pm   Now, patient is c/o cold - this is the only symptom     She Is awake and no other complaints       I reassured him, to check sugar one more time and reach out if extra help is needed   I did not feel that there was any major issue at this time from hearing to the son on phone       William Peacock MD

## 2020-04-20 NOTE — TELEPHONE ENCOUNTER
Spoke to currently admitted to [address] [place] [address]. 2/6/17 appointment is cancelled and patient will reschedule at a later date.    Please review review follow up ulrasound Assessment:  1. ARDS  2. Septic shock - possibly hypotension contributed by sedatives  3. Viral pneumonia secondary to COVID 19  4. Hypernatremia    Plan  - Mechanical ventilation with lung protective strategy   - Titrate Fio2 and PEEP as tolerated  - Sedation for ventilator synchrony   - Vasopressor support to maintain MAP > 60  - Monitor ABG  - S/p Anakinra and Hydroxychloroquine  - Airborne and Contact isolation  - Plan for prone positioning daily  - Cultures negative thus far and cont IV antibiotics will complete 7 day course, leukocytosis is resolving  - Monitor urine output  - Bowel regimen  - Keep close to euvolemic volume status   - DVT and Stress Ulcer prophylaxis

## 2020-04-23 ENCOUNTER — TELEPHONE (OUTPATIENT)
Dept: ENDOCRINOLOGY | Age: 80
End: 2020-04-23

## 2020-04-23 RX ORDER — HUMAN INSULIN 100 [IU]/ML
INJECTION, SUSPENSION SUBCUTANEOUS
Qty: 30 ML | Refills: 11
Start: 2020-04-23 | End: 2020-04-26

## 2020-04-23 NOTE — TELEPHONE ENCOUNTER
Received a page from patient's son Lul Tee at 3:36 pm that his mother's blood sugar had dropped into the 40s today after having taken 50 units of insulin this morning. She has had several lows during the day and a few at night on her current dose of 70/30 insulin 50 units in the morning and 30 units at dinner. I advised him to decrease her dose to 45 units before breakfast and 25 units before dinner and to call me if she continues to have lows under 90 despite making this change. He voiced understanding of this plan.

## 2020-04-26 ENCOUNTER — TELEPHONE (OUTPATIENT)
Dept: ENDOCRINOLOGY | Age: 80
End: 2020-04-26

## 2020-04-26 RX ORDER — HUMAN INSULIN 100 [IU]/ML
INJECTION, SUSPENSION SUBCUTANEOUS
Qty: 30 ML | Refills: 11
Start: 2020-04-26 | End: 2020-04-28

## 2020-04-26 NOTE — TELEPHONE ENCOUNTER
Received a page from patient that she had another severe low blood sugar last night requiring EMS assistance. She thinks her sugar was around 169 before dinner and had baked fish, mixed veggeis and a salad and 1 piece of bread and took the lower dose of 25 units of 70/30 insulin per my recommendation on 4/23/20 and went to bed around 9:30pm but her son found her down around 11pm and called EMS and they treated her with an IV and got her sugar back up and this morning her sugar was up to 279 at 9:20am and she took her normal dose of 45 units of 70/30 insulin. I told her that she needs to make sure she has some type of carb (bread, rice, potato, pasta, fruit) with all meals to avoid having low sugars but to be safe decrease her dinner dose of 70/30 insulin to 20 units starting tonight. She should continue 45 units in the morning. she voiced understanding of this plan.

## 2020-04-28 ENCOUNTER — TELEPHONE (OUTPATIENT)
Dept: ENDOCRINOLOGY | Age: 80
End: 2020-04-28

## 2020-04-28 RX ORDER — HUMAN INSULIN 100 [IU]/ML
INJECTION, SUSPENSION SUBCUTANEOUS
Qty: 30 ML | Refills: 11
Start: 2020-04-28 | End: 2020-05-12

## 2020-04-28 NOTE — TELEPHONE ENCOUNTER
----- Message from Ankit Minor sent at 4/28/2020  8:38 AM EDT -----  Regarding: /Telephone  General Message/Vendor Calls    Caller's first and last name: Margot Medrano      Reason for call:blood sugar low at night       Callback required yes/no and why: yes      Best contact number(s): [phone]      Details to clarify the request: Pt called requesting a call back from the nurse in regards to blood sugar is dropping at night . Pt stated the Novolin may needs to be adjusted . Pt take 45 in the  morning and 20 at night .       Ankit Minor

## 2020-04-28 NOTE — TELEPHONE ENCOUNTER
Patient stated that her sugar drops during the night. She stated that it starting dropping on 4/24/20.   She confirms that she takes Novilin 70/30 45 units in the am before breakfast and 20 units before dinner      4/24  4:47 am 288  4:50 pm 488    4/25  7:06 am 203  1:24 pm 83  2:34 pm 169  5:53 pm 160    4/26  3:24 am 189  5:13 am 217  7:21 am 205  8:37 am 250  9:13 am 279  12:08 pm 325  2:19 pm 234  5:00 pm 102  6:11 pm 157  10:03 pm 109    4/27  3:38 am 52  4:02 am 92  4:17 am 144  7:03 am 350  11:54 am 501  4:45 pm 351  9:46 pm 236  10:31 pm 288  10:45 am 276  11:46 pm 316    4/28  3:54 202  6:01 am 154  7:20 am 155  8:45 am 346  11:01 am 228

## 2020-04-28 NOTE — TELEPHONE ENCOUNTER
Called and spoke with pt. She had another low blood sugar overnight on Sunday night after I had spoken with her despite taking 20 units for dinner and having carbs at dinner. Yesterday morning she took the 45 units but yet her sugar spiked to 500 after having cereal.  She did not go low overnight last night but it did drop from over 300 to 150 this morning. I advised her to increase her morning dose to 50 units and decrease her evening dose to 14 units. she voiced understanding of this plan.

## 2020-05-08 ENCOUNTER — VIRTUAL VISIT (OUTPATIENT)
Dept: ENDOCRINOLOGY | Age: 80
End: 2020-05-08

## 2020-05-08 DIAGNOSIS — E78.5 HYPERLIPIDEMIA LDL GOAL <70: ICD-10-CM

## 2020-05-08 DIAGNOSIS — C73 THYROID CANCER (HCC): ICD-10-CM

## 2020-05-08 DIAGNOSIS — I10 ESSENTIAL HYPERTENSION, BENIGN: ICD-10-CM

## 2020-05-08 RX ORDER — FUROSEMIDE 80 MG/1
80 TABLET ORAL DAILY
COMMUNITY
Start: 2020-04-02

## 2020-05-08 NOTE — PATIENT INSTRUCTIONS
1) Continue 70/30 insulin 50 units before breakfast and stop the dose before dinner. Hopefully this will help get rid of the low sugars overnight. 2) Do not have a snack with any sugar at bedtime as this will cause your sugar to be too high the next morning. 3) Call me on Monday 5/15 to give me an update on how your sugars are doing. 4) Your TSH (thyroid test) is perfect so I will keep your dose the same of Synthroid. 5) Please come for a follow up visit on 11/16/20 at 11:30am in our [place] office. 6) I will mail you a lab slip (enclosed). Please put this in the glove compartment or other safe spot where you keep your medical papers and I will send you a reminder to have your labs drawn prior to next visit.

## 2020-05-08 NOTE — LETTER
5/8/2020 1:28 PM 
 
Ms. Tutu Arana [address] [facility] [id number] 
 
1) Continue 70/30 insulin 50 units before breakfast and stop the dose before dinner. Hopefully this will help get rid of the low sugars overnight. 2) Do not have a snack with any sugar at bedtime as this will cause your sugar to be too high the next morning. 3) Call me on Monday 5/15 to give me an update on how your sugars are doing. 4) Your TSH (thyroid test) is perfect so I will keep your dose the same of Synthroid. 5) Please come for a follow up visit on 11/16/20 at 11:30am in our [place] office. 6) I will mail you a lab slip (enclosed). Please put this in the glove compartment or other safe spot where you keep your medical papers and I will send you a reminder to have your labs drawn prior to next visit. If you have any questions, please don't hesitate to call me at [phone].  
 
Sincerely, 
 
 
 
Satish Negrete MD

## 2020-05-08 NOTE — PROGRESS NOTES
Chief Complaint   Patient presents with    Diabetes     pcp and pharmacy confirmed       **THIS IS A VIRTUAL VISIT VIA A VIDEO SYNCHRONOUS DISCUSSION. PATIENT AGREED TO HAVE THEIR CARE DELIVERED OVER A DOXY. ME VIDEO VISIT IN PLACE OF THEIR REGULARLY SCHEDULED OFFICE VISIT**    History of Present Illness: Margot Medrano is a 67 y.o. female here for follow up of diabetes. She and I have been in frequent contact over the past few weeks due to persistent overnight hypoglycemia. She has decreased her insulin to the doses below as of last week on 4/28/20 but despite this is still having some lows overnight several times a week down to the 60-70s. She even had a low sugar of 60 by dinner 2 days ago despite starting the day at 151. She does have many sugars between 100-200 during the day and not going over 200 that frequently unless she has a snack at bedtime to try and prevent a low and then can go into the 200-240 range. She has been checking her sugars 6 times a day recently due to fluctuating sugars and hypoglycemia. She has been compliant with synthroid and is taking everyday at least 30 min before breakfast.  Her kidney function has been worsening and recent creatinine was 3.48 last month at Dr. Kenneth Hidalgo' office and her GFR was 14. She is due to see Dr. Myrna Jean-Baptiste on Monday to discuss surgery if she needs dialysis in the future. Her TSH was normal at 1.13 at Dr. Kenneth Hidalgo' office. Current Outpatient Medications   Medication Sig    furosemide (LASIX) 80 mg tablet Take 80 mg by mouth daily.  insulin nph-regular human rec (NovoLIN 70-30 FlexPen U-100) 100 unit/mL (70-30) inpn Inject 50 units before breakfast and 14 units before dinner + 5 units for every 50 mg/dl above 150 mg/dl. Max 100 units per day--pt will pay cash--Dose change 4/28/20--updated med list--did not send prescription to the pharmacy    SYNTHROID 175 mcg tablet Take 1 Tab by mouth Daily (before breakfast).  Take 1 Tab by mouth Daily (before breakfast). BRAND MEDICALLY NECESSARY--Delete 150 mcg dose from profile    gabapentin (NEURONTIN) 300 mg capsule Take 2 Caps by mouth three (3) times daily. Max Daily Amount: 1,800 mg.  Lancing Device with Lancets kit Use as directed to test 3 times daily    ONETOUCH ULTRA BLUE TEST STRIP strip TEST 3 TIMES DAILY    ONETOUCH ULTRA2 monitoring kit Test 3 times daily. DX E11.65    atorvastatin (LIPITOR) 80 mg tablet Take 80 mg by mouth daily.  ferrous sulfate (IRON) 325 mg (65 mg iron) tablet Take  by mouth daily.  LOR PEN NEEDLE 32 gauge x 5/32\" ndle Use as directed 5 times daily    PYRIDOXINE HCL, VITAMIN B6, (VITAMIN B-6 PO) Take  by mouth daily.  allopurinol (ZYLOPRIM) 100 mg tablet Take  by mouth daily.  multivitamins-minerals-lutein (CENTRUM SILVER) Tab Take  by mouth daily. No current facility-administered medications for this visit. Allergies   Allergen Reactions    Amlodipine Swelling    Clindamycin Rash    Nifedipine Swelling    Sulfa (Sulfonamide Antibiotics) Rash     Review of Systems: PER HPI    Physical Examination:  - GENERAL: NCAT, Appears well nourished   - EYES: EOMI, non-icteric, no proptosis   - Ear/Nose/Throat: NCAT, no visible inflammation or masses   - CARDIOVASCULAR: no cyanosis, no visible JVD   - RESPIRATORY: respiratory effort normal without any distress or labored breathing   - MUSCULOSKELETAL: Normal ROM of neck and upper extremities observed   - SKIN: No rash on face  - NEUROLOGIC:  No facial asymmetry (Cranial nerve 7 motor function), No gaze palsy   - PSYCHIATRIC: Normal affect, Normal insight and judgement       Data Reviewed: 4/20  - TSH 1.13  - BUN/Cr 19/3.48    Assessment/Plan:     1.  Type 2 diabetes mellitus with diabetic polyneuropathy, with long-term current use of insulin ([facility] [address]): her most recent Hgb A1c was 10.3% in 2/20 down from 10.8% in 11/19 down from 10% in 10/19 up from 10.2% in 9/19 down from 12.4% in 4/19 down from 12.5% in 12/18 down from 13.8% in 9/18 up from 10.6% in 4/18 up from 7.4% in 11/17 up from 6.2% in 6/17 (first visit with me) down from 11.5% in 1/17 during hospital stay for stroke. A1c was above goal <8% at last visit so changed to pre-mix insulin. She is having a lot of hypoglycemia likely due to the 70/30 insulin lasting over 24 hours due to her current kidney function so will stop her evening dose of insulin to see if this helps eliminate her overnight hypoglycemia. - cont novolin 70/30 pen 50 units before breakfast and stop dinner dose  - check bs 3 times daily  - foot exam done 11/19  - eye exam UTD 5/18  - microalbumin 3192 11/17, down to 889 in 4/18, up to 2122 in 9/19 when TSH was 144 and 3719 in 11/19 when TSH 12 and 4388 in 2/20   - check Hgb A1c and cmp and microalbumin prior to next visit        2. Thyroid cancer [facility]): She noticed a lump in her neck in 2014 and was found to have a 3.4 cm right lobe nodule with microcalcifications and she made an appt with Dr. Denys Diaz and he performed a right thyroidectomy in 8/14 and final pathology showed a Follicular carcinoma, Hurthle cell type, at least minimally invasive and ended up having a completion thyroidectomy that was benign. Had a whole body scan at [facility] that fall that didn't show any uptake. Was initially on Synthroid 150 mcg daily but her dose was decreased to 137 mcg daily and has been on this dose for the past 2 years. Did have a TSH of 4.03 in 1/17 during her hospital stay. TSH 2.69 and TG 0.3 with neg TG ab in 6/17 so kept dose the same. TSH up to 3.41 in 11/17 so increased synthroid back to 150 mcg daily and TSH 0.6 in 4/18 and TG 0.3 with neg TG ab so kept her dose the same. Wt down 10 lbs and TSH down to 0.079 in 9/18 so had her take 6 tabs/week and TSH up to 5.98 in 12/18 so changed to 137 mcg daily and TSH 0.39 in 4/19 and TG 0.2 so kept dose the same.   Had been out for a couple of weeks prior to lab draw in 9/19 and .5 but with compliance, TSH down to 9.34 and TG 1.7 in 10/19 and TSH 12.8 and TG 2.2 in 11/19 so increased dose back to 150 mcg daily and TSH still 11.5 in 2/20 so increased to 175 mcg daily and TSH 1.13 in 4/20 so kept dose the same. - cont synthroid 175 mcg daily  - check TSH and free T4 prior to next visit  - check thyroglobulin reflex panel prior to next visit        3. Essential hypertension, benign: her BP was at goal < 140/90   - cont current regimen for now  - monitor home blood pressure readings      4. Hyperlipidemia LDL goal <70: LDL 70.6 in 1/17 on atorvastatin 40 mg and still 80 in 6/17 and 99 in 11/17 possibly due to higher TSH. Was put on 80 mg by PCP and LDL down to 63 in 4/18 and 55 in 9/18 and 58 in 12/18. Up to 185 in 9/19 when TSH was 144. Down to 109 in 10/19 when TSH was 9. Down to 80 in 2/20  - cont atorvastatin 80 mg daily  - check lipids prior to next visit            Patient Instructions   1) Continue 70/30 insulin 50 units before breakfast and stop the dose before dinner. Hopefully this will help get rid of the low sugars overnight. 2) Do not have a snack with any sugar at bedtime as this will cause your sugar to be too high the next morning. 3) Call me on Monday 5/15 to give me an update on how your sugars are doing. 4) Your TSH (thyroid test) is perfect so I will keep your dose the same of Synthroid. 5) Please come for a follow up visit on 11/16/20 at 11:30am in our [place]. 6) I will mail you a lab slip (enclosed). Please put this in the glove compartment or other safe spot where you keep your medical papers and I will send you a reminder to have your labs drawn prior to next visit.           Follow-up and Dispositions    · Return for 11/16/20 at 11:30am.               Copy sent to:  Dr. Boby East via [facility]

## 2020-05-11 ENCOUNTER — TELEPHONE (OUTPATIENT)
Dept: ENDOCRINOLOGY | Age: 80
End: 2020-05-11

## 2020-05-11 NOTE — TELEPHONE ENCOUNTER
----- Message from Karlene Tobar sent at 5/11/2020  8:37 AM EDT -----  Regarding: /Telephone  General Message/Vendor Calls    Caller's first and last name: Chayo Hernandez      Reason for call:report sugar count       Callback required yes/no and why:yes      Best contact number(s):[phone]      Details to clarify the request:Pt called requesting a call back from  to report blood sugar count .       Karlene Tobar

## 2020-05-12 RX ORDER — HUMAN INSULIN 100 [IU]/ML
INJECTION, SUSPENSION SUBCUTANEOUS
Qty: 30 ML | Refills: 11
Start: 2020-05-12 | End: 2020-06-09

## 2020-05-12 NOTE — TELEPHONE ENCOUNTER
Please let her know these sugars look so much better but to be safe, have her decrease her insulin to just 45 units in the morning and still take none before dinner.

## 2020-05-12 NOTE — TELEPHONE ENCOUNTER
----- Message from Elvira Flowers sent at 5/12/2020  5:26 PM EDT -----  Regarding: DR Bryson Marrero / Isabella Casillas Message/Vendor Calls    Myrna Dunlap son is returning call to office.          Callback required  Best contact number(s):[phone] [facility]

## 2020-05-12 NOTE — TELEPHONE ENCOUNTER
----- Message from Steffanie Hill sent at 5/12/2020  1:56 PM EDT -----  Regarding: Dr Deborah Hancock Message/Vendor Calls    Caller's first and last name:      Reason for call: Pt is following up on call made yesterday to report blood sugar levels from the weekend due to the change in medication.       Callback required yes/no and why:yes      Best contact number(s):[phone]        Details to clarify the request:      Steffanie Hill

## 2020-05-12 NOTE — TELEPHONE ENCOUNTER
Patient called to give her her 3 days of sugar readings.     5/8  5:59 am 76  6:19 am 178  10:48 am 191    5/9  1:47am 92  5:56 am 70  7:27 am 116  2:03 pm 150  4:55 pm 158  10:20 pm 115    5/10  3:10 am 104  4:49 am 157  8:44 am 129  10:09 am 179    Patient did not write down number for 5/11 and today

## 2020-05-29 ENCOUNTER — TELEPHONE (OUTPATIENT)
Dept: ENDOCRINOLOGY | Age: 80
End: 2020-05-29

## 2020-05-30 ENCOUNTER — TELEPHONE (OUTPATIENT)
Dept: ENDOCRINOLOGY | Age: 80
End: 2020-05-30

## 2020-05-30 NOTE — TELEPHONE ENCOUNTER
Received call from patient this evening around 8 PM. She notes that she woke up this AM, took her 45 units of insulin (70/30 insulin, this is the dose she has been using with breakfast). Later she was very active and slept through lunch. When she woke up just before dinner time, she thought it was morning again and went through her morning routine again showering etc and then took her 45 units again before dinner around 7 PM, thinking that it was breakfast time again. Her sugar was in the 140's at the time of her call, she was feeling well, well oriented and holding conversation well. Review of her chart reveals that she had been on 30 units with dinner in the past but this was discontinued due to hypoglycemia overnight. Thus she was only taking insulin before breakfast.     We reviewed what she had at home to eat / drink and we planned on the following: She will delay her peach cobbler for another hour at which time she will recheck her sugar. Then before bedtime she will have a small sandwich to eat following a sugar check. I have also asked her to set an alarm for 2-3 AM for another sugar check. She demonstrated her understanding and is aware she can reach out to me anytime. Peter Justin.  [address] Endocrinology  [address] [place] [address]

## 2020-05-30 NOTE — TELEPHONE ENCOUNTER
Saturday:     Spoke with patient today around noon. Reports she slept just a little after midnight, and woke up this AM between 10 AM - 12 AM. Her sugar this morning was 72. She did not require any snack overnight. She is preparing to eat her breakfast and asking about what to do. Will have some eggs, perez, toast.     Normally her sugar is reportedly in the 170's in the AM over the past 2 weeks. Considering that it has dropped to the 70's overnight, not lower, without requiring overnight snack, with the last dose of insulin having been around 16-17 hours ago, and that she is preparing to eat with a good appetite, we agreed on taking 10 units with her breakfast today with routine monitoring of her blood sugar, and plan to resume her 45 units tomorrow morning. She was welcomed to reach out to me again with concerns,     Niyah Yee.  [address] [facility] Endocrinology  [address] [place] [address]

## 2020-06-09 ENCOUNTER — TELEPHONE (OUTPATIENT)
Dept: ENDOCRINOLOGY | Age: 80
End: 2020-06-09

## 2020-06-09 RX ORDER — HUMAN INSULIN 100 [IU]/ML
INJECTION, SUSPENSION SUBCUTANEOUS
Qty: 30 ML | Refills: 11
Start: 2020-06-09 | End: 2020-06-10

## 2020-06-09 NOTE — TELEPHONE ENCOUNTER
----- Message from Anabel Rodas sent at 6/9/2020 10:14 AM EDT -----  Regarding: /Telephone    General Message/    Caller's first and last name:      Reason for call:Pt called requesting a call       Callback required yes/no and why: YES      Best contact number(s):[phone] . Details to clarify the request:Call back from the nurse informing blood sugar dropped yesterday around 1 pm to 60 . Pt stated she called the life alert which the ambulance came to pt. home .  Pt stated she taken the 70/60 8 am  that morning  and blood sugar were at a [address] [place] [address]

## 2020-06-09 NOTE — TELEPHONE ENCOUNTER
I spoke with Ms. Karley Jaimes and she stated her blood sugar dropped on yesterday and the rescue squad was called. She stated that morning when she woke up it was already low at 90. She stated she ate breakfast which consisted of cereal, one pear and juice. Patient stated that all she remembers is feeling shaky and the rescue squad telling her that her sugar was 60. I asked her for some readings and she stated that she did not write them down and her son usually checks it but he is not home. I asked her to check her meter but she stated she does not know how to retrieve her number. She stated that today she felt shaky again but when it was checked she thinks it was 157. I asked her to have her son call me tomorrow with her readings and she voiced okay. Dr. Inez Morrison was given this information and per him she is to decrease her novolin 70/30 to 40 units the am instead of the 45 units. Patient repeated it back to me and voiced understanding.

## 2020-06-10 RX ORDER — HUMAN INSULIN 100 [IU]/ML
INJECTION, SUSPENSION SUBCUTANEOUS
Qty: 30 ML | Refills: 11
Start: 2020-06-10 | End: 2020-09-21

## 2020-06-10 NOTE — TELEPHONE ENCOUNTER
Please let her know that it sounds like the only reason she went low was because she did not eat lunch on time as she doesn't have any other readings under 80. Therefore, I think she should stay on 45 units of insulin once daily before breakfast instead of 40 units. She should be sure to always have something with sugar in the middle of the day to prevent a low blood sugar from happening but otherwise everything looks much better on this dose.

## 2020-06-10 NOTE — TELEPHONE ENCOUNTER
Patient called back to give the numbers written down by her son. Patient stated that the day her sugar dropped she had went to two appointment and had not had any lunch. She state that she was not feeling well when she arrived home so she sat down. She stated the person who took her noticed that she has life alert so the button was pushed and when the rescue squad came they checked her sugar and it was 60. Her son was not home at that time.         6/6  2:24am 205  9:56 am 257  6:40 pm 207    6/7  8:55 am 257  1:35 pm 511  2:55 pm 216  4:17 pm 187  6:57 pm 154  8:57 pm 142    6/8  3:35 am 84  3:45 am 110  3:40 pm 141  5:20 pm 93  5:36 pm 100  5:56 pm 113  6:36 pm 164    6/9  104 am 225  4:05 pm 157  6:12 pm 191  8:15 pm 230  9:47 pm 262    6/10   3:49 am 209  7:03 am 172

## 2020-06-10 NOTE — TELEPHONE ENCOUNTER
----- Message from Lindsey Diop sent at 6/10/2020  4:10 PM EDT -----  Regarding: Dr Karyn Ohara  Patient return call    Caller's first and last name and relationship (if not the patient):      Best contact number(s):[phone]      Whose call is being returned: Yulissa Cruz      Details to clarify the request: regarding sugar level  readings.        Lindsey Diop

## 2020-07-06 RX ORDER — GABAPENTIN 300 MG/1
600 CAPSULE ORAL 3 TIMES DAILY
Qty: 540 CAP | Refills: 1 | Status: SHIPPED | OUTPATIENT
Start: 2020-07-06

## 2020-07-12 ENCOUNTER — HOSPITAL ENCOUNTER (EMERGENCY)
Age: 80
Discharge: HOME | End: 2020-07-12
Attending: EMERGENCY MEDICINE
Payer: MEDICARE

## 2020-07-12 VITALS
DIASTOLIC BLOOD PRESSURE: 74 MMHG | OXYGEN SATURATION: 100 % | BODY MASS INDEX: 35.49 KG/M2 | SYSTOLIC BLOOD PRESSURE: 186 MMHG | WEIGHT: 213 LBS | TEMPERATURE: 98 F | HEIGHT: 65 IN | RESPIRATION RATE: 18 BRPM | HEART RATE: 58 BPM

## 2020-07-12 DIAGNOSIS — H43.11 VITREOUS HEMORRHAGE OF RIGHT EYE (HCC): ICD-10-CM

## 2020-07-12 DIAGNOSIS — N17.9 ACUTE RENAL FAILURE, UNSPECIFIED ACUTE RENAL FAILURE TYPE (HCC): Primary | ICD-10-CM

## 2020-07-12 LAB
ALBUMIN SERPL-MCNC: 3.1 G/DL (ref 3.5–5)
ALBUMIN/GLOB SERPL: 0.8 {RATIO} (ref 1.1–2.2)
ALP SERPL-CCNC: 75 U/L (ref 45–117)
ALT SERPL-CCNC: 64 U/L (ref 12–78)
ANION GAP SERPL CALC-SCNC: 4 MMOL/L (ref 5–15)
ANION GAP SERPL CALC-SCNC: 5 MMOL/L (ref 5–15)
AST SERPL-CCNC: 56 U/L (ref 15–37)
BASOPHILS # BLD: 0 K/UL (ref 0–0.1)
BASOPHILS NFR BLD: 0 % (ref 0–1)
BILIRUB SERPL-MCNC: 0.3 MG/DL (ref 0.2–1)
BUN SERPL-MCNC: 40 MG/DL (ref 6–20)
BUN SERPL-MCNC: 42 MG/DL (ref 6–20)
BUN/CREAT SERPL: 10 (ref 12–20)
BUN/CREAT SERPL: 10 (ref 12–20)
CALCIUM SERPL-MCNC: 8.4 MG/DL (ref 8.5–10.1)
CALCIUM SERPL-MCNC: 8.7 MG/DL (ref 8.5–10.1)
CHLORIDE SERPL-SCNC: 104 MMOL/L (ref 97–108)
CHLORIDE SERPL-SCNC: 105 MMOL/L (ref 97–108)
CO2 SERPL-SCNC: 31 MMOL/L (ref 21–32)
CO2 SERPL-SCNC: 32 MMOL/L (ref 21–32)
CREAT SERPL-MCNC: 3.87 MG/DL (ref 0.55–1.02)
CREAT SERPL-MCNC: 4.32 MG/DL (ref 0.55–1.02)
DIFFERENTIAL METHOD BLD: ABNORMAL
EOSINOPHIL # BLD: 0.1 K/UL (ref 0–0.4)
EOSINOPHIL NFR BLD: 2 % (ref 0–7)
ERYTHROCYTE [DISTWIDTH] IN BLOOD BY AUTOMATED COUNT: 14.7 % (ref 11.5–14.5)
GLOBULIN SER CALC-MCNC: 3.9 G/DL (ref 2–4)
GLUCOSE SERPL-MCNC: 50 MG/DL (ref 65–100)
GLUCOSE SERPL-MCNC: 59 MG/DL (ref 65–100)
HCT VFR BLD AUTO: 30.2 % (ref 35–47)
HGB BLD-MCNC: 10 G/DL (ref 11.5–16)
IMM GRANULOCYTES # BLD AUTO: 0 K/UL (ref 0–0.04)
IMM GRANULOCYTES NFR BLD AUTO: 0 % (ref 0–0.5)
LYMPHOCYTES # BLD: 1.5 K/UL (ref 0.8–3.5)
LYMPHOCYTES NFR BLD: 31 % (ref 12–49)
MCH RBC QN AUTO: 28.8 PG (ref 26–34)
MCHC RBC AUTO-ENTMCNC: 33.1 G/DL (ref 30–36.5)
MCV RBC AUTO: 87 FL (ref 80–99)
MONOCYTES # BLD: 0.6 K/UL (ref 0–1)
MONOCYTES NFR BLD: 12 % (ref 5–13)
NEUTS SEG # BLD: 2.7 K/UL (ref 1.8–8)
NEUTS SEG NFR BLD: 55 % (ref 32–75)
NRBC # BLD: 0 K/UL (ref 0–0.01)
NRBC BLD-RTO: 0 PER 100 WBC
PLATELET # BLD AUTO: 192 K/UL (ref 150–400)
PMV BLD AUTO: 11.7 FL (ref 8.9–12.9)
POTASSIUM SERPL-SCNC: 3.7 MMOL/L (ref 3.5–5.1)
POTASSIUM SERPL-SCNC: 4.6 MMOL/L (ref 3.5–5.1)
PROT SERPL-MCNC: 7 G/DL (ref 6.4–8.2)
RBC # BLD AUTO: 3.47 M/UL (ref 3.8–5.2)
SODIUM SERPL-SCNC: 140 MMOL/L (ref 136–145)
SODIUM SERPL-SCNC: 141 MMOL/L (ref 136–145)
WBC # BLD AUTO: 4.9 K/UL (ref 3.6–11)

## 2020-07-12 PROCEDURE — 80053 COMPREHEN METABOLIC PANEL: CPT

## 2020-07-12 PROCEDURE — 96360 HYDRATION IV INFUSION INIT: CPT

## 2020-07-12 PROCEDURE — 85025 COMPLETE CBC W/AUTO DIFF WBC: CPT

## 2020-07-12 PROCEDURE — 74011250636 HC RX REV CODE- 250/636: Performed by: EMERGENCY MEDICINE

## 2020-07-12 PROCEDURE — 36415 COLL VENOUS BLD VENIPUNCTURE: CPT

## 2020-07-12 PROCEDURE — 99284 EMERGENCY DEPT VISIT MOD MDM: CPT

## 2020-07-12 PROCEDURE — 96361 HYDRATE IV INFUSION ADD-ON: CPT

## 2020-07-12 RX ADMIN — SODIUM CHLORIDE 500 ML: 900 INJECTION, SOLUTION INTRAVENOUS at 18:02

## 2020-07-12 NOTE — ED NOTES
17:21 Dr. Naresh Delarosa at bedside. 18:15  Pt incontinent of urine, pt cleaned and new diaper applied. Pt requesting food, provided with terri crackers and diet soda. US machine at bedside per MD request, MD Notified. 19:11  Bedside and Verbal shift change report given to [id number]  (oncoming nurse) by Lisa Grande RN  (offgoing nurse). Report included the following information SBAR.

## 2020-07-13 NOTE — ED NOTES
Pt discharged by Rosa Isela Yap. Pt provided with discharge instructions Rx and instructions on follow up care. Pt out of ED via wheelchair accompanied by RN.

## 2020-07-13 NOTE — DISCHARGE INSTRUCTIONS
Patient Education        Learning About Vitreous Hemorrhage  What is a vitreous hemorrhage? A vitreous hemorrhage is bleeding into the thick fluid that fills the center of the eye. This fluid is called the vitreous gel. The gel is clear, so light passes through it to the retina. The retina is the nerve layer at the back of the eye that sends images to your brain. Having blood in the vitreous gel can keep light from reaching your retina. This causes vision problems. If the bleeding is severe, it can cause vision loss. What are the symptoms? The symptoms of a vitreous hemorrhage start suddenly. They may include:  · Blurred vision. · Floaters. These are black specks or strings that float across your field of vision. · Flashes of light. Symptoms may be worse in the morning. That's because lying down can cause the blood to pool in the back of your eye. What causes a vitreous hemorrhage? The most common cause is diabetic retinopathy, an eye disease caused by diabetes. In this disease, abnormal blood vessels may grow on the retina. These blood vessels break easily and may leak blood into the eye. Other things that can cause a vitreous hemorrhage include:  · An eye or head injury. · Damage to the retina, such as a hole in the retina (retinal tear). · Bleeding in another part of the eye that seeps into the vitreous gel. What can you expect when you have a vitreous hemorrhage? An eye doctor (ophthalmologist) will do a complete eye exam to look for the cause of your symptoms. The doctor may use eyedrops to open (dilate) your pupils. You might also have other tests, such as blood tests or an ultrasound. If the bleeding is mild, you may not need treatment. Your body will get rid of the blood over time. But this may take several months. Your doctor may suggest that you sleep with the head of your bed raised about 45 degrees.  The doctor may also want you to avoid aspirin and other blood-thinning medicines. Treatment will depend on what's causing the bleeding and how severe it is. Some problems can be treated using a laser or a freezing probe (cryotherapy). Other problems may require surgery. When should you call for help? Call your doctor now or seek immediate medical care if:  · You have vision changes. · You see new flashes of light. Watch closely for changes in your health, and be sure to contact your doctor if:  · You see new or worse floaters. · You do not get better as expected. Follow-up care is a key part of your treatment and safety. Be sure to make and go to all appointments, and call your doctor if you are having problems. It's also a good idea to know your test results and keep a list of the medicines you take. Where can you learn more? Go to http://jaciel-fish.info/  Enter I900 in the search box to learn more about \"Learning About Vitreous Hemorrhage. \"  Current as of: December 18, 2019               Content Version: 12.5  © 7735-4291 [employer] Care instructions adapted under license by [employer] (which disclaims liability or warranty for this information). If you have questions about a medical condition or this instruction, always ask your healthcare professional. [place] [address] any warranty or liability for your use of this information. Patient Education        Acute Kidney Injury: Care Instructions  Your Care Instructions     Acute kidney injury (TARUN) is a sudden decrease in kidney function. This can happen over a period of hours, days or, in some cases, weeks. TARUN used to be called acute renal failure, but kidney failure doesn't always happen with TARUN. Common causes of TARUN are dehydration, blood loss, and medicines. When TARUN happens, the kidneys have trouble removing waste and excess fluids from the body. The waste and fluids build up and become harmful.   Kidney function may return to normal if the cause of TARUN is treated quickly. Your chance of a full recovery depends on what caused the problem, how quickly the cause was treated, and what other medical problems you have. A machine may be used to help your kidneys remove waste and fluids for a short period of time. This is called dialysis. Follow-up care is a key part of your treatment and safety. Be sure to make and go to all appointments, and call your doctor if you are having problems. It's also a good idea to know your test results and keep a list of the medicines you take. How can you care for yourself at home? · Talk to your doctor about how much fluid you should drink. · Eat a balanced diet. Talk to your doctor or a dietitian about what type of diet may be best for you. You may need to limit sodium, potassium, and phosphorus. · If you need dialysis, follow the instructions and schedule for dialysis that your doctor gives you. · Do not smoke. Smoking can make your condition worse. If you need help quitting, talk to your doctor about stop-smoking programs and medicines. These can increase your chances of quitting for good. · Do not drink alcohol. · Review all of your medicines with your doctor. Do not take any medicines, including nonsteroidal anti-inflammatory drugs (NSAIDs) such as ibuprofen (Advil, Motrin) or naproxen (Aleve), unless your doctor says it is safe for you to do so. · Make sure that anyone treating you for any health problem knows that you have had TARUN. When should you call for help? [id number] anytime you think you may need emergency care. For example, call if:  · You passed out (lost consciousness). Call your doctor now or seek immediate medical care if:  · You have new or worse nausea and vomiting. · You have much less urine than normal, or you have no urine. · You are feeling confused or cannot think clearly. · You have new or more blood in your urine. · You have new swelling.   · You are dizzy or lightheaded, or feel like you may faint. Watch closely for changes in your health, and be sure to contact your doctor if:  · You do not get better as expected. Where can you learn more? Go to http://jaciel-fish.info/  Enter X816 in the search box to learn more about \"Acute Kidney Injury: Care Instructions. \"  Current as of: August 12, 2019               Content Version: 12.5  © 8520-7420 [employer]. Care instructions adapted under license by [employer] (which disclaims liability or warranty for this information). If you have questions about a medical condition or this instruction, always ask your healthcare professional. [address] any warranty or liability for your use of this information.

## 2020-07-13 NOTE — ED PROVIDER NOTES
EMERGENCY DEPARTMENT HISTORY AND PHYSICAL EXAM      Date: 7/12/2020  Patient Name: Kwan Vasquez    History of Presenting Illness     Chief Complaint   Patient presents with    Eye Drainage     starting today    Arm Pain     R arm tremors occassionally    Rash     to right leg with bumps pt wants checked out       History Provided By: Patient    HPI: Kwan Vasquez, 67 y.o. female with PMHx significant for CKD, diabetes, hypertension, hyperlipidemia, presents to the ED with cc of flashes of light in the right eye, right arm tremor, and weakness over the last several days. Patient was most alarmed today that she felt like she saw \"floaters\" in her right visual field. They were still there but they are improving. She has a small area of erythema to her leg that she would also like us to look at as well. No other associated symptoms. No other exacerbating or mounting factors. There are no other complaints, changes, or physical findings at this time. PCP: Sruthi Guerrero MD    No current facility-administered medications on file prior to encounter. Current Outpatient Medications on File Prior to Encounter   Medication Sig Dispense Refill    gabapentin (NEURONTIN) 300 mg capsule Take 2 Caps by mouth three (3) times daily. Max Daily Amount: 1,800 mg. 540 Cap 1    insulin nph-regular human rec (NovoLIN 70-30 FlexPen U-100) 100 unit/mL (70-30) inpn Inject 45 units before breakfast and none before dinner. Max 100 units per day--pt will pay cash--Dose change 6/10/20--updated med list--did not send prescription to the pharmacy 30 mL 11    furosemide (LASIX) 80 mg tablet Take 80 mg by mouth daily.  SYNTHROID 175 mcg tablet Take 1 Tab by mouth Daily (before breakfast). Take 1 Tab by mouth Daily (before breakfast).  BRAND MEDICALLY NECESSARY--Delete 150 mcg dose from profile 90 Tab 3    Lancing Device with Lancets kit Use as directed to test 3 times daily 1 Kit 0    ONETOUCH ULTRA BLUE TEST STRIP strip TEST 3 TIMES DAILY 300 Strip 3    ONETOUCH ULTRA2 monitoring kit Test 3 times daily. DX E11.65 1 Kit 0    atorvastatin (LIPITOR) 80 mg tablet Take 80 mg by mouth daily.  ferrous sulfate (IRON) 325 mg (65 mg iron) tablet Take  by mouth daily.  LOR PEN NEEDLE 32 gauge x 5/32\" ndle Use as directed 5 times daily 500 Pen Needle 3    PYRIDOXINE HCL, VITAMIN B6, (VITAMIN B-6 PO) Take  by mouth daily.  allopurinol (ZYLOPRIM) 100 mg tablet Take  by mouth daily.  multivitamins-minerals-lutein (CENTRUM SILVER) Tab Take  by mouth daily. Past History     Past Medical History:  Past Medical History:   Diagnosis Date    Arthritis     Cataract     bilateral    Chronic kidney disease     Dr. Apolinar Paget Chronic pain     lower back    CKD stage 3 due to type 2 diabetes mellitus [facility]     Depression     Diabetes ([facility] age 49    Type II    Follicular thyroid cancer ([facility] 08/2014    Foot ulcer ([facility]     Gallbladder polyp 8/14/2013    Hypercholesterolemia     Hypertension     Ill-defined condition     states 'polyp' in gal bladder    Long term current use of anticoagulant therapy     Obesity     Right pontine stroke ([facility] 1/17/2017    TIA (transient ischemic attack) 6/6/2014       Past Surgical History:  Past Surgical History:   Procedure Laterality Date    ABDOMEN SURGERY PROC UNLISTED  2006    stomach    BREAST SURGERY PROCEDURE UNLISTED  2009, 2006    breast bx-vince    COLONOSCOPY N/A 12/17/2019    COLONOSCOPY performed by Cathryn Goetz MD at [address] [place] [address]  12/17/2019         COLORECTAL SCRN; HI RISK IND  5/30/2014         HX BREAST BIOPSY Right [id number]    2 biopies on the right. Benign (per patient)    HX BREAST BIOPSY Left 2015    Benign (per patient)    HX CATARACT REMOVAL Left     HX GYN  1970's    partial hysterectomy    HX HERNIA REPAIR  2009    hiatal    HX HYSTERECTOMY      HX ORTHOPAEDIC Bilateral 1988 bunion    HX THYROIDECTOMY  2014    Dr. Regina Anders       Family History:  Family History   Problem Relation Age of Onset    Heart Disease Mother     Hypertension Mother     Diabetes Mother     Stroke Mother     Cancer Father         colon    Heart Disease Sister     Diabetes Sister     Heart Attack Sister     Hypertension Sister     Lung Disease Brother     Hypertension Brother     Lung Disease Brother     Anesth Problems Neg Hx        Social History:  Social History     Tobacco Use    Smoking status: Never Smoker    Smokeless tobacco: Never Used   Substance Use Topics    Alcohol use: No    Drug use: No       Allergies: Allergies   Allergen Reactions    Amlodipine Swelling    Clindamycin Rash    Nifedipine Swelling    Sulfa (Sulfonamide Antibiotics) Rash         Review of Systems   Review of Systems   Constitutional: Negative for chills, diaphoresis, fatigue and fever. HENT: Negative for ear pain and sore throat. Eyes: Negative for pain and redness. Respiratory: Negative for cough and shortness of breath. Cardiovascular: Negative for chest pain and leg swelling. Gastrointestinal: Negative for abdominal pain, diarrhea, nausea and vomiting. Endocrine: Negative for cold intolerance and heat intolerance. Genitourinary: Negative for flank pain and hematuria. Musculoskeletal: Negative for back pain and neck stiffness. Skin: Negative for rash and wound. Neurological: Negative for dizziness, syncope and headaches. All other systems reviewed and are negative. Physical Exam   Physical Exam  Vitals signs and nursing note reviewed. Constitutional:       Appearance: She is well-developed. HENT:      Head: Normocephalic and atraumatic. Mouth/Throat:      Pharynx: No oropharyngeal exudate. Eyes:      General:         Right eye: No discharge. Left eye: No discharge.       Conjunctiva/sclera: Conjunctivae normal.      Pupils: Pupils are equal, round, and reactive to light.      Comments: Extra comments are intact. Patient has all 4 visual fields intact in both eyes. No evidence of retinal detachment seen on either the right or left eyes. Neck:      Musculoskeletal: Normal range of motion. Cardiovascular:      Rate and Rhythm: Normal rate and regular rhythm. Heart sounds: No murmur. Pulmonary:      Effort: Pulmonary effort is normal. No respiratory distress. Breath sounds: Normal breath sounds. No wheezing. Abdominal:      General: Bowel sounds are normal. There is no distension. Palpations: Abdomen is soft. Tenderness: There is no abdominal tenderness. Musculoskeletal: Normal range of motion. General: No deformity. Skin:     General: Skin is warm and dry. Findings: No rash. Neurological:      Mental Status: She is alert and oriented to person, place, and time. Coordination: Coordination normal.   Psychiatric:         Behavior: Behavior normal.                 Diagnostic Study Results     Labs -     Recent Results (from the past 24 hour(s))   CBC WITH AUTOMATED DIFF    Collection Time: 07/12/20  3:55 PM   Result Value Ref Range    WBC 4.9 3.6 - 11.0 K/uL    RBC 3.47 (L) 3.80 - 5.20 M/uL    HGB 10.0 (L) 11.5 - 16.0 g/dL    HCT 30.2 (L) 35.0 - 47.0 %    MCV 87.0 80.0 - 99.0 FL    MCH 28.8 26.0 - 34.0 PG    MCHC 33.1 30.0 - 36.5 g/dL    RDW 14.7 (H) 11.5 - 14.5 %    PLATELET 296 129 - 593 K/uL    MPV 11.7 8.9 - 12.9 FL    NRBC 0.0 0  WBC    ABSOLUTE NRBC 0.00 0.00 - 0.01 K/uL    NEUTROPHILS 55 32 - 75 %    LYMPHOCYTES 31 12 - 49 %    MONOCYTES 12 5 - 13 %    EOSINOPHILS 2 0 - 7 %    BASOPHILS 0 0 - 1 %    IMMATURE GRANULOCYTES 0 0.0 - 0.5 %    ABS. NEUTROPHILS 2.7 1.8 - 8.0 K/UL    ABS. LYMPHOCYTES 1.5 0.8 - 3.5 K/UL    ABS. MONOCYTES 0.6 0.0 - 1.0 K/UL    ABS. EOSINOPHILS 0.1 0.0 - 0.4 K/UL    ABS. BASOPHILS 0.0 0.0 - 0.1 K/UL    ABS. IMM.  GRANS. 0.0 0.00 - 0.04 K/UL    DF AUTOMATED     METABOLIC PANEL, COMPREHENSIVE Collection Time: 07/12/20  3:55 PM   Result Value Ref Range    Sodium 141 136 - 145 mmol/L    Potassium 4.6 3.5 - 5.1 mmol/L    Chloride 104 97 - 108 mmol/L    CO2 32 21 - 32 mmol/L    Anion gap 5 5 - 15 mmol/L    Glucose 59 (L) 65 - 100 mg/dL    BUN 42 (H) 6 - 20 MG/DL    Creatinine 4.32 (H) 0.55 - 1.02 MG/DL    BUN/Creatinine ratio 10 (L) 12 - 20      GFR est AA 12 (L) >60 ml/min/1.73m2    GFR est non-AA 10 (L) >60 ml/min/1.73m2    Calcium 8.7 8.5 - 10.1 MG/DL    Bilirubin, total 0.3 0.2 - 1.0 MG/DL    ALT (SGPT) 64 12 - 78 U/L    AST (SGOT) 56 (H) 15 - 37 U/L    Alk. phosphatase 75 45 - 117 U/L    Protein, total 7.0 6.4 - 8.2 g/dL    Albumin 3.1 (L) 3.5 - 5.0 g/dL    Globulin 3.9 2.0 - 4.0 g/dL    A-G Ratio 0.8 (L) 1.1 - 2.2     METABOLIC PANEL, BASIC    Collection Time: 07/12/20  7:53 PM   Result Value Ref Range    Sodium 140 136 - 145 mmol/L    Potassium 3.7 3.5 - 5.1 mmol/L    Chloride 105 97 - 108 mmol/L    CO2 31 21 - 32 mmol/L    Anion gap 4 (L) 5 - 15 mmol/L    Glucose 50 (L) 65 - 100 mg/dL    BUN 40 (H) 6 - 20 MG/DL    Creatinine 3.87 (H) 0.55 - 1.02 MG/DL    BUN/Creatinine ratio 10 (L) 12 - 20      GFR est AA 14 (L) >60 ml/min/1.73m2    GFR est non-AA 11 (L) >60 ml/min/1.73m2    Calcium 8.4 (L) 8.5 - 10.1 MG/DL       Radiologic Studies -   No orders to display     CT Results  (Last 48 hours)    None        CXR Results  (Last 48 hours)    None            Medical Decision Making   I am the first provider for this patient. I reviewed the vital signs, available nursing notes, past medical history, past surgical history, family history and social history. Vital Signs-Reviewed the patient's vital signs.   Patient Vitals for the past 24 hrs:   Temp Pulse Resp BP SpO2   07/12/20 2100 98 °F (36.7 °C) (!) 58 18 186/74 100 %   07/12/20 2045    198/69 100 %   07/12/20 2030    (!) 201/77 100 %   07/12/20 2015    (!) 201/71 100 %   07/12/20 2000    194/63 100 %   07/12/20 1945    198/71 100 % 07/12/20 1930    189/60 99 %   07/12/20 1915    179/66 100 %   07/12/20 1909 97.6 °F (36.4 °C)       07/12/20 1900    164/60 99 %   07/12/20 1845    161/61 100 %   07/12/20 1831  64 14 194/66 100 %   07/12/20 1830    194/66 100 %   07/12/20 1815    (!) 180/155 100 %   07/12/20 1542 98.4 °F (36.9 °C) (!) 58 18 152/64 100 %       Pulse Oximetry Analysis -100 % on room air    Cardiac Monitor:   Rate: 58 bpm  Rhythm: Normal Sinus Rhythm        Records Reviewed: Nursing Notes and Old Medical Records    Differential Diagnosis:        Provider Notes (Medical Decision Making):   64-year-old female presenting with likely vitreous hemorrhage and mild TARUN. Feeling better after IV fluids will follow-up closely with ophthalmology as outpatient. Additionally, she also follow-up with primary care doctor and/or nephrology for follow-up of her acute kidney injury. ED Course:     Initial assessment performed. The patients presenting problems have been discussed, and they are in agreement with the care plan formulated and outlined with them. I have encouraged them to ask questions as they arise throughout their visit. Critical Care Time:     None    Disposition:  10:02 AM  Guerita Kellogg's  results have been reviewed with her. She has been counseled regarding her diagnosis. She verbally conveys understanding and agreement of the signs, symptoms, diagnosis, treatment and prognosis and additionally agrees to follow up as recommended with Dr. Zohreh Kim MD in 24 - 48 hours. She also agrees with the care-plan and conveys that all of her questions have been answered.   I have also put together some discharge instructions for her that include: 1) educational information regarding their diagnosis, 2) how to care for their diagnosis at home, as well a 3) list of reasons why they would want to return to the ED prior to their follow-up appointment, should their condition change. PLAN:  1. Current Discharge Medication List        2. Follow-up Information     Follow up With Specialties Details Why Ariadne Ricci MD Nephrology In 3 days For a follow-up evaluation. THIS IS THE NEPHROLOGIST YOU WOULD LIKE YOU TO SEE IN THE AREA. [address] [facility] [address]  [address] [phone]      Tavares Moran MD Ophthalmology In 3 days For a follow-up evaluation. YOU WILL NEED TO SEE YOUR EYE DOCTOR FOR YOUR POSSIBLE VITREOUS HEMORRHAGE. [address]  [phone]          Return to ED if worse     Diagnosis     Clinical Impression:   1. Acute renal failure, unspecified acute renal failure type ([address]    2.  Vitreous hemorrhage of right eye ([address]

## 2020-08-21 ENCOUNTER — APPOINTMENT (OUTPATIENT)
Dept: ULTRASOUND IMAGING | Age: 80
End: 2020-08-21
Attending: EMERGENCY MEDICINE
Payer: MEDICARE

## 2020-08-21 ENCOUNTER — HOSPITAL ENCOUNTER (EMERGENCY)
Age: 80
Discharge: HOME | End: 2020-08-21
Attending: STUDENT IN AN ORGANIZED HEALTH CARE EDUCATION/TRAINING PROGRAM | Admitting: STUDENT IN AN ORGANIZED HEALTH CARE EDUCATION/TRAINING PROGRAM
Payer: MEDICARE

## 2020-08-21 ENCOUNTER — APPOINTMENT (OUTPATIENT)
Dept: ULTRASOUND IMAGING | Age: 80
End: 2020-08-21
Attending: STUDENT IN AN ORGANIZED HEALTH CARE EDUCATION/TRAINING PROGRAM
Payer: MEDICARE

## 2020-08-21 VITALS
SYSTOLIC BLOOD PRESSURE: 176 MMHG | DIASTOLIC BLOOD PRESSURE: 87 MMHG | WEIGHT: 212 LBS | RESPIRATION RATE: 16 BRPM | BODY MASS INDEX: 35.32 KG/M2 | TEMPERATURE: 99.4 F | HEART RATE: 81 BPM | HEIGHT: 65 IN | OXYGEN SATURATION: 100 %

## 2020-08-21 DIAGNOSIS — N18.9 CHRONIC KIDNEY DISEASE, UNSPECIFIED CKD STAGE: ICD-10-CM

## 2020-08-21 DIAGNOSIS — M25.562 ARTHRALGIA OF LEFT LOWER LEG: Primary | ICD-10-CM

## 2020-08-21 LAB
ALBUMIN SERPL-MCNC: 2.6 G/DL (ref 3.5–5)
ALBUMIN/GLOB SERPL: 0.5 {RATIO} (ref 1.1–2.2)
ALP SERPL-CCNC: 82 U/L (ref 45–117)
ALT SERPL-CCNC: 32 U/L (ref 12–78)
ANION GAP SERPL CALC-SCNC: 1 MMOL/L (ref 5–15)
APPEARANCE UR: ABNORMAL
AST SERPL-CCNC: 47 U/L (ref 15–37)
BACTERIA URNS QL MICRO: ABNORMAL /HPF
BASOPHILS # BLD: 0 K/UL (ref 0–0.1)
BASOPHILS NFR BLD: 0 % (ref 0–1)
BILIRUB SERPL-MCNC: 0.3 MG/DL (ref 0.2–1)
BILIRUB UR QL: NEGATIVE
BUN SERPL-MCNC: 39 MG/DL (ref 6–20)
BUN/CREAT SERPL: 8 (ref 12–20)
CALCIUM SERPL-MCNC: 8 MG/DL (ref 8.5–10.1)
CHLORIDE SERPL-SCNC: 102 MMOL/L (ref 97–108)
CO2 SERPL-SCNC: 35 MMOL/L (ref 21–32)
COLOR UR: ABNORMAL
CREAT SERPL-MCNC: 4.67 MG/DL (ref 0.55–1.02)
DIFFERENTIAL METHOD BLD: ABNORMAL
EOSINOPHIL # BLD: 0.2 K/UL (ref 0–0.4)
EOSINOPHIL NFR BLD: 2 % (ref 0–7)
EPITH CASTS URNS QL MICRO: ABNORMAL /LPF
ERYTHROCYTE [DISTWIDTH] IN BLOOD BY AUTOMATED COUNT: 15.7 % (ref 11.5–14.5)
GLOBULIN SER CALC-MCNC: 5 G/DL (ref 2–4)
GLUCOSE BLD STRIP.AUTO-MCNC: 53 MG/DL (ref 65–100)
GLUCOSE BLD STRIP.AUTO-MCNC: 55 MG/DL (ref 65–100)
GLUCOSE BLD STRIP.AUTO-MCNC: 92 MG/DL (ref 65–100)
GLUCOSE SERPL-MCNC: 58 MG/DL (ref 65–100)
GLUCOSE UR STRIP.AUTO-MCNC: NEGATIVE MG/DL
GRAN CASTS URNS QL MICRO: ABNORMAL /LPF
HCT VFR BLD AUTO: 30.7 % (ref 35–47)
HGB BLD-MCNC: 10.1 G/DL (ref 11.5–16)
HGB UR QL STRIP: ABNORMAL
IMM GRANULOCYTES # BLD AUTO: 0.1 K/UL (ref 0–0.04)
IMM GRANULOCYTES NFR BLD AUTO: 1 % (ref 0–0.5)
KETONES UR QL STRIP.AUTO: NEGATIVE MG/DL
LEUKOCYTE ESTERASE UR QL STRIP.AUTO: ABNORMAL
LYMPHOCYTES # BLD: 1.6 K/UL (ref 0.8–3.5)
LYMPHOCYTES NFR BLD: 19 % (ref 12–49)
MAGNESIUM SERPL-MCNC: 2.1 MG/DL (ref 1.6–2.4)
MCH RBC QN AUTO: 28.4 PG (ref 26–34)
MCHC RBC AUTO-ENTMCNC: 32.9 G/DL (ref 30–36.5)
MCV RBC AUTO: 86.2 FL (ref 80–99)
MONOCYTES # BLD: 1.2 K/UL (ref 0–1)
MONOCYTES NFR BLD: 14 % (ref 5–13)
NEUTS SEG # BLD: 5.3 K/UL (ref 1.8–8)
NEUTS SEG NFR BLD: 64 % (ref 32–75)
NITRITE UR QL STRIP.AUTO: NEGATIVE
NRBC # BLD: 0 K/UL (ref 0–0.01)
NRBC BLD-RTO: 0 PER 100 WBC
OTHER,OTHU: ABNORMAL
PH UR STRIP: 6.5 [PH] (ref 5–8)
PLATELET # BLD AUTO: 238 K/UL (ref 150–400)
PMV BLD AUTO: 10.1 FL (ref 8.9–12.9)
POTASSIUM SERPL-SCNC: 3.7 MMOL/L (ref 3.5–5.1)
PROT SERPL-MCNC: 7.6 G/DL (ref 6.4–8.2)
PROT UR STRIP-MCNC: 300 MG/DL
RBC # BLD AUTO: 3.56 M/UL (ref 3.8–5.2)
RBC #/AREA URNS HPF: ABNORMAL /HPF (ref 0–5)
SERVICE CMNT-IMP: ABNORMAL
SERVICE CMNT-IMP: ABNORMAL
SERVICE CMNT-IMP: NORMAL
SODIUM SERPL-SCNC: 138 MMOL/L (ref 136–145)
SP GR UR REFRACTOMETRY: 1.01 (ref 1–1.03)
UROBILINOGEN UR QL STRIP.AUTO: 0.2 EU/DL (ref 0.2–1)
WBC # BLD AUTO: 8.3 K/UL (ref 3.6–11)
WBC URNS QL MICRO: ABNORMAL /HPF (ref 0–4)

## 2020-08-21 PROCEDURE — 80053 COMPREHEN METABOLIC PANEL: CPT

## 2020-08-21 PROCEDURE — 36415 COLL VENOUS BLD VENIPUNCTURE: CPT

## 2020-08-21 PROCEDURE — 82962 GLUCOSE BLOOD TEST: CPT

## 2020-08-21 PROCEDURE — 83735 ASSAY OF MAGNESIUM: CPT

## 2020-08-21 PROCEDURE — 81001 URINALYSIS AUTO W/SCOPE: CPT

## 2020-08-21 PROCEDURE — 93971 EXTREMITY STUDY: CPT

## 2020-08-21 PROCEDURE — 99284 EMERGENCY DEPT VISIT MOD MDM: CPT

## 2020-08-21 PROCEDURE — 85025 COMPLETE CBC W/AUTO DIFF WBC: CPT

## 2020-08-21 NOTE — ED PROVIDER NOTES
EMERGENCY DEPARTMENT HISTORY AND PHYSICAL EXAM      Date: 8/21/2020  Patient Name: Dieudonne Cerda    History of Presenting Illness     Chief Complaint   Patient presents with    Leg Pain     c/o left leg pain x one week, reports she can not bear weight; denies injury         HPI: Dieudonne Cerda, 79 y.o. female presents to the ED with cc of left leg pain. She states that it feels like a cramp in the back of her left thigh that has been there for the past week. No history of trauma. No chest pain or shortness of breath, no fevers or redness. This has never happened before. She states that it feels like there is also a tightness in the muscle there. There are no other complaints, changes, or physical findings at this time. PCP: Sheldon Weston MD    No current facility-administered medications on file prior to encounter. Current Outpatient Medications on File Prior to Encounter   Medication Sig Dispense Refill    gabapentin (NEURONTIN) 300 mg capsule Take 2 Caps by mouth three (3) times daily. Max Daily Amount: 1,800 mg. 540 Cap 1    insulin nph-regular human rec (NovoLIN 70-30 FlexPen U-100) 100 unit/mL (70-30) inpn Inject 45 units before breakfast and none before dinner. Max 100 units per day--pt will pay cash--Dose change 6/10/20--updated med list--did not send prescription to the pharmacy 30 mL 11    furosemide (LASIX) 80 mg tablet Take 80 mg by mouth daily.  SYNTHROID 175 mcg tablet Take 1 Tab by mouth Daily (before breakfast). Take 1 Tab by mouth Daily (before breakfast). BRAND MEDICALLY NECESSARY--Delete 150 mcg dose from profile 90 Tab 3    Lancing Device with Lancets kit Use as directed to test 3 times daily 1 Kit 0    ONETOUCH ULTRA BLUE TEST STRIP strip TEST 3 TIMES DAILY 300 Strip 3    ONETOUCH ULTRA2 monitoring kit Test 3 times daily. DX E11.65 1 Kit 0    atorvastatin (LIPITOR) 80 mg tablet Take 80 mg by mouth daily.       ferrous sulfate (IRON) 325 mg (65 mg iron) tablet Take  by mouth daily.  LOR PEN NEEDLE 32 gauge x 5/32\" ndle Use as directed 5 times daily 500 Pen Needle 3    PYRIDOXINE HCL, VITAMIN B6, (VITAMIN B-6 PO) Take  by mouth daily.  allopurinol (ZYLOPRIM) 100 mg tablet Take  by mouth daily.  multivitamins-minerals-lutein (CENTRUM SILVER) Tab Take  by mouth daily. Past History     Past Medical History:  Past Medical History:   Diagnosis Date    Arthritis     Cataract     bilateral    Chronic kidney disease     Dr. [facility] Chronic pain     lower back    CKD stage 3 due to type 2 diabetes mellitus ([facility]     Depression     Diabetes ([facility] [facility] age 49    Type II    Follicular thyroid cancer ([facility] [facility] 08/2014    Foot ulcer ([facility]     Gallbladder polyp 8/14/2013    Hypercholesterolemia     Hypertension     Ill-defined condition     states 'polyp' in gal bladder    Long term current use of anticoagulant therapy     Obesity     Right pontine stroke ([facility] 1/17/2017    TIA (transient ischemic attack) 6/6/2014       Past Surgical History:  Past Surgical History:   Procedure Laterality Date    ABDOMEN SURGERY PROC UNLISTED  2006    stomach    BREAST SURGERY PROCEDURE UNLISTED  2009, 2006    breast bx-vince    COLONOSCOPY N/A 12/17/2019    COLONOSCOPY performed by Flip Black MD at [place] [address]  12/17/2019         COLORECTAL SCRN; HI RISK IND  5/30/2014         HX BREAST BIOPSY Right [id number]    2 biopies on the right. Benign (per patient)    HX BREAST BIOPSY Left 2015    Benign (per patient)    HX CATARACT REMOVAL Left     HX GYN  1970's    partial hysterectomy    HX HERNIA REPAIR  2009    hiatal    HX HYSTERECTOMY      HX ORTHOPAEDIC Bilateral 1988    bunion    HX THYROIDECTOMY  2014    Dr. Jennifer Jimenez       Family History:  Family History   Problem Relation Age of Onset    Heart Disease Mother     Hypertension Mother     Diabetes Mother     Stroke Mother     Cancer Father colon    Heart Disease Sister     Diabetes Sister     Heart Attack Sister     Hypertension Sister     Lung Disease Brother     Hypertension Brother     Lung Disease Brother     Anesth Problems Neg Hx        Social History:  Social History     Tobacco Use    Smoking status: Never Smoker    Smokeless tobacco: Never Used   Substance Use Topics    Alcohol use: No    Drug use: No       Allergies: Allergies   Allergen Reactions    Amlodipine Swelling    Clindamycin Rash    Nifedipine Swelling    Sulfa (Sulfonamide Antibiotics) Rash         Review of Systems   no fever  no eye pain  no ear pain  no shortness of breath  no chest pain  no abdominal pain  no dysuria  Reports leg pain  no rash  no lymphadenopathy  no weight loss    Physical Exam   Physical Exam  Constitutional:       Appearance: Normal appearance. HENT:      Head: Normocephalic and atraumatic. Nose: Nose normal.      Mouth/Throat:      Mouth: Mucous membranes are moist.   Eyes:      Pupils: Pupils are equal, round, and reactive to light. Neck:      Musculoskeletal: Neck supple. Cardiovascular:      Rate and Rhythm: Normal rate and regular rhythm. Pulmonary:      Effort: Pulmonary effort is normal.      Breath sounds: Normal breath sounds. Abdominal:      General: Abdomen is flat. Tenderness: There is no abdominal tenderness. Musculoskeletal: Normal range of motion. Comments: There is some firmness to the left posterior leg proximal to the popliteal fossa, no induration, redness or warmth. No fluctuance. Slight tenderness. No overlying skin changes. No redness or swelling over the joint, good DP pulses, sensation to light touch intact diffusely, good range of motion at all joints. Skin:     General: Skin is warm. Neurological:      General: No focal deficit present. Mental Status: She is alert.    Psychiatric:         Mood and Affect: Mood normal.         Diagnostic Study Results     Labs -     Recent Results (from the past 24 hour(s))   CBC WITH AUTOMATED DIFF    Collection Time: 08/21/20  4:26 PM   Result Value Ref Range    WBC 8.3 3.6 - 11.0 K/uL    RBC 3.56 (L) 3.80 - 5.20 M/uL    HGB 10.1 (L) 11.5 - 16.0 g/dL    HCT 30.7 (L) 35.0 - 47.0 %    MCV 86.2 80.0 - 99.0 FL    MCH 28.4 26.0 - 34.0 PG    MCHC 32.9 30.0 - 36.5 g/dL    RDW 15.7 (H) 11.5 - 14.5 %    PLATELET 386 984 - 654 K/uL    MPV 10.1 8.9 - 12.9 FL    NRBC 0.0 0  WBC    ABSOLUTE NRBC 0.00 0.00 - 0.01 K/uL    NEUTROPHILS 64 32 - 75 %    LYMPHOCYTES 19 12 - 49 %    MONOCYTES 14 (H) 5 - 13 %    EOSINOPHILS 2 0 - 7 %    BASOPHILS 0 0 - 1 %    IMMATURE GRANULOCYTES 1 (H) 0.0 - 0.5 %    ABS. NEUTROPHILS 5.3 1.8 - 8.0 K/UL    ABS. LYMPHOCYTES 1.6 0.8 - 3.5 K/UL    ABS. MONOCYTES 1.2 (H) 0.0 - 1.0 K/UL    ABS. EOSINOPHILS 0.2 0.0 - 0.4 K/UL    ABS. BASOPHILS 0.0 0.0 - 0.1 K/UL    ABS. IMM. GRANS. 0.1 (H) 0.00 - 0.04 K/UL    DF AUTOMATED     METABOLIC PANEL, COMPREHENSIVE    Collection Time: 08/21/20  4:26 PM   Result Value Ref Range    Sodium 138 136 - 145 mmol/L    Potassium 3.7 3.5 - 5.1 mmol/L    Chloride 102 97 - 108 mmol/L    CO2 35 (H) 21 - 32 mmol/L    Anion gap 1 (L) 5 - 15 mmol/L    Glucose 58 (L) 65 - 100 mg/dL    BUN 39 (H) 6 - 20 MG/DL    Creatinine 4.67 (H) 0.55 - 1.02 MG/DL    BUN/Creatinine ratio 8 (L) 12 - 20      GFR est AA 11 (L) >60 ml/min/1.73m2    GFR est non-AA 9 (L) >60 ml/min/1.73m2    Calcium 8.0 (L) 8.5 - 10.1 MG/DL    Bilirubin, total 0.3 0.2 - 1.0 MG/DL    ALT (SGPT) 32 12 - 78 U/L    AST (SGOT) 47 (H) 15 - 37 U/L    Alk.  phosphatase 82 45 - 117 U/L    Protein, total 7.6 6.4 - 8.2 g/dL    Albumin 2.6 (L) 3.5 - 5.0 g/dL    Globulin 5.0 (H) 2.0 - 4.0 g/dL    A-G Ratio 0.5 (L) 1.1 - 2.2     MAGNESIUM    Collection Time: 08/21/20  4:26 PM   Result Value Ref Range    Magnesium 2.1 1.6 - 2.4 mg/dL   URINALYSIS W/ RFLX MICROSCOPIC    Collection Time: 08/21/20  4:26 PM   Result Value Ref Range    Color YELLOW/STRAW      Appearance CLOUDY (A) CLEAR      Specific gravity 1.012 1.003 - 1.030      pH (UA) 6.5 5.0 - 8.0      Protein 300 (A) NEG mg/dL    Glucose Negative NEG mg/dL    Ketone Negative NEG mg/dL    Bilirubin Negative NEG      Blood MODERATE (A) NEG      Urobilinogen 0.2 0.2 - 1.0 EU/dL    Nitrites Negative NEG      Leukocyte Esterase SMALL (A) NEG      WBC  0 - 4 /hpf    RBC 0-5 0 - 5 /hpf    Epithelial cells FEW FEW /lpf    Bacteria 4+ (A) NEG /hpf    Granular cast 0-2 (A) NEG /lpf    Other: Renal Epithelial cells Present     GLUCOSE, POC    Collection Time: 08/21/20  6:17 PM   Result Value Ref Range    Glucose (POC) 53 (L) 65 - 100 mg/dL    Performed by [employer]    GLUCOSE, POC    Collection Time: 08/21/20  6:18 PM   Result Value Ref Range    Glucose (POC) 55 (L) 65 - 100 mg/dL    Performed by [employer]    GLUCOSE, POC    Collection Time: 08/21/20  6:45 PM   Result Value Ref Range    Glucose (POC) 92 65 - 100 mg/dL    Performed by [employer]        Radiologic Studies -   No orders to display     CT Results  (Last 48 hours)    None        CXR Results  (Last 48 hours)    None            Medical Decision Making   I am the first provider for this patient. I reviewed the vital signs, available nursing notes, past medical history, past surgical history, family history and social history. Vital Signs-Reviewed the patient's vital signs. Patient Vitals for the past 24 hrs:   Temp Pulse Resp BP SpO2   08/21/20 1704 99.4 °F (37.4 °C) 81 16 176/87 100 %   08/21/20 1329 99.1 °F (37.3 °C) 79 16 (!) 191/94 100 %         Provider Notes (Medical Decision Making):   81-year-old presenting with left leg pain. She has an area of firmness in her posterior left thigh region, and feels a bit like firm edema. No redness, warmth, fevers, not consistent with an infection. Duplex of it is done, negative for blood clot. She is neurovascularly intact.   CBC is negative for leukocytosis, UA negative for UTI BMP shows her known chronic kidney disease, slightly worse than her recent baseline, she is counseled at length on adequate hydration and follow-up. Her glucose is slightly low at 58, this has been in the 50s on multiple prior occasions, she is given juice and this improved to the 90s, she took her diabetes medications this morning and has not eaten since then. Likely etiology for this. ED Course:     Initial assessment performed. The patients presenting problems have been discussed, and they are in agreement with the care plan formulated and outlined with them. I have encouraged them to ask questions as they arise throughout their visit. Patient is counseled on supportive care and return precautions. Will return to the ED for any worsening pain, redness. Will followup with primary care doctor within 2-3 days. Critical Care Time:         Disposition:  Home     PLAN:  1. Discharge Medication List as of 8/21/2020  7:00 PM        2.    Follow-up Information     Follow up With Specialties Details Why Contact Info    Lashawn Sharpe MD Internal Medicine Call in 1 day  [address] [facility] [phone]      [address] DEPT Emergency Medicine  As needed, If symptoms worsen [address] [place] [address] Drive  [address] [facility]  [phone]        Return to ED if worse     Diagnosis     Clinical Impression: acute left Leg pain

## 2020-08-26 ENCOUNTER — OFFICE VISIT (OUTPATIENT)
Dept: SURGERY | Age: 80
End: 2020-08-26
Payer: MEDICARE

## 2020-08-26 VITALS
RESPIRATION RATE: 18 BRPM | HEIGHT: 65 IN | TEMPERATURE: 95.5 F | BODY MASS INDEX: 31.62 KG/M2 | DIASTOLIC BLOOD PRESSURE: 82 MMHG | SYSTOLIC BLOOD PRESSURE: 172 MMHG | WEIGHT: 189.8 LBS | HEART RATE: 103 BPM | OXYGEN SATURATION: 98 %

## 2020-08-26 DIAGNOSIS — N18.5 CKD (CHRONIC KIDNEY DISEASE) STAGE 5, GFR LESS THAN 15 ML/MIN (HCC): Primary | ICD-10-CM

## 2020-08-26 PROCEDURE — G0444 DEPRESSION SCREEN ANNUAL: HCPCS | Performed by: SURGERY

## 2020-08-26 PROCEDURE — G8510 SCR DEP NEG, NO PLAN REQD: HCPCS | Performed by: SURGERY

## 2020-08-26 PROCEDURE — G8536 NO DOC ELDER MAL SCRN: HCPCS | Performed by: SURGERY

## 2020-08-26 PROCEDURE — G8400 PT W/DXA NO RESULTS DOC: HCPCS | Performed by: SURGERY

## 2020-08-26 PROCEDURE — G9231 DOC ESRD DIA TRANS PREG: HCPCS | Performed by: SURGERY

## 2020-08-26 PROCEDURE — 93971 EXTREMITY STUDY: CPT | Performed by: SURGERY

## 2020-08-26 PROCEDURE — 99213 OFFICE O/P EST LOW 20 MIN: CPT | Performed by: SURGERY

## 2020-08-26 PROCEDURE — G8427 DOCREV CUR MEDS BY ELIG CLIN: HCPCS | Performed by: SURGERY

## 2020-08-26 PROCEDURE — 1090F PRES/ABSN URINE INCON ASSESS: CPT | Performed by: SURGERY

## 2020-08-26 PROCEDURE — G8419 CALC BMI OUT NRM PARAM NOF/U: HCPCS | Performed by: SURGERY

## 2020-08-26 PROCEDURE — 1101F PT FALLS ASSESS-DOCD LE1/YR: CPT | Performed by: SURGERY

## 2020-08-26 NOTE — PROGRESS NOTES
Surgery History and Physical    Subjective: Chet Roman is a 79 y.o. female who is referred by Dr. Sondra Brown for CKD 5. The patient is not presently on dialysis. The patient is right-handed. She has no history of pacemaker or defibrillator. She has no history of axillary node surgery. The patient has history of diabetes mellitus. She does not have history of MI or anginal chest pain or coronary intervention. She does have some shortness of breath with exertion. She is not short of breath at rest or while lying down. Past Medical History:   Diagnosis Date    Arthritis     Cataract     bilateral    Chronic kidney disease     [facility] Chronic pain     lower back    CKD stage 3 due to type 2 diabetes mellitus ([address]     Depression     Diabetes ([facility] age 49    Type II    Follicular thyroid cancer ([facility] 08/2014    Foot ulcer ([facility]     Gallbladder polyp 8/14/2013    Hypercholesterolemia     Hypertension     Ill-defined condition     states 'polyp' in gal bladder    Long term current use of anticoagulant therapy     Obesity     Right pontine stroke ([facility] 1/17/2017    TIA (transient ischemic attack) 6/6/2014     Past Surgical History:   Procedure Laterality Date    ABDOMEN SURGERY PROC UNLISTED  2006    stomach    BREAST SURGERY PROCEDURE UNLISTED  2009, 2006    breast bx-vince    COLONOSCOPY N/A 12/17/2019    COLONOSCOPY performed by Tristan Jarquin MD at [address] [place] [address]  12/17/2019         COLORECTAL SCRN; HI RISK IND  5/30/2014         HX BREAST BIOPSY Right [id number]    2 biopies on the right. Benign (per patient)    HX BREAST BIOPSY Left 2015    Benign (per patient)    HX CATARACT REMOVAL Left     HX GYN  1970's    partial hysterectomy    HX HERNIA REPAIR  2009    hiatal    HX HYSTERECTOMY      HX ORTHOPAEDIC Bilateral 1988    bunion    HX THYROIDECTOMY  2014    Dr. Lino Reyes      Family History   Problem Relation Age of Onset    Heart Disease Mother     Hypertension Mother     Diabetes Mother     Stroke Mother     Cancer Father         colon    Heart Disease Sister     Diabetes Sister     Heart Attack Sister     Hypertension Sister     Lung Disease Brother     Hypertension Brother     Lung Disease Brother     Anesth Problems Neg Hx      Social History     Tobacco Use    Smoking status: Never Smoker    Smokeless tobacco: Never Used   Substance Use Topics    Alcohol use: No      Prior to Admission medications    Medication Sig Start Date End Date Taking? Authorizing Provider   gabapentin (NEURONTIN) 300 mg capsule Take 2 Caps by mouth three (3) times daily. Max Daily Amount: 1,800 mg. 7/6/20  Yes Francie Salinas MD   insulin nph-regular human rec (NovoLIN 70-30 FlexPen U-100) 100 unit/mL (70-30) inpn Inject 45 units before breakfast and none before dinner. Max 100 units per day--pt will pay cash--Dose change 6/10/20--updated med list--did not send prescription to the pharmacy 6/10/20  Yes Francie Salinas MD   furosemide (LASIX) 80 mg tablet Take 80 mg by mouth daily. 4/2/20  Yes Provider, Historical   SYNTHROID 175 mcg tablet Take 1 Tab by mouth Daily (before breakfast). Take 1 Tab by mouth Daily (before breakfast). BRAND MEDICALLY NECESSARY--Delete 150 mcg dose from profile 2/6/20  Yes Francie Salinas MD   Lancing Device with Lancets kit Use as directed to test 3 times daily 9/11/19  Yes Francie Salinas MD   [facility] ULTRA BLUE TEST STRIP strip TEST 3 TIMES DAILY 9/10/19  Yes Francie Salinas MD   Naz Bounds monitoring kit Test 3 times daily. DX E11.65 4/22/19  Yes Francie Salinas MD   atorvastatin (LIPITOR) 80 mg tablet Take 80 mg by mouth daily. Yes Provider, Historical   ferrous sulfate (IRON) 325 mg (65 mg iron) tablet Take  by mouth daily.    Yes Provider, Historical   LOR PEN NEEDLE 32 gauge x 5/32\" ndle Use as directed 5 times daily 1/15/18  Yes Francie Salinas MD PYRIDOXINE HCL, VITAMIN B6, (VITAMIN B-6 PO) Take  by mouth daily. Yes Provider, Historical   allopurinol (ZYLOPRIM) 100 mg tablet Take  by mouth daily. Yes Provider, Historical   multivitamins-minerals-lutein (CENTRUM SILVER) Tab Take  by mouth daily. Yes Provider, Historical      Allergies   Allergen Reactions    Amlodipine Swelling    Clindamycin Rash    Nifedipine Swelling    Sulfa (Sulfonamide Antibiotics) Rash       Review of Systems:  Pertinent items are noted in the History of Present Illness. Objective:          Physical Exam:  On examination the patient is an elderly woman no acute distress. Vital signs blood pressure 172/82 pulse 103 respirations 18 temperature 95.5 O2 saturation 98% room air weight 189 pounds height 5 feet 5 inches    Head and neck examination showed no jaundice mass or thyromegaly. Lungs are clear bilaterally without rales rhonchi or wheezes. Heart is regular without murmur gallop or rub. The patient is alert and oriented. She moves all extremities. Facial movement is symmetrical.  Speech is normal.    Examination of the right upper extremity revealed 2+ radial and ulnar pulses. Doppler signals at the right radial and ulnar arteries are normal.  Right palmar arch is intact to Doppler exam.    Examination of the left upper extremity revealed 2+ radial and ulnar pulses. Radial and ulnar Doppler signals are normal on the left. Palmar arch is intact to Doppler exam on the left. Duplex ultrasound examination was carried out of the veins in the left upper extremity for dialysis access vein mapping. The cephalic vein in the left forearm becomes the median antebrachial vein in the mid forearm. However this vein since most flow into a laterally oriented cephalic vein. A very small median antebrachial vein continues to the antecubital level where it continues as a very small medial strike as a very small basilic tributary.     There is a large deep tributary at 4.1 mm diameter which becomes the cephalic vein in the left forearm. Cephalic vein in the distal left forearm is 4.5 mm diameter. In the mid and proximal left upper arm diameters 4.1 mm. The basilic vein on the left forearm from tributaries at the elbow level. The distal left basilic vein trunk is 4.1 mm diameter. It joins the brachial vein proximally and most proximally of the combined basilic and brachial vein is 6.8 mm in diameter. Assessment:     CKD 5 not currently requiring dialysis    Plan:     Creation of arteriovenous fistula left upper extremity under general anesthesia. Because of the patient's frailty I would plan to keep her overnight under observation. I reviewed with the patient the purpose, alternatives, risks and benefits of creation of arterio-venous fistula. Risks include bleeding, infection, failure of fistula, injury to structures in arm such as vessels or nerves, ischemia of the hand, swelling of the arm, risks of anesthesia, etc.  The patient understands and wishes to proceed. The patient was counseled at length about the risks of eladio Covid-19 during their perioperative period and any recovery window from their procedure. The patient was made aware that eladio Covid-19  may worsen their prognosis for recovering from their procedure and lend to a higher morbidity and/or mortality risk. All material risks, benefits, and reasonable alternatives including postponing the procedure were discussed. The patient does  wish to proceed with the procedure at this time.     Scott Johnson MD        Copy to Dr Sondra Brown

## 2020-08-26 NOTE — PROGRESS NOTES
Chief Complaint   Patient presents with    Advice Only     seen as requested by Dr. Mary Cormier, for evaluation for AVF, patient of Dr. Jelena Diaz     1. Have you been to the ER, urgent care clinic since your last visit? Hospitalized since your last visit? No    2. Have you seen or consulted any other health care providers outside of the [address] [place] [address] since your last visit? Include any pap smears or colon screening.  No

## 2020-08-26 NOTE — PROGRESS NOTES
Surgery Instruction Sheet    You have been scheduled for surgery on Tuesday 09/15/2020 at 11:30am am at [facility]. Please report to the Surgery Center at 9:30 am this is approximately 2 hours prior to your surgery time. The Surgery Center is located on the [facility] [address] side of the hospital, just next to the Emergency Room. Reserved parking is available and  parking if lot is full. You will need to have a Pre-op Visit prior to your surgery. Report to the Surgery Center on Tuesday 09/08/2020 at 1:00 pm.  Bring a list of medications and your insurance cards with you. You may eat/drink prior to this visit. Call your physician immediately if you notice a change in your health between the time you saw your physician and the day of surgery. If you take a blood thinner, please let us know. Call your ordering Doctor to make sure you can stop taking it prior to your surgery. STOP YOUR ASPIRIN 5 DAYS PRIOR TO SURGERY. DO NOT TAKE  IBUPROFEN, ADVIL, MOTRIN, ALEVE, EXCEDRIN, BC POWDER, GOODIES, FISH OIL OR ANY MEDICATION CONTAINING ASPIRIN 5 DAYS PRIOR TO YOUR SURGERY. MAY TAKE TYLENOL. Eat a light dinner the evening before your surgery. DO NOT EAT OR DRINK ANYTHING AFTER MIDNIGHT THE NIGHT BEFORE YOUR SURGERY. This includes water, chewing gum, lifesavers, etc.  The Pre op nurse will check with you about any medication that you may need to take the morning of surgery. Shower with a new bar of anti-bacterial soap (Dial, Safeguard) or solution given to you by Pre-op, the night before surgery. Do not use lotion, powder, deodorant on the skin after showering.   Wear loose, comfortable clothing the day of surgery and bring a container to store your contacts, eyeglasses, dentures, hearing aid, etc.  Do not bring money, valuables, jewelry, etc. to the hospital.      If you are having outpatient surgery, someone must come with you the morning of surgery to drive you home. You can not drive for 24 hours after any anesthesia. Sometimes it is necessary to stay overnight and leave the next morning. This is still considered outpatient for most insurance deductibles. Someone will still need to drive you home. If you have questions or concerns, please feel free to call Dr Zoë Garrison at [phone]. If you need to cancel your surgery, please call as soon as possible.

## 2020-09-08 RX ORDER — CEPHALEXIN 250 MG/1
250 TABLET ORAL 2 TIMES DAILY
COMMUNITY
End: 2020-09-16

## 2020-09-08 RX ORDER — METOPROLOL SUCCINATE 50 MG/1
50 TABLET, EXTENDED RELEASE ORAL DAILY
COMMUNITY

## 2020-09-08 RX ORDER — CLONIDINE HYDROCHLORIDE 0.3 MG/1
0.3 TABLET ORAL 2 TIMES DAILY
Status: ON HOLD | COMMUNITY
End: 2020-10-06 | Stop reason: SDUPTHER

## 2020-09-08 NOTE — PERIOP NOTES
[facility]  Preoperative Instructions        Surgery Date Sept 15, 2020 Arrive at 0930    1. On the day of your surgery, please report to the Surgical Services Registration Desk and sign in at your designated time. The Surgery Center is located to the right of the Emergency Room. 2. You must have someone with you to drive you home. You should not drive a car for 24 hours following surgery. Please make arrangements for a friend or family member to stay with you for the first 24 hours after your surgery. 3. Do not have anything to eat or drink (including water, gum, mints, coffee, juice) after midnight ? 9/14/20? José Manuel Kee ? This may not apply to medications prescribed by your physician. ?(Please note below the special instructions with medications to take the morning of your procedure.)    4. We recommend you do not drink any alcoholic beverages for 24 hours before and after your surgery. 5. Contact your surgeons office for instructions on the following medications: non-steroidal anti-inflammatory drugs (i.e. Advil, Aleve), vitamins, and supplements. (Some surgeons will want you to stop these medications prior to surgery and others may allow you to take them)  **If you are currently taking Plavix, Coumadin, Aspirin and/or other blood-thinning agents, contact your surgeon for instructions. ** Your surgeon will partner with the physician prescribing these medications to determine if it is safe to stop or if you need to continue taking. Please do not stop taking these medications without instructions from your surgeon    6. Wear comfortable clothes. Wear glasses instead of contacts. Do not bring any money or jewelry. Please bring picture ID, insurance card, and any prearranged co-payment or hospital payment. Do not wear make-up, particularly mascara the morning of your surgery. Do not wear nail polish, particularly if you are having foot /hand surgery.   Wear your hair loose or down, no ponytails, buns, kimberli pins or clips. All body piercings must be removed. Please shower with antibacterial soap for three consecutive days before and on the morning of surgery, but do not apply any lotions, powders or deodorants after the shower on the day of surgery. Please use a fresh towels after each shower. Please sleep in clean clothes and change bed linens the night before surgery. Please do not shave for 48 hours prior to surgery. Shaving of the face is acceptable. 7. You should understand that if you do not follow these instructions your surgery may be cancelled. If your physical condition changes (I.e. fever, cold or flu) please contact your surgeon as soon as possible. 8. It is important that you be on time. If a situation occurs where you may be late, please call [phone] (OR Holding Area). 9. If you have any questions and or problems, please call [phone] (Pre-admission Testing). 10. Your surgery time may be subject to change. You will receive a phone call the evening prior if your time changes. 11.  If having outpatient surgery, you must have someone to drive you here, stay with you during the duration of your stay, and to drive you home at time of discharge. Special Instructions: Follow surgeon instructions  Covid test on Sept 11   Self quarantine from testing day to day    TAKE ALL MEDICATIONS DAY OF SURGERY EXCEPT:  70/30 insulin    I understand a pre-operative phone call will be made to verify my surgery time. In the event that I am not available, I give permission for a message to be left on my answering service and/or with another person?  yes         ___________________      __________   _________    (Signature of Patient)             (Witness)                (Date and Time)

## 2020-09-08 NOTE — PERIOP NOTES
Pt was scheduled for PAT visit today- did not show and after review of CC chart- there is a CBC and CMP on 8/21/20. EKG done on 2/10/20. Phone interview done. not on dialysis at present time. \"not sure when this will start\" Informed patient that Covid testing needed to be done on 9/11 and she will check and call us back \"Depending on son for transportation\". Chart is labeled MRSA history- called Dr Love Rooney office for new order sheet.

## 2020-09-09 ENCOUNTER — TELEPHONE (OUTPATIENT)
Dept: ENDOCRINOLOGY | Age: 80
End: 2020-09-09

## 2020-09-09 NOTE — TELEPHONE ENCOUNTER
Patient stated that she will be having surgery to put a fistula in her arm just in case she will need dialysis. She would like to know Dr. Barbara Schreiber opinion on it because she does not want the surgery. She also stated she was instructed to eat potatoes by Dr. Soni Vasquez to help with her sugar and she would like to know if she should continue eating them.

## 2020-09-09 NOTE — TELEPHONE ENCOUNTER
----- Message from Clinton Marshall sent at 2020 12:49 PM EDT -----  Regarding: MD Omer/ telephone  Patient's first and last name: Radha Cormier  : 1940  ID numbers:  [id number]    Caller's first and last name: pt       Reason for call: Surgery    Callback required yes/no and why: yes     Best contact number(s): [phone]    Details to clarify the request: Pt would like a call back to discuss concerns with upcoming surgery.

## 2020-09-10 NOTE — TELEPHONE ENCOUNTER
Please let her know that if her kidney doctor feels this surgery is necessary, then I agree that she should have this done, especially if her kidney function is getting worse and she will need dialysis in the future. If she does not want to go on dialysis, then she should discuss this with her kidney doctor and thus would not need the surgery. You can let her know it's fine to eat no more than a palmful of potatoes per day to help keep her sugar regulated.

## 2020-09-11 ENCOUNTER — HOSPITAL ENCOUNTER (OUTPATIENT)
Dept: PREADMISSION TESTING | Age: 80
Discharge: HOME | End: 2020-09-11
Payer: MEDICARE

## 2020-09-11 PROCEDURE — 87635 SARS-COV-2 COVID-19 AMP PRB: CPT

## 2020-09-12 LAB
HEALTH STATUS, XMCV2T: NORMAL
SARS-COV-2, COV2NT: NOT DETECTED
SOURCE, COVRS: NORMAL
SPECIMEN SOURCE, FCOV2M: NORMAL
SPECIMEN TYPE, XMCV1T: NORMAL

## 2020-09-15 ENCOUNTER — HOSPITAL ENCOUNTER (OUTPATIENT)
Age: 80
Setting detail: OBSERVATION
Discharge: HOME | End: 2020-09-16
Attending: SURGERY | Admitting: SURGERY
Payer: MEDICARE

## 2020-09-15 ENCOUNTER — ANESTHESIA (OUTPATIENT)
Dept: SURGERY | Age: 80
End: 2020-09-15
Payer: MEDICARE

## 2020-09-15 ENCOUNTER — ANESTHESIA EVENT (OUTPATIENT)
Dept: SURGERY | Age: 80
End: 2020-09-15
Payer: MEDICARE

## 2020-09-15 DIAGNOSIS — N18.5 CKD (CHRONIC KIDNEY DISEASE) STAGE 5, GFR LESS THAN 15 ML/MIN (HCC): ICD-10-CM

## 2020-09-15 DIAGNOSIS — L76.82 PAIN AT SURGICAL INCISION: Primary | ICD-10-CM

## 2020-09-15 LAB
ANION GAP BLD CALC-SCNC: 12 MMOL/L (ref 10–20)
ANION GAP BLD CALC-SCNC: 15 MMOL/L (ref 10–20)
BUN BLD-MCNC: 36 MG/DL (ref 9–20)
BUN BLD-MCNC: 54 MG/DL (ref 9–20)
CA-I BLD-MCNC: 1.02 MMOL/L (ref 1.12–1.32)
CA-I BLD-MCNC: 1.13 MMOL/L (ref 1.12–1.32)
CHLORIDE BLD-SCNC: 104 MMOL/L (ref 98–107)
CHLORIDE BLD-SCNC: 106 MMOL/L (ref 98–107)
CO2 BLD-SCNC: 26 MMOL/L (ref 21–32)
CO2 BLD-SCNC: 27 MMOL/L (ref 21–32)
CREAT BLD-MCNC: 4.2 MG/DL (ref 0.6–1.3)
CREAT BLD-MCNC: 4.3 MG/DL (ref 0.6–1.3)
EST. AVERAGE GLUCOSE BLD GHB EST-MCNC: 157 MG/DL
GLUCOSE BLD STRIP.AUTO-MCNC: 121 MG/DL (ref 65–100)
GLUCOSE BLD STRIP.AUTO-MCNC: 293 MG/DL (ref 65–100)
GLUCOSE BLD-MCNC: 178 MG/DL (ref 65–100)
GLUCOSE BLD-MCNC: 58 MG/DL (ref 65–100)
HBA1C MFR BLD: 7.1 % (ref 4–5.6)
HCT VFR BLD CALC: 24 % (ref 35–47)
HCT VFR BLD CALC: 31 % (ref 35–47)
POTASSIUM BLD-SCNC: 4.6 MMOL/L (ref 3.5–5.1)
POTASSIUM BLD-SCNC: 7.2 MMOL/L (ref 3.5–5.1)
POTASSIUM SERPL-SCNC: 4.6 MMOL/L (ref 3.5–5.1)
SERVICE CMNT-IMP: ABNORMAL
SODIUM BLD-SCNC: 136 MMOL/L (ref 136–145)
SODIUM BLD-SCNC: 138 MMOL/L (ref 136–145)

## 2020-09-15 PROCEDURE — 77030020863 HC CLMP VASC BULDG SCAN -A: Performed by: SURGERY

## 2020-09-15 PROCEDURE — 74011250636 HC RX REV CODE- 250/636: Performed by: SURGERY

## 2020-09-15 PROCEDURE — 77030002987 HC SUT PROL J&J -B: Performed by: SURGERY

## 2020-09-15 PROCEDURE — 99218 HC RM OBSERVATION: CPT

## 2020-09-15 PROCEDURE — 77030002986 HC SUT PROL J&J -A: Performed by: SURGERY

## 2020-09-15 PROCEDURE — 77030002916 HC SUT ETHLN J&J -A: Performed by: SURGERY

## 2020-09-15 PROCEDURE — 74011250636 HC RX REV CODE- 250/636: Performed by: NURSE ANESTHETIST, CERTIFIED REGISTERED

## 2020-09-15 PROCEDURE — 77030008684 HC TU ET CUF COVD -B: Performed by: ANESTHESIOLOGY

## 2020-09-15 PROCEDURE — 76010000131 HC OR TIME 2 TO 2.5 HR: Performed by: SURGERY

## 2020-09-15 PROCEDURE — 77030040361 HC SLV COMPR DVT MDII -B: Performed by: SURGERY

## 2020-09-15 PROCEDURE — 94760 N-INVAS EAR/PLS OXIMETRY 1: CPT

## 2020-09-15 PROCEDURE — 74011636637 HC RX REV CODE- 636/637: Performed by: SURGERY

## 2020-09-15 PROCEDURE — 77030026438 HC STYL ET INTUB CARD -A: Performed by: ANESTHESIOLOGY

## 2020-09-15 PROCEDURE — 36821 AV FUSION DIRECT ANY SITE: CPT | Performed by: SURGERY

## 2020-09-15 PROCEDURE — 36415 COLL VENOUS BLD VENIPUNCTURE: CPT

## 2020-09-15 PROCEDURE — 2709999900 HC NON-CHARGEABLE SUPPLY: Performed by: SURGERY

## 2020-09-15 PROCEDURE — 76210000006 HC OR PH I REC 0.5 TO 1 HR: Performed by: SURGERY

## 2020-09-15 PROCEDURE — 74011000250 HC RX REV CODE- 250: Performed by: SURGERY

## 2020-09-15 PROCEDURE — 77030013079 HC BLNKT BAIR HGGR 3M -A: Performed by: ANESTHESIOLOGY

## 2020-09-15 PROCEDURE — 74011250637 HC RX REV CODE- 250/637: Performed by: SURGERY

## 2020-09-15 PROCEDURE — 80047 BASIC METABLC PNL IONIZED CA: CPT

## 2020-09-15 PROCEDURE — 76060000035 HC ANESTHESIA 2 TO 2.5 HR: Performed by: SURGERY

## 2020-09-15 PROCEDURE — 74011250636 HC RX REV CODE- 250/636: Performed by: ANESTHESIOLOGY

## 2020-09-15 PROCEDURE — 77030038692 HC WND DEB SYS IRMX -B: Performed by: SURGERY

## 2020-09-15 PROCEDURE — 82962 GLUCOSE BLOOD TEST: CPT

## 2020-09-15 PROCEDURE — 74011000250 HC RX REV CODE- 250: Performed by: ANESTHESIOLOGY

## 2020-09-15 PROCEDURE — 74011000250 HC RX REV CODE- 250: Performed by: NURSE ANESTHETIST, CERTIFIED REGISTERED

## 2020-09-15 PROCEDURE — 77030002888 HC SUT CHRMC J&J -A: Performed by: SURGERY

## 2020-09-15 PROCEDURE — 77030002996 HC SUT SLK J&J -A: Performed by: SURGERY

## 2020-09-15 PROCEDURE — 84132 ASSAY OF SERUM POTASSIUM: CPT

## 2020-09-15 PROCEDURE — 83036 HEMOGLOBIN GLYCOSYLATED A1C: CPT

## 2020-09-15 PROCEDURE — 77030008462 HC STPLR SKN PROX J&J -A: Performed by: SURGERY

## 2020-09-15 PROCEDURE — 77030020255 HC SOL INJ LR 1000ML BG: Performed by: SURGERY

## 2020-09-15 RX ORDER — FUROSEMIDE 80 MG/1
80 TABLET ORAL DAILY
Status: DISCONTINUED | OUTPATIENT
Start: 2020-09-16 | End: 2020-09-16 | Stop reason: HOSPADM

## 2020-09-15 RX ORDER — LANOLIN ALCOHOL/MO/W.PET/CERES
50 CREAM (GRAM) TOPICAL DAILY
Status: DISCONTINUED | OUTPATIENT
Start: 2020-09-16 | End: 2020-09-16 | Stop reason: HOSPADM

## 2020-09-15 RX ORDER — DEXTROSE 50 % IN WATER (D50W) INTRAVENOUS SYRINGE
25 ONCE
Status: COMPLETED | OUTPATIENT
Start: 2020-09-15 | End: 2020-09-15

## 2020-09-15 RX ORDER — HYDROMORPHONE HYDROCHLORIDE 1 MG/ML
0.3 INJECTION, SOLUTION INTRAMUSCULAR; INTRAVENOUS; SUBCUTANEOUS
Status: DISCONTINUED | OUTPATIENT
Start: 2020-09-15 | End: 2020-09-16 | Stop reason: HOSPADM

## 2020-09-15 RX ORDER — HYDROMORPHONE HYDROCHLORIDE 1 MG/ML
.2-.5 INJECTION, SOLUTION INTRAMUSCULAR; INTRAVENOUS; SUBCUTANEOUS
Status: DISCONTINUED | OUTPATIENT
Start: 2020-09-15 | End: 2020-09-15 | Stop reason: HOSPADM

## 2020-09-15 RX ORDER — EPHEDRINE SULFATE/0.9% NACL/PF 50 MG/5 ML
SYRINGE (ML) INTRAVENOUS AS NEEDED
Status: DISCONTINUED | OUTPATIENT
Start: 2020-09-15 | End: 2020-09-15 | Stop reason: HOSPADM

## 2020-09-15 RX ORDER — FENTANYL CITRATE 50 UG/ML
INJECTION, SOLUTION INTRAMUSCULAR; INTRAVENOUS AS NEEDED
Status: DISCONTINUED | OUTPATIENT
Start: 2020-09-15 | End: 2020-09-15 | Stop reason: HOSPADM

## 2020-09-15 RX ORDER — NEOSTIGMINE METHYLSULFATE 1 MG/ML
INJECTION, SOLUTION INTRAVENOUS AS NEEDED
Status: DISCONTINUED | OUTPATIENT
Start: 2020-09-15 | End: 2020-09-15 | Stop reason: HOSPADM

## 2020-09-15 RX ORDER — LIDOCAINE HYDROCHLORIDE 10 MG/ML
0.1 INJECTION, SOLUTION EPIDURAL; INFILTRATION; INTRACAUDAL; PERINEURAL AS NEEDED
Status: DISCONTINUED | OUTPATIENT
Start: 2020-09-15 | End: 2020-09-15 | Stop reason: HOSPADM

## 2020-09-15 RX ORDER — HYDROCODONE BITARTRATE AND ACETAMINOPHEN 5; 325 MG/1; MG/1
1 TABLET ORAL
Status: DISCONTINUED | OUTPATIENT
Start: 2020-09-15 | End: 2020-09-16 | Stop reason: HOSPADM

## 2020-09-15 RX ORDER — ALLOPURINOL 100 MG/1
100 TABLET ORAL DAILY
Status: DISCONTINUED | OUTPATIENT
Start: 2020-09-16 | End: 2020-09-16 | Stop reason: HOSPADM

## 2020-09-15 RX ORDER — ACETAMINOPHEN 325 MG/1
650 TABLET ORAL
Status: DISCONTINUED | OUTPATIENT
Start: 2020-09-15 | End: 2020-09-16 | Stop reason: HOSPADM

## 2020-09-15 RX ORDER — SODIUM CHLORIDE 9 MG/ML
25 INJECTION, SOLUTION INTRAVENOUS CONTINUOUS
Status: DISCONTINUED | OUTPATIENT
Start: 2020-09-15 | End: 2020-09-15 | Stop reason: HOSPADM

## 2020-09-15 RX ORDER — SODIUM CHLORIDE 0.9 % (FLUSH) 0.9 %
5-40 SYRINGE (ML) INJECTION EVERY 8 HOURS
Status: DISCONTINUED | OUTPATIENT
Start: 2020-09-15 | End: 2020-09-15 | Stop reason: HOSPADM

## 2020-09-15 RX ORDER — LANOLIN ALCOHOL/MO/W.PET/CERES
1 CREAM (GRAM) TOPICAL
Status: DISCONTINUED | OUTPATIENT
Start: 2020-09-16 | End: 2020-09-16 | Stop reason: HOSPADM

## 2020-09-15 RX ORDER — LIDOCAINE HYDROCHLORIDE 20 MG/ML
INJECTION, SOLUTION EPIDURAL; INFILTRATION; INTRACAUDAL; PERINEURAL AS NEEDED
Status: DISCONTINUED | OUTPATIENT
Start: 2020-09-15 | End: 2020-09-15 | Stop reason: HOSPADM

## 2020-09-15 RX ORDER — MAGNESIUM SULFATE 100 %
4 CRYSTALS MISCELLANEOUS AS NEEDED
Status: DISCONTINUED | OUTPATIENT
Start: 2020-09-15 | End: 2020-09-16 | Stop reason: HOSPADM

## 2020-09-15 RX ORDER — INSULIN GLARGINE 100 [IU]/ML
16 INJECTION, SOLUTION SUBCUTANEOUS DAILY
Status: DISCONTINUED | OUTPATIENT
Start: 2020-09-16 | End: 2020-09-16 | Stop reason: HOSPADM

## 2020-09-15 RX ORDER — ONDANSETRON 2 MG/ML
4 INJECTION INTRAMUSCULAR; INTRAVENOUS AS NEEDED
Status: DISCONTINUED | OUTPATIENT
Start: 2020-09-15 | End: 2020-09-15 | Stop reason: HOSPADM

## 2020-09-15 RX ORDER — FENTANYL CITRATE 50 UG/ML
25 INJECTION, SOLUTION INTRAMUSCULAR; INTRAVENOUS
Status: DISCONTINUED | OUTPATIENT
Start: 2020-09-15 | End: 2020-09-15 | Stop reason: HOSPADM

## 2020-09-15 RX ORDER — CLONIDINE HYDROCHLORIDE 0.1 MG/1
0.3 TABLET ORAL 2 TIMES DAILY
Status: DISCONTINUED | OUTPATIENT
Start: 2020-09-15 | End: 2020-09-16 | Stop reason: HOSPADM

## 2020-09-15 RX ORDER — GLYCOPYRROLATE 0.2 MG/ML
INJECTION INTRAMUSCULAR; INTRAVENOUS AS NEEDED
Status: DISCONTINUED | OUTPATIENT
Start: 2020-09-15 | End: 2020-09-15 | Stop reason: HOSPADM

## 2020-09-15 RX ORDER — SODIUM CHLORIDE 0.9 % (FLUSH) 0.9 %
5-40 SYRINGE (ML) INJECTION EVERY 8 HOURS
Status: DISCONTINUED | OUTPATIENT
Start: 2020-09-15 | End: 2020-09-16 | Stop reason: HOSPADM

## 2020-09-15 RX ORDER — ONDANSETRON 2 MG/ML
INJECTION INTRAMUSCULAR; INTRAVENOUS AS NEEDED
Status: DISCONTINUED | OUTPATIENT
Start: 2020-09-15 | End: 2020-09-15 | Stop reason: HOSPADM

## 2020-09-15 RX ORDER — DIPHENHYDRAMINE HYDROCHLORIDE 50 MG/ML
12.5 INJECTION, SOLUTION INTRAMUSCULAR; INTRAVENOUS AS NEEDED
Status: DISCONTINUED | OUTPATIENT
Start: 2020-09-15 | End: 2020-09-15 | Stop reason: HOSPADM

## 2020-09-15 RX ORDER — ROCURONIUM BROMIDE 10 MG/ML
INJECTION, SOLUTION INTRAVENOUS AS NEEDED
Status: DISCONTINUED | OUTPATIENT
Start: 2020-09-15 | End: 2020-09-15 | Stop reason: HOSPADM

## 2020-09-15 RX ORDER — FENTANYL CITRATE 50 UG/ML
50 INJECTION, SOLUTION INTRAMUSCULAR; INTRAVENOUS AS NEEDED
Status: DISCONTINUED | OUTPATIENT
Start: 2020-09-15 | End: 2020-09-15 | Stop reason: HOSPADM

## 2020-09-15 RX ORDER — PROPOFOL 10 MG/ML
INJECTION, EMULSION INTRAVENOUS AS NEEDED
Status: DISCONTINUED | OUTPATIENT
Start: 2020-09-15 | End: 2020-09-15 | Stop reason: HOSPADM

## 2020-09-15 RX ORDER — VANCOMYCIN/0.9 % SOD CHLORIDE 1.5G/250ML
1500 PLASTIC BAG, INJECTION (ML) INTRAVENOUS ONCE
Status: COMPLETED | OUTPATIENT
Start: 2020-09-15 | End: 2020-09-15

## 2020-09-15 RX ORDER — METOPROLOL SUCCINATE 50 MG/1
50 TABLET, EXTENDED RELEASE ORAL DAILY
Status: DISCONTINUED | OUTPATIENT
Start: 2020-09-16 | End: 2020-09-16 | Stop reason: HOSPADM

## 2020-09-15 RX ORDER — HYDRALAZINE HYDROCHLORIDE 20 MG/ML
INJECTION INTRAMUSCULAR; INTRAVENOUS
Status: DISCONTINUED
Start: 2020-09-15 | End: 2020-09-15

## 2020-09-15 RX ORDER — INSULIN LISPRO 100 [IU]/ML
INJECTION, SOLUTION INTRAVENOUS; SUBCUTANEOUS
Status: DISCONTINUED | OUTPATIENT
Start: 2020-09-15 | End: 2020-09-16 | Stop reason: HOSPADM

## 2020-09-15 RX ORDER — DIPHENHYDRAMINE HYDROCHLORIDE 50 MG/ML
12.5 INJECTION, SOLUTION INTRAMUSCULAR; INTRAVENOUS
Status: DISCONTINUED | OUTPATIENT
Start: 2020-09-15 | End: 2020-09-16 | Stop reason: HOSPADM

## 2020-09-15 RX ORDER — DEXTROSE 50 % IN WATER (D50W) INTRAVENOUS SYRINGE
12.5-25 AS NEEDED
Status: DISCONTINUED | OUTPATIENT
Start: 2020-09-15 | End: 2020-09-16 | Stop reason: HOSPADM

## 2020-09-15 RX ORDER — NALOXONE HYDROCHLORIDE 0.4 MG/ML
0.4 INJECTION, SOLUTION INTRAMUSCULAR; INTRAVENOUS; SUBCUTANEOUS AS NEEDED
Status: DISCONTINUED | OUTPATIENT
Start: 2020-09-15 | End: 2020-09-16 | Stop reason: HOSPADM

## 2020-09-15 RX ORDER — ONDANSETRON 2 MG/ML
4 INJECTION INTRAMUSCULAR; INTRAVENOUS
Status: DISCONTINUED | OUTPATIENT
Start: 2020-09-15 | End: 2020-09-16 | Stop reason: HOSPADM

## 2020-09-15 RX ORDER — SUCCINYLCHOLINE CHLORIDE 20 MG/ML
INJECTION INTRAMUSCULAR; INTRAVENOUS AS NEEDED
Status: DISCONTINUED | OUTPATIENT
Start: 2020-09-15 | End: 2020-09-15 | Stop reason: HOSPADM

## 2020-09-15 RX ORDER — HYDRALAZINE HYDROCHLORIDE 20 MG/ML
10 INJECTION INTRAMUSCULAR; INTRAVENOUS ONCE
Status: COMPLETED | OUTPATIENT
Start: 2020-09-15 | End: 2020-09-15

## 2020-09-15 RX ORDER — MIDAZOLAM HYDROCHLORIDE 1 MG/ML
0.5 INJECTION, SOLUTION INTRAMUSCULAR; INTRAVENOUS
Status: DISCONTINUED | OUTPATIENT
Start: 2020-09-15 | End: 2020-09-15 | Stop reason: HOSPADM

## 2020-09-15 RX ORDER — DEXAMETHASONE SODIUM PHOSPHATE 4 MG/ML
INJECTION, SOLUTION INTRA-ARTICULAR; INTRALESIONAL; INTRAMUSCULAR; INTRAVENOUS; SOFT TISSUE AS NEEDED
Status: DISCONTINUED | OUTPATIENT
Start: 2020-09-15 | End: 2020-09-15 | Stop reason: HOSPADM

## 2020-09-15 RX ORDER — SODIUM CHLORIDE 0.9 % (FLUSH) 0.9 %
5-40 SYRINGE (ML) INJECTION AS NEEDED
Status: DISCONTINUED | OUTPATIENT
Start: 2020-09-15 | End: 2020-09-16 | Stop reason: HOSPADM

## 2020-09-15 RX ORDER — ATORVASTATIN CALCIUM 40 MG/1
80 TABLET, FILM COATED ORAL DAILY
Status: DISCONTINUED | OUTPATIENT
Start: 2020-09-16 | End: 2020-09-16 | Stop reason: HOSPADM

## 2020-09-15 RX ORDER — GABAPENTIN 100 MG/1
200 CAPSULE ORAL
Status: DISCONTINUED | OUTPATIENT
Start: 2020-09-15 | End: 2020-09-16 | Stop reason: HOSPADM

## 2020-09-15 RX ORDER — SODIUM CHLORIDE 0.9 % (FLUSH) 0.9 %
5-40 SYRINGE (ML) INJECTION AS NEEDED
Status: DISCONTINUED | OUTPATIENT
Start: 2020-09-15 | End: 2020-09-15 | Stop reason: HOSPADM

## 2020-09-15 RX ADMIN — FENTANYL CITRATE 50 MCG: 50 INJECTION, SOLUTION INTRAMUSCULAR; INTRAVENOUS at 12:17

## 2020-09-15 RX ADMIN — Medication 10 ML: at 22:28

## 2020-09-15 RX ADMIN — VANCOMYCIN HYDROCHLORIDE 1500 MG: 10 INJECTION, POWDER, LYOPHILIZED, FOR SOLUTION INTRAVENOUS at 11:30

## 2020-09-15 RX ADMIN — Medication 5 MG: at 12:27

## 2020-09-15 RX ADMIN — INSULIN LISPRO 2 UNITS: 100 INJECTION, SOLUTION INTRAVENOUS; SUBCUTANEOUS at 22:27

## 2020-09-15 RX ADMIN — Medication 10 ML: at 18:06

## 2020-09-15 RX ADMIN — ROCURONIUM BROMIDE 37 MG: 10 INJECTION INTRAVENOUS at 12:25

## 2020-09-15 RX ADMIN — PROPOFOL 180 MG: 10 INJECTION, EMULSION INTRAVENOUS at 12:18

## 2020-09-15 RX ADMIN — GLYCOPYRROLATE 0.4 MG: 0.2 INJECTION, SOLUTION INTRAMUSCULAR; INTRAVENOUS at 13:57

## 2020-09-15 RX ADMIN — SODIUM CHLORIDE 25 ML/HR: 900 INJECTION, SOLUTION INTRAVENOUS at 11:14

## 2020-09-15 RX ADMIN — GABAPENTIN 200 MG: 100 CAPSULE ORAL at 18:05

## 2020-09-15 RX ADMIN — WATER 2 G: 1 INJECTION INTRAMUSCULAR; INTRAVENOUS; SUBCUTANEOUS at 12:15

## 2020-09-15 RX ADMIN — CLONIDINE HYDROCHLORIDE 0.3 MG: 0.1 TABLET ORAL at 18:05

## 2020-09-15 RX ADMIN — Medication 10 MG: at 12:23

## 2020-09-15 RX ADMIN — Medication 3 MG: at 13:57

## 2020-09-15 RX ADMIN — LIDOCAINE HYDROCHLORIDE 20 MG: 20 INJECTION, SOLUTION EPIDURAL; INFILTRATION; INTRACAUDAL; PERINEURAL at 12:18

## 2020-09-15 RX ADMIN — Medication 3 AMPULE: at 11:14

## 2020-09-15 RX ADMIN — SUCCINYLCHOLINE CHLORIDE 100 MG: 20 INJECTION, SOLUTION INTRAMUSCULAR; INTRAVENOUS at 12:18

## 2020-09-15 RX ADMIN — HYDRALAZINE HYDROCHLORIDE 10 MG: 20 INJECTION INTRAMUSCULAR; INTRAVENOUS at 14:52

## 2020-09-15 RX ADMIN — DEXAMETHASONE SODIUM PHOSPHATE 4 MG: 4 INJECTION, SOLUTION INTRAMUSCULAR; INTRAVENOUS at 13:03

## 2020-09-15 RX ADMIN — DEXTROSE MONOHYDRATE 25 G: 500 INJECTION PARENTERAL at 10:48

## 2020-09-15 RX ADMIN — ONDANSETRON HYDROCHLORIDE 4 MG: 2 INJECTION, SOLUTION INTRAMUSCULAR; INTRAVENOUS at 13:03

## 2020-09-15 RX ADMIN — ROCURONIUM BROMIDE 3 MG: 10 INJECTION INTRAVENOUS at 12:18

## 2020-09-15 NOTE — OP NOTES
Operative Report    CPT 43318      Creation of AV Fistula Left Arm  (proximal radial artery to deep tributary with cephalic outflow)      Patient: Josy Karenmichael    Nephrologist - Dr Tania Kingston    Date of Surgery:  9/15/2020    Preoperative Diagnosis - CKD 5    Postoperative Diagnosis - Same    Anesthesia - General    Surgeon - [address] [place] [address]        Operative Summary -     The patient was taken to the operating room and placed on the operating table in the supine position. The patient's left arm was  prepared and steriley draped. The location of the veins had been reconfirmed by ultrasound prior to surgery. An incision was made below the antecubital.  The deep tributary vein was exposed and freed for about 3.5 cm. It was around 3 mm in diameter. The proximal radial artery was exposed  and surrounded by vessel loops. The vein was marked for orientation. The vein was ligated distally and divided and was gently distended with heparinized lactated Ringer's solution. The  artery was controlled proximally and distally with Lujan Dias bulldog clamps. A small longitudinal arteriotomy was made. The end of the vein was spatulated and brought over to the artery. Using 6-O prolene an end vein to side artery anastomosis was made. Prior to completion, the artery was flushed proximally and distally. After completion, with initiation of flow, there was a thrill at the AV anastomosis and a strongdoppler in the radial artery at the wrist.  The wound was irrigated with antibiotic solution and closed with 3-0 chromic in the subcutaneous layer and with 4-0 nylon at the skin. A gauze dressing was applied. The patient tolerated the procedure well and was taken to the PACU in stable condition.      Specimen - none     Drain - none    Complications - none    Estimated Blood Loss - minimal    Implant - none    MASON Gonzalez MD

## 2020-09-15 NOTE — BRIEF OP NOTE
Brief Postoperative Note    Patient: Zuleyka Carbajal  YOB: 1940  MRN: [id number]    Date of Procedure: 9/15/2020     Pre-Op Diagnosis: CHRONIC KIDNEY DISEASE STAGE 5    Post-Op Diagnosis: Same as preoperative diagnosis.       Procedure(s):  CREATION OF ARTERIO VENOUS FISTULA  LEFT ARM (proximal radial artery to cephalic vein)    Surgeon(s):  Tamir Navarro MD    Surgical Assistant: Ruslan Bowden    Anesthesia: General     Estimated Blood Loss (mL): Minimal    Complications: None    Specimens: * No specimens in log *     Implants: * No implants in log *    Drains: * No LDAs found *    Findings: palpable thrill in AV fistula, strong doppler signal left radial artery at wrist.    Electronically Signed by Jordyn Betts MD on 9/15/2020 at 2:11 PM

## 2020-09-15 NOTE — ANESTHESIA PREPROCEDURE EVALUATION
Anesthetic History   No history of anesthetic complications            Review of Systems / Medical History  Patient summary reviewed, nursing notes reviewed and pertinent labs reviewed    Pulmonary  Within defined limits                 Neuro/Psych       CVA  TIA and psychiatric history    Comments: Depression Cardiovascular    Hypertension          Hyperlipidemia    Exercise tolerance: <4 METS     GI/Hepatic/Renal         Renal disease: ESRD      Comments: Hx polyps Endo/Other    Diabetes  Hypothyroidism  Obesity, arthritis and cancer     Other Findings   Comments: Hx Follicular thyroid cancer            Physical Exam    Airway  Mallampati: III  TM Distance: 4 - 6 cm  Neck ROM: normal range of motion   Mouth opening: Normal     Cardiovascular  Regular rate and rhythm,  S1 and S2 normal,  no murmur, click, rub, or gallop             Dental    Dentition: Upper partial plate and Lower partial plate     Pulmonary  Breath sounds clear to auscultation               Abdominal  GI exam deferred       Other Findings            Anesthetic Plan    ASA: 3  Anesthesia type: general    Monitoring Plan: BIS      Induction: Intravenous  Anesthetic plan and risks discussed with: Patient

## 2020-09-15 NOTE — PROGRESS NOTES
General Surgery End of Shift Nursing Note    Bedside shift change report given to Jose Clement (oncoming nurse) by Blas Graves (offgoing nurse). Report included the following information SBAR, Kardex, Procedure Summary, Intake/Output, MAR and Recent Results. Shift worked:   7a-7p   Summary of shift:    Pt arrived to unit from PACU ~1710. No complaints of pain. Tolerating diet with good appetite. Has not voided yet.     Issues for physician to address:        Number times ambulated in hallway past shift: 0    Number of times OOB to chair past shift: 0      GI:    Current diet:  DIET RENAL WITH OPTIONS 80-80-80; Regular; Consistent Carb 1800kcal    Tolerating current diet: YES          Nirmala Davis

## 2020-09-15 NOTE — ANESTHESIA POSTPROCEDURE EVALUATION
Procedure(s):  CREATION OF ARTERIO VENOUS FISTULA  LEFT ARM. general    Anesthesia Post Evaluation        Patient location during evaluation: PACU  Note status: Adequate. Level of consciousness: responsive to verbal stimuli and sleepy but conscious  Pain management: satisfactory to patient  Airway patency: patent  Anesthetic complications: no  Cardiovascular status: acceptable  Respiratory status: acceptable  Hydration status: acceptable  Comments: +Post-Anesthesia Evaluation and Assessment    Patient: Chasidy Thomson MRN: [id number]  SSN: [id number]   YOB: 1940  Age: 79 y.o. Sex: female      Cardiovascular Function/Vital Signs    BP (!) 177/70   Pulse 61   Temp 36.9 °C (98.4 °F)   Resp 27   Ht 5' 5\" (1.651 m)   Wt 102.8 kg (226 lb 10.1 oz)   SpO2 100%   BMI 37.71 kg/m²     Patient is status post Procedure(s):  CREATION OF ARTERIO VENOUS FISTULA  LEFT ARM. Nausea/Vomiting: Controlled. Postoperative hydration reviewed and adequate. Pain:  Pain Scale 1: Numeric (0 - 10) (09/15/20 1435)  Pain Intensity 1: 0 (09/15/20 1435)   Managed. Neurological Status:   Neuro (WDL): Exceptions to WDL (09/15/20 1416)   At baseline. Mental Status and Level of Consciousness: Arousable. Pulmonary Status:   O2 Device: Nasal cannula (09/15/20 1435)   Adequate oxygenation and airway patent. Complications related to anesthesia: None    Post-anesthesia assessment completed. No concerns. Signed By: Deepak Yuan MD    9/15/2020  Post anesthesia nausea and vomiting:  controlled      INITIAL Post-op Vital signs:   Vitals Value Taken Time   /70 9/15/2020  3:00 PM   Temp 36.9 °C (98.4 °F) 9/15/2020  2:19 PM   Pulse 64 9/15/2020  3:10 PM   Resp 29 9/15/2020  3:10 PM   SpO2 100 % 9/15/2020  3:10 PM   Vitals shown include unvalidated device data.

## 2020-09-15 NOTE — CONSULTS
Hospitalist Consultation Note    NAME:  Peter Reich   :      MRN:   [id number]     ATTENDING: Natalia Donis MD  PCP:  Shirley Hernadez MD    Date/Time:  9/15/2020 6:15 PM      Recommendations/Plan:       Active Problems:    CKD (chronic kidney disease) stage 5, GFR less than 15 ml/min ([facility] [address] (2014)       Diabetes mellitus type 2  Most recent hemoglobin A1c 9.9% 2020   Obtain new hemoglobin A1c   She is on NPH 15 units BID units before breakfast only at home. She was started on Lantus here, continue Lantus  Correction scale. Calculate how much only time put her on pre meals    Uncontrolled hypertension, malignant    Continue home meds including clonidine, Lasix, metoprolol  Add hydralazine as needed as ordered  Avoid treating pain induced hypertension    CKD  Starting new dialysis  General surgery following for AV fistula creation    Code Status: FULL  DVT Prophylaxis: As per primary team           Subjective:   REQUESTING PHYSICIAN:  REASON FOR CONSULT:        The patient is 79years old woman with past medical history of CKD, diabetes mellitus, hypertension, hyperlipidemia was admitted by general surgery for AV fistula creation when she had her hemodialysis. We were consulted to manage her diabetes mellitus and hypertension. Patient reported a chronically she takes NPH 15 units twice sdaily  units at home and has been taking her medications a regular basis without any missing doses.           Past Medical History:   Diagnosis Date    Arthritis     Cataract     bilateral    Chronic kidney disease     Dr. Zeus Morgan Chronic pain     lower back    CKD stage 3 due to type 2 diabetes mellitus ([facility] [address]     Depression     Diabetes [facility]) age 49    Type II    Follicular thyroid cancer ([facility] [address] 2014    Foot ulcer ([facility] [address]     Gallbladder polyp 2013    Hypercholesterolemia     Hypertension     Ill-defined condition     states 'polyp' in gal bladder    Long term current use of anticoagulant therapy     Obesity     Right pontine stroke ([facility] [address] 1/17/2017    TIA (transient ischemic attack) 6/6/2014      Past Surgical History:   Procedure Laterality Date    ABDOMEN SURGERY PROC UNLISTED  2006    stomach    BREAST SURGERY PROCEDURE UNLISTED  2009, 2006    breast bx-vince    COLONOSCOPY N/A 12/17/2019    COLONOSCOPY performed by Kristen Mayberry MD at [address] [place] [address]  12/17/2019         COLORECTAL SCRN; HI RISK IND  5/30/2014         HX BREAST BIOPSY Right [id number]    2 biopies on the right. Benign (per patient)    HX BREAST BIOPSY Left 2015    Benign (per patient)    HX CATARACT REMOVAL Left     HX GYN  1970's    partial hysterectomy    HX HERNIA REPAIR  2009    hiatal    HX HYSTERECTOMY      HX ORTHOPAEDIC Bilateral 1988    bunion    HX THYROIDECTOMY  2014    Dr. Monserrat Brown History     Tobacco Use    Smoking status: Never Smoker    Smokeless tobacco: Never Used   Substance Use Topics    Alcohol use: No      Family History   Problem Relation Age of Onset    Heart Disease Mother     Hypertension Mother     Diabetes Mother     Stroke Mother     Cancer Father         colon    Diabetes Sister     Heart Attack Sister     Hypertension Sister     Heart Disease Sister         pacemaker    Lung Disease Brother     Hypertension Brother     Lung Disease Brother     Anesth Problems Neg Hx        Allergies   Allergen Reactions    Amlodipine Swelling    Clindamycin Rash    Nifedipine Swelling    Sulfa (Sulfonamide Antibiotics) Rash      Prior to Admission medications    Medication Sig Start Date End Date Taking? Authorizing Provider   metoprolol succinate (TOPROL-XL) 50 mg XL tablet Take 50 mg by mouth daily. Yes Provider, Historical   cloNIDine HCL (CATAPRES) 0.3 mg tablet Take 0.3 mg by mouth two (2) times a day.    Yes Provider, Historical   cephalexin 250 mg tab Take 250 mg by mouth two (2) times a day. Started taking this week -\"almost done\"   Yes Provider, Historical   gabapentin (NEURONTIN) 300 mg capsule Take 2 Caps by mouth three (3) times daily. Max Daily Amount: 1,800 mg. 7/6/20  Yes Janey Tanner MD   insulin nph-regular human rec (NovoLIN 70-30 FlexPen U-100) 100 unit/mL (70-30) inpn Inject 45 units before breakfast and none before dinner. Max 100 units per day--pt will pay cash--Dose change 6/10/20--updated med list--did not send prescription to the pharmacy  Patient taking differently: . Max 100 units per day--pt will pay cash--Dose change 6/10/20--updated med list--did not send prescription to the pharmacy    15 units before breakfast and 15 units before supper 6/10/20  Yes Janey Tanner MD   SYNTHROID 175 mcg tablet Take 1 Tab by mouth Daily (before breakfast). Take 1 Tab by mouth Daily (before breakfast). BRAND MEDICALLY NECESSARY--Delete 150 mcg dose from profile 2/6/20  Yes Janey Tanner MD   Lancing Device with Lancets kit Use as directed to test 3 times daily 9/11/19  Yes Janey Tanner MD   [facility] ULTRA BLUE TEST STRIP strip TEST 3 TIMES DAILY 9/10/19  Yes MD Kyrie Espinal monitoring kit Test 3 times daily. DX E11.65 4/22/19  Yes Janey Tanner MD   atorvastatin (LIPITOR) 80 mg tablet Take 80 mg by mouth daily. Yes Provider, Historical   ferrous sulfate (IRON) 325 mg (65 mg iron) tablet Take  by mouth daily. Yes Provider, Historical   LOR PEN NEEDLE 32 gauge x 5/32\" ndle Use as directed 5 times daily 1/15/18  Yes Janey Tanner MD   PYRIDOXINE HCL, VITAMIN B6, (VITAMIN B-6 PO) Take  by mouth daily. Yes Provider, Historical   allopurinol (ZYLOPRIM) 100 mg tablet Take  by mouth daily. Yes Provider, Historical   multivitamins-minerals-lutein (CENTRUM SILVER) Tab Take  by mouth daily. Yes Provider, Historical   furosemide (LASIX) 80 mg tablet Take 80 mg by mouth daily.  4/2/20   Provider, Historical       REVIEW OF SYSTEMS: Total of 12 systems reviewed as follows:   I am not able to complete the review of systems because: The patient is intubated and sedated    The patient has altered mental status due to his acute medical problems    The patient has baseline aphasia from prior stroke(s)    The patient has baseline dementia and is not reliable historian                 POSITIVE= underlined text  Negative = text not underlined  General:  fever, chills, sweats, generalized weakness, weight loss/gain,      loss of appetite   Eyes:    blurred vision, eye pain, loss of vision, double vision  ENT:    rhinorrhea, pharyngitis   Respiratory:   cough, sputum production, SOB, wheezing, ALONSO, pleuritic pain   Cardiology:   chest pain, palpitations, orthopnea, PND, edema, syncope   Gastrointestinal:  abdominal pain , N/V, dysphagia, diarrhea, constipation, bleeding   Genitourinary:  frequency, urgency, dysuria, hematuria, incontinence   Muskuloskeletal :  arthralgia, myalgia   Hematology:  easy bruising, nose or gum bleeding, lymphadenopathy   Dermatological: rash, ulceration, pruritis   Endocrine:   hot flashes or polydipsia   Neurological:  headache, dizziness, confusion, focal weakness, paresthesia,     Speech difficulties, memory loss, gait disturbance  Psychological: Feelings of anxiety, depression, agitation    Objective:   VITALS:    Visit Vitals  BP (!) 183/66   Pulse 74   Temp 98.4 °F (36.9 °C)   Resp 18   Ht 5' 5\" (1.651 m)   Wt 102.8 kg (226 lb 10.1 oz)   SpO2 100%   BMI 37.71 kg/m²     Temp (24hrs), Av.3 °F (36.8 °C), Min:97.9 °F (36.6 °C), Max:98.5 °F (36.9 °C)      PHYSICAL EXAM:   General:    Alert, cooperative, no distress, appears stated age. HEENT: Atraumatic, anicteric sclerae, pink conjunctivae     No oral ulcers, mucosa moist, throat clear  Neck:  Supple, symmetrical,  thyroid: non tender  Lungs:   Clear to auscultation bilaterally. No Wheezing or Rhonchi. No rales.   Chest wall:  No tenderness  No Accessory muscle use.  Heart:   Regular  rhythm,  No  murmur   No edema  Abdomen:   Soft, non-tender. Not distended. Bowel sounds normal  Extremities: No cyanosis. No clubbing  Skin:     Not pale. Not Jaundiced  No rashes   Psych:  Good insight. Not depressed. Not anxious or agitated. Neurologic: EOMs intact. No facial asymmetry. No aphasia or slurred speech. Symmetrical strength, Alert and oriented X 4.     _______________________________________________________________________  Care Plan discussed with:    Comments   Patient x    Family      RN x    Care Manager                    Consultant:      ____________________________________________________________________  TOTAL TIME:     50 mins    Comments    x Reviewed previous records   >50% of visit spent in counseling and coordination of care x Discussion with patient and/or family and questions answered       Critical Care Provided     Minutes non procedure based  ________________________________________________________________________  Signed: Cheyenne Diop MD      Procedures: see electronic medical records for all procedures/Xrays and details which were not copied into this note but were reviewed prior to creation of Plan. LAB DATA REVIEWED:    Recent Results (from the past 24 hour(s))   POC CHEM8    Collection Time: 09/15/20 10:43 AM   Result Value Ref Range    Calcium, ionized (POC) 1.02 (L) 1.12 - 1.32 mmol/L    Sodium (POC) 136 136 - 145 mmol/L    Potassium (POC) 7.2 (HH) 3.5 - 5.1 mmol/L    Chloride (POC) 106 98 - 107 mmol/L    CO2 (POC) 27 21 - 32 mmol/L    Anion gap (POC) 12 10 - 20 mmol/L    Glucose (POC) 58 (L) 65 - 100 mg/dL    BUN (POC) 54 (H) 9 - 20 mg/dL    Creatinine (POC) 4.2 (H) 0.6 - 1.3 mg/dL    GFRAA, POC 12 (L) >60 ml/min/1.73m2    GFRNA, POC 10 (L) >60 ml/min/1.73m2    Hematocrit (POC) 31 (L) 35.0 - 47.0 %    Comment Comment Not Indicated.      POTASSIUM    Collection Time: 09/15/20 11:00 AM   Result Value Ref Range    Potassium 4.6 3.5 - 5.1 mmol/L POC CHEM8    Collection Time: 09/15/20 11:07 AM   Result Value Ref Range    Calcium, ionized (POC) 1.13 1.12 - 1.32 mmol/L    Sodium (POC) 138 136 - 145 mmol/L    Potassium (POC) 4.6 3.5 - 5.1 mmol/L    Chloride (POC) 104 98 - 107 mmol/L    CO2 (POC) 26 21 - 32 mmol/L    Anion gap (POC) 15 10 - 20 mmol/L    Glucose (POC) 178 (H) 65 - 100 mg/dL    BUN (POC) 36 (H) 9 - 20 mg/dL    Creatinine (POC) 4.3 (H) 0.6 - 1.3 mg/dL    GFRAA, POC 12 (L) >60 ml/min/1.73m2    GFRNA, POC 10 (L) >60 ml/min/1.73m2    Hematocrit (POC) 24 (L) 35.0 - 47.0 %    Comment Comment Not Indicated.      GLUCOSE, POC    Collection Time: 09/15/20  2:21 PM   Result Value Ref Range    Glucose (POC) 121 (H) 65 - 100 mg/dL    Performed by Kim Chapin        _____________________________  Hospitalist: Mayela Joshua MD

## 2020-09-15 NOTE — PERIOP NOTES
1416-Handoff Report from Operating Room to PACU    Report received from [address] and Nis[facility] Maldonado - [facility] regarding Elin Ashing. Surgeon(s):  Devora Shaw MD  And Procedure(s) (LRB):  CREATION OF ARTERIO VENOUS FISTULA  LEFT ARM (Left)  confirmed   with allergies and dressings discussed. Anesthesia type, drugs, patient history, complications, estimated blood loss, vital signs, intake and output, and last pain medication, lines, reversal medications and temperature were reviewed. 1430- Dr. Clementine Mehta notified of BP. No new orders received. [phone] 90- Dr. Clementine Mehta notified of BP. Order received for hydralazin one time. 1507- Report given to [facility] for lunch relief. [phone]- Son updated via telephone. 1620- TRANSFER - OUT REPORT:    Verbal report given to Blas Graves RN(name) on Elin Ashing  being transferred to gen surg(unit) for routine post - op       Report consisted of patients Situation, Background, Assessment and   Recommendations(SBAR). Information from the following report(s) SBAR, Kardex, OR Summary, Intake/Output, MAR and Recent Results was reviewed with the receiving nurse. Opportunity for questions and clarification was provided. Patient transported with:   Tech      Pt's son Nyla Mota updated on room number.

## 2020-09-15 NOTE — PERIOP NOTES
1045 - patients blood glucose on POC chem 8 result 58. Dr. Magdy Singh made aware. Order for amp of D50 to be given. 1048 - D50 given. 0 - Dr. Magdy Singh made aware of patients blood pressure 240/108 on admission and recheck 233/86. Patient asymptomatic, patient stated she did not take her blood pressure medication this morning. Dr. Magdy Singh stated to have patient take personal medication if she had it with her. 1055 - Patient has home medications with her. Metoprolol and Clonidine given PO from patients home medications. 1100 - recheck of potassium on POC chem 8 result 4.6 and glucose 178. Bloodwork sent to lab as well for recheck. 200 - Dr. Larwance Buerger in to see patient. Made aware of patients blood pressure and that she was given her home medications. 1140 - patient cleaned up from soiling the bed. Patient Covid 19 testing done 9/11/2020 and result negative. Patient states self quarantine and reports no signs or symptoms.

## 2020-09-16 VITALS
WEIGHT: 226.63 LBS | SYSTOLIC BLOOD PRESSURE: 171 MMHG | DIASTOLIC BLOOD PRESSURE: 63 MMHG | RESPIRATION RATE: 16 BRPM | BODY MASS INDEX: 37.76 KG/M2 | TEMPERATURE: 98.1 F | HEART RATE: 67 BPM | OXYGEN SATURATION: 100 % | HEIGHT: 65 IN

## 2020-09-16 LAB
BACTERIA SPEC CULT: NORMAL
BACTERIA SPEC CULT: NORMAL
GLUCOSE BLD STRIP.AUTO-MCNC: 274 MG/DL (ref 65–100)
GLUCOSE BLD STRIP.AUTO-MCNC: 287 MG/DL (ref 65–100)
SERVICE CMNT-IMP: ABNORMAL
SERVICE CMNT-IMP: ABNORMAL
SERVICE CMNT-IMP: NORMAL

## 2020-09-16 PROCEDURE — 82962 GLUCOSE BLOOD TEST: CPT

## 2020-09-16 PROCEDURE — 74011636637 HC RX REV CODE- 636/637: Performed by: SURGERY

## 2020-09-16 PROCEDURE — 74011250636 HC RX REV CODE- 250/636: Performed by: GENERAL ACUTE CARE HOSPITAL

## 2020-09-16 PROCEDURE — 94761 N-INVAS EAR/PLS OXIMETRY MLT: CPT

## 2020-09-16 PROCEDURE — 94760 N-INVAS EAR/PLS OXIMETRY 1: CPT

## 2020-09-16 PROCEDURE — 99218 HC RM OBSERVATION: CPT

## 2020-09-16 PROCEDURE — 74011250637 HC RX REV CODE- 250/637: Performed by: SURGERY

## 2020-09-16 RX ORDER — HYDROCODONE BITARTRATE AND ACETAMINOPHEN 5; 325 MG/1; MG/1
1 TABLET ORAL
Qty: 8 TAB | Refills: 0 | Status: SHIPPED | OUTPATIENT
Start: 2020-09-16 | End: 2020-09-19

## 2020-09-16 RX ORDER — HYDRALAZINE HYDROCHLORIDE 20 MG/ML
10 INJECTION INTRAMUSCULAR; INTRAVENOUS ONCE
Status: COMPLETED | OUTPATIENT
Start: 2020-09-16 | End: 2020-09-16

## 2020-09-16 RX ORDER — HYDRALAZINE HYDROCHLORIDE 25 MG/1
25 TABLET, FILM COATED ORAL 3 TIMES DAILY
Qty: 90 TAB | Refills: 1 | Status: SHIPPED | OUTPATIENT
Start: 2020-09-16

## 2020-09-16 RX ADMIN — INSULIN LISPRO 3 UNITS: 100 INJECTION, SOLUTION INTRAVENOUS; SUBCUTANEOUS at 10:15

## 2020-09-16 RX ADMIN — Medication 10 ML: at 13:31

## 2020-09-16 RX ADMIN — ATORVASTATIN CALCIUM 80 MG: 40 TABLET, FILM COATED ORAL at 10:14

## 2020-09-16 RX ADMIN — Medication 10 ML: at 07:02

## 2020-09-16 RX ADMIN — INSULIN GLARGINE 16 UNITS: 100 INJECTION, SOLUTION SUBCUTANEOUS at 10:13

## 2020-09-16 RX ADMIN — HYDRALAZINE HYDROCHLORIDE 10 MG: 20 INJECTION INTRAMUSCULAR; INTRAVENOUS at 10:16

## 2020-09-16 RX ADMIN — LEVOTHYROXINE SODIUM 175 MCG: 0.03 TABLET ORAL at 07:00

## 2020-09-16 RX ADMIN — METOPROLOL SUCCINATE 50 MG: 50 TABLET, EXTENDED RELEASE ORAL at 10:14

## 2020-09-16 RX ADMIN — CLONIDINE HYDROCHLORIDE 0.3 MG: 0.1 TABLET ORAL at 10:15

## 2020-09-16 RX ADMIN — INSULIN LISPRO 4 UNITS: 100 INJECTION, SOLUTION INTRAVENOUS; SUBCUTANEOUS at 13:30

## 2020-09-16 RX ADMIN — FUROSEMIDE 80 MG: 80 TABLET ORAL at 10:14

## 2020-09-16 RX ADMIN — ALLOPURINOL 100 MG: 100 TABLET ORAL at 10:15

## 2020-09-16 NOTE — PROGRESS NOTES
Pt is up to chair awaiting discharge. Pt has been discharged to home. Pt is a/o x4 with intermittent confusion. Dressing to LUE clean dry intact. All discharge instructions given to patient, all belongings returned, IV removed. Pt is stable on discharge. Left facility via wheelchair to private vehicle.

## 2020-09-16 NOTE — PROGRESS NOTES
Hospitalist Progress Note    NAME: Nura Joseph   :     MRN:  [id number]       Assessment / Plan:  Diabetes mellitus type 2  Most recent hemoglobin A1c 9.9% 2020   Obtain new hemoglobin A1c   She is on NPH 15 units BID units before breakfast only at home. She was started on Lantus here, continue Lantus  Correction scale. Calculate how much only time put her on pre meals    :  A1c fairly well controlled - continue home regimen     Uncontrolled hypertension, malignant    Continue home meds including clonidine, Lasix, metoprolol  Add hydralazine as needed as ordered  Avoid treating pain induced hypertension    :  BP meds adjusted. Will improve once HD has been initiated.     CKD  Starting new dialysis    As per Vascular Note - pt is for discharge today. Agree with this. Will complete med rec      30.0 - 39.9 Obese / Body mass index is 37.71 kg/m². Subjective:     Chief Complaint / Reason for Physician Visit  Pt states that she is ready for discharge. She denies any pain. Discussed with RN events overnight. Review of Systems:  Symptom Y/N Comments  Symptom Y/N Comments   Fever/Chills n   Chest Pain n    Poor Appetite n   Edema     Cough    Abdominal Pain n    Sputum    Joint Pain n    SOB/ALONSO    Pruritis/Rash n    Nausea/vomit    Tolerating PT/OT     Diarrhea    Tolerating Diet y    Constipation    Other       Could NOT obtain due to:      Objective:     VITALS:   Last 24hrs VS reviewed since prior progress note.  Most recent are:  Patient Vitals for the past 24 hrs:   Temp Pulse Resp BP SpO2   20 0745 99 °F (37.2 °C) 81 16 (!) 187/62 97 %   20 0357 98.6 °F (37 °C) 71 17 (!) 175/61 100 %   20 0037 98.7 °F (37.1 °C) 71 16 (!) 179/68 100 %   20 0027 98.7 °F (37.1 °C) 71 16 (!) 194/56 100 %   09/15/20 2058 98.3 °F (36.8 °C) 74 16 (!) 177/65 100 %   09/15/20 1859 98.6 °F (37 °C) 77 17 (!) 181/62 97 %   09/15/20 1721 98.4 °F (36.9 °C) 74 18 (!) 183/66 100 %   09/15/20 1700 98.5 °F (36.9 °C) 70 28 (!) 175/71 96 %   09/15/20 1630  66 20 (!) 185/73 100 %   09/15/20 1615  66 21 (!) 170/73 100 %   09/15/20 1600  65 18 (!) 173/67 99 %   09/15/20 1545  66 15 (!) 166/72 100 %   09/15/20 1530  66 20 (!) 166/66 100 %   09/15/20 1515  65 26 (!) 159/63 100 %   09/15/20 1500  61 27 (!) 177/70 100 %   09/15/20 1445  (!) 59 14 (!) 208/76 100 %   09/15/20 1435  (!) 59 14 (!) 193/73 99 %   09/15/20 1430  (!) 59 15 (!) 190/74 99 %   09/15/20 1425  61 15 (!) 186/66 99 %   09/15/20 1420  60 14 (!) 178/68 99 %   09/15/20 1419 98.4 °F (36.9 °C) 60 14 (!) 175/64 96 %   09/15/20 1416 98.4 °F (36.9 °C) 60 14 (!) 175/64 100 %   09/15/20 1157    (!) 215/79    09/15/20 1118    (!) 226/81    09/15/20 1053  66 15 (!) 233/86    09/15/20 1025 97.9 °F (36.6 °C) 66 15 (!) 240/108 100 %       Intake/Output Summary (Last 24 hours) at 9/16/2020 1001  Last data filed at 9/16/2020 0254  Gross per 24 hour   Intake 850 ml   Output 600 ml   Net 250 ml        PHYSICAL EXAM:  General: WD, WN. Alert, cooperative, no acute distress    EENT:  EOMI. Anicteric sclerae. MMM  Resp:  CTA bilaterally, no wheezing or rales. No accessory muscle use  CV:  Regular  rhythm,  No edema  GI:  Soft, Non distended, Non tender.  +Bowel sounds  Neurologic:  Alert and oriented X 3, normal speech,   Psych:   Good insight. Not anxious nor agitated  Skin:  No rashes.   No jaundice    Reviewed most current lab test results and cultures  YES  Reviewed most current radiology test results   YES  Review and summation of old records today    NO  Reviewed patient's current orders and MAR    YES  PMH/SH reviewed - no change compared to H&P  ________________________________________________________________________  Care Plan discussed with:    Comments   Patient x    Family      RN x    Care Manager     Consultant                        Multidiciplinary team rounds were held today with , nursing, pharmacist and clinical coordinator. Patient's plan of care was discussed; medications were reviewed and discharge planning was addressed. ________________________________________________________________________  Total NON critical care TIME:  35   Minutes    Total CRITICAL CARE TIME Spent:   Minutes non procedure based      Comments   >50% of visit spent in counseling and coordination of care     ________________________________________________________________________  Cayden Abad MD     Procedures: see electronic medical records for all procedures/Xrays and details which were not copied into this note but were reviewed prior to creation of Plan. LABS:  I reviewed today's most current labs and imaging studies. Pertinent labs include:  No results for input(s): WBC, HGB, HCT, PLT, HGBEXT, HCTEXT, PLTEXT, HGBEXT, HCTEXT, PLTEXT in the last 72 hours.   Recent Labs     09/15/20  1100   K 4.6       Signed: Cayden Abad MD

## 2020-09-16 NOTE — INTERDISCIPLINARY ROUNDS
Interdisciplinary Rounds were completed on 09/16/20 for this patient. Rounds included nursing, clinical care leader, MD, and case management. Plan of care discussed. See clinical pathway and/or care plan for interventions and desired outcomes.

## 2020-09-16 NOTE — PROGRESS NOTES
Problem: Risk for Spread of Infection  Goal: Prevent transmission of infectious organism to others  Description: Prevent the transmission of infectious organisms to other patients, staff members, and visitors. Outcome: Progressing Towards Goal     Problem: Patient Education:  Go to Education Activity  Goal: Patient/Family Education  Outcome: Progressing Towards Goal     Problem: Diabetes Self-Management  Goal: *Disease process and treatment process  Description: Define diabetes and identify own type of diabetes; list 3 options for treating diabetes. Outcome: Progressing Towards Goal  Goal: *Incorporating nutritional management into lifestyle  Description: Describe effect of type, amount and timing of food on blood glucose; list 3 methods for planning meals. Outcome: Progressing Towards Goal  Goal: *Incorporating physical activity into lifestyle  Description: State effect of exercise on blood glucose levels. Outcome: Progressing Towards Goal  Goal: *Developing strategies to promote health/change behavior  Description: Define the ABC's of diabetes; identify appropriate screenings, schedule and personal plan for screenings. Outcome: Progressing Towards Goal  Goal: *Using medications safely  Description: State effect of diabetes medications on diabetes; name diabetes medication taking, action and side effects. Outcome: Progressing Towards Goal  Goal: *Monitoring blood glucose, interpreting and using results  Description: Identify recommended blood glucose targets  and personal targets. Outcome: Progressing Towards Goal  Goal: *Prevention, detection, treatment of acute complications  Description: List symptoms of hyper- and hypoglycemia; describe how to treat low blood sugar and actions for lowering  high blood glucose level.   Outcome: Progressing Towards Goal  Goal: *Prevention, detection and treatment of chronic complications  Description: Define the natural course of diabetes and describe the relationship of blood glucose levels to long term complications of diabetes.   Outcome: Progressing Towards Goal  Goal: *Developing strategies to address psychosocial issues  Description: Describe feelings about living with diabetes; identify support needed and support network  Outcome: Progressing Towards Goal  Goal: *Insulin pump training  Outcome: Progressing Towards Goal  Goal: *Sick day guidelines  Outcome: Progressing Towards Goal  Goal: *Patient Specific Goal (EDIT GOAL, INSERT TEXT)  Outcome: Progressing Towards Goal     Problem: Patient Education: Go to Patient Education Activity  Goal: Patient/Family Education  Outcome: Progressing Towards Goal     Problem: Pain  Goal: *Control of Pain  Outcome: Progressing Towards Goal     Problem: Pain  Goal: *Control of Pain  Outcome: Progressing Towards Goal     Problem: Patient Education: Go to Patient Education Activity  Goal: Patient/Family Education  Outcome: Progressing Towards Goal

## 2020-09-16 NOTE — DIABETES MGMT
EUGENIE MORGAN  CLINICAL NURSE SPECIALIST CONSULT  [facility] DI[facility]    DISCHARGE RECOMMENDATIONS    Presentation   Navdeep Leal is a 79 y.o. female admitted yesterday for placement of dialysis access. Zaria Resendez HX:   Past Medical History:   Diagnosis Date    Arthritis     Cataract     bilateral    Chronic kidney disease     Dr. Loreta Ledesma Chronic pain     lower back    CKD stage 3 due to type 2 diabetes mellitus ([facility] [facility]     Depression     Diabetes ([facility] [address] age 49    Type II    Follicular thyroid cancer ([facility] [address] 08/2014    Foot ulcer ([facility] [address] [facility]     Gallbladder polyp 8/14/2013    Hypercholesterolemia     Hypertension     Ill-defined condition     states 'polyp' in gal bladder    Long term current use of anticoagulant therapy     Obesity     Right pontine stroke ([facility] [address] 1/17/2017    TIA (transient ischemic attack) 6/6/2014     DX: CHRONIC KIDNEY DISEASE STAGE 5    TX: CREATION OF ARTERIO VENOUS FISTULA  LEFT ARM (proximal radial artery to cephalic vein) 2/80/87    Current clinical course has been uncomplicated. Diabetes: Patient has known Type 2 diabetes for many years, treated with insulin  PTA. Family history positive for diabetes in mother & sister. A1c 7.1%. Consulted by Provider for advanced diabetes nursing assessment and care, specifically related to   [x] Inpatient management strategy    Diabetes-related medical history  Microvascular disease  Nephropathy    Diabetes medication history  Drug class Currently in use Discontinued Never used   Biguanide      DDP-4 inhibitor       Sulfonylurea      Thiazolidinedione      GLP-1 RA      SGLT-2 inhibitors      Basal insulin      Fixed Dose  Combinations Humulin 70/30 insulin D     Bolus insulin        Subjective   I'm hoping to do home today.     Patient reports the following home diabetes self-care practices:  Eating pattern  [x] Breakfast Cereal with banana  [x] Dinner  Chicken or fish with rice and string beans  [x] Beverages Coffee. Water.  Crystal Lite  Physical activity pattern-Walks with cane  Monitoring pattern  [x] Breakfast 200s  [x] Bedtime 100s  Taking medications pattern  [x] Consistent administration  [x] Affordable     Objective   Physical exam  General Alert, oriented and in no acute distress. Conversant and cooperative. Sitting up in bedside chair with home clothes. Vital Signs Afebrile. Hypertensive  Visit Vitals  BP (!) 171/63   Pulse 67   Temp 98.1 °F (36.7 °C)   Resp 16   Ht 5' 5\" (1.651 m)   Wt 102.8 kg (226 lb 10.1 oz)   SpO2 100%   BMI 37.71 kg/m²      Laboratory  Lab Results   Component Value Date/Time    Hemoglobin A1c 7.1 (H) 09/15/2020 07:38 PM    Hemoglobin A1c (POC) 10.2 09/11/2019 04:13 PM     Lab Results   Component Value Date/Time    LDL, calculated 83 02/03/2020 10:22 AM     Lab Results   Component Value Date/Time    Creatinine (POC) 4.3 (H) 09/15/2020 11:07 AM    Creatinine 4.67 (H) 08/21/2020 04:26 PM     Lab Results   Component Value Date/Time    Sodium 138 08/21/2020 04:26 PM    Potassium 4.6 09/15/2020 11:00 AM    Chloride 102 08/21/2020 04:26 PM    CO2 35 (H) 08/21/2020 04:26 PM    Anion gap 1 (L) 08/21/2020 04:26 PM    Glucose 58 (L) 08/21/2020 04:26 PM    BUN 39 (H) 08/21/2020 04:26 PM    Creatinine 4.67 (H) 08/21/2020 04:26 PM    BUN/Creatinine ratio 8 (L) 08/21/2020 04:26 PM    GFR est AA 11 (L) 08/21/2020 04:26 PM    GFR est non-AA 9 (L) 08/21/2020 04:26 PM    Calcium 8.0 (L) 08/21/2020 04:26 PM    Bilirubin, total 0.3 08/21/2020 04:26 PM    Alk.  phosphatase 82 08/21/2020 04:26 PM    Protein, total 7.6 08/21/2020 04:26 PM    Albumin 2.6 (L) 08/21/2020 04:26 PM    Globulin 5.0 (H) 08/21/2020 04:26 PM    A-G Ratio 0.5 (L) 08/21/2020 04:26 PM    ALT (SGPT) 32 08/21/2020 04:26 PM     Lab Results   Component Value Date/Time    ALT (SGPT) 32 08/21/2020 04:26 PM     Factors affecting BG pattern  Factor Dose Comments   Nutrition:  Renal, Carb-controlled meals   60 grams/meal   Eating 100% yesterday   Infection  WBC normal Other: CKD  Last serum creatinine 4.67/GFR 11 (8/2020)     Blood glucose pattern        Assessment and Plan   Nursing Diagnosis Risk for unstable blood glucose pattern   Nursing Intervention Domain 5616 Decision-making Support   Nursing Interventions Examined current inpatient diabetes control   Explored factors facilitating and impeding inpatient management  Identified self-management practices impeding diabetes control     Evaluation   This AA female, with Type 2 diabetes, hasn't achieved inpatient blood glucose target of 100-180mg/dl. Inpatient blood glucose management has been impacted by   [x] Kidney dysfunction  [x] Steroid  No record of receiving EPO (which would affect accuracy of A1c) noted. Patient received first dose of basal insulin today at less than 0.2 units/kg/D. Based on her weight, BMI and kidney function, suspect she should receive at least 0.2 units/kg/D. She can't afford Lantus insulin, so would recommend Relion NPH insulin (from [facility]). Recommendations   Discharge Recommendations:    If she doesn't go home on steroids,  Basal insulin   [x] 0.2 units/kg/D = Relion brand NPH insulin 10 units twice daily    Billing Code(s)   I personally reviewed chart, notes, data and current medications in the medical record, and examined the patient at bedside before making care recommendations.      [x] [id number] subsequent hospital care - 30 minutes      MICHAEL Carrington  [facility] Diabetes [facility]  Access via [employer]

## 2020-09-16 NOTE — PROGRESS NOTES
.General Surgery End of Shift Nursing Note    Bedside shift change report given to Teena Garcia (oncoming nurse) by Nohemy Dsouza RN (offgoing nurse). Report included the following information SBAR, Kardex, MAR and Recent Results. Shift worked:   7p-7a   Summary of shift:    Patient was able to tolerate all medications and procedures. Patient was assisted in getting OOB to restroom with walker and experienced dizziness once returned to bed. Vital signs remained stable, however, BP was 194/56 approximately at midnight. BP returned to baseline (175/61) a few minutes later. Issues for physician to address:   None      Number times ambulated in hallway past shift: 0  Number of times OOB to chair past shift: 1    Pain Management:  Current medication: No pain meds given or requested   Patient states pain is manageable on current pain medication: no     GI:    Current diet:  DIET RENAL WITH OPTIONS 80-80-80; Regular; Consistent Carb 1800kcal    Tolerating current diet: Yes  Passing flatus: Yes   Last Bowel Movement: unknown    Appearance: unknown     Respiratory:    Incentive Spirometer at bedside: no   Patient instructed on use: no     Patient Safety:    Falls Score: 4  Bed Alarm On? No   Sitter?  No     Sheila Rosado

## 2020-09-16 NOTE — PROGRESS NOTES
Surgery    Visit Vitals  BP (!) 187/62   Pulse 81   Temp 99 °F (37.2 °C)   Resp 16   Ht 5' 5\" (1.651 m)   Wt 102.8 kg (226 lb 10.1 oz)   SpO2 97%   BMI 37.71 kg/m²       Subjective: No pain      Exam: Alert, sitting in chair. Palpabl ethrill in AVF left arm. Palpable left radial pulse. No results for input(s): WBC, HGB, PLT, HGBEXT, PLTEXT in the last 72 hours. No results for input(s): INR, PTP, APTT, INREXT in the last 72 hours. Recent Labs     09/15/20  1100   K 4.6     Hospitalist consult appreciated re HTN, diabetes. Imp: Stable post op. Plan: Discuss with hospitalist; aim for discharge today. Follow up in 2 weeks.     Seb Chiang MD

## 2020-09-16 NOTE — PROGRESS NOTES
Plan:  -Home with son  -F/u appts  -Son to transport at d/c   -Obs status       Reason for Admission:   Fistula creation                   RUR Score:   Obs status               PCP: First and Last name:  Kristy Gross MD   Name of Practice:    Are you a current patient: Yes/No: Yes   Approximate date of last visit: Last Monday    Can you participate in a virtual visit if needed:     Do you (patient/family) have any concerns for transition/discharge? None at this time              Plan for utilizing home health:   None at this time     Current Advanced Directive/Advance Care Plan:  AMD naming sons at decision makers            Transition of Care Plan:  Home with son           4:30PM  CM met with pt to complete assessment and discuss d/c planning. Pt is a 78 yo female admitted for fistula creation. Pt presented with some confusion but stated her son, Gideon Mclean lives with her and that she is independent at baseline. Pt reports that she has a walker without wheels at home and a WC. Pt's cane at bedside. Pt call to son, Bebeto Aguirre, with CM in the room. Bebeto Aguirre to transport home and able to come to hospital late afternoon. Pt in observation status. State and [address] letters provided, explained, signed by pt, and placed on chart. PCP appt scheduled and added to AVS. Pt to also f/u with Dr. Cesar Clifton in 2 wks. Contact information on AVS. Pt ready for d/c from CM perspective. CM available for any additional needs. Care Management Interventions  PCP Verified by CM: Yes(Dr. Kristy Gross)  Mode of Transport at Discharge:  Other (see comment)(Pt's son )  Transition of Care Consult (CM Consult): Discharge Planning  Current Support Network: Own Home(Son lives with pt)  Confirm Follow Up Transport: Family  Discharge Location  Discharge Placement: Home with family assistance      PARAS Nolasco  Care Manager

## 2020-09-16 NOTE — DISCHARGE INSTRUCTIONS
Discharge Instructions Following AV Fistula Surgery        Keep dressing in place for two days. Keep incision dry for two days. No lifting over fifteen pounds with operated arm for two weeks. See Dr. Sanjay Chen in the office in two weeks - call for appointment - [phone]. Take pain medications as prescribed as needed or use Tyellenol. Do not drive while taking narcotic pain medication. For any problems, call Dr. Sanjay Chen at [phone] or [phone] (after hours)      YASHIRA Morgan MD    Product Image Cost       [employer]  Relion insulins:    Regular (R)  Isophane (N)  Isophane/Regular (70/30)                  $25.00/1000 unit vial    $42.00/1500 units (300 uints/pen device)     [employer] insulin syringes:    1/3 cc (Holds 30 units)  ½ cc (Holds 50 units)  1 cc (Holds 100 units)                 $12.50/100 syringes     [employer]  Relion blood glucose (BG) meter    $9.00     [employer]  Relion BG test strips                       $9.00/50 test strips     [employer]  Relion lancing device                 $3.65/50 devices

## 2020-09-17 ENCOUNTER — TELEPHONE (OUTPATIENT)
Dept: ENDOCRINOLOGY | Age: 80
End: 2020-09-17

## 2020-09-17 ENCOUNTER — PATIENT OUTREACH (OUTPATIENT)
Dept: CASE MANAGEMENT | Age: 80
End: 2020-09-17

## 2020-09-17 DIAGNOSIS — Z71.89 ACP (ADVANCE CARE PLANNING): Primary | ICD-10-CM

## 2020-09-17 NOTE — TELEPHONE ENCOUNTER
----- Message from Clinton Marshall sent at 2020  2:10 PM EDT -----  Regarding: MD Omer/ telephone  Patient's first and last name: Des Worthington  :  1940    Caller's first and last name: pt       Reason for call: Insulin clarification    Callback required yes/no and why: yes     Best contact number(s): [phone]    Details to clarify the request: Pt would like a call back to clarify switch insulin she is supposed to be taking. Pt has insulin in bottles and pen. Pt is not sure switch is correct.

## 2020-09-17 NOTE — TELEPHONE ENCOUNTER
Jolie Valdez LPN, called spoke to pt. Informed pt to only take novlin 70/30 pens and to take 45 units before breakfast and and none before dinner. Pt verbalized understanding of information discussed w/ no further questions at this time.

## 2020-09-17 NOTE — ACP (ADVANCE CARE PLANNING)
Advance Care Planning:   Does patient have an Advance Directive: yes; reviewed and needs to be updated. Patient has document dated 2014- does not have signature page included. Referral sent to ACP specialist for follow up.

## 2020-09-17 NOTE — TELEPHONE ENCOUNTER
Informed pt per Dr. Mendoza Longest note Continue 70/30 insulin 50 units before breakfast and stop the dose before dinner. Inject 50 units before breakfast and 14 units before dinner + 5 units for every 50 mg/dl above 150 mg/dl. Max 100 units per day  Pt verbalized understanding of information discussed w/ no further questions at this time.

## 2020-09-17 NOTE — TELEPHONE ENCOUNTER
Sharri,    Please ensure she is using the novolin 70/30 pens (I understand she was given lantus vials by the hospital and she should NOT be taking these). She should take 45 units before breakfast (NOT 50 units as listed in your note as I updated her dose in June over the phone and the dose on the med is correct) and none before dinner. Thanks.

## 2020-09-17 NOTE — PROGRESS NOTES
Patient contacted regarding recent discharge and COVID-19 risk. Discussed COVID-19 related testing which was available at this time. Test results were negative. Patient informed of results, if available? Completed prior to this admission as pre op work up. Care Transition Nurse/ Ambulatory Care Manager/ LPN Care Coordinator contacted the patient by telephone to perform post discharge assessment. Verified name and  with patient as identifiers. Patient has following risk factors of: diabetes and chronic kidney disease. CTN/ACM/LPN reviewed discharge instructions, medical action plan and red flags related to discharge diagnosis. Reviewed and educated them on any new and changed medications related to discharge diagnosis. Advised obtaining a 90-day supply of all daily and as-needed medications. Patient stated she has new medications and is taking as prescribed. Patient has questions about her insulin doses-she said it was changed in hospital and she is not sure of home dose. Patient advised  to contact PCP and Dr Karley Rogers her endocrinologist. Ankush Pepe will route  chart to Dr Karley Rogers for clarification. Advance Care Planning:   Does patient have an Advance Directive: yes; reviewed and needs to be updated    Education provided regarding infection prevention, and signs and symptoms of COVID-19 and when to seek medical attention with patient who verbalized understanding. Discussed exposure protocols and quarantine from [address] you at higher risk for severe illness  and given an opportunity for questions and concerns. The patient agrees to contact the COVID-19 hotline [phone] or PCP office for questions related to their healthcare. CTN/ACM/LPN provided contact information for future reference. Patient has follow up appointments with PCP and Surgeon. Patient stated she is aware of appointment times and family will provide transportation.      From CDC: Are you at higher risk for severe illness?  Wash your hands often.  Avoid close contact (6 feet, which is about two arm lengths) with people who are sick.  Put distance between yourself and other people if COVID-19 is spreading in your community.  Clean and disinfect frequently touched surfaces.  Avoid all cruise travel and non-essential air travel.  Call your healthcare professional if you have concerns about COVID-19 and your underlying condition or if you are sick. For more information on steps you can take to protect yourself, see CDC's How to Protect Yourself      Patient/family/caregiver given information for [facility] and agrees to enroll no  Patient's preferred e-mail:  n/a  Patient's preferred phone number: n/a    Plan to follow up in 14 days based on severity of symptoms and risk factors. DMB

## 2020-09-21 ENCOUNTER — TELEPHONE (OUTPATIENT)
Dept: ENDOCRINOLOGY | Age: 80
End: 2020-09-21

## 2020-09-21 RX ORDER — HUMAN INSULIN 100 [IU]/ML
INJECTION, SUSPENSION SUBCUTANEOUS
Qty: 30 ML | Refills: 11
Start: 2020-09-21

## 2020-09-21 NOTE — TELEPHONE ENCOUNTER
Pt stated that she believes that her bs are dropping during the night because her stated that for the last couple of weeks her son stated that he has had a hard time with waking her up. Pt never checks her bs during this time, but feels that because she is unaware of what's going on, her bs is more than likely low. Pt is now afraid to go to sleep. Pt is currently taking her seferino 70/30 45 units in the morning and 14 units at night (new medication dose was given while at [facility]). Her son stated that last night around 7pm her bs was 125 and this morning it was 138 but her son had a hard time with waking her up. Please advise.

## 2020-09-21 NOTE — TELEPHONE ENCOUNTER
----- Message from Joelle Carrasco sent at 9/21/2020  2:01 PM EDT -----  Regarding: /Telephone  General Message/Vendor Calls    Caller's first and last name: Lisandra Zhang      Reason for call:speak to the nurse       Callback required yes/no and why:yes      Best contact number(s): [phone]      Details to clarify the request:P would like to speak to the nurse in regards to pt passed out on Saturday . Pt stated she has a hard time waking up in the morning and blood sugar level is at 240 and is not under control .       Joelle Carrasco

## 2020-09-21 NOTE — TELEPHONE ENCOUNTER
Called and spoke with pt. Clarified that she is taking 45 units of 70/30 insulin in the morning and NOT taking 14 units before dinner  There is question of whether or not her sugar is dropping overnight because her son has had a hard time waking her when he leaves for work at 3-4am but her sugar was 138 this morning at that time, which is not low. I told her to be safe, I will have her decrease her 70/30 insulin to 42 units every morning and still NOT take any insulin before dinner. Tonight she can drink 4 oz of milk, juice or reg soda to prevent her sugar from dropping since she took 45 units this morning. States her sugar is currently around 200 so she will be less likely to drop overnight. I advised her to call us later this week if her sugar is dropping under 120 in the morning despite decreasing to 42 units in the morning. she voiced understanding of this plan.

## 2020-10-02 ENCOUNTER — APPOINTMENT (OUTPATIENT)
Dept: GENERAL RADIOLOGY | Age: 80
DRG: 641 | End: 2020-10-02
Attending: EMERGENCY MEDICINE
Payer: MEDICARE

## 2020-10-02 ENCOUNTER — APPOINTMENT (OUTPATIENT)
Dept: VASCULAR SURGERY | Age: 80
DRG: 641 | End: 2020-10-02
Attending: EMERGENCY MEDICINE
Payer: MEDICARE

## 2020-10-02 ENCOUNTER — DOCUMENTATION ONLY (OUTPATIENT)
Dept: CASE MANAGEMENT | Age: 80
End: 2020-10-02

## 2020-10-02 ENCOUNTER — PATIENT OUTREACH (OUTPATIENT)
Dept: CASE MANAGEMENT | Age: 80
End: 2020-10-02

## 2020-10-02 ENCOUNTER — HOSPITAL ENCOUNTER (INPATIENT)
Age: 80
LOS: 1 days | Discharge: NURSING, REHAB OR LONG-TERM CARE | DRG: 641 | End: 2020-10-06
Attending: EMERGENCY MEDICINE | Admitting: HOSPITALIST
Payer: MEDICARE

## 2020-10-02 ENCOUNTER — APPOINTMENT (OUTPATIENT)
Dept: CT IMAGING | Age: 80
DRG: 641 | End: 2020-10-02
Attending: EMERGENCY MEDICINE
Payer: MEDICARE

## 2020-10-02 DIAGNOSIS — R60.9 PERIPHERAL EDEMA: ICD-10-CM

## 2020-10-02 DIAGNOSIS — R53.1 GENERAL WEAKNESS: ICD-10-CM

## 2020-10-02 DIAGNOSIS — N18.9 CHRONIC RENAL FAILURE, UNSPECIFIED CKD STAGE: ICD-10-CM

## 2020-10-02 DIAGNOSIS — N30.90 CYSTITIS: ICD-10-CM

## 2020-10-02 DIAGNOSIS — R62.7 ADULT FAILURE TO THRIVE: Primary | ICD-10-CM

## 2020-10-02 PROBLEM — R29.898 LEFT LEG WEAKNESS: Status: ACTIVE | Noted: 2020-10-02

## 2020-10-02 PROBLEM — I16.0 HYPERTENSIVE URGENCY: Status: ACTIVE | Noted: 2020-10-02

## 2020-10-02 PROBLEM — R82.90 ABNORMAL URINALYSIS: Status: ACTIVE | Noted: 2020-10-02

## 2020-10-02 LAB
ALBUMIN SERPL-MCNC: 2.6 G/DL (ref 3.5–5)
ALBUMIN/GLOB SERPL: 0.6 {RATIO} (ref 1.1–2.2)
ALP SERPL-CCNC: 165 U/L (ref 45–117)
ALT SERPL-CCNC: 90 U/L (ref 12–78)
ANION GAP SERPL CALC-SCNC: 5 MMOL/L (ref 5–15)
APPEARANCE UR: ABNORMAL
ARTERIAL PATENCY WRIST A: ABNORMAL
AST SERPL-CCNC: 66 U/L (ref 15–37)
ATRIAL RATE: 65 BPM
B-OH-BUTYR SERPL-SCNC: 0.31 MMOL/L
BACTERIA URNS QL MICRO: ABNORMAL /HPF
BASE EXCESS BLD CALC-SCNC: 4 MMOL/L
BASOPHILS # BLD: 0 K/UL (ref 0–0.1)
BASOPHILS NFR BLD: 0 % (ref 0–1)
BDY SITE: ABNORMAL
BILIRUB SERPL-MCNC: 0.3 MG/DL (ref 0.2–1)
BILIRUB UR QL: NEGATIVE
BUN SERPL-MCNC: 47 MG/DL (ref 6–20)
BUN/CREAT SERPL: 10 (ref 12–20)
CA-I BLD-SCNC: 1.1 MMOL/L (ref 1.12–1.32)
CALCIUM SERPL-MCNC: 8.3 MG/DL (ref 8.5–10.1)
CALCULATED P AXIS, ECG09: 48 DEGREES
CALCULATED R AXIS, ECG10: -20 DEGREES
CALCULATED T AXIS, ECG11: 42 DEGREES
CHLORIDE SERPL-SCNC: 106 MMOL/L (ref 97–108)
CO2 SERPL-SCNC: 29 MMOL/L (ref 21–32)
COLOR UR: ABNORMAL
COMMENT, HOLDF: NORMAL
CREAT SERPL-MCNC: 4.68 MG/DL (ref 0.55–1.02)
DIAGNOSIS, 93000: NORMAL
DIFFERENTIAL METHOD BLD: ABNORMAL
EOSINOPHIL # BLD: 0.1 K/UL (ref 0–0.4)
EOSINOPHIL NFR BLD: 2 % (ref 0–7)
EPITH CASTS URNS QL MICRO: ABNORMAL /LPF
ERYTHROCYTE [DISTWIDTH] IN BLOOD BY AUTOMATED COUNT: 17.2 % (ref 11.5–14.5)
GAS FLOW.O2 O2 DELIVERY SYS: ABNORMAL L/MIN
GLOBULIN SER CALC-MCNC: 4.1 G/DL (ref 2–4)
GLUCOSE BLD STRIP.AUTO-MCNC: 163 MG/DL (ref 65–100)
GLUCOSE BLD STRIP.AUTO-MCNC: 279 MG/DL (ref 65–100)
GLUCOSE SERPL-MCNC: 146 MG/DL (ref 65–100)
GLUCOSE UR STRIP.AUTO-MCNC: NEGATIVE MG/DL
HCO3 BLD-SCNC: 29.4 MMOL/L (ref 22–26)
HCT VFR BLD AUTO: 26.4 % (ref 35–47)
HGB BLD-MCNC: 8.3 G/DL (ref 11.5–16)
HGB UR QL STRIP: ABNORMAL
HYALINE CASTS URNS QL MICRO: ABNORMAL /LPF (ref 0–5)
IMM GRANULOCYTES # BLD AUTO: 0 K/UL (ref 0–0.04)
IMM GRANULOCYTES NFR BLD AUTO: 1 % (ref 0–0.5)
KETONES UR QL STRIP.AUTO: NEGATIVE MG/DL
LEUKOCYTE ESTERASE UR QL STRIP.AUTO: ABNORMAL
LYMPHOCYTES # BLD: 0.9 K/UL (ref 0.8–3.5)
LYMPHOCYTES NFR BLD: 18 % (ref 12–49)
MAGNESIUM SERPL-MCNC: 2.1 MG/DL (ref 1.6–2.4)
MCH RBC QN AUTO: 28.3 PG (ref 26–34)
MCHC RBC AUTO-ENTMCNC: 31.4 G/DL (ref 30–36.5)
MCV RBC AUTO: 90.1 FL (ref 80–99)
MONOCYTES # BLD: 0.4 K/UL (ref 0–1)
MONOCYTES NFR BLD: 8 % (ref 5–13)
NEUTS SEG # BLD: 3.5 K/UL (ref 1.8–8)
NEUTS SEG NFR BLD: 71 % (ref 32–75)
NITRITE UR QL STRIP.AUTO: NEGATIVE
NRBC # BLD: 0 K/UL (ref 0–0.01)
NRBC BLD-RTO: 0 PER 100 WBC
P-R INTERVAL, ECG05: 144 MS
PCO2 BLD: 48.6 MMHG (ref 35–45)
PH BLD: 7.39 [PH] (ref 7.35–7.45)
PH UR STRIP: 6 [PH] (ref 5–8)
PLATELET # BLD AUTO: 267 K/UL (ref 150–400)
PMV BLD AUTO: 11.4 FL (ref 8.9–12.9)
PO2 BLD: 25 MMHG (ref 80–100)
POTASSIUM SERPL-SCNC: 3.8 MMOL/L (ref 3.5–5.1)
PROT SERPL-MCNC: 6.7 G/DL (ref 6.4–8.2)
PROT UR STRIP-MCNC: 300 MG/DL
Q-T INTERVAL, ECG07: 448 MS
QRS DURATION, ECG06: 88 MS
QTC CALCULATION (BEZET), ECG08: 465 MS
RBC # BLD AUTO: 2.93 M/UL (ref 3.8–5.2)
RBC #/AREA URNS HPF: ABNORMAL /HPF (ref 0–5)
SAMPLES BEING HELD,HOLD: NORMAL
SAO2 % BLD: 43 % (ref 92–97)
SERVICE CMNT-IMP: ABNORMAL
SERVICE CMNT-IMP: ABNORMAL
SODIUM SERPL-SCNC: 140 MMOL/L (ref 136–145)
SP GR UR REFRACTOMETRY: 1.01 (ref 1–1.03)
SPECIMEN TYPE: ABNORMAL
UROBILINOGEN UR QL STRIP.AUTO: 0.2 EU/DL (ref 0.2–1)
VENTRICULAR RATE, ECG03: 65 BPM
WBC # BLD AUTO: 5 K/UL (ref 3.6–11)
WBC URNS QL MICRO: >100 /HPF (ref 0–4)

## 2020-10-02 PROCEDURE — 82803 BLOOD GASES ANY COMBINATION: CPT

## 2020-10-02 PROCEDURE — 74011250637 HC RX REV CODE- 250/637: Performed by: NURSE PRACTITIONER

## 2020-10-02 PROCEDURE — 82010 KETONE BODYS QUAN: CPT

## 2020-10-02 PROCEDURE — 36600 WITHDRAWAL OF ARTERIAL BLOOD: CPT

## 2020-10-02 PROCEDURE — 82962 GLUCOSE BLOOD TEST: CPT

## 2020-10-02 PROCEDURE — 36415 COLL VENOUS BLD VENIPUNCTURE: CPT

## 2020-10-02 PROCEDURE — 96372 THER/PROPH/DIAG INJ SC/IM: CPT

## 2020-10-02 PROCEDURE — 73562 X-RAY EXAM OF KNEE 3: CPT

## 2020-10-02 PROCEDURE — 85025 COMPLETE CBC W/AUTO DIFF WBC: CPT

## 2020-10-02 PROCEDURE — 74011250636 HC RX REV CODE- 250/636: Performed by: EMERGENCY MEDICINE

## 2020-10-02 PROCEDURE — 74011636637 HC RX REV CODE- 636/637: Performed by: NURSE PRACTITIONER

## 2020-10-02 PROCEDURE — 74011250636 HC RX REV CODE- 250/636: Performed by: NURSE PRACTITIONER

## 2020-10-02 PROCEDURE — 83735 ASSAY OF MAGNESIUM: CPT

## 2020-10-02 PROCEDURE — 96374 THER/PROPH/DIAG INJ IV PUSH: CPT

## 2020-10-02 PROCEDURE — 70450 CT HEAD/BRAIN W/O DYE: CPT

## 2020-10-02 PROCEDURE — 73502 X-RAY EXAM HIP UNI 2-3 VIEWS: CPT

## 2020-10-02 PROCEDURE — 81001 URINALYSIS AUTO W/SCOPE: CPT

## 2020-10-02 PROCEDURE — 99218 HC RM OBSERVATION: CPT

## 2020-10-02 PROCEDURE — 87086 URINE CULTURE/COLONY COUNT: CPT

## 2020-10-02 PROCEDURE — 87077 CULTURE AEROBIC IDENTIFY: CPT

## 2020-10-02 PROCEDURE — 87186 SC STD MICRODIL/AGAR DIL: CPT

## 2020-10-02 PROCEDURE — 74011000258 HC RX REV CODE- 258: Performed by: EMERGENCY MEDICINE

## 2020-10-02 PROCEDURE — 93005 ELECTROCARDIOGRAM TRACING: CPT

## 2020-10-02 PROCEDURE — 99285 EMERGENCY DEPT VISIT HI MDM: CPT

## 2020-10-02 PROCEDURE — 93970 EXTREMITY STUDY: CPT

## 2020-10-02 PROCEDURE — 80053 COMPREHEN METABOLIC PANEL: CPT

## 2020-10-02 PROCEDURE — 51701 INSERT BLADDER CATHETER: CPT

## 2020-10-02 RX ORDER — HYDRALAZINE HYDROCHLORIDE 25 MG/1
25 TABLET, FILM COATED ORAL 3 TIMES DAILY
Status: DISCONTINUED | OUTPATIENT
Start: 2020-10-02 | End: 2020-10-04

## 2020-10-02 RX ORDER — FUROSEMIDE 40 MG/1
80 TABLET ORAL DAILY
Status: DISCONTINUED | OUTPATIENT
Start: 2020-10-03 | End: 2020-10-06 | Stop reason: HOSPADM

## 2020-10-02 RX ORDER — SODIUM CHLORIDE 0.9 % (FLUSH) 0.9 %
5-40 SYRINGE (ML) INJECTION AS NEEDED
Status: DISCONTINUED | OUTPATIENT
Start: 2020-10-02 | End: 2020-10-06 | Stop reason: HOSPADM

## 2020-10-02 RX ORDER — INSULIN GLARGINE 100 [IU]/ML
20 INJECTION, SOLUTION SUBCUTANEOUS
Status: DISCONTINUED | OUTPATIENT
Start: 2020-10-02 | End: 2020-10-06 | Stop reason: HOSPADM

## 2020-10-02 RX ORDER — INSULIN LISPRO 100 [IU]/ML
INJECTION, SOLUTION INTRAVENOUS; SUBCUTANEOUS
Status: DISCONTINUED | OUTPATIENT
Start: 2020-10-02 | End: 2020-10-06 | Stop reason: HOSPADM

## 2020-10-02 RX ORDER — ALLOPURINOL 100 MG/1
100 TABLET ORAL DAILY
Status: DISCONTINUED | OUTPATIENT
Start: 2020-10-03 | End: 2020-10-06 | Stop reason: HOSPADM

## 2020-10-02 RX ORDER — HYDRALAZINE HYDROCHLORIDE 50 MG/1
50 TABLET, FILM COATED ORAL
Status: DISCONTINUED | OUTPATIENT
Start: 2020-10-02 | End: 2020-10-06 | Stop reason: HOSPADM

## 2020-10-02 RX ORDER — HEPARIN SODIUM 5000 [USP'U]/ML
5000 INJECTION, SOLUTION INTRAVENOUS; SUBCUTANEOUS EVERY 8 HOURS
Status: DISCONTINUED | OUTPATIENT
Start: 2020-10-02 | End: 2020-10-06 | Stop reason: HOSPADM

## 2020-10-02 RX ORDER — LANOLIN ALCOHOL/MO/W.PET/CERES
1 CREAM (GRAM) TOPICAL
Status: DISCONTINUED | OUTPATIENT
Start: 2020-10-03 | End: 2020-10-06 | Stop reason: HOSPADM

## 2020-10-02 RX ORDER — MAGNESIUM SULFATE 100 %
4 CRYSTALS MISCELLANEOUS AS NEEDED
Status: DISCONTINUED | OUTPATIENT
Start: 2020-10-02 | End: 2020-10-06 | Stop reason: HOSPADM

## 2020-10-02 RX ORDER — SODIUM CHLORIDE 0.9 % (FLUSH) 0.9 %
5-40 SYRINGE (ML) INJECTION EVERY 8 HOURS
Status: DISCONTINUED | OUTPATIENT
Start: 2020-10-02 | End: 2020-10-06 | Stop reason: HOSPADM

## 2020-10-02 RX ORDER — DEXTROSE MONOHYDRATE 100 MG/ML
0-250 INJECTION, SOLUTION INTRAVENOUS AS NEEDED
Status: DISCONTINUED | OUTPATIENT
Start: 2020-10-02 | End: 2020-10-06 | Stop reason: HOSPADM

## 2020-10-02 RX ORDER — ATORVASTATIN CALCIUM 40 MG/1
80 TABLET, FILM COATED ORAL DAILY
Status: DISCONTINUED | OUTPATIENT
Start: 2020-10-03 | End: 2020-10-06 | Stop reason: HOSPADM

## 2020-10-02 RX ORDER — ACETAMINOPHEN 650 MG/1
650 SUPPOSITORY RECTAL
Status: DISCONTINUED | OUTPATIENT
Start: 2020-10-02 | End: 2020-10-06 | Stop reason: HOSPADM

## 2020-10-02 RX ORDER — ACETAMINOPHEN 325 MG/1
650 TABLET ORAL
Status: DISCONTINUED | OUTPATIENT
Start: 2020-10-02 | End: 2020-10-06 | Stop reason: HOSPADM

## 2020-10-02 RX ORDER — METOPROLOL SUCCINATE 50 MG/1
50 TABLET, EXTENDED RELEASE ORAL DAILY
Status: DISCONTINUED | OUTPATIENT
Start: 2020-10-03 | End: 2020-10-06 | Stop reason: HOSPADM

## 2020-10-02 RX ORDER — PROMETHAZINE HYDROCHLORIDE 25 MG/1
12.5 TABLET ORAL
Status: DISCONTINUED | OUTPATIENT
Start: 2020-10-02 | End: 2020-10-06 | Stop reason: HOSPADM

## 2020-10-02 RX ORDER — ONDANSETRON 2 MG/ML
4 INJECTION INTRAMUSCULAR; INTRAVENOUS
Status: DISCONTINUED | OUTPATIENT
Start: 2020-10-02 | End: 2020-10-06 | Stop reason: HOSPADM

## 2020-10-02 RX ORDER — NYSTATIN 100000 U/G
OINTMENT TOPICAL 2 TIMES DAILY
Status: DISCONTINUED | OUTPATIENT
Start: 2020-10-02 | End: 2020-10-06 | Stop reason: HOSPADM

## 2020-10-02 RX ORDER — POLYETHYLENE GLYCOL 3350 17 G/17G
17 POWDER, FOR SOLUTION ORAL DAILY PRN
Status: DISCONTINUED | OUTPATIENT
Start: 2020-10-02 | End: 2020-10-06 | Stop reason: HOSPADM

## 2020-10-02 RX ORDER — SODIUM CHLORIDE, SODIUM LACTATE, POTASSIUM CHLORIDE, CALCIUM CHLORIDE 600; 310; 30; 20 MG/100ML; MG/100ML; MG/100ML; MG/100ML
50 INJECTION, SOLUTION INTRAVENOUS CONTINUOUS
Status: DISCONTINUED | OUTPATIENT
Start: 2020-10-02 | End: 2020-10-05

## 2020-10-02 RX ORDER — GABAPENTIN 300 MG/1
600 CAPSULE ORAL 3 TIMES DAILY
Status: DISCONTINUED | OUTPATIENT
Start: 2020-10-02 | End: 2020-10-06 | Stop reason: HOSPADM

## 2020-10-02 RX ADMIN — HYDRALAZINE HYDROCHLORIDE 25 MG: 50 TABLET, FILM COATED ORAL at 20:40

## 2020-10-02 RX ADMIN — INSULIN LISPRO 3 UNITS: 100 INJECTION, SOLUTION INTRAVENOUS; SUBCUTANEOUS at 22:32

## 2020-10-02 RX ADMIN — SODIUM CHLORIDE, SODIUM LACTATE, POTASSIUM CHLORIDE, AND CALCIUM CHLORIDE 50 ML/HR: 600; 310; 30; 20 INJECTION, SOLUTION INTRAVENOUS at 22:16

## 2020-10-02 RX ADMIN — INSULIN GLARGINE 20 UNITS: 100 INJECTION, SOLUTION SUBCUTANEOUS at 23:46

## 2020-10-02 RX ADMIN — GABAPENTIN 600 MG: 300 CAPSULE ORAL at 22:40

## 2020-10-02 RX ADMIN — CEFTRIAXONE 1 G: 1 INJECTION, POWDER, FOR SOLUTION INTRAMUSCULAR; INTRAVENOUS at 14:48

## 2020-10-02 RX ADMIN — Medication 10 ML: at 22:16

## 2020-10-02 RX ADMIN — CLONIDINE HYDROCHLORIDE 0.3 MG: 0.2 TABLET ORAL at 20:39

## 2020-10-02 RX ADMIN — HEPARIN SODIUM 5000 UNITS: 5000 INJECTION INTRAVENOUS; SUBCUTANEOUS at 22:34

## 2020-10-02 NOTE — PROGRESS NOTES
Patient resolved from Transition of Care episode on 10/2/20   Patient has returned to ER, this episode of care is resolved. Patient maybe opended to care management at later date after discharge. Patient/family was provided the following resources and education related to COVID-19 during the initial call:                         Signs, symptoms and red flags related to COVID-19            CDC exposure and quarantine guidelines            [facility] exposure contact - [phone]            Contact for their local Department of Health             No further outreach scheduled with this CTN/ACM. Episode of Care resolved. Patient has this CTN/ACM contact information if future needs arise. LEXUSB

## 2020-10-02 NOTE — H&P
[facility] Adult  Hospitalist Group  History and Physical    Primary Care Provider: Kylee Sams MD  Date of Service:  10/2/2020    Subjective: Nicky Jacobs is a 79 y.o. female who presented to ED w/ son and CC failure to thrive, bedsore between buttocks/legs, left side hip and ankle pain for the last 2 years. According to ED notes, it appears that the patient was possibly going to be discharged in the ED case management was unable to place the patient tonight and so we were asked to admit the patient for failure to thrive. Work-up in ED included largely unremarkable labs, history of CKD 5, abnormal UA that appears to be chronic/colonized, head CT with no acute intracranial hemorrhage mass or infarct. X-ray of the left knee shows bicompartmental osteoarthritis with a small joint effusion, left hip x-ray with no acute abnormality. EKG with normal sinus rhythm. She has seen no acute distress with her son at the bedside. She is alert and oriented x4. She denies any pain, headache, dizziness, cough, chest pain, abdominal pain, nausea vomiting diarrhea or dysuria. Reports that she is not been and unable to lift her left leg for approximately the last 1 month. It is not associated with pain. She feels uncomfortable going home because she lives alone and says her son cannot either help her because he is . Discussed with Dr. Jose L Gordillo. Review of Systems:    A comprehensive review of systems was negative except for that written in the History of Present Illness.      Past Medical History:   Diagnosis Date    Arthritis     Cataract     bilateral    Chronic kidney disease     Dr. Cy Red Chronic pain     lower back    CKD stage 3 due to type 2 diabetes mellitus ([facility]     Depression     Diabetes [facility]) age 49    Type II    Follicular thyroid cancer ([facility] 08/2014    Foot ulcer ([facility]     Gallbladder polyp 8/14/2013    Hypercholesterolemia     Hypertension     Ill-defined condition     states 'polyp' in gal bladder    Long term current use of anticoagulant therapy     Obesity     Right pontine stroke ([facility] 1/17/2017    TIA (transient ischemic attack) 6/6/2014      Past Surgical History:   Procedure Laterality Date    ABDOMEN SURGERY PROC UNLISTED  2006    stomach    BREAST SURGERY PROCEDURE UNLISTED  2009, 2006    breast bx-vince    COLONOSCOPY N/A 12/17/2019    COLONOSCOPY performed by Max Carrero MD at [address] [place] [address]  12/17/2019         COLORECTAL SCRN; HI RISK IND  5/30/2014         HX BREAST BIOPSY Right [id number]    2 biopies on the right. Benign (per patient)    HX BREAST BIOPSY Left 2015    Benign (per patient)    HX CATARACT REMOVAL Left     HX GYN  1970's    partial hysterectomy    HX HERNIA REPAIR  2009    hiatal    HX HYSTERECTOMY      HX ORTHOPAEDIC Bilateral 1988    bunion    HX THYROIDECTOMY  2014    Dr. Danielle Decker     Prior to Admission medications    Medication Sig Start Date End Date Taking? Authorizing Provider   insulin nph-regular human rec (NovoLIN 70-30 FlexPen U-100) 100 unit/mL (70-30) inpn Inject 42 units before breakfast and none before dinner. Max 100 units per day--pt will pay cash--Dose change 9/21/20--updated med list--did not send prescription to the pharmacy 9/21/20   Diana Piper MD   hydrALAZINE (APRESOLINE) 25 mg tablet Take 1 Tab by mouth three (3) times daily. 9/16/20   Escobar Perez MD   metoprolol succinate (TOPROL-XL) 50 mg XL tablet Take 50 mg by mouth daily. Provider, Historical   cloNIDine HCL (CATAPRES) 0.3 mg tablet Take 0.3 mg by mouth two (2) times a day. Provider, Historical   gabapentin (NEURONTIN) 300 mg capsule Take 2 Caps by mouth three (3) times daily. Max Daily Amount: 1,800 mg. 7/6/20   Diana Piper MD   furosemide (LASIX) 80 mg tablet Take 80 mg by mouth daily.  4/2/20   Provider, Historical   SYNTHROID 175 mcg tablet Take 1 Tab by mouth Daily (before breakfast). Take 1 Tab by mouth Daily (before breakfast). BRAND MEDICALLY NECESSARY--Delete 150 mcg dose from profile 2/6/20   Jagdeep Child MD   atorvastatin (LIPITOR) 80 mg tablet Take 80 mg by mouth daily. Provider, Historical   ferrous sulfate (IRON) 325 mg (65 mg iron) tablet Take  by mouth daily. Provider, Historical   PYRIDOXINE HCL, VITAMIN B6, (VITAMIN B-6 PO) Take  by mouth daily. Provider, Historical   allopurinol (ZYLOPRIM) 100 mg tablet Take  by mouth daily. Provider, Historical   multivitamins-minerals-lutein (CENTRUM SILVER) Tab Take  by mouth daily. Provider, Historical     Allergies   Allergen Reactions    Amlodipine Swelling    Clindamycin Rash    Nifedipine Swelling    Sulfa (Sulfonamide Antibiotics) Rash      Family History   Problem Relation Age of Onset    Heart Disease Mother     Hypertension Mother     Diabetes Mother     Stroke Mother     Cancer Father         colon    Diabetes Sister     Heart Attack Sister     Hypertension Sister     Heart Disease Sister         pacemaker    Lung Disease Brother     Hypertension Brother     Lung Disease Brother     Anesth Problems Neg Hx         SOCIAL HISTORY:  Patient resides at home alone  Patient ambulates with walker, unable to ambulate x1 week  Smoking history: Denies  Alcohol history: Denies    Objective:       Physical Exam:   General:  Alert, cooperative, no distress, appears stated age   Head:  Normocephalic, without obvious abnormality, atraumatic. Eyes:  Conjunctivae/corneas clear. Pupils equal, round, reactive to light. Lungs:   Clear to auscultation bilaterally   Heart:  Regular rate and rhythm, S1, S2 normal, no murmur, click, rub, or gallop. Abdomen:   Soft, non-tender/non-distended. Bowel sounds normal   Extremities: Extremities normal, atraumatic, no cyanosis or edema. Pulses: 2+ and symmetric all extremities. Skin: Skin color, texture, turgor normal. No rashes or lesions. Neurologic: CNII-XII grossly intact. Normal strength. A&Ox4       ECG:  NSR    Data Review: All diagnostic labs and studies have been reviewed.       Assessment:     Principal Problem:    Failure to thrive in adult (10/2/2020)    Active Problems:    Recurrent ventral incisional hernia (8/14/2013)      Obesity (BMI 35.0-39.9 without comorbidity) (8/14/2013)      CKD (chronic kidney disease) stage 5, GFR less than 15 ml/min (HCC) (6/6/2014)      Type 2 diabetes with nephropathy ([facility] (4/15/2018)      Left leg weakness (10/2/2020)      Abnormal urinalysis (10/2/2020)      Hypertensive urgency (10/2/2020)        Plan:     Failure to thrive  -unable to get OOB/ambulate for last 1-2 weeks  -Needs placement, feels unsafe going home - lives alone  -CM consult  -supportive care    LLE weakness  -Unable to lift off bed x1 month  -denies pain  -Left knee xray w/ small effusion - consult ortho  -MRI lumbar/thoracic spine; consult ortho if abnormal  -PT/OT     Abnormal UA  -appears chronic/colonized  -asymptomatic, afebrile, no WBC  -received 1 dose ceftriaxone in ED; no further abx indicated  -urine culture sent    Hypertensive Urgency  -No treatment in ED  -resume home meds w/ first dose now  -PRN hydralazine    Acute Renal Failure on CKD5: POA creat/gfr 4.68/11 (baseline creat 3.2-3.9)  -Not on HD; has had mapping for fistula placement in future  -Sees Dr. Meyers [address] as OP  -Gentle IV hydration overnight  -resume home lasix    DMII: AC/HS accuchecks w/ SSI, lantus 20u qhs, monitor BS  Abd Hernia: stable, easily reducible; supportive care  Anemia d/t CKD: stable, continue iron daily  HLD: continue atorvastatin  Hypothyroid: continue levothyroxine    DVTppx: SCDs, heparin  Gippx: NA  Code Status: DNR - DDNR filled out  Diet: Diabetic  Activity: OOB to chair TID and PRN  Discharge: TBD      Signed By: Aren Alexander, SUDHIR     October 2, 2020

## 2020-10-02 NOTE — ED NOTES
Patient straight catheterized for urine. No signs of acute distress. Urine sample obtained. Patient cleaned and repositioned in bed. RN will continue to monitor.

## 2020-10-02 NOTE — PROGRESS NOTES
Noted part of 6 page [place] [facility] on file. During assessment patient acknowledges Manisha Smith should be primary. Son acknowledges Josie Max has not been available for his Mother's care.      Informed patient Nusrat Lara AMD can be completed at the [facility].

## 2020-10-02 NOTE — ED TRIAGE NOTES
Patient presents from home with complaints of bedsore in between her buttocks and in between her legs.  Patient also reports left sided hip and ankle pain for the last 2 years

## 2020-10-02 NOTE — PROGRESS NOTES
Date of previous inpatient admission/ ED visit? Patient previously seen in the ED 8/21/20 for leg pain    What brought the patient back to ED? Patient presents to the ED for wound evaluation    Did patient decline recommended services during last admission/ ED visit (if yes, what)? No    Has patient seen a provider since their last inpatient admission/ED visit (if yes, when)? Per Patient seen at [facility] 9/10/20. Seen by PCP same day prior to ED    PCP: First and Last name: Alesia Porter   Name of Practice: [facility]   Are you a current patient: Yes/No: Yes   Approximate date of last visit: 9/10/20    CM Interventions:  From previous inpatient admission/ED visit: Patient is followed by [facility] CM Team (will follow regarding ACP)  From current inpatient admission/ED visit: Assessment    SSED/CM consult received and appreciated. EMR reviewed. History significant for CKD, diabetes, TIA, pontine stroke, hypertension, and hypercholesterolemia. Met w/ patient and son Daisy Dalton [phone] - introduced to role of CM. Patient lives home alone and has become more debilitated in past few weeks. Home is 2 stories w/ ramp (bedroom is on the first floor). During month of September seen by PCP who referred to [facility] ED for evaluation. Ms. Dmitri Frazier had ED visits at [address] 7/12/20 and 8/21/20. Patient endorses has not been able to mobilize after [place]. DME used is a cane (from [facility] 5 yrs ago/ [facility] Inpatient Admission). Timothy Monroy checks on during the week and is available. Family was able to obtain a care aide for this week (on-line) M-F [id number] Aide acknowledged today patient needed to have more care and get rehab for her knee and mobility.  has not had a previous Inpatient Admission in past 30 days.  CM discussed processes evaluation by Hospitalist to determine disposition Home (currently is not safe since patient lives alone and unable to mobilize) versus admission OBS/ INPT. If patient is admitted has received care at [address] [place] [address] ([facility]). CM discussed LOC determination by Physician and recommendations by PT/OT. / son requesting to review SNF list - this writer provided list.      does not have a LTC policy/ nor private funds to supplement care. Patient has not applied for medicaid in the past.      Care Management Interventions  PCP Verified by CM:  Yes  Last Visit to PCP: 09/10/20  Palliative Care Criteria Met (RRAT>21 & CHF Dx)?: No  Transition of Care Consult (CM Consult): Discharge Planning  MyChart Signup: No  Discharge Durable Medical Equipment: No  Health Maintenance Reviewed: Yes  Physical Therapy Consult: No  Occupational Therapy Consult: No  Speech Therapy Consult: No  Current Support Network: Own Home, Lives Alone   Resource Information Provided?: No  Discharge Location  Discharge Placement: (TBD)

## 2020-10-02 NOTE — PROGRESS NOTES
Review of chart indicates patient is currently in [facility] ED with complaints of a wound, left sided hip and ankle pain. RN will call her next week regarding Advance Care Planning if she has returned home.  Greta Hernandez RN

## 2020-10-02 NOTE — PROGRESS NOTES
Admission Medication Reconciliation: In progress:    Spoke with son David Keys) by telephone @ [phone], unable to speak with patient face to face at this time due to general isolation precautions in the ED related to COVID-19 pandemic. Son is a poor historian, states he does not live with her and does not know her regimen. RX query is available at this time. Attempting to reach bedside RN to determine whether patient can use hospital phone for interview. Will update if current information becomes available. Thank you for allowing me to participate in the care of your patient. Tiffany RogerD, RN # [phone]       [facility] pharmacy benefit data reflects medications filled and processed through the patient's insurance, however   this data does NOT capture whether the medication was picked up or is currently being taken by the patient. Allergies:  Amlodipine; Clindamycin; Nifedipine; and Sulfa (sulfonamide antibiotics)    Significant PMH/Disease States:   Past Medical History:   Diagnosis Date    Arthritis     Cataract     bilateral    Chronic kidney disease     Dr. Donald Pal    Chronic pain     lower back    CKD stage 3 due to type 2 diabetes mellitus ([facility]     Depression     Diabetes [facility]) age 49    Type II    Follicular thyroid cancer ([facility] 08/2014    Foot ulcer ([facility]     Gallbladder polyp 8/14/2013    Hypercholesterolemia     Hypertension     Ill-defined condition     states 'polyp' in gal bladder    Long term current use of anticoagulant therapy     Obesity     Right pontine stroke ([facility] 1/17/2017    TIA (transient ischemic attack) 6/6/2014     Chief Complaint for this Admission:    Chief Complaint   Patient presents with    Wound Check     Prior to Admission Medications:   Prior to Admission Medications   Prescriptions Last Dose Informant Taking? PYRIDOXINE HCL, VITAMIN B6, (VITAMIN B-6 PO)   No   Sig: Take  by mouth daily.    SYNTHROID 175 mcg tablet   No   Sig: Take 1 Tab by mouth Daily (before breakfast). Take 1 Tab by mouth Daily (before breakfast). BRAND MEDICALLY NECESSARY--Delete 150 mcg dose from profile   allopurinol (ZYLOPRIM) 100 mg tablet   No   Sig: Take  by mouth daily. atorvastatin (LIPITOR) 80 mg tablet   No   Sig: Take 80 mg by mouth daily. cloNIDine HCL (CATAPRES) 0.3 mg tablet   No   Sig: Take 0.3 mg by mouth two (2) times a day. ferrous sulfate (IRON) 325 mg (65 mg iron) tablet   No   Sig: Take  by mouth daily. furosemide (LASIX) 80 mg tablet   No   Sig: Take 80 mg by mouth daily. gabapentin (NEURONTIN) 300 mg capsule   No   Sig: Take 2 Caps by mouth three (3) times daily. Max Daily Amount: 1,800 mg.   hydrALAZINE (APRESOLINE) 25 mg tablet   No   Sig: Take 1 Tab by mouth three (3) times daily. insulin nph-regular human rec (NovoLIN 70-30 FlexPen U-100) 100 unit/mL (70-30) inpn   No   Sig: Inject 42 units before breakfast and none before dinner. Max 100 units per day--pt will pay cash--Dose change 9/21/20--updated med list--did not send prescription to the pharmacy   metoprolol succinate (TOPROL-XL) 50 mg XL tablet   No   Sig: Take 50 mg by mouth daily. multivitamins-minerals-lutein (CENTRUM SILVER) Tab   No   Sig: Take  by mouth daily. Facility-Administered Medications: None       Please contact the main inpatient pharmacy with any questions or concerns at [phone] and we will direct you to the clinical pharmacist covering this patient's care while in-house.    Julien Martinez, [address]

## 2020-10-02 NOTE — ED PROVIDER NOTES
12:43 PM  I have evaluated the patient as the Provider in Triage. I have reviewed Her vital signs and the triage nurse assessment. I have talked with the patient and any available family and advised that I am the provider in triage and have ordered the appropriate study to initiate their work up based on the clinical presentation during my assessment. I have advised that the patient will be accommodated in the Main ED as soon as possible. I have also requested to contact the triage nurse or myself immediately if the patient experiences any changes in their condition during this brief waiting period. MD Dr. Arsh Chew dictating. 26-year-old female with a history of CKD, diabetes, TIA, pontine stroke, hypertension hypercholesterolemia presents with a chief complaint of wound check. Patient's son provides the history for this HPI. He reports that his mother has had a steady cognitive decline over the last month. She lives alone and does not currently have any help. He reports that she has not been able to get out of bed recently. He tells me that she has not even rotating herself in bed. He is concerned because he found some wounds on her bottom and in between her legs today. She denies any chest pain, shortness of breath, cough, abdominal pain. She has not had any fevers. Wound Check    Pertinent negatives include no back pain.         Past Medical History:   Diagnosis Date    Arthritis     Cataract     bilateral    Chronic kidney disease     Dr. 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 2 diabetes mellitus ([facility]     Depression     Diabetes [facility]) age 49    Type II    Follicular thyroid cancer ([address] 08/2014    Foot ulcer ([address]     Gallbladder polyp 8/14/2013    Hypercholesterolemia     Hypertension     Ill-defined condition     states 'polyp' in gal bladder    Long term current use of anticoagulant therapy     Obesity     Right pontine stroke ([address] 1/17/2017    TIA (transient ischemic attack) 6/6/2014       Past Surgical History:   Procedure Laterality Date    ABDOMEN SURGERY PROC UNLISTED  2006    stomach    BREAST SURGERY PROCEDURE UNLISTED  2009, 2006    breast bx-vince    COLONOSCOPY N/A 12/17/2019    COLONOSCOPY performed by Jim Davis MD at [address] [place] [address]  12/17/2019         COLORECTAL SCRN; HI RISK IND  5/30/2014         HX BREAST BIOPSY Right [id number]    2 biopies on the right. Benign (per patient)    HX BREAST BIOPSY Left 2015    Benign (per patient)    HX CATARACT REMOVAL Left     HX GYN  1970's    partial hysterectomy    HX HERNIA REPAIR  2009    hiatal    HX HYSTERECTOMY      HX ORTHOPAEDIC Bilateral 1988    bunion    HX THYROIDECTOMY  2014    Dr. Krystal Melvin         Family History:   Problem Relation Age of Onset    Heart Disease Mother     Hypertension Mother     Diabetes Mother     Stroke Mother     Cancer Father         colon    Diabetes Sister     Heart Attack Sister     Hypertension Sister     Heart Disease Sister         pacemaker    Lung Disease Brother     Hypertension Brother     Lung Disease Brother     Anesth Problems Neg Hx        Social History     Socioeconomic History    Marital status:      Spouse name: Not on file    Number of children: Not on file    Years of education: Not on file    Highest education level: Not on file   Occupational History    Not on file   Social Needs    Financial resource strain: Not on file    Food insecurity     Worry: Not on file     Inability: Not on file    Transportation needs     Medical: Not on file     Non-medical: Not on file   Tobacco Use    Smoking status: Never Smoker    Smokeless tobacco: Never Used   Substance and Sexual Activity    Alcohol use: No    Drug use: No    Sexual activity: Not on file   Lifestyle    Physical activity     Days per week: Not on file     Minutes per session: Not on file    Stress: Not on file   Relationships    Social connections     Talks on phone: Not on file     Gets together: Not on file     Attends [employer]al service: Not on file     Active member of club or organization: Not on file     Attends meetings of clubs or organizations: Not on file     Relationship status: Not on file    Intimate partner violence     Fear of current or ex partner: Not on file     Emotionally abused: Not on file     Physically abused: Not on file     Forced sexual activity: Not on file   Other Topics Concern    Not on file   Social History Narrative    Lives in [place] with her son. Also has one other son. . Used to work at [employer] and [employer]. Likes to [employer]. ALLERGIES: Amlodipine; Clindamycin; Nifedipine; and Sulfa (sulfonamide antibiotics)    Review of Systems   Constitutional: Negative for fever. HENT: Negative for rhinorrhea. Respiratory: Negative for shortness of breath. Cardiovascular: Negative for chest pain. Gastrointestinal: Negative for abdominal pain. Genitourinary: Negative for dysuria. Musculoskeletal: Negative for back pain. Skin: Positive for wound. Neurological: Negative for headaches. Psychiatric/Behavioral: Positive for confusion. Vitals:    10/02/20 1146   BP: (!) 188/75   Pulse: 69   Resp: 16   Temp: 97.7 °F (36.5 °C)   SpO2: 97%            Physical Exam  Vitals signs and nursing note reviewed. Constitutional:       General: She is not in acute distress. Appearance: Normal appearance. She is not ill-appearing, toxic-appearing or diaphoretic. HENT:      Head: Normocephalic and atraumatic. Eyes:      Extraocular Movements: Extraocular movements intact. Neck:      Musculoskeletal: Normal range of motion. Cardiovascular:      Rate and Rhythm: Normal rate and regular rhythm. Pulses: Normal pulses. Heart sounds: Normal heart sounds. No murmur. No friction rub. No gallop.     Pulmonary:      Effort: Pulmonary effort is normal. No respiratory distress. Breath sounds: Normal breath sounds. No wheezing, rhonchi or rales. Abdominal:      General: Abdomen is flat. Bowel sounds are normal. There is distension. Palpations: Abdomen is soft. Tenderness: There is no abdominal tenderness. Musculoskeletal: Normal range of motion. Skin:     General: Skin is warm and dry. Comments: See picture below. Neurological:      Mental Status: She is alert and oriented to person, place, and time. Psychiatric:         Mood and Affect: Mood normal.                      MDM  Number of Diagnoses or Management Options  Diagnosis management comments: EKG shows a normal sinus rhythm at a rate of 65, normal intervals, normal axis, no evidence of active ischemia. Patient seen evaluated by myself. She presents with fatigue and inability to perform her activities of daily living. Obviously there is some concern for infection. Patient has had stroke in the past and I do feel that a CT of the head is indicated to evaluate for intracranial process including stroke. Laboratory studies will be obtained as well. Urinalysis shows significant pyuria. The patient will be treated with Rocephin. Case management consult was placed for home assistance or placement. Patient will be signed out to the night attending pending CT of the head and disposition. 3:00 PM  Change of shift. Care of patient signed over to Dr. Tate Benoit. Bedside handoff complete. Awaiting CT head and dispo.             Procedures

## 2020-10-02 NOTE — ED NOTES
Pt re-evaluated and notes feeling unsafe at home due to profound weakness. Ultrasound added on and xrays due to pain and swelling in left leg.  saw the patient and despite the patient clearly needing placement somewhere she is unable to place her from the ER. Patient will need admission for further evaluation and management          Perfect Serve Consult for Admission  5:41 PM    ED Room Number: ER16/16  Patient Name and age: Sanjuana Hutchins 79 y.o.  female  Working Diagnosis:   1. Adult failure to thrive    2. Cystitis    3. General weakness    4. Peripheral edema    5. Chronic renal failure, unspecified CKD stage        COVID-19 Suspicion:  no  Sepsis present:  no  Reassessment needed: no  Code Status:  Full Code  Readmission: no  Isolation Requirements:  no  Recommended Level of Care:  telemetry  Department:[facility] Adult ED - 21   Other:  Pt is a 78 y/o female with pmh significant for dm, CKD, TIA, prior pontine stroke, HTN, hyperlipidemia who was brought to the by her son for evaluation of profound general weakness and failure to thrive. Apparently she has been so weak that she has just been laying in bed and not even able to rotate herself in the bed. She apparently lives at home is unable to care for herself or perform her ADLs. Patient has peripheral edema and ultrasound is pending however she denies any chest pain or shortness of breath. She does have a significant urinary tract infection which may be contributing to her symptoms. Patient was seen by  for possible placement however they were unable to place her from the ER and she is unable to ambulate or care for herself at home at this time.

## 2020-10-03 LAB
ALBUMIN SERPL-MCNC: 2.2 G/DL (ref 3.5–5)
ALBUMIN/GLOB SERPL: 0.6 {RATIO} (ref 1.1–2.2)
ALP SERPL-CCNC: 138 U/L (ref 45–117)
ALT SERPL-CCNC: 68 U/L (ref 12–78)
ANION GAP SERPL CALC-SCNC: 3 MMOL/L (ref 5–15)
AST SERPL-CCNC: 38 U/L (ref 15–37)
BASOPHILS # BLD: 0 K/UL (ref 0–0.1)
BASOPHILS NFR BLD: 1 % (ref 0–1)
BILIRUB SERPL-MCNC: 0.4 MG/DL (ref 0.2–1)
BUN SERPL-MCNC: 43 MG/DL (ref 6–20)
BUN/CREAT SERPL: 10 (ref 12–20)
CALCIUM SERPL-MCNC: 8 MG/DL (ref 8.5–10.1)
CHLORIDE SERPL-SCNC: 109 MMOL/L (ref 97–108)
CO2 SERPL-SCNC: 29 MMOL/L (ref 21–32)
CREAT SERPL-MCNC: 4.37 MG/DL (ref 0.55–1.02)
DIFFERENTIAL METHOD BLD: ABNORMAL
EOSINOPHIL # BLD: 0.1 K/UL (ref 0–0.4)
EOSINOPHIL NFR BLD: 2 % (ref 0–7)
ERYTHROCYTE [DISTWIDTH] IN BLOOD BY AUTOMATED COUNT: 17.3 % (ref 11.5–14.5)
GLOBULIN SER CALC-MCNC: 3.8 G/DL (ref 2–4)
GLUCOSE BLD STRIP.AUTO-MCNC: 127 MG/DL (ref 65–100)
GLUCOSE BLD STRIP.AUTO-MCNC: 151 MG/DL (ref 65–100)
GLUCOSE BLD STRIP.AUTO-MCNC: 223 MG/DL (ref 65–100)
GLUCOSE BLD STRIP.AUTO-MCNC: 79 MG/DL (ref 65–100)
GLUCOSE SERPL-MCNC: 98 MG/DL (ref 65–100)
HCT VFR BLD AUTO: 23.9 % (ref 35–47)
HGB BLD-MCNC: 7.5 G/DL (ref 11.5–16)
IMM GRANULOCYTES # BLD AUTO: 0 K/UL (ref 0–0.04)
IMM GRANULOCYTES NFR BLD AUTO: 0 % (ref 0–0.5)
IRON SATN MFR SERPL: 13 % (ref 20–50)
IRON SERPL-MCNC: 27 UG/DL (ref 35–150)
LYMPHOCYTES # BLD: 1 K/UL (ref 0.8–3.5)
LYMPHOCYTES NFR BLD: 25 % (ref 12–49)
MAGNESIUM SERPL-MCNC: 2.1 MG/DL (ref 1.6–2.4)
MCH RBC QN AUTO: 28 PG (ref 26–34)
MCHC RBC AUTO-ENTMCNC: 31.4 G/DL (ref 30–36.5)
MCV RBC AUTO: 89.2 FL (ref 80–99)
MONOCYTES # BLD: 0.6 K/UL (ref 0–1)
MONOCYTES NFR BLD: 13 % (ref 5–13)
NEUTS SEG # BLD: 2.5 K/UL (ref 1.8–8)
NEUTS SEG NFR BLD: 59 % (ref 32–75)
NRBC # BLD: 0 K/UL (ref 0–0.01)
NRBC BLD-RTO: 0 PER 100 WBC
PLATELET # BLD AUTO: 254 K/UL (ref 150–400)
PMV BLD AUTO: 11 FL (ref 8.9–12.9)
POTASSIUM SERPL-SCNC: 3.7 MMOL/L (ref 3.5–5.1)
PROT SERPL-MCNC: 6 G/DL (ref 6.4–8.2)
RBC # BLD AUTO: 2.68 M/UL (ref 3.8–5.2)
SERVICE CMNT-IMP: ABNORMAL
SERVICE CMNT-IMP: NORMAL
SODIUM SERPL-SCNC: 141 MMOL/L (ref 136–145)
TIBC SERPL-MCNC: 207 UG/DL (ref 250–450)
WBC # BLD AUTO: 4.2 K/UL (ref 3.6–11)

## 2020-10-03 PROCEDURE — 74011636637 HC RX REV CODE- 636/637: Performed by: HOSPITALIST

## 2020-10-03 PROCEDURE — 96372 THER/PROPH/DIAG INJ SC/IM: CPT

## 2020-10-03 PROCEDURE — 94640 AIRWAY INHALATION TREATMENT: CPT

## 2020-10-03 PROCEDURE — 85025 COMPLETE CBC W/AUTO DIFF WBC: CPT

## 2020-10-03 PROCEDURE — 77030019905 HC CATH URETH INTMIT MDII -A

## 2020-10-03 PROCEDURE — 97165 OT EVAL LOW COMPLEX 30 MIN: CPT

## 2020-10-03 PROCEDURE — 99218 HC RM OBSERVATION: CPT

## 2020-10-03 PROCEDURE — 97530 THERAPEUTIC ACTIVITIES: CPT

## 2020-10-03 PROCEDURE — 94760 N-INVAS EAR/PLS OXIMETRY 1: CPT

## 2020-10-03 PROCEDURE — 83540 ASSAY OF IRON: CPT

## 2020-10-03 PROCEDURE — 36415 COLL VENOUS BLD VENIPUNCTURE: CPT

## 2020-10-03 PROCEDURE — 74011636637 HC RX REV CODE- 636/637: Performed by: NURSE PRACTITIONER

## 2020-10-03 PROCEDURE — 97161 PT EVAL LOW COMPLEX 20 MIN: CPT

## 2020-10-03 PROCEDURE — 74011250637 HC RX REV CODE- 250/637: Performed by: NURSE PRACTITIONER

## 2020-10-03 PROCEDURE — 74011000258 HC RX REV CODE- 258: Performed by: HOSPITALIST

## 2020-10-03 PROCEDURE — 74011250637 HC RX REV CODE- 250/637: Performed by: EMERGENCY MEDICINE

## 2020-10-03 PROCEDURE — 82962 GLUCOSE BLOOD TEST: CPT

## 2020-10-03 PROCEDURE — A9270 NON-COVERED ITEM OR SERVICE: HCPCS | Performed by: HOSPITALIST

## 2020-10-03 PROCEDURE — 77030029684 HC NEB SM VOL KT MONA -A

## 2020-10-03 PROCEDURE — 74011250636 HC RX REV CODE- 250/636: Performed by: NURSE PRACTITIONER

## 2020-10-03 PROCEDURE — 80053 COMPREHEN METABOLIC PANEL: CPT

## 2020-10-03 PROCEDURE — 74011250636 HC RX REV CODE- 250/636: Performed by: HOSPITALIST

## 2020-10-03 PROCEDURE — 96376 TX/PRO/DX INJ SAME DRUG ADON: CPT

## 2020-10-03 PROCEDURE — 83735 ASSAY OF MAGNESIUM: CPT

## 2020-10-03 PROCEDURE — 74011000250 HC RX REV CODE- 250: Performed by: NURSE PRACTITIONER

## 2020-10-03 RX ORDER — IPRATROPIUM BROMIDE AND ALBUTEROL SULFATE 2.5; .5 MG/3ML; MG/3ML
3 SOLUTION RESPIRATORY (INHALATION)
Status: DISCONTINUED | OUTPATIENT
Start: 2020-10-03 | End: 2020-10-06 | Stop reason: HOSPADM

## 2020-10-03 RX ORDER — ALBUTEROL SULFATE 0.83 MG/ML
2.5 SOLUTION RESPIRATORY (INHALATION)
Status: DISCONTINUED | OUTPATIENT
Start: 2020-10-03 | End: 2020-10-06 | Stop reason: HOSPADM

## 2020-10-03 RX ORDER — PREDNISONE 10 MG/1
10 TABLET ORAL
Status: DISCONTINUED | OUTPATIENT
Start: 2020-10-03 | End: 2020-10-06 | Stop reason: HOSPADM

## 2020-10-03 RX ORDER — ALBUTEROL SULFATE 90 UG/1
1 AEROSOL, METERED RESPIRATORY (INHALATION)
Status: DISCONTINUED | OUTPATIENT
Start: 2020-10-03 | End: 2020-10-03 | Stop reason: CLARIF

## 2020-10-03 RX ADMIN — HEPARIN SODIUM 5000 UNITS: 5000 INJECTION INTRAVENOUS; SUBCUTANEOUS at 23:03

## 2020-10-03 RX ADMIN — INSULIN LISPRO 2 UNITS: 100 INJECTION, SOLUTION INTRAVENOUS; SUBCUTANEOUS at 23:06

## 2020-10-03 RX ADMIN — GABAPENTIN 600 MG: 300 CAPSULE ORAL at 10:36

## 2020-10-03 RX ADMIN — HYDRALAZINE HYDROCHLORIDE 25 MG: 50 TABLET, FILM COATED ORAL at 23:01

## 2020-10-03 RX ADMIN — Medication 10 ML: at 23:31

## 2020-10-03 RX ADMIN — GABAPENTIN 600 MG: 300 CAPSULE ORAL at 23:01

## 2020-10-03 RX ADMIN — GABAPENTIN 600 MG: 300 CAPSULE ORAL at 18:02

## 2020-10-03 RX ADMIN — NYSTATIN OINTMENT: 100000 OINTMENT TOPICAL at 18:04

## 2020-10-03 RX ADMIN — ATORVASTATIN CALCIUM 80 MG: 40 TABLET, FILM COATED ORAL at 10:36

## 2020-10-03 RX ADMIN — METOPROLOL SUCCINATE 50 MG: 50 TABLET, EXTENDED RELEASE ORAL at 10:37

## 2020-10-03 RX ADMIN — FERROUS SULFATE TAB 325 MG (65 MG ELEMENTAL FE) 325 MG: 325 (65 FE) TAB at 07:28

## 2020-10-03 RX ADMIN — HYDRALAZINE HYDROCHLORIDE 25 MG: 50 TABLET, FILM COATED ORAL at 10:37

## 2020-10-03 RX ADMIN — CLONIDINE HYDROCHLORIDE 0.3 MG: 0.2 TABLET ORAL at 10:37

## 2020-10-03 RX ADMIN — CEFTRIAXONE SODIUM 1 G: 1 INJECTION, POWDER, FOR SOLUTION INTRAMUSCULAR; INTRAVENOUS at 18:04

## 2020-10-03 RX ADMIN — INSULIN GLARGINE 20 UNITS: 100 INJECTION, SOLUTION SUBCUTANEOUS at 23:07

## 2020-10-03 RX ADMIN — FUROSEMIDE 80 MG: 40 TABLET ORAL at 10:37

## 2020-10-03 RX ADMIN — NYSTATIN OINTMENT: 100000 OINTMENT TOPICAL at 10:54

## 2020-10-03 RX ADMIN — HEPARIN SODIUM 5000 UNITS: 5000 INJECTION INTRAVENOUS; SUBCUTANEOUS at 14:40

## 2020-10-03 RX ADMIN — HEPARIN SODIUM 5000 UNITS: 5000 INJECTION INTRAVENOUS; SUBCUTANEOUS at 06:01

## 2020-10-03 RX ADMIN — INSULIN LISPRO 2 UNITS: 100 INJECTION, SOLUTION INTRAVENOUS; SUBCUTANEOUS at 18:03

## 2020-10-03 RX ADMIN — PREDNISONE 10 MG: 10 TABLET ORAL at 18:02

## 2020-10-03 RX ADMIN — ALLOPURINOL 100 MG: 100 TABLET ORAL at 10:36

## 2020-10-03 RX ADMIN — IPRATROPIUM BROMIDE AND ALBUTEROL SULFATE 3 ML: .5; 3 SOLUTION RESPIRATORY (INHALATION) at 23:44

## 2020-10-03 RX ADMIN — SODIUM CHLORIDE, SODIUM LACTATE, POTASSIUM CHLORIDE, AND CALCIUM CHLORIDE 50 ML/HR: 600; 310; 30; 20 INJECTION, SOLUTION INTRAVENOUS at 23:30

## 2020-10-03 RX ADMIN — HYDRALAZINE HYDROCHLORIDE 25 MG: 50 TABLET, FILM COATED ORAL at 18:02

## 2020-10-03 RX ADMIN — LEVOTHYROXINE SODIUM 175 MCG: 125 TABLET ORAL at 07:27

## 2020-10-03 RX ADMIN — IPRATROPIUM BROMIDE AND ALBUTEROL SULFATE 3 ML: .5; 3 SOLUTION RESPIRATORY (INHALATION) at 19:30

## 2020-10-03 RX ADMIN — CLONIDINE HYDROCHLORIDE 0.3 MG: 0.2 TABLET ORAL at 18:02

## 2020-10-03 NOTE — ED NOTES
Attempted to call report. Placed on hold for over 5 minutes with no answer.  Will attempt again in 5 minutes

## 2020-10-03 NOTE — PROGRESS NOTES
While giving patient night time medications, pt. stated that she \"would like to be resuscitated\" in the event of an emergency. Patient stated she wants to be a Full Code and not a DNR. Patient stated \"I only said I wanted to be a DNR because my son was present and I didn't want him to know. \" RN paged Dr. Milla Boggs, who changed the code status to Full Code.

## 2020-10-03 NOTE — PROGRESS NOTES
Problem: Self Care Deficits Care Plan (Adult)  Goal: *Acute Goals and Plan of Care (Insert Text)  Description:   FUNCTIONAL STATUS PRIOR TO ADMISSION: Patient was modified independent for ADLs and using a walker for functional mobility up until approx. 1 week before admission. HOME SUPPORT: The patient lived alone with home aide to provide assistance with meals and cleaning. Occupational Therapy Goals  Initiated 10/3/2020  1. Patient will perform grooming with supervision/set-up within 7 day(s). 2.  Patient will perform lower body dressing with moderate assistance  within 7 day(s). 3.  Patient will perform bathing with moderate assistance  within 7 day(s). 4.  Patient will perform toilet transfers with moderate assistance  within 7 day(s). 5.  Patient will perform all aspects of toileting with moderate assistance  within 7 day(s). 6.  Patient will participate in upper extremity therapeutic exercise/activities with minimal assistance/contact guard assist for 10 minutes within 7 day(s). 7.  Patient will utilize energy conservation techniques during functional activities with verbal and visual cues within 7 day(s). Outcome: Progressing Towards Goal    OCCUPATIONAL THERAPY EVALUATION  Patient: Nayla Galvan (79 y.o. female)  Date: 10/3/2020  Primary Diagnosis: Failure to thrive in adult [R62.7]        Precautions: fall       ASSESSMENT  Based on the objective data described below, the patient presents with generalized weakness, impaired ADL performance and ADL related mobility after admission for failure to thrive after pt was unable to get out of bed for several days. She was overall mod to max assist of 2 for basic bed mobility. She presents with significant weakness in LLE and limited LUE ROM and strength as well. Reported dizziness and with elevated blood pressure upon sitting EOB.  OOB activity deferred as pt deemed unsafe to assess transfer to chair at this time but likely would be max to total assist. Anticipate she will need short term rehab stay prior to returning home to optimize functional recovery as pt is well below baseline and lives alone. OT to continue to follow during hospital stay for ADL/iADL training and interventions listed below. Pt is very motivated to get better and return to independent living, however do not believe she is safe to do so at this time. Current Level of Function Impacting Discharge (ADLs/self-care): up to max A for aspects of LB self care, mod A x 2 for ADL related mobility    Functional Outcome Measure: The patient scored Total: 15/100 on the Barthel Index outcome measure      Other factors to consider for discharge: lives alone, well below baseline LOF     Patient will benefit from skilled therapy intervention to address the above noted impairments. PLAN :  Recommendations and Planned Interventions: self care training, functional mobility training, therapeutic exercise, balance training, therapeutic activities, endurance activities, neuromuscular re-education, patient education, home safety training, and family training/education    Frequency/Duration: Patient will be followed by occupational therapy 5 times a week to address goals. Recommendation for discharge: (in order for the patient to meet his/her long term goals)  Therapy 3 hours per day 5-7 days per week    This discharge recommendation:  Has not yet been discussed the attending provider and/or case management    IF patient discharges home will need the following DME: TBD       SUBJECTIVE:   Patient stated I was able to do all those things, but now I can barely move. I can't move this leg (LLE).     OBJECTIVE DATA SUMMARY:   HISTORY:   Past Medical History:   Diagnosis Date    Arthritis     Cataract     bilateral    Chronic kidney disease     Dr. Zeke Pino    Chronic pain     lower back    CKD stage 3 due to type 2 diabetes mellitus [facility]     Depression     Diabetes ([facility] age 49    Type II Follicular thyroid cancer ([facility] 08/2014    Foot ulcer ([facility]     Gallbladder polyp 8/14/2013    Hypercholesterolemia     Hypertension     Ill-defined condition     states 'polyp' in gal bladder    Long term current use of anticoagulant therapy     Obesity     Right pontine stroke ([facility] 1/17/2017    TIA (transient ischemic attack) 6/6/2014     Past Surgical History:   Procedure Laterality Date    ABDOMEN SURGERY PROC UNLISTED  2006    stomach    BREAST SURGERY PROCEDURE UNLISTED  2009, 2006    breast bx-vince    COLONOSCOPY N/A 12/17/2019    COLONOSCOPY performed by Nereyda Sorto MD at [address] [place] [address]  12/17/2019         COLORECTAL SCRN; HI RISK IND  5/30/2014         HX BREAST BIOPSY Right [id number]    2 biopies on the right. Benign (per patient)    HX BREAST BIOPSY Left 2015    Benign (per patient)    HX CATARACT REMOVAL Left     HX GYN  1970's    partial hysterectomy    HX HERNIA REPAIR  2009    hiatal    HX HYSTERECTOMY      HX ORTHOPAEDIC Bilateral 1988    bunion    HX THYROIDECTOMY  2014    Dr. Bull Gordon       Expanded or extensive additional review of patient history:     Home Situation  Home Environment: Private residence  # Steps to Enter: 6  Rails to Enter: Yes  One/Two Story Residence: Two story, live on 1st floor  Living Alone: Yes  Support Systems: Home care staff, Family member(s)  Patient Expects to be Discharged to[de-identified] Rehabilitation facility  Current DME Used/Available at Home: Grab bars, Transfer bench, Walker, rolling, Wheelchair, Commode, bedside    Hand dominance: Right    EXAMINATION OF PERFORMANCE DEFICITS:  Cognitive/Behavioral Status:                      Skin: no issues observed, see nursing notes for details    Edema: mild edema in RLE    Hearing:       Vision/Perceptual:                                     Range of Motion:    AROM: Generally decreased, functional(L hip grossly limited; LUE shoulder limited from previous CV) Strength:    Strength: Generally decreased, functional(L hip flex ; L knee ext ; L hip ABD/ADD ; RLE )                Coordination:  Coordination: Generally decreased, functional  Fine Motor Skills-Upper: Left Intact; Right Intact         Tone & Sensation:                              Balance:  Sitting: Impaired; Without support  Sitting - Static: Fair (occasional)  Sitting - Dynamic: Fair (occasional)    Functional Mobility and Transfers for ADLs:  Bed Mobility:  Rolling: Moderate assistance(to right; min to left)  Supine to Sit: Maximum assistance;Assist x2  Sit to Supine: Moderate assistance;Assist x2  Scooting: Maximum assistance    Transfers:   Unable to attempt due to elevated BP    ADL Assessment:  Feeding: Modified independent    Oral Facial Hygiene/Grooming: Minimum assistance    Bathing: Maximum assistance    Upper Body Dressing: Minimum assistance    Lower Body Dressing: Maximum assistance    Toileting: Maximum assistance                ADL Intervention and task modifications:  Feeding  Feeding Assistance: Set-up; Modified independent                   Upper Body Dressing Assistance  Bra: Minimum assistance    Lower Body Dressing Assistance  Socks: Maximum assistance                 Functional Measure:  Barthel Index:    Bathin  Bladder: 0  Bowels: 0  Groomin  Dressin  Feeding: 10  Mobility: 0  Stairs: 0  Toilet Use: 0  Transfer (Bed to Chair and Back): 0  Total: 15/100        The Barthel ADL Index: Guidelines  1. The index should be used as a record of what a patient does, not as a record of what a patient could do. 2. The main aim is to establish degree of independence from any help, physical or verbal, however minor and for whatever reason. 3. The need for supervision renders the patient not independent. 4. A patient's performance should be established using the best available evidence.  Asking the patient, friends/relatives and nurses are the usual sources, but direct observation and common sense are also important. However direct testing is not needed. 5. Usually the patient's performance over the preceding 24-48 hours is important, but occasionally longer periods will be relevant. 6. Middle categories imply that the patient supplies over 50 per cent of the effort. 7. Use of aids to be independent is allowed. Yamile Bowling., Barthel, D.W. (5367). Functional evaluation: the Barthel Index. [address] (14)2. STAR Jean, Ursula Blackman., Elver Pham., Nick, [address] (1999). Measuring the change indisability after inpatient rehabilitation; comparison of the responsiveness of the Barthel Index and Functional [place]gett Measure. Journal of Neurology, Neurosurgery, and Psychiatry, 66(4), 150-942. TAMI Berg, LUIS E Chan, & Kimberly Santiago M.A. (2004.) Assessment of post-stroke quality of life in cost-effectiveness studies: The usefulness of the Barthel Index and the EuroQoL-5D. Quality of Life Research, 15, 83-34         Occupational Therapy Evaluation Charge Determination   History Examination Decision-Making   LOW Complexity : Brief history review  MEDIUM Complexity : 3-5 performance deficits relating to physical, cognitive , or psychosocial skils that result in activity limitations and / or participation restrictions MEDIUM Complexity : Patient may present with comorbidities that affect occupational performnce. Miniml to moderate modification of tasks or assistance (eg, physical or verbal ) with assesment(s) is necessary to enable patient to complete evaluation       Based on the above components, the patient evaluation is determined to be of the following complexity level: MEDIUM  Pain Rating:  Not rated/reported    Activity Tolerance:   Fair, requires rest breaks, and observed SOB with activity with forward bending  Please refer to the flowsheet for vital signs taken during this treatment.     After treatment patient left in no apparent distress:    Supine in bed and Call bell within reach    COMMUNICATION/EDUCATION:   The patients plan of care was discussed with: Physical therapist, Registered nurse, and Physician. Home safety education was provided and the patient/caregiver indicated understanding. and Patient/family have participated as able in goal setting and plan of care. This patients plan of care is appropriate for delegation to \[facility] [place] [facility]\"".     Thank you for this referral.  Qing Alfaro OT  Time Calculation: 23 mins

## 2020-10-03 NOTE — PROGRESS NOTES
Bedside shift change report given to Peng Marcum RN (oncoming nurse) by Keke Lynne RN (offgoing nurse). Report included the following information SBAR, Kardex, Intake/Output, MAR and Recent Results.

## 2020-10-03 NOTE — PROGRESS NOTES
Upon arrival, patient /71. Patient was given Hydralazine 25 mg p.o. in the ED at 2040. RN paged provider, Roselyn Hernández NP, who ordered to re-check BP at later time and give prn dose of Hydralazine later if needed.

## 2020-10-03 NOTE — PROGRESS NOTES
Hospitalist Progress Note  Miracle Issa MD  Answering service: [phone] OR [phone] from in house phone      Date of Service:  10/3/2020  NAME:  Anny Redman  :  [birth year]  MRN:  [id number]      Admission Summary:   Anny Redman is a 79 y.o. female who presented to ED w/ son and CC failure to thrive, bedsore between buttocks/legs, left side hip and ankle pain for the last 2 years. According to ED notes, it appears that the patient was possibly going to be discharged in the ED case management was unable to place the patient tonight and so we were asked to admit the patient for failure to thrive. Interval history / Subjective:     Patient Was seen in followup of generalized joint pains  Patient was seen in room. She denies fevers or chills. She denies nausea vomiting. She is working with physical therapy. She reports chronic pain in the joints more in the left knee than other joints. Denies fevers. Assessment & Plan:     1. Generalized pain with left knee pains. -Seen by orthopedics. Deemed not to be a candidate for steroid intra-articular joint infection due to renal disease. We will initiate oral steroids now. Patient in agreement, PT OT eval. per Ortho no signs of septic arthritis. 2. Hypertensive urgency resume home medication. PRN hydralazine  3. Failure to thriveCase management consult. Possible rehab placement  4. CKD stage V, not on hemodialysis. No prior renal function available in our system. Doubt acute kidney injury. 5. Diabetes mellitus type 2 with hyperglycemia. Blood sugars better controlled today. 6. Elevated LFTs on admission. Currently have trended down. Continue to monitor  7. Drop in hemoglobin from 8.3-7.5 today. Check iron panel. Check stool for occult. Transfuse if hemoglobin less than 7  8. UTI on admission. On Rocephin. Follow-up cultures.       Code status: Full  DVT prophylaxis: Heparin      Risk of deterioration: low      Total time spent with patient: [address] discussed with: Patient/Family and Nurse  Disposition: [facility]/Rehab  Anticipated Discharge: 24 hours to 48 hours  Explained in detail about disease process and plan of care, and patient/family understand and are in agreement with plan. Hospital Problems  Date Reviewed: 10/2/2020          Codes Class Noted POA    * (Principal) Failure to thrive in adult ICD-10-CM: R62.7  ICD-9-CM: 783.7  10/2/2020 Yes        Left leg weakness ICD-10-CM: R29.898  ICD-9-CM: 729.89  10/2/2020 Yes        Abnormal urinalysis ICD-10-CM: R82.90  ICD-9-CM: 791.9  10/2/2020 Yes        Hypertensive urgency ICD-10-CM: I16.0  ICD-9-CM: 401.9  10/2/2020 Yes        Type 2 diabetes with nephropathy ([facility] ICD-10-CM: E11.21  ICD-9-CM: 250.40, 583.81  4/15/2018 Yes        CKD (chronic kidney disease) stage 5, GFR less than 15 ml/min ([facility] ICD-10-CM: N18.5  ICD-9-CM: 585.5  6/6/2014 Yes        Recurrent ventral incisional hernia ICD-10-CM: K43.2  ICD-9-CM: 553.21  8/14/2013 Yes        Obesity (BMI 35.0-39.9 without comorbidity) ICD-10-CM: E66.9  ICD-9-CM: 278.00  8/14/2013 Yes                Review of Systems:   Review of Systems   Constitutional: Negative. HENT: Negative. Eyes: Negative. Respiratory: Negative. Cardiovascular: Negative. Gastrointestinal: Negative. Genitourinary: Negative. Musculoskeletal: Positive for joint pain. Skin: Negative. Neurological: Negative. Endo/Heme/Allergies: Negative. Psychiatric/Behavioral: Negative. Vital Signs:    Last 24hrs VS reviewed since prior progress note.  Most recent are:  Visit Vitals  BP (!) 170/69 (BP 1 Location: Right arm, BP Patient Position: At rest)   Pulse 67   Temp 98.5 °F (36.9 °C)   Resp 18   SpO2 92%         Intake/Output Summary (Last 24 hours) at 10/3/2020 1503  Last data filed at 10/3/2020 0558  Gross per 24 hour   Intake    Output 300 ml   Net -300 ml Physical Examination:             Constitutional:  No acute distress, cooperative, pleasant    ENT:  Oral mucous moist, oropharynx benign. Neck supple,    Resp:  CTA bilaterally. No wheezing/rhonchi/rales. No accessory muscle use   CV:  Regular rhythm, normal rate, no murmurs, gallops, rubs    GI:  Soft, non distended, non tender. normoactive bowel sounds, no hepatosplenomegaly     Musculoskeletal:  No edema, warm, 2+ pulses throughout, left knee midly swollne, warm to touch, crackiling noted on rom     Neurologic:  Moves all extremities. AAOx3, CN II-XII reviewed     Skin:  Good turgor, no rashes or ulcers       Data Review:    Review and/or order of clinical lab test  Review and/or order of tests in the radiology section of CPT  Review and/or order of tests in the medicine section of CPT      Labs:     Recent Labs     10/03/20  0539 10/02/20  1310   WBC 4.2 5.0   HGB 7.5* 8.3*   HCT 23.9* 26.4*    267     Recent Labs     10/03/20  0539 10/02/20  1315 10/02/20  1310     --  140   K 3.7  --  3.8   *  --  106   CO2 29  --  29   BUN 43*  --  47*   CREA 4.37*  --  4.68*   GLU 98  --  146*   CA 8.0*  --  8.3*   MG 2.1 2.1  --      Recent Labs     10/03/20  0539 10/02/20  1310   ALT 68 90*   * 165*   TBILI 0.4 0.3   TP 6.0* 6.7   ALB 2.2* 2.6*   GLOB 3.8 4.1*     No results for input(s): INR, PTP, APTT, INREXT in the last 72 hours. No results for input(s): FE, TIBC, PSAT, FERR in the last 72 hours. No results found for: FOL, RBCF   No results for input(s): PH, PCO2, PO2 in the last 72 hours. No results for input(s): CPK, CKNDX, TROIQ in the last 72 hours.     No lab exists for component: CPKMB  Lab Results   Component Value Date/Time    Cholesterol, total 180 02/03/2020 10:22 AM    HDL Cholesterol 81 02/03/2020 10:22 AM    LDL, calculated 83 02/03/2020 10:22 AM    Triglyceride 79 02/03/2020 10:22 AM    CHOL/HDL Ratio 2.6 01/15/2017 04:43 AM     Lab Results   Component Value Date/Time Glucose (POC) 127 (H) 10/03/2020 11:43 AM    Glucose (POC) 79 10/03/2020 07:21 AM    Glucose (POC) 279 (H) 10/02/2020 10:08 PM    Glucose (POC) 163 (H) 10/02/2020 01:18 PM    Glucose (POC) 274 (H) 09/16/2020 12:04 PM     Lab Results   Component Value Date/Time    Color YELLOW/STRAW 10/02/2020 01:10 PM    Appearance TURBID (A) 10/02/2020 01:10 PM    Specific gravity 1.013 10/02/2020 01:10 PM    pH (UA) 6.0 10/02/2020 01:10 PM    Protein 300 (A) 10/02/2020 01:10 PM    Glucose Negative 10/02/2020 01:10 PM    Ketone Negative 10/02/2020 01:10 PM    Bilirubin Negative 10/02/2020 01:10 PM    Urobilinogen 0.2 10/02/2020 01:10 PM    Nitrites Negative 10/02/2020 01:10 PM    Leukocyte Esterase LARGE (A) 10/02/2020 01:10 PM    Epithelial cells FEW 10/02/2020 01:10 PM    Bacteria 4+ (A) 10/02/2020 01:10 PM    WBC >100 (H) 10/02/2020 01:10 PM    RBC 5-10 10/02/2020 01:10 PM         Medications Reviewed:     Current Facility-Administered Medications   Medication Dose Route Frequency    nystatin (MYCOSTATIN) 100,000 unit/gram ointment   Topical BID    sodium chloride (NS) flush 5-40 mL  5-40 mL IntraVENous Q8H    sodium chloride (NS) flush 5-40 mL  5-40 mL IntraVENous PRN    acetaminophen (TYLENOL) tablet 650 mg  650 mg Oral Q6H PRN    Or    acetaminophen (TYLENOL) suppository 650 mg  650 mg Rectal Q6H PRN    polyethylene glycol (MIRALAX) packet 17 g  17 g Oral DAILY PRN    promethazine (PHENERGAN) tablet 12.5 mg  12.5 mg Oral Q6H PRN    Or    ondansetron (ZOFRAN) injection 4 mg  4 mg IntraVENous Q6H PRN    allopurinoL (ZYLOPRIM) tablet 100 mg  100 mg Oral DAILY    atorvastatin (LIPITOR) tablet 80 mg  80 mg Oral DAILY    cloNIDine HCL (CATAPRES) tablet 0.3 mg  0.3 mg Oral BID    ferrous sulfate tablet 325 mg  1 Tab Oral DAILY WITH BREAKFAST    furosemide (LASIX) tablet 80 mg  80 mg Oral DAILY    gabapentin (NEURONTIN) capsule 600 mg  600 mg Oral TID    hydrALAZINE (APRESOLINE) tablet 25 mg  25 mg Oral TID   Geo Lovell metoprolol succinate (TOPROL-XL) XL tablet 50 mg  50 mg Oral DAILY    levothyroxine (SYNTHROID) tablet 175 mcg  175 mcg Oral ACB    glucose chewable tablet 16 g  4 Tab Oral PRN    glucagon (GLUCAGEN) injection 1 mg  1 mg IntraMUSCular PRN    dextrose 10% infusion 0-250 mL  0-250 mL IntraVENous PRN    insulin glargine (LANTUS) injection 20 Units  20 Units SubCUTAneous QHS    insulin lispro (HUMALOG) injection   SubCUTAneous AC&HS    hydrALAZINE (APRESOLINE) tablet 50 mg  50 mg Oral Q8H PRN    heparin (porcine) injection 5,000 Units  5,000 Units SubCUTAneous Q8H    lactated Ringers infusion  50 mL/hr IntraVENous CONTINUOUS     ______________________________________________________________________  EXPECTED LENGTH OF STAY: - - -  ACTUAL LENGTH OF STAY:          0                 Oscar Nguyen MD   Patient has given Verbal permission to discuss medical care with   persons present in the room and and also with contact as listed on face sheet. Please note that this dictation was completed with [employer], the computer voice recognition software. Quite often unanticipated grammatical, syntax, homophones, and other interpretive errors are inadvertently transcribed by the computer software. Please disregard these errors. Please excuse any errors that have escaped final proofreading. Thank you.

## 2020-10-03 NOTE — PROGRESS NOTES
FAWN: SNF recommended by PT.  RUR: N/A Observation status  Contact: Son, Sabino Monsivais (primary per pt) [phone]    CM spoke with pt son who indicated although pt has previous experience in [address] [place] [address]; SNF preferences are: 1) [address] [place] [address] 2) [facility] 3) [facility]     If unable to place pt in SNF for long term secondary to being observation status without 3 night inpt stay CM will refer to Inpt Rehab options. PT recommends Therapy 3 hours per day 5-7 days per week; if unable SNF or 24/7 assistance in home    DME: Pt already has a ramp to front door, walker, cane, wheelchair and bars in the shower and next to toilet. Observation notice provided in writing to patient and/or caregiver as well as verbal explanation of the policy. Patients who are in outpatient status also receive the Observation notice.       Care Manager: Ariel Sigala MSN RN

## 2020-10-03 NOTE — CONSULTS
ORTHOPAEDIC CONSULT NOTE    Subjective:     Date of Consultation:  October 3, 2020  Referring Physician:  Tristan/Brigid Villasenor Nadira is a 79 y.o. femalewith PMH significant for DM, stage 5 CKD not on HD, past CVA admitted for failure to thrive and inability to ambulate is seen for left knee \"pain and swelling\". Per ED notes patient was doing well enough for consideration of direct admit to rehab but this could not be accomplished and she was admitted. She complains at this time of generalized weakness in both sides of her body for upper and lower extremities. She reports bilateral lower extremity swelling. She states she has some pain in her left knee which has been ongoing for some months but she denies recent accidents or injuries. She denies open wounds. She denies current Orthopedic relationship but acknowledges that she has OA and also has issues with gout (mainly in her ankles) for which she takes Allopurinol. We have been asked to see the patient regarding her left knee and a small effusion seen on imaging. She denies tingling, numbness, fevers or chills.     Patient Active Problem List    Diagnosis Date Noted    Failure to thrive in adult 10/02/2020    Left leg weakness 10/02/2020    Abnormal urinalysis 10/02/2020    Hypertensive urgency 10/02/2020    Sepsis ([address] 02/10/2020    UTI (urinary tract infection) 02/10/2020    Encephalopathy 02/10/2020    Foot ulcer ([address]     Severe obesity ([address] 09/11/2019    Type 2 diabetes with nephropathy ([address] 04/15/2018    Hyperlipidemia LDL goal <70 06/06/2017    Right pontine stroke ([address] 01/17/2017    Stenosis of both internal carotid arteries 01/17/2017    Stroke (cerebrum) ([address] 01/14/2017    Thyroid cancer ([address] 09/17/2014    TIA (transient ischemic attack) 06/06/2014    Essential hypertension, benign 06/06/2014    CKD (chronic kidney disease) stage 5, GFR less than 15 ml/min ([address] 06/06/2014    Thyroid nodule, right 03/10/2014    Uncontrolled type 2 diabetes mellitus with diabetic polyneuropathy, with long-term current use of insulin ([address] 03/10/2014    Gallbladder polyp 08/14/2013    Gallbladder sludge 08/14/2013    Recurrent ventral incisional hernia 08/14/2013    Obesity (BMI 35.0-39.9 without comorbidity) 08/14/2013     Family History   Problem Relation Age of Onset    Heart Disease Mother     Hypertension Mother     Diabetes Mother     Stroke Mother     Cancer Father         colon    Diabetes Sister     Heart Attack Sister     Hypertension Sister     Heart Disease Sister         pacemaker    Lung Disease Brother     Hypertension Brother     Lung Disease Brother     Anesth Problems Neg Hx       Social History     Tobacco Use    Smoking status: Never Smoker    Smokeless tobacco: Never Used   Substance Use Topics    Alcohol use: No     Past Medical History:   Diagnosis Date    Arthritis     Cataract     bilateral    Chronic kidney disease     Dr. Juan Li Chronic pain     lower back    CKD stage 3 due to type 2 diabetes mellitus ([address]     Depression     Diabetes ([address] age 49    Type II    Follicular thyroid cancer ([address] 08/2014    Foot ulcer ([address]     Gallbladder polyp 8/14/2013    Hypercholesterolemia     Hypertension     Ill-defined condition     states 'polyp' in gal bladder    Long term current use of anticoagulant therapy     Obesity     Right pontine stroke ([address] 1/17/2017    TIA (transient ischemic attack) 6/6/2014      Past Surgical History:   Procedure Laterality Date    ABDOMEN SURGERY PROC UNLISTED  2006    stomach    BREAST SURGERY PROCEDURE UNLISTED  2009, 2006    breast bx-vince    COLONOSCOPY N/A 12/17/2019    COLONOSCOPY performed by Ruben [facility] at [facility] END[facility]COPY    COLONOSCOPY,REMV CAIO,SNARE  12/17/2019         COLORECTAL SCRN; HI RISK IND  5/30/2014         HX BREAST BIOPSY Right [id number]    2 biopies on the right. Benign (per patient)    HX BREAST BIOPSY Left 2015 Benign (per patient)    HX CATARACT REMOVAL Left     HX GYN  1970's    partial hysterectomy    HX HERNIA REPAIR  2009    hiatal    HX HYSTERECTOMY      HX ORTHOPAEDIC Bilateral 1988    bunion    HX THYROIDECTOMY  2014    Dr. Kimberly Howard      Prior to Admission medications    Medication Sig Start Date End Date Taking? Authorizing Provider   insulin nph-regular human rec (NovoLIN 70-30 FlexPen U-100) 100 unit/mL (70-30) inpn Inject 42 units before breakfast and none before dinner. Max 100 units per day--pt will pay cash--Dose change 9/21/20--updated med list--did not send prescription to the pharmacy 9/21/20   Tal Leach MD   hydrALAZINE (APRESOLINE) 25 mg tablet Take 1 Tab by mouth three (3) times daily. 9/16/20   Umair Moses MD   metoprolol succinate (TOPROL-XL) 50 mg XL tablet Take 50 mg by mouth daily. Provider, Historical   cloNIDine HCL (CATAPRES) 0.3 mg tablet Take 0.3 mg by mouth two (2) times a day. Provider, Historical   gabapentin (NEURONTIN) 300 mg capsule Take 2 Caps by mouth three (3) times daily. Max Daily Amount: 1,800 mg. 7/6/20   Tal Leach MD   furosemide (LASIX) 80 mg tablet Take 80 mg by mouth daily. 4/2/20   Provider, Historical   SYNTHROID 175 mcg tablet Take 1 Tab by mouth Daily (before breakfast). Take 1 Tab by mouth Daily (before breakfast). BRAND MEDICALLY NECESSARY--Delete 150 mcg dose from profile 2/6/20   Tal Leach MD   atorvastatin (LIPITOR) 80 mg tablet Take 80 mg by mouth daily. Provider, Historical   ferrous sulfate (IRON) 325 mg (65 mg iron) tablet Take  by mouth daily. Provider, Historical   PYRIDOXINE HCL, VITAMIN B6, (VITAMIN B-6 PO) Take  by mouth daily. Provider, Historical   allopurinol (ZYLOPRIM) 100 mg tablet Take  by mouth daily. Provider, Historical   multivitamins-minerals-lutein (CENTRUM SILVER) Tab Take  by mouth daily.     Provider, Historical     Current Facility-Administered Medications   Medication Dose Route Frequency    nystatin (MYCOSTATIN) 100,000 unit/gram ointment   Topical BID    sodium chloride (NS) flush 5-40 mL  5-40 mL IntraVENous Q8H    sodium chloride (NS) flush 5-40 mL  5-40 mL IntraVENous PRN    acetaminophen (TYLENOL) tablet 650 mg  650 mg Oral Q6H PRN    Or    acetaminophen (TYLENOL) suppository 650 mg  650 mg Rectal Q6H PRN    polyethylene glycol (MIRALAX) packet 17 g  17 g Oral DAILY PRN    promethazine (PHENERGAN) tablet 12.5 mg  12.5 mg Oral Q6H PRN    Or    ondansetron (ZOFRAN) injection 4 mg  4 mg IntraVENous Q6H PRN    allopurinoL (ZYLOPRIM) tablet 100 mg  100 mg Oral DAILY    atorvastatin (LIPITOR) tablet 80 mg  80 mg Oral DAILY    cloNIDine HCL (CATAPRES) tablet 0.3 mg  0.3 mg Oral BID    ferrous sulfate tablet 325 mg  1 Tab Oral DAILY WITH BREAKFAST    furosemide (LASIX) tablet 80 mg  80 mg Oral DAILY    gabapentin (NEURONTIN) capsule 600 mg  600 mg Oral TID    hydrALAZINE (APRESOLINE) tablet 25 mg  25 mg Oral TID    metoprolol succinate (TOPROL-XL) XL tablet 50 mg  50 mg Oral DAILY    levothyroxine (SYNTHROID) tablet 175 mcg  175 mcg Oral ACB    glucose chewable tablet 16 g  4 Tab Oral PRN    glucagon (GLUCAGEN) injection 1 mg  1 mg IntraMUSCular PRN    dextrose 10% infusion 0-250 mL  0-250 mL IntraVENous PRN    insulin glargine (LANTUS) injection 20 Units  20 Units SubCUTAneous QHS    insulin lispro (HUMALOG) injection   SubCUTAneous AC&HS    hydrALAZINE (APRESOLINE) tablet 50 mg  50 mg Oral Q8H PRN    heparin (porcine) injection 5,000 Units  5,000 Units SubCUTAneous Q8H    lactated Ringers infusion  50 mL/hr IntraVENous CONTINUOUS      Allergies   Allergen Reactions    Amlodipine Swelling    Clindamycin Rash    Nifedipine Swelling    Sulfa (Sulfonamide Antibiotics) Rash        Review of Systems:  Pertinent items are noted in HPI.     Mental Status: no dementia    Objective:     Patient Vitals for the past 8 hrs:   BP Temp Pulse Resp SpO2   10/03/20 0544 (!) 172/73 98.5 °F (36.9 °C) 66 16 94 %     Temp (24hrs), Av.2 °F (36.8 °C), Min:97.7 °F (36.5 °C), Max:98.5 °F (36.9 °C)      EXAM: Awake and alert lying in bed; NAD; agreeable to exam  Bilateral lower extremities swollen from thighs through feet but do not pit  There is no erythema or warmth about the joints  No palpable effusions Left or Right  Left knee with only mild TTP about joint lines and NTTP about patella or posterior  Patient has trouble flexing bilateral hips and knees actively against gravity  4/5 strength for ankle DF/PF  Distal sensory function grossly intact    Imaging Review:  EXAM: XR KNEE LT 3 V     INDICATION: Left knee pain.     COMPARISON: None.     FINDINGS: Three views of the left knee demonstrate no fracture. There is  bicompartmental osteoarthritis, most pronounced in the medial and patellofemoral  compartments. A small joint effusion is seen.     IMPRESSION  IMPRESSION: Bicompartmental osteoarthritis with small joint effusion    Labs:   Recent Results (from the past 24 hour(s))   CBC WITH AUTOMATED DIFF    Collection Time: 10/02/20  1:10 PM   Result Value Ref Range    WBC 5.0 3.6 - 11.0 K/uL    RBC 2.93 (L) 3.80 - 5.20 M/uL    HGB 8.3 (L) 11.5 - 16.0 g/dL    HCT 26.4 (L) 35.0 - 47.0 %    MCV 90.1 80.0 - 99.0 FL    MCH 28.3 26.0 - 34.0 PG    MCHC 31.4 30.0 - 36.5 g/dL    RDW 17.2 (H) 11.5 - 14.5 %    PLATELET 269 798 - 973 K/uL    MPV 11.4 8.9 - 12.9 FL    NRBC 0.0 0  WBC    ABSOLUTE NRBC 0.00 0.00 - 0.01 K/uL    NEUTROPHILS 71 32 - 75 %    LYMPHOCYTES 18 12 - 49 %    MONOCYTES 8 5 - 13 %    EOSINOPHILS 2 0 - 7 %    BASOPHILS 0 0 - 1 %    IMMATURE GRANULOCYTES 1 (H) 0.0 - 0.5 %    ABS. NEUTROPHILS 3.5 1.8 - 8.0 K/UL    ABS. LYMPHOCYTES 0.9 0.8 - 3.5 K/UL    ABS. MONOCYTES 0.4 0.0 - 1.0 K/UL    ABS. EOSINOPHILS 0.1 0.0 - 0.4 K/UL    ABS. BASOPHILS 0.0 0.0 - 0.1 K/UL    ABS. IMM.  GRANS. 0.0 0.00 - 0.04 K/UL    DF AUTOMATED     METABOLIC PANEL, COMPREHENSIVE    Collection Time: 10/02/20  1:10 PM   Result Value Ref Range    Sodium 140 136 - 145 mmol/L    Potassium 3.8 3.5 - 5.1 mmol/L    Chloride 106 97 - 108 mmol/L    CO2 29 21 - 32 mmol/L    Anion gap 5 5 - 15 mmol/L    Glucose 146 (H) 65 - 100 mg/dL    BUN 47 (H) 6 - 20 MG/DL    Creatinine 4.68 (H) 0.55 - 1.02 MG/DL    BUN/Creatinine ratio 10 (L) 12 - 20      GFR est AA 11 (L) >60 ml/min/1.73m2    GFR est non-AA 9 (L) >60 ml/min/1.73m2    Calcium 8.3 (L) 8.5 - 10.1 MG/DL    Bilirubin, total 0.3 0.2 - 1.0 MG/DL    ALT (SGPT) 90 (H) 12 - 78 U/L    AST (SGOT) 66 (H) 15 - 37 U/L    Alk. phosphatase 165 (H) 45 - 117 U/L    Protein, total 6.7 6.4 - 8.2 g/dL    Albumin 2.6 (L) 3.5 - 5.0 g/dL    Globulin 4.1 (H) 2.0 - 4.0 g/dL    A-G Ratio 0.6 (L) 1.1 - 2.2     SAMPLES BEING HELD    Collection Time: 10/02/20  1:10 PM   Result Value Ref Range    SAMPLES BEING HELD 1BLU,1RED,1UC     COMMENT        Add-on orders for these samples will be processed based on acceptable specimen integrity and analyte stability, which may vary by analyte.    URINALYSIS W/ RFLX MICROSCOPIC    Collection Time: 10/02/20  1:10 PM   Result Value Ref Range    Color YELLOW/STRAW      Appearance TURBID (A) CLEAR      Specific gravity 1.013 1.003 - 1.030      pH (UA) 6.0 5.0 - 8.0      Protein 300 (A) NEG mg/dL    Glucose Negative NEG mg/dL    Ketone Negative NEG mg/dL    Bilirubin Negative NEG      Blood SMALL (A) NEG      Urobilinogen 0.2 0.2 - 1.0 EU/dL    Nitrites Negative NEG      Leukocyte Esterase LARGE (A) NEG      WBC >100 (H) 0 - 4 /hpf    RBC 5-10 0 - 5 /hpf    Epithelial cells FEW FEW /lpf    Bacteria 4+ (A) NEG /hpf    Hyaline cast 0-2 0 - 5 /lpf   BETA-HYDROXYBUTYRATE    Collection Time: 10/02/20  1:15 PM   Result Value Ref Range    B-hydroxybutyrate 0.31 <0.40 mmol/L   MAGNESIUM    Collection Time: 10/02/20  1:15 PM   Result Value Ref Range    Magnesium 2.1 1.6 - 2.4 mg/dL   GLUCOSE, POC    Collection Time: 10/02/20  1:18 PM   Result Value Ref Range    Glucose (POC) 163 (H) 65 - 100 mg/dL    Performed by [facility] DONOVAN COOPER    EKG, 12 LEAD, INITIAL    Collection Time: 10/02/20  1:32 PM   Result Value Ref Range    Ventricular Rate 65 BPM    Atrial Rate 65 BPM    P-R Interval 144 ms    QRS Duration 88 ms    Q-T Interval 448 ms    QTC Calculation (Bezet) 465 ms    Calculated P Axis 48 degrees    Calculated R Axis -20 degrees    Calculated T Axis 42 degrees    Diagnosis       Normal sinus rhythm  When compared with ECG of 20-FEB-2020 19:34,  No significant change was found  Confirmed by Anh Zamorano ([id number]) on 10/2/2020 6:02:12 PM     POC EG7    Collection Time: 10/02/20  1:39 PM   Result Value Ref Range    Calcium, ionized (POC) 1.10 (L) 1.12 - 1.32 mmol/L    pH (POC) 7.39 7.35 - 7.45      pCO2 (POC) 48.6 (H) 35.0 - 45.0 MMHG    pO2 (POC) 25 (LL) 80 - 100 MMHG    HCO3 (POC) 29.4 (H) 22 - 26 MMOL/L    Base excess (POC) 4 mmol/L    sO2 (POC) 43 (L) 92 - 97 %    Site RIGHT BRACHIAL      Device: ROOM AIR      Allens test (POC) N/A      Specimen type (POC) VENOUS BLOOD     GLUCOSE, POC    Collection Time: 10/02/20 10:08 PM   Result Value Ref Range    Glucose (POC) 279 (H) 65 - 100 mg/dL    Performed by Seth Rodriguez    METABOLIC PANEL, COMPREHENSIVE    Collection Time: 10/03/20  5:39 AM   Result Value Ref Range    Sodium 141 136 - 145 mmol/L    Potassium 3.7 3.5 - 5.1 mmol/L    Chloride 109 (H) 97 - 108 mmol/L    CO2 29 21 - 32 mmol/L    Anion gap 3 (L) 5 - 15 mmol/L    Glucose 98 65 - 100 mg/dL    BUN 43 (H) 6 - 20 MG/DL    Creatinine 4.37 (H) 0.55 - 1.02 MG/DL    BUN/Creatinine ratio 10 (L) 12 - 20      GFR est AA 12 (L) >60 ml/min/1.73m2    GFR est non-AA 10 (L) >60 ml/min/1.73m2    Calcium 8.0 (L) 8.5 - 10.1 MG/DL    Bilirubin, total 0.4 0.2 - 1.0 MG/DL    ALT (SGPT) 68 12 - 78 U/L    AST (SGOT) 38 (H) 15 - 37 U/L    Alk.  phosphatase 138 (H) 45 - 117 U/L    Protein, total 6.0 (L) 6.4 - 8.2 g/dL    Albumin 2.2 (L) 3.5 - 5.0 g/dL    Globulin 3.8 2.0 - 4.0 g/dL    A-G Ratio 0.6 (L) 1.1 - 2.2 MAGNESIUM    Collection Time: 10/03/20  5:39 AM   Result Value Ref Range    Magnesium 2.1 1.6 - 2.4 mg/dL   CBC WITH AUTOMATED DIFF    Collection Time: 10/03/20  5:39 AM   Result Value Ref Range    WBC 4.2 3.6 - 11.0 K/uL    RBC 2.68 (L) 3.80 - 5.20 M/uL    HGB 7.5 (L) 11.5 - 16.0 g/dL    HCT 23.9 (L) 35.0 - 47.0 %    MCV 89.2 80.0 - 99.0 FL    MCH 28.0 26.0 - 34.0 PG    MCHC 31.4 30.0 - 36.5 g/dL    RDW 17.3 (H) 11.5 - 14.5 %    PLATELET 697 209 - 707 K/uL    MPV 11.0 8.9 - 12.9 FL    NRBC 0.0 0  WBC    ABSOLUTE NRBC 0.00 0.00 - 0.01 K/uL    NEUTROPHILS 59 32 - 75 %    LYMPHOCYTES 25 12 - 49 %    MONOCYTES 13 5 - 13 %    EOSINOPHILS 2 0 - 7 %    BASOPHILS 1 0 - 1 %    IMMATURE GRANULOCYTES 0 0.0 - 0.5 %    ABS. NEUTROPHILS 2.5 1.8 - 8.0 K/UL    ABS. LYMPHOCYTES 1.0 0.8 - 3.5 K/UL    ABS. MONOCYTES 0.6 0.0 - 1.0 K/UL    ABS. EOSINOPHILS 0.1 0.0 - 0.4 K/UL    ABS. BASOPHILS 0.0 0.0 - 0.1 K/UL    ABS. IMM.  GRANS. 0.0 0.00 - 0.04 K/UL    DF AUTOMATED     GLUCOSE, POC    Collection Time: 10/03/20  7:21 AM   Result Value Ref Range    Glucose (POC) 79 65 - 100 mg/dL    Performed by Lydia Foster          Impression:     Principal Problem:    Failure to thrive in adult (10/2/2020)    Active Problems:    Recurrent ventral incisional hernia (8/14/2013)      Obesity (BMI 35.0-39.9 without comorbidity) (8/14/2013)      CKD (chronic kidney disease) stage 5, GFR less than 15 ml/min ([facility] (6/6/2014)      Type 2 diabetes with nephropathy ([facility] (4/15/2018)      Left leg weakness (10/2/2020)      Abnormal urinalysis (10/2/2020)      Hypertensive urgency (10/2/2020)        Plan:     Left knee effusion - patient with known and imaging demonstrated OA with very small effusion which is not palpable; she has no signs of having a septic arthritis; joint aspiration is not indicated at this time and steroid injection is not likely to improve her overall weakness and debility  Should be seen by PT/OT   Rehab placement  WBAT through legs  Can follow-up as needed outpatient for ongoing knee pain with Dr Bustamante Slice     Dr Carl Ta aware of patient and in agreement with current plan    Cade Jimenez PA-C  Orthopedic Trauma Service  [address] [place] [address]

## 2020-10-03 NOTE — PROGRESS NOTES
Problem: Mobility Impaired (Adult and Pediatric)  Goal: *Acute Goals and Plan of Care (Insert Text)  Description: FUNCTIONAL STATUS PRIOR TO ADMISSION: one month ago, modified independent, ambulating in home with cane; since then, max to total assist, unable to get out of bed    HOME SUPPORT PRIOR TO ADMISSION: The patient lived alone with home health aide or family to provide assistance. Physical Therapy Goals  Initiated 10/3/2020  1. Patient will move from supine to sit and sit to supine  and roll side to side in bed with minimal assistance/contact guard assist within 7 day(s). 2.  Patient will transfer from bed to chair and chair to bed with maximal assistance using the least restrictive device within 7 day(s). 3.  Patient will perform sit to stand with maximal assistance within 7 day(s). 4.  Patient will ambulate with moderate assistance  for 5 feet with the least restrictive device within 7 day(s). Outcome: Progressing Towards Goal   PHYSICAL THERAPY EVALUATION  Patient: Aleatha Spatz (79 y.o. female)  Date: 10/3/2020  Primary Diagnosis: Failure to thrive in adult [R62.7]        Precautions: fall         ASSESSMENT  Based on the objective data described below, the patient presents with significantly impaired mobility requiring mod to max assist of 2 for basic bed mobility. She has significant weakness in LLE and limited in LUE as well. Upon sitting EOB, reports dizziness and has elevated blood pressure. Deemed unsafe to assess transfer to chair at this time but likely would be max to total assist.  Patient is  motivated to return to her home and to be able to ambulate and is most willing to go to rehab to maximize her independence. .    Current Level of Function Impacting Discharge (mobility/balance): mod to max assist of 2; decreased sitting balance, non ambulatory    Functional Outcome Measure:   The patient scored Total: 15/100 on the Barthel Index which is indicative of 85% impaired ability to care for basic self needs/dependency on others. Other factors to consider for discharge: lives alone; was mod I one month ago; having issues with blood sugar  per patient     Patient will benefit from skilled therapy intervention to address the above noted impairments. PLAN :  Recommendations and Planned Interventions: bed mobility training, transfer training, gait training, therapeutic exercises, patient and family training/education, and therapeutic activities      Frequency/Duration: Patient will be followed by physical therapy:  5 times a week to address goals. Recommendation for discharge: (in order for the patient to meet his/her long term goals)  Therapy 3 hours per day 5-7 days per week; if unable SNF or 24/7 assistance in home    This discharge recommendation:  Has been made in collaboration with the attending provider and/or case management    IF patient discharges home will need the following DME: patient owns DME required for discharge and would need full 24/7 care, home health therapy         SUBJECTIVE:   Patient stated I want to get walking again.     OBJECTIVE DATA SUMMARY:   HISTORY:    Past Medical History:   Diagnosis Date    Arthritis     Cataract     bilateral    Chronic kidney disease     Dr. Floyd Greer    Chronic pain     lower back    CKD stage 3 due to type 2 diabetes mellitus ([facility] [address]     Depression     Diabetes [facility]) age 49    Type II    Follicular thyroid cancer ([facility] [address] 08/2014    Foot ulcer ([facility] [address]     Gallbladder polyp 8/14/2013    Hypercholesterolemia     Hypertension     Ill-defined condition     states 'polyp' in gal bladder    Long term current use of anticoagulant therapy     Obesity     Right pontine stroke ([facility] [address] 1/17/2017    TIA (transient ischemic attack) 6/6/2014     Past Surgical History:   Procedure Laterality Date    ABDOMEN SURGERY PROC UNLISTED  2006    stomach    BREAST SURGERY PROCEDURE UNLISTED  2009, 2006    breast bx-vince    COLONOSCOPY N/A 12/17/2019    COLONOSCOPY performed by Ary Garcia MD at [facility] ENDOSCOPY    COLONOSCOPY,REMV Karen Obrien  2019         COLORECTAL SCRN; HI RISK IND  2014         HX BREAST BIOPSY Right [id number]    2 biopies on the right. Benign (per patient)    HX BREAST BIOPSY Left     Benign (per patient)    HX CATARACT REMOVAL Left     HX GYN  1970's    partial hysterectomy    HX HERNIA REPAIR  2009    hiatal    HX HYSTERECTOMY      HX ORTHOPAEDIC Bilateral     bunion    HX THYROIDECTOMY      Dr. Maile Brittle       Personal factors and/or comorbidities impacting plan of care:     Home Situation  Home Environment: Private residence  # Steps to Enter: 6  Rails to Enter: Yes  One/Two Story Residence: Two story, live on 1st floor  Living Alone: Yes  Support Systems: Home care staff, Family member(s)  Patient Expects to be Discharged to[de-identified] Rehabilitation facility  Current DME Used/Available at Home: Grab bars, Transfer bench, Walker, rolling, Wheelchair, Commode, bedside    EXAMINATION/PRESENTATION/DECISION MAKING:   Critical Behavior:            Range Of Motion:  AROM: Generally decreased, functional(L hip grossly limited; LUE shoulder limited from previous CV)         Strength:    Strength: Generally decreased, functional(L hip flex 1/5; L knee ext 2/5; L hip ABD/ADD 1/5; RLE 5/5)          Coordination:  Coordination: Generally decreased, functional  Functional Mobility:  Bed Mobility:  Rolling: Moderate assistance(to right; min to left)  Supine to Sit: Maximum assistance;Assist x2  Sit to Supine: Moderate assistance;Assist x2  Scooting: Maximum assistance        Balance:   Sitting: Impaired; Without support  Sitting - Static: Fair (occasional)  Sitting - Dynamic: Fair (occasional)         Therapeutic Exercises:    Ankle pumps, knee extension    Functional Measure:  Barthel Index:    Bathin  Bladder: 0  Bowels: 0  Groomin  Dressin  Feeding: 10  Mobility: 0  Stairs: 0  Toilet Use: 0  Transfer (Bed to Chair and Back): 0  Total: 15/100       The Barthel ADL Index: Guidelines  1. The index should be used as a record of what a patient does, not as a record of what a patient could do. 2. The main aim is to establish degree of independence from any help, physical or verbal, however minor and for whatever reason. 3. The need for supervision renders the patient not independent. 4. A patient's performance should be established using the best available evidence. Asking the patient, friends/relatives and nurses are the usual sources, but direct observation and common sense are also important. However direct testing is not needed. 5. Usually the patient's performance over the preceding 24-48 hours is important, but occasionally longer periods will be relevant. 6. Middle categories imply that the patient supplies over 50 per cent of the effort. 7. Use of aids to be independent is allowed. Maximilian Harm., Barthel, D.W. (5910). Functional evaluation: the Barthel Index. [address] [facility] (14)2. STAR Camacho, Abelardo Mosquera., Eleanor Ramos., [place] Dec, [address] (1999). Measuring the change indisability after inpatient rehabilitation; comparison of the responsiveness of the Barthel Index and Functional [place] Measure. Journal of Neurology, Neurosurgery, and Psychiatry, 66(4), 887-357. Ana Johnson, NIngridJ.A, LUIS E Chan, & Felicitas Perez MIngridA. (2004.) Assessment of post-stroke quality of life in cost-effectiveness studies: The usefulness of the Barthel Index and the EuroQoL-5D.  Quality of Life Research, 15, 533-91        Physical Therapy Evaluation Charge Determination   History Examination Presentation Decision-Making   HIGH Complexity :3+ comorbidities / personal factors will impact the outcome/ POC  LOW Complexity : 1-2 Standardized tests and measures addressing body structure, function, activity limitation and / or participation in recreation  LOW Complexity : Stable, uncomplicated  Other outcome measures Barthel  HIGH Based on the above components, the patient evaluation is determined to be of the following complexity level: LOW       Activity Tolerance:   Poor  Please refer to the flowsheet for vital signs taken during this treatment. After treatment patient left in no apparent distress:   Call bell within reach, Side rails x 3, and bed in chair position    COMMUNICATION/EDUCATION:   The patients plan of care was discussed with: Occupational therapist, Registered nurse, and Physician. Fall prevention education was provided and the patient/caregiver indicated understanding., Patient/family have participated as able in goal setting and plan of care. , and Patient/family agree to work toward stated goals and plan of care.     Thank you for this referral.  Nat Montes, PT   Time Calculation: 25 mins

## 2020-10-03 NOTE — PROGRESS NOTES
3:22 PM  Patient denied any pain today, BP keena to 200's when sitting so PT placed pt in chair position and not out of bed. SBP in 170's otherwise. Patient reiterated that she wanted to be a FULL code for now, DDNR paper shredded. 8:17 PM  Patient complaining of increased dyspnea on exertion, MD notified and orders received. MRI form done and faxed. Bedside shift change report given to [place] (oncoming nurse) by Petrona Medellin RN (offgoing nurse). Report included the following information SBAR, Kardex, MAR and Recent Results.

## 2020-10-04 ENCOUNTER — APPOINTMENT (OUTPATIENT)
Dept: MRI IMAGING | Age: 80
DRG: 641 | End: 2020-10-04
Attending: NURSE PRACTITIONER
Payer: MEDICARE

## 2020-10-04 LAB
BACTERIA SPEC CULT: ABNORMAL
CC UR VC: ABNORMAL
GLUCOSE BLD STRIP.AUTO-MCNC: 219 MG/DL (ref 65–100)
GLUCOSE BLD STRIP.AUTO-MCNC: 247 MG/DL (ref 65–100)
GLUCOSE BLD STRIP.AUTO-MCNC: 247 MG/DL (ref 65–100)
GLUCOSE BLD STRIP.AUTO-MCNC: 264 MG/DL (ref 65–100)
SERVICE CMNT-IMP: ABNORMAL

## 2020-10-04 PROCEDURE — 74011636637 HC RX REV CODE- 636/637: Performed by: NURSE PRACTITIONER

## 2020-10-04 PROCEDURE — 74011250637 HC RX REV CODE- 250/637: Performed by: NURSE PRACTITIONER

## 2020-10-04 PROCEDURE — 74011000258 HC RX REV CODE- 258: Performed by: HOSPITALIST

## 2020-10-04 PROCEDURE — 96372 THER/PROPH/DIAG INJ SC/IM: CPT

## 2020-10-04 PROCEDURE — 94640 AIRWAY INHALATION TREATMENT: CPT

## 2020-10-04 PROCEDURE — 74011250637 HC RX REV CODE- 250/637: Performed by: EMERGENCY MEDICINE

## 2020-10-04 PROCEDURE — 74011250637 HC RX REV CODE- 250/637: Performed by: HOSPITALIST

## 2020-10-04 PROCEDURE — 74011250636 HC RX REV CODE- 250/636: Performed by: NURSE PRACTITIONER

## 2020-10-04 PROCEDURE — A9270 NON-COVERED ITEM OR SERVICE: HCPCS | Performed by: HOSPITALIST

## 2020-10-04 PROCEDURE — 72146 MRI CHEST SPINE W/O DYE: CPT

## 2020-10-04 PROCEDURE — 94664 DEMO&/EVAL PT USE INHALER: CPT

## 2020-10-04 PROCEDURE — 74011636637 HC RX REV CODE- 636/637: Performed by: HOSPITALIST

## 2020-10-04 PROCEDURE — 74011250636 HC RX REV CODE- 250/636: Performed by: HOSPITALIST

## 2020-10-04 PROCEDURE — 74011000250 HC RX REV CODE- 250: Performed by: NURSE PRACTITIONER

## 2020-10-04 PROCEDURE — 96375 TX/PRO/DX INJ NEW DRUG ADDON: CPT

## 2020-10-04 PROCEDURE — 96376 TX/PRO/DX INJ SAME DRUG ADON: CPT

## 2020-10-04 PROCEDURE — 72148 MRI LUMBAR SPINE W/O DYE: CPT

## 2020-10-04 PROCEDURE — 99218 HC RM OBSERVATION: CPT

## 2020-10-04 PROCEDURE — 82962 GLUCOSE BLOOD TEST: CPT

## 2020-10-04 PROCEDURE — 94760 N-INVAS EAR/PLS OXIMETRY 1: CPT

## 2020-10-04 RX ORDER — HYDRALAZINE HYDROCHLORIDE 25 MG/1
25 TABLET, FILM COATED ORAL
Status: COMPLETED | OUTPATIENT
Start: 2020-10-04 | End: 2020-10-04

## 2020-10-04 RX ORDER — HYDRALAZINE HYDROCHLORIDE 50 MG/1
50 TABLET, FILM COATED ORAL 2 TIMES DAILY
Status: DISCONTINUED | OUTPATIENT
Start: 2020-10-04 | End: 2020-10-06 | Stop reason: HOSPADM

## 2020-10-04 RX ORDER — HYDRALAZINE HYDROCHLORIDE 50 MG/1
50 TABLET, FILM COATED ORAL 3 TIMES DAILY
Status: DISCONTINUED | OUTPATIENT
Start: 2020-10-04 | End: 2020-10-04

## 2020-10-04 RX ADMIN — HEPARIN SODIUM 5000 UNITS: 5000 INJECTION INTRAVENOUS; SUBCUTANEOUS at 07:22

## 2020-10-04 RX ADMIN — Medication 10 ML: at 22:00

## 2020-10-04 RX ADMIN — FUROSEMIDE 80 MG: 40 TABLET ORAL at 09:12

## 2020-10-04 RX ADMIN — INSULIN LISPRO 3 UNITS: 100 INJECTION, SOLUTION INTRAVENOUS; SUBCUTANEOUS at 07:17

## 2020-10-04 RX ADMIN — LEVOTHYROXINE SODIUM 175 MCG: 125 TABLET ORAL at 07:15

## 2020-10-04 RX ADMIN — FERROUS SULFATE TAB 325 MG (65 MG ELEMENTAL FE) 325 MG: 325 (65 FE) TAB at 07:14

## 2020-10-04 RX ADMIN — INSULIN LISPRO 5 UNITS: 100 INJECTION, SOLUTION INTRAVENOUS; SUBCUTANEOUS at 12:22

## 2020-10-04 RX ADMIN — CLONIDINE HYDROCHLORIDE 0.3 MG: 0.2 TABLET ORAL at 09:12

## 2020-10-04 RX ADMIN — CEFTRIAXONE SODIUM 1 G: 1 INJECTION, POWDER, FOR SOLUTION INTRAMUSCULAR; INTRAVENOUS at 17:12

## 2020-10-04 RX ADMIN — GABAPENTIN 600 MG: 300 CAPSULE ORAL at 22:31

## 2020-10-04 RX ADMIN — GABAPENTIN 600 MG: 300 CAPSULE ORAL at 09:12

## 2020-10-04 RX ADMIN — ATORVASTATIN CALCIUM 80 MG: 40 TABLET, FILM COATED ORAL at 09:13

## 2020-10-04 RX ADMIN — INSULIN LISPRO 2 UNITS: 100 INJECTION, SOLUTION INTRAVENOUS; SUBCUTANEOUS at 22:32

## 2020-10-04 RX ADMIN — CLONIDINE HYDROCHLORIDE 0.3 MG: 0.2 TABLET ORAL at 17:12

## 2020-10-04 RX ADMIN — HYDRALAZINE HYDROCHLORIDE 50 MG: 50 TABLET, FILM COATED ORAL at 12:28

## 2020-10-04 RX ADMIN — IRON SUCROSE 200 MG: 20 INJECTION, SOLUTION INTRAVENOUS at 19:47

## 2020-10-04 RX ADMIN — HEPARIN SODIUM 5000 UNITS: 5000 INJECTION INTRAVENOUS; SUBCUTANEOUS at 22:31

## 2020-10-04 RX ADMIN — NYSTATIN OINTMENT: 100000 OINTMENT TOPICAL at 09:14

## 2020-10-04 RX ADMIN — HEPARIN SODIUM 5000 UNITS: 5000 INJECTION INTRAVENOUS; SUBCUTANEOUS at 17:13

## 2020-10-04 RX ADMIN — INSULIN GLARGINE 20 UNITS: 100 INJECTION, SOLUTION SUBCUTANEOUS at 22:33

## 2020-10-04 RX ADMIN — IPRATROPIUM BROMIDE AND ALBUTEROL SULFATE 3 ML: .5; 3 SOLUTION RESPIRATORY (INHALATION) at 17:36

## 2020-10-04 RX ADMIN — HYDRALAZINE HYDROCHLORIDE 25 MG: 50 TABLET, FILM COATED ORAL at 09:13

## 2020-10-04 RX ADMIN — METOPROLOL SUCCINATE 50 MG: 50 TABLET, EXTENDED RELEASE ORAL at 09:13

## 2020-10-04 RX ADMIN — HYDRALAZINE HYDROCHLORIDE 25 MG: 25 TABLET, FILM COATED ORAL at 09:57

## 2020-10-04 RX ADMIN — HYDRALAZINE HYDROCHLORIDE 50 MG: 50 TABLET, FILM COATED ORAL at 22:30

## 2020-10-04 RX ADMIN — ALLOPURINOL 100 MG: 100 TABLET ORAL at 09:13

## 2020-10-04 RX ADMIN — GABAPENTIN 600 MG: 300 CAPSULE ORAL at 17:12

## 2020-10-04 RX ADMIN — INSULIN LISPRO 3 UNITS: 100 INJECTION, SOLUTION INTRAVENOUS; SUBCUTANEOUS at 17:12

## 2020-10-04 RX ADMIN — PREDNISONE 10 MG: 10 TABLET ORAL at 07:15

## 2020-10-04 RX ADMIN — NYSTATIN OINTMENT: 100000 OINTMENT TOPICAL at 19:47

## 2020-10-04 NOTE — PROGRESS NOTES
Bedside shift change report given to Vani Veras RN (oncoming nurse) by Francisco Florentino RN (offgoing nurse). Report included the following information SBAR, Kardex, Intake/Output and MAR.

## 2020-10-04 NOTE — PROGRESS NOTES
CM reviewed chart and noted consult for discharge planning. Per previous CM notes, preference is to go to Inpatient rehab at [address] [place] [address]. So this CM sent a referral through [facility] to [address] [place] [address] to evaluate, since Therapy is recommending Inpatient Level of Rehab. CM will continue to follow.   LEANNE Muñoz, ACM

## 2020-10-04 NOTE — PROGRESS NOTES
Hospitalist Progress Note  Curtis Arechiga MD  Answering service: [phone] OR [phone] from in house phone      Date of Service:  10/4/2020  NAME:  Lavern De  :    MRN:  [id number]      Admission Summary:   Lavern De is a 79 y.o. female who presented to ED w/ son and CC failure to thrive, bedsore between buttocks/legs, left side hip and ankle pain for the last 2 years. According to ED notes, it appears that the patient was possibly going to be discharged in the ED case management was unable to place the patient tonight and so we were asked to admit the patient for failure to thrive. Interval history / Subjective:     Patient Was seen in followup of generalized joint pains  She feels improved from yesterday. Reports that the pain is better. Denies fevers or chills. .     Assessment & Plan:     1. Generalized pain with left knee pains. -Seen by orthopedics. Deemed not to be a candidate for steroid intra-articular joint infection due to renal disease. Responding well to oral steroid. Continue PT OT  2. Drop in hemoglobin from 8.3-7.5 today. Check iron panel. Check stool for occult. Transfuse if hemoglobin less than 7. Order iron infusion due to iron deficiency anemia. 3. Hypertensive urgency resume home medication. PRN hydralazine, increased dose of hydralazine today as systolic blood pressure 334    4. CKD stage V, not on hemodialysis. No prior renal function available in our system. Doubt acute kidney injury. 5. Diabetes mellitus type 2 with hyperglycemia. Blood sugars better controlled today. 6. Elevated LFTs on admission. Currently have trended down. Continue to monitor    7. UTI on admission. On Rocephin. Follow-up cultures. 8. Failure to thriveCase management consult.   Possible rehab placement      Code status: Full  DVT prophylaxis: Heparin      Risk of deterioration: low      Total time spent with patient: Kiana Trivedi discussed with: Patient/Family and Nurse  Disposition: [facility]/Rehab  Anticipated Discharge: 24 hours to 48 hours   Plan to discharge to rehab   Explained in detail about disease process and plan of care, and patient/family understand and are in agreement with plan. Hospital Problems  Date Reviewed: 10/2/2020          Codes Class Noted POA    * (Principal) Failure to thrive in adult ICD-10-CM: R62.7  ICD-9-CM: 783.7  10/2/2020 Yes        Left leg weakness ICD-10-CM: R29.898  ICD-9-CM: 729.89  10/2/2020 Yes        Abnormal urinalysis ICD-10-CM: R82.90  ICD-9-CM: 791.9  10/2/2020 Yes        Hypertensive urgency ICD-10-CM: I16.0  ICD-9-CM: 401.9  10/2/2020 Yes        Type 2 diabetes with nephropathy ([facility] [address] ICD-10-CM: E11.21  ICD-9-CM: 250.40, 583.81  4/15/2018 Yes        CKD (chronic kidney disease) stage 5, GFR less than 15 ml/min ([facility]) ICD-10-CM: N18.5  ICD-9-CM: 585.5  6/6/2014 Yes        Recurrent ventral incisional hernia ICD-10-CM: K43.2  ICD-9-CM: 553.21  8/14/2013 Yes        Obesity (BMI 35.0-39.9 without comorbidity) ICD-10-CM: E66.9  ICD-9-CM: 278.00  8/14/2013 Yes                Review of Systems:   Review of Systems   Constitutional: Negative. HENT: Negative. Eyes: Negative. Respiratory: Negative. Cardiovascular: Negative. Gastrointestinal: Negative. Genitourinary: Negative. Musculoskeletal: Positive for joint pain. Skin: Negative. Neurological: Negative. Endo/Heme/Allergies: Negative. Psychiatric/Behavioral: Negative. Vital Signs:    Last 24hrs VS reviewed since prior progress note.  Most recent are:  Visit Vitals  BP (!) 178/72   Pulse 72   Temp 98.4 °F (36.9 °C)   Resp 18   SpO2 96%         Intake/Output Summary (Last 24 hours) at 10/4/2020 0949  Last data filed at 10/4/2020 0717  Gross per 24 hour   Intake    Output 1625 ml   Net -1625 ml        Physical Examination:             Constitutional:  No acute distress, cooperative, pleasant    ENT:  Oral mucous moist, oropharynx benign. Neck supple,    Resp:  CTA bilaterally. No wheezing/rhonchi/rales. No accessory muscle use   CV:  Regular rhythm, normal rate, no murmurs, gallops, rubs    GI:  Soft, non distended, non tender. normoactive bowel sounds, no hepatosplenomegaly     Musculoskeletal:  No edema, warm, 2+ pulses throughout,    Neurologic:  Moves all extremities. AAOx3, CN II-XII reviewed     Skin:  Good turgor, no rashes or ulcers       Data Review:    Review and/or order of clinical lab test  Review and/or order of tests in the radiology section of CPT  Review and/or order of tests in the medicine section of CPT      Labs:     Recent Labs     10/03/20  0539 10/02/20  1310   WBC 4.2 5.0   HGB 7.5* 8.3*   HCT 23.9* 26.4*    267     Recent Labs     10/03/20  0539 10/02/20  1315 10/02/20  1310     --  140   K 3.7  --  3.8   *  --  106   CO2 29  --  29   BUN 43*  --  47*   CREA 4.37*  --  4.68*   GLU 98  --  146*   CA 8.0*  --  8.3*   MG 2.1 2.1  --      Recent Labs     10/03/20  0539 10/02/20  1310   ALT 68 90*   * 165*   TBILI 0.4 0.3   TP 6.0* 6.7   ALB 2.2* 2.6*   GLOB 3.8 4.1*     No results for input(s): INR, PTP, APTT, INREXT, INREXT in the last 72 hours. Recent Labs     10/03/20  1850   TIBC 207*   PSAT 13*      No results found for: FOL, RBCF   No results for input(s): PH, PCO2, PO2 in the last 72 hours. No results for input(s): CPK, CKNDX, TROIQ in the last 72 hours.     No lab exists for component: CPKMB  Lab Results   Component Value Date/Time    Cholesterol, total 180 02/03/2020 10:22 AM    HDL Cholesterol 81 02/03/2020 10:22 AM    LDL, calculated 83 02/03/2020 10:22 AM    Triglyceride 79 02/03/2020 10:22 AM    CHOL/HDL Ratio 2.6 01/15/2017 04:43 AM     Lab Results   Component Value Date/Time    Glucose (POC) 219 (H) 10/04/2020 06:21 AM    Glucose (POC) 223 (H) 10/03/2020 09:03 PM    Glucose (POC) 151 (H) 10/03/2020 04:19 PM    Glucose (POC) 127 (H) 10/03/2020 11:43 AM    Glucose (POC) 79 10/03/2020 07:21 AM     Lab Results   Component Value Date/Time    Color YELLOW/STRAW 10/02/2020 01:10 PM    Appearance TURBID (A) 10/02/2020 01:10 PM    Specific gravity 1.013 10/02/2020 01:10 PM    pH (UA) 6.0 10/02/2020 01:10 PM    Protein 300 (A) 10/02/2020 01:10 PM    Glucose Negative 10/02/2020 01:10 PM    Ketone Negative 10/02/2020 01:10 PM    Bilirubin Negative 10/02/2020 01:10 PM    Urobilinogen 0.2 10/02/2020 01:10 PM    Nitrites Negative 10/02/2020 01:10 PM    Leukocyte Esterase LARGE (A) 10/02/2020 01:10 PM    Epithelial cells FEW 10/02/2020 01:10 PM    Bacteria 4+ (A) 10/02/2020 01:10 PM    WBC >100 (H) 10/02/2020 01:10 PM    RBC 5-10 10/02/2020 01:10 PM         Medications Reviewed:     Current Facility-Administered Medications   Medication Dose Route Frequency    hydrALAZINE (APRESOLINE) tablet 50 mg  50 mg Oral BID    cefTRIAXone (ROCEPHIN) 1 g in 0.9% sodium chloride (MBP/ADV) 50 mL  1 g IntraVENous Q24H    predniSONE (DELTASONE) tablet 10 mg  10 mg Oral DAILY WITH BREAKFAST    influenza vaccine 2020-21 (6 mos+)(PF) (FLUARIX/FLULAVAL/FLUZONE QUAD) injection 0.5 mL  0.5 mL IntraMUSCular PRIOR TO DISCHARGE    albuterol-ipratropium (DUO-NEB) 2.5 MG-0.5 MG/3 ML  3 mL Nebulization Q4H PRN    albuterol (PROVENTIL VENTOLIN) nebulizer solution 2.5 mg  2.5 mg Nebulization Q4H PRN    nystatin (MYCOSTATIN) 100,000 unit/gram ointment   Topical BID    sodium chloride (NS) flush 5-40 mL  5-40 mL IntraVENous Q8H    sodium chloride (NS) flush 5-40 mL  5-40 mL IntraVENous PRN    acetaminophen (TYLENOL) tablet 650 mg  650 mg Oral Q6H PRN    Or    acetaminophen (TYLENOL) suppository 650 mg  650 mg Rectal Q6H PRN    polyethylene glycol (MIRALAX) packet 17 g  17 g Oral DAILY PRN    promethazine (PHENERGAN) tablet 12.5 mg  12.5 mg Oral Q6H PRN    Or    ondansetron (ZOFRAN) injection 4 mg  4 mg IntraVENous Q6H PRN    allopurinoL (ZYLOPRIM) tablet 100 mg  100 mg Oral DAILY    atorvastatin (LIPITOR) tablet 80 mg  80 mg Oral DAILY    cloNIDine HCL (CATAPRES) tablet 0.3 mg  0.3 mg Oral BID    ferrous sulfate tablet 325 mg  1 Tab Oral DAILY WITH BREAKFAST    furosemide (LASIX) tablet 80 mg  80 mg Oral DAILY    gabapentin (NEURONTIN) capsule 600 mg  600 mg Oral TID    metoprolol succinate (TOPROL-XL) XL tablet 50 mg  50 mg Oral DAILY    levothyroxine (SYNTHROID) tablet 175 mcg  175 mcg Oral ACB    glucose chewable tablet 16 g  4 Tab Oral PRN    glucagon (GLUCAGEN) injection 1 mg  1 mg IntraMUSCular PRN    dextrose 10% infusion 0-250 mL  0-250 mL IntraVENous PRN    insulin glargine (LANTUS) injection 20 Units  20 Units SubCUTAneous QHS    insulin lispro (HUMALOG) injection   SubCUTAneous AC&HS    hydrALAZINE (APRESOLINE) tablet 50 mg  50 mg Oral Q8H PRN    heparin (porcine) injection 5,000 Units  5,000 Units SubCUTAneous Q8H    lactated Ringers infusion  50 mL/hr IntraVENous CONTINUOUS     ______________________________________________________________________  EXPECTED LENGTH OF STAY: - - -  ACTUAL LENGTH OF STAY:          0                 Mari Justin MD   Patient has given Verbal permission to discuss medical care with   persons present in the room and and also with contact as listed on face sheet. Please note that this dictation was completed with [employer], the computer voice recognition software. Quite often unanticipated grammatical, syntax, homophones, and other interpretive errors are inadvertently transcribed by the computer software. Please disregard these errors. Please excuse any errors that have escaped final proofreading. Thank you.

## 2020-10-05 ENCOUNTER — APPOINTMENT (OUTPATIENT)
Dept: GENERAL RADIOLOGY | Age: 80
DRG: 641 | End: 2020-10-05
Attending: HOSPITALIST
Payer: MEDICARE

## 2020-10-05 PROBLEM — M19.90 OSTEOARTHRITIS: Status: ACTIVE | Noted: 2020-10-05

## 2020-10-05 LAB
BASOPHILS # BLD: 0 K/UL (ref 0–0.1)
BASOPHILS NFR BLD: 0 % (ref 0–1)
COMMENT, HOLDF: NORMAL
DIFFERENTIAL METHOD BLD: ABNORMAL
EOSINOPHIL # BLD: 0.1 K/UL (ref 0–0.4)
EOSINOPHIL NFR BLD: 1 % (ref 0–7)
ERYTHROCYTE [DISTWIDTH] IN BLOOD BY AUTOMATED COUNT: 17.2 % (ref 11.5–14.5)
GLUCOSE BLD STRIP.AUTO-MCNC: 138 MG/DL (ref 65–100)
GLUCOSE BLD STRIP.AUTO-MCNC: 240 MG/DL (ref 65–100)
GLUCOSE BLD STRIP.AUTO-MCNC: 246 MG/DL (ref 65–100)
GLUCOSE BLD STRIP.AUTO-MCNC: 254 MG/DL (ref 65–100)
HCT VFR BLD AUTO: 24.5 % (ref 35–47)
HGB BLD-MCNC: 7.9 G/DL (ref 11.5–16)
IMM GRANULOCYTES # BLD AUTO: 0.1 K/UL (ref 0–0.04)
IMM GRANULOCYTES NFR BLD AUTO: 1 % (ref 0–0.5)
LYMPHOCYTES # BLD: 1.4 K/UL (ref 0.8–3.5)
LYMPHOCYTES NFR BLD: 18 % (ref 12–49)
MCH RBC QN AUTO: 28.6 PG (ref 26–34)
MCHC RBC AUTO-ENTMCNC: 32.2 G/DL (ref 30–36.5)
MCV RBC AUTO: 88.8 FL (ref 80–99)
MONOCYTES # BLD: 0.7 K/UL (ref 0–1)
MONOCYTES NFR BLD: 9 % (ref 5–13)
NEUTS SEG # BLD: 5.7 K/UL (ref 1.8–8)
NEUTS SEG NFR BLD: 71 % (ref 32–75)
NRBC # BLD: 0 K/UL (ref 0–0.01)
NRBC BLD-RTO: 0 PER 100 WBC
PLATELET # BLD AUTO: 244 K/UL (ref 150–400)
PMV BLD AUTO: 11 FL (ref 8.9–12.9)
RBC # BLD AUTO: 2.76 M/UL (ref 3.8–5.2)
SAMPLES BEING HELD,HOLD: NORMAL
SERVICE CMNT-IMP: ABNORMAL
WBC # BLD AUTO: 7.9 K/UL (ref 3.6–11)

## 2020-10-05 PROCEDURE — 74011636637 HC RX REV CODE- 636/637: Performed by: HOSPITALIST

## 2020-10-05 PROCEDURE — 74011000250 HC RX REV CODE- 250: Performed by: NURSE PRACTITIONER

## 2020-10-05 PROCEDURE — 77010033678 HC OXYGEN DAILY

## 2020-10-05 PROCEDURE — 97530 THERAPEUTIC ACTIVITIES: CPT

## 2020-10-05 PROCEDURE — 94640 AIRWAY INHALATION TREATMENT: CPT

## 2020-10-05 PROCEDURE — 36415 COLL VENOUS BLD VENIPUNCTURE: CPT

## 2020-10-05 PROCEDURE — 87635 SARS-COV-2 COVID-19 AMP PRB: CPT

## 2020-10-05 PROCEDURE — 74011000258 HC RX REV CODE- 258: Performed by: HOSPITALIST

## 2020-10-05 PROCEDURE — 74011250637 HC RX REV CODE- 250/637: Performed by: NURSE PRACTITIONER

## 2020-10-05 PROCEDURE — 85025 COMPLETE CBC W/AUTO DIFF WBC: CPT

## 2020-10-05 PROCEDURE — 74011250636 HC RX REV CODE- 250/636: Performed by: NURSE PRACTITIONER

## 2020-10-05 PROCEDURE — 74011636637 HC RX REV CODE- 636/637: Performed by: NURSE PRACTITIONER

## 2020-10-05 PROCEDURE — 99218 HC RM OBSERVATION: CPT

## 2020-10-05 PROCEDURE — 65270000029 HC RM PRIVATE

## 2020-10-05 PROCEDURE — 74011250636 HC RX REV CODE- 250/636: Performed by: HOSPITALIST

## 2020-10-05 PROCEDURE — 74011250637 HC RX REV CODE- 250/637: Performed by: EMERGENCY MEDICINE

## 2020-10-05 PROCEDURE — 71045 X-RAY EXAM CHEST 1 VIEW: CPT

## 2020-10-05 PROCEDURE — A9270 NON-COVERED ITEM OR SERVICE: HCPCS | Performed by: HOSPITALIST

## 2020-10-05 PROCEDURE — 82962 GLUCOSE BLOOD TEST: CPT

## 2020-10-05 PROCEDURE — 96372 THER/PROPH/DIAG INJ SC/IM: CPT

## 2020-10-05 PROCEDURE — 74011250637 HC RX REV CODE- 250/637: Performed by: HOSPITALIST

## 2020-10-05 RX ORDER — PREDNISONE 10 MG/1
10 TABLET ORAL SEE ADMIN INSTRUCTIONS
Qty: 60 TAB | Refills: 0 | Status: SHIPPED
Start: 2020-10-05 | End: 2020-10-26

## 2020-10-05 RX ORDER — FUROSEMIDE 10 MG/ML
20 INJECTION INTRAMUSCULAR; INTRAVENOUS ONCE
Status: COMPLETED | OUTPATIENT
Start: 2020-10-05 | End: 2020-10-05

## 2020-10-05 RX ORDER — ALBUTEROL SULFATE 0.83 MG/ML
2.5 SOLUTION RESPIRATORY (INHALATION)
Qty: 30 NEBULE | Status: SHIPPED | COMMUNITY
Start: 2020-10-05

## 2020-10-05 RX ORDER — CEPHALEXIN 500 MG/1
500 CAPSULE ORAL 4 TIMES DAILY
Qty: 12 CAP | Refills: 0 | Status: SHIPPED
Start: 2020-10-05 | End: 2020-10-08

## 2020-10-05 RX ADMIN — FUROSEMIDE 20 MG: 10 INJECTION, SOLUTION INTRAMUSCULAR; INTRAVENOUS at 16:38

## 2020-10-05 RX ADMIN — HEPARIN SODIUM 5000 UNITS: 5000 INJECTION INTRAVENOUS; SUBCUTANEOUS at 07:17

## 2020-10-05 RX ADMIN — HYDRALAZINE HYDROCHLORIDE 50 MG: 50 TABLET, FILM COATED ORAL at 17:35

## 2020-10-05 RX ADMIN — GABAPENTIN 600 MG: 300 CAPSULE ORAL at 15:04

## 2020-10-05 RX ADMIN — NYSTATIN OINTMENT: 100000 OINTMENT TOPICAL at 09:25

## 2020-10-05 RX ADMIN — CEFTRIAXONE SODIUM 1 G: 1 INJECTION, POWDER, FOR SOLUTION INTRAMUSCULAR; INTRAVENOUS at 17:08

## 2020-10-05 RX ADMIN — IPRATROPIUM BROMIDE AND ALBUTEROL SULFATE 3 ML: .5; 3 SOLUTION RESPIRATORY (INHALATION) at 16:49

## 2020-10-05 RX ADMIN — CLONIDINE HYDROCHLORIDE 0.3 MG: 0.2 TABLET ORAL at 17:04

## 2020-10-05 RX ADMIN — CLONIDINE HYDROCHLORIDE 0.3 MG: 0.2 TABLET ORAL at 09:23

## 2020-10-05 RX ADMIN — Medication 10 ML: at 22:00

## 2020-10-05 RX ADMIN — METOPROLOL SUCCINATE 50 MG: 50 TABLET, EXTENDED RELEASE ORAL at 09:23

## 2020-10-05 RX ADMIN — INSULIN LISPRO 3 UNITS: 100 INJECTION, SOLUTION INTRAVENOUS; SUBCUTANEOUS at 13:52

## 2020-10-05 RX ADMIN — FERROUS SULFATE TAB 325 MG (65 MG ELEMENTAL FE) 325 MG: 325 (65 FE) TAB at 07:16

## 2020-10-05 RX ADMIN — PREDNISONE 10 MG: 10 TABLET ORAL at 07:16

## 2020-10-05 RX ADMIN — INSULIN LISPRO 2 UNITS: 100 INJECTION, SOLUTION INTRAVENOUS; SUBCUTANEOUS at 22:34

## 2020-10-05 RX ADMIN — IRON SUCROSE 200 MG: 20 INJECTION, SOLUTION INTRAVENOUS at 18:34

## 2020-10-05 RX ADMIN — ALLOPURINOL 100 MG: 100 TABLET ORAL at 09:24

## 2020-10-05 RX ADMIN — GABAPENTIN 600 MG: 300 CAPSULE ORAL at 22:32

## 2020-10-05 RX ADMIN — ATORVASTATIN CALCIUM 80 MG: 40 TABLET, FILM COATED ORAL at 09:24

## 2020-10-05 RX ADMIN — NYSTATIN OINTMENT: 100000 OINTMENT TOPICAL at 18:39

## 2020-10-05 RX ADMIN — HEPARIN SODIUM 5000 UNITS: 5000 INJECTION INTRAVENOUS; SUBCUTANEOUS at 22:32

## 2020-10-05 RX ADMIN — IPRATROPIUM BROMIDE AND ALBUTEROL SULFATE 3 ML: .5; 3 SOLUTION RESPIRATORY (INHALATION) at 05:17

## 2020-10-05 RX ADMIN — GABAPENTIN 600 MG: 300 CAPSULE ORAL at 09:24

## 2020-10-05 RX ADMIN — INSULIN GLARGINE 20 UNITS: 100 INJECTION, SOLUTION SUBCUTANEOUS at 22:33

## 2020-10-05 RX ADMIN — HEPARIN SODIUM 5000 UNITS: 5000 INJECTION INTRAVENOUS; SUBCUTANEOUS at 15:04

## 2020-10-05 RX ADMIN — INSULIN LISPRO 5 UNITS: 100 INJECTION, SOLUTION INTRAVENOUS; SUBCUTANEOUS at 17:04

## 2020-10-05 RX ADMIN — FUROSEMIDE 80 MG: 40 TABLET ORAL at 09:24

## 2020-10-05 RX ADMIN — HYDRALAZINE HYDROCHLORIDE 50 MG: 50 TABLET, FILM COATED ORAL at 22:32

## 2020-10-05 RX ADMIN — HYDRALAZINE HYDROCHLORIDE 50 MG: 50 TABLET, FILM COATED ORAL at 07:13

## 2020-10-05 RX ADMIN — LEVOTHYROXINE SODIUM 175 MCG: 125 TABLET ORAL at 07:16

## 2020-10-05 NOTE — ROUTINE PROCESS
Bedside and Verbal shift change report given to Estephania James (oncoming nurse) by Teresa Kwong (offgoing nurse). Report included the following information SBAR, Kardex, MAR and Recent Results.

## 2020-10-05 NOTE — DISCHARGE SUMMARY
Discharge Summary       PATIENT ID: Denzel Salazar  MRN: [id number]   YOB: 1940    DATE OF ADMISSION: 10/2/2020 12:37 PM    DATE OF DISCHARGE: 10/06/20  PRIMARY CARE PROVIDER: Audra Crowell MD       DISCHARGING PROVIDER: Saba Swan MD    To contact this individual call [phone] and ask the  to page. If unavailable ask to be transferred the Adult Hospitalist Department. CONSULTATIONS: ED CONSULT TO SENIOR SERVICES CASE MANAGEMENT  IP CONSULT TO NEPHROLOGY  IP CONSULT TO HOSPITALIST  IP CONSULT TO ORTHOPEDIC SURGERY      PROCEDURES/SURGERIES: * No surgery found *    IMAGING  Xr Hip Lt W Or Wo Pelv 2-3 Vws    Result Date: 10/2/2020  IMPRESSION: No acute abnormality. Mri Thorac Spine Wo Cont    Result Date: 10/5/2020  IMPRESSION:  1. Mild spinal stenosis at C5-6 and C6-7. 2. No significant spinal stenosis or neural foraminal narrowing in the thoracic spine. 3. Incidental lipoma in the subcutaneous tissues posteriorly from T8 to T10. Mri Lumb Spine Wo Cont    Result Date: 10/5/2020  Impression: 1. Study significantly limited by motion artifact 2. Degenerative changes most significant at L2-3 and L3-4 not significantly progressed in the interval     Ct Head Wo Cont    Result Date: 10/2/2020  IMPRESSION: No acute intracranial hemorrhage, mass or infarct. Xr Chest Port    Result Date: 10/5/2020  IMPRESSION: Interstitial edema with left basilar opacity and small bilateral pleural effusions. Xr Knee Lt 3 V    Result Date: 10/2/2020  IMPRESSION: Bicompartmental osteoarthritis with small joint effusion          ADMITTING DIAGNOSES   Erna Kellogg is a 79 y.o. female who presented to ED w/ son and CC failure to thrive, bedsore between buttocks/legs, left side hip and ankle pain for the last 2 years.  According to [facility], it appears that the patient was possibly going to be discharged in the ED case management was unable to place the patient tonight and so we were asked to admit the patient for failure to thrive. HOSPITAL COURSE:     1. Generalized pain with left knee pains. -Seen by orthopedics. Deemed not to be a candidate for steroid intra-articular joint infection due to renal disease. Responding well to oral steroid. Continue PT OT    2. Hypertensive urgency Adjusted home medications, BP has improved at the time of discharge. 3. CKD stage V, not on hemodialysis. Appears stable. Has Fistula in situ. Renal consulted. No plans for HD at this time.      5. Diabetes mellitus type 2 with hyperglycemia. Blood sugars better controlled today.      6. Elevated LFTs on admission. Currently have trended down. Continue to monitor     7. UTI on admission. On Rocephin.   switch to Keflex on discharge     8. Failure to thriveCase management consult. - Rehab placement. 9. Dyspnea - likely from debility, Atelectasis and fluid retention from CKD. Off Iv fluids and continued on lasix. Encourage Incentive spirometry. Ordered extra doses lasix today. Patient will need work-up for iron deficiency anemia/anemia and chronic kidney disease as an outpatient with colonoscopy. Follow-up with nephrology for Epogen for anemia of chronic disease.      DISCHARGE DIAGNOSES / PLAN:      Patient Active Problem List   Diagnosis Code    Gallbladder polyp K82.4    Gallbladder sludge K82.8    Recurrent ventral incisional hernia K43.2    Obesity (BMI 35.0-39.9 without comorbidity) E66.9    Thyroid nodule, right E04.1    Uncontrolled type 2 diabetes mellitus with diabetic polyneuropathy, with long-term current use of insulin (HCC) E11.42, Z79.4, E11.65    TIA (transient ischemic attack) G45.9    Essential hypertension, benign I10    CKD (chronic kidney disease) stage 5, GFR less than 15 ml/min (HCC) N18.5    Thyroid cancer ([facility] [address] C73    Stroke (cerebrum) ([facility]) I63.9    Right pontine stroke (HCC) I63.50    Stenosis of both internal carotid arteries I65.23    Hyperlipidemia LDL goal <70 E78.5    Type 2 diabetes with nephropathy (HCC) E11.21    Severe obesity (HCC) E66.01    Foot ulcer (HCC) L97.509    Sepsis ([address] A41.9    UTI (urinary tract infection) N39.0    Encephalopathy G93.40    Failure to thrive in adult R62.7    Left leg weakness R29.898    Abnormal urinalysis R82.90    Hypertensive urgency I16.0    Osteoarthritis M19.90         FOLLOW UP APPOINTMENTS:    Follow-up Information     Follow up With Specialties Details Why Contact Brenda Mars MD Internal Medicine   [address]      Manasa Duran MD Nephrology In 2 weeks Follow Up [address] [phone]             ADDITIONAL CARE RECOMMENDATIONS:   · It is important that you take the medication exactly as they are prescribed. · Keep your medication in the bottles provided by the pharmacist and keep a list of the medication names, dosages, and times to be taken in your wallet. · Do not take other medications without consulting your doctor. · No drinking alcohol or driving car or operating machinery if you are on narcotic pain medications. Donot take sedating mediations if you are sleepy or confused. · Fall Precautions   · Report to your medical provider if you feel you have  developed allergies to medications  · Follow up with your PCP or Consultant for medication adjustments and refills  · Monitor for signs of fevers,chills,bleeding,chest pain and seek medical attention if you do so.    · Encourage Incentive spirometry, fluid restrict to 2000 cc per day   · Wean down Steroids      DIET: Renal Diet    ACTIVITY: Activity as tolerated        DISCHARGE MEDICATIONS:  Current Discharge Medication List      START taking these medications    Details   albuterol (PROVENTIL VENTOLIN) 2.5 mg /3 mL (0.083 %) nebu 3 mL by Nebulization route every four (4) hours as needed for Wheezing or Shortness of Breath. Qty: 30 Nebule      cephALEXin (Keflex) 500 mg capsule Take 1 Cap by mouth four (4) times daily for 3 days. Qty: 12 Cap, Refills: 0      predniSONE (DELTASONE) 10 mg tablet Take 10 mg by mouth See Admin Instructions. then 2 tabs daily for 4 days  and 1 tab daily for 3 days and stop  Qty: 60 Tab, Refills: 0         CONTINUE these medications which have CHANGED    Details   cloNIDine HCL (CATAPRES) 0.3 mg tablet Take 1 Tab by mouth three (3) times daily. CONTINUE these medications which have NOT CHANGED    Details   insulin nph-regular human rec (NovoLIN 70-30 FlexPen U-100) 100 unit/mL (70-30) inpn Inject 42 units before breakfast and none before dinner. Max 100 units per day--pt will pay cash--Dose change 9/21/20--updated med list--did not send prescription to the pharmacy  Qty: 30 mL, Refills: 11      hydrALAZINE (APRESOLINE) 25 mg tablet Take 1 Tab by mouth three (3) times daily. Qty: 90 Tab, Refills: 1      metoprolol succinate (TOPROL-XL) 50 mg XL tablet Take 50 mg by mouth daily. gabapentin (NEURONTIN) 300 mg capsule Take 2 Caps by mouth three (3) times daily. Max Daily Amount: 1,800 mg. Qty: 540 Cap, Refills: 1    Associated Diagnoses: Uncontrolled type 2 diabetes mellitus with diabetic polyneuropathy, with long-term current use of insulin (HCC)      furosemide (LASIX) 80 mg tablet Take 80 mg by mouth daily. SYNTHROID 175 mcg tablet Take 1 Tab by mouth Daily (before breakfast). Take 1 Tab by mouth Daily (before breakfast). BRAND MEDICALLY NECESSARY--Delete 150 mcg dose from profile  Qty: 90 Tab, Refills: 3      atorvastatin (LIPITOR) 80 mg tablet Take 80 mg by mouth daily. ferrous sulfate (IRON) 325 mg (65 mg iron) tablet Take  by mouth daily. PYRIDOXINE HCL, VITAMIN B6, (VITAMIN B-6 PO) Take  by mouth daily. allopurinol (ZYLOPRIM) 100 mg tablet Take  by mouth daily.       multivitamins-minerals-lutein (CENTRUM SILVER) Tab Take by mouth daily. All Micro Results     Procedure Component Value Units Date/Time    CULTURE, URINE [[id number]]  (Abnormal)  (Susceptibility) Collected:  10/02/20 1400    Order Status:  Completed Specimen:  Urine from Clean catch Updated:  10/04/20 1002     Special Requests: NO SPECIAL REQUESTS        [place] Count --        >100,000  COLONIES/mL       Culture result: ESCHERICHIA COLI       CULTURE, URINE [[id number]] Collected:  10/03/20 1700    Order Status:  Canceled Specimen:  Urine from Clean catch     CULTURE, URINE [[id number]]     Order Status:  Canceled Specimen:  Urine from Clean catch           Recent Results (from the past 24 hour(s))   SARS-COV-2    Collection Time: 10/05/20  3:12 PM   Result Value Ref Range    Specimen source Nasopharyngeal      SARS-CoV-2 PENDING     Specimen source Nasopharyngeal      Specimen type NP Swab      Health status Symptomatic Testing     GLUCOSE, POC    Collection Time: 10/05/20  4:45 PM   Result Value Ref Range    Glucose (POC) 254 (H) 65 - 100 mg/dL    Performed by Janes Hernandez (CON)    GLUCOSE, POC    Collection Time: 10/05/20 10:15 PM   Result Value Ref Range    Glucose (POC) 246 (H) 65 - 100 mg/dL    Performed by Janes Hernandez (CON)    GLUCOSE, POC    Collection Time: 10/06/20  6:34 AM   Result Value Ref Range    Glucose (POC) 76 65 - 100 mg/dL    Performed by Gabrielle Yeh    GLUCOSE, POC    Collection Time: 10/06/20 11:30 AM   Result Value Ref Range    Glucose (POC) 124 (H) 65 - 100 mg/dL    Performed by Christopher Garay            NOTIFY YOUR PHYSICIAN FOR ANY OF THE FOLLOWING:   Fever over 101 degrees for 24 hours. Chest pain, shortness of breath, fever, chills, nausea, vomiting, diarrhea, change in mentation, falling, weakness, bleeding. Severe pain or pain not relieved by medications. Or, any other signs or symptoms that you may have questions about.     DISPOSITION:    Home With:   OT  PT  HH  RN      X Long term SNF/Inpatient Rehab Independent/assisted living    Hospice    Other:       PATIENT CONDITION AT DISCHARGE:     Functional status    Poor    X Deconditioned     Independent      Cognition   X  Lucid     Forgetful     Dementia      Catheters/lines (plus indication)    Ambrosio     PICC     PEG    X None      Code status   X  Full code     DNR      PHYSICAL EXAMINATION AT DISCHARGE:  Gen:  No distress, alert  HEENT:  Normal cephalic atraumatic, extra-occular movements are intact. Neck:  Supple, No JVD  Lungs:  Clear bilaterally, no wheeze, no rales, normal effort  Heart:  Regular Rate and Rhythm, normal S1 and S2, no edema  Abdomen:  Soft, non tender, normal bowel sounds, no guarding.   Extremities:  Well perfused, no cyanosis or edema  Neurological:  Awake and alert, CN's are intact, normal strength throughout extremities  Skin:  No rashes or moles  Mental Status:  Normal thought process, does not appear anxious  Left Arm Fistula in place. + Bruit      CHRONIC MEDICAL DIAGNOSES:  Problem List as of 10/6/2020 Date Reviewed: 10/2/2020          Codes Class Noted - Resolved    Osteoarthritis ICD-10-CM: M19.90  ICD-9-CM: 715.90  10/5/2020 - Present        * (Principal) Failure to thrive in adult ICD-10-CM: R62.7  ICD-9-CM: 783.7  10/2/2020 - Present        Left leg weakness ICD-10-CM: R29.898  ICD-9-CM: 729.89  10/2/2020 - Present        Abnormal urinalysis ICD-10-CM: R82.90  ICD-9-CM: 791.9  10/2/2020 - Present        Hypertensive urgency ICD-10-CM: I16.0  ICD-9-CM: 401.9  10/2/2020 - Present        Sepsis ([facility] [address] ICD-10-CM: A41.9  ICD-9-CM: 038.9, 995.91  2/10/2020 - Present        UTI (urinary tract infection) ICD-10-CM: N39.0  ICD-9-CM: 599.0  2/10/2020 - Present        Encephalopathy ICD-10-CM: G93.40  ICD-9-CM: 348.30  2/10/2020 - Present        Foot ulcer ([facility] [address] ICD-10-CM: L97.509  ICD-9-CM: 707.15  Unknown - Present        Severe obesity ([facility] [address] ICD-10-CM: E66.01  ICD-9-CM: 278.01  9/11/2019 - Present        Type 2 diabetes with nephropathy [facility]) ICD-10-CM: E11.21  ICD-9-CM: 250.40, 583.81  4/15/2018 - Present        Hyperlipidemia LDL goal <70 ICD-10-CM: E78.5  ICD-9-CM: 272.4  6/6/2017 - Present        Right pontine stroke ([facility] ICD-10-CM: I63.50  ICD-9-CM: 434.91  1/17/2017 - Present        Stenosis of both internal carotid arteries ICD-10-CM: I65.23  ICD-9-CM: 433.10, 433.30  1/17/2017 - Present        Stroke (cerebrum) (HCC) ICD-10-CM: I63.9  ICD-9-CM: 434.91  1/14/2017 - Present        Thyroid cancer ([facility] ICD-10-CM: C73  ICD-9-CM: 193  9/17/2014 - Present        TIA (transient ischemic attack) ICD-10-CM: G45.9  ICD-9-CM: 435.9  6/6/2014 - Present        Essential hypertension, benign ICD-10-CM: I10  ICD-9-CM: 401.1  6/6/2014 - Present        CKD (chronic kidney disease) stage 5, GFR less than 15 ml/min (HCC) ICD-10-CM: N18.5  ICD-9-CM: 585.5  6/6/2014 - Present        Thyroid nodule, right ICD-10-CM: E04.1  ICD-9-CM: 241.0  3/10/2014 - Present        Uncontrolled type 2 diabetes mellitus with diabetic polyneuropathy, with long-term current use of insulin (HCC) ICD-10-CM: E11.42, Z79.4, E11.65  ICD-9-CM: 250.62, 357.2, V58.67  3/10/2014 - Present        Gallbladder polyp ICD-10-CM: K82.4  ICD-9-CM: 575.6  8/14/2013 - Present        Gallbladder sludge ICD-10-CM: K82.8  ICD-9-CM: 575.8  8/14/2013 - Present        Recurrent ventral incisional hernia ICD-10-CM: K43.2  ICD-9-CM: 553.21  8/14/2013 - Present        Obesity (BMI 35.0-39.9 without comorbidity) ICD-10-CM: E66.9  ICD-9-CM: 278.00  8/14/2013 - Present                Discussed with patient and family. Explained the importance of following up, Compliance with medications and recommendations on discharge,Side effect profile of medications were explained. Safety precautions at home and while taking pain medications also explained. All questions answered to the satisfaction of the patient/family. Discussed with consultant(s) who are agreeable to the discharge.    Verbal and written instructions on discharge given. Explained about Discharge medications and side effect profile. Advised patient/family to followup with their pcp for medication refills and preauthorization of medications, Home health orders. checkups,screenign programs as appropriate for age. Thank you Sheyla Foss MD for taking care of your patient, Please call with any questions. Greater than 36 minutes were spent with the patient on counseling and coordination of care    Signed:   Radu Cano MD  10/6/2020  9:18 AM    Please note that this dictation was completed with [employer], the computer voice recognition software. Quite often unanticipated grammatical, syntax, homophones, and other interpretive errors are inadvertently transcribed by the computer software. Please disregard these errors. Please excuse any errors that have escaped final proofreading. Thank you.

## 2020-10-05 NOTE — PROGRESS NOTES
Problem: Mobility Impaired (Adult and Pediatric)  Goal: *Acute Goals and Plan of Care (Insert Text)  Description: FUNCTIONAL STATUS PRIOR TO ADMISSION: one month ago, modified independent, ambulating in home with cane; since then, max to total assist, unable to get out of bed    HOME SUPPORT PRIOR TO ADMISSION: The patient lived alone with home health aide or family to provide assistance. Physical Therapy Goals  Initiated 10/3/2020  1. Patient will move from supine to sit and sit to supine  and roll side to side in bed with minimal assistance/contact guard assist within 7 day(s). 2.  Patient will transfer from bed to chair and chair to bed with maximal assistance using the least restrictive device within 7 day(s). 3.  Patient will perform sit to stand with maximal assistance within 7 day(s). 4.  Patient will ambulate with moderate assistance  for 5 feet with the least restrictive device within 7 day(s). Outcome: Progressing Towards Goal   PHYSICAL THERAPY TREATMENT  Patient: Rima Muhammad (79 y.o. female)  Date: 10/5/2020  Diagnosis: Failure to thrive in adult [R62.7]  Osteoarthritis [M19.90]   Failure to thrive in adult       Precautions:    Chart, physical therapy assessment, plan of care and goals were reviewed. ASSESSMENT  Patient continues with skilled PT services and is progressing towards goals gradually. Today, she was able to completed stand x1 w/ Mod Ax2 via pull to stand on RW. Once in standing she was able to side step to [facility] w/ Min Ax1 and returned to sit. Noted knee buckling and pt SOB during activity and encouraged PLB. Pt returned to bed and bed>chair position. At this time, would continue to recommend pt to be in chair position in bed at least 3x/day until safe enough to transfer to chair at bedside w/ Ax2. Current Level of Function Impacting Discharge (mobility/balance): Min-Mod Ax2 for functional mobility and side step to [facility].      Other factors to consider for discharge: lives alone (has/had home health aide or family to assist). PLAN :  Patient continues to benefit from skilled intervention to address the above impairments. Continue treatment per established plan of care. to address goals. Recommendation for discharge: (in order for the patient to meet his/her long term goals)  Therapy up to 5 days/week in SNF setting    This discharge recommendation:  Has been made in collaboration with the attending provider and/or case management    IF patient discharges home will need the following DME: patient owns DME required for discharge and to be determined (TBD) at rehab       SUBJECTIVE:   Patient stated .    OBJECTIVE DATA SUMMARY:   Critical Behavior:              Functional Mobility Training:  Bed Mobility:     Supine to Sit: Moderate assistance;Assist x2  Sit to Supine: Moderate assistance;Assist x2  Scooting: Minimum assistance; Moderate assistance;Assist x1        Transfers:  Sit to Stand: Moderate assistance;Assist x2  Stand to Sit: Moderate assistance;Assist x2                             Balance:  Sitting: Impaired  Sitting - Static: Fair (occasional)  Sitting - Dynamic: Fair (occasional)  Ambulation/Gait Training:  Distance (ft): ([facility] to [facility])  Assistive Device: Gait belt;Walker, rolling  Ambulation - Level of Assistance: Minimal assistance;Assist x2        Gait Abnormalities: Decreased step clearance;Shuffling gait; Step to gait        Base of Support: Center of gravity altered; Widened     Speed/Katharina: Shuffled; Slow  Step Length: Left shortened;Right shortened        Therapeutic Exercises:   PLB    Pain Rating:  No verbal c/o pain    Activity Tolerance:   Fair and observed SOB with activity  Please refer to the flowsheet for vital signs taken during this treatment.     After treatment patient left in no apparent distress:   Supine in bed, Call bell within reach, Side rails x 3, and bed>chair position    COMMUNICATION/COLLABORATION:   The patients plan of care was discussed with: Registered nurse.      Reba LIGHT Means,PTA   Time Calculation: 22 mins

## 2020-10-05 NOTE — PROGRESS NOTES
FAWN: Acute rehab vs SNF placement    RUR: 29%    -CM following for discharge planning needs, noted discharge order written for today.     -A referral had been sent to [address] [place] [address] yesterday and [facility] is awaiting their decision. -[facility] notes that patient was upgraded to inpatient status this morning. If patient is denied by acute rehab, she would need to remain in the hospital for three more nights in order to have a qualifying SNF stay. 10:25am: [facility] informed that patient has not had a covid test yet. She cannot go anywhere without that done. [facility] notified MD of this. 11:15am: Call received from Ruthie inquiring about whether or not patient would need HD at discharge, as patients' son indicated she had a fistula placed last week. [facility] discussed with attending and he will contact nephrology for further information. 2:30pm: Rah spoke with [address] [place] [address] liaison again.  The [address] is concerned about the \"dialysis situation\" and \"does not feel her labs are stable\"     Refugio. Mehrdad [address], ACM

## 2020-10-05 NOTE — PROGRESS NOTES
Hospitalist Progress Note  Kelvin To MD  Answering service: [phone] OR [phone] from in house phone      Date of Service:  10/5/2020  NAME:  Akira Forbes  :  [birth year]  MRN:  [id number]      Admission Summary:   Akira Forbes is a 79 y.o. female who presented to ED w/ son and CC failure to thrive, bedsore between buttocks/legs, left side hip and ankle pain for the last 2 years. According to ED notes, it appears that the patient was possibly going to be discharged in the ED case management was unable to place the patient tonight and so we were asked to admit the patient for failure to thrive. Interval history / Subjective:     Patient Was seen in followup of generalized joint pains  She reports      Assessment & Plan:     1. Generalized pain with left knee pains. Seen by orthopedics. Deemed not to be a candidate for steroid intra-articular joint infection due to renal disease. Responding well to oral steroid. Continue PT OT    2. Anemia . Check iron panel. Check stool for occult. Transfuse if hemoglobin less than 7. Order iron infusion due to iron deficiency anemia. Also degree of AOCD. Pt reports colonoscopy with 2 polyps removed. Needs Followup with pcp for Hg monitoring. 3. Hypertensive urgency resume home medication. PRN hydralazine, increased dose of hydralazine today as systolic blood pressure 895    4. CKD stage V, not on hemodialysis. No prior renal function available in our system. Doubt acute kidney injury. Renal Consulted. No plans for HD. 5. Diabetes mellitus type 2 with hyperglycemia. Blood sugars better controlled today. 6. Elevated LFTs on admission. Currently have trended down. Continue to monitor    7. UTI on admission. On Rocephin. Follow-up cultures. 8. Failure to thriveCase management consult. Possible rehab placement    9.  Dyspnea - CXR today showing Interstitial Edema- d/c IV fluids, one time dose iV lasix now       Code status: Full  DVT prophylaxis: Heparin      Risk of deterioration: low      Total time spent with patient: [address] discussed with: Patient/Family and Nurse  Disposition: [facility]/Rehab  Anticipated Discharge: 24 hours to 48 hours   Plan to discharge to rehab   Explained in detail about disease process and plan of care, and patient/family understand and are in agreement with plan. Hospital Problems  Date Reviewed: 10/2/2020          Codes Class Noted POA    Osteoarthritis ICD-10-CM: M19.90  ICD-9-CM: 715.90  10/5/2020 Unknown        * (Principal) Failure to thrive in adult ICD-10-CM: R62.7  ICD-9-CM: 783.7  10/2/2020 Yes        Left leg weakness ICD-10-CM: R29.898  ICD-9-CM: 729.89  10/2/2020 Yes        Abnormal urinalysis ICD-10-CM: R82.90  ICD-9-CM: 791.9  10/2/2020 Yes        Hypertensive urgency ICD-10-CM: I16.0  ICD-9-CM: 401.9  10/2/2020 Yes        Type 2 diabetes with nephropathy ([facility] [address] ICD-10-CM: E11.21  ICD-9-CM: 250.40, 583.81  4/15/2018 Yes        CKD (chronic kidney disease) stage 5, GFR less than 15 ml/min ([facility]) ICD-10-CM: N18.5  ICD-9-CM: 585.5  6/6/2014 Yes        Recurrent ventral incisional hernia ICD-10-CM: K43.2  ICD-9-CM: 553.21  8/14/2013 Yes        Obesity (BMI 35.0-39.9 without comorbidity) ICD-10-CM: E66.9  ICD-9-CM: 278.00  8/14/2013 Yes                Review of Systems:   Review of Systems   Constitutional: Negative. HENT: Negative. Eyes: Negative. Respiratory: Negative. Cardiovascular: Negative. Gastrointestinal: Negative. Genitourinary: Negative. Musculoskeletal: Positive for joint pain. Skin: Negative. Neurological: Negative. Endo/Heme/Allergies: Negative. Psychiatric/Behavioral: Negative. Vital Signs:    Last 24hrs VS reviewed since prior progress note.  Most recent are:  Visit Vitals  BP (!) 188/83   Pulse 80   Temp 98.7 °F (37.1 °C)   Resp 18   Wt 109.6 kg (241 lb 9.6 oz)   SpO2 97%   BMI 40.20 kg/m²         Intake/Output Summary (Last 24 hours) at 10/5/2020 1552  Last data filed at 10/5/2020 5356  Gross per 24 hour   Intake    Output 300 ml   Net -300 ml        Physical Examination:             Constitutional:  No acute distress, cooperative, pleasant    ENT:  Oral mucous moist, oropharynx benign. Neck supple,    Resp:  CTA bilaterally. No wheezing/rhonchi/rales. No accessory muscle use   CV:  Regular rhythm, normal rate, no murmurs, gallops, rubs    GI:  Soft, non distended, non tender. normoactive bowel sounds, no hepatosplenomegaly     Musculoskeletal:  No edema, warm, 2+ pulses throughout,    Neurologic:  Moves all extremities. AAOx3, CN II-XII reviewed     Skin:  Good turgor, no rashes or ulcers       Data Review:    Review and/or order of clinical lab test  Review and/or order of tests in the radiology section of CPT  Review and/or order of tests in the medicine section of CPT      Labs:     Recent Labs     10/05/20  0330 10/03/20  0539   WBC 7.9 4.2   HGB 7.9* 7.5*   HCT 24.5* 23.9*    254     Recent Labs     10/03/20  0539      K 3.7   *   CO2 29   BUN 43*   CREA 4.37*   GLU 98   CA 8.0*   MG 2.1     Recent Labs     10/03/20  0539   ALT 68   *   TBILI 0.4   TP 6.0*   ALB 2.2*   GLOB 3.8     No results for input(s): INR, PTP, APTT, INREXT, INREXT in the last 72 hours. Recent Labs     10/03/20  1850   TIBC 207*   PSAT 13*      No results found for: FOL, RBCF   No results for input(s): PH, PCO2, PO2 in the last 72 hours. No results for input(s): CPK, CKNDX, TROIQ in the last 72 hours.     No lab exists for component: CPKMB  Lab Results   Component Value Date/Time    Cholesterol, total 180 02/03/2020 10:22 AM    HDL Cholesterol 81 02/03/2020 10:22 AM    LDL, calculated 83 02/03/2020 10:22 AM    Triglyceride 79 02/03/2020 10:22 AM    CHOL/HDL Ratio 2.6 01/15/2017 04:43 AM     Lab Results   Component Value Date/Time    Glucose (POC) 240 (H) 10/05/2020 01:10 PM    Glucose (POC) 138 (H) 10/05/2020 06:19 AM    Glucose (POC) 247 (H) 10/04/2020 10:16 PM    Glucose (POC) 247 (H) 10/04/2020 05:03 PM    Glucose (POC) 264 (H) 10/04/2020 11:32 AM     Lab Results   Component Value Date/Time    Color YELLOW/STRAW 10/02/2020 01:10 PM    Appearance TURBID (A) 10/02/2020 01:10 PM    Specific gravity 1.013 10/02/2020 01:10 PM    pH (UA) 6.0 10/02/2020 01:10 PM    Protein 300 (A) 10/02/2020 01:10 PM    Glucose Negative 10/02/2020 01:10 PM    Ketone Negative 10/02/2020 01:10 PM    Bilirubin Negative 10/02/2020 01:10 PM    Urobilinogen 0.2 10/02/2020 01:10 PM    Nitrites Negative 10/02/2020 01:10 PM    Leukocyte Esterase LARGE (A) 10/02/2020 01:10 PM    Epithelial cells FEW 10/02/2020 01:10 PM    Bacteria 4+ (A) 10/02/2020 01:10 PM    WBC >100 (H) 10/02/2020 01:10 PM    RBC 5-10 10/02/2020 01:10 PM         Medications Reviewed:     Current Facility-Administered Medications   Medication Dose Route Frequency    furosemide (LASIX) injection 20 mg  20 mg IntraVENous ONCE    hydrALAZINE (APRESOLINE) tablet 50 mg  50 mg Oral BID    iron sucrose (VENOFER) 200 mg in 0.9% sodium chloride 100 mL IVPB  200 mg IntraVENous Q24H    cefTRIAXone (ROCEPHIN) 1 g in 0.9% sodium chloride (MBP/ADV) 50 mL  1 g IntraVENous Q24H    predniSONE (DELTASONE) tablet 10 mg  10 mg Oral DAILY WITH BREAKFAST    influenza vaccine 2020-21 (6 mos+)(PF) (FLUARIX/FLULAVAL/FLUZONE QUAD) injection 0.5 mL  0.5 mL IntraMUSCular PRIOR TO DISCHARGE    albuterol-ipratropium (DUO-NEB) 2.5 MG-0.5 MG/3 ML  3 mL Nebulization Q4H PRN    albuterol (PROVENTIL VENTOLIN) nebulizer solution 2.5 mg  2.5 mg Nebulization Q4H PRN    nystatin (MYCOSTATIN) 100,000 unit/gram ointment   Topical BID    sodium chloride (NS) flush 5-40 mL  5-40 mL IntraVENous Q8H    sodium chloride (NS) flush 5-40 mL  5-40 mL IntraVENous PRN    acetaminophen (TYLENOL) tablet 650 mg  650 mg Oral Q6H PRN    Or    acetaminophen (TYLENOL) suppository 650 mg  650 mg Rectal Q6H PRN    polyethylene glycol (MIRALAX) packet 17 g  17 g Oral DAILY PRN    promethazine (PHENERGAN) tablet 12.5 mg  12.5 mg Oral Q6H PRN    Or    ondansetron (ZOFRAN) injection 4 mg  4 mg IntraVENous Q6H PRN    allopurinoL (ZYLOPRIM) tablet 100 mg  100 mg Oral DAILY    atorvastatin (LIPITOR) tablet 80 mg  80 mg Oral DAILY    cloNIDine HCL (CATAPRES) tablet 0.3 mg  0.3 mg Oral BID    ferrous sulfate tablet 325 mg  1 Tab Oral DAILY WITH BREAKFAST    furosemide (LASIX) tablet 80 mg  80 mg Oral DAILY    gabapentin (NEURONTIN) capsule 600 mg  600 mg Oral TID    metoprolol succinate (TOPROL-XL) XL tablet 50 mg  50 mg Oral DAILY    levothyroxine (SYNTHROID) tablet 175 mcg  175 mcg Oral ACB    glucose chewable tablet 16 g  4 Tab Oral PRN    glucagon (GLUCAGEN) injection 1 mg  1 mg IntraMUSCular PRN    dextrose 10% infusion 0-250 mL  0-250 mL IntraVENous PRN    insulin glargine (LANTUS) injection 20 Units  20 Units SubCUTAneous QHS    insulin lispro (HUMALOG) injection   SubCUTAneous AC&HS    hydrALAZINE (APRESOLINE) tablet 50 mg  50 mg Oral Q8H PRN    heparin (porcine) injection 5,000 Units  5,000 Units SubCUTAneous Q8H     ______________________________________________________________________  EXPECTED LENGTH OF STAY: 2d 14h  ACTUAL LENGTH OF STAY:          0                 Monae Hansen MD   Patient has given Verbal permission to discuss medical care with   persons present in the room and and also with contact as listed on face sheet. Please note that this dictation was completed with [employer], the computer voice recognition software. Quite often unanticipated grammatical, syntax, homophones, and other interpretive errors are inadvertently transcribed by the computer software. Please disregard these errors. Please excuse any errors that have escaped final proofreading. Thank you.

## 2020-10-05 NOTE — CONSULTS
[address]    Name:  Nicholas Sampson  MR#:  [id number]  :  1940  ACCOUNT #:  [de-identified]  DATE OF SERVICE:  10/05/2020      REFERRING PHYSICIAN:  Michael Lutz MD    REASON FOR CONSULTATION:  Management of a patient with advanced chronic kidney disease. HISTORY OF PRESENT ILLNESS:  The patient is a very pleasant 78-year-old black woman, who is an office patient of Dr. Radha Shelley. The patient has advanced chronic kidney disease and she is already in preparation for initiation of dialysis when needed. The patient had a vascular surgery done in 2020 for placement of AV fistula on her left arm. She still has the staples in. The patient was admitted after her son brought her in for possible placement in a nursing home or rehab with the impression of failure to thrive. She has joint pains, swelling of left lower extremities, and anemia. The patient is on anemia management with Procrit injections. She had a visiting nurse, who was coming to give her the injections. She does not remember the last time when she had her Procrit done. PAST MEDICAL HISTORY:  1. Arthritis. 2.  Cataract. 3.  Chronic kidney disease. 4.  Chronic lower back pain. 5.  Depression. 6.  Diabetes. 7.  Follicular thyroid cancer. 8.  Foot ulcer. 9.  Gallbladder polyp. 10.  Hypercholesterolemia. 11.  Hypertension. 12.  Obesity. 13.  Right pontine stroke. 14. TIA. HOME MEDICATIONS:  List includes,  1. Insulin. 2.  Hydralazine. 3.  Metoprolol. 4.  Gabapentin. 5.  Furosemide. 6.  Synthroid. 7.  Atorvastatin. 8.  Ferrous sulfate. 9.  Pyridoxine. 10.  Allopurinol. ALLERGIES:  AMLODIPINE, CLINDAMYCIN, NIFEDIPINE, AND SULFA MEDICATIONS. PAST SURGICAL HISTORY:  1.  Stomach surgery. 2.  Breast biopsy bilaterally. 3.  Colonoscopy. 4.  Colorectal surgery. 5.  Cataract removal.  6.  Hernia repair. 7.  Hysterectomy. 8.  Bunion surgery.   9. Thyroidectomy. SOCIAL HISTORY:  The patient resides at home alone. She is unable to ambulate for at least one week prior to this admission. She is a lifetime nonsmoker. She does not use alcohol or illicit drugs. FAMILY HISTORY:  Mother with diabetes, hypertension, heart disease, and stroke. Father with colon cancer. Sister with diabetes, heart attack, hypertension. Brother with lung disease and hypertension. REVIEW OF SYSTEMS:  Pertinent positive for shortness of breath at present, swelling of left lower extremities, weakness, and inability to ambulate. PHYSICAL EXAMINATION:  GENERAL:  An elder-aged black woman, who is awake, alert, and pleasant. She is not in any obvious distress. She is laying in bed receiving infusion with Venofer. VITAL SIGNS:  Blood pressure is 188/83, heart rate is 80, respirations 16, temperature is 98.7. HEENT:  Head is normocephalic. Eyes with anicteric sclerae. Mouth with moist oral mucosa. Ears and nose without abnormal discharge. CHEST:  Symmetrical.  LUNGS:  Clear to auscultation. HEART:  With S1 and S2, with regular rate and rhythm. ABDOMEN:  Soft. EXTREMITIES:  With pneumatic boots, left leg with 1+ edema. Left arm with fresh AV fistula. The stitches are still in. It has good bruit and thrill. NEUROLOGIC:  The patient has general fatigue and weakness, but she is awake, alert, and oriented. LABORATORY DATA:  Sodium is 141, potassium is 3.7, CO2 is 29, BUN is 43, creatinine is 4.37, iron saturation is 13, hemoglobin is 7.9. ASSESSMENT:  1. Chronic kidney disease stage V from diabetic nephropathy. The patient is not uremic clinically, but her electrolytes are acceptable. 2.  Shortness of breath, likely from mild hypervolemia. The patient has been on IV fluids and she is on her diuretics. 3.  Significant anemia with iron deficiency. RECOMMENDATIONS:  1. The patient does not need to start dialysis at this point.   Her renal function is still adequate to allow time to mature her AV fistula. When she will need the dialysis, she will be dialyzed either at [facility] or the one on [place] [address]. 2.  Complete iron supplementation and resume Procrit. 3.  Continue diuretics and discontinue IV fluids. 4.  Avoid nephrotoxic agents when possible to allow the fistula to mature properly. Thank you very much for the opportunity to be part of this patient's care. Dr. Boris Obrien will follow up on the patient as an outpatient.       MD GABY [email or web address]  D:  10/05/2020 13:33  T:  10/05/2020 15:07  JOB #:  [id number]  CC:  Blsa Alonzo MD

## 2020-10-05 NOTE — PROGRESS NOTES
Bedside shift change report given to [employer], RN (oncoming nurse) by Nilda Becerra RN (offgoing nurse). Report included the following information SBAR, Kardex, Intake/Output and MAR.

## 2020-10-05 NOTE — WOUND CARE
Wound Consult:  New Patient Visit. Chart reviewed. Consulted for right buttock injury noted POA. Spoke with patients nurse,  Jessica and we were at bedside together to [address] [facility] to turn and provide care. Patient is resting on a total care bed with foam mattress; she turned well towards side with minimal assistance of one. She noted she was getting a sore on buttocks at home with progressive immobility and moisture. Assessment: 
Right gluteal fold - dry scabbed area that when moisturized with Hydragaurd, comes off and reveals pink resurfaced area less than 1 cm; dry skin with resolving dyschromia surrounding and through cleft / left buttock without injury to those areas. Appears as MASD that is now resolving with barrier ointment, diverting of urine stream with pure wick. No discoloration or injury indicative of pressure noted. Her left heel has a dry callus on posterior aspect; ~ 1.4 x 2.4 cm, no surrounding redness. She has DM and sees a podiatrist regularly per her report but has not been able to drive recently so has not gone. Does not appear to be pressure injury. No discoloration or callus to right heel. Groin/pannus intact, resolving from some MASD as well with light dyschromia of intact skin. Treatment: 
Hydragaurd to to buttocks/gluteal cleft/folds. Also used to moisturize feet. Heels floated and repositioned up in bed. Skin Care Recommendations: 1. Minimize friction/shear: minimize layers of linen/pads under patient. 2. Off load pressure/reposition: continue to turn and reposition approximately every 2 hours; float heels; waffle cushion for sitting. 3. Manage Moisture - keep skin folds dry; incontinence skin care - using Pure wick effectively; continue barrier ointments. 4. Continue to monitor nutrition, pain, and skin risk scale, and skin assessment. Plan: Will sign off; please re-consult if needed. Enoc Taylor RN,CN Wound Healing Office [phone] Pager [phone]

## 2020-10-06 VITALS
SYSTOLIC BLOOD PRESSURE: 171 MMHG | RESPIRATION RATE: 18 BRPM | TEMPERATURE: 98.2 F | OXYGEN SATURATION: 93 % | WEIGHT: 233.91 LBS | HEART RATE: 76 BPM | BODY MASS INDEX: 38.92 KG/M2 | DIASTOLIC BLOOD PRESSURE: 100 MMHG

## 2020-10-06 LAB
ANION GAP SERPL CALC-SCNC: 6 MMOL/L (ref 5–15)
BUN SERPL-MCNC: 47 MG/DL (ref 6–20)
BUN/CREAT SERPL: 10 (ref 12–20)
CALCIUM SERPL-MCNC: 8.2 MG/DL (ref 8.5–10.1)
CHLORIDE SERPL-SCNC: 106 MMOL/L (ref 97–108)
CO2 SERPL-SCNC: 29 MMOL/L (ref 21–32)
CREAT SERPL-MCNC: 4.52 MG/DL (ref 0.55–1.02)
GLUCOSE BLD STRIP.AUTO-MCNC: 124 MG/DL (ref 65–100)
GLUCOSE BLD STRIP.AUTO-MCNC: 76 MG/DL (ref 65–100)
GLUCOSE SERPL-MCNC: 170 MG/DL (ref 65–100)
HEALTH STATUS, XMCV2T: NORMAL
POTASSIUM SERPL-SCNC: 4.1 MMOL/L (ref 3.5–5.1)
SARS-COV-2, COV2NT: NOT DETECTED
SERVICE CMNT-IMP: ABNORMAL
SERVICE CMNT-IMP: NORMAL
SODIUM SERPL-SCNC: 141 MMOL/L (ref 136–145)
SOURCE, COVRS: NORMAL
SPECIMEN SOURCE, FCOV2M: NORMAL
SPECIMEN TYPE, XMCV1T: NORMAL

## 2020-10-06 PROCEDURE — 97110 THERAPEUTIC EXERCISES: CPT

## 2020-10-06 PROCEDURE — 90471 IMMUNIZATION ADMIN: CPT

## 2020-10-06 PROCEDURE — 90686 IIV4 VACC NO PRSV 0.5 ML IM: CPT | Performed by: HOSPITALIST

## 2020-10-06 PROCEDURE — 74011250636 HC RX REV CODE- 250/636: Performed by: INTERNAL MEDICINE

## 2020-10-06 PROCEDURE — 74011636637 HC RX REV CODE- 636/637: Performed by: HOSPITALIST

## 2020-10-06 PROCEDURE — 74011250636 HC RX REV CODE- 250/636: Performed by: NURSE PRACTITIONER

## 2020-10-06 PROCEDURE — 74011250636 HC RX REV CODE- 250/636: Performed by: HOSPITALIST

## 2020-10-06 PROCEDURE — 74011250637 HC RX REV CODE- 250/637: Performed by: HOSPITALIST

## 2020-10-06 PROCEDURE — 74011250637 HC RX REV CODE- 250/637: Performed by: NURSE PRACTITIONER

## 2020-10-06 PROCEDURE — 82962 GLUCOSE BLOOD TEST: CPT

## 2020-10-06 PROCEDURE — 80048 BASIC METABOLIC PNL TOTAL CA: CPT

## 2020-10-06 RX ORDER — FUROSEMIDE 10 MG/ML
20 INJECTION INTRAMUSCULAR; INTRAVENOUS ONCE
Status: COMPLETED | OUTPATIENT
Start: 2020-10-06 | End: 2020-10-06

## 2020-10-06 RX ORDER — LABETALOL HYDROCHLORIDE 5 MG/ML
10 INJECTION, SOLUTION INTRAVENOUS
Status: DISCONTINUED | OUTPATIENT
Start: 2020-10-06 | End: 2020-10-06 | Stop reason: HOSPADM

## 2020-10-06 RX ORDER — FUROSEMIDE 10 MG/ML
60 INJECTION INTRAMUSCULAR; INTRAVENOUS ONCE
Status: COMPLETED | OUTPATIENT
Start: 2020-10-06 | End: 2020-10-06

## 2020-10-06 RX ORDER — HYDRALAZINE HYDROCHLORIDE 20 MG/ML
10 INJECTION INTRAMUSCULAR; INTRAVENOUS
Status: DISCONTINUED | OUTPATIENT
Start: 2020-10-06 | End: 2020-10-06 | Stop reason: HOSPADM

## 2020-10-06 RX ORDER — CLONIDINE HYDROCHLORIDE 0.3 MG/1
0.3 TABLET ORAL 3 TIMES DAILY
Status: SHIPPED | COMMUNITY
Start: 2020-10-06

## 2020-10-06 RX ADMIN — ATORVASTATIN CALCIUM 80 MG: 40 TABLET, FILM COATED ORAL at 09:06

## 2020-10-06 RX ADMIN — LEVOTHYROXINE SODIUM 175 MCG: 125 TABLET ORAL at 07:11

## 2020-10-06 RX ADMIN — ALLOPURINOL 100 MG: 100 TABLET ORAL at 09:06

## 2020-10-06 RX ADMIN — FUROSEMIDE 20 MG: 10 INJECTION, SOLUTION INTRAMUSCULAR; INTRAVENOUS at 15:49

## 2020-10-06 RX ADMIN — CLONIDINE HYDROCHLORIDE 0.3 MG: 0.2 TABLET ORAL at 09:06

## 2020-10-06 RX ADMIN — EPOETIN ALFA-EPBX 10000 UNITS: 10000 INJECTION, SOLUTION INTRAVENOUS; SUBCUTANEOUS at 15:59

## 2020-10-06 RX ADMIN — GABAPENTIN 600 MG: 300 CAPSULE ORAL at 09:06

## 2020-10-06 RX ADMIN — NYSTATIN OINTMENT: 100000 OINTMENT TOPICAL at 09:06

## 2020-10-06 RX ADMIN — FERROUS SULFATE TAB 325 MG (65 MG ELEMENTAL FE) 325 MG: 325 (65 FE) TAB at 07:11

## 2020-10-06 RX ADMIN — HYDRALAZINE HYDROCHLORIDE 10 MG: 20 INJECTION INTRAMUSCULAR; INTRAVENOUS at 10:21

## 2020-10-06 RX ADMIN — Medication 10 ML: at 16:01

## 2020-10-06 RX ADMIN — FUROSEMIDE 60 MG: 10 INJECTION, SOLUTION INTRAMUSCULAR; INTRAVENOUS at 12:22

## 2020-10-06 RX ADMIN — PREDNISONE 10 MG: 10 TABLET ORAL at 07:11

## 2020-10-06 RX ADMIN — INFLUENZA VIRUS VACCINE 0.5 ML: 15; 15; 15; 15 SUSPENSION INTRAMUSCULAR at 16:01

## 2020-10-06 RX ADMIN — METOPROLOL SUCCINATE 50 MG: 50 TABLET, EXTENDED RELEASE ORAL at 09:06

## 2020-10-06 RX ADMIN — FUROSEMIDE 80 MG: 40 TABLET ORAL at 09:06

## 2020-10-06 RX ADMIN — GABAPENTIN 600 MG: 300 CAPSULE ORAL at 15:56

## 2020-10-06 RX ADMIN — HEPARIN SODIUM 5000 UNITS: 5000 INJECTION INTRAVENOUS; SUBCUTANEOUS at 15:58

## 2020-10-06 RX ADMIN — Medication 10 ML: at 06:00

## 2020-10-06 RX ADMIN — CLONIDINE HYDROCHLORIDE 0.3 MG: 0.2 TABLET ORAL at 15:56

## 2020-10-06 RX ADMIN — HYDRALAZINE HYDROCHLORIDE 50 MG: 50 TABLET, FILM COATED ORAL at 07:11

## 2020-10-06 RX ADMIN — HEPARIN SODIUM 5000 UNITS: 5000 INJECTION INTRAVENOUS; SUBCUTANEOUS at 07:10

## 2020-10-06 NOTE — PROGRESS NOTES
Problem: Mobility Impaired (Adult and Pediatric)  Goal: *Acute Goals and Plan of Care (Insert Text)  Description: FUNCTIONAL STATUS PRIOR TO ADMISSION: one month ago, modified independent, ambulating in home with cane; since then, max to total assist, unable to get out of bed    HOME SUPPORT PRIOR TO ADMISSION: The patient lived alone with home health aide or family to provide assistance. Physical Therapy Goals  Initiated 10/3/2020  1. Patient will move from supine to sit and sit to supine  and roll side to side in bed with minimal assistance/contact guard assist within 7 day(s). 2.  Patient will transfer from bed to chair and chair to bed with maximal assistance using the least restrictive device within 7 day(s). 3.  Patient will perform sit to stand with maximal assistance within 7 day(s). 4.  Patient will ambulate with moderate assistance  for 5 feet with the least restrictive device within 7 day(s). Outcome: Progressing Towards Goal   PHYSICAL THERAPY TREATMENT  Patient: James Morales (79 y.o. female)  Date: 10/6/2020  Diagnosis: Failure to thrive in adult [R62.7]  Osteoarthritis [M19.90]   Failure to thrive in adult       Precautions:    Chart, physical therapy assessment, plan of care and goals were reviewed. ASSESSMENT  Patient continues with skilled PT services and is progressing towards goals. She is still quite weak overall, especially on her L side. Also noted today that she was more ALONSO and SpO2 in the 89-90% range on room air. After she was returned to 1L nasal cannula, she was more comfortable with her breathing even with activity. Worked on UE and LE exercises and trunk strengthening in supine and in bed in chair position. BPs are elevated but stable with activity and RN is aware. Continue to recommend rehab once she is medically ready. She is highly motivated and cooperative with therapy efforts.      Current Level of Function Impacting Discharge (mobility/balance): max assist for mobility    Other factors to consider for discharge: none         PLAN :  Patient continues to benefit from skilled intervention to address the above impairments. Continue treatment per established plan of care. to address goals. Recommendation for discharge: (in order for the patient to meet his/her long term goals)  Therapy 3 hours per day 5-7 days per week    This discharge recommendation:  Has been made in collaboration with the attending provider and/or case management    IF patient discharges home will need the following DME: note patient has been accepted at [facility] per CM and will transfer there today       SUBJECTIVE:   Patient stated I want to get back to doing things for myself.     OBJECTIVE DATA SUMMARY:   Critical Behavior:              Functional Mobility Training:  Bed Mobility:                    Transfers:                                   Balance:  Sitting: Impaired; With support(able to hold trunk up away from bed in chair position)  Sitting - Static: Poor (constant support)(needs UEs to hold herself up away from bed in chair)  Ambulation/Gait Training:                                                        Stairs: Therapeutic Exercises:   AROM/strengthening exercises in UEs and LEs in all planes, trunk flexion using bedrails to assist from reclined to more upright position  Pain Rating:  No pain reported    Activity Tolerance:   Fair, desaturates with exertion and requires oxygen, and observed SOB with activity  Please refer to the flowsheet for vital signs taken during this treatment. After treatment patient left in no apparent distress:   Call bell within reach, Side rails x 3, and bed in chair position    COMMUNICATION/COLLABORATION:   The patients plan of care was discussed with: Registered nurse and Case management.      Gerald Streeter, PT   Time Calculation: 33 mins

## 2020-10-06 NOTE — PROGRESS NOTES
FAWN: Rehab vs SNF    RUR: 22%    Call received from [facility], stating they have denied patient for their program. Cm called patient's son to discuss Boris Lockwood, [phone]) other options. Patient would still have to remain in the hospital for an additional 2 nights in order to go to a SNF. CM discussed the option of [facility] and son was in agreement with them evaluating. Referral sent.     2:00pm: [facility] has accepted patient and can take today. CM discussed with patient and son, all in agreement. Referral sent to [facility] requesting a 4 pm .      Sapna PERDOMO, ACM

## 2020-10-06 NOTE — DISCHARGE INSTRUCTIONS
· It is important that you take the medication exactly as they are prescribed. · Keep your medication in the bottles provided by the pharmacist and keep a list of the medication names, dosages, and times to be taken in your wallet. · Do not take other medications without consulting your doctor. · No drinking alcohol or driving car or operating machinery if you are on narcotic pain medications. Donot take sedating mediations if you are sleepy or confused. · Fall Precautions  · Keep Well Hydrated  · Report to your medical provider if you feel you have  developed allergies to medications  · Follow up with your PCP or Consultant for medication adjustments and refills  · Monitor for signs of fevers,chills,bleeding,chest pain and seek medical attention if you do so.    · Check CXR in 1 week for resolution of radiographic changes

## 2020-10-06 NOTE — PROGRESS NOTES
Name: Nicole Mccallum MRN: [id number]   : 1940 Hospital[facility]   Date: 10/6/2020        IMPRESSION:   · CKD stage 5. Patient does not have overt hypervolemia  · Anemia of CKD with iron deficiency. Patient already has received IV iron. She is on oral Fe supplementation as well. · Hypervolemia, symptomatic. PLAN:   · Give an additional IV dose of Lasix. · Start anemia management. · Repeat the renal panel. Subjective/Interval History:   I have reviewed the flowsheet and previous days notes. ROS:Pertinent items are noted in HPI. C/o SOB    Objective:   Vital Signs:    Visit Vitals  BP (!) 179/71   Pulse 83   Temp 99.2 °F (37.3 °C)   Resp 18   Wt 109.6 kg (241 lb 9.6 oz)   SpO2 95%   BMI 40.20 kg/m²       O2 Device: Nasal cannula   O2 Flow Rate (L/min): 1 l/min   Temp (24hrs), Av.8 °F (37.1 °C), Min:98.4 °F (36.9 °C), Max:99.2 °F (37.3 °C)       Intake/Output:   Last shift:      10/06 0701 - 10/06 1900  In: -   Out: 1000 [Urine:1000]  Last 3 shifts: 10/04 1901 - 10/06 0700  In: -   Out: 4976 [Urine:1650]    Intake/Output Summary (Last 24 hours) at 10/6/2020 1217  Last data filed at 10/6/2020 0825  Gross per 24 hour   Intake    Output 2350 ml   Net -2350 ml        Physical Exam:  General:    Alert, cooperative, no distress, appears stated age. Head:   Normocephalic, without obvious abnormality, atraumatic. Eyes:   Conjunctivae/corneas clear. Nose:  Nares normal. No drainage or sinus tenderness. Throat:    Lips, mucosa, and tongue normal.  No Thrush  Neck:  Supple, symmetrical,  no adenopathy, thyroid: non tender    no carotid bruit and no JVD. Lungs:   Diminished to auscultation bilaterally. No Wheezing or Rhonchi. No rales. Chest wall:  No tenderness or deformity. No Accessory muscle use. Heart:   Regular rate and rhythm,  no murmur, rub or gallop. Abdomen:   Soft, non-tender. Not distended.   Bowel sounds normal. No masses  Extremities: Extremities normal, atraumatic, No cyanosis. Trace  edema. No clubbing  Skin:     Texture, turgor normal. No rashes or lesions. Not Jaundiced  Psych:  Good insight. Not depressed. Not anxious or agitated. Neurologic: Normal strength, Alert and oriented X 3. DATA:  Labs:  No results for input(s): NA, K, CL, CO2, BUN, CREA, CA, ALB, PHOS, MG in the last 72 hours. Recent Labs     10/05/20  0330   WBC 7.9   HGB 7.9*   HCT 24.5*        No results for input(s): TOM, KU, CLU, CREAU in the last 72 hours.     No lab exists for component: PROU    Total time spent with patient:  35 minutes    [] Critical Care Provided    Care Plan discussed with:   India Hand MD

## 2020-10-06 NOTE — PROGRESS NOTES
Bedside and Verbal shift change report given to Sol Person RN (oncoming nurse) by MINDI Pulido RN (offgoing nurse). Report included the following information SBAR, Kardex, Intake/Output, MAR and Recent Results.

## 2020-10-06 NOTE — PROGRESS NOTES
3:00 PM: COVID test done and sent to lab.    4:35 PM: Orders received for one time dose IV lasix. Patients BP currently 202/73. Will recheck after giving IV lasix. 5:45 PM: BP after giving lasix still elevated, 200/75. PRN hydralazine given. 7:45 PM: Patients BP now 179/71. Patient is resting quietly and having no discomfort or pain. Putting out adequately with purewick, about 1250ml of clear yellow urine and passing gas.

## 2020-10-06 NOTE — PROGRESS NOTES
Bedside shift change report given to María Chicas RN (oncoming nurse) by Keyonna Canseco RN (offgoing nurse). Report included the following information SBAR, Kardex, Intake/Output and MAR.

## 2020-10-07 ENCOUNTER — TELEPHONE (OUTPATIENT)
Dept: CASE MANAGEMENT | Age: 80
End: 2020-10-07

## 2020-10-07 NOTE — ACP (ADVANCE CARE PLANNING)
Follow up call made to Case Management at [facility] today post pt's transfer to that facility. Pt has an AMD on file but it not in its entirety which is not valid to be used. In reading pt's chart, she indicated in the ED she prefers her son Cara Avilez be the  who is listed as Emergency contact. I am inquiring with [facility] if they have the means to assist pt in completing a new document to reflect pt's statement made in the ED. Await a return call.   Rosalind Flores LCSW

## 2020-10-09 ENCOUNTER — TELEPHONE (OUTPATIENT)
Dept: CASE MANAGEMENT | Age: 80
End: 2020-10-09

## 2020-10-20 ENCOUNTER — HOSPITAL ENCOUNTER (INPATIENT)
Age: 80
LOS: 4 days | Discharge: HOSPICE | DRG: 069 | End: 2020-10-26
Attending: EMERGENCY MEDICINE | Admitting: INTERNAL MEDICINE
Payer: MEDICARE

## 2020-10-20 ENCOUNTER — APPOINTMENT (OUTPATIENT)
Dept: CT IMAGING | Age: 80
DRG: 069 | End: 2020-10-20
Attending: EMERGENCY MEDICINE
Payer: MEDICARE

## 2020-10-20 DIAGNOSIS — G93.40 ENCEPHALOPATHY: ICD-10-CM

## 2020-10-20 DIAGNOSIS — K59.00 OBSTIPATION: ICD-10-CM

## 2020-10-20 DIAGNOSIS — Z71.89 GOALS OF CARE, COUNSELING/DISCUSSION: ICD-10-CM

## 2020-10-20 DIAGNOSIS — D64.9 ANEMIA, UNSPECIFIED TYPE: Primary | ICD-10-CM

## 2020-10-20 DIAGNOSIS — G45.9 TIA (TRANSIENT ISCHEMIC ATTACK): ICD-10-CM

## 2020-10-20 DIAGNOSIS — R53.81 DEBILITY: ICD-10-CM

## 2020-10-20 LAB
ALBUMIN SERPL-MCNC: 2.7 G/DL (ref 3.5–5)
ALBUMIN/GLOB SERPL: 0.7 {RATIO} (ref 1.1–2.2)
ALP SERPL-CCNC: 86 U/L (ref 45–117)
ALT SERPL-CCNC: 31 U/L (ref 12–78)
ANION GAP SERPL CALC-SCNC: 12 MMOL/L (ref 5–15)
APTT PPP: 27.3 SEC (ref 22.1–32)
AST SERPL-CCNC: 32 U/L (ref 15–37)
ATRIAL RATE: 77 BPM
BASOPHILS # BLD: 0 K/UL (ref 0–0.1)
BASOPHILS NFR BLD: 1 % (ref 0–1)
BILIRUB SERPL-MCNC: 0.6 MG/DL (ref 0.2–1)
BUN SERPL-MCNC: 93 MG/DL (ref 6–20)
BUN/CREAT SERPL: 12 (ref 12–20)
CALCIUM SERPL-MCNC: 8.2 MG/DL (ref 8.5–10.1)
CALCULATED P AXIS, ECG09: 47 DEGREES
CALCULATED R AXIS, ECG10: 18 DEGREES
CALCULATED T AXIS, ECG11: 125 DEGREES
CHLORIDE SERPL-SCNC: 97 MMOL/L (ref 97–108)
CK SERPL-CCNC: 226 U/L (ref 26–192)
CO2 SERPL-SCNC: 28 MMOL/L (ref 21–32)
COMMENT, HOLDF: NORMAL
CREAT SERPL-MCNC: 8 MG/DL (ref 0.55–1.02)
DIAGNOSIS, 93000: NORMAL
DIFFERENTIAL METHOD BLD: ABNORMAL
EOSINOPHIL # BLD: 0.1 K/UL (ref 0–0.4)
EOSINOPHIL NFR BLD: 2 % (ref 0–7)
ERYTHROCYTE [DISTWIDTH] IN BLOOD BY AUTOMATED COUNT: 18.1 % (ref 11.5–14.5)
GLOBULIN SER CALC-MCNC: 4 G/DL (ref 2–4)
GLUCOSE BLD STRIP.AUTO-MCNC: 139 MG/DL (ref 65–100)
GLUCOSE SERPL-MCNC: 129 MG/DL (ref 65–100)
HCT VFR BLD AUTO: 23.2 % (ref 35–47)
HCT VFR BLD AUTO: 23.5 % (ref 35–47)
HGB BLD-MCNC: 7.2 G/DL (ref 11.5–16)
HGB BLD-MCNC: 7.4 G/DL (ref 11.5–16)
HISTORY CHECKED?,CKHIST: NORMAL
IMM GRANULOCYTES # BLD AUTO: 0 K/UL (ref 0–0.04)
IMM GRANULOCYTES NFR BLD AUTO: 0 % (ref 0–0.5)
INR PPP: 1 (ref 0.9–1.1)
LYMPHOCYTES # BLD: 0.8 K/UL (ref 0.8–3.5)
LYMPHOCYTES NFR BLD: 15 % (ref 12–49)
MCH RBC QN AUTO: 28.6 PG (ref 26–34)
MCHC RBC AUTO-ENTMCNC: 31 G/DL (ref 30–36.5)
MCV RBC AUTO: 92.1 FL (ref 80–99)
MONOCYTES # BLD: 0.8 K/UL (ref 0–1)
MONOCYTES NFR BLD: 15 % (ref 5–13)
NEUTS SEG # BLD: 3.6 K/UL (ref 1.8–8)
NEUTS SEG NFR BLD: 67 % (ref 32–75)
NRBC # BLD: 0 K/UL (ref 0–0.01)
NRBC BLD-RTO: 0 PER 100 WBC
P-R INTERVAL, ECG05: 106 MS
PLATELET # BLD AUTO: 188 K/UL (ref 150–400)
PMV BLD AUTO: 11.9 FL (ref 8.9–12.9)
POTASSIUM SERPL-SCNC: 4.5 MMOL/L (ref 3.5–5.1)
PROT SERPL-MCNC: 6.7 G/DL (ref 6.4–8.2)
PROTHROMBIN TIME: 10.8 SEC (ref 9–11.1)
Q-T INTERVAL, ECG07: 440 MS
QRS DURATION, ECG06: 92 MS
QTC CALCULATION (BEZET), ECG08: 497 MS
RBC # BLD AUTO: 2.52 M/UL (ref 3.8–5.2)
SAMPLES BEING HELD,HOLD: NORMAL
SERVICE CMNT-IMP: ABNORMAL
SODIUM SERPL-SCNC: 137 MMOL/L (ref 136–145)
THERAPEUTIC RANGE,PTTT: NORMAL SECS (ref 58–77)
TROPONIN I SERPL-MCNC: 0.12 NG/ML
TSH SERPL DL<=0.05 MIU/L-ACNC: 5.44 UIU/ML (ref 0.36–3.74)
TSH SERPL DL<=0.05 MIU/L-ACNC: 6.29 UIU/ML (ref 0.36–3.74)
VENTRICULAR RATE, ECG03: 77 BPM
WBC # BLD AUTO: 5.4 K/UL (ref 3.6–11)

## 2020-10-20 PROCEDURE — 82550 ASSAY OF CK (CPK): CPT

## 2020-10-20 PROCEDURE — 0042T CT CODE NEURO PERF W CBF: CPT

## 2020-10-20 PROCEDURE — 82962 GLUCOSE BLOOD TEST: CPT

## 2020-10-20 PROCEDURE — 99218 HC RM OBSERVATION: CPT

## 2020-10-20 PROCEDURE — 85025 COMPLETE CBC W/AUTO DIFF WBC: CPT

## 2020-10-20 PROCEDURE — 85014 HEMATOCRIT: CPT

## 2020-10-20 PROCEDURE — 74011000636 HC RX REV CODE- 636: Performed by: RADIOLOGY

## 2020-10-20 PROCEDURE — 74011000250 HC RX REV CODE- 250: Performed by: FAMILY MEDICINE

## 2020-10-20 PROCEDURE — 96374 THER/PROPH/DIAG INJ IV PUSH: CPT

## 2020-10-20 PROCEDURE — 4A03X5D MEASUREMENT OF ARTERIAL FLOW, INTRACRANIAL, EXTERNAL APPROACH: ICD-10-PCS | Performed by: RADIOLOGY

## 2020-10-20 PROCEDURE — 70496 CT ANGIOGRAPHY HEAD: CPT

## 2020-10-20 PROCEDURE — 86900 BLOOD TYPING SEROLOGIC ABO: CPT

## 2020-10-20 PROCEDURE — 74011250637 HC RX REV CODE- 250/637: Performed by: FAMILY MEDICINE

## 2020-10-20 PROCEDURE — 85730 THROMBOPLASTIN TIME PARTIAL: CPT

## 2020-10-20 PROCEDURE — 84443 ASSAY THYROID STIM HORMONE: CPT

## 2020-10-20 PROCEDURE — 85610 PROTHROMBIN TIME: CPT

## 2020-10-20 PROCEDURE — 86923 COMPATIBILITY TEST ELECTRIC: CPT

## 2020-10-20 PROCEDURE — 80053 COMPREHEN METABOLIC PANEL: CPT

## 2020-10-20 PROCEDURE — 83540 ASSAY OF IRON: CPT

## 2020-10-20 PROCEDURE — 74011000258 HC RX REV CODE- 258: Performed by: RADIOLOGY

## 2020-10-20 PROCEDURE — 74011250636 HC RX REV CODE- 250/636: Performed by: FAMILY MEDICINE

## 2020-10-20 PROCEDURE — 36415 COLL VENOUS BLD VENIPUNCTURE: CPT

## 2020-10-20 PROCEDURE — 93005 ELECTROCARDIOGRAM TRACING: CPT

## 2020-10-20 PROCEDURE — 70450 CT HEAD/BRAIN W/O DYE: CPT

## 2020-10-20 PROCEDURE — 84484 ASSAY OF TROPONIN QUANT: CPT

## 2020-10-20 PROCEDURE — 87635 SARS-COV-2 COVID-19 AMP PRB: CPT

## 2020-10-20 PROCEDURE — 99285 EMERGENCY DEPT VISIT HI MDM: CPT

## 2020-10-20 PROCEDURE — C9113 INJ PANTOPRAZOLE SODIUM, VIA: HCPCS | Performed by: FAMILY MEDICINE

## 2020-10-20 RX ORDER — GUAIFENESIN 100 MG/5ML
81 LIQUID (ML) ORAL DAILY
Status: DISCONTINUED | OUTPATIENT
Start: 2020-10-21 | End: 2020-10-26 | Stop reason: HOSPADM

## 2020-10-20 RX ORDER — ATORVASTATIN CALCIUM 40 MG/1
80 TABLET, FILM COATED ORAL DAILY
Status: DISCONTINUED | OUTPATIENT
Start: 2020-10-21 | End: 2020-10-26 | Stop reason: HOSPADM

## 2020-10-20 RX ORDER — HYDRALAZINE HYDROCHLORIDE 25 MG/1
25 TABLET, FILM COATED ORAL 3 TIMES DAILY
Status: DISCONTINUED | OUTPATIENT
Start: 2020-10-20 | End: 2020-10-26 | Stop reason: HOSPADM

## 2020-10-20 RX ORDER — SODIUM CHLORIDE 9 MG/ML
75 INJECTION, SOLUTION INTRAVENOUS CONTINUOUS
Status: DISPENSED | OUTPATIENT
Start: 2020-10-20 | End: 2020-10-21

## 2020-10-20 RX ORDER — ACETAMINOPHEN 650 MG/1
650 SUPPOSITORY RECTAL
Status: DISCONTINUED | OUTPATIENT
Start: 2020-10-20 | End: 2020-10-26 | Stop reason: HOSPADM

## 2020-10-20 RX ORDER — METOPROLOL SUCCINATE 50 MG/1
50 TABLET, EXTENDED RELEASE ORAL DAILY
Status: DISCONTINUED | OUTPATIENT
Start: 2020-10-21 | End: 2020-10-26 | Stop reason: HOSPADM

## 2020-10-20 RX ORDER — SODIUM CHLORIDE 9 MG/ML
250 INJECTION, SOLUTION INTRAVENOUS AS NEEDED
Status: DISPENSED | OUTPATIENT
Start: 2020-10-20 | End: 2020-10-21

## 2020-10-20 RX ORDER — SODIUM CHLORIDE 0.9 % (FLUSH) 0.9 %
10 SYRINGE (ML) INJECTION
Status: COMPLETED | OUTPATIENT
Start: 2020-10-20 | End: 2020-10-20

## 2020-10-20 RX ORDER — LANOLIN ALCOHOL/MO/W.PET/CERES
1 CREAM (GRAM) TOPICAL
Status: DISCONTINUED | OUTPATIENT
Start: 2020-10-21 | End: 2020-10-26 | Stop reason: HOSPADM

## 2020-10-20 RX ORDER — MAGNESIUM CITRATE
296 SOLUTION, ORAL ORAL
Status: DISPENSED | OUTPATIENT
Start: 2020-10-20 | End: 2020-10-21

## 2020-10-20 RX ORDER — ACETAMINOPHEN 325 MG/1
650 TABLET ORAL
Status: DISCONTINUED | OUTPATIENT
Start: 2020-10-20 | End: 2020-10-26 | Stop reason: HOSPADM

## 2020-10-20 RX ADMIN — IOPAMIDOL 120 ML: 755 INJECTION, SOLUTION INTRAVENOUS at 11:30

## 2020-10-20 RX ADMIN — Medication 10 ML: at 11:30

## 2020-10-20 RX ADMIN — SODIUM CHLORIDE 40 MG: 9 INJECTION, SOLUTION INTRAMUSCULAR; INTRAVENOUS; SUBCUTANEOUS at 15:48

## 2020-10-20 RX ADMIN — SODIUM CHLORIDE 75 ML/HR: 9 INJECTION, SOLUTION INTRAVENOUS at 19:41

## 2020-10-20 RX ADMIN — HYDRALAZINE HYDROCHLORIDE 25 MG: 25 TABLET, FILM COATED ORAL at 23:03

## 2020-10-20 RX ADMIN — CLONIDINE HYDROCHLORIDE 0.3 MG: 0.2 TABLET ORAL at 23:01

## 2020-10-20 RX ADMIN — SODIUM CHLORIDE 100 ML: 900 INJECTION, SOLUTION INTRAVENOUS at 11:30

## 2020-10-20 NOTE — ED TRIAGE NOTES
Pt arrives with [facility] transport from [facility]. Per transport, staff felt patient has \"altered mental status\", \"BG has been bouncing all over the place\" and pt has been refusing to eat. Per transport, staff also felt patient had right sided weakness with right sided facial droop. Hx CVA, residual LEFT sided weakness at baseline. No facial droop or right sided weakness noted on exam    Pt states \"my stool has been hard as rocks and I don't want to eat if its going to be like that and they aren't going to help me\". Pt denies any other complaints. Pt is A&O x 4.

## 2020-10-20 NOTE — ED NOTES
MD spoke with physician at facility. Per facility MD, pt had right sided facial droop this morning.  Therapy staff woke her up and patient had left arm/leg weakness

## 2020-10-20 NOTE — ED NOTES
TRANSFER - OUT REPORT:    Verbal report given to Camella Ganser, RN(name) on Penny Bloch  being transferred to [facility]) for routine progression of care       Report consisted of patients Situation, Background, Assessment and   Recommendations(SBAR). Information from the following report(s) SBAR, ED Summary, [facility] and Recent Results was reviewed with the receiving nurse. Lines:   Peripheral IV 10/20/20 Right Wrist (Active)   Site Assessment Clean, dry, & intact 10/20/20 1620   Phlebitis Assessment 0 10/20/20 1620   Infiltration Assessment 0 10/20/20 1620   Dressing Status Clean, dry, & intact 10/20/20 1620   Dressing Type 4 X 4;Transparent 10/20/20 1620   Hub Color/Line Status Pink;Flushed;Patent 10/20/20 1620        Opportunity for questions and clarification was provided.       Patient transported with:   [employer]

## 2020-10-20 NOTE — ED PROVIDER NOTES
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 She has a history of elevated cholesterol and hypertension along with obesity and a right pontine stroke. She was sent in today because they say she has not been eating or drinking for a couple of days and was more lethargic. The patient states that she has not been eating or drinking because she did not want to try to have a bowel movement because it hurts so badly. She denies any fever or chills, no shortness of breath and no chest pain. Her stomach is bothering her a little bit because of her obstipation. Her biggest complaint is rectal pain when she attempts to have a bowel movement. She has no complaint of weakness, urinary symptoms or other acute symptomatology. Past Medical History:   Diagnosis Date    Arthritis     Cataract     bilateral    Chronic kidney disease     Dr. Juan Li Chronic pain     lower back    CKD stage 3 due to type 2 diabetes mellitus ([address]     Depression     Diabetes ([address] age 49    Type II    Follicular thyroid cancer ([address] 08/2014    Foot ulcer ([address]     Gallbladder polyp 8/14/2013    Hypercholesterolemia     Hypertension     Ill-defined condition     states 'polyp' in gal bladder    Long term current use of anticoagulant therapy     Obesity     Right pontine stroke ([address] 1/17/2017    TIA (transient ischemic attack) 6/6/2014       Past Surgical History:   Procedure Laterality Date    ABDOMEN SURGERY PROC UNLISTED  2006    stomach    BREAST SURGERY PROCEDURE UNLISTED  2009, 2006    breast bx-vince    COLONOSCOPY N/A 12/17/2019    COLONOSCOPY performed by Ruben Norris MD at [address] [place] [address]  12/17/2019         COLORECTAL SCRN; HI RISK IND  5/30/2014         HX BREAST BIOPSY Right [id number]    2 biopies on the right. Benign (per patient)    HX BREAST BIOPSY Left 2015    Benign (per patient)  HX CATARACT REMOVAL Left     HX GYN  1970's    partial hysterectomy    HX HERNIA REPAIR  2009    hiatal    HX HYSTERECTOMY      HX ORTHOPAEDIC Bilateral 1988    bunion    HX THYROIDECTOMY  2014    Dr. Jonathan Houser         Family History:   Problem Relation Age of Onset    Heart Disease Mother     Hypertension Mother     Diabetes Mother     Stroke Mother     Cancer Father         colon    Diabetes Sister     Heart Attack Sister     Hypertension Sister     Heart Disease Sister         pacemaker    Lung Disease Brother     Hypertension Brother     Lung Disease Brother     Anesth Problems Neg Hx        Social History     Socioeconomic History    Marital status:      Spouse name: Not on file    Number of children: Not on file    Years of education: Not on file    Highest education level: Not on file   Occupational History    Not on file   Social Needs    Financial resource strain: Not on file    Food insecurity     Worry: Not on file     Inability: Not on file    Transportation needs     Medical: Not on file     Non-medical: Not on file   Tobacco Use    Smoking status: Never Smoker    Smokeless tobacco: Never Used   Substance and Sexual Activity    Alcohol use: No    Drug use: No    Sexual activity: Not on file   Lifestyle    Physical activity     Days per week: Not on file     Minutes per session: Not on file    Stress: Not on file   Relationships    Social connections     Talks on phone: Not on file     Gets together: Not on file     Attends Congregational service: Not on file     Active member of club or organization: Not on file     Attends meetings of clubs or organizations: Not on file     Relationship status: Not on file    Intimate partner violence     Fear of current or ex partner: Not on file     Emotionally abused: Not on file     Physically abused: Not on file     Forced sexual activity: Not on file   Other Topics Concern    Not on file   Social History Narrative    Lives in [place] with her son. Also has one other son. . Used to work at [employer] and [employer]. Likes to [employer]. ALLERGIES: Amlodipine; Clindamycin; Nifedipine; and Sulfa (sulfonamide antibiotics)    Review of Systems   Constitutional: Positive for appetite change. Gastrointestinal: Positive for abdominal pain ( Mild when attempting to have a bowel movement. There is rectal pain with having a bowel movement. ). Vitals:    10/20/20 1013   BP: (!) 157/47   Pulse: 77   Resp: 18   Temp: 98.8 °F (37.1 °C)   SpO2: 98%            Physical Exam  Vitals signs and nursing note reviewed. Constitutional:       General: She is not in acute distress. Appearance: She is well-developed. HENT:      Head: Normocephalic and atraumatic. Nose: Nose normal.   Eyes:      General: No scleral icterus. Conjunctiva/sclera: Conjunctivae normal.      Pupils: Pupils are equal, round, and reactive to light. Neck:      Musculoskeletal: Normal range of motion and neck supple. Thyroid: No thyromegaly. Vascular: No JVD. Trachea: No tracheal deviation. Comments: No carotid bruits noted. Cardiovascular:      Rate and Rhythm: Normal rate and regular rhythm. Heart sounds: Normal heart sounds. No murmur. No friction rub. No gallop. Pulmonary:      Effort: Pulmonary effort is normal. No respiratory distress. Breath sounds: Normal breath sounds. No wheezing or rales. Chest:      Chest wall: No tenderness. Abdominal:      General: Bowel sounds are normal. There is no distension. Palpations: Abdomen is soft. There is no mass. Tenderness: There is no abdominal tenderness. There is no guarding or rebound. Musculoskeletal: Normal range of motion. General: No tenderness. Lymphadenopathy:      Cervical: No cervical adenopathy. Skin:     General: Skin is warm and dry. Findings: No erythema or rash.    Neurological:      Mental Status: She is alert and oriented to person, place, and time. Cranial Nerves: No cranial nerve deficit. Coordination: Coordination normal.      Deep Tendon Reflexes: Reflexes are normal and symmetric. Comments: The patient has some weakness of the left lower extremity. She is unable to raise it more than an inch off the bed. Her dorsiflexion is weak on that side. Plantarflexion is fine and sensory is normal bilaterally. She has good range of motion of the right leg and strength is good. Both upper extremities appear to be equal in strength. No sensory deficits there. She has no facial weakness or drooping noted. She is alert and oriented x3 and awake. Psychiatric:         Behavior: Behavior normal.         Thought Content: Thought content normal.         Judgment: Judgment normal.          MDM  Number of Diagnoses or Management Options     Amount and/or Complexity of Data Reviewed  Clinical lab tests: ordered and reviewed  Tests in the radiology section of CPT®: ordered and reviewed  Decide to obtain previous medical records or to obtain history from someone other than the patient: yes  Review and summarize past medical records: yes  Independent visualization of images, tracings, or specimens: yes    Risk of Complications, Morbidity, and/or Mortality  Presenting problems: high  Diagnostic procedures: moderate  Management options: moderate    Patient Progress  Patient progress: stable         Procedures    The complaints related by the nursing home did not seem to be the patient's complaint. She is alert and oriented x3. Her only complaint is that she is not eating or drinking because it hurts when she tries to move her bowel. She denies other associated symptoms. We will check her blood work and a KUB as well as her rectum and then reevaluate.     11:19 AM  Addendum note is that we spoke with the home, and apparently physical therapy was in the room with the patient this morning and determined that she was not able to move her left side. The physician was called and when he went into the room, the patient was moving the left side well but he felt she had a right facial droop. So she was sent here for possible strokelike syndrome. She was not sent for her obstipation. A level 2 code stroke he was therefore called. She will be taken to CT and evaluated for possible TIA. I have spoken to telemetry neurology and they agree with admission for further evaluation of possible TIA. Patient is also noted to be somewhat anemic. The patient had a hemoglobin of 7.9 on the fifth of this month. So this is not a particularly new problem. I have asked the hospitalist to see and admit for further evaluation for TIA. Perfect Serve Consult for Admission  1:13 PM    ED Room Number: ER24/24  Patient Name and age: Hali Arroyo 79 y.o.  female  Working Diagnosis:   1. Anemia, unspecified type    2. TIA (transient ischemic attack)    3. Obstipation        COVID-19 Suspicion:  no  Sepsis present:  no  Reassessment needed: no  Code Status:  Full Code  Readmission: no  Isolation Requirements:  no  Recommended Level of Care:  telemetry  Department:[facility] Adult ED - 21   Other:        The patient initially decided that she did not want to be admitted. I have talked to her as well as the hospitalist.  We have explained that we are concerned that she may have had a TIA based on what the physician at her facility said. We also discussed the possibilities as discussed with telemetry neurology. She was told it was strongly recommended both by myself and the telemetry neurologist that she be admitted for couple of additional test overnight to rule out any acute strokelike syndrome. She again refuses admission. We have told her that she could possibly end up going on to a full-blown stroke and could die. That if she goes home it would be AGAINST MEDICAL ADVICE.   She would like to speak to her sister, and has tried by phone in a couple of times. We are awaiting them to talk and for her to decide what she would like to do. If she does not want admission and she will be discharged AMA back home. Dr. Santos Paul is willing to admit and has seen the patient. 3:26 PM    I am asking the nurse to get the sister back on the phone so the patient sister can discuss her disposition. She was told that our recommendation was to be admitted for a completed workup for possible TIA. HBG is an ongoing issue. Hard stools. Patient appears to be competent in making a decision on admission or no. We spoke with her several times and encouraged her to stay. Dr. Lord Schrader is aware of the situation and understands that if the patient decides to go home it will be ama    Patient ultimately agreed to stay and admitted for further evaluation and care.

## 2020-10-20 NOTE — PROGRESS NOTES
2000: Bedside shift change report given to Nenita Gage RN (oncoming nurse) by Anjelica Washington RN (offgoing nurse). Report included the following information SBAR, Kardex, ED Summary, Intake/Output, MAR, Accordion, Recent Results and Cardiac Rhythm NSR. Primary Nurse Conchita Poon and Felicity Wilson RN performed a dual skin assessment on this patient No impairment noted  Vinny score is 14  - buttocks hyperpigmentation but intact  - ABD scarring  - scattered bruising  - missing L foot toenail      1638: TRANSFER - IN REPORT:    Verbal report received from [address] [place] [address], RN(name) on Marven Corti  being received from ED(unit) for routine progression of care      Report consisted of patients Situation, Background, Assessment and   Recommendations(SBAR). Information from the following report(s) SBAR, Kardex, ED Summary, Intake/Output, MAR, Accordion, Recent Results and Cardiac Rhythm NSR was reviewed with the receiving nurse. Opportunity for questions and clarification was provided. Assessment completed upon patients arrival to unit and care assumed.

## 2020-10-20 NOTE — H&P
History & Physical    Primary Care Provider: Teresa Penaloza MD  Source of Information: Patient     History of Presenting Illness: Tejinder Washington is a 79 y.o. female who presents with left-sided weakness    History was obtained from the patient, patient is somewhat angry and upset because of being in the hospital.  Would not answer questions appropriately, thus history was somewhat suboptimal.      Patient apparently is [facility], patient has not been eating drinking well for the past few days and has been lethargic. Today the physical therapist noticed that the patient was having some left-sided facial droop and some left lower extremity weakness and patient was sent to the ER. Patient was deemed as a code stroke, patient was evaluated by telemetry neurology and was requested to be observed under the hospitalist service for further management and evaluation. Patient reports that she has rectal pain, has been eating drinking less because it hurts when she tries to defecate. Patient reports that she has not had a bowel movement for the past few days. Currently the patient reports that all her symptoms have resolved and \"she would like to get the ham sandwich out of this hospital\". Patient reports that she has no other complaints or problems and denies any other issues. The patient denies any Headache, blurry vision, sore throat, trouble swallowing, trouble with speech, chest pain, SOB, cough, fever, chills, N/V/D, abd pain, urinary symptoms, constipation, recent travels, sick contacts,   falls, injuries, rashes, contact with COVID-19 diagnosed patients, hematemesis, melena, hemoptysis, hematuria, rashes, denies starting any new medications and denies any other concerns or problems besides as mentioned above. Review of Systems:  A comprehensive review of systems was negative except for that written in the History of Present Illness.      Past Medical History: Diagnosis Date    Arthritis     Cataract     bilateral    Chronic kidney disease     Dr. Donna Trujillo Chronic pain     lower back    CKD stage 3 due to type 2 diabetes mellitus [facility]     Depression     Diabetes ([facility] age 49    Type II    Follicular thyroid cancer ([facility] 08/2014    Foot ulcer ([facility]     Gallbladder polyp 8/14/2013    Hypercholesterolemia     Hypertension     Ill-defined condition     states 'polyp' in gal bladder    Long term current use of anticoagulant therapy     Obesity     Right pontine stroke ([facility] 1/17/2017    TIA (transient ischemic attack) 6/6/2014      Past Surgical History:   Procedure Laterality Date    ABDOMEN SURGERY PROC UNLISTED  2006    stomach    BREAST SURGERY PROCEDURE UNLISTED  2009, 2006    breast bx-vince    COLONOSCOPY N/A 12/17/2019    COLONOSCOPY performed by Honey Juarez MD at [address] [place] [address]  12/17/2019         COLORECTAL SCRN; HI RISK IND  5/30/2014         HX BREAST BIOPSY Right [id number]    2 biopies on the right. Benign (per patient)    HX BREAST BIOPSY Left 2015    Benign (per patient)    HX CATARACT REMOVAL Left     HX GYN  1970's    partial hysterectomy    HX HERNIA REPAIR  2009    hiatal    HX HYSTERECTOMY      HX ORTHOPAEDIC Bilateral 1988    bunion    HX THYROIDECTOMY  2014    Dr. Evelia Canavan     Prior to Admission medications    Medication Sig Start Date End Date Taking? Authorizing Provider   cloNIDine HCL (CATAPRES) 0.3 mg tablet Take 1 Tab by mouth three (3) times daily. 10/6/20   Shraddha Rainey MD   albuterol (PROVENTIL VENTOLIN) 2.5 mg /3 mL (0.083 %) nebu 3 mL by Nebulization route every four (4) hours as needed for Wheezing or Shortness of Breath. 10/5/20   Kamala Romero MD   predniSONE (DELTASONE) 10 mg tablet Take 10 mg by mouth See Admin Instructions.  then 2 tabs daily for 4 days  and 1 tab daily for 3 days and stop 10/5/20   Kamala Romero MD   insulin nph-regular human rec (NovoLIN 70-30 FlexPen U-100) 100 unit/mL (70-30) inpn Inject 42 units before breakfast and none before dinner. Max 100 units per day--pt will pay cash--Dose change 9/21/20--updated med list--did not send prescription to the pharmacy 9/21/20   Mony Oshea MD   hydrALAZINE (APRESOLINE) 25 mg tablet Take 1 Tab by mouth three (3) times daily. 9/16/20   Aletha Belcher MD   metoprolol succinate (TOPROL-XL) 50 mg XL tablet Take 50 mg by mouth daily. Provider, Historical   gabapentin (NEURONTIN) 300 mg capsule Take 2 Caps by mouth three (3) times daily. Max Daily Amount: 1,800 mg. 7/6/20   Mony Oshea MD   furosemide (LASIX) 80 mg tablet Take 80 mg by mouth daily. 4/2/20   Provider, Historical   SYNTHROID 175 mcg tablet Take 1 Tab by mouth Daily (before breakfast). Take 1 Tab by mouth Daily (before breakfast). BRAND MEDICALLY NECESSARY--Delete 150 mcg dose from profile 2/6/20   Mony Oshea MD   atorvastatin (LIPITOR) 80 mg tablet Take 80 mg by mouth daily. Provider, Historical   ferrous sulfate (IRON) 325 mg (65 mg iron) tablet Take  by mouth daily. Provider, Historical   PYRIDOXINE HCL, VITAMIN B6, (VITAMIN B-6 PO) Take  by mouth daily. Provider, Historical   allopurinol (ZYLOPRIM) 100 mg tablet Take  by mouth daily. Provider, Historical   multivitamins-minerals-lutein (CENTRUM SILVER) Tab Take  by mouth daily.     Provider, Historical     Allergies   Allergen Reactions    Amlodipine Swelling    Clindamycin Rash    Nifedipine Swelling    Sulfa (Sulfonamide Antibiotics) Rash      Family History   Problem Relation Age of Onset    Heart Disease Mother     Hypertension Mother    Minh Sosa Diabetes Mother     Stroke Mother     Cancer Father         colon    Diabetes Sister     Heart Attack Sister     Hypertension Sister     Heart Disease Sister         pacemaker    Lung Disease Brother     Hypertension Brother     Lung Disease Brother     Anesth Problems Neg Hx SOCIAL HISTORY:  Patient resides:  Independently x   Assisted Living    SNF    With family care       Smoking history:   None xx   Former    Chronic      Alcohol history:   None    Social x   Chronic      Ambulates:   Independently    w/cane    w/walker x   w/wc    CODE STATUS:  DNR    Full x   Other      Objective:     Physical Exam:     Visit Vitals  BP (!) 156/59   Pulse 76   Temp 98.8 °F (37.1 °C)   Resp 18   Ht 5' 5\" (1.651 m)   Wt 110.4 kg (243 lb 6.2 oz)   SpO2 96%   BMI 40.50 kg/m²      O2 Device: Room air    General : alert x 3, awake, angry, upset, female appears to be stated age  HEENT: PEERL, EOMI, moist mucus membrane, TM clear  Neck: supple, no JVD, no meningeal signs  Chest: Decreased basal breath sounds  CVS: S1 S2 heard, Capillary refill less than 2 seconds  Abd: soft/ Non tender, non distended, BS physiological,   Ext: no clubbing, no cyanosis, no edema, brisk 2+ DP pulses  Neuro/Psych: pleasant mood and affect, CN 2-12 grossly intact, sensory grossly within normal limit, Strength 4/5 left upper and left lower extremity, 5/5 right upper and right lower extremity, DTR 1+ x 4  Skin: warm      EKG: Pending      Data Review:     Recent Days:  Recent Labs     10/20/20  1127   WBC 5.4   HGB 7.2*   HCT 23.2*        Recent Labs     10/20/20  1127      K 4.5   CL 97   CO2 28   *   BUN 93*   CREA 8.00*   CA 8.2*   ALB 2.7*   ALT 31     No results for input(s): PH, PCO2, PO2, HCO3, FIO2 in the last 72 hours.     24 Hour Results:  Recent Results (from the past 24 hour(s))   GLUCOSE, POC    Collection Time: 10/20/20 10:23 AM   Result Value Ref Range    Glucose (POC) 139 (H) 65 - 100 mg/dL    Performed by LUISA LAM    CBC WITH AUTOMATED DIFF    Collection Time: 10/20/20 11:27 AM   Result Value Ref Range    WBC 5.4 3.6 - 11.0 K/uL    RBC 2.52 (L) 3.80 - 5.20 M/uL    HGB 7.2 (L) 11.5 - 16.0 g/dL    HCT 23.2 (L) 35.0 - 47.0 %    MCV 92.1 80.0 - 99.0 FL    MCH 28.6 26.0 - 34.0 PG    MCHC 31.0 30.0 - 36.5 g/dL    RDW 18.1 (H) 11.5 - 14.5 %    PLATELET 268 854 - 755 K/uL    MPV 11.9 8.9 - 12.9 FL    NRBC 0.0 0  WBC    ABSOLUTE NRBC 0.00 0.00 - 0.01 K/uL    NEUTROPHILS 67 32 - 75 %    LYMPHOCYTES 15 12 - 49 %    MONOCYTES 15 (H) 5 - 13 %    EOSINOPHILS 2 0 - 7 %    BASOPHILS 1 0 - 1 %    IMMATURE GRANULOCYTES 0 0.0 - 0.5 %    ABS. NEUTROPHILS 3.6 1.8 - 8.0 K/UL    ABS. LYMPHOCYTES 0.8 0.8 - 3.5 K/UL    ABS. MONOCYTES 0.8 0.0 - 1.0 K/UL    ABS. EOSINOPHILS 0.1 0.0 - 0.4 K/UL    ABS. BASOPHILS 0.0 0.0 - 0.1 K/UL    ABS. IMM. GRANS. 0.0 0.00 - 0.04 K/UL    DF AUTOMATED     METABOLIC PANEL, COMPREHENSIVE    Collection Time: 10/20/20 11:27 AM   Result Value Ref Range    Sodium 137 136 - 145 mmol/L    Potassium 4.5 3.5 - 5.1 mmol/L    Chloride 97 97 - 108 mmol/L    CO2 28 21 - 32 mmol/L    Anion gap 12 5 - 15 mmol/L    Glucose 129 (H) 65 - 100 mg/dL    BUN 93 (H) 6 - 20 MG/DL    Creatinine 8.00 (H) 0.55 - 1.02 MG/DL    BUN/Creatinine ratio 12 12 - 20      GFR est AA 6 (L) >60 ml/min/1.73m2    GFR est non-AA 5 (L) >60 ml/min/1.73m2    Calcium 8.2 (L) 8.5 - 10.1 MG/DL    Bilirubin, total 0.6 0.2 - 1.0 MG/DL    ALT (SGPT) 31 12 - 78 U/L    AST (SGOT) 32 15 - 37 U/L    Alk. phosphatase 86 45 - 117 U/L    Protein, total 6.7 6.4 - 8.2 g/dL    Albumin 2.7 (L) 3.5 - 5.0 g/dL    Globulin 4.0 2.0 - 4.0 g/dL    A-G Ratio 0.7 (L) 1.1 - 2.2     RBC, ALLOCATE    Collection Time: 10/20/20  2:00 PM   Result Value Ref Range    HISTORY CHECKED? Historical check performed          Imaging:   Cta Code Neuro Head And Neck W Cont    Result Date: 10/20/2020  IMPRESSION: 1. No hemodynamically significant extracranial ICA stenosis (using NASCET criteria). 2. No large vessel occlusion or significant intracranial stenosis. 3. No aneurysm.     Ct Code Neuro Head Wo Contrast    Result Date: 10/20/2020  IMPRESSION: No acute process or change compared to the prior exam.     Ct Code Neuro Perf W Cbf    Addendum Date: 10/20/2020 Addendum: AI: This study was analyzed using the [address] ai algorithm, and Viz. ai images were reviewed by the radiologist. Jose Lancaster. Result Date: 10/20/2020  IMPRESSION: No perfusion abnormality. Assessment:     TIA: Patient will be observed on a telemetry bed, MRI brain, neurovascular checks, aspirin, statin, neurology consult, echocardiogram, telemetry monitoring, PT/OT/speech consult, supportive care and close monitoring, await neurology input, reassess as needed and continue to monitor    Acute renal failure on chronic kidney disease: Likely prerenal, patient has not been eating drinking well for the past few days, patient on diuretics, hold diuretics, gentle IV hydration, avoid nephrotoxic medication, renally dose oral medication, CT of the abdomen pelvis, place a Ambrosio, nephrology consult, supportive care and close monitoring, closely monitor electrolytes, reassess as needed    Anemia: Unclear etiology, lower GI bleed secondary to constipation versus secondary to renal disease versus other etiology, n.p.o., tonics twice daily, GI consult, monitor H&H, iron panel, transfuse 1 unit PRBC, supportive care and close monitoring, further intervention per hospital course, reassess as needed.     Obstipation with rectal pain: CT of the abdomen pelvis, mag citrate, colorectal surgery consult, supportive care and close monitoring    Hypertension: Allow permissive hypertension, continue home medications, continue monitor, reassess as needed    Hypothyroidism: Continue home medication, TSH pending, continue to monitor    GI DVT prophylaxis: Patient will be on SCDs             Signed By: Arya Butler MD     October 20, 2020

## 2020-10-20 NOTE — SENIOR SERVICES NOTE
Chart reviewed, d/w Dr. Membreno Like. Patient states that her stools are very hard and she doesn't want to eat because she is constipated. Call placed to [facility]. Someone will call me back. 11:24 AM   Spoke with nurse at [facility] who state that PT reported left sided weakness and patient being unable to move left side when they were working her today. States Dr. Red Hdz went in to assess patient and patient had right facial droop so patient was sent to ED. Dr. Membreno Like made aware of this information and Code Stroke level 2 called.

## 2020-10-21 ENCOUNTER — APPOINTMENT (OUTPATIENT)
Dept: GENERAL RADIOLOGY | Age: 80
DRG: 069 | End: 2020-10-21
Attending: NURSE PRACTITIONER
Payer: MEDICARE

## 2020-10-21 LAB
AMMONIA PLAS-SCNC: 12 UMOL/L
ANION GAP SERPL CALC-SCNC: 16 MMOL/L (ref 5–15)
APTT PPP: 29.1 SEC (ref 22.1–32)
BUN SERPL-MCNC: 97 MG/DL (ref 6–20)
BUN/CREAT SERPL: 13 (ref 12–20)
CALCIUM SERPL-MCNC: 7.7 MG/DL (ref 8.5–10.1)
CHLORIDE SERPL-SCNC: 100 MMOL/L (ref 97–108)
CHOLEST SERPL-MCNC: 180 MG/DL
CK SERPL-CCNC: 206 U/L (ref 26–192)
CO2 SERPL-SCNC: 22 MMOL/L (ref 21–32)
CREAT SERPL-MCNC: 7.52 MG/DL (ref 0.55–1.02)
ERYTHROCYTE [DISTWIDTH] IN BLOOD BY AUTOMATED COUNT: 17.7 % (ref 11.5–14.5)
EST. AVERAGE GLUCOSE BLD GHB EST-MCNC: 140 MG/DL
FIBRINOGEN PPP-MCNC: 613 MG/DL (ref 200–475)
GLUCOSE BLD STRIP.AUTO-MCNC: 190 MG/DL (ref 65–100)
GLUCOSE BLD STRIP.AUTO-MCNC: 239 MG/DL (ref 65–100)
GLUCOSE SERPL-MCNC: 162 MG/DL (ref 65–100)
HBA1C MFR BLD: 6.5 % (ref 4–5.6)
HCT VFR BLD AUTO: 22.9 % (ref 35–47)
HDLC SERPL-MCNC: 85 MG/DL
HDLC SERPL: 2.1 {RATIO} (ref 0–5)
HEALTH STATUS, XMCV2T: NORMAL
HGB BLD-MCNC: 7.3 G/DL (ref 11.5–16)
INR PPP: 1.1 (ref 0.9–1.1)
IRON SATN MFR SERPL: 11 % (ref 20–50)
IRON SERPL-MCNC: 30 UG/DL (ref 35–150)
LDLC SERPL CALC-MCNC: 73.4 MG/DL (ref 0–100)
LIPID PROFILE,FLP: NORMAL
MAGNESIUM SERPL-MCNC: 3 MG/DL (ref 1.6–2.4)
MCH RBC QN AUTO: 29.2 PG (ref 26–34)
MCHC RBC AUTO-ENTMCNC: 31.9 G/DL (ref 30–36.5)
MCV RBC AUTO: 91.6 FL (ref 80–99)
NRBC # BLD: 0 K/UL (ref 0–0.01)
NRBC BLD-RTO: 0 PER 100 WBC
PHOSPHATE SERPL-MCNC: 7.1 MG/DL (ref 2.6–4.7)
PLATELET # BLD AUTO: 182 K/UL (ref 150–400)
PMV BLD AUTO: 12.3 FL (ref 8.9–12.9)
POTASSIUM SERPL-SCNC: 4.6 MMOL/L (ref 3.5–5.1)
PROTHROMBIN TIME: 11.2 SEC (ref 9–11.1)
RBC # BLD AUTO: 2.5 M/UL (ref 3.8–5.2)
SARS-COV-2, COV2: NOT DETECTED
SERVICE CMNT-IMP: ABNORMAL
SERVICE CMNT-IMP: ABNORMAL
SODIUM SERPL-SCNC: 138 MMOL/L (ref 136–145)
SOURCE, COVRS: NORMAL
SPECIMEN SOURCE, FCOV2M: NORMAL
SPECIMEN TYPE, XMCV1T: NORMAL
THERAPEUTIC RANGE,PTTT: NORMAL SECS (ref 58–77)
TIBC SERPL-MCNC: 274 UG/DL (ref 250–450)
TRIGL SERPL-MCNC: 108 MG/DL (ref ?–150)
TROPONIN I SERPL-MCNC: 0.12 NG/ML
TSH SERPL DL<=0.05 MIU/L-ACNC: 4.74 UIU/ML (ref 0.36–3.74)
VLDLC SERPL CALC-MCNC: 21.6 MG/DL
WBC # BLD AUTO: 5.2 K/UL (ref 3.6–11)

## 2020-10-21 PROCEDURE — 96376 TX/PRO/DX INJ SAME DRUG ADON: CPT

## 2020-10-21 PROCEDURE — 74011250636 HC RX REV CODE- 250/636: Performed by: FAMILY MEDICINE

## 2020-10-21 PROCEDURE — 85384 FIBRINOGEN ACTIVITY: CPT

## 2020-10-21 PROCEDURE — 36415 COLL VENOUS BLD VENIPUNCTURE: CPT

## 2020-10-21 PROCEDURE — 74011250637 HC RX REV CODE- 250/637: Performed by: FAMILY MEDICINE

## 2020-10-21 PROCEDURE — 99218 HC RM OBSERVATION: CPT

## 2020-10-21 PROCEDURE — 83036 HEMOGLOBIN GLYCOSYLATED A1C: CPT

## 2020-10-21 PROCEDURE — 83735 ASSAY OF MAGNESIUM: CPT

## 2020-10-21 PROCEDURE — 82550 ASSAY OF CK (CPK): CPT

## 2020-10-21 PROCEDURE — 84443 ASSAY THYROID STIM HORMONE: CPT

## 2020-10-21 PROCEDURE — 74011250637 HC RX REV CODE- 250/637: Performed by: NURSE PRACTITIONER

## 2020-10-21 PROCEDURE — 80061 LIPID PANEL: CPT

## 2020-10-21 PROCEDURE — 85027 COMPLETE CBC AUTOMATED: CPT

## 2020-10-21 PROCEDURE — 85610 PROTHROMBIN TIME: CPT

## 2020-10-21 PROCEDURE — 74018 RADEX ABDOMEN 1 VIEW: CPT

## 2020-10-21 PROCEDURE — 84100 ASSAY OF PHOSPHORUS: CPT

## 2020-10-21 PROCEDURE — 74011000250 HC RX REV CODE- 250: Performed by: FAMILY MEDICINE

## 2020-10-21 PROCEDURE — 85730 THROMBOPLASTIN TIME PARTIAL: CPT

## 2020-10-21 PROCEDURE — 80048 BASIC METABOLIC PNL TOTAL CA: CPT

## 2020-10-21 PROCEDURE — 82962 GLUCOSE BLOOD TEST: CPT

## 2020-10-21 PROCEDURE — 82140 ASSAY OF AMMONIA: CPT

## 2020-10-21 PROCEDURE — 84484 ASSAY OF TROPONIN QUANT: CPT

## 2020-10-21 PROCEDURE — C9113 INJ PANTOPRAZOLE SODIUM, VIA: HCPCS | Performed by: FAMILY MEDICINE

## 2020-10-21 PROCEDURE — 99205 OFFICE O/P NEW HI 60 MIN: CPT | Performed by: PSYCHIATRY & NEUROLOGY

## 2020-10-21 RX ORDER — POLYETHYLENE GLYCOL 3350 17 G/17G
17 POWDER, FOR SOLUTION ORAL DAILY
Status: DISCONTINUED | OUTPATIENT
Start: 2020-10-21 | End: 2020-10-26 | Stop reason: HOSPADM

## 2020-10-21 RX ORDER — HYDRALAZINE HYDROCHLORIDE 20 MG/ML
10 INJECTION INTRAMUSCULAR; INTRAVENOUS
Status: DISCONTINUED | OUTPATIENT
Start: 2020-10-21 | End: 2020-10-26 | Stop reason: HOSPADM

## 2020-10-21 RX ADMIN — CLONIDINE HYDROCHLORIDE 0.3 MG: 0.2 TABLET ORAL at 17:05

## 2020-10-21 RX ADMIN — HYDRALAZINE HYDROCHLORIDE 25 MG: 25 TABLET, FILM COATED ORAL at 17:05

## 2020-10-21 RX ADMIN — HYDRALAZINE HYDROCHLORIDE 25 MG: 25 TABLET, FILM COATED ORAL at 09:08

## 2020-10-21 RX ADMIN — ATORVASTATIN CALCIUM 80 MG: 40 TABLET, FILM COATED ORAL at 09:07

## 2020-10-21 RX ADMIN — SODIUM CHLORIDE 40 MG: 9 INJECTION INTRAMUSCULAR; INTRAVENOUS; SUBCUTANEOUS at 04:07

## 2020-10-21 RX ADMIN — METOPROLOL SUCCINATE 50 MG: 50 TABLET, EXTENDED RELEASE ORAL at 09:07

## 2020-10-21 RX ADMIN — FERROUS SULFATE TAB 325 MG (65 MG ELEMENTAL FE) 325 MG: 325 (65 FE) TAB at 09:07

## 2020-10-21 RX ADMIN — CLONIDINE HYDROCHLORIDE 0.3 MG: 0.2 TABLET ORAL at 21:41

## 2020-10-21 RX ADMIN — SODIUM CHLORIDE 40 MG: 9 INJECTION INTRAMUSCULAR; INTRAVENOUS; SUBCUTANEOUS at 17:06

## 2020-10-21 RX ADMIN — ASPIRIN 81 MG: 81 TABLET, CHEWABLE ORAL at 09:07

## 2020-10-21 RX ADMIN — POLYETHYLENE GLYCOL 3350 17 G: 17 POWDER, FOR SOLUTION ORAL at 13:02

## 2020-10-21 RX ADMIN — LEVOTHYROXINE SODIUM 175 MCG: 0.03 TABLET ORAL at 09:09

## 2020-10-21 RX ADMIN — CLONIDINE HYDROCHLORIDE 0.3 MG: 0.2 TABLET ORAL at 09:08

## 2020-10-21 RX ADMIN — HYDRALAZINE HYDROCHLORIDE 25 MG: 25 TABLET, FILM COATED ORAL at 21:41

## 2020-10-21 NOTE — CONSULTS
Colorectal Surgery Consultation Note          NAME:  Anny Redman   :      MRN:   [id number]     Referring Physician:  Vero Stuart MD    Consultation Date: 10/21/2020    Chief Complaint:   I was asked to see this patient regarding obstipation and \"severe rectal pain. \"     History of Present Illness: The patient is a 26-year-old female who was admitted yesterday for evaluation of a left-sided weakness, and I do not know whether or not she is a reliable historian. I have reviewed the chart, including the laboratory results and the last two colonoscopy reports. I have interviewed the patient, and she denies having any rectal pain or seeing any blood with her bowel movements. She is emphatic that she has no pain now and that she does not give me permission to examine her. PMH:  Past Medical History:   Diagnosis Date    Arthritis     Cataract     bilateral    Chronic kidney disease     Dr. Roverto Lopez Chronic pain     lower back    CKD stage 3 due to type 2 diabetes mellitus ([address]     Depression     Diabetes ([address] age 49    Type II    Follicular thyroid cancer ([address] 2014    Foot ulcer ([address]     Gallbladder polyp 2013    Hypercholesterolemia     Hypertension     Ill-defined condition     states 'polyp' in gal bladder    Long term current use of anticoagulant therapy     Obesity     Right pontine stroke ([address] 2017    TIA (transient ischemic attack) 2014       PSH:  Past Surgical History:   Procedure Laterality Date    ABDOMEN SURGERY PROC UNLISTED      stomach    BREAST SURGERY PROCEDURE UNLISTED  2006    breast bx-vince    COLONOSCOPY N/A 2019    COLONOSCOPY performed by Reji Cabrera MD at [address] [place] [address]  2019         COLORECTAL SCRN; HI RISK IND  2014         HX BREAST BIOPSY Right [id number]    2 biopies on the right. Benign (per patient)    HX BREAST BIOPSY Left     Benign (per patient)    HX CATARACT REMOVAL Left     HX GYN  1970's    partial hysterectomy    HX HERNIA REPAIR  2009    hiatal    HX HYSTERECTOMY      HX ORTHOPAEDIC Bilateral 1988    bunion    HX THYROIDECTOMY  2014    Dr. Roxanne Gary Medications:  Prior to Admission Medications   Prescriptions Last Dose Informant Patient Reported? Taking? PYRIDOXINE HCL, VITAMIN B6, (VITAMIN B-6 PO)   Yes No   Sig: Take  by mouth daily. SYNTHROID 175 mcg tablet   No No   Sig: Take 1 Tab by mouth Daily (before breakfast). Take 1 Tab by mouth Daily (before breakfast). BRAND MEDICALLY NECESSARY--Delete 150 mcg dose from profile   albuterol (PROVENTIL VENTOLIN) 2.5 mg /3 mL (0.083 %) nebu   Yes No   Sig: 3 mL by Nebulization route every four (4) hours as needed for Wheezing or Shortness of Breath. allopurinol (ZYLOPRIM) 100 mg tablet   Yes No   Sig: Take  by mouth daily. atorvastatin (LIPITOR) 80 mg tablet   Yes No   Sig: Take 80 mg by mouth daily. cloNIDine HCL (CATAPRES) 0.3 mg tablet   Yes No   Sig: Take 1 Tab by mouth three (3) times daily. ferrous sulfate (IRON) 325 mg (65 mg iron) tablet   Yes No   Sig: Take  by mouth daily. furosemide (LASIX) 80 mg tablet   Yes No   Sig: Take 80 mg by mouth daily. gabapentin (NEURONTIN) 300 mg capsule   No No   Sig: Take 2 Caps by mouth three (3) times daily. Max Daily Amount: 1,800 mg.   hydrALAZINE (APRESOLINE) 25 mg tablet   No No   Sig: Take 1 Tab by mouth three (3) times daily. insulin nph-regular human rec (NovoLIN 70-30 FlexPen U-100) 100 unit/mL (70-30) inpn   No No   Sig: Inject 42 units before breakfast and none before dinner. Max 100 units per day--pt will pay cash--Dose change 9/21/20--updated med list--did not send prescription to the pharmacy   metoprolol succinate (TOPROL-XL) 50 mg XL tablet   Yes No   Sig: Take 50 mg by mouth daily. multivitamins-minerals-lutein (CENTRUM SILVER) Tab   Yes No   Sig: Take  by mouth daily.    predniSONE (DELTASONE) 10 mg tablet No No   Sig: Take 10 mg by mouth See Admin Instructions. then 2 tabs daily for 4 days  and 1 tab daily for 3 days and stop      Facility-Administered Medications: None       Hospital Medications:  Current Facility-Administered Medications   Medication Dose Route Frequency    hydrALAZINE (APRESOLINE) 20 mg/mL injection 10 mg  10 mg IntraVENous Q6H PRN    polyethylene glycol (MIRALAX) packet 17 g  17 g Oral DAILY    atorvastatin (LIPITOR) tablet 80 mg  80 mg Oral DAILY    cloNIDine HCL (CATAPRES) tablet 0.3 mg  0.3 mg Oral TID    ferrous sulfate tablet 325 mg  1 Tab Oral DAILY WITH BREAKFAST    hydrALAZINE (APRESOLINE) tablet 25 mg  25 mg Oral TID    metoprolol succinate (TOPROL-XL) XL tablet 50 mg  50 mg Oral DAILY    levothyroxine (SYNTHROID) tablet 175 mcg  175 mcg Oral ACB    acetaminophen (TYLENOL) tablet 650 mg  650 mg Oral Q4H PRN    Or    acetaminophen (TYLENOL) solution 650 mg  650 mg Per NG tube Q4H PRN    Or    acetaminophen (TYLENOL) suppository 650 mg  650 mg Rectal Q4H PRN    0.9% sodium chloride infusion  75 mL/hr IntraVENous CONTINUOUS    aspirin chewable tablet 81 mg  81 mg Oral DAILY    pantoprazole (PROTONIX) 40 mg in 0.9% sodium chloride 10 mL injection  40 mg IntraVENous Q12H       Allergies:   Allergies   Allergen Reactions    Amlodipine Swelling    Clindamycin Rash    Nifedipine Swelling    Sulfa (Sulfonamide Antibiotics) Rash       Family History:  Family History   Problem Relation Age of Onset    Heart Disease Mother     Hypertension Mother    French Diabetes Mother     Stroke Mother     Cancer Father         colon    Diabetes Sister     Heart Attack Sister     Hypertension Sister     Heart Disease Sister         pacemaker    Lung Disease Brother     Hypertension Brother     Lung Disease Brother     Anesth Problems Neg Hx        Social History:  Social History     Tobacco Use    Smoking status: Never Smoker    Smokeless tobacco: Never Used   Substance Use Topics    Alcohol use: No       Review of Systems:    Symptom Y/N Comments  Symptom Y/N Comments   Fever/Chills    Chest Pain     Cough    Abdominal Pain N    Sputum    Joint Pain     SOB/ALONSO    Pruritis/Rash     Nausea/vomit    Tolerating PT/OT     Diarrhea N   Tolerating Diet Y    Constipation Y   Other       Could NOT obtain due to:        Objective:     Patient Vitals for the past 8 hrs:   BP Temp Pulse Resp SpO2   10/21/20 1222 (!) 167/43 98.1 °F (36.7 °C) 73 18 94 %   10/21/20 0830 (!) 178/49 98.4 °F (36.9 °C) 79 18 94 %     No intake/output data recorded. 10/19 1901 - 10/21 0700  In: 100 [I.V.:100]  Out: -     PHYSICAL EXAMINATION:    GENERAL:  No distress. NEURO:  Drowsy but arouseable. Anorectal examination was refused. Data Review     Recent Labs     10/21/20  0324 10/20/20  1939 10/20/20  1127   WBC 5.2  --  5.4   HGB 7.3* 7.4* 7.2*   HCT 22.9* 23.5* 23.2*     --  188     Recent Labs     10/21/20  0324 10/20/20  1127    137   K 4.6 4.5    97   CO2 22 28   BUN 97* 93*   CREA 7.52* 8.00*   * 129*   PHOS 7.1*  --    CA 7.7* 8.2*     Recent Labs     10/20/20  1127   AP 86   TP 6.7   ALB 2.7*   GLOB 4.0     Recent Labs     10/21/20  0324 10/20/20  1547   INR 1.1 1.0   PTP 11.2* 10.8   APTT 29.1 27.3                       Assessment and Plan:      The patient denies having any rectal pain or seeing any blood with her bowel movements, and she had a colonoscopy 10 months ago that revealed diverticulosis and multiple diminutive tubular adenomas that were removed. There is not much I can do since she is refusing to allow an examination, and she is obviously not exsanguinating. The only painful anorectal condition that would require urgent surgical intervention would be an abscess. If there is any significant concern that an abscess is present, then  A CT scan of the pelvis with IV contrast could be obtained.   If that study revealed an abscess, then drainage might be indicated. Please re-consult me if the patient's condition changes or if there is new pertinent information.         Active Problems:    TIA (transient ischemic attack) (6/6/2014)

## 2020-10-21 NOTE — PROGRESS NOTES
Physical Therapy: Abort    Orders acknowledged, chart reviewed, cleared by RN. Attempted to see pt for PT evaluation. Patient was alert when therapist entered room and engaged with conversation however she refused all activities despite max encouragement. Session aborted. Will follow up again tomorrow for PT eval as able and medically appropriate.     Devin Busch, PT, DPT

## 2020-10-21 NOTE — PROGRESS NOTES
Care Management Interventions  PCP Verified by CM: Yes(Dr BAKER)  Palliative Care Criteria Met (RRAT>21 & CHF Dx)?: No  Mode of Transport at Discharge: (STRETCHER)  Transition of Care Consult (CM Consult): Other(IPR)  Physical Therapy Consult: Yes  Occupational Therapy Consult: Yes  Speech Therapy Consult: Yes  Current Support Network: (HAS BEEN AT INPATIENT REHAB)  Confirm Follow Up Transport: (STRETCHER )  The Plan for Transition of Care is Related to the Following Treatment Goals : N/A  The Patient and/or Patient Representative was Provided with a Choice of Provider and Agrees with the Discharge Plan?: Yes  [facility] Provided?: No  Discharge Location  Discharge Placement: Rehab hospital/unit acute   CRM did speak with patient's son Roseann Cohen phone [phone]. Patient has been in acute rehab for a short period of time and has AMS and left sided weakness. She does have equiptment to include a wheelchair,walker and bedside commode. Per son his mom has had problems for the last few weeks with a fungal rash under the fold on her abdomen and buttocks. Patient does have an ACP and is a dnr. Wound care nurse has evaluated above. Care management will follow for transition of care needs.

## 2020-10-21 NOTE — PROGRESS NOTES
Bedside and Verbal shift change report given to Tanika Garland (oncoming nurse) by Luciano Parker (offgoing nurse). Report included the following information SBAR, Kardex, Intake/Output, MAR, Accordion, Recent Results, Cardiac Rhythm NSR and Quality Measures. 0730-No IV access. Patient pulled line. Day shift RN made aware. Problem: Falls - Risk of  Goal: *Absence of Falls  Description: Document Thea Velazquez Fall Risk and appropriate interventions in the flowsheet. Outcome: Progressing Towards Goal  Note: Fall Risk Interventions:  Mobility Interventions: Communicate number of staff needed for ambulation/transfer, Bed/chair exit alarm, Patient to call before getting OOB, PT Consult for mobility concerns, Utilize walker, cane, or other assistive device    Mentation Interventions: Adequate sleep, hydration, pain control, Bed/chair exit alarm, Evaluate medications/consider consulting pharmacy, More frequent rounding, Reorient patient, Room close to nurse's station, Toileting rounds, Update white board    Medication Interventions: Bed/chair exit alarm, Evaluate medications/consider consulting pharmacy, Patient to call before getting OOB, Teach patient to arise slowly    Elimination Interventions: Call light in reach, Bed/chair exit alarm, Patient to call for help with toileting needs, Stay With Me (per policy), Toilet paper/wipes in reach, Toileting schedule/hourly rounds              Problem: Pressure Injury - Risk of  Goal: *Prevention of pressure injury  Description: Document Vinny Scale and appropriate interventions in the flowsheet. Outcome: Progressing Towards Goal  Note: Pressure Injury Interventions:  Sensory Interventions: Assess changes in LOC, Check visual cues for pain, Discuss PT/OT consult with provider, Float heels, Keep linens dry and wrinkle-free, Minimize linen layers, Pressure redistribution bed/mattress (bed type), Turn and reposition approx.  every two hours (pillows and wedges if needed)    Moisture Interventions: Absorbent underpads, Apply protective barrier, creams and emollients, Check for incontinence Q2 hours and as needed, Internal/External urinary devices, Maintain skin hydration (lotion/cream), Minimize layers, Moisture barrier    Activity Interventions: Increase time out of bed, Pressure redistribution bed/mattress(bed type), PT/OT evaluation    Mobility Interventions: Float heels, Pressure redistribution bed/mattress (bed type), PT/OT evaluation, Turn and reposition approx.  every two hours(pillow and wedges)    Nutrition Interventions: Document food/fluid/supplement intake    Friction and Shear Interventions: Apply protective barrier, creams and emollients, Minimize layers

## 2020-10-21 NOTE — PROGRESS NOTES
FAWN:  CRM notes that patient has been at [facility] inpatient rehab per [place] at above [address] [facility] [address]. Patient is now at [facility] under observation services. The IPR will consider taking patient back per our conversation.

## 2020-10-21 NOTE — PROGRESS NOTES
SLP Contact Note    Updated 4525: Patient with GI bleed but passed STAND. Therefore, SLP will sign-off for now. Please reconsult should SLP be of any assistance. Consult received and appreciated. Patient chart reviewed. Pt head imaging negative for any acute infarct at this time. Pt has not had STAND completed. No hx of dysphagia. Therefore, would recommend completing STAND. SLP will follow peripherally and complete evaluation if indicated.       Thank you,  ARIK Perry.Ed, [id number] [facility]  Speech-Language Pathologist

## 2020-10-21 NOTE — CONSULTS
INPATIENT NEUROLOGY CONSULTATION  10/21/2020     Consulted by: Kisha Hernández MD        Patient ID:  Caridad Landrum  [id number]  79 y.o.  1940    CC: Worsening weakness    HPI    Tara Schuster is a 80-year-old woman with a history of a right pontine infarct in 2017, diabetes, pain, hypertension who is here because in the past few days while at rehabilitation she has had increasing lethargy and decreased oral intake. There was concern for worsening left-sided weakness and speech change and she was brought to the hospital.  Initial evaluation revealing a CTA unremarkable for any acute issue. It is not clear that she was taking aspirin prior to presentation. She is not a very good historian. She wants to leave. She is very irritable.       Review of Systems   Unable to perform ROS: Medical condition       Past Medical History:   Diagnosis Date    Arthritis     Cataract     bilateral    Chronic kidney disease     Dr. Jamison Suh Chronic pain     lower back    CKD stage 3 due to type 2 diabetes mellitus ([address]     Depression     Diabetes ([address] age 49    Type II    Follicular thyroid cancer ([address] 08/2014    Foot ulcer ([address]     Gallbladder polyp 8/14/2013    Hypercholesterolemia     Hypertension     Ill-defined condition     states 'polyp' in gal bladder    Long term current use of anticoagulant therapy     Obesity     Right pontine stroke ([address] 1/17/2017    TIA (transient ischemic attack) 6/6/2014     Family History   Problem Relation Age of Onset    Heart Disease Mother     Hypertension Mother     Diabetes Mother     Stroke Mother     Cancer Father         colon    Diabetes Sister     Heart Attack Sister     Hypertension Sister     Heart Disease Sister         pacemaker    Lung Disease Brother     Hypertension Brother     Lung Disease Brother     Anesth Problems Neg Hx      Social History     Socioeconomic History    Marital status:      Spouse name: Not on file    Number of children: Not on file    Years of education: Not on file    Highest education level: Not on file   Occupational History    Not on file   Social Needs    Financial resource strain: Not on file    Food insecurity     Worry: Not on file     Inability: Not on file    Transportation needs     Medical: Not on file     Non-medical: Not on file   Tobacco Use    Smoking status: Never Smoker    Smokeless tobacco: Never Used   Substance and Sexual Activity    Alcohol use: No    Drug use: No    Sexual activity: Not on file   Lifestyle    Physical activity     Days per week: Not on file     Minutes per session: Not on file    Stress: Not on file   Relationships    Social connections     Talks on phone: Not on file     Gets together: Not on file     Attends Synagogue service: Not on file     Active member of club or organization: Not on file     Attends meetings of clubs or organizations: Not on file     Relationship status: Not on file    Intimate partner violence     Fear of current or ex partner: Not on file     Emotionally abused: Not on file     Physically abused: Not on file     Forced sexual activity: Not on file   Other Topics Concern    Not on file   Social History Narrative    Lives in [place] with her son. Also has one other son. . Used to work at [employer]. Likes to [facility].      Current Facility-Administered Medications   Medication Dose Route Frequency    hydrALAZINE (APRESOLINE) 20 mg/mL injection 10 mg  10 mg IntraVENous Q6H PRN    polyethylene glycol (MIRALAX) packet 17 g  17 g Oral DAILY    0.9% sodium chloride infusion 250 mL  250 mL IntraVENous PRN    atorvastatin (LIPITOR) tablet 80 mg  80 mg Oral DAILY    cloNIDine HCL (CATAPRES) tablet 0.3 mg  0.3 mg Oral TID    ferrous sulfate tablet 325 mg  1 Tab Oral DAILY WITH BREAKFAST    hydrALAZINE (APRESOLINE) tablet 25 mg  25 mg Oral TID    metoprolol succinate (TOPROL-XL) XL tablet 50 mg  50 mg Oral DAILY    levothyroxine (SYNTHROID) tablet 175 mcg  175 mcg Oral ACB    acetaminophen (TYLENOL) tablet 650 mg  650 mg Oral Q4H PRN    Or    acetaminophen (TYLENOL) solution 650 mg  650 mg Per NG tube Q4H PRN    Or    acetaminophen (TYLENOL) suppository 650 mg  650 mg Rectal Q4H PRN    0.9% sodium chloride infusion  75 mL/hr IntraVENous CONTINUOUS    aspirin chewable tablet 81 mg  81 mg Oral DAILY    pantoprazole (PROTONIX) 40 mg in 0.9% sodium chloride 10 mL injection  40 mg IntraVENous Q12H     Allergies   Allergen Reactions    Amlodipine Swelling    Clindamycin Rash    Nifedipine Swelling    Sulfa (Sulfonamide Antibiotics) Rash       Visit Vitals  BP (!) 178/49 (BP 1 Location: Right arm, BP Patient Position: At rest)   Pulse 79   Temp 98.4 °F (36.9 °C)   Resp 18   Ht 5' 5\" (1.651 m)   Wt 105.5 kg (232 lb 9.4 oz)   SpO2 94%   BMI 38.70 kg/m²     Physical Exam   Constitutional: She appears well-developed and well-nourished. Cardiovascular: Normal rate. Pulmonary/Chest: Effort normal.   Vitals reviewed. Neurologic Exam     Mental Status   Age-appropriate woman in bed awake and alert very irritable. Not really willing to participate in lots of questioning. She knows where she is and she can explain her history of stroke.   Pupils are equal, EOMI but she seems to have difficulty crossing the midline to the left  Face is asymmetric to the left tongue is midline speech is a little slurred  Strength on the left is 4+ arm 3/5 with poor effort  Right side intact  No abnormal movements  Gait deferred            Lab Results   Component Value Date/Time    WBC 5.2 10/21/2020 03:24 AM    HGB 7.3 (L) 10/21/2020 03:24 AM    HCT 22.9 (L) 10/21/2020 03:24 AM    Hematocrit (POC) 24 (L) 09/15/2020 11:07 AM    PLATELET 909 07/51/9088 03:24 AM    MCV 91.6 10/21/2020 03:24 AM     Lab Results   Component Value Date/Time    Hemoglobin A1c 6.5 (H) 10/21/2020 03:24 AM    Hemoglobin A1c 7.1 (H) 09/15/2020 07:38 PM    Hemoglobin A1c 9.9 (H) 02/11/2020 12:31 AM    Glucose 162 (H) 10/21/2020 03:24 AM    Glucose (POC) 190 (H) 10/21/2020 11:56 AM    Microalb/Creat ratio (ug/mg creat.) 4,388 (H) 02/03/2020 10:22 AM    LDL, calculated 73.4 10/21/2020 03:24 AM    Creatinine (POC) 4.3 (H) 09/15/2020 11:07 AM    Creatinine 7.52 (H) 10/21/2020 03:24 AM      Lab Results   Component Value Date/Time    Cholesterol, total 180 10/21/2020 03:24 AM    HDL Cholesterol 85 10/21/2020 03:24 AM    LDL, calculated 73.4 10/21/2020 03:24 AM    Triglyceride 108 10/21/2020 03:24 AM    CHOL/HDL Ratio 2.1 10/21/2020 03:24 AM     Lab Results   Component Value Date/Time    ALT (SGPT) 31 10/20/2020 11:27 AM    Alk. phosphatase 86 10/20/2020 11:27 AM    Bilirubin, total 0.6 10/20/2020 11:27 AM    Albumin 2.7 (L) 10/20/2020 11:27 AM    Protein, total 6.7 10/20/2020 11:27 AM    Ammonia 12 10/21/2020 03:24 AM    INR 1.1 10/21/2020 03:24 AM    Prothrombin time 11.2 (H) 10/21/2020 03:24 AM    PLATELET 913 77/16/8431 03:24 AM        CT Results (maximum last 3): Results from [place] encounter on 10/20/20   CTA CODE NEURO HEAD AND NECK W CONT    Narrative INTRACRANIAL AND EXTRACRANIAL CT ANGIOGRAPHY: 10/20/2020 12:01 PM    CLINICAL HISTORY: Right-sided facial droop. TECHNIQUE: CT angiography of the head and neck was performed after the  administration of 120 cc IV contrast (Isovue 370). Coronal and sagittal, and  coronal and sagittal MIP reconstructions were performed. CT dose reduction was  achieved through use of a standardized protocol tailored for this examination  and automatic exposure control for dose modulation. COMPARISON: None. FINDINGS:  CTA neck: Bovine arch is a normal variant. Evaluation of proximal left  subclavian stenosis due to calcified plaque (2-156) is limited by motion. However, this is probably not hemodynamically significant. The vertebral  arteries are codominant.  Calcified plaque of the right external carotid artery  origin (2-425, 196-855) causes greater than 50% stenosis. No significant  extracranial internal carotid artery stenosis, as measured with respect to the  distal internal carotid artery lumen (using NASCET criteria). There are  bilateral pleural effusions. There is interstitial edema in the visualized right  lung. CTA head: No large vessel occlusion or significant intracranial stenosis. There  is mild stenosis of the distal right vertebral artery. There is dense calcific  plaque of the right petrous ICA. This does not cause greater than 50% stenosis,  however (this is more apparent using a bone window). No aneurysm. No visible  posterior communicating arteries. No suspicious enhancing lesions. The dural  venous sinuses are patent. Impression IMPRESSION:   1. No hemodynamically significant extracranial ICA stenosis (using NASCET  criteria). 2. No large vessel occlusion or significant intracranial stenosis. 3. No aneurysm. CT CODE NEURO HEAD WO CONTRAST    Narrative EXAM: CT CODE NEURO HEAD WO CONTRAST    INDICATION: Right facial droop    COMPARISON: 10/2/2020. CONTRAST: None. TECHNIQUE: Unenhanced CT of the head was performed using 5 mm images. Brain and  bone windows were generated. Coronal and sagittal reformats. CT dose reduction  was achieved through use of a standardized protocol tailored for this  examination and automatic exposure control for dose modulation. FINDINGS:  The ventricles and sulci are normal in size, shape and configuration. . Small  vessel ischemic changes are stable compared to the prior exam. Lacunar infarct  right tracee unchanged. There is no intracranial hemorrhage, extra-axial  collection, or mass effect. The basilar cisterns are open. No CT evidence of  acute infarct. The bone windows demonstrate no abnormalities. The visualized portions of the  paranasal sinuses and mastoid air cells are clear. Vascular calcification is  noted.       Impression IMPRESSION:   No acute process or change compared to the prior exam.           MRI Results (maximum last 3): Results from [place] encounter on 10/02/20   MRI [facility] SPINE WO CONT    Narrative *PRELIMINARY REPORT*    Broad-based disc osteophyte complexes at C5-6 and C6-7 produce mild spinal  stenosis. No significant stenosis in the thoracic spine. No acute fracture. No  discitis. Thoracic spinal cord is normal in contour and caliber. Small fluid  collection in the subcutaneous tissues of the mid back. Preliminary report was provided by Dr. Semaj Smalls, the on-call radiologist, at 5:07  PM.    Final report to follow. *END PRELIMINARY REPORT*    EXAM: MRI THORAC SPINE WO CONT    INDICATION:   unable to move LLE    COMPARISON: None    CONTRAST:    Contrast was not administered. TECHNIQUE:   MR imaging of the thoracic spine was performed with the following sequences:  sagittal T1, T2, stir; axial T1, T2.     FINDINGS:  There is normal alignment of the thoracic spine. Vertebral body heights are  maintained. Marrow signal is normal. The course, caliber, and signal intensity  of the spinal cord are normal.     There is a mild broad-based disc osteophyte complex at C6-7 causing mild spinal  stenosis. There is a mild broad-based disc osteophyte complex at C5-6 causing  mild spinal stenosis, only seen on the  images. No significant spinal stenosis or neural foraminal narrowing in the thoracic  spine. There is a 3.7 x 1.9 x 3.4 cm lipoma in the subcutaneous tissues posterior  midline to T8-T10. Impression IMPRESSION:    1. Mild spinal stenosis at C5-6 and C6-7.  2. No significant spinal stenosis or neural foraminal narrowing in the thoracic  spine. 3. Incidental lipoma in the subcutaneous tissues posteriorly from T8 to T10. MRI LUMB SPINE WO CONT    Narrative *PRELIMINARY REPORT*    Mild spinal stenosis at L2-3 and L3-4 secondary to broad-based disc bulges. No  acute fracture. No discitis.  No epidural fluid collection. L3 hemangioma. Preliminary report was provided by Dr. Semaj Smalls, the on-call radiologist, at 5:10  PM.    Final report to follow. *END PRELIMINARY REPORT*    *FINAL REPORT BELOW*    Indication: Unable to move lower extremities    Exam: MRI of the lumbar spine. Sequences include sagittal and axial T1 and  T2-weighted images. Sagittal STIR. There is decreased signal-to-noise and  patient motion artifact limiting the study    Comparisons: None    Contrast: None. Findings: Alignment is normal. There are endplate degenerative changes most  significant at L2-L3. No evidence of marrow replacement or acute fracture. There  are facet effusions and degenerative changes at L3-4 and L4-5. This is  progressed. Given limitations of the study cord terminus is normal. Paraspinous  soft tissues are within normal limits. T12-L1: No stenosis    L1-L2: No stenosis    L2-L3: There is disc height loss and desiccation of this disc with a diffuse  bulge there are facet degenerative changes with thickening of the ligamentum  flavum. Canal stenosis is mild to moderate with narrowing of the subarticular  zones with mild to moderate right and mild left foraminal narrowing. Given  limitations no significant change    L3-L4: There is disc height loss and degeneration of this disc. Diffuse bulge  asymmetric to the right with facet arthropathy, thickening of the ligamentum  flavum and facet effusions. Canal stenosis is mild with narrowing of the right  greater than left subarticular zones. Mild narrowing of the foramen. No interval  change    L4-L5: Bilateral facet degenerative change with facet effusions. Borderline  bulge. There is slight narrowing of the subarticular zones. Disc material  extends into the foramen with mild narrowing the foramen. No interval change    L5-S1: There are facet degenerative changes. Right foraminal extrusion extending  cranially.  Moderate right foraminal narrowing and mild left foraminal narrowing  unchanged      Impression Impression:  1. Study significantly limited by motion artifact  2. Degenerative changes most significant at L2-3 and L3-4 not significantly  progressed in the interval     Results from Hospital Encounter encounter on 10/11/19   MRI LUMB SPINE WO CONT    Narrative EXAM: MRI LUMB SPINE WO CONT    INDICATION: Lumbar pain. COMPARISON: None    TECHNIQUE: MR imaging of the lumbar spine was performed using the following  sequences: sagittal T1, T2, STIR;  axial T1, T2.     CONTRAST:  None. FINDINGS:    There is normal alignment of the lumbar spine. Vertebral body heights are  maintained. There is an incidental intraosseous hemangioma in L3 and T12. Mild  degenerative edema seen along the superior endplate of L3. The conus medullaris terminates at L1. Signal and caliber of the distal spinal  cord are within normal limits. The paraspinal soft tissues are within normal limits. Lower thoracic spine: No herniation or stenosis. L1-L2: No herniation or stenosis. L2-L3: Mild disc space narrowing. Mild broad-based disc bulge with thickening of  ligamentum flavum causing mild to moderate spinal stenosis. There is mild  bilateral neural foraminal narrowing. L3-L4: Mild disc space narrowing. Mild broad-based disc bulge with thickening of  ligamentum flavum causing mild to moderate spinal stenosis. There is moderate  bilateral neural foraminal narrowing    L4-L5: No significant spinal stenosis. There is moderate left and mild right  neural foraminal narrowing. L5-S1: No severe spinal stenosis. There is moderate bilateral neural foraminal  narrowing. Impression IMPRESSION:  Multilevel spondylosis as above. Mild to moderate spinal stenosis at L2-3 and  L3-4. Multilevel bilateral neural foraminal narrowing as above. Mild  degenerative edema in the superior endplate of L3. VAS/US/Carotid Doppler Results (maximum last 3):   Results from [facility] encounter on 01/13/17   DUPLEX CAROTID BILATERAL       PET Results (maximum last 3): No results found for this or any previous visit. Assessment and Plan        52-year-old woman with history of stroke who has baseline left-sided weakness who does admit that her left side was weaker on presentation. Unclear if she was on aspirin so were going to start that now and check an aspirin level. MRI needs to be done looking for any acute issue. CTA did not reveal any acute process. Stay on telemetry. Obtain echo. Any acute changes activate code stroke. Following     During this evaluation, we also discussed stroke education to include signs and symptoms of stroke and TIA. This clinical note was dictated with an electronic dictation software that can make unintentional errors. If there are any questions, please contact me directly for clarification.       [facility],   NEUROLOGIST  Diplomate BRUNO  10/21/2020

## 2020-10-21 NOTE — CONSULTS
[address] [facility] Millicent Enrique NOTE  [facility] office  [phone] NP in-hospital cell phone M-F until 4:30  After 5pm or on weekends, please call  for physician on call        NAME:  Denzel Salazar   :      MRN:   [id number]       Referring Physician: Pattie Hastings Date: 10/21/2020 9:57 AM    Chief Complaint: GI bleed     History of Present Illness:  Patient is a 79 y.o. who is seen in consultation at the request of Dr. Ivory Savage for GI bleed. Medical history as listed below. She presented for left sided weakness. Also noted to be lethargic with decreased PO intake. She's admitted with suspected TIA and TARUN on CKD. Discussed with RN, no obvious GI blood loss but patient refusing much care. Patient was not physically seen due to [address] she refused to discuss on phone. Colonoscopy Dr. Malu Muñiz 2019: grade 1 internal hemorrhoids, sigmoid diverticulosis, four polyps removed    I have reviewed the emergency room note, hospital admission note, notes by all other clinicians who have seen the patient during this hospitalization to date. I have reviewed the problem list and the reason for this hospitalization. I have reviewed the allergies and the medications the patient was taking at home prior to this hospitalization.     PMH:  Past Medical History:   Diagnosis Date    Arthritis     Cataract     bilateral    Chronic kidney disease     Dr. Fer Whiting Chronic pain     lower back    CKD stage 3 due to type 2 diabetes mellitus ([address]     Depression     Diabetes ([address] age 49    Type II    Follicular thyroid cancer ([address] 2014    Foot ulcer ([address]     Gallbladder polyp 2013    Hypercholesterolemia     Hypertension     Ill-defined condition     states 'polyp' in gal bladder    Long term current use of anticoagulant therapy     Obesity     Right pontine stroke ([address] 2017    TIA (transient ischemic attack) 6/6/2014       PSH:  Past Surgical History:   Procedure Laterality Date    ABDOMEN SURGERY PROC UNLISTED  2006    stomach    BREAST SURGERY PROCEDURE UNLISTED  2009, 2006    breast bx-vince    COLONOSCOPY N/A 12/17/2019    COLONOSCOPY performed by Jorge Rosa MD at [address] [place] [address]  12/17/2019         COLORECTAL SCRN; HI RISK IND  5/30/2014         HX BREAST BIOPSY Right [id number]    2 biopies on the right. Benign (per patient)    HX BREAST BIOPSY Left 2015    Benign (per patient)    HX CATARACT REMOVAL Left     HX GYN  1970's    partial hysterectomy    HX HERNIA REPAIR  2009    hiatal    HX HYSTERECTOMY      HX ORTHOPAEDIC Bilateral 1988    bunion    HX THYROIDECTOMY  2014    Dr. Mary Ellen Gomes       Allergies: Allergies   Allergen Reactions    Amlodipine Swelling    Clindamycin Rash    Nifedipine Swelling    Sulfa (Sulfonamide Antibiotics) Rash       Home Medications:  Prior to Admission Medications   Prescriptions Last Dose Informant Patient Reported? Taking? PYRIDOXINE HCL, VITAMIN B6, (VITAMIN B-6 PO)   Yes No   Sig: Take  by mouth daily. SYNTHROID 175 mcg tablet   No No   Sig: Take 1 Tab by mouth Daily (before breakfast). Take 1 Tab by mouth Daily (before breakfast). BRAND MEDICALLY NECESSARY--Delete 150 mcg dose from profile   albuterol (PROVENTIL VENTOLIN) 2.5 mg /3 mL (0.083 %) nebu   Yes No   Sig: 3 mL by Nebulization route every four (4) hours as needed for Wheezing or Shortness of Breath. allopurinol (ZYLOPRIM) 100 mg tablet   Yes No   Sig: Take  by mouth daily. atorvastatin (LIPITOR) 80 mg tablet   Yes No   Sig: Take 80 mg by mouth daily. cloNIDine HCL (CATAPRES) 0.3 mg tablet   Yes No   Sig: Take 1 Tab by mouth three (3) times daily. ferrous sulfate (IRON) 325 mg (65 mg iron) tablet   Yes No   Sig: Take  by mouth daily. furosemide (LASIX) 80 mg tablet   Yes No   Sig: Take 80 mg by mouth daily. gabapentin (NEURONTIN) 300 mg capsule   No No   Sig: Take 2 Caps by mouth three (3) times daily. Max Daily Amount: 1,800 mg.   hydrALAZINE (APRESOLINE) 25 mg tablet   No No   Sig: Take 1 Tab by mouth three (3) times daily. insulin nph-regular human rec (NovoLIN 70-30 FlexPen U-100) 100 unit/mL (70-30) inpn   No No   Sig: Inject 42 units before breakfast and none before dinner. Max 100 units per day--pt will pay cash--Dose change 9/21/20--updated med list--did not send prescription to the pharmacy   metoprolol succinate (TOPROL-XL) 50 mg XL tablet   Yes No   Sig: Take 50 mg by mouth daily. multivitamins-minerals-lutein (CENTRUM SILVER) Tab   Yes No   Sig: Take  by mouth daily. predniSONE (DELTASONE) 10 mg tablet   No No   Sig: Take 10 mg by mouth See Admin Instructions.  then 2 tabs daily for 4 days  and 1 tab daily for 3 days and stop      Facility-Administered Medications: None       Hospital Medications:  Current Facility-Administered Medications   Medication Dose Route Frequency    hydrALAZINE (APRESOLINE) 20 mg/mL injection 10 mg  10 mg IntraVENous Q6H PRN    0.9% sodium chloride infusion 250 mL  250 mL IntraVENous PRN    atorvastatin (LIPITOR) tablet 80 mg  80 mg Oral DAILY    cloNIDine HCL (CATAPRES) tablet 0.3 mg  0.3 mg Oral TID    ferrous sulfate tablet 325 mg  1 Tab Oral DAILY WITH BREAKFAST    hydrALAZINE (APRESOLINE) tablet 25 mg  25 mg Oral TID    metoprolol succinate (TOPROL-XL) XL tablet 50 mg  50 mg Oral DAILY    levothyroxine (SYNTHROID) tablet 175 mcg  175 mcg Oral ACB    acetaminophen (TYLENOL) tablet 650 mg  650 mg Oral Q4H PRN    Or    acetaminophen (TYLENOL) solution 650 mg  650 mg Per NG tube Q4H PRN    Or    acetaminophen (TYLENOL) suppository 650 mg  650 mg Rectal Q4H PRN    0.9% sodium chloride infusion  75 mL/hr IntraVENous CONTINUOUS    aspirin chewable tablet 81 mg  81 mg Oral DAILY    pantoprazole (PROTONIX) 40 mg in 0.9% sodium chloride 10 mL injection  40 mg IntraVENous Q12H       Social History:  Social History     Tobacco Use    Smoking status: Never Smoker    Smokeless tobacco: Never Used   Substance Use Topics    Alcohol use: No       Family History:  Family History   Problem Relation Age of Onset    Heart Disease Mother     Hypertension Mother     Diabetes Mother     Stroke Mother     Cancer Father         colon    Diabetes Sister     Heart Attack Sister     Hypertension Sister     Heart Disease Sister         pacemaker    Lung Disease Brother     Hypertension Brother     Lung Disease Brother     Anesth Problems Neg Hx        Review of Systems:  Patient refusing to talk on phone    Objective:     Patient Vitals for the past 8 hrs:   BP Temp Pulse Resp SpO2 Weight   10/21/20 0830 (!) 178/49 98.4 °F (36.9 °C) 79 18 94 %    10/21/20 0458   79 21 100 % 105.5 kg (232 lb 9.4 oz)     No intake/output data recorded. 10/19 1901 - 10/21 0700  In: 100 [I.V.:100]  Out: -     EXAM:     Deferred due to [facility]     10/21/20  0324 10/20/20  1939 10/20/20  1127   WBC 5.2  --  5.4   HGB 7.3* 7.4* 7.2*   HCT 22.9* 23.5* 23.2*     --  188     Recent Labs     10/21/20  0324 10/20/20  1127    137   K 4.6 4.5    97   CO2 22 28   BUN 97* 93*   CREA 7.52* 8.00*   * 129*   PHOS 7.1*  --    CA 7.7* 8.2*     Recent Labs     10/20/20  1127   AP 86   TP 6.7   ALB 2.7*   GLOB 4.0     Recent Labs     10/21/20  0324 10/20/20  1547   INR 1.1 1.0   PTP 11.2* 10.8   APTT 29.1 27.3      Assessment:     · Chronic Iron Deficiency Anemia: hgb 7.3  · Constipation/rectal pain: CRS consulted   · Left sided weakness: AMS ?  Uremia vs dementia, neurology consulted  · COVID PUI  · TARUN on CKD: nephology consulted     Patient Active Problem List   Diagnosis Code    Gallbladder polyp K82.4    Gallbladder sludge K82.8    Recurrent ventral incisional hernia K43.2    Obesity (BMI 35.0-39.9 without comorbidity) E66.9    Thyroid nodule, right E04.1    Uncontrolled type 2 diabetes mellitus with diabetic polyneuropathy, with long-term current use of insulin (HCC) E11.42, Z79.4, E11.65    TIA (transient ischemic attack) G45.9    Essential hypertension, benign I10    CKD (chronic kidney disease) stage 5, GFR less than 15 ml/min (HCC) N18.5    Thyroid cancer ([facility] C73    Stroke (cerebrum) (HCC) I63.9    Right pontine stroke (HCC) I63.50    Stenosis of both internal carotid arteries I65.23    Hyperlipidemia LDL goal <70 E78.5    Type 2 diabetes with nephropathy (HCC) E11.21    Severe obesity (HCC) E66.01    Foot ulcer (HCC) L97.509    Sepsis (HCC) A41.9    UTI (urinary tract infection) N39.0    Encephalopathy G93.40    Failure to thrive in adult R62.7    Left leg weakness R29.898    Abnormal urinalysis R82.90    Hypertensive urgency I16.0    Osteoarthritis M19.90     Plan:     · Abdominal x-ray pending  · Avoid constipation - MiraLax   · Iron supplementation    · Agree with BID PPI  · Trend CBC, transfuse as necessary  · If pt begins to bleed briskly, order NM bleeding scan and immediately consult IR for intervention if the CTA is positive  · Patient discussed with and will be seen by Dr. Nicky Medeiros  · Thank you for allowing me to participate in care of Hali Arroyo     Signed By: Karlos Prater NP     10/21/2020  9:57 AM       Gastroenterology Attending Physician attestation statement and comments. This patient was seen and examined by me in a face-to-face visit today. I reviewed the medical record including lab work, imaging and other provider notes. I confirmed the history as described above. I spoke to the patient, reviewed the medical record including lab work, imaging and other provider notes. I discussed this case in detail with Santino PEGUERO. I formulated an  assessment of this patient and developed a treatment plan. I agree with the above consultation note.  I agree with the history, exam and assessment and plan as outlined in the note. I would like to add the following:     Abd: normoactive BS, nt, nd, no rebound/guarding. KUB: IMPRESSION: Gas pattern is within normal limits. No significant stool burden. Patient states she has no rectal bleeding or rectal pain. She refused LYNNE. Will sign off. Please call with any questions. Outpatient GI follow-up with Dr. Jaylin Galloway.     Dr. Ivan Hurtado

## 2020-10-21 NOTE — PROGRESS NOTES
Nutrition Note    NPO until dysphagia screen completed. Chart reviewed and spoke with RN. Passed STAND. No need for SLP eval.  Discussed with hospitalist NP - ok to start diet. [facility] records indicate pt on regular/ consistent CHO diet prior to admission. KUB still active and pending, so also discussed with GI and OK to start diet from GI perspective as well. Additional labs, hospital problems, and PMHx reviewed. Nephrology consult pending. Will start consistent CHO, low Phos diet for now. Full RD assessment per policy.     Recent Labs     10/21/20  0324 10/20/20  1127   * 129*   BUN 97* 93*   CREA 7.52* 8.00*    137   K 4.6 4.5    97   CO2 22 28   CA 7.7* 8.2*   PHOS 7.1*  --    MG 3.0*  --          Recent Labs     10/21/20  1156 10/20/20  1023   GLUCPOC 190* 139*       Artguevara Mcclain, JAYASHREE  Available via [employer]  Or Pager# [phone]

## 2020-10-21 NOTE — PROGRESS NOTES
[facility]  Hospitalist Group                                                                                          Hospitalist Progress Note  Mariana Sosa, NP  Answering service: [phone] from in house phone        Date of Service:  10/21/2020  NAME:  Nicky Jacobs  :    MRN:  [id number]      Admission Summary: This is a 79 y.o. female TIA, T2DM, Dementia, HTN, Morbid Obesity, HLD, CKD3, who lives short term at [facility] [address], who presents with left-sided weakness and has not been eating drinking well for the past few days and has been lethargic. Today the PT noticed that the patient was having some left-sided facial droop and some left lower extremity weakness and patient was sent to the ER. Patient was deemed as a code stroke, patient was evaluated by telemetry neurology and was requested to be observed under the hospitalist service for further management and evaluation. Patient reports that she has rectal pain, has been eating drinking less because it hurts when she tries to defecate. Patient reports that she has not had a bowel movement for the past few days. Currently the patient reports that all her symptoms have resolved and \"she would like to get the ham sandwich out of this hospital\". Patient reports that she has no other complaints or problems and denies any other issues.      The patient denies any Headache, blurry vision, sore throat, trouble swallowing, trouble with speech, chest pain, SOB, cough, fever, chills, N/V/D, abd pain, urinary symptoms, constipation, recent travels, sick contacts, falls, injuries, rashes, contact with COVID-19 diagnosed patients, hematemesis, melena, hemoptysis, hematuria, rashes, denies starting any new medications and denies any other concerns or problems besides as mentioned above. Interval history / Subjective:    Follow-up for TIA, ARF, Anemia, HTN, Hypothyroidism, Constipation and Dementia.   -Patient seen and examined, no c/o's. Assessment & Plan:     TIA:   -telemetry  -MRI brain  -neurovascular checks  -aspirin  -statin  -Neurology consult  -telemetry  -PT/OT  -speech consult     ARF on CKD:   -Prerenal  -IVF  -avoid nephrotoxic medication  -renally dose oral medication  -CT of the abdomen pelvis   -place a Ambrosio   -nephrology consult     Anemia: Lower GIB secondary to constipation   -GI consult  -monitor H&H  -iron panel  -transfuse to keep Hgb <7.0     Constipation with rectal pain:   -CT of the abdomen pelvis  -mag citrate  -colorectal surgery consult     HTN: allow permissive hypertension     Hypothyroidism: continue home medication, TSH.     DVTppx: SCDs  Gippx: Pepcid  Code Status: Full Code  Diet: Cardiac  Activity: OOB to chair TID and PRN  Discharge: TBD     Hospital Problems  Date Reviewed: 10/2/2020          Codes Class Noted POA    TIA (transient ischemic attack) ICD-10-CM: G45.9  ICD-9-CM: 435.9  6/6/2014 Unknown                Review of Systems:   A comprehensive review of systems was negative except for that written in the HPI. Vital Signs:    Last 24hrs VS reviewed since prior progress note. Most recent are:  Visit Vitals  BP (!) 178/49 (BP 1 Location: Right arm, BP Patient Position: At rest)   Pulse 79   Temp 98.4 °F (36.9 °C)   Resp 18   Ht 5' 5\" (1.651 m)   Wt 105.5 kg (232 lb 9.4 oz)   SpO2 94%   BMI 38.70 kg/m²       No intake or output data in the 24 hours ending 10/21/20 1230     Physical Examination:     Constitutional:  No acute distress. ENT:  Oral mucosa moist.    Resp:  CTA bilaterally. No wheezing/rhonchi/rales. No accessory muscle use. CV:  Regular rhythm, normal rate, no murmurs, gallops, rubs. /GI:  Soft, non distended, non tender, no guarding, BS present. Musculoskeletal:  No edema, warm, 2+ pulses throughout. Neurologic:  Moves all extremities, left sided weakness. AAO to person, CN II-XII reviewed.   Skin:  Good turgor, no rashes or ulcers  Psych:  Poor insight, intermittent anxious, agitated. Data Review:    Review and/or order of clinical lab test      Labs:     Recent Labs     10/21/20  0324 10/20/20  1939 10/20/20  1127   WBC 5.2  --  5.4   HGB 7.3* 7.4* 7.2*   HCT 22.9* 23.5* 23.2*     --  188     Recent Labs     10/21/20  0324 10/20/20  1127    137   K 4.6 4.5    97   CO2 22 28   BUN 97* 93*   CREA 7.52* 8.00*   * 129*   CA 7.7* 8.2*   MG 3.0*  --    PHOS 7.1*  --      Recent Labs     10/20/20  1127   ALT 31   AP 86   TBILI 0.6   TP 6.7   ALB 2.7*   GLOB 4.0     Recent Labs     10/21/20  0324 10/20/20  1547   INR 1.1 1.0   PTP 11.2* 10.8   APTT 29.1 27.3      No results for input(s): FE, TIBC, PSAT, FERR in the last 72 hours. No results found for: FOL, RBCF   No results for input(s): PH, PCO2, PO2 in the last 72 hours.   Recent Labs     10/21/20  0324 10/20/20  1939   * 226*   TROIQ 0.12* 0.12*     Lab Results   Component Value Date/Time    Cholesterol, total 180 10/21/2020 03:24 AM    HDL Cholesterol 85 10/21/2020 03:24 AM    LDL, calculated 73.4 10/21/2020 03:24 AM    Triglyceride 108 10/21/2020 03:24 AM    CHOL/HDL Ratio 2.1 10/21/2020 03:24 AM     Lab Results   Component Value Date/Time    Glucose (POC) 190 (H) 10/21/2020 11:56 AM    Glucose (POC) 139 (H) 10/20/2020 10:23 AM    Glucose (POC) 124 (H) 10/06/2020 11:30 AM    Glucose (POC) 76 10/06/2020 06:34 AM    Glucose (POC) 246 (H) 10/05/2020 10:15 PM     Lab Results   Component Value Date/Time    Color YELLOW/STRAW 10/02/2020 01:10 PM    Appearance TURBID (A) 10/02/2020 01:10 PM    Specific gravity 1.013 10/02/2020 01:10 PM    pH (UA) 6.0 10/02/2020 01:10 PM    Protein 300 (A) 10/02/2020 01:10 PM    Glucose Negative 10/02/2020 01:10 PM    Ketone Negative 10/02/2020 01:10 PM    Bilirubin Negative 10/02/2020 01:10 PM    Urobilinogen 0.2 10/02/2020 01:10 PM    Nitrites Negative 10/02/2020 01:10 PM    Leukocyte Esterase LARGE (A) 10/02/2020 01:10 PM    Epithelial cells FEW 10/02/2020 01:10 PM    Bacteria 4+ (A) 10/02/2020 01:10 PM    WBC >100 (H) 10/02/2020 01:10 PM    RBC 5-10 10/02/2020 01:10 PM         Medications Reviewed:     Current Facility-Administered Medications   Medication Dose Route Frequency    hydrALAZINE (APRESOLINE) 20 mg/mL injection 10 mg  10 mg IntraVENous Q6H PRN    polyethylene glycol (MIRALAX) packet 17 g  17 g Oral DAILY    0.9% sodium chloride infusion 250 mL  250 mL IntraVENous PRN    atorvastatin (LIPITOR) tablet 80 mg  80 mg Oral DAILY    cloNIDine HCL (CATAPRES) tablet 0.3 mg  0.3 mg Oral TID    ferrous sulfate tablet 325 mg  1 Tab Oral DAILY WITH BREAKFAST    hydrALAZINE (APRESOLINE) tablet 25 mg  25 mg Oral TID    metoprolol succinate (TOPROL-XL) XL tablet 50 mg  50 mg Oral DAILY    levothyroxine (SYNTHROID) tablet 175 mcg  175 mcg Oral ACB    acetaminophen (TYLENOL) tablet 650 mg  650 mg Oral Q4H PRN    Or    acetaminophen (TYLENOL) solution 650 mg  650 mg Per NG tube Q4H PRN    Or    acetaminophen (TYLENOL) suppository 650 mg  650 mg Rectal Q4H PRN    0.9% sodium chloride infusion  75 mL/hr IntraVENous CONTINUOUS    aspirin chewable tablet 81 mg  81 mg Oral DAILY    pantoprazole (PROTONIX) 40 mg in 0.9% sodium chloride 10 mL injection  40 mg IntraVENous Q12H     ______________________________________________________________________  EXPECTED LENGTH OF STAY: - - -  ACTUAL LENGTH OF STAY:          0                 Jacqueline Graham NP

## 2020-10-21 NOTE — PROGRESS NOTES
Occupational Therapy Note:     Attempted to see pt for evaluation. Pt cleared by nursing and received in bed. She was alert when therapist entered room and engaged with conversation. However, she refused all activities. Pt refusing OOB due to level of fatigue today. Will hold evaluation today and attempt tomorrow.   Thanks,       Audrene Paget, OT

## 2020-10-22 ENCOUNTER — APPOINTMENT (OUTPATIENT)
Dept: NON INVASIVE DIAGNOSTICS | Age: 80
DRG: 069 | End: 2020-10-22
Attending: FAMILY MEDICINE
Payer: MEDICARE

## 2020-10-22 LAB
GLUCOSE BLD STRIP.AUTO-MCNC: 176 MG/DL (ref 65–100)
GLUCOSE BLD STRIP.AUTO-MCNC: 264 MG/DL (ref 65–100)
GLUCOSE BLD STRIP.AUTO-MCNC: 278 MG/DL (ref 65–100)
SERVICE CMNT-IMP: ABNORMAL

## 2020-10-22 PROCEDURE — 96372 THER/PROPH/DIAG INJ SC/IM: CPT

## 2020-10-22 PROCEDURE — 99232 SBSQ HOSP IP/OBS MODERATE 35: CPT | Performed by: NURSE PRACTITIONER

## 2020-10-22 PROCEDURE — 99218 HC RM OBSERVATION: CPT

## 2020-10-22 PROCEDURE — 82962 GLUCOSE BLOOD TEST: CPT

## 2020-10-22 PROCEDURE — 99233 SBSQ HOSP IP/OBS HIGH 50: CPT | Performed by: CLINICAL NURSE SPECIALIST

## 2020-10-22 PROCEDURE — 74011250636 HC RX REV CODE- 250/636: Performed by: NURSE PRACTITIONER

## 2020-10-22 PROCEDURE — 74011250636 HC RX REV CODE- 250/636: Performed by: INTERNAL MEDICINE

## 2020-10-22 PROCEDURE — 74011636637 HC RX REV CODE- 636/637: Performed by: NURSE PRACTITIONER

## 2020-10-22 PROCEDURE — 74011250637 HC RX REV CODE- 250/637: Performed by: FAMILY MEDICINE

## 2020-10-22 PROCEDURE — 65660000000 HC RM CCU STEPDOWN

## 2020-10-22 RX ORDER — FUROSEMIDE 10 MG/ML
80 INJECTION INTRAMUSCULAR; INTRAVENOUS 2 TIMES DAILY
Status: COMPLETED | OUTPATIENT
Start: 2020-10-22 | End: 2020-10-24

## 2020-10-22 RX ORDER — MAGNESIUM SULFATE 100 %
4 CRYSTALS MISCELLANEOUS AS NEEDED
Status: DISCONTINUED | OUTPATIENT
Start: 2020-10-22 | End: 2020-10-26 | Stop reason: HOSPADM

## 2020-10-22 RX ORDER — HALOPERIDOL 5 MG/ML
2 INJECTION INTRAMUSCULAR
Status: DISCONTINUED | OUTPATIENT
Start: 2020-10-22 | End: 2020-10-22

## 2020-10-22 RX ORDER — INSULIN LISPRO 100 [IU]/ML
INJECTION, SOLUTION INTRAVENOUS; SUBCUTANEOUS
Status: DISCONTINUED | OUTPATIENT
Start: 2020-10-22 | End: 2020-10-26 | Stop reason: HOSPADM

## 2020-10-22 RX ORDER — HALOPERIDOL 5 MG/ML
2 INJECTION INTRAMUSCULAR
Status: DISCONTINUED | OUTPATIENT
Start: 2020-10-22 | End: 2020-10-24

## 2020-10-22 RX ORDER — DEXTROSE MONOHYDRATE 100 MG/ML
0-250 INJECTION, SOLUTION INTRAVENOUS AS NEEDED
Status: DISCONTINUED | OUTPATIENT
Start: 2020-10-22 | End: 2020-10-26 | Stop reason: HOSPADM

## 2020-10-22 RX ORDER — LANOLIN ALCOHOL/MO/W.PET/CERES
6 CREAM (GRAM) TOPICAL
Status: DISCONTINUED | OUTPATIENT
Start: 2020-10-22 | End: 2020-10-26 | Stop reason: HOSPADM

## 2020-10-22 RX ADMIN — HYDRALAZINE HYDROCHLORIDE 10 MG: 20 INJECTION INTRAMUSCULAR; INTRAVENOUS at 20:03

## 2020-10-22 RX ADMIN — CLONIDINE HYDROCHLORIDE 0.3 MG: 0.2 TABLET ORAL at 17:32

## 2020-10-22 RX ADMIN — INSULIN LISPRO 5 UNITS: 100 INJECTION, SOLUTION INTRAVENOUS; SUBCUTANEOUS at 17:32

## 2020-10-22 RX ADMIN — HUMAN INSULIN 6 UNITS: 100 INJECTION, SUSPENSION SUBCUTANEOUS at 17:32

## 2020-10-22 RX ADMIN — HYDRALAZINE HYDROCHLORIDE 25 MG: 25 TABLET, FILM COATED ORAL at 17:32

## 2020-10-22 RX ADMIN — HALOPERIDOL LACTATE 2 MG: 5 INJECTION, SOLUTION INTRAMUSCULAR at 10:04

## 2020-10-22 RX ADMIN — FUROSEMIDE 80 MG: 10 INJECTION, SOLUTION INTRAMUSCULAR; INTRAVENOUS at 17:31

## 2020-10-22 NOTE — CONSULTS
[address]    Name:  Bonifacio Espinoza  MR#:  [id number]  :  1940  ACCOUNT #:  [de-identified]  DATE OF SERVICE:      IN-HOSPITAL NEPHROLOGY CONSULTATION    REFERRING PHYSICIAN:  Binh Varela MD    REASON FOR CONSULTATION:  Advanced kidney injury. HISTORY OF PRESENT ILLNESS:  The patient is well known to us, 71-year-old black woman who is a patient of Dr. Mary Jalloh. She was recently seen at [facility] [address] [place] [address] when she was admitted for placement at an assisted living facility. The patient already has left arm AV fistula placed in anticipation for need of dialysis. At that time, her serum creatinine was in the ballpark of 4+. The patient was sent back to the emergency room with left-sided weakness. She became combative and confused. She required emergent restraint placed. When I saw her, the patient was agitated, but able to answer some questions. I explained to her that she may need dialysis during this hospitalization, and she became more agitated and stated clearly that she would never allow that. The rest of the review of systems and medical information obtained from the patient is unreliable and very limited due to her confusion. The patient resides at [facility]. She was noted recently not to be eating and drinking well, and she was more lethargic than usual.  The patient developed left facial drop and lower extremity weakness. The patient was already seen by Neurology, and the workup is in place. PAST MEDICAL HISTORY:  1. Arthritis. 2.  Cataract. 3.  Chronic kidney disease. 4.  Chronic pain of lower back. 5.  CKD, stage V.  6.  Diabetes. 7.  Thyroid cancer. 8.  Foot ulcer. 9.  Gallbladder polyp. 10.  Hypercholesterolemia. 11.  Hypertension. 12.  Obesity. 13.  Right pontine stroke in 2017. PAST SURGICAL HISTORY:  1.  Stomach surgery. 2.  Breast surgery. 3.  Colonoscopy. 4.  Breast biopsy. 5.  Hysterectomy.   6.  Hiatal hernia repair. 7.  Bunion repair. 8.  Thyroidectomy. CURRENT MEDICATIONS:  Home medication list consists of:  1. Clonidine. 2.  Ventolin. 3.  Prednisone. 4.  Insulin. 5.  Alprazolam.  6.  Neurontin. 7.  Furosemide 80 mg daily. 8.  Synthroid 175 mcg. 9.  Lipitor 80.  10.  Ferrous sulfate 325 mg. 11.  Vitamin B6.  12.  Multivitamins. ALLERGIES:  ALLERGIC TO AMLODIPINE, CLINDAMYCIN, NIFEDIPINE, AND SULFA MEDICATIONS. SOCIAL HISTORY:  The patient denies alcohol, tobacco, or illicit drug abuse. She lives in rehab currently. She needs help with daily life activities. She ambulates with the walker. The patient is DNR. FAMILY HISTORY:  Mother with heart disease, hypertension, diabetes, and stroke. Father with colon cancer. Sister with diabetes, heart attack, and hypertension. Brother with lung disease and hypertension. REVIEW OF SYSTEMS:  Unreliable and unobtained. PHYSICAL EXAMINATION:  GENERAL:  Confused, agitated, obviously upset elderly woman, who is not in acute distress. VITAL SIGNS:  Blood pressure is 169/52, heart rate is 76, respirations 23, oxygen saturation 93% on room air. HEENT:  Eyes with anicteric sclerae. Mouth with moist oral mucosa. NECK:  Supple. LUNGS:  With diminished breath sounds and few bibasilar crepitations. HEART:  With S1 and S2 with systolic murmur. ABDOMEN:  Soft, nontender. EXTREMITIES:  With 4+ edema of lower extremities. Peripheral pulses are not palpable due to edema. The patient has AV fistula on the left antecubital area. The fistula has good bruit and thrill, but it is not matured. It was placed last month. LABORATORY DATA:  Sodium is 138, potassium is 4.6, CO2 is 22, BUN is 97, creatinine is 7.5. Hemoglobin is 7.3, white blood count is 5.3. Chest x-ray was not done during this hospitalization. IMPRESSION:  1. Chronic kidney disease, stage V.   The patient was prepared for initiation of hemodialysis although she now adamantly refused to even talk about it. The patient's renal function has significantly worsened since early 10/2020. She may have an element of metabolic encephalopathy due to uremia. 2.  Severe edema from chronic kidney disease. I doubt if the patient was taking her medications at the [facility]. 3.  Severe anemia of chronic kidney disease. There is an element of dilution due to the edema. 4.  Acute delirium, likely from a combination of new stroke and encephalopathy. RECOMMENDATIONS:  1.  Start aggressive diuresis. 2.  The patient will have another set of blood work done. If she remains leukemic at this level, she will need to start dialysis via catheter. The patient is not able to given consent due to her acute delirium and altered mental status. We will need to contact her son, Laura Wagner. Although if the patient is combative and unwilling to comply with the procedure, that will be very difficult to dialyze her since dialysis requires cooperation with the process; otherwise, it will become dangerous for the patient and for the staff. 3.  Anemia management. Obtain iron profile. Consider Epogen. Thank you very much for the opportunity to be a part of this patient's care. Our service will follow up with you very closely.       MD GABY [email or web address]  D:  10/22/2020 12:47  T:  10/22/2020 15:59  JOB #:  [id number]

## 2020-10-22 NOTE — PROGRESS NOTES
Bedside shift change report given to Karishma Flores (oncoming nurse) by Jennifer Orozco RN (offgoing nurse). Report included the following information SBAR, Kardex, ED Summary, Recent Results and Cardiac Rhythm NSR. Problem: Falls - Risk of  Goal: *Absence of Falls  Description: Document Veatrice Marker Fall Risk and appropriate interventions in the flowsheet.   Outcome: Progressing Towards Goal  Note: Fall Risk Interventions:  Mobility Interventions: Communicate number of staff needed for ambulation/transfer, OT consult for ADLs, Patient to call before getting OOB, PT Consult for mobility concerns, Utilize walker, cane, or other assistive device, Utilize gait belt for transfers/ambulation    Mentation Interventions: Adequate sleep, hydration, pain control, Bed/chair exit alarm, Door open when patient unattended, Gait belt with transfers/ambulation, More frequent rounding, Reorient patient, Room close to nurse's station, Toileting rounds, Update white board    Medication Interventions: Bed/chair exit alarm, Evaluate medications/consider consulting pharmacy, Patient to call before getting OOB, Teach patient to arise slowly, Utilize gait belt for transfers/ambulation    Elimination Interventions: Bed/chair exit alarm, Call light in reach, Elevated toilet seat, Toileting schedule/hourly rounds, Patient to call for help with toileting needs

## 2020-10-22 NOTE — PROGRESS NOTES
Pt very agitated and combative. Refusing blood draws, medications, diagnostic tests and basic hygiene assistance. Has made verbal threats to several staff members. Trying to use her call bell and phone to hit staff. Is verbally and physically very aggressive. Patient stating \"I want to go home,\" \"don't touch me,\" \"get out of my face. \"  Pt in soft wrist restraints. Call made to to son, Tim Sevilla, to update on condition. Priyanka Mendes NP, received order for IM halidol. Patient still very agitated and making verbal threats to staff after halidol. Pt calmed in the mid afternoon. Still refusing treatment but not as restless. Daughter at bedside with patient, fed patient lunch.

## 2020-10-22 NOTE — PROGRESS NOTES
Physical Therapy: hold    Chart reviewed and discussed with RN in prep for PT eval.  RN reports pt is still combative, none cooperative, and not safe to come out of restraints for therapy. Will hold and continue to follow peripherally.      Gay Lopez, PT, DPT

## 2020-10-22 NOTE — PROGRESS NOTES
[id number]  Hospitalist Group                                                                                          Hospitalist Progress Note  Sandra Ely NP  Answering service: [phone] or [phone] from in house phone        Date of Service:  10/22/2020  NAME:  Silvana Desouza  :    MRN:  [id number]      Admission Summary:   Per H&P: This is a 79 y.o. female TIA, T2DM, Dementia, HTN, Morbid Obesity, HLD, CKD3, who lives short term at [facility] [address] [facility] [address] [facility] [address], who presents with left-sided weakness and has not been eating drinking well for the past few days and has been lethargic. Today the PT noticed that the patient was having some left-sided facial droop and some left lower extremity weakness and patient was sent to the ER. Patient was deemed as a code stroke, patient was evaluated by telemetry neurology and was requested to be observed under the hospitalist service for further management and evaluation. Patient reports that she has rectal pain, has been eating drinking less because it hurts when she tries to defecate. Patient reports that she has not had a bowel movement for the past few days. Currently the patient reports that all her symptoms have resolved and \"she would like to get the ham sandwich out of this hospital\". Patient reports that she has no other complaints or problems and denies any other issues.      The patient denies any Headache, blurry vision, sore throat, trouble swallowing, trouble with speech, chest pain, SOB, cough, fever, chills, N/V/D, abd pain, urinary symptoms, constipation, recent travels, sick contacts, falls, injuries, rashes, contact with COVID-19 diagnosed patients, hematemesis, melena, hemoptysis, hematuria, rashes, denies starting any new medications and denies any other concerns or problems besides as mentioned above. Interval history / Subjective:     Seen and examined patient.  States that she \"doesn't know what's going on and need to die quickly\". Oriented to self only. Per RN- patient became combative overnight, code atlas was called. Patient still being combative this am.   Bilateral soft wrist restraints noted. Upper extremities are positive for movement and circulation. Patient refused any po fluid intake. Will continue wrist restraints for patient safety. No acute distress noted. 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   - Baseline creatine ~4.   - Creatine 8.00 on admission, 7.52 today. - Avoid nephrotoxins and renal dose medications   - Nephrology consulted. - For possible dialysis. Will need line placement. - Continue lasix. Strict I&Os  - Monitor labs in am     Metabolic encephalopathy   - Likely secondary to uremia.  - Increased confusion with irritability and combativeness   - Continue bilateral wrist restraints for safety.   - Reorient frequently   - Nephrology following.   - UA and UC pending     Anemia likely secondary to CKD  -GI consulted, signed off   -monitor H&H  -iron 30, TIBC 274  -transfuse to keep Hgb <7.0     Constipation with rectal pain:   -CT of the abdomen pelvis  - Colorectal consulted and signed off.      Diabetes Mellitus type 2   - Hgb A1c 6.5  - Monitor blood sugars   - Insulin sliding scale   - Start NPH 6 units BID   - Carb consistent diet  - Diabetes management following.      HTN: allow permissive hypertension     Hypothyroidism: continue home medication, TSH.     Morbid Obesity   - BMI 39.02  - weight loss management to be followed up outpatient       Code status: Full   DVT prophylaxis: SCDs    Care Plan discussed with: Patient/Family and Nurse  Anticipated Disposition: TBD  Anticipated Discharge: Greater than 48 hours     Hospital Problems  Date Reviewed: 10/2/2020          Codes Class Noted POA    TIA (transient ischemic attack) ICD-10-CM: G45.9  ICD-9-CM: 435.9  6/6/2014 Unknown                Review of Systems:   A comprehensive review of systems was negative except for that written in the HPI. Vital Signs:    Last 24hrs VS reviewed since prior progress note. Most recent are:  Visit Vitals  BP (!) 170/55   Pulse 75   Temp 98.5 °F (36.9 °C)   Resp 15   Ht 5' 5\" (1.651 m)   Wt 106.4 kg (234 lb 8 oz)   SpO2 95%   BMI 39.02 kg/m²         Intake/Output Summary (Last 24 hours) at 10/22/2020 1831  Last data filed at 10/22/2020 0315  Gross per 24 hour   Intake 300 ml   Output    Net 300 ml        Physical Examination:             Constitutional:  No acute distress, uncooperative,    ENT:  Oral mucosa moist, oropharynx benign. Resp:  CTA bilaterally. No wheezing/rhonchi/rales. No accessory muscle use   CV:  Regular rhythm, normal rate, no murmurs, gallops, rubs    GI:  Soft, non distended, non tender. normoactive bowel sounds, no hepatosplenomegaly     Musculoskeletal:  No edema, warm, 2+ pulses throughout    Neurologic:  Moves all extremities.   AAOx1-2             Data Review:    Review and/or order of clinical lab test  Review and/or order of tests in the radiology section of CPT  Review and/or order of tests in the medicine section of CPT      Labs:     Recent Labs     10/21/20  0324 10/20/20  1939 10/20/20  1127   WBC 5.2  --  5.4   HGB 7.3* 7.4* 7.2*   HCT 22.9* 23.5* 23.2*     --  188     Recent Labs     10/21/20  0324 10/20/20  1127    137   K 4.6 4.5    97   CO2 22 28   BUN 97* 93*   CREA 7.52* 8.00*   * 129*   CA 7.7* 8.2*   MG 3.0*  --    PHOS 7.1*  --      Recent Labs     10/20/20  1127   ALT 31   AP 86   TBILI 0.6   TP 6.7   ALB 2.7*   GLOB 4.0     Recent Labs     10/21/20  0324 10/20/20  1547   INR 1.1 1.0   PTP 11.2* 10.8   APTT 29.1 27.3      Recent Labs     10/20/20  1939   TIBC 274   PSAT 11*      No results found for: FOL, RBCF   No results for input(s): PH, PCO2, PO2 in the last 72 hours.   Recent Labs     10/21/20  0324 10/20/20  1939   * 226*   TROIQ 0.12* 0.12*     Lab Results   Component Value Date/Time    Cholesterol, total 180 10/21/2020 03:24 AM    HDL Cholesterol 85 10/21/2020 03:24 AM    LDL, calculated 73.4 10/21/2020 03:24 AM    Triglyceride 108 10/21/2020 03:24 AM    CHOL/HDL Ratio 2.1 10/21/2020 03:24 AM     Lab Results   Component Value Date/Time    Glucose (POC) 278 (H) 10/22/2020 05:16 PM    Glucose (POC) 264 (H) 10/22/2020 01:46 PM    Glucose (POC) 239 (H) 10/21/2020 05:41 PM    Glucose (POC) 190 (H) 10/21/2020 11:56 AM    Glucose (POC) 139 (H) 10/20/2020 10:23 AM     Lab Results   Component Value Date/Time    Color YELLOW/STRAW 10/02/2020 01:10 PM    Appearance TURBID (A) 10/02/2020 01:10 PM    Specific gravity 1.013 10/02/2020 01:10 PM    pH (UA) 6.0 10/02/2020 01:10 PM    Protein 300 (A) 10/02/2020 01:10 PM    Glucose Negative 10/02/2020 01:10 PM    Ketone Negative 10/02/2020 01:10 PM    Bilirubin Negative 10/02/2020 01:10 PM    Urobilinogen 0.2 10/02/2020 01:10 PM    Nitrites Negative 10/02/2020 01:10 PM    Leukocyte Esterase LARGE (A) 10/02/2020 01:10 PM    Epithelial cells FEW 10/02/2020 01:10 PM    Bacteria 4+ (A) 10/02/2020 01:10 PM    WBC >100 (H) 10/02/2020 01:10 PM    RBC 5-10 10/02/2020 01:10 PM         Medications Reviewed:     Current Facility-Administered Medications   Medication Dose Route Frequency    melatonin tablet 6 mg  6 mg Oral QHS PRN    haloperidol lactate (HALDOL) injection 2 mg  2 mg IntraMUSCular Q4H PRN    glucose chewable tablet 16 g  4 Tab Oral PRN    glucagon (GLUCAGEN) injection 1 mg  1 mg IntraMUSCular PRN    dextrose 10% infusion 0-250 mL  0-250 mL IntraVENous PRN    insulin lispro (HUMALOG) injection   SubCUTAneous AC&HS    furosemide (LASIX) injection 80 mg  80 mg IntraVENous BID    insulin NPH (NOVOLIN N, HUMULIN N) injection 6 Units  6 Units SubCUTAneous BID    hydrALAZINE (APRESOLINE) 20 mg/mL injection 10 mg  10 mg IntraVENous Q6H PRN    polyethylene glycol (MIRALAX) packet 17 g  17 g Oral DAILY    atorvastatin (LIPITOR) tablet 80 mg  80 mg Oral DAILY    cloNIDine HCL (CATAPRES) tablet 0.3 mg  0.3 mg Oral TID    ferrous sulfate tablet 325 mg  1 Tab Oral DAILY WITH BREAKFAST    hydrALAZINE (APRESOLINE) tablet 25 mg  25 mg Oral TID    metoprolol succinate (TOPROL-XL) XL tablet 50 mg  50 mg Oral DAILY    levothyroxine (SYNTHROID) tablet 175 mcg  175 mcg Oral ACB    acetaminophen (TYLENOL) tablet 650 mg  650 mg Oral Q4H PRN    Or    acetaminophen (TYLENOL) solution 650 mg  650 mg Per NG tube Q4H PRN    Or    acetaminophen (TYLENOL) suppository 650 mg  650 mg Rectal Q4H PRN    aspirin chewable tablet 81 mg  81 mg Oral DAILY    pantoprazole (PROTONIX) 40 mg in 0.9% sodium chloride 10 mL injection  40 mg IntraVENous Q12H     ______________________________________________________________________  EXPECTED LENGTH OF STAY: - - -  ACTUAL LENGTH OF STAY:          0                 Gio Matamoros NP

## 2020-10-22 NOTE — PROGRESS NOTES
Occupational Therapy Note:    Orders acknowledged and chart reviewed, spoke to pt's nurse. Pt still combative and non-cooperative, not appropriate for therapy evaluation at this time. Will defer and continue to follow. Thank you.     Narayan Aviles, OTR/L

## 2020-10-22 NOTE — PROGRESS NOTES
Problem: Falls - Risk of  Goal: *Absence of Falls  Description: Document Brittney Sims Fall Risk and appropriate interventions in the flowsheet. Outcome: Progressing Towards Goal  Pt remains free of falls during admission. Call bell and frequently used items within reach. Bedside table within reach. Pt provided nonskid socks and instructed to call out for nurse when needing assistance. Problem: Pressure Injury - Risk of  Goal: *Prevention of pressure injury  Description: Document Vinny Scale and appropriate interventions in the flowsheet. Outcome: Progressing Towards Goal  Pt turned ever two hours, moisture barrier applied, heels elevated, pillows and blankets used, pressure redistribution mattress, linens dry. Problem: Diabetes Self-Management  Goal: *Disease process and treatment process  Description: Define diabetes and identify own type of diabetes; list 3 options for treating diabetes. Outcome: Progressing Towards Goal         Bedside shift change report given to Imer Amanda RN (oncoming nurse) by Sridhar Calderon RN (offgoing nurse). Report included the following information SBAR, Kardex, Med Rec Status, and Cardiac Rhythm NSR .

## 2020-10-22 NOTE — PROGRESS NOTES
Problem: Falls - Risk of  Goal: *Absence of Falls  Description: Document Bob Mendez Fall Risk and appropriate interventions in the flowsheet. Outcome: Progressing Towards Goal  Note: Fall Risk Interventions:  Mobility Interventions: Communicate number of staff needed for ambulation/transfer, Bed/chair exit alarm    Mentation Interventions: Door open when patient unattended, Reorient patient, Room close to nurse's station    Medication Interventions: Evaluate medications/consider consulting pharmacy    Elimination Interventions: Toileting schedule/hourly rounds     Problem: Non-Violent Restraints  Goal: *Removal from restraints as soon as assessed to be safe  Outcome: Progressing Towards Goal  Pt very agitated and physically aggressive. Will continue to assess mental status and will remove when appropriate. Goal: Non-violent Restaints:Standard Interventions  Outcome: Progressing Towards Goal  Goal: Patient/Family Education  Outcome: Progressing Towards Goal    Problem: TIA/CVA Stroke: 0-24 hours  Goal: Activity/Safety  Outcome: Progressing Towards Goal     Problem: TIA/CVA Stroke: 0-24 hours  Goal: Consults, if ordered  Outcome: Progressing Towards Goal    Bedside shift change report given to Gurmeet Lindsey RN (oncoming nurse) by Eduardo Salinas RN (offgoing nurse). Report included the following information SBAR, Kardex, ED Summary, Recent Results and Cardiac Rhythm NSR.

## 2020-10-22 NOTE — PROGRESS NOTES
Pt became belligerent, combative, refusing to follow commands. Pt was attempting to get out of bed, when staff attempted to get her to put her gown back on and discuss getting out of bed, she grabbed this RN's hair and pulled without letting go for several minutes, bit another staff member's arm who was trying to get her to let go of the nurse's hair, and once she released RN, she ripped off all her telemetry wires, bp cuff, purewick. She was cussing at staff and code atlas was called. De-escalation techniques ineffective as patient unable to be reasoned with or calmed. MD paged. [facility] staff and RN assisted patient into bilateral soft wrist restraints, put new tele leads on patient, while she continued to be combative and swearing at staff. Lights dimmed, patient on monitor and will continue to monitor and assess safety measures. Await MD callback. New orders received.

## 2020-10-22 NOTE — FORENSIC NURSE
Safety plan reviewed with staff. Two staff member assist to care for patient. Soft wrist restraints in place. Patient medicated for sleep.

## 2020-10-22 NOTE — CONSULTS
EUGENIE MORGAN  CLINICAL NURSE SPECIALIST CONSULT  [facility]    INITIAL NOTE    Presentation   Goldy Mariee is a 79 y.o. female who presented from [facility] who presented to the ED after PT noticed left sided facial droop and weakness during therapy. A code stroke was called in the ED and she was admitted for work-up. HX: Arthritis, CKD, Depression, DM2, follicular thyroid cancer s/p thyroidectomy, hypercholesteremia, CVA, TIA,     DX: TIA    TX: Neurology consult, CTA    Current clinical course has been complicated by agitation. Diabetes: Patient has known Type two diabetes, treated with 70/30 insulin at breakfast PTA. Admitting A1C 6.5%  (down from 7.1% in Sept)      Consulted by Provider for advanced diabetes nursing assessment and care, specifically related to   [x] Inpatient management strategy      Diabetes-related medical history  Acute complications: Hyperglycemia without acidosis  Neurological complications  Peripheral neuropathy  Microvascular disease  Nephropathy  Macrovascular disease  Cerebral vascular accident  Other associated conditions: None    Diabetes medication history  Drug class Currently in use Discontinued Never used   Biguanide      DDP-4 inhibitor       Sulfonylurea      Thiazolidinedione      GLP-1 RA      SGLT-2 inhibitors      Basal insulin      Fixed Dose  Combinations 70/30 14 units at breakfast and dinner 70/30 42 units at breakfast     Bolus insulin        Subjective   I want to go home.     Has been living with he son PTA   Was seen at [address] [place] [address] Sw October 2 for Adult FTT and discharged to [facility].   At this time, patient not cooperative with interview/poor historian   Daughter at bedside who conferenced in two other family members  Diabetes is managed by Dr Jennifer Gallegos with [facility]- last virtual visit was 5/20 (next visit scheduled 11/16/20)    At this time:  GFR 6  Anion gap 16  anemia   Glucose 129-264 in the last 24 hours  A1C 6.5%  S/p PRBC transfusion yesterday    Objective   Physical exam  General Confused, verbal outbursts. Periods of appropriate comments and periods of falling asleep mid conversation. In soft wrist restraints. Did have haldol today    Vital Signs   Visit Vitals  BP (!) 169/152   Pulse 76   Temp 98.1 °F (36.7 °C)   Resp 23   Ht 5' 5\" (1.651 m)   Wt 106.4 kg (234 lb 8 oz)   SpO2 93%   BMI 39.02 kg/m²     Skin  Warm and dry. Acanthosis noted along neckline. No lipohypertrophy or lipoatrophy noted at injection sites   Heart   Regular rate and rhythm. No murmurs, rubs or gallops  Lungs  Clear to auscultation without rales or rhonchi  Extremities No foot wounds    Diabetic foot exam:    Left Foot     Visual Exam: callous - bilateral LE swelling, dry/flaking skin bilateral LE swelling, dry/flaking skin   Pulse DP: 1+ (weak)   Filament test: reduced sensation    Vibratory sensation: diminished  Right Foot   Visual Exam: callous - bilateral LE swelling, dry/flaking skin   Pulse DP: 1+ (weak)   Filament test: reduced sensation    Vibratory sensation: diminished DP & PT pulses +2.      Laboratory  Lab Results   Component Value Date/Time    Hemoglobin A1c 6.5 (H) 10/21/2020 03:24 AM    Hemoglobin A1c (POC) 10.2 09/11/2019 04:13 PM     Lab Results   Component Value Date/Time    LDL, calculated 73.4 10/21/2020 03:24 AM     Lab Results   Component Value Date/Time    Creatinine (POC) 4.3 (H) 09/15/2020 11:07 AM    Creatinine 7.52 (H) 10/21/2020 03:24 AM     Lab Results   Component Value Date/Time    Sodium 138 10/21/2020 03:24 AM    Potassium 4.6 10/21/2020 03:24 AM    Chloride 100 10/21/2020 03:24 AM    CO2 22 10/21/2020 03:24 AM    Anion gap 16 (H) 10/21/2020 03:24 AM    Glucose 162 (H) 10/21/2020 03:24 AM    BUN 97 (H) 10/21/2020 03:24 AM    Creatinine 7.52 (H) 10/21/2020 03:24 AM    BUN/Creatinine ratio 13 10/21/2020 03:24 AM    GFR est AA 6 (L) 10/21/2020 03:24 AM    GFR est non-AA 5 (L) 10/21/2020 03:24 AM    Calcium 7.7 (L) 10/21/2020 03:24 AM    Bilirubin, total 0.6 10/20/2020 11:27 AM    Alk. phosphatase 86 10/20/2020 11:27 AM    Protein, total 6.7 10/20/2020 11:27 AM    Albumin 2.7 (L) 10/20/2020 11:27 AM    Globulin 4.0 10/20/2020 11:27 AM    A-G Ratio 0.7 (L) 10/20/2020 11:27 AM    ALT (SGPT) 31 10/20/2020 11:27 AM     Lab Results   Component Value Date/Time    ALT (SGPT) 31 10/20/2020 11:27 AM       Factors affecting BG pattern  Factor Dose Comments   Nutrition:  Carb-controlled meals   60 grams/meal    Other: CKD GFR 6 Renal clearance of exogenous insulin is dramatically reduced when GFR under 20 mL/min which predisposes patients to fasting hypoglycemia. Blood glucose pattern        Assessment and Plan   Nursing Diagnosis Risk for unstable blood glucose pattern   Nursing Intervention Domain 5258 Decision-making Support   Nursing Interventions Examined current inpatient diabetes control   Explored factors facilitating and impeding inpatient management  Identified self-management practices impeding diabetes control  Explored corrective strategies with patient and responsible inpatient provider   Informed patient of rational for insulin strategy while hospitalized     Evaluation   Kay Barlow is a 79year old female with type two diabetes on once daily 70/30 insulin residing at an assisted living facility who presents with a TIA. CTA was unremarkable for acute process and MRI recommended for additional work-up. A1C on admission was 6.5% and a significant trend down has been noted this year (highest A1C 10.3% 2/3/20). Unclear her previous history of blood transfusion but certainly has anemia which can impact A1C result. GFR is also significantly decreased this admission- renal clearance of exogenous insulin is dramatically reduced when GFR under 20 mL/min which predisposes patients to fasting hypoglycemia. Goal A1C likely closer to 8% and tight glycemic control is not recommended as she is at risk for hypoglycemia. Recommendations   Recommend:  1. Can start very low dose basal insulin: 0.1 units/k units NPH BID, monitoring for fasting hypoglycemia. 2. Correctional insulin at high sensitivity (ESRD)    3. Continue consistent carbohydrate diet    Billing Code(s)   I personally reviewed chart, notes, data and current medications in the medical record, and examined the patient at bedside before making care recommendations. Thank you for including us in their care. I spent 55 minutes in direct patient care today for this patient.   Time includes chart review, face to face with patient and collaboration with interdisciplinary care team.      MICHAEL Jordan  [facility]  Access via [address] [place] [address]  [phone]

## 2020-10-22 NOTE — PROGRESS NOTES
Neurology Progress Note    Patient ID:  Misty Howe  [id number]  79 y.o.  1940    Chief Complaint:    Subjective: Today patient is combative, outburst,  and agitated. She is currently in wrist restraints. She tells me that on listening to what the nurses have to say about her and its all alive. She is requested to get out of the hospital.  She refused to do any test.  She is able to tell me her name and where she is at. She informs me that she has 2 sons. I spoke to the son who informed me that Mrs. Kelsey Jacques is normally alert and oriented x4. No history psych history. He notesabout a month ago he had noticed a decline in her ability to walk. He notes that she was comfortable having him do everything around the house except bathe her. She recently was admitted to [facility] about 3 weeks ago and has gradually declined since admission. He reports that this is not her normal behavior. Objective:    All records in [facility] reviewed and noted    ROS:  Unable to obtain     Meds:  Current Facility-Administered Medications   Medication Dose Route Frequency    melatonin tablet 6 mg  6 mg Oral QHS PRN    haloperidol lactate (HALDOL) injection 2 mg  2 mg IntraMUSCular Q4H PRN    glucose chewable tablet 16 g  4 Tab Oral PRN    glucagon (GLUCAGEN) injection 1 mg  1 mg IntraMUSCular PRN    dextrose 10% infusion 0-250 mL  0-250 mL IntraVENous PRN    insulin lispro (HUMALOG) injection   SubCUTAneous AC&HS    furosemide (LASIX) injection 80 mg  80 mg IntraVENous BID    hydrALAZINE (APRESOLINE) 20 mg/mL injection 10 mg  10 mg IntraVENous Q6H PRN    polyethylene glycol (MIRALAX) packet 17 g  17 g Oral DAILY    atorvastatin (LIPITOR) tablet 80 mg  80 mg Oral DAILY    cloNIDine HCL (CATAPRES) tablet 0.3 mg  0.3 mg Oral TID    ferrous sulfate tablet 325 mg  1 Tab Oral DAILY WITH BREAKFAST    hydrALAZINE (APRESOLINE) tablet 25 mg  25 mg Oral TID    metoprolol succinate (TOPROL-XL) XL tablet 50 mg  50 mg Oral DAILY    levothyroxine (SYNTHROID) tablet 175 mcg  175 mcg Oral ACB    acetaminophen (TYLENOL) tablet 650 mg  650 mg Oral Q4H PRN    Or    acetaminophen (TYLENOL) solution 650 mg  650 mg Per NG tube Q4H PRN    Or    acetaminophen (TYLENOL) suppository 650 mg  650 mg Rectal Q4H PRN    aspirin chewable tablet 81 mg  81 mg Oral DAILY    pantoprazole (PROTONIX) 40 mg in 0.9% sodium chloride 10 mL injection  40 mg IntraVENous Q12H       Imaging:      Lab Review   Recent Results (from the past 24 hour(s))   GLUCOSE, POC    Collection Time: 10/21/20  5:41 PM   Result Value Ref Range    Glucose (POC) 239 (H) 65 - 100 mg/dL    Performed by Agusto Bates        Exam:  Visit Vitals  BP (!) 169/152   Pulse 76   Temp 98.1 °F (36.7 °C)   Resp 23   Ht 5' 5\" (1.651 m)   Wt 106.4 kg (234 lb 8 oz)   SpO2 93%   BMI 39.02 kg/m²     Gen: Well developed  CV: RR  Neuro: Alert refuse to follow commands,selective when answering questions  CN II-XII: unable to test   Motor: strength 5/5 all four ext  Sensory: unable to test  Gait: unable to test     Assessment:     Patient Active Problem List   Diagnosis Code    Gallbladder polyp K82.4    Gallbladder sludge K82.8    Recurrent ventral incisional hernia K43.2    Obesity (BMI 35.0-39.9 without comorbidity) E66.9    Thyroid nodule, right E04.1    Uncontrolled type 2 diabetes mellitus with diabetic polyneuropathy, with long-term current use of insulin (HCC) E11.42, Z79.4, E11.65    TIA (transient ischemic attack) G45.9    Essential hypertension, benign I10    CKD (chronic kidney disease) stage 5, GFR less than 15 ml/min ([facility]) N18.5    Thyroid cancer ([facility] [address] C73    Stroke (cerebrum) (HCC) I63.9    Right pontine stroke (HCC) I63.50    Stenosis of both internal carotid arteries I65.23    Hyperlipidemia LDL goal <70 E78.5    Type 2 diabetes with nephropathy (HCC) E11.21    Severe obesity (HCC) E66.01    Foot ulcer (HCC) L97.509    Sepsis (HCC) A41.9    UTI (urinary tract infection) N39.0    Encephalopathy G93.40    Failure to thrive in adult R62.7    Left leg weakness R29.898    Abnormal urinalysis R82.90    Hypertensive urgency I16.0    Osteoarthritis M19.90     Plan:   A 73-year-old woman with a history of stroke who has baseline left-sided weakness who does admit that her left side was weaker on presentation. Now very combative and refusing to participate in any diagnostic testing, labs, nursing care. It would be ideal to get an MRI of the brain to rule out any structural abnormalities, Will switch to with Contrast given her hx of cancer. Recommend possibly consulting psych to help with managing her agitation. She may benefit from a nightly dose of Seroquel. Differential metabolic vs structural vs behavioral. MRI, pending. Creatinine 7.52, recommend consulting nephrology.        Signed:  Freddie Gowers, NP  10/22/2020  1:23 PM

## 2020-10-23 LAB
ABO + RH BLD: NORMAL
BASOPHILS # BLD: 0 K/UL (ref 0–0.1)
BASOPHILS NFR BLD: 0 % (ref 0–1)
BLD PROD TYP BPU: NORMAL
BLOOD GROUP ANTIBODIES SERPL: NORMAL
BPU ID: NORMAL
CALCIUM SERPL-MCNC: 7.7 MG/DL (ref 8.5–10.1)
CROSSMATCH RESULT,%XM: NORMAL
DIFFERENTIAL METHOD BLD: ABNORMAL
EOSINOPHIL # BLD: 0.2 K/UL (ref 0–0.4)
EOSINOPHIL NFR BLD: 3 % (ref 0–7)
ERYTHROCYTE [DISTWIDTH] IN BLOOD BY AUTOMATED COUNT: 17.5 % (ref 11.5–14.5)
GLUCOSE BLD STRIP.AUTO-MCNC: 159 MG/DL (ref 65–100)
GLUCOSE BLD STRIP.AUTO-MCNC: 163 MG/DL (ref 65–100)
GLUCOSE BLD STRIP.AUTO-MCNC: 169 MG/DL (ref 65–100)
GLUCOSE BLD STRIP.AUTO-MCNC: 200 MG/DL (ref 65–100)
HCT VFR BLD AUTO: 24.7 % (ref 35–47)
HGB BLD-MCNC: 8.1 G/DL (ref 11.5–16)
IMM GRANULOCYTES # BLD AUTO: 0 K/UL (ref 0–0.04)
IMM GRANULOCYTES NFR BLD AUTO: 0 % (ref 0–0.5)
LYMPHOCYTES # BLD: 0.8 K/UL (ref 0.8–3.5)
LYMPHOCYTES NFR BLD: 14 % (ref 12–49)
MCH RBC QN AUTO: 29.2 PG (ref 26–34)
MCHC RBC AUTO-ENTMCNC: 32.8 G/DL (ref 30–36.5)
MCV RBC AUTO: 89.2 FL (ref 80–99)
MONOCYTES # BLD: 0.7 K/UL (ref 0–1)
MONOCYTES NFR BLD: 13 % (ref 5–13)
NEUTS SEG # BLD: 3.9 K/UL (ref 1.8–8)
NEUTS SEG NFR BLD: 70 % (ref 32–75)
NRBC # BLD: 0 K/UL (ref 0–0.01)
NRBC BLD-RTO: 0 PER 100 WBC
PLATELET # BLD AUTO: 218 K/UL (ref 150–400)
PMV BLD AUTO: 11.9 FL (ref 8.9–12.9)
PTH-INTACT SERPL-MCNC: 244.9 PG/ML (ref 18.4–88)
RBC # BLD AUTO: 2.77 M/UL (ref 3.8–5.2)
RBC MORPH BLD: ABNORMAL
SERVICE CMNT-IMP: ABNORMAL
SPECIMEN EXP DATE BLD: NORMAL
STATUS OF UNIT,%ST: NORMAL
UNIT DIVISION, %UDIV: 0
WBC # BLD AUTO: 5.6 K/UL (ref 3.6–11)

## 2020-10-23 PROCEDURE — 36415 COLL VENOUS BLD VENIPUNCTURE: CPT

## 2020-10-23 PROCEDURE — 65660000000 HC RM CCU STEPDOWN

## 2020-10-23 PROCEDURE — 85025 COMPLETE CBC W/AUTO DIFF WBC: CPT

## 2020-10-23 PROCEDURE — 99232 SBSQ HOSP IP/OBS MODERATE 35: CPT | Performed by: NURSE PRACTITIONER

## 2020-10-23 PROCEDURE — 82962 GLUCOSE BLOOD TEST: CPT

## 2020-10-23 PROCEDURE — 99223 1ST HOSP IP/OBS HIGH 75: CPT | Performed by: PHYSICAL MEDICINE & REHABILITATION

## 2020-10-23 PROCEDURE — 97161 PT EVAL LOW COMPLEX 20 MIN: CPT

## 2020-10-23 PROCEDURE — 74011250637 HC RX REV CODE- 250/637: Performed by: NURSE PRACTITIONER

## 2020-10-23 PROCEDURE — 74011250636 HC RX REV CODE- 250/636: Performed by: FAMILY MEDICINE

## 2020-10-23 PROCEDURE — 74011250636 HC RX REV CODE- 250/636: Performed by: INTERNAL MEDICINE

## 2020-10-23 PROCEDURE — 74011000250 HC RX REV CODE- 250: Performed by: FAMILY MEDICINE

## 2020-10-23 PROCEDURE — 74011250636 HC RX REV CODE- 250/636: Performed by: NURSE PRACTITIONER

## 2020-10-23 PROCEDURE — 74011250637 HC RX REV CODE- 250/637: Performed by: INTERNAL MEDICINE

## 2020-10-23 PROCEDURE — 97530 THERAPEUTIC ACTIVITIES: CPT

## 2020-10-23 PROCEDURE — 74011250637 HC RX REV CODE- 250/637: Performed by: FAMILY MEDICINE

## 2020-10-23 PROCEDURE — 83970 ASSAY OF PARATHORMONE: CPT

## 2020-10-23 PROCEDURE — 97165 OT EVAL LOW COMPLEX 30 MIN: CPT

## 2020-10-23 PROCEDURE — 74011636637 HC RX REV CODE- 636/637: Performed by: NURSE PRACTITIONER

## 2020-10-23 PROCEDURE — C9113 INJ PANTOPRAZOLE SODIUM, VIA: HCPCS | Performed by: FAMILY MEDICINE

## 2020-10-23 RX ORDER — SEVELAMER CARBONATE 800 MG/1
1600 TABLET, FILM COATED ORAL
Status: DISCONTINUED | OUTPATIENT
Start: 2020-10-23 | End: 2020-10-26 | Stop reason: HOSPADM

## 2020-10-23 RX ADMIN — ASPIRIN 81 MG: 81 TABLET, CHEWABLE ORAL at 09:13

## 2020-10-23 RX ADMIN — HYDRALAZINE HYDROCHLORIDE 25 MG: 25 TABLET, FILM COATED ORAL at 09:14

## 2020-10-23 RX ADMIN — LEVOTHYROXINE SODIUM 175 MCG: 0.03 TABLET ORAL at 07:22

## 2020-10-23 RX ADMIN — SEVELAMER CARBONATE 1600 MG: 800 TABLET, FILM COATED ORAL at 11:12

## 2020-10-23 RX ADMIN — INSULIN LISPRO 2 UNITS: 100 INJECTION, SOLUTION INTRAVENOUS; SUBCUTANEOUS at 17:55

## 2020-10-23 RX ADMIN — HUMAN INSULIN 6 UNITS: 100 INJECTION, SUSPENSION SUBCUTANEOUS at 09:13

## 2020-10-23 RX ADMIN — HYDRALAZINE HYDROCHLORIDE 25 MG: 25 TABLET, FILM COATED ORAL at 17:54

## 2020-10-23 RX ADMIN — HYDRALAZINE HYDROCHLORIDE 10 MG: 20 INJECTION INTRAMUSCULAR; INTRAVENOUS at 07:16

## 2020-10-23 RX ADMIN — CLONIDINE HYDROCHLORIDE 0.3 MG: 0.2 TABLET ORAL at 21:33

## 2020-10-23 RX ADMIN — CLONIDINE HYDROCHLORIDE 0.3 MG: 0.2 TABLET ORAL at 17:54

## 2020-10-23 RX ADMIN — FERROUS SULFATE TAB 325 MG (65 MG ELEMENTAL FE) 325 MG: 325 (65 FE) TAB at 09:14

## 2020-10-23 RX ADMIN — FUROSEMIDE 80 MG: 10 INJECTION, SOLUTION INTRAMUSCULAR; INTRAVENOUS at 09:13

## 2020-10-23 RX ADMIN — METOPROLOL SUCCINATE 50 MG: 50 TABLET, EXTENDED RELEASE ORAL at 09:14

## 2020-10-23 RX ADMIN — CLONIDINE HYDROCHLORIDE 0.3 MG: 0.2 TABLET ORAL at 09:13

## 2020-10-23 RX ADMIN — ATORVASTATIN CALCIUM 80 MG: 40 TABLET, FILM COATED ORAL at 09:13

## 2020-10-23 RX ADMIN — SODIUM CHLORIDE 40 MG: 9 INJECTION INTRAMUSCULAR; INTRAVENOUS; SUBCUTANEOUS at 17:54

## 2020-10-23 RX ADMIN — SEVELAMER CARBONATE 1600 MG: 800 TABLET, FILM COATED ORAL at 17:54

## 2020-10-23 RX ADMIN — HYDRALAZINE HYDROCHLORIDE 25 MG: 25 TABLET, FILM COATED ORAL at 21:33

## 2020-10-23 RX ADMIN — POLYETHYLENE GLYCOL 3350 17 G: 17 POWDER, FOR SOLUTION ORAL at 09:14

## 2020-10-23 RX ADMIN — INSULIN LISPRO 2 UNITS: 100 INJECTION, SOLUTION INTRAVENOUS; SUBCUTANEOUS at 09:13

## 2020-10-23 RX ADMIN — SODIUM CHLORIDE 40 MG: 9 INJECTION INTRAMUSCULAR; INTRAVENOUS; SUBCUTANEOUS at 03:30

## 2020-10-23 RX ADMIN — FUROSEMIDE 80 MG: 10 INJECTION, SOLUTION INTRAMUSCULAR; INTRAVENOUS at 18:05

## 2020-10-23 RX ADMIN — HUMAN INSULIN 6 UNITS: 100 INJECTION, SUSPENSION SUBCUTANEOUS at 17:55

## 2020-10-23 NOTE — PROGRESS NOTES
[address] [facility] Adult  Hospitalist Group                                                                                          Hospitalist Progress Note  Jessenia Boss NP  Answering service: [phone] from in house phone        Date of Service:  10/23/2020  NAME:  Albert Mitchell  :    MRN:  [id number]      Admission Summary:   Per H&P: This is a 79 y.o. female TIA, T2DM, Dementia, HTN, Morbid Obesity, HLD, CKD3, who lives short term at [facility] [address] [facility] [address], who presents with left-sided weakness and has not been eating drinking well for the past few days and has been lethargic. Today the PT noticed that the patient was having some left-sided facial droop and some left lower extremity weakness and patient was sent to the ER. Patient was deemed as a code stroke, patient was evaluated by telemetry neurology and was requested to be observed under the hospitalist service for further management and evaluation. Patient reports that she has rectal pain, has been eating drinking less because it hurts when she tries to defecate. Patient reports that she has not had a bowel movement for the past few days. Currently the patient reports that all her symptoms have resolved and \"she would like to get the ham sandwich out of this hospital\". Patient reports that she has no other complaints or problems and denies any other issues.      The patient denies any Headache, blurry vision, sore throat, trouble swallowing, trouble with speech, chest pain, SOB, cough, fever, chills, N/V/D, abd pain, urinary symptoms, constipation, recent travels, sick contacts, falls, injuries, rashes, contact with COVID-19 diagnosed patients, hematemesis, melena, hemoptysis, hematuria, rashes, denies starting any new medications and denies any other concerns or problems besides as mentioned above.     Interval history / Subjective:    Seen and examined patient.  Sitting up in bed stating she needs \"to get out of here now\". Oriented to self only. Becomes agitated with reorientation. Overnight refused labs to be drawn and refusing to take medication. Bilateral wrist restraints noted. In place for patient safety. Upper extremities are positive for movement, sensation and circulation. No acute distress noted. Trinh Mccormack with patient's son Christiano Hoyt ([phone]) updated him on patients condition and plan of care. Stated that he would like to discuss with his brother Yadiel Rosales in order to decide plan of care. Treatment vs hospice. Attempted to call patient's other son Melissa Perezer ([phone]). No answer. [address] with son Pam He at bedside. He states that him and his brother want to proceed with patient having dialysis. Discussed with Dr. Bennie Laws. She states to get renal panel and she will reassess in the am.      Assessment & Plan:     TIA:   - hx of CVA with left-sided weakness at baseline   -telemetry  -MRI brain pending   - continue neuro check   - Neurology following   - PT/OT following   - Continue asa and atorvastatin      Chronic kidney disease stage 5.   - Baseline creatine ~4.   - Creatine 8.00 on admission, 7.52 today. - Avoid nephrotoxins and renal dose medications   - Nephrology consulted. - For possible dialysis. Will need line placement. - Continue lasix.  Strict I&Os  - Labs pending, patient refusing labs   - Palliative consulted     Metabolic encephalopathy   - Likely secondary to uremia.  - Increased confusion with irritability and combativeness   - Continue bilateral wrist restraints for safety.   - Reorient frequently   - Nephrology following.   - UA and UC pending      Anemia likely secondary to CKD  -GI consulted, signed off   -monitor H&H  -iron 30, TIBC 274  -transfuse to keep Hgb <7.0     Constipation with rectal pain:   -CT of the abdomen pelvis  - Colorectal consulted and signed off.      Diabetes Mellitus type 2   - Hgb A1c 6.5  - Monitor blood sugars   - Insulin sliding scale - Start NPH 6 units BID   - Carb consistent diet  - Diabetes management following.      HTN: allow permissive hypertension     Hypothyroidism: continue home medication, TSH.      Morbid Obesity   - BMI 39.02  - weight loss management to be followed up outpatient      Code status: Full   DVT prophylaxis: SCDs    Care Plan discussed with: Patient/Family, Nurse and   Anticipated Disposition: TBD  Anticipated Discharge: Greater than 48 hours     Hospital Problems  Date Reviewed: 10/2/2020          Codes Class Noted POA    TIA (transient ischemic attack) ICD-10-CM: G45.9  ICD-9-CM: 435.9  6/6/2014 Unknown                Review of Systems:   A comprehensive review of systems was negative except for that written in the HPI. Vital Signs:    Last 24hrs VS reviewed since prior progress note. Most recent are:  Visit Vitals  BP (!) 181/55   Pulse 87   Temp 98.6 °F (37 °C)   Resp 15   Ht 5' 5\" (1.651 m)   Wt 106.8 kg (235 lb 7.2 oz)   SpO2 94%   BMI 39.18 kg/m²       No intake or output data in the 24 hours ending 10/23/20 0920     Physical Examination:             Constitutional:  No acute distress, Uncooperative    ENT:  Oral mucosa moist, oropharynx benign. Resp:  CTA bilaterally. No wheezing/rhonchi/rales. No accessory muscle use   CV:  Regular rhythm, normal rate, no murmurs, gallops, rubs    GI:  Soft, non distended, non tender. normoactive bowel sounds, no hepatosplenomegaly     Musculoskeletal:  No edema, warm, 2+ pulses throughout    Neurologic:  Moves all extremities.   AAOx1      Psych:  Agitated       Data Review:    Review and/or order of clinical lab test  Review and/or order of tests in the radiology section of CPT  Review and/or order of tests in the medicine section of CPT      Labs:     Recent Labs     10/21/20  0324 10/20/20  1939 10/20/20  1127   WBC 5.2  --  5.4   HGB 7.3* 7.4* 7.2*   HCT 22.9* 23.5* 23.2*     --  188     Recent Labs     10/21/20  0324 10/20/20  1127    137 K 4.6 4.5    97   CO2 22 28   BUN 97* 93*   CREA 7.52* 8.00*   * 129*   CA 7.7* 8.2*   MG 3.0*  --    PHOS 7.1*  --      Recent Labs     10/20/20  1127   ALT 31   AP 86   TBILI 0.6   TP 6.7   ALB 2.7*   GLOB 4.0     Recent Labs     10/21/20  0324 10/20/20  1547   INR 1.1 1.0   PTP 11.2* 10.8   APTT 29.1 27.3      Recent Labs     10/20/20  1939   TIBC 274   PSAT 11*      No results found for: FOL, RBCF   No results for input(s): PH, PCO2, PO2 in the last 72 hours.   Recent Labs     10/21/20  0324 10/20/20  1939   * 226*   TROIQ 0.12* 0.12*     Lab Results   Component Value Date/Time    Cholesterol, total 180 10/21/2020 03:24 AM    HDL Cholesterol 85 10/21/2020 03:24 AM    LDL, calculated 73.4 10/21/2020 03:24 AM    Triglyceride 108 10/21/2020 03:24 AM    CHOL/HDL Ratio 2.1 10/21/2020 03:24 AM     Lab Results   Component Value Date/Time    Glucose (POC) 159 (H) 10/23/2020 08:42 AM    Glucose (POC) 176 (H) 10/22/2020 09:11 PM    Glucose (POC) 278 (H) 10/22/2020 05:16 PM    Glucose (POC) 264 (H) 10/22/2020 01:46 PM    Glucose (POC) 239 (H) 10/21/2020 05:41 PM     Lab Results   Component Value Date/Time    Color YELLOW/STRAW 10/02/2020 01:10 PM    Appearance TURBID (A) 10/02/2020 01:10 PM    Specific gravity 1.013 10/02/2020 01:10 PM    pH (UA) 6.0 10/02/2020 01:10 PM    Protein 300 (A) 10/02/2020 01:10 PM    Glucose Negative 10/02/2020 01:10 PM    Ketone Negative 10/02/2020 01:10 PM    Bilirubin Negative 10/02/2020 01:10 PM    Urobilinogen 0.2 10/02/2020 01:10 PM    Nitrites Negative 10/02/2020 01:10 PM    Leukocyte Esterase LARGE (A) 10/02/2020 01:10 PM    Epithelial cells FEW 10/02/2020 01:10 PM    Bacteria 4+ (A) 10/02/2020 01:10 PM    WBC >100 (H) 10/02/2020 01:10 PM    RBC 5-10 10/02/2020 01:10 PM         Medications Reviewed:     Current Facility-Administered Medications   Medication Dose Route Frequency    melatonin tablet 6 mg  6 mg Oral QHS PRN    haloperidol lactate (HALDOL) injection 2 mg  2 mg IntraMUSCular Q4H PRN    glucose chewable tablet 16 g  4 Tab Oral PRN    glucagon (GLUCAGEN) injection 1 mg  1 mg IntraMUSCular PRN    dextrose 10% infusion 0-250 mL  0-250 mL IntraVENous PRN    insulin lispro (HUMALOG) injection   SubCUTAneous AC&HS    furosemide (LASIX) injection 80 mg  80 mg IntraVENous BID    insulin NPH (NOVOLIN N, HUMULIN N) injection 6 Units  6 Units SubCUTAneous BID    hydrALAZINE (APRESOLINE) 20 mg/mL injection 10 mg  10 mg IntraVENous Q6H PRN    polyethylene glycol (MIRALAX) packet 17 g  17 g Oral DAILY    atorvastatin (LIPITOR) tablet 80 mg  80 mg Oral DAILY    cloNIDine HCL (CATAPRES) tablet 0.3 mg  0.3 mg Oral TID    ferrous sulfate tablet 325 mg  1 Tab Oral DAILY WITH BREAKFAST    hydrALAZINE (APRESOLINE) tablet 25 mg  25 mg Oral TID    metoprolol succinate (TOPROL-XL) XL tablet 50 mg  50 mg Oral DAILY    levothyroxine (SYNTHROID) tablet 175 mcg  175 mcg Oral ACB    acetaminophen (TYLENOL) tablet 650 mg  650 mg Oral Q4H PRN    Or    acetaminophen (TYLENOL) solution 650 mg  650 mg Per NG tube Q4H PRN    Or    acetaminophen (TYLENOL) suppository 650 mg  650 mg Rectal Q4H PRN    aspirin chewable tablet 81 mg  81 mg Oral DAILY    pantoprazole (PROTONIX) 40 mg in 0.9% sodium chloride 10 mL injection  40 mg IntraVENous Q12H     ______________________________________________________________________  EXPECTED LENGTH OF STAY: 2d 0h  ACTUAL LENGTH OF STAY:          1                 Adrianna Espinal NP

## 2020-10-23 NOTE — PROGRESS NOTES
Problem: Falls - Risk of  Goal: *Absence of Falls  Description: Document Flex Jamil Fall Risk and appropriate interventions in the flowsheet. Outcome: Progressing Towards Goal  Note: Fall Risk Interventions:  Mobility Interventions: Bed/chair exit alarm, Communicate number of staff needed for ambulation/transfer, Patient to call before getting OOB    Mentation Interventions: Adequate sleep, hydration, pain control, Bed/chair exit alarm, Update white board, Reorient patient, Room close to nurse's station, Evaluate medications/consider consulting pharmacy    Medication Interventions: Evaluate medications/consider consulting pharmacy, Bed/chair exit alarm    Elimination Interventions: Patient to call for help with toileting needs, Toileting schedule/hourly rounds, Call light in reach, Bed/chair exit alarm              Problem: Pressure Injury - Risk of  Goal: *Prevention of pressure injury  Description: Document Vinny Scale and appropriate interventions in the flowsheet. Outcome: Progressing Towards Goal  Note: Pressure Injury Interventions:  Sensory Interventions: Turn and reposition approx. every two hours (pillows and wedges if needed), Minimize linen layers, Keep linens dry and wrinkle-free, Pressure redistribution bed/mattress (bed type)    Moisture Interventions: Apply protective barrier, creams and emollients, Absorbent underpads, Internal/External urinary devices, Moisture barrier, Maintain skin hydration (lotion/cream)    Activity Interventions: Pressure redistribution bed/mattress(bed type)    Mobility Interventions: Turn and reposition approx.  every two hours(pillow and wedges), Pressure redistribution bed/mattress (bed type)    Nutrition Interventions: Document food/fluid/supplement intake    Friction and Shear Interventions: Minimize layers, Apply protective barrier, creams and emollients, Transferring/repositioning devices                Problem: Diabetes Self-Management  Goal: *Monitoring blood glucose, interpreting and using results  Description: Identify recommended blood glucose targets  and personal targets. Outcome: Progressing Towards Goal  Note: Patient's BG was 178 and she received no bedtime coverage. Problem: Non-Violent Restraints  Goal: *Removal from restraints as soon as assessed to be safe  Outcome: Progressing Towards Goal  Note: Patient is being verbally and physically aggressive towards staff providing care. Patient is safe and being checked every 2 hours for injury, offered food/liquids and incontinence care.

## 2020-10-23 NOTE — PROGRESS NOTES
Neurology Progress Note    Patient ID:  Akira Forbes  [id number]  79 y.o.  1940    [facility] [place]     Naman Gusman is a 60-year-old woman with a history of a right pontine infarct in 2017, diabetes, pain, hypertension who is here because in the past few days while at rehabilitation she has had increasing lethargy and decreased oral intake. There was concern for worsening left-sided weakness and speech change and she was brought to the hospital.  Initial evaluation revealing a CTA unremarkable for any acute issue. It is not clear that she was taking aspirin prior to presentation. She is not a very good historian. She wants to leave. She is very irritable. Subjective:    She continues to tell me she does not want to do anything. She would like to be with her family and die. She states \" would you want your mother tied down like this\". She is requesting to get up in the chair. She will follow some commands, but continue to refuse full examination. I explain that she will need an MRI and dialysis she stated she does not want anymore test she is ready to die. Objective:    All records in [facility] reviewed and noted    ROS:  Unable to obtain     Meds:  Current Facility-Administered Medications   Medication Dose Route Frequency    melatonin tablet 6 mg  6 mg Oral QHS PRN    haloperidol lactate (HALDOL) injection 2 mg  2 mg IntraMUSCular Q4H PRN    glucose chewable tablet 16 g  4 Tab Oral PRN    glucagon (GLUCAGEN) injection 1 mg  1 mg IntraMUSCular PRN    dextrose 10% infusion 0-250 mL  0-250 mL IntraVENous PRN    insulin lispro (HUMALOG) injection   SubCUTAneous AC&HS    furosemide (LASIX) injection 80 mg  80 mg IntraVENous BID    insulin NPH (NOVOLIN N, HUMULIN N) injection 6 Units  6 Units SubCUTAneous BID    hydrALAZINE (APRESOLINE) 20 mg/mL injection 10 mg  10 mg IntraVENous Q6H PRN    polyethylene glycol (MIRALAX) packet 17 g  17 g Oral DAILY    atorvastatin (LIPITOR) tablet 80 mg  80 mg Oral DAILY  cloNIDine HCL (CATAPRES) tablet 0.3 mg  0.3 mg Oral TID    ferrous sulfate tablet 325 mg  1 Tab Oral DAILY WITH BREAKFAST    hydrALAZINE (APRESOLINE) tablet 25 mg  25 mg Oral TID    metoprolol succinate (TOPROL-XL) XL tablet 50 mg  50 mg Oral DAILY    levothyroxine (SYNTHROID) tablet 175 mcg  175 mcg Oral ACB    acetaminophen (TYLENOL) tablet 650 mg  650 mg Oral Q4H PRN    Or    acetaminophen (TYLENOL) solution 650 mg  650 mg Per NG tube Q4H PRN    Or    acetaminophen (TYLENOL) suppository 650 mg  650 mg Rectal Q4H PRN    aspirin chewable tablet 81 mg  81 mg Oral DAILY    pantoprazole (PROTONIX) 40 mg in 0.9% sodium chloride 10 mL injection  40 mg IntraVENous Q12H       Imaging:      Lab Review   Recent Results (from the past 24 hour(s))   GLUCOSE, POC    Collection Time: 10/22/20  1:46 PM   Result Value Ref Range    Glucose (POC) 264 (H) 65 - 100 mg/dL    Performed by Darylene Robert, POC    Collection Time: 10/22/20  5:16 PM   Result Value Ref Range    Glucose (POC) 278 (H) 65 - 100 mg/dL    Performed by Darylene Robert, POC    Collection Time: 10/22/20  9:11 PM   Result Value Ref Range    Glucose (POC) 176 (H) 65 - 100 mg/dL    Performed by Dali Nicolas ([facility])    GLUCOSE, POC    Collection Time: 10/23/20  8:42 AM   Result Value Ref Range    Glucose (POC) 159 (H) 65 - 100 mg/dL    Performed by Tennille Marshall        Exam:  Visit Vitals  BP (!) 181/55   Pulse 87   Temp 98.6 °F (37 °C)   Resp 15   Ht 5' 5\" (1.651 m)   Wt 106.8 kg (235 lb 7.2 oz)   SpO2 94%   BMI 39.18 kg/m²     Gen: Well developed  CV: RR  Neuro: Alert refuse to follow commands,selective when answering questions  CN II-XII: unable to test   Motor: strength 5/5 all four ext  Sensory: unable to test  Gait: unable to test     Assessment:     Patient Active Problem List   Diagnosis Code    Gallbladder polyp K82.4    Gallbladder sludge K82.8    Recurrent ventral incisional hernia K43.2    Obesity (BMI 35.0-39.9 without comorbidity) E66.9    Thyroid nodule, right E04.1    Uncontrolled type 2 diabetes mellitus with diabetic polyneuropathy, with long-term current use of insulin (HCC) E11.42, Z79.4, E11.65    TIA (transient ischemic attack) G45.9    Essential hypertension, benign I10    CKD (chronic kidney disease) stage 5, GFR less than 15 ml/min ([facility]) N18.5    Thyroid cancer ([facility] [address] C73    Stroke (cerebrum) ([facility]) I63.9    Right pontine stroke ([facility]) I63.50    Stenosis of both internal carotid arteries I65.23    Hyperlipidemia LDL goal <70 E78.5    Type 2 diabetes with nephropathy (HCC) E11.21    Severe obesity ([facility]) E66.01    Foot ulcer ([facility]) L97.509    Sepsis ([facility]) A41.9    UTI (urinary tract infection) N39.0    Encephalopathy G93.40    Failure to thrive in adult R62.7    Left leg weakness R29.898    Abnormal urinalysis R82.90    Hypertensive urgency I16.0    Osteoarthritis M19.90     Plan:   A 36-year-old woman with a history of stroke who has baseline left-sided weakness who does admit that her left side was weaker on presentation. Now very combative and refusing to participate in any diagnostic testing, labs, nursing care. It would be ideal to get an MRI of the brain to rule out any structural abnormalities. .Differential metabolic vs structural vs behavioral. MRI, pending. Creatinine 7.52 may be the source of her acute delirium and confusion. Recommend palliative care and get family involved with decision making. -LDl 73.4, Continue lipitor 80 mg nightly   -continue ASA 81mg   -a1c 6.5 , goal less than 6 will defer to Primary team   -MRI pending    Neurology will follow peripherally.       Signed:  Jelly Bae NP  10/23/2020  1:23 PM

## 2020-10-23 NOTE — PROGRESS NOTES
Spiritual Care Assessment/Progress Note  [facility]      NAME: Penny Bloch      MRN: [id number]  AGE: 79 y.o. SEX: female  Anabaptist Affiliation: Amish   Language: English     10/23/2020     Total Time (in minutes): 10     Spiritual Assessment begun in [facility] through conversation with:         []Patient        [] Family    [] Friend(s)        Reason for Consult: Palliative Care, Initial/Spiritual Assessment     Spiritual beliefs: (Please include comment if needed)     [] Identifies with a dimple tradition:         [] Supported by a dimple community:            [] Claims no spiritual orientation:           [] Seeking spiritual identity:                [] Adheres to an individual form of spirituality:           [x] Not able to assess:                           Identified resources for coping:      [] Prayer                               [] Music                  [] Guided Imagery     [] Family/friends                 [] Pet visits     [] Devotional reading                         [x] Unknown     [] Other:                                               Interventions offered during this visit: (See comments for more details)    Patient Interventions: Initial visit           Plan of Care:     [] Support spiritual and/or cultural needs    [] Support AMD and/or advance care planning process      [] Support grieving process   [] Coordinate Rites and/or Rituals    [] Coordination with community clergy   [] No spiritual needs identified at this time   [] Detailed Plan of Care below (See Comments)  [] Make referral to Music Therapy  [] Make referral to Pet Therapy     [] Make referral to Addiction services  [] Make referral to [facility]  [] Make referral to Spiritual Care Partner  [] No future visits requested        [x] Follow up visits as needed       Attempted to visit pt for initial spiritual assessment. Unable to complete assessment at this time, pt sleeping and did not awake. No family present. Chaplains will continue to offer support as needed.   Chaplain Darnell MDiv, MS, [facility]  [phone])

## 2020-10-23 NOTE — CONSULTS
Palliative Medicine Consult  [place]: [phone] ([phone])    Patient Name: Isha Jean-Baptsite  YOB: 1940    Date of Initial Consult: 10/23/20  Reason for Consult: Care decisions   Requesting Provider: Gus Washington   Primary Care Physician: Doyle Rodriguez MD     SUMMARY:   Isha Jean-Baptiste is a 79 y.o. with a past history of CKD w/ an AV fistula in place already placed 9/2020 (not yet started on dialysis), DM, hx of pontine stroke 2017 who was admitted on 10/20/2020 from [facility] with poor po intake and L sided weakness. Head CT and CTA w/out acute findings. She has been confused and combative during this admission. Have not been able to get MRI brain due to agitation. May require dialysis, although needs to be safe and comply w/ treatment. Current medical issues leading to Palliative Medicine involvement include: care decisions. Social: John Nickerson is mPOA 1 and son Pam He is Louannparag Matos. We have an AMD on file but only half the pages- sons They share that at baseline is oriented x 4, but a decline in her ability to walk a month ago. No psychiatric hx. PALLIATIVE DIAGNOSES:   1. Confusion- sounds as though improved since admission   2. Debility - declined a month ago, before that doing all ADLs  3. Agitation   4. Goals of care       PLAN:   1. Meet w/ pt and then one of her sisters comes into visit. Pt is able to tell me her name, year, date, and that she does not want dialysis, she wants to go home to see her great grandbaby and die. This is consistent w/ what she has said to neurology, hospitalist and nephrology. 2. However I have concerns that she has waxing/waning of her mental status this admission to the point where she was in restraints up until recently for her safety and received Haldol dose yest. She has been refusing medical care.  This is not unusual for some patients, however speaking to son Pam He (cannot get [employer] as he drives a truck and often cannot  phone) this is not how his mother typically is- was compliant with medications and agreed to get an AV fistula placed just last month in anticipation of starting dialysis soon. She understood risks/benefits of it. 3. Work up for confusion still not completed, as have not gotten MRI brain and now wondering if worsening renal failure also contributing to her mental changes. 4. I think we need to involve sons in her medical decision making and I tell her this- she gets upset about this, although calm. 5. If she had been talking w/ her medical team and family about moving to hospice as her renal failure worsened and declined an AVF that would be one thing- hospice would support her well and would medically make sense as a choice. However she got the fistula placed and talked to her family about getting started on dialysis to try and help give her good function in addition to longer life. Certainly she has declined functionally in this past month and this may be playing into things, but am concerned about her mental status and that she does not have full capacity to make complex medical decisions based on me just meeting her today. 6. Son Anny Cisneros to come into the hospital (medical exception for 2 visitors in one day to help w/ complex care decisions) about 1- appreciate primary team going to meet w/ him then to see if we can reach an agreement. Pt seems fixated on going home as she was at rehab and then here- trying to assure her that home is the goal even if we need to dialyze her here. The challenge also lies in how do we safely dialyze someone who may not have full capacity but is able to give input into the situation- share w/ son that I would not want to physically or chemically restrain her for dialysis.    7. Son Dillon Esteban also likely needs to be involved, perhaps he can come in tmrw on Sat.   8. Following w/ you.   9. Initial consult note routed to primary continuity provider and/or primary health care team members  10. Communicated plan of care with: Palliative IDT, [facility] [address] Team incl Stanford Arreguin NP, Gypsy Underwood RN, messaged Dr Man Rank / TREATMENT PREFERENCES:     GOALS OF CARE:  Patient/Health Care Proxy Stated Goals: Rehabilitation    TREATMENT PREFERENCES:   Code Status: DNR    Advance Care Planning:  [x] [facility] Interdisciplinary Team has updated the ACP Navigator with Health Care Decision Maker and Patient Capacity      Primary Decision Maker: Aurea Tysonwesley - [phone]    Secondary Decision Maker: Darya Leepa - [phone]    Advance Care Planning 9/15/2020   Patient's Healthcare Decision Maker is: -   Primary Decision Maker Name -   Primary Decision Maker Phone Number -   Primary Decision Maker Relationship to Patient -   Secondary Decision [address] Name -   Secondary Decision [address] Phone Number -   Secondary Decision Maker Relationship to Patient -   Confirm Advance Directive Yes, not on file   Does the patient have other document types -       Medical Interventions: Limited additional interventions         Other:    As far as possible, the palliative care team has discussed with patient / health care proxy about goals of care / treatment preferences for patient. HISTORY:     History obtained from: Pt, chart, staff, family     CHIEF COMPLAINT: \"I am ready to die\"    HPI/SUBJECTIVE:    The patient is:   [x] Verbal and participatory  [] Non-participatory due to:     Pt keeps on saying she wants to go home to her bed, and die.     Clinical Pain Assessment (nonverbal scale for severity on nonverbal patients):   Clinical Pain Assessment  Severity: 0          Duration: for how long has pt been experiencing pain (e.g., 2 days, 1 month, years)  Frequency: how often pain is an issue (e.g., several times per day, once every few days, constant)     FUNCTIONAL ASSESSMENT:     Palliative Performance Scale (PPS):  PPS: 40       PSYCHOSOCIAL/SPIRITUAL SCREENING:     Palliative IDT has assessed this patient for cultural preferences / practices and a referral made as appropriate to needs (Cultural Services, Patient Advocacy, Ethics, etc.)    Any spiritual / Church concerns:  [] Yes /  [x] No    Caregiver Burnout:  [] Yes /  [x] No /  [] No Caregiver Present      Anticipatory grief assessment:   [x] Normal  / [] Maladaptive       ESAS Anxiety: Anxiety: 3    ESAS Depression:       Cannot obtain due to patient factors     REVIEW OF SYSTEMS:     Positive and pertinent negative findings in ROS are noted above in HPI. The following systems were [x] reviewed / [] unable to be reviewed as noted in HPI  Other findings are noted below. Systems: constitutional, ears/nose/mouth/throat, respiratory, gastrointestinal, genitourinary, musculoskeletal, integumentary, neurologic, psychiatric, endocrine. Positive findings noted below. Modified ESAS Completed by: provider   Fatigue: 5 Drowsiness: 0     Pain: 0   Anxiety: 3 Nausea: 0     Dyspnea: 0                    PHYSICAL EXAM:     From RN flowsheet:  Wt Readings from Last 3 Encounters:   10/23/20 235 lb 7.2 oz (106.8 kg)   10/06/20 233 lb 14.5 oz (106.1 kg)   10/03/20 226 lb (102.5 kg)     Blood pressure (!) 145/85, pulse 75, temperature 99.2 °F (37.3 °C), resp. rate 21, height 5' 5\" (1.651 m), weight 235 lb 7.2 oz (106.8 kg), SpO2 95 %.     Pain Scale 1: Numeric (0 - 10)  Pain Intensity 1: 0                 Last bowel movement, if known:     Constitutional: calm, appears younger than stated age, oriented to person, place, year  Eyes: pupils equal, anicteric  ENMT: no nasal discharge, moist mucous membranes  Respiratory: breathing not labored  Musculoskeletal: no deformity, no tenderness to palpation  Skin: warm, dry  Neurologic: following commands, moving all extremities  Psychiatric: full affect     HISTORY:     Active Problems:    TIA (transient ischemic attack) (6/6/2014)      Past Medical History:   Diagnosis Date    Arthritis     Cataract bilateral    Chronic kidney disease     Dr. Lupe Jones Chronic pain     lower back    CKD stage 3 due to type 2 diabetes mellitus ([facility]     Depression     Diabetes [facility]) age 49    Type II    Follicular thyroid cancer ([facility] [facility] 08/2014    Foot ulcer ([facility]     Gallbladder polyp 8/14/2013    Hypercholesterolemia     Hypertension     Ill-defined condition     states 'polyp' in gal bladder    Long term current use of anticoagulant therapy     Obesity     Right pontine stroke ([facility] 1/17/2017    TIA (transient ischemic attack) 6/6/2014      Past Surgical History:   Procedure Laterality Date    ABDOMEN SURGERY PROC UNLISTED  2006    stomach    BREAST SURGERY PROCEDURE UNLISTED  2009, 2006    breast bx-vince    COLONOSCOPY N/A 12/17/2019    COLONOSCOPY performed by Triston Santos MD at [address] [place] [address]  12/17/2019         COLORECTAL SCRN; HI RISK IND  5/30/2014         HX BREAST BIOPSY Right [id number]    2 biopies on the right. Benign (per patient)    HX BREAST BIOPSY Left 2015    Benign (per patient)    HX CATARACT REMOVAL Left     HX GYN  19's    partial hysterectomy    HX HERNIA REPAIR  2009    hiatal    HX HYSTERECTOMY      HX ORTHOPAEDIC Bilateral 1988    bunion    HX THYROIDECTOMY  2014    Dr. Cisneros Members      Family History   Problem Relation Age of Onset    Heart Disease Mother     Hypertension Mother     Diabetes Mother     Stroke Mother     Cancer Father         colon    Diabetes Sister     Heart Attack Sister     Hypertension Sister     Heart Disease Sister         pacemaker    Lung Disease Brother     Hypertension Brother     Lung Disease Brother     Anesth Problems Neg Hx       History reviewed, no pertinent family history.   Social History     Tobacco Use    Smoking status: Never Smoker    Smokeless tobacco: Never Used   Substance Use Topics    Alcohol use: No     Allergies   Allergen Reactions    Amlodipine Swelling    Clindamycin Rash    Nifedipine Swelling    Sulfa (Sulfonamide Antibiotics) Rash      Current Facility-Administered Medications   Medication Dose Route Frequency    sevelamer carbonate (RENVELA) tab 1,600 mg  1,600 mg Oral TID WITH MEALS    melatonin tablet 6 mg  6 mg Oral QHS PRN    haloperidol lactate (HALDOL) injection 2 mg  2 mg IntraMUSCular Q4H PRN    glucose chewable tablet 16 g  4 Tab Oral PRN    glucagon (GLUCAGEN) injection 1 mg  1 mg IntraMUSCular PRN    dextrose 10% infusion 0-250 mL  0-250 mL IntraVENous PRN    insulin lispro (HUMALOG) injection   SubCUTAneous AC&HS    furosemide (LASIX) injection 80 mg  80 mg IntraVENous BID    insulin NPH (NOVOLIN N, HUMULIN N) injection 6 Units  6 Units SubCUTAneous BID    hydrALAZINE (APRESOLINE) 20 mg/mL injection 10 mg  10 mg IntraVENous Q6H PRN    polyethylene glycol (MIRALAX) packet 17 g  17 g Oral DAILY    atorvastatin (LIPITOR) tablet 80 mg  80 mg Oral DAILY    cloNIDine HCL (CATAPRES) tablet 0.3 mg  0.3 mg Oral TID    ferrous sulfate tablet 325 mg  1 Tab Oral DAILY WITH BREAKFAST    hydrALAZINE (APRESOLINE) tablet 25 mg  25 mg Oral TID    metoprolol succinate (TOPROL-XL) XL tablet 50 mg  50 mg Oral DAILY    levothyroxine (SYNTHROID) tablet 175 mcg  175 mcg Oral ACB    acetaminophen (TYLENOL) tablet 650 mg  650 mg Oral Q4H PRN    Or    acetaminophen (TYLENOL) solution 650 mg  650 mg Per NG tube Q4H PRN    Or    acetaminophen (TYLENOL) suppository 650 mg  650 mg Rectal Q4H PRN    aspirin chewable tablet 81 mg  81 mg Oral DAILY    pantoprazole (PROTONIX) 40 mg in 0.9% sodium chloride 10 mL injection  40 mg IntraVENous Q12H          LAB AND IMAGING FINDINGS:     Lab Results   Component Value Date/Time    WBC 5.2 10/21/2020 03:24 AM    HGB 7.3 (L) 10/21/2020 03:24 AM    PLATELET 887 75/62/0028 03:24 AM     Lab Results   Component Value Date/Time    Sodium 138 10/21/2020 03:24 AM    Potassium 4.6 10/21/2020 03:24 AM    Chloride 100 10/21/2020 03:24 AM CO2 22 10/21/2020 03:24 AM    BUN 97 (H) 10/21/2020 03:24 AM    Creatinine 7.52 (H) 10/21/2020 03:24 AM    Calcium 7.7 (L) 10/21/2020 03:24 AM    Magnesium 3.0 (H) 10/21/2020 03:24 AM    Phosphorus 7.1 (H) 10/21/2020 03:24 AM      Lab Results   Component Value Date/Time    Alk. phosphatase 86 10/20/2020 11:27 AM    Protein, total 6.7 10/20/2020 11:27 AM    Albumin 2.7 (L) 10/20/2020 11:27 AM    Globulin 4.0 10/20/2020 11:27 AM     Lab Results   Component Value Date/Time    INR 1.1 10/21/2020 03:24 AM    Prothrombin time 11.2 (H) 10/21/2020 03:24 AM    aPTT 29.1 10/21/2020 03:24 AM      Lab Results   Component Value Date/Time    Iron 30 (L) 10/20/2020 07:39 PM    TIBC 274 10/20/2020 07:39 PM    Iron % saturation 11 (L) 10/20/2020 07:39 PM    Ferritin 224 02/11/2020 12:31 AM      No results found for: PH, PCO2, PO2  No components found for: [place]   Lab Results   Component Value Date/Time     (H) 10/21/2020 03:24 AM    CK - MB 2.7 01/14/2017 12:11 AM                Total time: 70 min   Counseling / coordination time, spent as noted above: 50 min  > 50% counseling / coordination?: yes    Prolonged service was provided for  []30 min   []75 min in face to face time in the presence of the patient, spent as noted above. Time Start:   Time End:   Note: this can only be billed with 21459 (initial) or 11349 (follow up). If multiple start / stop times, list each separately.

## 2020-10-23 NOTE — PROGRESS NOTES
Problem: Mobility Impaired (Adult and Pediatric)  Goal: *Acute Goals and Plan of Care (Insert Text)  Description: Description: FUNCTIONAL STATUS PRIOR TO ADMISSION: Pt was modified independent, ambulating in home with cane until recent decline in function. She has L sided weakness from previous stroke [facility]. She reports she was ambulating with therapy using RW at rehab. HOME SUPPORT PRIOR TO ADMISSION: The patient lived alone with home health aide or family to provide assistance. Physical Therapy Goals  Initiated 10/23/2020  1. Patient will move from supine to sit and sit to supine , scoot up and down, and roll side to side in bed with moderate assistance  within 7 day(s). 2.  Patient will transfer from bed to chair and chair to bed with contact guard assist using the least restrictive device within 7 day(s). 3.  Patient will perform sit to stand with contact guard assist within 7 day(s). 4.  Patient will ambulate with minimal assistance/contact guard assist for 50 feet with the least restrictive device within 7 day(s). Outcome: Not Met  PHYSICAL THERAPY EVALUATION  Patient: Gerldine Baumgarten (79 y.o. female)  Date: 10/23/2020  Primary Diagnosis: TIA (transient ischemic attack) [G45.9]  TIA (transient ischemic attack) [G45.9]        Precautions: Fall       ASSESSMENT  Based on the objective data described below, the patient presents with decline in functional mobility s/p admission for TIA workup - imagining negative at the time of this evaluation. Pt pleasant this date, agreeable to mobilize with therapy. She transferred supine>sit with maxA and demonstrated good sitting balance. She transferred sit<>stand with Diandra x3 reps and then took steps bed to chair with Diandra and use of RW. Note LLE weaker - pt with decreased step clearance and stance phase on left. She ended session seated in a chair with all needs met and nursing updated.   Recommending return to [facility] once medically cleared for d/c. Current Level of Function Impacting Discharge (mobility/balance): maxA for bed mobility    Functional Outcome Measure: The patient scored 6/56 on the Back outcome measure which is indicative of high fall risk. Other factors to consider for discharge: fluctuating participation, from [facility], hx CVA with residual L weakness     Patient will benefit from skilled therapy intervention to address the above noted impairments. PLAN :  Recommendations and Planned Interventions: bed mobility training, transfer training, gait training, therapeutic exercises, neuromuscular re-education, patient and family training/education and therapeutic activities      Frequency/Duration: Patient will be followed by physical therapy:  5 times a week to address goals. Recommendation for discharge: (in order for the patient to meet his/her long term goals)  Therapy 3 hours per day 5-7 days per week    This discharge recommendation:  Has not yet been discussed the attending provider and/or case management    IF patient discharges home will need the following DME: to be determined (TBD)         SUBJECTIVE:   Patient stated my feet are on the floor. Thank goodness. It's been so long.     OBJECTIVE DATA SUMMARY:   HISTORY:    Past Medical History:   Diagnosis Date    Arthritis     Cataract     bilateral    Chronic kidney disease     Dr. 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 mellitus ([address]     Depression     Diabetes ([address] age 49    Type II    Follicular thyroid cancer ([address] 08/2014    Foot ulcer ([facility]     Gallbladder polyp 8/14/2013    Hypercholesterolemia     Hypertension     Ill-defined condition     states 'polyp' in gal bladder    Long term current use of anticoagulant therapy     Obesity     Right pontine stroke ([address] 1/17/2017    TIA (transient ischemic attack) 6/6/2014     Past Surgical History:   Procedure Laterality Date    ABDOMEN SURGERY PROC UNLISTED  2006    stomach    BREAST SURGERY PROCEDURE UNLISTED  2009, 2006    breast bx-vince    COLONOSCOPY N/A 12/17/2019    COLONOSCOPY performed by Jorge Rosa MD at [address] [place] [address]  12/17/2019         COLORECTAL SCRN; HI RISK IND  5/30/2014         HX BREAST BIOPSY Right [id number]    2 biopies on the right. Benign (per patient)    HX BREAST BIOPSY Left 2015    Benign (per patient)    HX CATARACT REMOVAL Left     HX GYN  1970's    partial hysterectomy    HX HERNIA REPAIR  2009    hiatal    HX HYSTERECTOMY      HX ORTHOPAEDIC Bilateral 1988    bunion    HX THYROIDECTOMY  2014    Dr. Mary Ellen Gomes          EXAMINATION/PRESENTATION/DECISION MAKING:   Critical Behavior:  Neurologic State: Alert, Eyes open spontaneously  Orientation Level: Oriented to person, Oriented to place, Oriented to time, Disoriented to situation  Cognition: Decreased command following, Poor safety awareness  Safety/Judgement: Lack of insight into deficits, Decreased awareness of need for assistance, Decreased awareness of need for safety, Decreased awareness of environment, Decreased insight into deficits  Hearing: Auditory  Auditory Impairment: None    Range Of Motion:  AROM: Generally decreased, functional(L side)           PROM: Within functional limits           Strength:    Strength: Generally decreased, functional(L side)                    Tone & Sensation:   Tone: Normal                              Coordination:  Coordination: Generally decreased, functional  Vision:      Functional Mobility:  Bed Mobility:     Supine to Sit: Maximum assistance;Assist x1  Sit to Supine: (up in chair post session )  Scooting: Maximum assistance;Assist x1(seated)  Transfers:  Sit to Stand: Minimum assistance  Stand to Sit: Minimum assistance        Bed to Chair: Minimum assistance              Balance:   Sitting: Intact  Standing: Impaired; With support  Standing - Static: Fair;Constant support  Standing - Dynamic : Fair;Constant support  Ambulation/Gait Training:  Distance (ft): 3 Feet (ft)     Ambulation - Level of Assistance: Minimal assistance        Gait Abnormalities: Decreased step clearance;Shuffling gait;Trunk sway increased        Base of Support: Widened  Stance: Left decreased  Speed/Katharina: Slow;Shuffled  Step Length: Left shortened  Swing Pattern: Left asymmetrical    Functional Measure:  Blount Balance Test:    Sitting to Standin  Standing Unsupported: 0  Sitting with Back Unsupported: 4  Standing to Sittin  Transfers: 1  Standing Unsupported with Eyes Closed: 0  Standing Unsupported with Feet Together: 0  Reach Forward with Outstretched Arm: 0   Object: 0  Turn to Look Over Shoulders: 0  Turn 360 Degrees: 0  Alternate Foot on Step/Stool: 0  Standing Unsupported One Foot in Front: 0  Stand on One Le  Total: 6/56         56=Maximum possible score;   0-20=High fall risk  21-40=Moderate fall risk   41-56=Low fall risk      Physical Therapy Evaluation Charge Determination   History Examination Presentation Decision-Making   HIGH Complexity :3+ comorbidities / personal factors will impact the outcome/ POC  HIGH Complexity : 4+ Standardized tests and measures addressing body structure, function, activity limitation and / or participation in recreation  LOW Complexity : Stable, uncomplicated  Other outcome measures blount  HIGH       Based on the above components, the patient evaluation is determined to be of the following complexity level: LOW     Activity Tolerance:   Fair  Please refer to the flowsheet for vital signs taken during this treatment. After treatment patient left in no apparent distress:   Sitting in chair, Call bell within reach and Bed / chair alarm activated    COMMUNICATION/EDUCATION:   The patients plan of care was discussed with: Occupational therapist and Registered nurse.      Fall prevention education was provided and the patient/caregiver indicated understanding., Patient/family have participated as able in goal setting and plan of care. and Patient/family agree to work toward stated goals and plan of care.     Thank you for this referral.  Emily Gross, PT, DPT   Time Calculation: 21 mins

## 2020-10-23 NOTE — PROGRESS NOTES
Name: Gordon Sesay MRN: [id number]   : 1940 Hospital: [facility] [address]   Date: 10/23/2020        IMPRESSION:   · CKD stage 5. Patient had agreed previously to be prepared for HD. She has an AVF placed. Her Scr is way up to 8. Patient refuses any blood work for 10/22 and 10/23. · AMS- cleared. Patient was able to answer all questions approprietly. · Hyperphosphatemia from advanced CKD  · Anemia of CKD      PLAN:   · Patient refuses to start HD. She made it very clear that she changed her mind about RRT. She is aware that if she is not dialyzed she will ultimately die. Patient insists that she will be allowed to go home. No plans for RRT at this point. · Palliative care may be beneficial since patient may need comfort measures. · Start PO4 binders  · Anemia management     Subjective/Interval History:   I have reviewed the flowsheet and previous days notes. ROS:Pertinent items are noted in HPI. \" I want to go home\"    Objective:   Vital Signs:    Visit Vitals  BP (!) 181/55   Pulse 87   Temp 98.6 °F (37 °C)   Resp 15   Ht 5' 5\" (1.651 m)   Wt 106.8 kg (235 lb 7.2 oz)   SpO2 94%   BMI 39.18 kg/m²       O2 Device: Room air       Temp (24hrs), Av.8 °F (37.1 °C), Min:98.5 °F (36.9 °C), Max:99 °F (37.2 °C)       Intake/Output:   Last shift:      No intake/output data recorded. Last 3 shifts: 10/21 1901 - 10/23 0700  In: 300 [P.O.:300]  Out: -   No intake or output data in the 24 hours ending 10/23/20 1031     Physical Exam:  General:    Alert, cooperative, no distress, appears stated age. Head:   Normocephalic, without obvious abnormality, atraumatic. Eyes:   Conjunctivae/corneas clear. Nose:  Nares normal. No drainage or sinus tenderness. Throat:    Lips, mucosa, and tongue normal.    Neck:  Supple, symmetrical,  no adenopathy, thyroid: non tender    no carotid bruit and no JVD. Lungs:   Clear to auscultation bilaterally. No Wheezing or Rhonchi. No rales.   Chest wall:  No tenderness or deformity. No Accessory muscle use. Heart:   Regular rate and rhythm,  no murmur, rub or gallop. Abdomen:   Soft, non-tender. Not distended. Bowel sounds normal. No masses  Extremities: Extremities normal, atraumatic, No cyanosis. No edema. No clubbing  Skin:     Texture, turgor normal. No rashes or lesions. Not Jaundiced  Lymph nodes: Cervical, supraclavicular normal.  Psych:  Fair insight. Not depressed. Not anxious or agitated. Neurologic: Alert and oriented X 3. DATA:  Labs:  Recent Labs     10/21/20  0324 10/20/20  1127    137   K 4.6 4.5    97   CO2 22 28   BUN 97* 93*   CREA 7.52* 8.00*   CA 7.7* 8.2*   ALB  --  2.7*   PHOS 7.1*  --    MG 3.0*  --      Recent Labs     10/21/20  0324 10/20/20  1939 10/20/20  1127   WBC 5.2  --  5.4   HGB 7.3* 7.4* 7.2*   HCT 22.9* 23.5* 23.2*     --  188     No results for input(s): TOM, KU, CLU, CREAU in the last 72 hours.     No lab exists for component: PROU    Total time spent with patient:  35 minutes    [] Critical Care Provided    Care Plan discussed with:   Staff, Medical Team    Fernando Garcias MD

## 2020-10-23 NOTE — DIABETES MGMT
EUGENIE MORGAN  CLINICAL NURSE SPECIALIST CONSULT  PROGRAM FOR DIABETES HEALTH    INITIAL NOTE    Presentation   Akira Forbes is a 79 y.o. female who presented from [facility] who presented to the ED after PT noticed left sided facial droop and weakness during therapy. A code stroke was called in the ED and she was admitted for work-up. HX: Arthritis, CKD, Depression, DM2, follicular thyroid cancer s/p thyroidectomy, hypercholesteremia, CVA, TIA,     DX: TIA    TX: Neurology consult, CTA    Current clinical course has been complicated by agitation. Diabetes: Patient has known Type two diabetes, treated with 70/30 insulin at breakfast PTA. Admitting A1C 6.5%  (down from 7.1% in Sept)      Consulted by Provider for advanced diabetes nursing assessment and care, specifically related to   [x] Inpatient management strategy      Diabetes-related medical history  Acute complications: Hyperglycemia without acidosis  Neurological complications  Peripheral neuropathy  Microvascular disease  Nephropathy  Macrovascular disease  Cerebral vascular accident  Other associated conditions: None    Diabetes medication history  Drug class Currently in use Discontinued Never used   Biguanide      DDP-4 inhibitor       Sulfonylurea      Thiazolidinedione      GLP-1 RA      SGLT-2 inhibitors      Basal insulin      Fixed Dose  Combinations 70/30 14 units at breakfast and dinner 70/30 42 units at breakfast     Bolus insulin        Subjective   At this time:  No new labs since 10/21  Glucose 159-278 in the last 24 hours  A1C 6.5% but likely impacted by severe anemia of chronic disease   S/p PRBC transfusion yesterday    Agitation and reports refusal of essential blood work and medication. Concerns of worsening renal failure with a metabolic encephalopathy   Remains in soft wrist restraints  Palliative care involved     Objective   Physical exam  General Confused, verbal outbursts.   Periods of appropriate comments and periods of falling asleep mid conversation. In soft wrist restraints. Vital Signs   Visit Vitals  BP (!) 145/85 (BP 1 Location: Right arm, BP Patient Position: At rest)   Pulse 75   Temp 99.2 °F (37.3 °C)   Resp 21   Ht 5' 5\" (1.651 m)   Wt 106.8 kg (235 lb 7.2 oz)   SpO2 95%   BMI 39.18 kg/m²     Skin  Warm and dry. Acanthosis noted along neckline. No lipohypertrophy or lipoatrophy noted at injection sites   Heart   Regular rate and rhythm. No murmurs, rubs or gallops  Lungs  Clear to auscultation without rales or rhonchi  Extremities No foot wounds, dry and severe pitting edema of lower extremities     Laboratory  Lab Results   Component Value Date/Time    Hemoglobin A1c 6.5 (H) 10/21/2020 03:24 AM    Hemoglobin A1c (POC) 10.2 09/11/2019 04:13 PM     Lab Results   Component Value Date/Time    LDL, calculated 73.4 10/21/2020 03:24 AM     Lab Results   Component Value Date/Time    Creatinine (POC) 4.3 (H) 09/15/2020 11:07 AM    Creatinine 7.52 (H) 10/21/2020 03:24 AM     Lab Results   Component Value Date/Time    Sodium 138 10/21/2020 03:24 AM    Potassium 4.6 10/21/2020 03:24 AM    Chloride 100 10/21/2020 03:24 AM    CO2 22 10/21/2020 03:24 AM    Anion gap 16 (H) 10/21/2020 03:24 AM    Glucose 162 (H) 10/21/2020 03:24 AM    BUN 97 (H) 10/21/2020 03:24 AM    Creatinine 7.52 (H) 10/21/2020 03:24 AM    BUN/Creatinine ratio 13 10/21/2020 03:24 AM    GFR est AA 6 (L) 10/21/2020 03:24 AM    GFR est non-AA 5 (L) 10/21/2020 03:24 AM    Calcium 7.7 (L) 10/21/2020 03:24 AM    Bilirubin, total 0.6 10/20/2020 11:27 AM    Alk.  phosphatase 86 10/20/2020 11:27 AM    Protein, total 6.7 10/20/2020 11:27 AM    Albumin 2.7 (L) 10/20/2020 11:27 AM    Globulin 4.0 10/20/2020 11:27 AM    A-G Ratio 0.7 (L) 10/20/2020 11:27 AM    ALT (SGPT) 31 10/20/2020 11:27 AM     Lab Results   Component Value Date/Time    ALT (SGPT) 31 10/20/2020 11:27 AM       Factors affecting BG pattern  Factor Dose Comments   Nutrition:  Carb-controlled meals   60 grams/meal Other: CKD GFR 6 Renal clearance of exogenous insulin is dramatically reduced when GFR under 20 mL/min which predisposes patients to fasting hypoglycemia. Blood glucose pattern        Assessment and Plan   Nursing Diagnosis Risk for unstable blood glucose pattern   Nursing Intervention Domain 5250 Decision-making Support   Nursing Interventions Examined current inpatient diabetes control   Explored factors facilitating and impeding inpatient management  Identified self-management practices impeding diabetes control  Explored corrective strategies with patient and responsible inpatient provider   Informed patient of rational for insulin strategy while hospitalized     Evaluation   Nicky Valles is a 79year old female with type two diabetes on once daily 70/30 insulin residing at an assisted living facility who presents with a TIA. CTA was unremarkable for acute process and MRI recommended for additional work-up. A1C on admission was 6.5% and a significant trend down has been noted this year (highest A1C 10.3% 2/3/20). Unclear her previous history of blood transfusion but certainly has anemia of chronic disease which can contribute to a falsely low A1C result. GFR is also significantly decreased this admission- renal clearance of exogenous insulin is dramatically reduced when GFR under 20 mL/min which predisposes patients to fasting hypoglycemia. Goal A1C likely closer to 8% and tight glycemic control is not recommended as she is at risk for hypoglycemia. Recommendations   Recommend:  1. Continue very low dose basal insulin: 0.1 units/k units NPH BID, monitoring for fasting hypoglycemia. 2. Correctional insulin at high sensitivity (ESRD)    3. Continue consistent carbohydrate diet    Billing Code(s)   I personally reviewed chart, notes, data and current medications in the medical record, and examined the patient at bedside before making care recommendations.        Thank you for including us in their care. I spent 15 minutes in direct patient care today for this patient.   Time includes chart review, face to face with patient and collaboration with interdisciplinary care team.      Mckinley Boyle, [facility]  Program for Diabetes Health  Access via [address] [place] [address]  [phone]

## 2020-10-23 NOTE — PROGRESS NOTES
Occupational Therapy  Chart reviewed; patient cleared for therapy today; she declines OT today and tomorrow; \"I only want PT and only with the girl that came today. \" With encouragement, did agree to have B LEs elevated in recliner chair but refused to return to bed, despite edema and low diastolic BP today. Requesting Dr. Roseanna Wetzel to return and speak with sister and son who are present. RN aware.  Will retry tomorrow for OT lisa. Stew Okeefe OTR/L

## 2020-10-23 NOTE — PROGRESS NOTES
Bedside shift change report given to Ailyn (oncoming nurse) by Colette Aguirre (offgoing nurse). Report included the following information SBAR, Kardex, ED Summary, Intake/Output, Accordion and Recent Results. Pt refused another stick for lab. Pt refused to get cleaned up and change brief. Son at bedside. Problem: Falls - Risk of  Goal: *Absence of Falls  Description: Document Albino Messing Fall Risk and appropriate interventions in the flowsheet.   Outcome: Progressing Towards Goal  Note: Fall Risk Interventions:  Mobility Interventions: Communicate number of staff needed for ambulation/transfer, Patient to call before getting OOB, PT Consult for mobility concerns, PT Consult for assist device competence    Mentation Interventions: Door open when patient unattended, Evaluate medications/consider consulting pharmacy, Increase mobility, More frequent rounding, Reorient patient    Medication Interventions: Evaluate medications/consider consulting pharmacy, Patient to call before getting OOB, Teach patient to arise slowly    Elimination Interventions: Call light in reach, Patient to call for help with toileting needs, Toileting schedule/hourly rounds

## 2020-10-23 NOTE — PROGRESS NOTES
Bedside shift change report given to Graciela Marcial (oncoming nurse) by Kika Guerra (offgoing nurse). Report included the following information SBAR, Kardex, ED Summary, MAR, Recent Results and Cardiac Rhythm NSR. Last 3 Recorded Weights in this Encounter    10/21/20 0458 10/22/20 0315 10/23/20 0332   Weight: 105.5 kg (232 lb 9.4 oz) 106.4 kg (234 lb 8 oz) 106.8 kg (235 lb 7.2 oz)       2200: Crushed pills and attempted to give patient the pills crushed in applesauce. Patient refused and told me to wait 10 minutes. 2230:Came back and attempted again with crushed pills and applesauce. Patient refused and medication was charted as refused. Will continue to monitor patient and her blood pressure. 0205: Patient is refusing to have her blood drawn.

## 2020-10-23 NOTE — PROGRESS NOTES
Occupational Therapy  Patient currently with MD discussing new need for dialysis. Will defer and allow patient to digest this information; will retry later as able.  Padilla Orr OTR/L

## 2020-10-24 LAB
GLUCOSE BLD STRIP.AUTO-MCNC: 110 MG/DL (ref 65–100)
GLUCOSE BLD STRIP.AUTO-MCNC: 121 MG/DL (ref 65–100)
GLUCOSE BLD STRIP.AUTO-MCNC: 127 MG/DL (ref 65–100)
GLUCOSE BLD STRIP.AUTO-MCNC: 94 MG/DL (ref 65–100)
SERVICE CMNT-IMP: ABNORMAL
SERVICE CMNT-IMP: NORMAL

## 2020-10-24 PROCEDURE — 74011250636 HC RX REV CODE- 250/636: Performed by: NURSE PRACTITIONER

## 2020-10-24 PROCEDURE — 74011250637 HC RX REV CODE- 250/637: Performed by: INTERNAL MEDICINE

## 2020-10-24 PROCEDURE — 82962 GLUCOSE BLOOD TEST: CPT

## 2020-10-24 PROCEDURE — 74011636637 HC RX REV CODE- 636/637: Performed by: NURSE PRACTITIONER

## 2020-10-24 PROCEDURE — 94760 N-INVAS EAR/PLS OXIMETRY 1: CPT

## 2020-10-24 PROCEDURE — 74011250637 HC RX REV CODE- 250/637: Performed by: NURSE PRACTITIONER

## 2020-10-24 PROCEDURE — 74011250636 HC RX REV CODE- 250/636: Performed by: INTERNAL MEDICINE

## 2020-10-24 PROCEDURE — 74011250637 HC RX REV CODE- 250/637: Performed by: FAMILY MEDICINE

## 2020-10-24 PROCEDURE — 65660000000 HC RM CCU STEPDOWN

## 2020-10-24 RX ORDER — HALOPERIDOL 5 MG/ML
2 INJECTION INTRAMUSCULAR
Status: DISCONTINUED | OUTPATIENT
Start: 2020-10-24 | End: 2020-10-26 | Stop reason: HOSPADM

## 2020-10-24 RX ADMIN — METOPROLOL SUCCINATE 50 MG: 50 TABLET, EXTENDED RELEASE ORAL at 08:17

## 2020-10-24 RX ADMIN — ATORVASTATIN CALCIUM 80 MG: 40 TABLET, FILM COATED ORAL at 08:17

## 2020-10-24 RX ADMIN — HYDRALAZINE HYDROCHLORIDE 10 MG: 20 INJECTION INTRAMUSCULAR; INTRAVENOUS at 12:24

## 2020-10-24 RX ADMIN — SEVELAMER CARBONATE 1600 MG: 800 TABLET, FILM COATED ORAL at 12:12

## 2020-10-24 RX ADMIN — POLYETHYLENE GLYCOL 3350 17 G: 17 POWDER, FOR SOLUTION ORAL at 08:18

## 2020-10-24 RX ADMIN — HYDRALAZINE HYDROCHLORIDE 25 MG: 25 TABLET, FILM COATED ORAL at 08:17

## 2020-10-24 RX ADMIN — SEVELAMER CARBONATE 1600 MG: 800 TABLET, FILM COATED ORAL at 08:17

## 2020-10-24 RX ADMIN — HUMAN INSULIN 6 UNITS: 100 INJECTION, SUSPENSION SUBCUTANEOUS at 09:20

## 2020-10-24 RX ADMIN — FUROSEMIDE 80 MG: 10 INJECTION, SOLUTION INTRAMUSCULAR; INTRAVENOUS at 08:18

## 2020-10-24 RX ADMIN — HALOPERIDOL LACTATE 2 MG: 5 INJECTION, SOLUTION INTRAMUSCULAR at 13:39

## 2020-10-24 RX ADMIN — CLONIDINE HYDROCHLORIDE 0.3 MG: 0.2 TABLET ORAL at 08:17

## 2020-10-24 RX ADMIN — ASPIRIN 81 MG: 81 TABLET, CHEWABLE ORAL at 08:17

## 2020-10-24 RX ADMIN — LEVOTHYROXINE SODIUM 175 MCG: 0.03 TABLET ORAL at 07:14

## 2020-10-24 RX ADMIN — FERROUS SULFATE TAB 325 MG (65 MG ELEMENTAL FE) 325 MG: 325 (65 FE) TAB at 08:17

## 2020-10-24 NOTE — PROGRESS NOTES
Bedside shift change report given to Hui Redman (oncoming nurse) by Diane Luna (offgoing nurse). Report included the following information SBAR, Kardex, ED Summary, Intake/Output, Accordion, and Recent Results. Problem: Falls - Risk of  Goal: *Absence of Falls  Description: Document Brittney Sims Fall Risk and appropriate interventions in the flowsheet. Outcome: Progressing Towards Goal  Note: Fall Risk Interventions:  Mobility Interventions: Communicate number of staff needed for ambulation/transfer, Patient to call before getting OOB, PT Consult for mobility concerns, PT Consult for assist device competence    Mentation Interventions: Door open when patient unattended, Evaluate medications/consider consulting pharmacy, Increase mobility, More frequent rounding, Reorient patient, Room close to nurse's station    Medication Interventions: Evaluate medications/consider consulting pharmacy, Patient to call before getting OOB, Teach patient to arise slowly    Elimination Interventions: Call light in reach, Patient to call for help with toileting needs, Toileting schedule/hourly rounds              Problem: Pressure Injury - Risk of  Goal: *Prevention of pressure injury  Description: Document Vinny Scale and appropriate interventions in the flowsheet.   Outcome: Progressing Towards Goal  Note: Pressure Injury Interventions:  Sensory Interventions: Assess changes in LOC, Discuss PT/OT consult with provider, Minimize linen layers    Moisture Interventions: Assess need for specialty bed, Apply protective barrier, creams and emollients, Internal/External urinary devices, Maintain skin hydration (lotion/cream)    Activity Interventions: Increase time out of bed, Pressure redistribution bed/mattress(bed type), PT/OT evaluation    Mobility Interventions: HOB 30 degrees or less, Pressure redistribution bed/mattress (bed type), PT/OT evaluation    Nutrition Interventions: Document food/fluid/supplement intake    Friction and Shear Interventions: HOB 30 degrees or less, Apply protective barrier, creams and emollients, Minimize layers                Problem: Pressure Injury - Risk of  Goal: *Prevention of pressure injury  Description: Document Vinny Scale and appropriate interventions in the flowsheet. Outcome: Progressing Towards Goal  Note: Pressure Injury Interventions:  Sensory Interventions: Assess changes in LOC, Discuss PT/OT consult with provider, Minimize linen layers    Moisture Interventions: Assess need for specialty bed, Apply protective barrier, creams and emollients, Internal/External urinary devices, Maintain skin hydration (lotion/cream)    Activity Interventions: Increase time out of bed, Pressure redistribution bed/mattress(bed type), PT/OT evaluation    Mobility Interventions: HOB 30 degrees or less, Pressure redistribution bed/mattress (bed type), PT/OT evaluation    Nutrition Interventions: Document food/fluid/supplement intake    Friction and Shear Interventions: HOB 30 degrees or less, Apply protective barrier, creams and emollients, Minimize layers                Problem: Pressure Injury - Risk of  Goal: *Prevention of pressure injury  Description: Document Vinny Scale and appropriate interventions in the flowsheet.   Outcome: Progressing Towards Goal  Note: Pressure Injury Interventions:  Sensory Interventions: Assess changes in LOC, Discuss PT/OT consult with provider, Minimize linen layers    Moisture Interventions: Assess need for specialty bed, Apply protective barrier, creams and emollients, Internal/External urinary devices, Maintain skin hydration (lotion/cream)    Activity Interventions: Increase time out of bed, Pressure redistribution bed/mattress(bed type), PT/OT evaluation    Mobility Interventions: HOB 30 degrees or less, Pressure redistribution bed/mattress (bed type), PT/OT evaluation    Nutrition Interventions: Document food/fluid/supplement intake    Friction and Shear Interventions: HOB 30 degrees or less, Apply protective barrier, creams and emollients, Minimize layers

## 2020-10-24 NOTE — PROGRESS NOTES
Name: Rima Muhammad MRN: [id number]   : 1940 Hospital: [facility]   Date: 10/24/2020        IMPRESSION:   · CKD stage 5. Patient had agreed previously to be prepared for HD. She has an AVF placed. Her Scr was way up to 8, but went down to 7+ on . Patient has now allowed any blood work for  and  and today. · AMS- cleared. Patient was able to answer all questions approprietly. She known where she is, the year and the name of the [address] [place] [address],[address] president. · Hyperphosphatemia from advanced CKD  · Anemia of CKD      PLAN:   · Patient refuses to start HD. She made it very clear that she changed her mind about RRT. She is aware that if she is not dialyzed she will ultimately die. Patient insists that she will be allowed to go home. No plans for RRT at this point. Patient's son Yuliet Abraham was in room during my discussion with Ms. Demarcus Morales. Patient is not overtly uremic, we don't have any recent renal labs. It is possible her GFR has improved. Patient does not need urgent HD at present. · Palliative care input is appreciated. · Renal panel in AM if patient allows  · Renal diet  · Anemia management     Subjective/Interval History:   I have reviewed the flowsheet and previous days notes. ROS:Pertinent items are noted in HPI. \" I want to go home\"    Objective:   Vital Signs:    Visit Vitals  BP (!) 173/49 (BP 1 Location: Right arm, BP Patient Position: At rest)   Pulse 69   Temp 98.4 °F (36.9 °C)   Resp 18   Ht 5' 5\" (1.651 m)   Wt 106.8 kg (235 lb 6.4 oz)   SpO2 98%   BMI 39.17 kg/m²       O2 Device: Room air       Temp (24hrs), Av.2 °F (36.8 °C), Min:97.9 °F (36.6 °C), Max:98.4 °F (36.9 °C)       Intake/Output:   Last shift:      No intake/output data recorded. Last 3 shifts: No intake/output data recorded. No intake or output data in the 24 hours ending 10/24/20 1303     Physical Exam:  General:    Alert, cooperative, no distress, appears stated age. Fully oriented.   Head: Normocephalic, without obvious abnormality, atraumatic. Eyes:   Conjunctivae/corneas clear. Nose:  Nares normal. No drainage or sinus tenderness. Throat:    Lips, mucosa, and tongue normal.    Neck:  Supple, symmetrical,  no adenopathy, thyroid: non tender    no carotid bruit and no JVD. Lungs:   Clear to auscultation bilaterally. No Wheezing or Rhonchi. No rales. Chest wall:  No tenderness or deformity. No Accessory muscle use. Heart:   Regular rate and rhythm,  no murmur, rub or gallop. Abdomen:   Soft, non-tender. Not distended. Bowel sounds normal. No masses  Extremities: Extremities normal, atraumatic, No cyanosis. 1+ edema. No clubbing  Skin:     Texture, turgor normal. No rashes or lesions. Not Jaundiced  Lymph nodes: Cervical, supraclavicular normal.  Psych:  Good insight. Not depressed. Not anxious or agitated. Neurologic: Alert and oriented X 3. DATA:  Labs:  Recent Labs     10/23/20  1620   CA 7.7*     Recent Labs     10/23/20  1620   WBC 5.6   HGB 8.1*   HCT 24.7*        No results for input(s): TOM, KU, CLU, CREAU in the last 72 hours.     No lab exists for component: PROU    Total time spent with patient:  35 minutes    [] Critical Care Provided    Care Plan discussed with:   Staff, Medical Team    Amberly Vaughan MD

## 2020-10-24 NOTE — PROGRESS NOTES
Bedside shift change report given to Man Naranjo (oncoming nurse) by Jaskaran Robert (offgoing nurse). Report included the following information SBAR.

## 2020-10-24 NOTE — PROGRESS NOTES
Problem: Falls - Risk of  Goal: *Absence of Falls  Description: Document Noé Wilks Fall Risk and appropriate interventions in the flowsheet. Outcome: Progressing Towards Goal  Note: Fall Risk Interventions:  Mobility Interventions: Communicate number of staff needed for ambulation/transfer    Mentation Interventions: Bed/chair exit alarm    Medication Interventions: Bed/chair exit alarm    Elimination Interventions: Bed/chair exit alarm              Problem: Patient Education: Go to Patient Education Activity  Goal: Patient/Family Education  Outcome: Progressing Towards Goal     Problem: Pressure Injury - Risk of  Goal: *Prevention of pressure injury  Description: Document Vinny Scale and appropriate interventions in the flowsheet. Outcome: Progressing Towards Goal  Note: Pressure Injury Interventions:  Sensory Interventions: Assess changes in LOC, Discuss PT/OT consult with provider, Minimize linen layers, Maintain/enhance activity level, Turn and reposition approx.  every two hours (pillows and wedges if needed)    Moisture Interventions: Check for incontinence Q2 hours and as needed    Activity Interventions: Pressure redistribution bed/mattress(bed type)    Mobility Interventions: Pressure redistribution bed/mattress (bed type)    Nutrition Interventions: Offer support with meals,snacks and hydration    Friction and Shear Interventions: Minimize layers                Problem: Patient Education: Go to Patient Education Activity  Goal: Patient/Family Education  Outcome: Progressing Towards Goal     Problem: Patient Education: Go to Patient Education Activity  Goal: Patient/Family Education  Outcome: Progressing Towards Goal

## 2020-10-24 NOTE — ROUTINE PROCESS
Bedside shift change report given to [address], RN (oncoming nurse) by Roque Gillis (offgoing nurse). Report included the following information SBAR, Kardex, MAR, Cardiac Rhythm SR, Alarm Parameters  and Dual Neuro Assessment.
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
## 2020-10-24 NOTE — PROGRESS NOTES
[address] [facility]  Hospitalist Group                                                                                          Hospitalist Progress Note  Sandra Ely NP  Answering service: [phone] OR [phone] from in house phone        Date of Service:  10/24/2020  NAME:  Silvana Desouza  :    MRN:  [id number]      Admission Summary:   Per H&P: This is a 79 y.o. female TIA, T2DM, Dementia, HTN, Morbid Obesity, HLD, CKD3, who lives short term at [facility] [address] [facility] [address], who presents with left-sided weakness and has not been eating drinking well for the past few days and has been lethargic. Today the PT noticed that the patient was having some left-sided facial droop and some left lower extremity weakness and patient was sent to the ER. Patient was deemed as a code stroke, patient was evaluated by telemetry neurology and was requested to be observed under the hospitalist service for further management and evaluation. Patient reports that she has rectal pain, has been eating drinking less because it hurts when she tries to defecate. Patient reports that she has not had a bowel movement for the past few days. Currently the patient reports that all her symptoms have resolved and \"she would like to get the ham sandwich out of this hospital\". Patient reports that she has no other complaints or problems and denies any other issues.      The patient denies any Headache, blurry vision, sore throat, trouble swallowing, trouble with speech, chest pain, SOB, cough, fever, chills, N/V/D, abd pain, urinary symptoms, constipation, recent travels, sick contacts, falls, injuries, rashes, contact with COVID-19 diagnosed patients, hematemesis, melena, hemoptysis, hematuria, rashes, denies starting any new medications and denies any other concerns or problems besides as mentioned above.        Interval history / Subjective:     Seen and examined patient sitting up in bedside chair.  States \"I need to get home now\". She is oriented to self and place. Son Allan Sis at the bedside. He states that she is not acting herself and he feels further testing is needed in order to find out what is going on with her. He wants her to have HD but understands that a blood work has to be completed to review if HD is warranted at this time. No acute distress noted. No over night events noted. 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   - Baseline creatine ~4.   - Creatine 8.00 on admission, 7.52.   - Avoid nephrotoxins and renal dose medications   - Nephrology following   - For possible dialysis. Will need line placement. - Continue lasix.  Strict I&Os  - Palliative following   - Has refused today.      Metabolic encephalopathy   - Likely secondary to uremia.  - Increased confusion with irritability and combativeness   - Reorient frequently   - Nephrology following.   - UA and UC pending      Anemia likely secondary to CKD  -GI consulted, signed off   -monitor H&H  -iron 30, TIBC 274  -transfuse to keep Hgb <7.0     Constipation with rectal pain:   -CT of the abdomen pelvis  - Colorectal consulted and signed off.      Diabetes Mellitus type 2   - Hgb A1c 6.5  - Monitor blood sugars   - Insulin sliding scale   - Continue NPH 6 units BID   - Carb consistent diet  - Diabetes management following.      HTN: allow permissive hypertension     Hypothyroidism: continue home medication, TSH.      Morbid Obesity   - BMI 39.02  - weight loss management to be followed up outpatient      Code status: Full   DVT prophylaxis: SCDs    Care Plan discussed with: Patient/Family, Nurse and   Anticipated Disposition: TBD  Anticipated Discharge: Greater than 48 hours     Hospital Problems Date Reviewed: 10/2/2020          Codes Class Noted POA    TIA (transient ischemic attack) ICD-10-CM: G45.9  ICD-9-CM: 435.9  6/6/2014 Unknown                Review of Systems:   A comprehensive review of systems was negative except for that written in the HPI. Vital Signs:    Last 24hrs VS reviewed since prior progress note. Most recent are:  Visit Vitals  BP (!) 173/49 (BP 1 Location: Right arm, BP Patient Position: At rest)   Pulse 69   Temp 98.4 °F (36.9 °C)   Resp 18   Ht 5' 5\" (1.651 m)   Wt 106.8 kg (235 lb 6.4 oz)   SpO2 98%   BMI 39.17 kg/m²       No intake or output data in the 24 hours ending 10/24/20 1436     Physical Examination:             Constitutional:  No acute distress, uncooperative     ENT:  Oral mucosa moist, oropharynx benign. Resp:  CTA bilaterally. No wheezing/rhonchi/rales. No accessory muscle use   CV:  Regular rhythm, normal rate, no murmurs, gallops, rubs    GI:  Soft, non distended, non tender. normoactive bowel sounds, no hepatosplenomegaly     Musculoskeletal:  Upper extremities with edema. Neurologic:  Moves all extremities. AAOx1-2     Psych:  Poor insight, becomes agitated with continued conversation. Data Review:    Review and/or order of tests in the medicine section of CPT      Labs:     Recent Labs     10/23/20  1620   WBC 5.6   HGB 8.1*   HCT 24.7*        Recent Labs     10/23/20  1620   CA 7.7*     No results for input(s): ALT, AP, TBIL, TBILI, TP, ALB, GLOB, GGT, AML, LPSE in the last 72 hours. No lab exists for component: SGOT, GPT, AMYP, HLPSE  No results for input(s): INR, PTP, APTT, INREXT in the last 72 hours. No results for input(s): FE, TIBC, PSAT, FERR in the last 72 hours. No results found for: FOL, RBCF   No results for input(s): PH, PCO2, PO2 in the last 72 hours. No results for input(s): CPK, CKNDX, TROIQ in the last 72 hours.     No lab exists for component: CPKMB  Lab Results   Component Value Date/Time    Cholesterol, total 180 10/21/2020 03:24 AM    HDL Cholesterol 85 10/21/2020 03:24 AM    LDL, calculated 73.4 10/21/2020 03:24 AM    Triglyceride 108 10/21/2020 03:24 AM    CHOL/HDL Ratio 2.1 10/21/2020 03:24 AM     Lab Results   Component Value Date/Time    Glucose (POC) 127 (H) 10/24/2020 11:54 AM    Glucose (POC) 121 (H) 10/24/2020 09:10 AM    Glucose (POC) 163 (H) 10/23/2020 09:31 PM    Glucose (POC) 169 (H) 10/23/2020 05:23 PM    Glucose (POC) 200 (H) 10/23/2020 12:31 PM     Lab Results   Component Value Date/Time    Color YELLOW/STRAW 10/02/2020 01:10 PM    Appearance TURBID (A) 10/02/2020 01:10 PM    Specific gravity 1.013 10/02/2020 01:10 PM    pH (UA) 6.0 10/02/2020 01:10 PM    Protein 300 (A) 10/02/2020 01:10 PM    Glucose Negative 10/02/2020 01:10 PM    Ketone Negative 10/02/2020 01:10 PM    Bilirubin Negative 10/02/2020 01:10 PM    Urobilinogen 0.2 10/02/2020 01:10 PM    Nitrites Negative 10/02/2020 01:10 PM    Leukocyte Esterase LARGE (A) 10/02/2020 01:10 PM    Epithelial cells FEW 10/02/2020 01:10 PM    Bacteria 4+ (A) 10/02/2020 01:10 PM    WBC >100 (H) 10/02/2020 01:10 PM    RBC 5-10 10/02/2020 01:10 PM         Medications Reviewed:     Current Facility-Administered Medications   Medication Dose Route Frequency    haloperidol lactate (HALDOL) injection 2 mg  2 mg IntraVENous Q4H PRN    sevelamer carbonate (RENVELA) tab 1,600 mg  1,600 mg Oral TID WITH MEALS    melatonin tablet 6 mg  6 mg Oral QHS PRN    glucose chewable tablet 16 g  4 Tab Oral PRN    glucagon (GLUCAGEN) injection 1 mg  1 mg IntraMUSCular PRN    dextrose 10% infusion 0-250 mL  0-250 mL IntraVENous PRN    insulin lispro (HUMALOG) injection   SubCUTAneous AC&HS    insulin NPH (NOVOLIN N, HUMULIN N) injection 6 Units  6 Units SubCUTAneous BID    hydrALAZINE (APRESOLINE) 20 mg/mL injection 10 mg  10 mg IntraVENous Q6H PRN    polyethylene glycol (MIRALAX) packet 17 g  17 g Oral DAILY    atorvastatin (LIPITOR) tablet 80 mg  80 mg Oral DAILY    cloNIDine HCL (CATAPRES) tablet 0.3 mg  0.3 mg Oral TID    ferrous sulfate tablet 325 mg  1 Tab Oral DAILY WITH BREAKFAST    hydrALAZINE (APRESOLINE) tablet 25 mg  25 mg Oral TID    metoprolol succinate (TOPROL-XL) XL tablet 50 mg  50 mg Oral DAILY    levothyroxine (SYNTHROID) tablet 175 mcg  175 mcg Oral ACB    acetaminophen (TYLENOL) tablet 650 mg  650 mg Oral Q4H PRN    Or    acetaminophen (TYLENOL) solution 650 mg  650 mg Per NG tube Q4H PRN    Or    acetaminophen (TYLENOL) suppository 650 mg  650 mg Rectal Q4H PRN    aspirin chewable tablet 81 mg  81 mg Oral DAILY    pantoprazole (PROTONIX) 40 mg in 0.9% sodium chloride 10 mL injection  40 mg IntraVENous Q12H     ______________________________________________________________________  EXPECTED LENGTH OF STAY: 2d 0h  ACTUAL LENGTH OF STAY:          2                 Benito Paul NP

## 2020-10-24 NOTE — PROGRESS NOTES
Problem: Self Care Deficits Care Plan (Adult)  Goal: *Acute Goals and Plan of Care (Insert Text)  Description: FUNCTIONAL STATUS PRIOR TO ADMISSION: [facility] at [facility] prior to this admission, ambulating with RW during therapy. Prior to that, patient was modified independent, using a cane for functional mobility. Of note, patient with residual L-side weakness from previous stroke. HOME SUPPORT: The patient lived alone with home health aide or family to provide assistance. Occupational Therapy Goals  Initiated 10/24/2020  1. Patient will tolerate sitting unsupported with CGA for 5 minutes within 7 days. 2.  Patient will perform toilet transfer with minimal assistance using RW within 7 day(s). 3.  Patient will perform lower body dressing with minimal assistance within 7 day(s). 4.  Patient will complete static stand for 2 minutes with RW and CGA within 7 day(s). 5.  Patient will perform all aspects of toileting with minimal assistance using RW prn within 7 day(s). 6.  Patient will participate in upper extremity therapeutic exercise/activities with supervision/set-up for 10 minutes within 7 day(s). 7.  Patient will utilize fall prevention techniques during functional activities with minimal verbal cues within 7 day(s). Outcome: Progressing Towards Goal     OCCUPATIONAL THERAPY EVALUATION  Patient: Soto Godinez (79 y.o. female)  Date: 10/24/2020  Primary Diagnosis: TIA (transient ischemic attack) [G45.9]  TIA (transient ischemic attack) [G45.9]        Precautions:  DNR, Fall    ASSESSMENT  Based on the objective data described below, the patient presents with impaired functional mobility, limited functional reach to lower body, decreased activity tolerance, poor insight, and verbal aggression s/p admission for TIA. Patient with residual L-side weakness from earlier stroke. This date, patient received in chair - initially making verbal threats to OT.  Patient adamant that she wants to go home - at times, seemed somewhat receptive to mobility and activity with gentle encouragement but ultimately refused all offers to include grooming ADLs, toileting, and repositioning for comfort. While patient is alert and oriented x3, she is refusing much of her medical care at this time - per patient's son, present this date, this is not patient's baseline mentation and personality. Patient able to come to long-sitting in bedside chair from reclined position and maintain for approx 30 seconds - patient refusing any further mobility or activity. Per earlier PT note, patient completing bed mobility with max assist and transfers with min assist. Will continue to assess and update goals as appropriate pending patient's willingness to participate with therapy. Patient may benefit from cognitive assessment to better evaluate insight, safety awareness, problem-solving, etc.    Functional Outcome Measure: The patient scored 30/100 on the Barthel Index. Other factors to consider for discharge: cognition, safety awareness     Patient will benefit from skilled therapy intervention to address the above noted impairments. PLAN :  Recommendations and Planned Interventions: self care training, functional mobility training, therapeutic exercise, balance training, therapeutic activities, endurance activities, patient education, home safety training, and family training/education    Frequency/Duration: Patient will be followed by occupational therapy 5 times a week to address goals. Recommendation for discharge: (in order for the patient to meet his/her long term goals)  Therapy 3 hours per day 5-7 days per week; If patient were to discharge home, would require 24/7 supervision and assist for safety with all mobility and dynamic self-care.     This discharge recommendation:  Has not yet been discussed the attending provider and/or case management    IF patient discharges home will need the following DME: MARY SUBJECTIVE:   Patient stated I want you two to leave and my son to stay.     OBJECTIVE DATA SUMMARY:   HISTORY:   Past Medical History:   Diagnosis Date    Arthritis     Cataract     bilateral    Chronic kidney disease     Dr. Jadyn Wright Chronic pain     lower back    CKD stage 3 due to type 2 diabetes mellitus [facility]     Depression     Diabetes ([facility] age 49    Type II    Follicular thyroid cancer ([facility] 08/2014    Foot ulcer ([facility]     Gallbladder polyp 8/14/2013    Hypercholesterolemia     Hypertension     Ill-defined condition     states 'polyp' in gal bladder    Long term current use of anticoagulant therapy     Obesity     Right pontine stroke ([facility] 1/17/2017    TIA (transient ischemic attack) 6/6/2014     Past Surgical History:   Procedure Laterality Date    ABDOMEN SURGERY PROC UNLISTED  2006    stomach    BREAST SURGERY PROCEDURE UNLISTED  2009, 2006    breast bx-vince    COLONOSCOPY N/A 12/17/2019    COLONOSCOPY performed by Ary Garcia MD at [address] [place] [address]  12/17/2019         COLORECTAL SCRN; HI RISK IND  5/30/2014         HX BREAST BIOPSY Right [id number]    2 biopies on the right. Benign (per patient)    HX BREAST BIOPSY Left 2015    Benign (per patient)    HX CATARACT REMOVAL Left     HX GYN  1970's    partial hysterectomy    HX HERNIA REPAIR  2009    hiatal    HX HYSTERECTOMY      HX ORTHOPAEDIC Bilateral 1988    bunion    HX THYROIDECTOMY  2014    Dr. Karissa Barriga DEFICITS:  Cognitive/Behavioral Status:  Neurologic State: Alert  Orientation Level: Oriented to person;Oriented to place;Oriented to situation  Cognition: Decreased command following;Decreased attention/concentration;Poor safety awareness; Impaired decision making  Perception: Appears intact  Perseveration: No perseveration noted  Safety/Judgement: Lack of insight into deficits;Home safety    Hearing:   Auditory  Auditory Impairment: None    Vision/Perceptual:     No overt deficits noted - patient tracking multiple persons in room as appropriate for speaker and conversation. Range of Motion:  AROM: Generally decreased, functional    Strength:  Strength: Generally decreased, functional    Coordination:  Coordination: Generally decreased, functional  Gross Motor Skills-Upper: Left Intact; Right Intact    Balance:  Sitting: Intact    Functional Mobility and Transfers for ADLs:  Bed Mobility:   Patient received and left in bedside chair. Transfers:   Patient refusing all mobility. ADL Assessment:  Feeding: Setup;Stand-by assistance    Oral Facial Hygiene/Grooming: Setup;Stand-by assistance    Bathing: Maximum assistance(inferred)    Upper Body Dressing: Minimum assistance(infer 2/2 UE mobility)    Lower Body Dressing: Total assistance(not observed)    Toileting: Total assistance(not observed)    ADL Intervention and task modifications:  Cognitive Retraining  Safety/Judgement: Lack of insight into deficits;Home safety    Functional Measure:  Barthel Index:    Bathin  Bladder: 5  Bowels: 5  Groomin  Dressin  Feedin  Mobility: 0  Stairs: 0  Toilet Use: 5  Transfer (Bed to Chair and Back): 5  Total: 30/100        The Barthel ADL Index: Guidelines  1. The index should be used as a record of what a patient does, not as a record of what a patient could do. 2. The main aim is to establish degree of independence from any help, physical or verbal, however minor and for whatever reason. 3. The need for supervision renders the patient not independent. 4. A patient's performance should be established using the best available evidence. Asking the patient, friends/relatives and nurses are the usual sources, but direct observation and common sense are also important. However direct testing is not needed.   5. Usually the patient's performance over the preceding 24-48 hours is important, but occasionally longer periods will be relevant. 6. Middle categories imply that the patient supplies over 50 per cent of the effort. 7. Use of aids to be independent is allowed. Ava Jarvis., Barthel, DIngridW. ([id number]). Functional evaluation: the Barthel Index. [address] (14)2. Chares Show dada [place], J.J.M.F, Kenneth Bhatia., Renu Capone., [facility], [address] (1999). Measuring the change indisability after inpatient rehabilitation; comparison of the responsiveness of the Barthel Index and Functional [facility] Measure. Journal of Neurology, Neurosurgery, and Psychiatry, 66(4), 581-390. JOVITA Car.A, LUIS E Chan, & Nany Mcneil M.A. (2004.) Assessment of post-stroke quality of life in cost-effectiveness studies: The usefulness of the Barthel Index and the EuroQoL-5D. Quality of Life Research, 15, 829-80     Occupational Therapy Evaluation Charge Determination   History Examination Decision-Making   LOW Complexity : Brief history review  HIGH Complexity : 5 or more performance deficits relating to physical, cognitive , or psychosocial skils that result in activity limitations and / or participation restrictions HIGH Complexity : Patient presents with comorbidities that affect occupational performance. Signifigant modification of tasks or assistance (eg, physical or verbal) with assessment (s) is necessary to enable patient to complete evaluation       Based on the above components, the patient evaluation is determined to be of the following complexity level: LOW   Pain Rating:  Patient with no reports. Activity Tolerance:   Fair  Please refer to the flowsheet for vital signs taken during this treatment. After treatment patient left in no apparent distress:    Sitting in chair, Call bell within reach, Caregiver / family present, and NP in room    COMMUNICATION/EDUCATION:   The patients plan of care was discussed with: Physical therapist and Registered nurse.      Home safety education was provided and the patient/caregiver indicated understanding., Patient/family have participated as able in goal setting and plan of care. , and Patient/family agree to work toward stated goals and plan of care.     Thank you for this referral.  Herb Yancey, OT  Time Calculation: 25 mins

## 2020-10-25 LAB
ALBUMIN SERPL-MCNC: 2.5 G/DL (ref 3.5–5)
ANION GAP SERPL CALC-SCNC: 9 MMOL/L (ref 5–15)
BUN SERPL-MCNC: 96 MG/DL (ref 6–20)
BUN/CREAT SERPL: 10 (ref 12–20)
CALCIUM SERPL-MCNC: 7.7 MG/DL (ref 8.5–10.1)
CHLORIDE SERPL-SCNC: 99 MMOL/L (ref 97–108)
CO2 SERPL-SCNC: 29 MMOL/L (ref 21–32)
CREAT SERPL-MCNC: 9.46 MG/DL (ref 0.55–1.02)
GLUCOSE BLD STRIP.AUTO-MCNC: 139 MG/DL (ref 65–100)
GLUCOSE BLD STRIP.AUTO-MCNC: 143 MG/DL (ref 65–100)
GLUCOSE BLD STRIP.AUTO-MCNC: 184 MG/DL (ref 65–100)
GLUCOSE BLD STRIP.AUTO-MCNC: 191 MG/DL (ref 65–100)
GLUCOSE SERPL-MCNC: 158 MG/DL (ref 65–100)
PHOSPHATE SERPL-MCNC: 6.6 MG/DL (ref 2.6–4.7)
POTASSIUM SERPL-SCNC: 3.8 MMOL/L (ref 3.5–5.1)
SERVICE CMNT-IMP: ABNORMAL
SODIUM SERPL-SCNC: 137 MMOL/L (ref 136–145)

## 2020-10-25 PROCEDURE — 36415 COLL VENOUS BLD VENIPUNCTURE: CPT

## 2020-10-25 PROCEDURE — 74011250637 HC RX REV CODE- 250/637: Performed by: NURSE PRACTITIONER

## 2020-10-25 PROCEDURE — 65660000000 HC RM CCU STEPDOWN

## 2020-10-25 PROCEDURE — 74011636637 HC RX REV CODE- 636/637: Performed by: NURSE PRACTITIONER

## 2020-10-25 PROCEDURE — 74011250637 HC RX REV CODE- 250/637: Performed by: FAMILY MEDICINE

## 2020-10-25 PROCEDURE — 82962 GLUCOSE BLOOD TEST: CPT

## 2020-10-25 PROCEDURE — 80069 RENAL FUNCTION PANEL: CPT

## 2020-10-25 RX ADMIN — METOPROLOL SUCCINATE 50 MG: 50 TABLET, EXTENDED RELEASE ORAL at 09:08

## 2020-10-25 RX ADMIN — CLONIDINE HYDROCHLORIDE 0.3 MG: 0.2 TABLET ORAL at 16:32

## 2020-10-25 RX ADMIN — LEVOTHYROXINE SODIUM 175 MCG: 0.03 TABLET ORAL at 07:52

## 2020-10-25 RX ADMIN — HYDRALAZINE HYDROCHLORIDE 25 MG: 25 TABLET, FILM COATED ORAL at 22:04

## 2020-10-25 RX ADMIN — ATORVASTATIN CALCIUM 80 MG: 40 TABLET, FILM COATED ORAL at 09:08

## 2020-10-25 RX ADMIN — CLONIDINE HYDROCHLORIDE 0.3 MG: 0.2 TABLET ORAL at 22:07

## 2020-10-25 RX ADMIN — FERROUS SULFATE TAB 325 MG (65 MG ELEMENTAL FE) 325 MG: 325 (65 FE) TAB at 09:08

## 2020-10-25 RX ADMIN — HYDRALAZINE HYDROCHLORIDE 25 MG: 25 TABLET, FILM COATED ORAL at 16:32

## 2020-10-25 RX ADMIN — HUMAN INSULIN 6 UNITS: 100 INJECTION, SUSPENSION SUBCUTANEOUS at 09:08

## 2020-10-25 RX ADMIN — HYDRALAZINE HYDROCHLORIDE 25 MG: 25 TABLET, FILM COATED ORAL at 09:08

## 2020-10-25 RX ADMIN — CLONIDINE HYDROCHLORIDE 0.3 MG: 0.2 TABLET ORAL at 09:07

## 2020-10-25 RX ADMIN — POLYETHYLENE GLYCOL 3350 17 G: 17 POWDER, FOR SOLUTION ORAL at 09:08

## 2020-10-25 RX ADMIN — ASPIRIN 81 MG: 81 TABLET, CHEWABLE ORAL at 09:08

## 2020-10-25 NOTE — PROGRESS NOTES
[facility] Adult  Hospitalist Group                                                                                          Hospitalist Progress Note  Gricel Gonzalez NP  Answering service: [phone] from in house phone        Date of Service:  10/25/2020  NAME:  Lizy Chambers  :    MRN:  [id number]      Admission Summary:   Per H&P: This is a 79 y.o. female TIA, T2DM, Dementia, HTN, Morbid Obesity, HLD, CKD3, who lives short term at [facility] [address] [facility] [address], who presents with left-sided weakness and has not been eating drinking well for the past few days and has been lethargic. Today the PT noticed that the patient was having some left-sided facial droop and some left lower extremity weakness and patient was sent to the ER. Patient was deemed as a code stroke, patient was evaluated by telemetry neurology and was requested to be observed under the hospitalist service for further management and evaluation. Patient reports that she has rectal pain, has been eating drinking less because it hurts when she tries to defecate. Patient reports that she has not had a bowel movement for the past few days. Currently the patient reports that all her symptoms have resolved and \"she would like to get the ham sandwich out of this hospital\". Patient reports that she has no other complaints or problems and denies any other issues.      The patient denies any Headache, blurry vision, sore throat, trouble swallowing, trouble with speech, chest pain, SOB, cough, fever, chills, N/V/D, abd pain, urinary symptoms, constipation, recent travels, sick contacts, falls, injuries, rashes, contact with COVID-19 diagnosed patients, hematemesis, melena, hemoptysis, hematuria, rashes, denies starting any new medications and denies any other concerns or problems besides as mentioned above.        Interval history / Subjective:    Seen and examined patient. Sitting in bedside chair.    Today she is awake, alert, oriented x4. She is able to carry a conversation appropriately. She states that she does not want to have dialysis, she just wants to go home and see her grandbaby. She has agreed to having blood drawn for lab- Discussed with the RN. RN states that patient has been AAO and cooperative today. No acute distress noted. 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   - Baseline creatine ~4.   - Creatine 8.00 on admission  - Avoid nephrotoxins and renal dose medications   - Nephrology following   - For possible dialysis. Will need line placement. - Continue lasix.  Strict I&Os  - Palliative following   - Lab pending      Metabolic encephalopathy   - Likely secondary to uremia.  - Increased confusion with irritability and combativeness   - Reorient frequently   - Nephrology following.   - UA and UC pending      Anemia likely secondary to CKD  -GI consulted, signed off   -monitor H&H  -iron 30, TIBC 274  -transfuse to keep Hgb <7.0     Constipation with rectal pain:   -CT of the abdomen pelvis  - Colorectal consulted and signed off.      Diabetes Mellitus type 2   - Hgb A1c 6.5  - Monitor blood sugars   - Insulin sliding scale   - Continue NPH 6 units BID   - Carb consistent diet  - Diabetes management following.      HTN: allow permissive hypertension     Hypothyroidism: continue home medication, TSH.      Morbid Obesity   - BMI 39.02  - weight loss management to be followed up outpatient      Code status: Full   DVT prophylaxis: SCDs    Care Plan discussed with: Patient/Family and Nurse  Anticipated Disposition: TBD  Anticipated Discharge: TBD     Hospital Problems  Date Reviewed: 10/2/2020          Codes Class Noted POA    TIA (transient ischemic attack) ICD-10-CM: G45.9  ICD-9-CM: 435.9  6/6/2014 Unknown                Review of Systems:   A comprehensive review of systems was negative except for that written in the HPI. Vital Signs:    Last 24hrs VS reviewed since prior progress note. Most recent are:  Visit Vitals  BP (!) 117/96 (BP 1 Location: Right arm, BP Patient Position: At rest)   Pulse 69   Temp 97.6 °F (36.4 °C)   Resp 21   Ht 5' 5\" (1.651 m)   Wt 106 kg (233 lb 11 oz)   SpO2 94%   BMI 38.89 kg/m²       No intake or output data in the 24 hours ending 10/25/20 1448     Physical Examination:             Constitutional:  No acute distress, cooperative, pleasant, answers questions   ENT:  Oral mucosa moist, oropharynx benign. Resp:  CTA bilaterally. No wheezing/rhonchi/rales. No accessory muscle use   CV:  Regular rhythm, normal rate, no murmurs, gallops, rubs    GI:  Soft, non distended, non tender. normoactive bowel sounds, no hepatosplenomegaly     Musculoskeletal:  No edema, warm, 2+ pulses throughout    Neurologic:  Moves all extremities. AAOx3, CN II-XII reviewed, follows commands     Psych:  Not anxious or agitated        Data Review:    Review and/or order of clinical lab test      Labs:     Recent Labs     10/23/20  1620   WBC 5.6   HGB 8.1*   HCT 24.7*        Recent Labs     10/23/20  1620   CA 7.7*     No results for input(s): ALT, AP, TBIL, TBILI, TP, ALB, GLOB, GGT, AML, LPSE in the last 72 hours. No lab exists for component: SGOT, GPT, AMYP, HLPSE  No results for input(s): INR, PTP, APTT, INREXT in the last 72 hours. No results for input(s): FE, TIBC, PSAT, FERR in the last 72 hours. No results found for: FOL, RBCF   No results for input(s): PH, PCO2, PO2 in the last 72 hours. No results for input(s): CPK, CKNDX, TROIQ in the last 72 hours.     No lab exists for component: CPKMB  Lab Results   Component Value Date/Time    Cholesterol, total 180 10/21/2020 03:24 AM    HDL Cholesterol 85 10/21/2020 03:24 AM    LDL, calculated 73.4 10/21/2020 03:24 AM    Triglyceride 108 10/21/2020 03:24 AM    CHOL/HDL Ratio 2.1 10/21/2020 03:24 AM     Lab Results   Component Value Date/Time    Glucose (POC) 139 (H) 10/25/2020 07:46 AM    Glucose (POC) 110 (H) 10/24/2020 09:43 PM    Glucose (POC) 94 10/24/2020 06:23 PM    Glucose (POC) 127 (H) 10/24/2020 11:54 AM    Glucose (POC) 121 (H) 10/24/2020 09:10 AM     Lab Results   Component Value Date/Time    Color YELLOW/STRAW 10/02/2020 01:10 PM    Appearance TURBID (A) 10/02/2020 01:10 PM    Specific gravity 1.013 10/02/2020 01:10 PM    pH (UA) 6.0 10/02/2020 01:10 PM    Protein 300 (A) 10/02/2020 01:10 PM    Glucose Negative 10/02/2020 01:10 PM    Ketone Negative 10/02/2020 01:10 PM    Bilirubin Negative 10/02/2020 01:10 PM    Urobilinogen 0.2 10/02/2020 01:10 PM    Nitrites Negative 10/02/2020 01:10 PM    Leukocyte Esterase LARGE (A) 10/02/2020 01:10 PM    Epithelial cells FEW 10/02/2020 01:10 PM    Bacteria 4+ (A) 10/02/2020 01:10 PM    WBC >100 (H) 10/02/2020 01:10 PM    RBC 5-10 10/02/2020 01:10 PM         Medications Reviewed:     Current Facility-Administered Medications   Medication Dose Route Frequency    haloperidol lactate (HALDOL) injection 2 mg  2 mg IntraVENous Q4H PRN    sevelamer carbonate (RENVELA) tab 1,600 mg  1,600 mg Oral TID WITH MEALS    melatonin tablet 6 mg  6 mg Oral QHS PRN    glucose chewable tablet 16 g  4 Tab Oral PRN    glucagon (GLUCAGEN) injection 1 mg  1 mg IntraMUSCular PRN    dextrose 10% infusion 0-250 mL  0-250 mL IntraVENous PRN    insulin lispro (HUMALOG) injection   SubCUTAneous AC&HS    insulin NPH (NOVOLIN N, HUMULIN N) injection 6 Units  6 Units SubCUTAneous BID    hydrALAZINE (APRESOLINE) 20 mg/mL injection 10 mg  10 mg IntraVENous Q6H PRN    polyethylene glycol (MIRALAX) packet 17 g  17 g Oral DAILY    atorvastatin (LIPITOR) tablet 80 mg  80 mg Oral DAILY    cloNIDine HCL (CATAPRES) tablet 0.3 mg  0.3 mg Oral TID    ferrous sulfate tablet 325 mg  1 Tab Oral DAILY WITH BREAKFAST    hydrALAZINE (APRESOLINE) tablet 25 mg  25 mg Oral TID    metoprolol succinate (TOPROL-XL) XL tablet 50 mg  50 mg Oral DAILY    levothyroxine (SYNTHROID) tablet 175 mcg  175 mcg Oral ACB    acetaminophen (TYLENOL) tablet 650 mg  650 mg Oral Q4H PRN    Or    acetaminophen (TYLENOL) solution 650 mg  650 mg Per NG tube Q4H PRN    Or    acetaminophen (TYLENOL) suppository 650 mg  650 mg Rectal Q4H PRN    aspirin chewable tablet 81 mg  81 mg Oral DAILY    pantoprazole (PROTONIX) 40 mg in 0.9% sodium chloride 10 mL injection  40 mg IntraVENous Q12H     ______________________________________________________________________  EXPECTED LENGTH OF STAY: 2d 0h  ACTUAL LENGTH OF STAY:          3                 Juancarlos Redman NP

## 2020-10-25 NOTE — PROGRESS NOTES
Problem: TIA/CVA Stroke: Day 2 Until Discharge  Goal: Discharge Planning  Outcome: Not Progressing Towards Goal  Goal: Medications  Outcome: Not Progressing Towards Goal  Goal: Psychosocial  Outcome: Not Progressing Towards Goal

## 2020-10-25 NOTE — PROGRESS NOTES
2140: RN attempted to check pt's BG and VS. Pt refused to have vVS taken. RN explained it was important to check her BP for well-being and to give BP meds. Pt still insisted on not having VS taken and refused to take her BP medications. RN explained the importance of the medications, but pt still refused. 0000: Monitor tech notified RN that pt was off telemetry. Rn went in to check on pt. Pt had pulled all telemetry leads off. When RN attempted to grab the leads, pt began to get aggressive and rip the leads out of RN's hands. Primary RN and secondary RN explained the importance of being on telemetry. Pt still refused. Supervisors and night time NP notified. 0300: RN offered to change pt's brief. Pt refused stating, \"Not right now, I am comfortable. \"    Pt has been been noncompliant and refusing care all night.

## 2020-10-25 NOTE — PROGRESS NOTES
Name: Orly Redman MRN: [id number]   : 1940 Hospital: . [address]   Date: 10/25/2020        IMPRESSION:   · CKD stage 5. Patient made it clear on numerous occasions that she does not want to be started on HD. Patient is completely cognizant and she made her choice. Patient declines any blood work too. ·  AMS- cleared. Patient was able to answer all questions approprietly. She known where she is, the year and the name of the [address] [place] [address],[address] president. · Hyperphosphatemia from advanced CKD  · Anemia of CKD      PLAN:   · Patient refuses to start HD. She made it very clear that she changed her mind about RRT. She is aware that if she is not dialyzed she will ultimately die. Patient insists that she will be allowed to go home. No plans for RRT at this point. Patient's son Chano Linares was in room during my discussion with Ms. Ave Quijano. Patient is not overtly uremic, we don't have any recent renal labs. It is possible her GFR has improved. Patient does not need urgent HD at present. · Palliative care input is appreciated. · Renal panel in AM if patient allows  · Renal diet  · Family meeting for goals of care needs to be called on Monday. Patient is able to make her deisions. · Anemia management     Subjective/Interval History:   I have reviewed the flowsheet and previous days notes. ROS:Pertinent items are noted in HPI. \" I want to go home\"    Objective:   Vital Signs:    Visit Vitals  /86 (BP Patient Position: At rest;Sitting)   Pulse 70   Temp 97.9 °F (36.6 °C)   Resp 16   Ht 5' 5\" (1.651 m)   Wt 106 kg (233 lb 11 oz)   SpO2 94%   BMI 38.89 kg/m²       O2 Device: Room air       Temp (24hrs), Av.2 °F (36.8 °C), Min:97.9 °F (36.6 °C), Max:98.4 °F (36.9 °C)       Intake/Output:   Last shift:      No intake/output data recorded. Last 3 shifts: No intake/output data recorded.   No intake or output data in the 24 hours ending 10/25/20 0911     Physical Exam:  General:    Alert, cooperative, no distress, appears stated age. Fully oriented. Head:   Normocephalic, without obvious abnormality, atraumatic. Eyes:   Conjunctivae/corneas clear. Nose:  Nares normal. No drainage or sinus tenderness. Throat:    Lips, mucosa, and tongue normal.    Neck:  Supple, symmetrical,  no adenopathy, thyroid: non tender    no carotid bruit and no JVD. Lungs:   Clear to auscultation bilaterally. No Wheezing or Rhonchi. No rales. Chest wall:  No tenderness or deformity. No Accessory muscle use. Heart:   Regular rate and rhythm,  no murmur, rub or gallop. Abdomen:   Soft, non-tender. Not distended. Bowel sounds normal. No masses  Extremities: Extremities normal, atraumatic, No cyanosis. 1+ edema. No clubbing  Skin:     Texture, turgor normal. No rashes or lesions. Not Jaundiced  Lymph nodes: Cervical, supraclavicular normal.  Psych:  Good insight. Not depressed. Not anxious or agitated. Neurologic: Alert and oriented X 3. DATA:  Labs:  Recent Labs     10/23/20  1620   CA 7.7*     Recent Labs     10/23/20  1620   WBC 5.6   HGB 8.1*   HCT 24.7*        No results for input(s): TOM, KU, CLU, CREAU in the last 72 hours.     No lab exists for component: PROU    Total time spent with patient:  35 minutes    [] Critical Care Provided    Care Plan discussed with:   Staff, Medical Team    Shakira Worthington MD

## 2020-10-25 NOTE — PROGRESS NOTES
08:00: Assumed care of the patient after receiving am report. Pt resting in recliner chair. No acute distress noted. Pt noted to be SR on the cardiac monitor. Pt continues to express wishes to be discharged home. Conversation had about refusal of care. Pt agreeable to permit basic care (toileting, bathing, VS) and is willing to take AM meds at this time. Pt states she \"wants to go home to see my 10year old grandchild. \" Pt also stated, \"my son needs to get out of my house so I can move back in.\"  Pt is A&Ox4 and expressed frustration stating her son does not respect her wishes to move back home and remain off dialysis. \"  Will discuss with care management and care team.    14:05: Blood work drawn from the [address]. Pt tolerated well. 16:06: Dr. Fe Childs paged and notified of renal panel result. 20:11: Bedside shift change report given to Trinity Mckinney (oncoming nurse) by Stefan Chaudhry RN (offgoing nurse). Report included the following information SBAR, Recent Results, Cardiac Rhythm SR and Dual Neuro Assessment.

## 2020-10-26 VITALS
RESPIRATION RATE: 17 BRPM | HEIGHT: 65 IN | BODY MASS INDEX: 38.93 KG/M2 | TEMPERATURE: 97.7 F | WEIGHT: 233.69 LBS | HEART RATE: 62 BPM | SYSTOLIC BLOOD PRESSURE: 134 MMHG | OXYGEN SATURATION: 95 % | DIASTOLIC BLOOD PRESSURE: 97 MMHG

## 2020-10-26 PROCEDURE — 74011250637 HC RX REV CODE- 250/637: Performed by: FAMILY MEDICINE

## 2020-10-26 PROCEDURE — 99233 SBSQ HOSP IP/OBS HIGH 50: CPT | Performed by: PHYSICAL MEDICINE & REHABILITATION

## 2020-10-26 RX ORDER — SEVELAMER CARBONATE 800 MG/1
1600 TABLET, FILM COATED ORAL
Qty: 30 TAB | Refills: 0 | Status: SHIPPED | OUTPATIENT
Start: 2020-10-26

## 2020-10-26 RX ADMIN — LEVOTHYROXINE SODIUM 175 MCG: 0.03 TABLET ORAL at 06:59

## 2020-10-26 NOTE — PROGRESS NOTES
[facility] [facility] Adult  Hospitalist Group                                                                                          Hospitalist Progress Note  Nico Hillman NP  Answering service: [phone] OR [phone] from in house phone        Date of Service:  10/26/2020  NAME:  Lynne Reyes  :    MRN:  [id number]      Admission Summary:   Per H&P: This is a 79 y.o. female TIA, T2DM, Dementia, HTN, Morbid Obesity, HLD, CKD3, who lives short term at [facility] [address] [facility] [address], who presents with left-sided weakness and has not been eating drinking well for the past few days and has been lethargic. Today the PT noticed that the patient was having some left-sided facial droop and some left lower extremity weakness and patient was sent to the ER. Patient was deemed as a code stroke, patient was evaluated by telemetry neurology and was requested to be observed under the hospitalist service for further management and evaluation. Patient reports that she has rectal pain, has been eating drinking less because it hurts when she tries to defecate. Patient reports that she has not had a bowel movement for the past few days. Currently the patient reports that all her symptoms have resolved and \"she would like to get the ham sandwich out of this hospital\". Patient reports that she has no other complaints or problems and denies any other issues.      The patient denies any Headache, blurry vision, sore throat, trouble swallowing, trouble with speech, chest pain, SOB, cough, fever, chills, N/V/D, abd pain, urinary symptoms, constipation, recent travels, sick contacts, falls, injuries, rashes, contact with COVID-19 diagnosed patients, hematemesis, melena, hemoptysis, hematuria, rashes, denies starting any new medications and denies any other concerns or problems besides as mentioned above.     Interval history / Subjective:    Seen and examined patient, sitting in bedside chair.    Today she is awake, alert, oriented x4. Able to appropriately carry a conversation. Discussed her lab results (cr 9.46 and bun 96). She states that she understands she may need dialysis but refuses to have it done. She understands that she may eventually die from not having dialysis and she is accepting of that. Also discussed with Dr Preston Phelps, her nephrologist. He states that she has never been a fan of dialysis. No new complaints, No acute distress noted. Discussed with RN. 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   - Baseline creatine ~4.   - Creatine 8.00 on admission  - Avoid nephrotoxins and renal dose medications   - Nephrology following   - For possible dialysis. Will need line placement. - Continue lasix.  Strict I&Os  - Palliative following   - Creat- 9.46 and bun 96     Metabolic encephalopathy   - Likely secondary to uremia.  - Increased confusion with irritability and combativeness   - Reorient frequently   - Nephrology following.   - UA and UC pending      Anemia likely secondary to CKD  -GI consulted, signed off   -monitor H&H  -iron 30, TIBC 274  -transfuse to keep Hgb <7.0     Constipation with rectal pain:   -CT of the abdomen pelvis  - Colorectal consulted and signed off.      Diabetes Mellitus type 2   - Hgb A1c 6.5  - Monitor blood sugars   - Insulin sliding scale   - Continue NPH 6 units BID   - Carb consistent diet  - Diabetes management following.      HTN: allow permissive hypertension     Hypothyroidism: continue home medication, TSH.      Morbid Obesity   - BMI 39.02  - weight loss management to be followed up outpatient     Code status: Full   DVT prophylaxis: SCDs    Care Plan discussed with: Patient/Family and Nurse  Anticipated Disposition: TBD  Anticipated Discharge:TBD    1200 Attempted to contact patient's son Veto De Santiago ([phone]). Unable to reach him at this time. Hospital Problems  Date Reviewed: 10/2/2020          Codes Class Noted POA    TIA (transient ischemic attack) ICD-10-CM: G45.9  ICD-9-CM: 435.9  6/6/2014 Unknown                Review of Systems:   A comprehensive review of systems was negative except for that written in the HPI. Vital Signs:    Last 24hrs VS reviewed since prior progress note. Most recent are:  Visit Vitals  BP (!) 134/97 (BP 1 Location: Right arm, BP Patient Position: At rest)   Pulse 62   Temp 97.7 °F (36.5 °C)   Resp 17   Ht 5' 5\" (1.651 m)   Wt 106 kg (233 lb 11 oz)   SpO2 95%   BMI 38.89 kg/m²       No intake or output data in the 24 hours ending 10/26/20 9714     Physical Examination:             Constitutional:  No acute distress, cooperative, pleasant, answers questions   ENT:  Oral mucosa moist, oropharynx benign. Resp:  CTA bilaterally. No wheezing/rhonchi/rales. No accessory muscle use   CV:  Regular rhythm, normal rate, no murmurs, gallops, rubs    GI:  Soft, non distended, non tender. normoactive bowel sounds, no hepatosplenomegaly     Musculoskeletal:  No edema, warm, 2+ pulses throughout    Neurologic:  Moves all extremities. Follows commands. AAOx3, CN II-XII reviewed     Psych:  Not anxious or agitated       Data Review:    Review and/or order of clinical lab test      Labs:     Recent Labs     10/23/20  1620   WBC 5.6   HGB 8.1*   HCT 24.7*        Recent Labs     10/25/20  1405 10/23/20  1620     --    K 3.8  --    CL 99  --    CO2 29  --    BUN 96*  --    CREA 9.46*  --    *  --    CA 7.7* 7.7*   PHOS 6.6*  --      Recent Labs     10/25/20  1405   ALB 2.5*     No results for input(s): INR, PTP, APTT, INREXT in the last 72 hours. No results for input(s): FE, TIBC, PSAT, FERR in the last 72 hours. No results found for: FOL, RBCF   No results for input(s): PH, PCO2, PO2 in the last 72 hours.   No results for input(s): CPK, CKNDX, TROIQ in the last 72 hours.     No lab exists for component: CPKMB  Lab Results   Component Value Date/Time    Cholesterol, total 180 10/21/2020 03:24 AM    HDL Cholesterol 85 10/21/2020 03:24 AM    LDL, calculated 73.4 10/21/2020 03:24 AM    Triglyceride 108 10/21/2020 03:24 AM    CHOL/HDL Ratio 2.1 10/21/2020 03:24 AM     Lab Results   Component Value Date/Time    Glucose (POC) 191 (H) 10/25/2020 10:08 PM    Glucose (POC) 143 (H) 10/25/2020 04:21 PM    Glucose (POC) 184 (H) 10/25/2020 11:29 AM    Glucose (POC) 139 (H) 10/25/2020 07:46 AM    Glucose (POC) 110 (H) 10/24/2020 09:43 PM     Lab Results   Component Value Date/Time    Color YELLOW/STRAW 10/02/2020 01:10 PM    Appearance TURBID (A) 10/02/2020 01:10 PM    Specific gravity 1.013 10/02/2020 01:10 PM    pH (UA) 6.0 10/02/2020 01:10 PM    Protein 300 (A) 10/02/2020 01:10 PM    Glucose Negative 10/02/2020 01:10 PM    Ketone Negative 10/02/2020 01:10 PM    Bilirubin Negative 10/02/2020 01:10 PM    Urobilinogen 0.2 10/02/2020 01:10 PM    Nitrites Negative 10/02/2020 01:10 PM    Leukocyte Esterase LARGE (A) 10/02/2020 01:10 PM    Epithelial cells FEW 10/02/2020 01:10 PM    Bacteria 4+ (A) 10/02/2020 01:10 PM    WBC >100 (H) 10/02/2020 01:10 PM    RBC 5-10 10/02/2020 01:10 PM         Medications Reviewed:     Current Facility-Administered Medications   Medication Dose Route Frequency    haloperidol lactate (HALDOL) injection 2 mg  2 mg IntraVENous Q4H PRN    sevelamer carbonate (RENVELA) tab 1,600 mg  1,600 mg Oral TID WITH MEALS    melatonin tablet 6 mg  6 mg Oral QHS PRN    glucose chewable tablet 16 g  4 Tab Oral PRN    glucagon (GLUCAGEN) injection 1 mg  1 mg IntraMUSCular PRN    dextrose 10% infusion 0-250 mL  0-250 mL IntraVENous PRN    insulin lispro (HUMALOG) injection   SubCUTAneous AC&HS    insulin NPH (NOVOLIN N, HUMULIN N) injection 6 Units  6 Units SubCUTAneous BID    hydrALAZINE (APRESOLINE) 20 mg/mL injection 10 mg  10 mg IntraVENous Q6H PRN    polyethylene glycol (MIRALAX) packet 17 g  17 g Oral DAILY    atorvastatin (LIPITOR) tablet 80 mg  80 mg Oral DAILY    cloNIDine HCL (CATAPRES) tablet 0.3 mg  0.3 mg Oral TID    ferrous sulfate tablet 325 mg  1 Tab Oral DAILY WITH BREAKFAST    hydrALAZINE (APRESOLINE) tablet 25 mg  25 mg Oral TID    metoprolol succinate (TOPROL-XL) XL tablet 50 mg  50 mg Oral DAILY    levothyroxine (SYNTHROID) tablet 175 mcg  175 mcg Oral ACB    acetaminophen (TYLENOL) tablet 650 mg  650 mg Oral Q4H PRN    Or    acetaminophen (TYLENOL) solution 650 mg  650 mg Per NG tube Q4H PRN    Or    acetaminophen (TYLENOL) suppository 650 mg  650 mg Rectal Q4H PRN    aspirin chewable tablet 81 mg  81 mg Oral DAILY    pantoprazole (PROTONIX) 40 mg in 0.9% sodium chloride 10 mL injection  40 mg IntraVENous Q12H     ______________________________________________________________________  EXPECTED LENGTH OF STAY: 2d 0h  ACTUAL LENGTH OF STAY:          [address],[place] [address], NP

## 2020-10-26 NOTE — DISCHARGE SUMMARY
Discharge Summary       PATIENT ID: Nicky Jacobs  MRN: [id number]   YOB: 1940    DATE OF ADMISSION: 10/20/2020 10:09 AM    DATE OF DISCHARGE: 10/26/2020  PRIMARY CARE PROVIDER: Kylee Sams MD     ATTENDING PHYSICIAN: Luigi Aaron MD  DISCHARGING PROVIDER: Kevan Shirley NP    To contact this individual call [phone] and ask the  to page. If unavailable ask to be transferred the Adult Hospitalist Department. CONSULTATIONS: IP CONSULT TO NEPHROLOGY  IP CONSULT TO GASTROENTEROLOGY  IP CONSULT TO NEUROLOGY  IP CONSULT TO PALLIATIVE CARE - PROVIDER  IP CONSULT TO COLORECTAL SURGERY    PROCEDURES/SURGERIES: * No surgery found *    ADMITTING DIAGNOSES & HOSPITAL COURSE:   Per H&P: This is a 79 y.o. female TIA, T2DM, Dementia, HTN, Morbid Obesity, HLD, CKD3, who lives short term at [facility], who presents with left-sided weakness and has not been eating drinking well for the past few days and has been lethargic. Today the PT noticed that the patient was having some left-sided facial droop and some left lower extremity weakness and patient was sent to the ER. Patient was deemed as a code stroke, patient was evaluated by telemetry neurology and was requested to be observed under the hospitalist service for further management and evaluation. Patient reports that she has rectal pain, has been eating drinking less because it hurts when she tries to defecate. Patient reports that she has not had a bowel movement for the past few days. Currently the patient reports that all her symptoms have resolved and \"she would like to get the ham sandwich out of this hospital\".  Patient reports that she has no other complaints or problems and denies any other issues.      The patient denies any Headache, blurry vision, sore throat, trouble swallowing, trouble with speech, chest pain, SOB, cough, fever, chills, N/V/D, abd pain, urinary symptoms, constipation, recent travels, sick contacts, falls, injuries, rashes, contact with COVID-19 diagnosed patients, hematemesis, melena, hemoptysis, hematuria, rashes, denies starting any new medications and denies any other concerns or problems besides as mentioned above.     Throughout patients hospital stay, she has stated she does not want to have dialysis. Initially there was a concern if she was able to make competent decisions. Over the last 2 days she has improved. She has been awake, alert, and oriented x4. Able to have a conversation without confusion or difficulty. She is aware of the risks if she does not have dialysis and understands that this could lead to her death. She is accepting of this information and has decided on going home with Hospice. This was also discussed with patient's sons Sylvia Lopez and Candis Arboleda. They are also in agreement with this plan of care. DISCHARGE DIAGNOSES / PLAN:      Patient to be admitted into hospice services.   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   - Baseline creatine ~4.   - Creat- 9.46 and bun 96     Metabolic encephalopathy   - Likely secondary to uremia.       Anemia likely secondary to CKD  -GI consulted, signed off        Constipation with rectal pain:   - Colorectal consulted and signed off.      Diabetes Mellitus type 2    continue home regimen     HTN:     Hypothyroidism:   continue home medication      Morbid Obesity   - BMI 39.02          ADDITIONAL CARE RECOMMENDATIONS:   Hospice to admit patient at home     PENDING TEST RESULTS:   At the time of discharge the following test results are still pending:     FOLLOW UP APPOINTMENTS:    Follow-up Information     Follow up With Specialties Details Why Contact Info    Noah Santos MD Internal Medicine   [facility] [address] [phone]               DIET: Resume previous diet      ACTIVITY: Activity as tolerated    WOUND CARE: None     EQUIPMENT needed: None DISCHARGE MEDICATIONS:  Current Discharge Medication List      START taking these medications    Details   sevelamer carbonate (RENVELA) 800 mg tab tab Take 2 Tabs by mouth three (3) times daily (with meals). Qty: 30 Tab, Refills: 0         CONTINUE these medications which have NOT CHANGED    Details   cloNIDine HCL (CATAPRES) 0.3 mg tablet Take 1 Tab by mouth three (3) times daily. insulin nph-regular human rec (NovoLIN 70-30 FlexPen U-100) 100 unit/mL (70-30) inpn Inject 42 units before breakfast and none before dinner. Max 100 units per day--pt will pay cash--Dose change 9/21/20--updated med list--did not send prescription to the pharmacy  Qty: 30 mL, Refills: 11      metoprolol succinate (TOPROL-XL) 50 mg XL tablet Take 50 mg by mouth daily. gabapentin (NEURONTIN) 300 mg capsule Take 2 Caps by mouth three (3) times daily. Max Daily Amount: 1,800 mg. Qty: 540 Cap, Refills: 1    Associated Diagnoses: Uncontrolled type 2 diabetes mellitus with diabetic polyneuropathy, with long-term current use of insulin (HCC)      SYNTHROID 175 mcg tablet Take 1 Tab by mouth Daily (before breakfast). Take 1 Tab by mouth Daily (before breakfast). BRAND MEDICALLY NECESSARY--Delete 150 mcg dose from profile  Qty: 90 Tab, Refills: 3      atorvastatin (LIPITOR) 80 mg tablet Take 80 mg by mouth daily. ferrous sulfate (IRON) 325 mg (65 mg iron) tablet Take  by mouth daily. allopurinol (ZYLOPRIM) 100 mg tablet Take  by mouth daily. albuterol (PROVENTIL VENTOLIN) 2.5 mg /3 mL (0.083 %) nebu 3 mL by Nebulization route every four (4) hours as needed for Wheezing or Shortness of Breath. Qty: 30 Nebule      hydrALAZINE (APRESOLINE) 25 mg tablet Take 1 Tab by mouth three (3) times daily. Qty: 90 Tab, Refills: 1      furosemide (LASIX) 80 mg tablet Take 80 mg by mouth daily. PYRIDOXINE HCL, VITAMIN B6, (VITAMIN B-6 PO) Take  by mouth daily.       multivitamins-minerals-lutein (CENTRUM SILVER) Tab Take  by mouth daily. STOP taking these medications       predniSONE (DELTASONE) 10 mg tablet Comments:   Reason for Stopping:                 NOTIFY YOUR PHYSICIAN FOR ANY OF THE FOLLOWING:   Fever over 101 degrees for 24 hours. Chest pain, shortness of breath, fever, chills, nausea, vomiting, diarrhea, change in mentation, falling, weakness, bleeding. Severe pain or pain not relieved by medications. Or, any other signs or symptoms that you may have questions about. DISPOSITION:  X  Home With:   OT  PT  HH  RN       Long term SNF/Inpatient Rehab    Independent/assisted living   X Hospice    Other:       PATIENT CONDITION AT DISCHARGE:     Functional status   X Poor     Deconditioned     Independent      Cognition    X Lucid     Forgetful     Dementia      Catheters/lines (plus indication)    Ambrosio     PICC     PEG    X None      Code status     Full code    X DNR      PHYSICAL EXAMINATION AT DISCHARGE:  General:          Alert, cooperative, no distress, appears stated age. HEENT:           Atraumatic, anicteric sclerae, pink conjunctivae                          No oral ulcers, mucosa moist, throat clear, dentition fair  Neck:               Supple, symmetrical  Lungs:             Clear to auscultation bilaterally. No Wheezing or Rhonchi. No rales. Chest wall:      No tenderness  No Accessory muscle use. Heart:              Regular  rhythm,  No  murmur   No edema  Abdomen:        Soft, non-tender. Not distended. Bowel sounds normal  Extremities:     No cyanosis. No clubbing,                            Skin turgor normal, Capillary refill normal  Skin:                Not pale. Not Jaundiced  No rashes   Psych:             Not anxious or agitated.   Neurologic:      Alert, moves all extremities, answers questions appropriately and responds to commands       CHRONIC MEDICAL DIAGNOSES:  Problem List as of 10/26/2020 Date Reviewed: 10/2/2020          Codes Class Noted - Resolved    Osteoarthritis ICD-10-CM: M19.90  ICD-9-CM: 715.90  10/5/2020 - Present        Failure to thrive in adult ICD-10-CM: R62.7  ICD-9-CM: 783.7  10/2/2020 - Present        Left leg weakness ICD-10-CM: R29.898  ICD-9-CM: 729.89  10/2/2020 - Present        Abnormal urinalysis ICD-10-CM: R82.90  ICD-9-CM: 791.9  10/2/2020 - Present        Hypertensive urgency ICD-10-CM: I16.0  ICD-9-CM: 401.9  10/2/2020 - Present        Sepsis ([facility] ICD-10-CM: A41.9  ICD-9-CM: 038.9, 995.91  2/10/2020 - Present        UTI (urinary tract infection) ICD-10-CM: N39.0  ICD-9-CM: 599.0  2/10/2020 - Present        Encephalopathy ICD-10-CM: G93.40  ICD-9-CM: 348.30  2/10/2020 - Present        Foot ulcer ([facility] ICD-10-CM: L97.509  ICD-9-CM: 707.15  Unknown - Present        Severe obesity ([facility] ICD-10-CM: E66.01  ICD-9-CM: 278.01  9/11/2019 - Present        Type 2 diabetes with nephropathy ([facility] ICD-10-CM: E11.21  ICD-9-CM: 250.40, 583.81  4/15/2018 - Present        Hyperlipidemia LDL goal <70 ICD-10-CM: E78.5  ICD-9-CM: 272.4  6/6/2017 - Present        Right pontine stroke ([facility] ICD-10-CM: I63.50  ICD-9-CM: 434.91  1/17/2017 - Present        Stenosis of both internal carotid arteries ICD-10-CM: I65.23  ICD-9-CM: 433.10, 433.30  1/17/2017 - Present        Stroke (cerebrum) ([facility] ICD-10-CM: I63.9  ICD-9-CM: 434.91  1/14/2017 - Present        Thyroid cancer ([address] ICD-10-CM: C73  ICD-9-CM: 983  9/17/2014 - Present        TIA (transient ischemic attack) ICD-10-CM: G45.9  ICD-9-CM: 435.9  6/6/2014 - Present        Essential hypertension, benign ICD-10-CM: I10  ICD-9-CM: 401.1  6/6/2014 - Present        CKD (chronic kidney disease) stage 5, GFR less than 15 ml/min (HCC) ICD-10-CM: N18.5  ICD-9-CM: 585.5  6/6/2014 - Present        Thyroid nodule, right ICD-10-CM: E04.1  ICD-9-CM: 241.0  3/10/2014 - Present        Uncontrolled type 2 diabetes mellitus with diabetic polyneuropathy, with long-term current use of insulin (HCC) ICD-10-CM: E11.42, Z79.4, E11.65  ICD-9-CM: 250.62, 357.2, V58.67  3/10/2014 - Present        Gallbladder polyp ICD-10-CM: K82.4  ICD-9-CM: 575.6  8/14/2013 - Present        Gallbladder sludge ICD-10-CM: K82.8  ICD-9-CM: 575.8  8/14/2013 - Present        Recurrent ventral incisional hernia ICD-10-CM: K43.2  ICD-9-CM: 553.21  8/14/2013 - Present        Obesity (BMI 35.0-39.9 without comorbidity) ICD-10-CM: E66.9  ICD-9-CM: 278.00  8/14/2013 - Present              Greater than 45  minutes were spent with the patient on counseling and coordination of care    Signed:   Rey Castillo NP  10/26/2020  1:47 PM

## 2020-10-26 NOTE — DISCHARGE INSTRUCTIONS
Discharge Instructions       PATIENT ID: Lynne Reyes  MRN: [id number]   YOB: 1940    DATE OF ADMISSION: 10/20/2020 10:09 AM    DATE OF DISCHARGE: 10/26/2020    PRIMARY CARE PROVIDER: Brisa Villalobos MD     ATTENDING PHYSICIAN: Lucho Garcia*  DISCHARGING PROVIDER: Nico Hillman NP    To contact this individual call [phone] and ask the  to page. If unavailable ask to be transferred the Adult Hospitalist Department. DISCHARGE DIAGNOSES End Stage Renal Disease    CONSULTATIONS: IP CONSULT TO NEPHROLOGY  IP CONSULT TO GASTROENTEROLOGY  IP CONSULT TO NEUROLOGY  IP CONSULT TO PALLIATIVE CARE - PROVIDER  IP CONSULT TO COLORECTAL SURGERY    PROCEDURES/SURGERIES: * No surgery found *    PENDING TEST RESULTS:   At the time of discharge the following test results are still pending:     FOLLOW UP APPOINTMENTS:   Follow-up Information     Follow up With Specialties Details Why Contact Info    Brisa Villalobos MD Internal Medicine   [address] [place] [address]  [phone]             ADDITIONAL CARE RECOMMENDATIONS:     Hospice to admit patient at home     DIET: Resume previous diet       ACTIVITY: Activity as tolerated    WOUND CARE: None     EQUIPMENT needed: None       DISCHARGE MEDICATIONS:   See Medication Reconciliation Form    · It is important that you take the medication exactly as they are prescribed. · Keep your medication in the bottles provided by the pharmacist and keep a list of the medication names, dosages, and times to be taken in your wallet. · Do not take other medications without consulting your doctor. NOTIFY YOUR PHYSICIAN FOR ANY OF THE FOLLOWING:   Fever over 101 degrees for 24 hours. Chest pain, shortness of breath, fever, chills, nausea, vomiting, diarrhea, change in mentation, falling, weakness, bleeding. Severe pain or pain not relieved by medications.   Or, any other signs or symptoms that you may have questions about.      DISPOSITION:   X Home With:   OT  PT  HH  RN       SNF/Inpatient Rehab/LTAC    Independent/assisted living   X Hospice    Other:       Signed:   Consuelo Garcia NP  10/26/2020  1:46 PM fair +

## 2020-10-26 NOTE — DIABETES MGMT
EUGENIE MORGAN  CLINICAL NURSE SPECIALIST CONSULT  PROGRAM FOR DIABETES HEALTH  FOLLOW UP NOTE    Presentation   Abdulaziz Macias is a 79 y.o. female who presented from [facility] who presented to the ED after PT noticed left sided facial droop and weakness during therapy. A code stroke was called in the ED and she was admitted for work-up. HX: Arthritis, CKD, Depression, DM2, follicular thyroid cancer s/p thyroidectomy, hypercholesteremia, CVA, TIA,     DX: TIA    TX: Neurology consult, CTA    Current clinical course has been complicated by agitation and confusion. Diabetes: Patient has known Type two diabetes, treated with 70/30 insulin at breakfast PTA. Admitting A1C 6.5%  (down from 7.1% in Sept)      Consulted by Provider for advanced diabetes nursing assessment and care, specifically related to   [x] Inpatient management strategy      Diabetes-related medical history  Acute complications: Hyperglycemia without acidosis  Neurological complications  Peripheral neuropathy  Microvascular disease  Nephropathy  Macrovascular disease  Cerebral vascular accident  Other associated conditions: None    Diabetes medication history  Drug class Currently in use Discontinued Never used   Biguanide      DDP-4 inhibitor       Sulfonylurea      Thiazolidinedione      GLP-1 RA      SGLT-2 inhibitors      Basal insulin      Fixed Dose  Combinations 70/30 14 units at breakfast and dinner 70/30 42 units at breakfast     Bolus insulin        Subjective   At this time:  No new labs since 10/21  Glucose 159-278 in the last 24 hours  A1C 6.5% but likely impacted by severe anemia of chronic disease   S/p PRBC transfusion yesterday    Agitation and reports refusal of essential blood work and medication. Concerns of worsening renal failure with a metabolic encephalopathy, confusion improved over the weekend and does not want dialysis despite ESRD.   Palliative care involved and will transition to hospice     Objective   Physical exam  General Awake, alert, oriented      Vital Signs   Visit Vitals  BP (!) 134/97 (BP 1 Location: Right arm, BP Patient Position: At rest)   Pulse 62   Temp 97.7 °F (36.5 °C)   Resp 17   Ht 5' 5\" (1.651 m)   Wt 106 kg (233 lb 11 oz)   SpO2 95%   BMI 38.89 kg/m²     Skin  Warm and dry. Acanthosis noted along neckline. No lipohypertrophy or lipoatrophy noted at injection sites   Heart   Regular rate and rhythm. No murmurs, rubs or gallops  Lungs  Clear to auscultation without rales or rhonchi  Extremities No foot wounds, dry and severe pitting edema of lower extremities     Laboratory  Lab Results   Component Value Date/Time    Hemoglobin A1c 6.5 (H) 10/21/2020 03:24 AM    Hemoglobin A1c (POC) 10.2 09/11/2019 04:13 PM     Lab Results   Component Value Date/Time    LDL, calculated 73.4 10/21/2020 03:24 AM     Lab Results   Component Value Date/Time    Creatinine (POC) 4.3 (H) 09/15/2020 11:07 AM    Creatinine 9.46 (H) 10/25/2020 02:05 PM     Lab Results   Component Value Date/Time    Sodium 137 10/25/2020 02:05 PM    Potassium 3.8 10/25/2020 02:05 PM    Chloride 99 10/25/2020 02:05 PM    CO2 29 10/25/2020 02:05 PM    Anion gap 9 10/25/2020 02:05 PM    Glucose 158 (H) 10/25/2020 02:05 PM    BUN 96 (H) 10/25/2020 02:05 PM    Creatinine 9.46 (H) 10/25/2020 02:05 PM    BUN/Creatinine ratio 10 (L) 10/25/2020 02:05 PM    GFR est AA 5 (L) 10/25/2020 02:05 PM    GFR est non-AA 4 (L) 10/25/2020 02:05 PM    Calcium 7.7 (L) 10/25/2020 02:05 PM    Bilirubin, total 0.6 10/20/2020 11:27 AM    Alk.  phosphatase 86 10/20/2020 11:27 AM    Protein, total 6.7 10/20/2020 11:27 AM    Albumin 2.5 (L) 10/25/2020 02:05 PM    Globulin 4.0 10/20/2020 11:27 AM    A-G Ratio 0.7 (L) 10/20/2020 11:27 AM    ALT (SGPT) 31 10/20/2020 11:27 AM     Lab Results   Component Value Date/Time    ALT (SGPT) 31 10/20/2020 11:27 AM       Factors affecting BG pattern  Factor Dose Comments   Nutrition:  Carb-controlled meals   60 grams/meal    Other: CKD GFR 6 Renal clearance of exogenous insulin is dramatically reduced when GFR under 20 mL/min which predisposes patients to fasting hypoglycemia. Blood glucose pattern        Assessment and Plan   Nursing Diagnosis Risk for unstable blood glucose pattern   Nursing Intervention Domain 3692 Decision-making Support   Nursing Interventions Examined current inpatient diabetes control   Explored factors facilitating and impeding inpatient management  Identified self-management practices impeding diabetes control  Explored corrective strategies with patient and responsible inpatient provider   Informed patient of rational for insulin strategy while hospitalized     Evaluation   Cynthia Candelaria is a 79year old female with type two diabetes on once daily 70/30 insulin residing at an assisted living facility who presents with a TIA. CTA was unremarkable for acute process and MRI recommended for additional work-up. A1C on admission was 6.5% and a significant trend down has been noted this year (highest A1C 10.3% 2/3/20). Unclear her previous history of blood transfusion but certainly has anemia of chronic disease which can contribute to a falsely low A1C result. GFR is also significantly decreased this admission- renal clearance of exogenous insulin is dramatically reduced when GFR under 20 mL/min which predisposes patients to fasting hypoglycemia. Goal A1C likely closer to 8% and tight glycemic control is not recommended as she is at risk for hypoglycemia. Patient will transition to hospice services. Recommendations   Recommend:    Patient to be discharged home on hospice. Please d/c POC glucose and insulin. If she does change her mind and wants to continue insulin therapy:  1. Very low dose basal insulin: 0.1 units/k units NPH BID, monitoring for fasting hypoglycemia. 2. Correctional insulin at high sensitivity (ESRD)    3.  Continue consistent carbohydrate diet    Diabetes Management Team to sign off at this point as patient is transitioning to hospice. Please re-consult us if patient needs change. Thank you for including us in their care. Billing Code(s)   I personally reviewed chart, notes, data and current medications in the medical record, and examined the patient at bedside before making care recommendations. Thank you for including us in their care. I spent 15 minutes in direct patient care today for this patient.   Time includes chart review, face to face with patient and collaboration with interdisciplinary care team.      MICHAEL Walker  Program for Diabetes Health  Access via [address] [place] [address]  [phone]

## 2020-10-26 NOTE — PROGRESS NOTES
FAWN- Possible home with hospice ? RAH noted that there is a hospice consult for this pt. CM attempted to call pt's son, Laura Wagner, but he did not answer the phone. RAH spoke with son, Duog Neff. He said that  Laura Wagner is on his way to the hospital now. CM will attempt to meet with Laura Wagner when he gets here.  Jennie Rich

## 2020-10-26 NOTE — PROGRESS NOTES
FAWN- Pending referral to [facility]. CM met this pt and her son, Aminah Watson to discuss hospice consult and offer freedom of choice for hospice. Pt and son would like to receive hospice services from [facility]. CM sent a referral to [facility] via [email or web address].  Petra White

## 2020-10-26 NOTE — ROUTINE PROCESS
Bedside shift change report given to Judith Lino (oncoming nurse) by Skip Griffin (offgoing nurse). Report included the following information SBAR, Kardex, Recent Results, Cardiac Rhythm SB and Dual Neuro Assessment.

## 2020-10-26 NOTE — PROGRESS NOTES
Problem: Pressure Injury - Risk of  Goal: *Prevention of pressure injury  Description: Document Vinny Scale and appropriate interventions in the flowsheet. Outcome: Progressing Towards Goal  Note: Pressure Injury Interventions:  Sensory Interventions: Turn and reposition approx. every two hours (pillows and wedges if needed), Monitor skin under medical devices, Minimize linen layers, Keep linens dry and wrinkle-free, Float heels, Check visual cues for pain, Avoid rigorous massage over bony prominences    Moisture Interventions: Maintain skin hydration (lotion/cream), Minimize layers, Offer toileting Q_hr, Absorbent underpads, Apply protective barrier, creams and emollients, Check for incontinence Q2 hours and as needed    Activity Interventions: PT/OT evaluation, Pressure redistribution bed/mattress(bed type), Increase time out of bed    Mobility Interventions: Turn and reposition approx. every two hours(pillow and wedges), PT/OT evaluation, Pressure redistribution bed/mattress (bed type), Float heels, HOB 30 degrees or less    Nutrition Interventions: Document food/fluid/supplement intake, Offer support with meals,snacks and hydration    Friction and Shear Interventions: HOB 30 degrees or less, Feet elevated on foot rest, Apply protective barrier, creams and emollients, Transfer aides:transfer board/Fabricio lift/ceiling lift, Transferring/repositioning devices, Minimize layers     Problem: Patient Education: Go to Patient Education Activity  Goal: Patient/Family Education  Outcome: Progressing Towards Goal     Problem: Diabetes Self-Management  Goal: *Disease process and treatment process  Description: Define diabetes and identify own type of diabetes; list 3 options for treating diabetes. Outcome: Not Progressing Towards Goal  Goal: *Incorporating nutritional management into lifestyle  Description: Describe effect of type, amount and timing of food on blood glucose; list 3 methods for planning meals.   Outcome: Not Progressing Towards Goal  Goal: *Incorporating physical activity into lifestyle  Description: State effect of exercise on blood glucose levels. Outcome: Not Progressing Towards Goal  Goal: *Developing strategies to promote health/change behavior  Description: Define the ABC's of diabetes; identify appropriate screenings, schedule and personal plan for screenings. Outcome: Not Progressing Towards Goal  Goal: *Monitoring blood glucose, interpreting and using results  Description: Identify recommended blood glucose targets  and personal targets. Outcome: Not Progressing Towards Goal  Goal: *Prevention, detection, treatment of acute complications  Description: List symptoms of hyper- and hypoglycemia; describe how to treat low blood sugar and actions for lowering  high blood glucose level.   Outcome: Not Progressing Towards Goal  Goal: *Developing strategies to address psychosocial issues  Description: Describe feelings about living with diabetes; identify support needed and support network  Outcome: Not Progressing Towards Goal  Goal: *Sick day guidelines  Outcome: Not Progressing Towards Goal

## 2020-10-26 NOTE — PROGRESS NOTES
Name: Katie Yancey MRN: [id number]   : 1940 Hospital: [facility]   Date: 10/26/2020        IMPRESSION:   · CKD stage 5 - refusing HD  · AMS - resolved  · HTN   · Hyperphosphatemia from advanced CKD  · Anemia of CKD  · Fluid Overload      PLAN:   · Mr. Ritika Hung is quite firm in her REFUSAL of dialysis therapy. She understands she will die without   · dialysis therapy and has accepted this prognosis / outcome. · Appreciate the input of the Palliative Care team.  · Ms Ritika Hung will go home with Hospice Care. ·      Subjective/Interval History:       F/U ESRD / CKD 5 --> Ms. Ritika Hung wants to go home. Objective:   Vital Signs:    Visit Vitals  BP (!) 134/97 (BP 1 Location: Right arm, BP Patient Position: At rest)   Pulse 62   Temp 97.7 °F (36.5 °C)   Resp 17   Ht 5' 5\" (1.651 m)   Wt 106 kg (233 lb 11 oz)   SpO2 95%   BMI 38.89 kg/m²       O2 Device: Room air       Temp (24hrs), Av.6 °F (36.4 °C), Min:97.2 °F (36.2 °C), Max:97.9 °F (36.6 °C)       Intake/Output:   Last shift:      No intake/output data recorded. Last 3 shifts: No intake/output data recorded. No intake or output data in the 24 hours ending 10/26/20 1505     Physical Exam:    Seen in Room 671. Her son Nadja Kan was in attendance      General:    Alert, cooperative, no distress, appears stated age. Head:   Normocephalic    Eyes:   No icterus      Lungs:   Clear to auscultation , No Wheezing , Rhonchi or rales. Heart:   No S 3  Gallop , No pericardial rub  . Extremities: Leg Oedema +++                          Upper Extremity oedema ++                            Left Forearm AVF - bruit present. Psych:  Appropriate  .   Neurologic: Alert and oriented        DATA:  Labs:  Recent Labs     10/25/20  1405 10/23/20  1620     --    K 3.8  --    CL 99  --    CO2 29  --    BUN 96*  --    CREA 9.46*  --    CA 7.7* 7.7*   ALB 2.5*  --    PHOS 6.6*  --      Recent Labs     10/23/20  1620   WBC 5.6   HGB 8.1*   HCT 24.7*   PLT 218     No results for input(s): TOM, KU, CLU, CRECHEYANNE in the last 72 hours.     No lab exists for component: PROU    Total time spent with patient:           Care Plan discussed with:       D/W David Gallego (son)    Blake Herron MD

## 2020-10-26 NOTE — ROUTINE PROCESS
Bedside and Verbal shift change report given to Rosy Rosenbaum RN   (oncoming nurse) by Darwin Richardson RN  (offgoing nurse). Report included the following information SBAR, Kardex, ED Summary, Procedure Summary, Intake/Output, MAR, Recent Results, Med Rec Status, Cardiac Rhythm NSR. , Alarm Parameters , Procedure Verification, Quality Measures and Dual Neuro Assessment.

## 2020-10-26 NOTE — PROGRESS NOTES
[address] with [facility]. CM received a call from Karissa Drake from [facility]. They have accepted this pt for hospice at home. She is going to call the son Adam Tamayo to coordinate care.  CM placed this information on pt's AVS. Blaine Tabares

## 2020-10-26 NOTE — PALLIATIVE CARE DISCHARGE
Palliative medicine discharge note: 
 
Dear [facility], It was a pleasure to care for you during this hospital stay. This admission you have been very clear and consistent that you do not want dialysis and you wish to go home and to die peacefully when the time comes. You are aware that without dialysis, your life is limited and you are okay with this. You do not want to come back to the hospital. 
 
We talked with your sons Aminah Walter and Genevieve Baum about this as well, and they support you going home with Hospice- to focus on keeping you comfortable, but no aggressive measures. If you go home with Hospice and change your mind, you can always revoke Hospice and go follow up with Dr Floyd Greer for dialysis. Based on our conversations, I don't think you will change your mind- but it is an option. Please take good care of yourself!  
 
 
Bowen Reaves MD

## 2020-11-02 DIAGNOSIS — I10 ESSENTIAL HYPERTENSION, BENIGN: ICD-10-CM

## 2020-11-02 DIAGNOSIS — E78.5 HYPERLIPIDEMIA LDL GOAL <70: ICD-10-CM

## 2020-11-02 DIAGNOSIS — C73 THYROID CANCER (HCC): ICD-10-CM

## 2020-11-10 ENCOUNTER — TELEPHONE (OUTPATIENT)
Dept: ENDOCRINOLOGY | Age: 80
End: 2020-11-10

## 2020-11-10 NOTE — TELEPHONE ENCOUNTER
----- Message from Mana Garsia sent at 11/10/2020  2:26 PM EST -----  11/10/2020  2:26 PM      I spoke with Tamia Ortiz) stated that Ms. Thomas Page would like to keep her VV appointment for Monday (I did hear Ms. Thomas Page in the background stating to keep the appointment). Thanks  ----- Message -----  From: Brenda May MD  Sent: 11/10/2020  11:28 AM EST  To: Rd Davidson,    This patient is scheduled for 11/16/20 at 11:30 but I'm pretty sure will not be coming as I think she went home on hospice. Can you see if this is the case.

## 2020-11-16 ENCOUNTER — TELEPHONE (OUTPATIENT)
Dept: ENDOCRINOLOGY | Age: 80
End: 2020-11-16

## 2020-11-16 ENCOUNTER — VIRTUAL VISIT (OUTPATIENT)
Dept: ENDOCRINOLOGY | Age: 80
End: 2020-11-16

## 2020-11-16 NOTE — TELEPHONE ENCOUNTER
I called to check Ms. Jordyn Almodovar in for her appointment today and per her son Reggie Mariano she is transitioning. Dr. Gume June was notified.

## 2020-11-16 NOTE — PROGRESS NOTES
A user error has taken place: encounter opened in error, closed for administrative reasons. Ashlie Eason spoke with her son, Aminah Watson, who confirmed that patient's condition has worsened over the past week while in hospice and she will not need to have her appointment today.

## 2022-03-18 PROBLEM — I16.0 HYPERTENSIVE URGENCY: Status: ACTIVE | Noted: 2020-10-02

## 2022-03-18 PROBLEM — R82.90 ABNORMAL URINALYSIS: Status: ACTIVE | Noted: 2020-10-02

## 2022-03-19 PROBLEM — I65.23 STENOSIS OF BOTH INTERNAL CAROTID ARTERIES: Status: ACTIVE | Noted: 2017-01-17

## 2022-03-19 PROBLEM — E78.5 HYPERLIPIDEMIA LDL GOAL <70: Status: ACTIVE | Noted: 2017-06-06

## 2022-03-19 PROBLEM — R62.7 FAILURE TO THRIVE IN ADULT: Status: ACTIVE | Noted: 2020-10-02

## 2022-03-19 PROBLEM — N39.0 UTI (URINARY TRACT INFECTION): Status: ACTIVE | Noted: 2020-02-10

## 2022-03-19 PROBLEM — A41.9 SEPSIS (HCC): Status: ACTIVE | Noted: 2020-02-10

## 2022-03-19 PROBLEM — R29.898 LEFT LEG WEAKNESS: Status: ACTIVE | Noted: 2020-10-02

## 2022-03-19 PROBLEM — I63.9 STROKE (CEREBRUM) (HCC): Status: ACTIVE | Noted: 2017-01-14

## 2022-03-19 PROBLEM — I63.50 RIGHT PONTINE STROKE (HCC): Status: ACTIVE | Noted: 2017-01-17

## 2022-03-19 PROBLEM — M19.90 OSTEOARTHRITIS: Status: ACTIVE | Noted: 2020-10-05

## 2022-03-19 PROBLEM — E11.21 TYPE 2 DIABETES WITH NEPHROPATHY (HCC): Status: ACTIVE | Noted: 2018-04-15

## 2022-03-19 NOTE — PATIENT INSTRUCTIONS
1) Use up the lantus and humalog and then we will change to novolin 70/30 mix insulin. You will have to roll the pen for 10 seconds prior to injection. The same pen needles fit this pen. 2) We will use 50 units of the new insulin 5-10 minutes before breakfast and 30 units 5-10 minutes before dinner. [facility] when you want to start. If you check your sugar and you are over 180, then you can adjust the insulin as below:  Blood sugar  Breakfast  Dinner       Less than 100  Eat first, 45 units Eat first, 25 units   100-180  50 units  30 units  181-230  55 units  35 units    231-280  60 units  40 units   281-330  65 units  45 units    331-380  70 units  50 units    381-430  75 units  55 units    3) TSH is a thyroid test.  Your level is 11 which is still high and above goal of 0.5-2.0. This test goes opposite of your thyroid dose and suggests your dose of synthroid is still not enough. I will increase your dose to 175 mcg daily and have [facility] a [facility]. Feel free to use up the 150 mcg tabs by taking 1 tab on Monday-Saturday and 2 tabs on Sunday. 4) I got your gabapentin approved to take 2 caps three times daily for nerve pain. This should come through the mail in the next week. 5) For diabetic shoes, please contact the clinic below to get fitted for diabetic shoes.   I already faxed over all the paperwork    [employer] [facility] [address] [place], [place]. [address]  Phone: [phone]
chest pain

## 2022-03-20 PROBLEM — E66.01 SEVERE OBESITY (HCC): Status: ACTIVE | Noted: 2019-09-11

## 2022-03-20 PROBLEM — G93.40 ENCEPHALOPATHY: Status: ACTIVE | Noted: 2020-02-10

## 2023-05-11 RX ORDER — METOPROLOL SUCCINATE 50 MG/1
50 TABLET, EXTENDED RELEASE ORAL DAILY
COMMUNITY

## 2023-05-11 RX ORDER — ALBUTEROL SULFATE 2.5 MG/3ML
SOLUTION RESPIRATORY (INHALATION) EVERY 4 HOURS PRN
COMMUNITY
Start: 2020-10-05

## 2023-05-11 RX ORDER — HYDRALAZINE HYDROCHLORIDE 25 MG/1
TABLET, FILM COATED ORAL 3 TIMES DAILY
COMMUNITY
Start: 2020-09-16

## 2023-05-11 RX ORDER — ALLOPURINOL 100 MG/1
TABLET ORAL DAILY
COMMUNITY

## 2023-05-11 RX ORDER — SEVELAMER CARBONATE 800 MG/1
TABLET, FILM COATED ORAL
COMMUNITY
Start: 2020-10-26

## 2023-05-11 RX ORDER — FUROSEMIDE 80 MG
80 TABLET ORAL DAILY
COMMUNITY
Start: 2020-04-02

## 2023-05-11 RX ORDER — HUMAN INSULIN 100 [IU]/ML
INJECTION, SUSPENSION SUBCUTANEOUS
COMMUNITY
Start: 2020-09-21

## 2023-05-11 RX ORDER — FERROUS SULFATE 325(65) MG
TABLET ORAL DAILY
COMMUNITY

## 2023-05-11 RX ORDER — LEVOTHYROXINE SODIUM 175 UG/1
TABLET ORAL
COMMUNITY
Start: 2020-02-06

## 2023-05-11 RX ORDER — CLONIDINE HYDROCHLORIDE 0.3 MG/1
TABLET ORAL 3 TIMES DAILY
COMMUNITY
Start: 2020-10-06

## 2023-05-11 RX ORDER — GABAPENTIN 300 MG/1
CAPSULE ORAL 3 TIMES DAILY
COMMUNITY
Start: 2020-07-06

## 2023-05-11 RX ORDER — ATORVASTATIN CALCIUM 80 MG/1
80 TABLET, FILM COATED ORAL DAILY
COMMUNITY

## 2023-09-08 NOTE — PROGRESS NOTES
>>NPO instructions given per surgeons office.     -- Medication information (what to hold and what to take)   -- Arrival place and directions given; time to be given the day before procedure or Friday before (if Monday case) by the Surgeon's Office   -- Bathing with normal soap; unless otherwise stated by surgeon's office  -- Don't wear any jewelry or bring any valuables AM of surgery   -- No powder, lotions, creams (except diaper rash)    Pt verbalized understanding.           Echo to be completed tomorrow (1/17) due to time constraints and transport complications.

## 2023-09-26 NOTE — PROGRESS NOTES
Palliative Medicine Consult  Mello: [phone] ([phone])    Patient Name: Lavern De  YOB: 1940    Date of Initial Consult: 10/23/20  Reason for Consult: Care decisions   Requesting Provider: Christina Thompson   Primary Care Physician: Parmjit Rivera MD     SUMMARY:   Lavern De is a 79 y.o. with a past history of CKD w/ an AV fistula in place already placed 9/2020 (not yet started on dialysis), DM, hx of pontine stroke 2017 who was admitted on 10/20/2020 from [facility] with poor po intake and L sided weakness. Head CT and CTA w/out acute findings. She has been confused and combative during this admission. Have not been able to get MRI brain due to agitation. May require dialysis, although needs to be safe and comply w/ treatment. Current medical issues leading to Palliative Medicine involvement include: care decisions. Social: Merly Escamilla is mPOA 1 and bonita Maldonado is Lunette Falling. We have an AMD on file but only half the pages- sons They share that at baseline is oriented x 4, but a decline in her ability to walk a month ago. No psychiatric hx. PALLIATIVE DIAGNOSES:   1. Confusion- improved   2. Debility - declined a month ago, before that doing all ADLs  3. Agitation   4. Goals of care       PLAN:   1. Meet w/ pt who is alert/oriented x 4 and retaining information, which is different at least for me when I first met her on Friday. 2. Consistent that she does not want dialysis- and we have learned from primary nephrologist that she never really wanted the AVF, it was her family that wanted it. 3. I updated son Lucy Maldonado, as could not reach Bucky Arriaga again, however due to family communication- we need to talk to Bucky Arriaga. 4. Hospice best option for patient as she is adamant she does not want dialysis. Otherwise would return to the hospital soon likely. She is agreeable with this and signs her DDNR today. Dom Vanegas to get home.    5. Placed care management consult for hospice, but they are Per standing protocol by Dr. Kiran vitamin d is low, prescribe high dose vitamin d 50,000 units for 8 weeks.    holding off until we can reach Ag Byrnes. Will try to reach him again, as will primary team.  6. Communicated plan of care with: Palliative IDTRosa 192 Team incl Geovany Gonzalez NP, Daya Winslow care management      GOALS OF CARE / TREATMENT PREFERENCES:     GOALS OF CARE:  Patient/Health Care Proxy Stated Goals: Rehabilitation    TREATMENT PREFERENCES:   Code Status: DNR    Advance Care Planning:  [x] [facility] Interdisciplinary Team has updated the ACP Navigator with Health Care Decision Maker and Patient Capacity      Primary Decision Maker: Compa Mancillaalvarado - [phone]    Secondary Decision Maker: Tia Hong - [phone]    Advance Care Planning 9/15/2020   Patient's Healthcare Decision Maker is: -   Primary Decision Maker Name -   Primary Decision Maker Phone Number -   Primary Decision Maker Relationship to Patient -   Secondary Decision [address] Name -   Secondary Decision [address] Phone Number -   Secondary Decision Maker Relationship to Patient -   Confirm Advance Directive Yes, not on file   Does the patient have other document types -       Medical Interventions: Limited additional interventions         Other:    As far as possible, the palliative care team has discussed with patient / health care proxy about goals of care / treatment preferences for patient. HISTORY:         CHIEF COMPLAINT: \"I am read to go home\"    HPI/SUBJECTIVE:    The patient is:   [x] Verbal and participatory  [] Non-participatory due to:     Pt has not changed anything that she has said on Friday- consistent on going home w/ hospice to pass away.      Clinical Pain Assessment (nonverbal scale for severity on nonverbal patients):   Clinical Pain Assessment  Severity: 0          Duration: for how long has pt been experiencing pain (e.g., 2 days, 1 month, years)  Frequency: how often pain is an issue (e.g., several times per day, once every few days, constant)     FUNCTIONAL ASSESSMENT:     Palliative Performance Scale (PPS):  PPS: 40       PSYCHOSOCIAL/SPIRITUAL SCREENING:     Palliative IDT has assessed this patient for cultural preferences / practices and a referral made as appropriate to needs (Cultural Services, Patient Advocacy, Ethics, etc.)    Any spiritual / Nondenominational concerns:  [] Yes /  [x] No    Caregiver Burnout:  [] Yes /  [x] No /  [] No Caregiver Present      Anticipatory grief assessment:   [x] Normal  / [] Maladaptive       ESAS Anxiety: Anxiety: 3    ESAS Depression:       Cannot obtain due to patient factors     REVIEW OF SYSTEMS:     Positive and pertinent negative findings in ROS are noted above in HPI. The following systems were [x] reviewed / [] unable to be reviewed as noted in HPI  Other findings are noted below. Systems: constitutional, ears/nose/mouth/throat, respiratory, gastrointestinal, genitourinary, musculoskeletal, integumentary, neurologic, psychiatric, endocrine. Positive findings noted below. Modified ESAS Completed by: provider   Fatigue: 5 Drowsiness: 0     Pain: 0   Anxiety: 3 Nausea: 0     Dyspnea: 0                    PHYSICAL EXAM:     From RN flowsheet:  Wt Readings from Last 3 Encounters:   10/25/20 233 lb 11 oz (106 kg)   10/06/20 233 lb 14.5 oz (106.1 kg)   10/03/20 226 lb (102.5 kg)     Blood pressure (!) 134/97, pulse 62, temperature 97.7 °F (36.5 °C), resp. rate 17, height 5' 5\" (1.651 m), weight 233 lb 11 oz (106 kg), SpO2 95 %.     Pain Scale 1: Numeric (0 - 10)  Pain Intensity 1: 0                 Last bowel movement, if known:     Constitutional: calm, appears younger than stated age, oriented to person, place, year  Eyes: pupils equal, anicteric  ENMT: no nasal discharge, moist mucous membranes  Respiratory: breathing not labored  Musculoskeletal: no deformity, no tenderness to palpation  Skin: warm, dry  Neurologic: following commands, moving all extremities  Psychiatric: anxious about going home      HISTORY:     Active Problems:    TIA (transient ischemic attack) (6/6/2014)      Past Medical History:   Diagnosis Date    Arthritis     Cataract     bilateral    Chronic kidney disease     Dr. Hilario Oquendo Chronic pain     lower back    CKD stage 3 due to type 2 diabetes mellitus ([facility]     Depression     Diabetes ([facility] age 49    Type II    Follicular thyroid cancer ([facility] 08/2014    Foot ulcer ([facility]     Gallbladder polyp 8/14/2013    Hypercholesterolemia     Hypertension     Ill-defined condition     states 'polyp' in gal bladder    Long term current use of anticoagulant therapy     Obesity     Right pontine stroke ([facility] 1/17/2017    TIA (transient ischemic attack) 6/6/2014      Past Surgical History:   Procedure Laterality Date    ABDOMEN SURGERY PROC UNLISTED  2006    stomach    BREAST SURGERY PROCEDURE UNLISTED  2009, 2006    breast bx-vince    COLONOSCOPY N/A 12/17/2019    COLONOSCOPY performed by Jeevan Bauman MD at [address] [place] [address]  12/17/2019         COLORECTAL SCRN; HI RISK IND  5/30/2014         HX BREAST BIOPSY Right [id number]    2 biopies on the right. Benign (per patient)    HX BREAST BIOPSY Left 2015    Benign (per patient)    HX CATARACT REMOVAL Left     HX GYN  18's    partial hysterectomy    HX HERNIA REPAIR  2009    hiatal    HX HYSTERECTOMY      HX ORTHOPAEDIC Bilateral 1988    bunion    HX THYROIDECTOMY  2014    Dr. Armand Archibald      Family History   Problem Relation Age of Onset    Heart Disease Mother     Hypertension Mother     Diabetes Mother     Stroke Mother     Cancer Father         colon    Diabetes Sister     Heart Attack Sister     Hypertension Sister     Heart Disease Sister         pacemaker    Lung Disease Brother     Hypertension Brother     Lung Disease Brother     Anesth Problems Neg Hx       History reviewed, no pertinent family history.   Social History     Tobacco Use    Smoking status: Never Smoker    Smokeless tobacco: Never Used   Substance Use Topics    Alcohol use: No     Allergies   Allergen Reactions    Amlodipine Swelling    Clindamycin Rash    Nifedipine Swelling    Sulfa (Sulfonamide Antibiotics) Rash      Current Facility-Administered Medications   Medication Dose Route Frequency    haloperidol lactate (HALDOL) injection 2 mg  2 mg IntraVENous Q4H PRN    sevelamer carbonate (RENVELA) tab 1,600 mg  1,600 mg Oral TID WITH MEALS    melatonin tablet 6 mg  6 mg Oral QHS PRN    glucose chewable tablet 16 g  4 Tab Oral PRN    glucagon (GLUCAGEN) injection 1 mg  1 mg IntraMUSCular PRN    dextrose 10% infusion 0-250 mL  0-250 mL IntraVENous PRN    insulin lispro (HUMALOG) injection   SubCUTAneous AC&HS    insulin NPH (NOVOLIN N, HUMULIN N) injection 6 Units  6 Units SubCUTAneous BID    hydrALAZINE (APRESOLINE) 20 mg/mL injection 10 mg  10 mg IntraVENous Q6H PRN    polyethylene glycol (MIRALAX) packet 17 g  17 g Oral DAILY    atorvastatin (LIPITOR) tablet 80 mg  80 mg Oral DAILY    cloNIDine HCL (CATAPRES) tablet 0.3 mg  0.3 mg Oral TID    ferrous sulfate tablet 325 mg  1 Tab Oral DAILY WITH BREAKFAST    hydrALAZINE (APRESOLINE) tablet 25 mg  25 mg Oral TID    metoprolol succinate (TOPROL-XL) XL tablet 50 mg  50 mg Oral DAILY    levothyroxine (SYNTHROID) tablet 175 mcg  175 mcg Oral ACB    acetaminophen (TYLENOL) tablet 650 mg  650 mg Oral Q4H PRN    Or    acetaminophen (TYLENOL) solution 650 mg  650 mg Per NG tube Q4H PRN    Or    acetaminophen (TYLENOL) suppository 650 mg  650 mg Rectal Q4H PRN    aspirin chewable tablet 81 mg  81 mg Oral DAILY    pantoprazole (PROTONIX) 40 mg in 0.9% sodium chloride 10 mL injection  40 mg IntraVENous Q12H          LAB AND IMAGING FINDINGS:     Lab Results   Component Value Date/Time    WBC 5.6 10/23/2020 04:20 PM    HGB 8.1 (L) 10/23/2020 04:20 PM    PLATELET 101 39/66/9530 04:20 PM     Lab Results   Component Value Date/Time    Sodium 137 10/25/2020 02:05 PM    Potassium 3.8 10/25/2020 02:05 PM Chloride 99 10/25/2020 02:05 PM    CO2 29 10/25/2020 02:05 PM    BUN 96 (H) 10/25/2020 02:05 PM    Creatinine 9.46 (H) 10/25/2020 02:05 PM    Calcium 7.7 (L) 10/25/2020 02:05 PM    Magnesium 3.0 (H) 10/21/2020 03:24 AM    Phosphorus 6.6 (H) 10/25/2020 02:05 PM      Lab Results   Component Value Date/Time    Alk. phosphatase 86 10/20/2020 11:27 AM    Protein, total 6.7 10/20/2020 11:27 AM    Albumin 2.5 (L) 10/25/2020 02:05 PM    Globulin 4.0 10/20/2020 11:27 AM     Lab Results   Component Value Date/Time    INR 1.1 10/21/2020 03:24 AM    Prothrombin time 11.2 (H) 10/21/2020 03:24 AM    aPTT 29.1 10/21/2020 03:24 AM      Lab Results   Component Value Date/Time    Iron 30 (L) 10/20/2020 07:39 PM    TIBC 274 10/20/2020 07:39 PM    Iron % saturation 11 (L) 10/20/2020 07:39 PM    Ferritin 224 02/11/2020 12:31 AM      No results found for: PH, PCO2, PO2  No components found for: [place]   Lab Results   Component Value Date/Time     (H) 10/21/2020 03:24 AM    CK - MB 2.7 01/14/2017 12:11 AM                Total time: 25 min   Counseling / coordination time, spent as noted above: 20 min  > 50% counseling / coordination?: yes    Prolonged service was provided for  []30 min   []75 min in face to face time in the presence of the patient, spent as noted above. Time Start:   Time End:   Note: this can only be billed with 95726 (initial) or 22934 (follow up). If multiple start / stop times, list each separately.

## 2025-04-04 NOTE — PROGRESS NOTES
----- Message from Tyron Guy MD sent at 4/3/2025  5:05 PM CDT -----  Please notify patient stool for C. difficile is negative   Hospitalist Progress Note    NAME: Nora Abraham   :  1940   MRN:  [id number]       Assessment / Plan:  Sepsis POA:   Tana negative bacteriemia   UTI:  fever, tachycardia, encephalopathy due to UTI  C/w ceftriaxone 2g  , repeat bcx NTD   Awaiting Ucx /Bcx sensitivity     Acute Metabolic Encephalopathy: resolved   Pt now AAOx4    TARUN on CKD: creatinine worsening, per record patient having significant proteinuria, appreciate nephro input     DM type II with renal manifestation POA:   Hyperglycemia   Pt started recently  on  novolin 70/30, but pt reported taking lantus 20units bid up to her admission . She never started novolin   BS controlled , c/w lantus 35 units + lispro 8 units TIDAC  ,  SSI,  HBA1C 9.9. H/O CVA: with residual left sided weakness, seems stable at this time. Essential HTN POA:BP elevated   c/w BB/Clonidine, use labetalol prn, hold ARB for now  Hydralazine added, titrate as needed    Hypothyroidism POA: c/w L-thyroxine  Hyperlipidemia POA c/w statin  Gallbladder polyp, followed by Dr Nancy Child with abd US every year      Code Status: Full Code  Surrogate Decision Maker: bonita Bryan [phone] or [phone], son Pamalee Gilford [phone]  DVT Prophylaxis: heparin  GI Prophylaxis: not indicated  Baseline: independent lives with Lawerance Mercury, has some left sided weakness  PT recommended SNF     30.0 - 39.9 Obese / Body mass index is 37.27 kg/m². Subjective:     Chief Complaint / Reason for Physician Visit  Fu sepsis . AAOx4, no acute issues . Discussed with RN events overnight. Review of Systems:  Symptom Y/N Comments  Symptom Y/N Comments   Fever/Chills n   Chest Pain n    Poor Appetite    Edema     Cough    Abdominal Pain n    Sputum    Joint Pain     SOB/ALONSO n   Pruritis/Rash     Nausea/vomit n   Tolerating PT/OT     Diarrhea n   Tolerating Diet y    Constipation    Other       Could NOT obtain due to:      Objective:     VITALS:   Last 24hrs VS reviewed since prior progress note.  Most recent are:  Patient Vitals for the past 24 hrs:   Temp Pulse Resp BP SpO2   02/12/20 0400  (!) 58      02/12/20 0333 98.4 °F (36.9 °C) (!) 55 18 154/65 96 %   02/12/20 0256 98.2 °F (36.8 °C)       02/12/20 0000  (!) 57      02/11/20 2346 99.2 °F (37.3 °C) 62 18 145/66 99 %   02/11/20 2335 99.2 °F (37.3 °C)       02/11/20 2252 99.1 °F (37.3 °C)       02/11/20 2212 (!) 101.5 °F (38.6 °C)       02/11/20 2058 (!) 101 °F (38.3 °C)       02/11/20 2000  77      02/11/20 1957 (!) 100.8 °F (38.2 °C) 71 18 149/60 94 %   02/11/20 1553  79      02/11/20 1538 (!) 101.9 °F (38.8 °C) 77 18 162/67 96 %   02/11/20 1200  79      02/11/20 1137 99.2 °F (37.3 °C) 76 19 159/68 94 %   02/11/20 1044     95 %     No intake or output data in the 24 hours ending 02/12/20 0803     PHYSICAL EXAM:  General: WD, WN. Alert, cooperative, no acute distress    EENT:  EOMI. Anicteric sclerae. MMM  Resp:  CTA bilaterally, no wheezing or rales. No accessory muscle use  CV:  Regular  rhythm,  No edema  GI:  Soft, Non distended, Non tender.  +Bowel sounds  Neurologic:  Alert and oriented X 3, normal speech,   Psych:   Good insight. Not anxious nor agitated  Skin:  No rashes. No jaundice    Reviewed most current lab test results and cultures  YES  Reviewed most current radiology test results   YES  Review and summation of old records today    NO  Reviewed patient's current orders and MAR    YES  PMH/SH reviewed - no change compared to H&P  ________________________________________________________________________  Care Plan discussed with:    Comments   Patient x    Family      RN x    Care Manager     Consultant                       x Multidiciplinary team rounds were held today with , nursing, pharmacist and clinical coordinator. Patient's plan of care was discussed; medications were reviewed and discharge planning was addressed. ________________________________________________________________________  Total NON critical care TIME:  30   Minutes    Total CRITICAL CARE TIME Spent:   Minutes non procedure based      Comments   >50% of visit spent in counseling and coordination of care x    ________________________________________________________________________  Carol Marrero MD     Procedures: see electronic medical records for all procedures/Xrays and details which were not copied into this note but were reviewed prior to creation of Plan. LABS:  I reviewed today's most current labs and imaging studies.   Pertinent labs include:  Recent Labs     02/11/20  0031 02/10/20  1851   WBC 8.6 7.3   HGB 8.0* 9.5*   HCT 24.0* 28.5*    205     Recent Labs     02/12/20  0328 02/11/20  0031 02/10/20  2332 02/10/20  1851    133*  --  135*   K 3.4* 3.7  --  3.8    100  --  100   CO2 27 28  --  28   * 433*  --  226*   BUN 33* 37*  --  36*   CREA 3.24* 3.32*  --  3.19*   CA 7.1* 7.3* 6.9* 8.1*   MG  --  1.7  --  1.8   PHOS  --  3.5  --   --    ALB 1.7* 1.9*  --  2.3*   TBILI 0.3 0.6  --  0.8   SGOT 66* 44*  --  59*   ALT 48 39  --  48   INR  --  1.1  --  1.1       Signed: Carol Marrero MD

## (undated) DEVICE — SUT SLK 2 60IN TIE MP BLK --

## (undated) DEVICE — DECANTER BAG 9": Brand: MEDLINE INDUSTRIES, INC.

## (undated) DEVICE — SPONGE GZ W4XL4IN COT RADPQ HIGHLY ABSRB

## (undated) DEVICE — SYR 3ML LL TIP 1/10ML GRAD --

## (undated) DEVICE — SUT CHRMC 3-0 27IN SH BRN --

## (undated) DEVICE — PROBE VASC 8MHZ WTRPRF

## (undated) DEVICE — STRAP,POSITIONING,KNEE/BODY,FOAM,4X60": Brand: MEDLINE

## (undated) DEVICE — STERILE POLYISOPRENE POWDER-FREE SURGICAL GLOVES: Brand: PROTEXIS

## (undated) DEVICE — NEONATAL-ADULT SPO2 SENSOR: Brand: NELLCOR

## (undated) DEVICE — ELECTRODE,RADIOTRANSLUCENT,FOAM,5PK: Brand: MEDLINE

## (undated) DEVICE — CONTAINER SPEC 20 ML LID NEUT BUFF FORMALIN 10 % POLYPR STS

## (undated) DEVICE — LOOP VES MAXI YEL 2/PK

## (undated) DEVICE — HANDLE LT SNAP ON ULT DURABLE LENS FOR TRUMPF ALC DISPOSABLE

## (undated) DEVICE — DISH PETRI W/LID STRL -- MIN CASE ORDER IS 2 CA

## (undated) DEVICE — 450 ML BOTTLE OF 0.05% CHLORHEXIDINE GLUCONATE IN 99.95% STERILE WATER FOR IRRIGATION, USP AND APPLICATOR.: Brand: IRRISEPT ANTIMICROBIAL WOUND LAVAGE

## (undated) DEVICE — TOWEL 4 PLY TISS 19X30 SUE WHT

## (undated) DEVICE — TOWEL SURG W17XL27IN STD BLU COT NONFENESTRATED PREWASHED

## (undated) DEVICE — Device

## (undated) DEVICE — SOLIDIFIER MEDC 1200ML -- CONVERT TO 356117

## (undated) DEVICE — SCANLAN® VASCU-STATT® II PLUS S-USE BULLDOG CLAMP W/FIRM PRESS GENTLE-JAW™- MINI ANGLED 45°(GREEN), CLAMPING PRESSURE 20-25 G (2/STERILE PKG): Brand: SCANLAN® VASCU-STATT® II PLUS S-USE BULLDOG CLAMP W/FIRM PRESS GENTLE-JAW™

## (undated) DEVICE — SUT ETHLN 4-0 18IN PS2 BLK --

## (undated) DEVICE — SYRINGE 50ML E/T

## (undated) DEVICE — SUTURE PERMAHAND SZ 3-0 L30IN NONABSORBABLE BLK SILK BRAID A304H

## (undated) DEVICE — SET ADMIN 16ML TBNG L100IN 2 Y INJ SITE IV PIGGY BK DISP

## (undated) DEVICE — BASIN EMSIS 16OZ GRAPHITE PLAS KID SHP MOLD GRAD FOR ORAL

## (undated) DEVICE — SUTURE PROL SZ 5-0 L24IN NONABSORBABLE BLU RB-2 L13IN 1/2 8554H

## (undated) DEVICE — SYR 10ML LUER LOK 1/5ML GRAD --

## (undated) DEVICE — Z DISCONTINUED PER MEDLINE LINE GAS SAMPLING O2/CO2 LNG AD 13 FT NSL W/ TBNG FILTERLINE

## (undated) DEVICE — SNARE ENDOSCP M L240CM W27MM SHTH DIA2.4MM CHN 2.8MM OVL

## (undated) DEVICE — GARMENT,MEDLINE,DVT,INT,CALF,MED, GEN2: Brand: MEDLINE

## (undated) DEVICE — DRAPE,REIN 53X77,STERILE: Brand: MEDLINE

## (undated) DEVICE — NEEDLE HYPO 18GA L1.5IN PNK S STL HUB POLYPR SHLD REG BVL

## (undated) DEVICE — 1200 GUARD II KIT W/5MM TUBE W/O VAC TUBE: Brand: GUARDIAN

## (undated) DEVICE — SUT PROL 6-0 18IN BV1 DA BLU --

## (undated) DEVICE — SOLUTION LACTATED RINGERS INJECTION USP

## (undated) DEVICE — RETRIEVER ENDOSCP L230CM DIA2.5MM NET W3XL5.5CM MIN WRK CHN

## (undated) DEVICE — CATH IV AUTOGRD BC BLU 22GA 25 -- INSYTE

## (undated) DEVICE — TRAP SPEC COLL POLYP POLYSTYR --

## (undated) DEVICE — REM POLYHESIVE ADULT PATIENT RETURN ELECTRODE: Brand: VALLEYLAB

## (undated) DEVICE — PREP SKN CHLRAPRP APL 26ML STR --

## (undated) DEVICE — INFECTION CONTROL KIT SYS

## (undated) DEVICE — CLIP INT L235CM WRK CHAN DIA2.8MM OPN 11MM LCK MECHANISM MR